# Patient Record
Sex: FEMALE | Race: WHITE | NOT HISPANIC OR LATINO | Employment: OTHER | URBAN - METROPOLITAN AREA
[De-identification: names, ages, dates, MRNs, and addresses within clinical notes are randomized per-mention and may not be internally consistent; named-entity substitution may affect disease eponyms.]

---

## 2017-06-19 ENCOUNTER — TRANSFERRED RECORDS (OUTPATIENT)
Dept: HEALTH INFORMATION MANAGEMENT | Facility: CLINIC | Age: 67
End: 2017-06-19

## 2017-06-19 LAB
ASTHMA CONTROL TEST SCORE: 23
ER ASTHMA: 0
HOSPITALIZATION ASTHMA: 0

## 2017-08-25 DIAGNOSIS — I10 ESSENTIAL HYPERTENSION, BENIGN: ICD-10-CM

## 2017-08-26 NOTE — TELEPHONE ENCOUNTER
hydrochlorothiazide (HYDRODIURIL) 25 MG tablet  Last Written Prescription Date: 10/14/2016  Last Fill Quantity: 90, # refills: 1  Last Office Visit with [facility], [facility] or [facility] prescribing provider: 11/11/2016       Potassium   Date Value Ref Range Status   11/11/2016 4.1 3.4 - 5.3 mmol/L Final     Creatinine   Date Value Ref Range Status   08/19/2016 0.78 0.52 - 1.04 mg/dL Final     BP Readings from Last 3 Encounters:   11/11/16 140/80   10/25/16 128/72   10/14/16 120/80

## 2017-08-28 RX ORDER — HYDROCHLOROTHIAZIDE 25 MG/1
TABLET ORAL
Qty: 90 TABLET | Refills: 0 | Status: SHIPPED | OUTPATIENT
Start: 2017-08-28 | End: 2017-10-04

## 2017-08-28 NOTE — TELEPHONE ENCOUNTER
Medication is being filled for 1 time refill only due to:  Patient needs office visit and labs for more refills.

## 2017-09-08 ENCOUNTER — TELEPHONE (OUTPATIENT)
Dept: FAMILY MEDICINE | Facility: CLINIC | Age: 67
End: 2017-09-08

## 2017-09-08 DIAGNOSIS — M22.41 CHONDROMALACIA OF PATELLA, RIGHT: Primary | ICD-10-CM

## 2017-09-08 NOTE — TELEPHONE ENCOUNTER
9/8/2017    Attempt 1    Contacted patient in regards to scheduling VIP mammogram  Message on voicemail     Patient is also due for - Preventive Health Screening N/A    Comments:       Outreach   KV

## 2017-10-04 ENCOUNTER — RADIANT APPOINTMENT (OUTPATIENT)
Dept: BONE DENSITY | Facility: CLINIC | Age: 67
End: 2017-10-04
Attending: FAMILY MEDICINE
Payer: COMMERCIAL

## 2017-10-04 ENCOUNTER — RADIANT APPOINTMENT (OUTPATIENT)
Dept: GENERAL RADIOLOGY | Facility: CLINIC | Age: 67
End: 2017-10-04
Attending: FAMILY MEDICINE
Payer: COMMERCIAL

## 2017-10-04 ENCOUNTER — OFFICE VISIT (OUTPATIENT)
Dept: FAMILY MEDICINE | Facility: CLINIC | Age: 67
End: 2017-10-04
Payer: COMMERCIAL

## 2017-10-04 ENCOUNTER — TELEPHONE (OUTPATIENT)
Dept: FAMILY MEDICINE | Facility: CLINIC | Age: 67
End: 2017-10-04

## 2017-10-04 VITALS
OXYGEN SATURATION: 96 % | HEIGHT: 65 IN | WEIGHT: 184 LBS | DIASTOLIC BLOOD PRESSURE: 72 MMHG | RESPIRATION RATE: 16 BRPM | BODY MASS INDEX: 30.66 KG/M2 | TEMPERATURE: 96.8 F | HEART RATE: 75 BPM | SYSTOLIC BLOOD PRESSURE: 134 MMHG

## 2017-10-04 DIAGNOSIS — M25.561 ACUTE PAIN OF RIGHT KNEE: ICD-10-CM

## 2017-10-04 DIAGNOSIS — N81.11 CYSTOCELE, MIDLINE: ICD-10-CM

## 2017-10-04 DIAGNOSIS — Z23 NEED FOR PROPHYLACTIC VACCINATION AND INOCULATION AGAINST INFLUENZA: ICD-10-CM

## 2017-10-04 DIAGNOSIS — Z00.00 ROUTINE GENERAL MEDICAL EXAMINATION AT A HEALTH CARE FACILITY: Primary | ICD-10-CM

## 2017-10-04 DIAGNOSIS — Z00.00 ROUTINE GENERAL MEDICAL EXAMINATION AT A HEALTH CARE FACILITY: ICD-10-CM

## 2017-10-04 DIAGNOSIS — R73.03 PRE-DIABETES: ICD-10-CM

## 2017-10-04 DIAGNOSIS — I10 BENIGN ESSENTIAL HYPERTENSION: ICD-10-CM

## 2017-10-04 DIAGNOSIS — M79.644 THUMB PAIN, RIGHT: ICD-10-CM

## 2017-10-04 LAB
ALBUMIN UR-MCNC: NEGATIVE MG/DL
ANION GAP SERPL CALCULATED.3IONS-SCNC: 8 MMOL/L (ref 3–14)
APPEARANCE UR: CLEAR
BILIRUB UR QL STRIP: NEGATIVE
BUN SERPL-MCNC: 16 MG/DL (ref 7–30)
CALCIUM SERPL-MCNC: 10.1 MG/DL (ref 8.5–10.1)
CHLORIDE SERPL-SCNC: 105 MMOL/L (ref 94–109)
CHOLEST SERPL-MCNC: 212 MG/DL
CO2 SERPL-SCNC: 26 MMOL/L (ref 20–32)
COLOR UR AUTO: YELLOW
CREAT SERPL-MCNC: 0.88 MG/DL (ref 0.52–1.04)
GFR SERPL CREATININE-BSD FRML MDRD: 64 ML/MIN/1.7M2
GLUCOSE SERPL-MCNC: 108 MG/DL (ref 70–99)
GLUCOSE UR STRIP-MCNC: NEGATIVE MG/DL
HBA1C MFR BLD: 5.4 % (ref 4.3–6)
HDLC SERPL-MCNC: 78 MG/DL
HGB UR QL STRIP: NEGATIVE
KETONES UR STRIP-MCNC: NEGATIVE MG/DL
LDLC SERPL CALC-MCNC: 120 MG/DL
LEUKOCYTE ESTERASE UR QL STRIP: NEGATIVE
NITRATE UR QL: NEGATIVE
NONHDLC SERPL-MCNC: 134 MG/DL
PH UR STRIP: 6.5 PH (ref 5–7)
POTASSIUM SERPL-SCNC: 4.2 MMOL/L (ref 3.4–5.3)
SODIUM SERPL-SCNC: 139 MMOL/L (ref 133–144)
SOURCE: NORMAL
SP GR UR STRIP: 1.01 (ref 1–1.03)
TRIGL SERPL-MCNC: 70 MG/DL
UROBILINOGEN UR STRIP-ACNC: 0.2 EU/DL (ref 0.2–1)

## 2017-10-04 PROCEDURE — 80061 LIPID PANEL: CPT | Performed by: FAMILY MEDICINE

## 2017-10-04 PROCEDURE — 90662 IIV NO PRSV INCREASED AG IM: CPT | Performed by: FAMILY MEDICINE

## 2017-10-04 PROCEDURE — 81003 URINALYSIS AUTO W/O SCOPE: CPT | Performed by: FAMILY MEDICINE

## 2017-10-04 PROCEDURE — 83036 HEMOGLOBIN GLYCOSYLATED A1C: CPT | Performed by: FAMILY MEDICINE

## 2017-10-04 PROCEDURE — 99397 PER PM REEVAL EST PAT 65+ YR: CPT | Mod: 25 | Performed by: FAMILY MEDICINE

## 2017-10-04 PROCEDURE — 80048 BASIC METABOLIC PNL TOTAL CA: CPT | Performed by: FAMILY MEDICINE

## 2017-10-04 PROCEDURE — 77080 DXA BONE DENSITY AXIAL: CPT | Performed by: INTERNAL MEDICINE

## 2017-10-04 PROCEDURE — G0008 ADMIN INFLUENZA VIRUS VAC: HCPCS | Performed by: FAMILY MEDICINE

## 2017-10-04 PROCEDURE — 73565 X-RAY EXAM OF KNEES: CPT

## 2017-10-04 PROCEDURE — 36415 COLL VENOUS BLD VENIPUNCTURE: CPT | Performed by: FAMILY MEDICINE

## 2017-10-04 PROCEDURE — 99214 OFFICE O/P EST MOD 30 MIN: CPT | Mod: 25 | Performed by: FAMILY MEDICINE

## 2017-10-04 RX ORDER — AMLODIPINE BESYLATE 5 MG/1
5 TABLET ORAL DAILY
Qty: 90 TABLET | Refills: 3 | Status: SHIPPED | OUTPATIENT
Start: 2017-10-04 | End: 2018-12-14

## 2017-10-04 RX ORDER — LATANOPROST 50 UG/ML
1 SOLUTION/ DROPS OPHTHALMIC AT BEDTIME
COMMUNITY

## 2017-10-04 NOTE — LETTER
My Asthma Action Plan  Name: Jennifer Jane   YOB: 1950  Date: 10/4/2017   My doctor: Sun Person MD   My clinic: [facility]        My Control Medicine: { :940504}  My Rescue Medicine: { :106128}  {AAP include Oral Steroid:545090} My Asthma Severity: { :961398}  Avoid your asthma triggers: { :470007}        {Is patient a child or adult?:205243}       GREEN ZONE   Good Control    I feel good    No cough or wheeze    Can work, sleep and play without asthma symptoms       Take your asthma control medicine every day.     1. If exercise triggers your asthma, take your rescue medication    15 minutes before exercise or sports, and    During exercise if you have asthma symptoms  2. Spacer to use with inhaler: If you have a spacer, make sure to use it with your inhaler             YELLOW ZONE Getting Worse  I have ANY of these:    I do not feel good    Cough or wheeze    Chest feels tight    Wake up at night   1. Keep taking your Green Zone medications  2. Start taking your rescue medicine:    every 20 minutes for up to 1 hour. Then every 4 hours for 24-48 hours.  3. If you stay in the Yellow Zone for more than 12-24 hours, contact your doctor.  4. If you do not return to the Green Zone in 12-24 hours or you get worse, start taking your oral steroid medicine if prescribed by your provider.           RED ZONE Medical Alert - Get Help  I have ANY of these:    I feel awful    Medicine is not helping    Breathing getting harder    Trouble walking or talking    Nose opens wide to breathe       1. Take your rescue medicine NOW  2. If your provider has prescribed an oral steroid medicine, start taking it NOW  3. Call your doctor NOW  4. If you are still in the Red Zone after 20 minutes and you have not reached your doctor:    Take your rescue medicine again and    Call 911 or go to the emergency room right away    See your regular doctor within 2 weeks of an Emergency Room  or Urgent Care visit for follow-up treatment.        Electronically signed by: Marjorie Siddiqi, October 4, 2017    Annual Reminders:  Meet with Asthma Educator,  Flu Shot in the Fall, consider Pneumonia Vaccination for patients with asthma (aged 19 and older).    Pharmacy: [facility] [address] [place] [address]                    Asthma Triggers  How To Control Things That Make Your Asthma Worse    Triggers are things that make your asthma worse.  Look at the list below to help you find your triggers and what you can do about them.  You can help prevent asthma flare-ups by staying away from your triggers.      Trigger                                                          What you can do   Cigarette Smoke  Tobacco smoke can make asthma worse. Do not allow smoking in your home, car or around you.  Be sure no one smokes at a child s day care or school.  If you smoke, ask your health care provider for ways to help you quit.  Ask family members to quit too.  Ask your health care provider for a referral to Quit Plan to help you quit smoking, or call 5-973-209-PLAN.     Colds, Flu, Bronchitis  These are common triggers of asthma. Wash your hands often.  Don t touch your eyes, nose or mouth.  Get a flu shot every year.     Dust Mites  These are tiny bugs that live in cloth or carpet. They are too small to see. Wash sheets and blankets in hot water every week.   Encase pillows and mattress in dust mite proof covers.  Avoid having carpet if you can. If you have carpet, vacuum weekly.   Use a dust mask and HEPA vacuum.   Pollen and Outdoor Mold  Some people are allergic to trees, grass, or weed pollen, or molds. Try to keep your windows closed.  Limit time out doors when pollen count is high.   Ask you health care provider about taking medicine during allergy season.     Animal Dander  Some people are allergic to skin flakes, urine or saliva from pets with fur or feathers. Keep pets with fur or feathers  out of your home.    If you can t keep the pet outdoors, then keep the pet out of your bedroom.  Keep the bedroom door closed.  Keep pets off cloth furniture and away from stuffed toys.     Mice, Rats, and Cockroaches  Some people are allergic to the waste from these pests.   Cover food and garbage.  Clean up spills and food crumbs.  Store grease in the refrigerator.   Keep food out of the bedroom.   Indoor Mold  This can be a trigger if your home has high moisture. Fix leaking faucets, pipes, or other sources of water.   Clean moldy surfaces.  Dehumidify basement if it is damp and smelly.   Smoke, Strong Odors, and Sprays  These can reduce air quality. Stay away from strong odors and sprays, such as perfume, powder, hair spray, paints, smoke incense, paint, cleaning products, candles and new carpet.   Exercise or Sports  Some people with asthma have this trigger. Be active!  Ask your doctor about taking medicine before sports or exercise to prevent symptoms.    Warm up for 5-10 minutes before and after sports or exercise.     Other Triggers of Asthma  Cold air:  Cover your nose and mouth with a scarf.  Sometimes laughing or crying can be a trigger.  Some medicines and food can trigger asthma.

## 2017-10-04 NOTE — PROGRESS NOTES
SUBJECTIVE:   Jennifer Jane is a 66 year old female who presents for Preventive Visit.  Are you in the first 12 months of your Medicare coverage?  No    Physical   Annual:     Getting at least 3 servings of Calcium per day::  Yes    Bi-annual eye exam::  Yes    Dental care twice a year::  Yes    Sleep apnea or symptoms of sleep apnea::  None    Diet::  Regular (no restrictions)    Taking medications regularly::  Yes    Medication side effects::  Other    Additional concerns today::  YES      COGNITIVE SCREEN  1) Repeat 3 items (Banana, Sunrise, Chair)    2) Clock draw: NORMAL  3) 3 item recall: Recalls 3 objects  Results: 3 items recalled: COGNITIVE IMPAIRMENT LESS LIKELY    Mini-CogTM Copyright S Konstantin. Licensed by the author for use in [facility]; reprinted with permission (devin@[email or web address]). All rights reserved.        Reviewed and updated as needed this visit by clinical staff       Reviewed and updated as needed this visit by Provider      Social History   Substance Use Topics     Smoking status: Never Smoker     Smokeless tobacco: Never Used     Alcohol use 0.0 oz/week     0 Standard drinks or equivalent per week       The patient does not drink >3 drinks per day nor >7 drinks per week.    PROBLEMS TO ADD ON...  She wishes to also address some pain in the right knee at today's visit. These have been moderate to severe in nature, gradual in onset until last week when helped a friend move and since then has been really in a lot of pain.  Can't go up or down stairs, feels unstable.   Exam shows reduced range of motion of right knee and tenderness medial joint line, positive rosi's, concerning for meniscal tear.  This pain needs further workup - will get xray today and likely MRI if results are negative.  Ordred both today, will call her with results and plan.  In meantime ice, ACE wrap, and OTC or prescription NSAID's are recommended for PRN use, side effects are discussed. Return for further  "discussion if these persist or worsen.    She wishes to also address some pain in the right thumb at today's visit. These have been mild-to-moderate in nature, gradual in onset. Exam shows localized tenderness of right MCP. These pains seem benign and are likely related to osteoarthritis. OTC or prescription NSAID's are recommended for PRN use, side effects are discussed. See hand surgeon to discuss possible corticosteroid injecton and/or would surgery help at this point?  Been over a year and worsening.    Also, she has additional complaints of doesn't like blood pressure med; makes her feel \"dried out\".  What else could she try?  blood pressure still in pre-hypertensive range at 130/70s.  Plan: switch to amlodipine, discussed risks and benefits and side effects, check blood pressure at home daily for next month and if high Return to clinic.    Also, she has additional complaints of needing to double urinate for last year.  On exam mild cystocele with valsalva, otherwise normal.  Plan: check Urinalysis today at patient request, discussed kegels, follow up if worsens.        Today's PHQ-2 Score: PHQ-2 ( 1999 [employer]) 10/4/2017   Q1: Little interest or pleasure in doing things 0   Q2: Feeling down, depressed or hopeless 0   PHQ-2 Score 0   Q1: Little interest or pleasure in doing things Not at all   Q2: Feeling down, depressed or hopeless Not at all   PHQ-2 Score 0       Do you feel safe in your environment - Yes    Do you have a Health Care Directive?: No: Advance care planning reviewed with patient; information given to patient to review.      Current providers sharing in care for this patient include: Patient Care Team:  Sun Person MD as PCP - General (Family Practice)      Hearing impairment: No    Ability to successfully perform activities of daily living: Yes, no assistance needed     Fall risk:         Home safety:  none identified      The following health maintenance items are reviewed in Epic and " "correct as of today:Health Maintenance   Topic Date Due     MAMMO SCREEN Q2 YR (SYSTEM ASSIGNED)  01/02/2017     FALL RISK ASSESSMENT  08/19/2017     INFLUENZA VACCINE (SYSTEM ASSIGNED)  09/01/2017     ASTHMA ACTION PLAN Q1 YR  10/14/2017     ASTHMA CONTROL TEST Q6 MOS  12/19/2017     ADVANCE DIRECTIVE PLANNING Q5 YRS  04/15/2020     TETANUS IMMUNIZATION (SYSTEM ASSIGNED)  01/06/2021     LIPID SCREEN Q5 YR FEMALE (SYSTEM ASSIGNED)  08/19/2021     COLON CANCER SCREEN (SYSTEM ASSIGNED)  04/15/2023     DEXA SCAN SCREENING (SYSTEM ASSIGNED)  Addressed     PNEUMOCOCCAL  Completed     HEPATITIS C SCREENING  Completed     Labs reviewed in EPIC  BP Readings from Last 3 Encounters:   10/04/17 134/72   11/11/16 140/80   10/25/16 128/72    Wt Readings from Last 3 Encounters:   10/04/17 184 lb (83.5 kg)   11/11/16 177 lb (80.3 kg)   10/25/16 174 lb 9.6 oz (79.2 kg)              ROS:  Constitutional, HEENT, cardiovascular, pulmonary, GI, , musculoskeletal, neuro, skin, endocrine and psych systems are negative, except as otherwise noted.      OBJECTIVE:   LMP  (LMP Unknown) Estimated body mass index is 29.45 kg/(m^2) as calculated from the following:    Height as of 11/11/16: 5' 5\" (1.651 m).    Weight as of 11/11/16: 177 lb (80.3 kg).  EXAM:   GENERAL APPEARANCE: healthy, alert and no distress  EYES: Eyes grossly normal to inspection, PERRL and conjunctivae and sclerae normal  HENT: ear canals and TM's normal, nose and mouth without ulcers or lesions, oropharynx clear and oral mucous membranes moist  NECK: no adenopathy, no asymmetry, masses, or scars and thyroid normal to palpation  RESP: lungs clear to auscultation - no rales, rhonchi or wheezes  BREAST: normal without masses, tenderness or nipple discharge and no palpable axillary masses or adenopathy  CV: regular rate and rhythm, normal S1 S2, no S3 or S4, no murmur, click or rub, no peripheral edema and peripheral pulses strong  ABDOMEN: soft, nontender, no " hepatosplenomegaly, no masses and bowel sounds normal   (female): normal female external genitalia, normal urethral meatus, vaginal mucosal atrophy noted, normal cervix, adnexae, and uterus without masses or abnormal discharge  MS: no musculoskeletal defects are noted and gait is age appropriate without ataxia  SKIN: no suspicious lesions or rashes  NEURO: Normal strength and tone, sensory exam grossly normal, mentation intact and speech normal  PSYCH: mentation appears normal and affect normal/bright    ASSESSMENT / PLAN:       ICD-10-CM    1. Routine general medical examination at a health care facility Z00.00 FLU VAC, SPLIT VIRUS IM > 3 YO (QUADRIVALENT) 16564     LIPID REFLEX TO DIRECT LDL PANEL     *MA Screening Digital Bilateral     Basic metabolic panel     DX Hip/Pelvis/Spine     UA reflex to Microscopic   2. Acute pain of right knee M25.561 XR Knee AP Standing Bilateral     MR Knee Left w/o & w Contrast     OFFICE/OUTPT VISIT,EST,LEVL IV    Pain for months, worse since helping friend move.  Exam consistent with meniscal tear.  Check xray today, depending on results MRI.  Ice, NSAIDS for now.  ACE.   3. Thumb pain, right M79.644 OFFICE/OUTPT VISIT,EST,LEVL IV    Has known arthritis.  Worsening.  See hand; they may do cortisone or discuss surgery.  Referred.   4. Benign essential hypertension I10 amLODIPine (NORVASC) 5 MG tablet     OFFICE/OUTPT VISIT,EST,LEVL IV    Pt feels hctz dries her out, would like to switch.  Lotrel not covered.  Will try amlodipine, check BP at home.  If not controlled RTC.   5. Cystocele, midline N81.11 UA reflex to Microscopic     OFFICE/OUTPT VISIT,EST,LEVL IV    Pt need to double void in last year.  On exam cystocele.  Plan: check UA.  Kegels.  Let us know if worsens.   6. Pre-diabetes R73.03 Hemoglobin A1c     OFFICE/OUTPT VISIT,EST,LEVL IV     PROBLEMS TO ADD ON...  She wishes to also address some pain in the right knee at today's visit. These have been moderate to severe in  "nature, gradual in onset until last week when helped a friend move and since then has been really in a lot of pain.  Can't go up or down stairs, feels unstable.   Exam shows reduced range of motion of right knee and tenderness medial joint line, positive rosi's, concerning for meniscal tear.  This pain needs further workup - will get xray today and likely MRI if results are negative.  Ordred both today, will call her with results and plan.  In meantime ice, ACE wrap, and OTC or prescription NSAID's are recommended for PRN use, side effects are discussed. Return for further discussion if these persist or worsen.    She wishes to also address some pain in the right thumb at today's visit. These have been mild-to-moderate in nature, gradual in onset. Exam shows localized tenderness of right MCP. These pains seem benign and are likely related to osteoarthritis. OTC or prescription NSAID's are recommended for PRN use, side effects are discussed. See hand surgeon to discuss possible corticosteroid injecton and/or would surgery help at this point?  Been over a year and worsening.    Also, she has additional complaints of doesn't like blood pressure med; makes her feel \"dried out\".  What else could she try?  blood pressure still in pre-hypertensive range at 130/70s.  Plan: switch to amlodipine, discussed risks and benefits and side effects, check blood pressure at home daily for next month and if high Return to clinic.    Also, she has additional complaints of needing to double urinate for last year.  On exam mild cystocele with valsalva, otherwise normal.  Plan: check Urinalysis today at patient request, discussed jarret, follow up if worsens.      End of Life Planning:  Patient currently has an advanced directive: Yes.  Practitioner is supportive of decision.    COUNSELING:  Reviewed preventive health counseling, as reflected in patient instructions        Estimated body mass index is 29.45 kg/(m^2) as calculated from " "the following:    Height as of 11/11/16: 5' 5\" (1.651 m).    Weight as of 11/11/16: 177 lb (80.3 kg).  Weight management plan: Discussed healthy diet and exercise guidelines and patient will follow up in 12 months in clinic to re-evaluate.   reports that she has never smoked. She has never used smokeless tobacco.        Appropriate preventive services were discussed with this patient, including applicable screening as appropriate for cardiovascular disease, diabetes, osteopenia/osteoporosis, and glaucoma.  As appropriate for age/gender, discussed screening for colorectal cancer, prostate cancer, breast cancer, and cervical cancer. Checklist reviewing preventive services available has been given to the patient.    Reviewed patients plan of care and provided an AVS. The Basic Care Plan (routine screening as documented in Health Maintenance) for Jennifer meets the Care Plan requirement. This Care Plan has been established and reviewed with the Patient.    Counseling Resources:  ATP IV Guidelines  Pooled Cohorts Equation Calculator  Breast Cancer Risk Calculator  FRAX Risk Assessment  ICSI Preventive Guidelines  Dietary Guidelines for Americans, 2010  USDA's MyPlate  ASA Prophylaxis  Lung CA Screening    Sun Person MD  [facility] for [facility] submitted by the patient on 10/4/2017   PHQ-2 Score: 0    "

## 2017-10-04 NOTE — NURSING NOTE
"Chief Complaint   Patient presents with     Physical       Initial /72  Pulse 75  Temp 96.8  F (36  C) (Tympanic)  Resp 16  Ht 5' 5\" (1.651 m)  Wt 184 lb (83.5 kg)  LMP  (LMP Unknown)  SpO2 96%  BMI 30.62 kg/m2 Estimated body mass index is 30.62 kg/(m^2) as calculated from the following:    Height as of this encounter: 5' 5\" (1.651 m).    Weight as of this encounter: 184 lb (83.5 kg).  Medication Reconciliation: complete   .Sulema CALLES      "

## 2017-10-04 NOTE — PROGRESS NOTES
Cayetano Rodriguez:  Your knee xray shows some stress and calcification, signs of wear and tear, but no severe arthritis which is good.  I think you need to get that MRI we talked about - please call to schedule it!  Let me know if you have any questions about this.  Dr. Sun Person MD  [facility]  [phone]

## 2017-10-04 NOTE — PROGRESS NOTES
Good news, Jennifer!   Your lab results are stable.  Let me know if you have any questions.  Dr. Sun Person MD  [facility]  [phone]

## 2017-10-04 NOTE — MR AVS SNAPSHOT
After Visit Summary   10/4/2017    Jennifer Jane    MRN: [id number]           Patient Information     Date Of Birth          1950        Visit Information        Provider Department      10/4/2017 8:20 AM Sun Person MD [facility]        Today's Diagnoses     Routine general medical examination at a health care facility    -  1    Pre-diabetes        Acute pain of right knee        Thumb pain, right        Cystocele, midline          Care Instructions    For your dexa scan:  Call to schedule at [facility]: [phone].    HAND SURGEONS:  [place]:  Dr. Radha Ramirez  [phone]  Dr. Caio Mcgregor  Or  [facility]  Dr. Anuradha Parra [phone]    For your knee:  We'll check an xray today.  Depending on results, you likely will need an MRI.  Call to schedule:  For [place] or [facility] call [phone].      Preventive Health Recommendations    Female Ages 65 +    Yearly exam:     See your health care provider every year in order to  o Review health changes.   o Discuss preventive care.    o Review your medicines if your doctor has prescribed any.      You no longer need a yearly Pap test unless you've had an abnormal Pap test in the past 10 years. If you have vaginal symptoms, such as bleeding or discharge, be sure to talk with your provider about a Pap test.      Every 1 to 2 years, have a mammogram.  If you are over 69, talk with your health care provider about whether or not you want to continue having screening mammograms.      Every 10 years, have a colonoscopy. Or, have a yearly FIT test (stool test). These exams will check for colon cancer.       Have a cholesterol test every 5 years, or more often if your doctor advises it.       Have a diabetes test (fasting glucose) every three years. If you are at risk for diabetes, you should have this test more often.       At age 65, have a bone density scan (DEXA) to check for  osteoporosis (brittle bone disease).    Shots:    Get a flu shot each year.    Get a tetanus shot every 10 years.    Talk to your doctor about your pneumonia vaccines. There are now two you should receive - Pneumovax (PPSV 23) and Prevnar (PCV 13).    Talk to your doctor about the shingles vaccine.    Talk to your doctor about the hepatitis B vaccine.    Nutrition:     Eat at least 5 servings of fruits and vegetables each day.      Eat whole-grain bread, whole-wheat pasta and brown rice instead of white grains and rice.      Talk to your provider about Calcium and Vitamin D.     Lifestyle    Exercise at least 150 minutes a week (30 minutes a day, 5 days a week). This will help you control your weight and prevent disease.      Limit alcohol to one drink per day.      No smoking.       Wear sunscreen to prevent skin cancer.       See your dentist twice a year for an exam and cleaning.    See your eye doctor every 1 to 2 years to screen for conditions such as glaucoma, macular degeneration and cataracts.  Knee Pain with Possible Torn Meniscus    The meniscus is a tough cartilage pad that cushions the inside of the knee joint. It helps absorb the shock from movement. It also spreads the weight of your body evenly across the knee joint. This prevents excess wear and tear to the bones of that joint.  The most common causes of meniscal tears are injuries, especially related to sports and degenerative disease that happens with aging.  A meniscus tear commonly happens during a twisting injury when the knee is bent. This causes pain, swelling, reduced movement of the knee, and trouble walking. There may be popping, clicking, joint locking or inability to completely straighten the knee. Ligaments of the knee may also be injured.  A torn meniscus is diagnosed by physical exam and X-rays. In the case of a severe injury, the knee may be too painful to examine fully. A more accurate exam can be done after the initial swelling goes  down. An MRI may be ordered to make a final diagnosis.  If your healthcare provider suspects a meniscal injury, you will treat your knee with ice and rest and preventing movement of the knee. A splint or knee brace that keeps your leg straight may be put on to protect the joint. Depending on the severity of the injury, surgery may be needed. A cartilage injury may take 4-12 weeks to heal depending on how bad it is.  Home care  Stay off the injured leg as much as possible until you can walk on it without pain. If you have a lot of pain when walking, crutches or a walker may be prescribed. (These can be rented or bought at many pharmacies and surgical or orthopedic supply stores). Follow your healthcare provider's advice about when to begin putting weight on that leg.  Keep your leg elevated to reduce pain and swelling. When sleeping, place a pillow under the injured leg. When sitting, support the injured leg so it is level with your waist. This is very important during the first 48 hours.  Apply an ice pack over the injured area for 15 to 20 minutes every 3 to 6 hours. You should do this for the first 24 to 48 hours. You can make an ice pack by filling a plastic bag that seals at the top with ice cubes and then wrapping it with a thin towel. Continue to use ice packs for relief of pain and swelling as needed. As the ice melts, be careful to avoid getting your wrap, splint, or cast wet. After 48 hours, apply heat (warm shower or warm bath) for 15 to 20 minutes several times a day, or alternate ice and heat. You can place the ice pack directly over the splint. If you have to wear a hook-and-loop knee brace, you can open it to apply the ice pack, or heat, directly to the knee. Never put ice directly on the skin. Always wrap the ice in a towel or other type of cloth.  You may use over-the-counter pain medicine to control pain, unless another pain medicine was prescribed. If you have chronic liver or kidney disease or  ever had a stomach ulcer, talk with your healthcare provider before using these medicines.  If you were given a splint, keep it dry at all times. Bathe with your splint out of the water. Protect it with a large plastic bag that is rubber-banded at the top end. If a fiberglass splint gets wet, you can dry it with a hair dryer. If you have a hook-and-loop knee brace, you can remove this to bathe, unless told otherwise.  Check with your healthcare provider before returning to sports or full work duties.  Follow-up care  Follow up with your healthcare provider, or as advised. This is usually within 1-2 weeks. Further testing may be required to check the extent of your injury.  If X-rays were taken, you will be told of any new findings that may affect your care.  Call 911  Call 911 if you have:   Shortness of breath  Chest pain  When to seek medical advice  Call your healthcare provider right away if any of these occur:  Toes or foot gets swollen, cold, blue, numb, or tingly  Pain or swelling spreads over the knee or calf  Warmth or redness appears over the knee or calf  Fever of 100.4 F (38 C) or above lasting for 24 to 48 hours  Date Last Reviewed: 11/23/2015 2000-2017 [employer]. [address] [place] [address], [place] [address]. All rights reserved. This information is not intended as a substitute for professional medical care. Always follow your healthcare professional's instructions.                  Follow-ups after your visit        Your next 10 appointments already scheduled     Oct 06, 2017 10:45 AM CDT   MA SCREENING DIGITAL BILATERAL with [facility]MA[facility]   [facility]    [address]  [place] [address] [phone]           Do not use any powder, lotion or deodorant under your arms or on your breast. If you do, we will ask you to remove it before your exam.  Wear comfortable, two-piece clothing.  If  you have any allergies, tell your care team.  Bring any previous mammograms from other facilities or have them mailed to the breast center.              Future tests that were ordered for you today     Open Future Orders        Priority Expected Expires Ordered    DX Hip/Pelvis/Spine Routine  10/4/2018 10/4/2017    MR Knee Left w/o & w Contrast Routine  10/4/2018 10/4/2017    XR Knee AP Standing Bilateral Routine 10/4/2017 10/4/2018 10/4/2017    *MA Screening Digital Bilateral Routine  10/4/2018 10/4/2017            Who to contact     If you have questions or need follow up information about today's clinic visit or your schedule please contact [facility] directly at [phone].  Normal or non-critical lab and imaging results will be communicated to you by Sistemichart, letter or phone within 4 business days after the clinic has received the results. If you do not hear from us within 7 days, please contact the clinic through Sistemichart or phone. If you have a critical or abnormal lab result, we will notify you by phone as soon as possible.  Submit refill requests through J[employer] App or call your pharmacy and they will forward the refill request to us. Please allow 3 business days for your refill to be completed.          Additional Information About Your Visit        J[employer] App Information     [employer] App gives you secure access to your electronic health record. If you see a primary care provider, you can also send messages to your care team and make appointments. If you have questions, please call your primary care clinic.  If you do not have a primary care provider, please call [phone] and they will assist you.        Care EveryWhere ID     This is your Care EveryWhere ID. This could be used by other organizations to access your [facility] medical records  [id number]        Your Vitals Were     Pulse Temperature Respirations Height Last Period Pulse Oximetry    75 96.8  F (36  C) (Tympanic)  "16 5' 5\" (1.651 m) (LMP Unknown) 96%    BMI (Body Mass Index)                   30.62 kg/m2            Blood Pressure from Last 3 Encounters:   10/04/17 134/72   11/11/16 140/80   10/25/16 128/72    Weight from Last 3 Encounters:   10/04/17 184 lb (83.5 kg)   11/11/16 177 lb (80.3 kg)   10/25/16 174 lb 9.6 oz (79.2 kg)              We Performed the Following     Basic metabolic panel     FLU VAC, SPLIT VIRUS IM > 3 YO (QUADRIVALENT) 70637     Hemoglobin A1c     LIPID REFLEX TO DIRECT LDL PANEL     UA reflex to Microscopic        Primary Care Provider Office Phone # Fax #    Sun Person [phone] [address] [facility] [address]        Equal Access to Services     TAMARA WYNNE : Hadii aad ku hadasho Soomaali, waaxda luqadaha, qaybta kaalmada aderodyaroscoe, vadim thomas . So [facility] [phone].    ATENCIÓN: Si habla español, tiene a garcia disposición servicios gratuitos de asistencia lingüística. Brody al [phone].    We comply with applicable federal civil rights laws and [place] laws. We do not discriminate on the basis of race, color, national origin, age, disability, sex, sexual orientation, or gender identity.            Thank you!     Thank you for choosing [facility]  for your care. Our goal is always to provide you with excellent care. Hearing back from our patients is one way we can continue to improve our services. Please take a few minutes to complete the written survey that you may receive in the mail after your visit with us. Thank you!             Your Updated Medication List - Protect others around you: Learn how to safely use, store and throw away your medicines at www.disposemymeds.org.          This list is accurate as of: 10/4/17  8:53 AM.  Always use your most recent med list.                   Brand Name Dispense Instructions for use Diagnosis    ADVAIR -21 MCG/ACT Inhaler   Generic drug:  " fluticasone-salmeterol           albuterol 108 (90 BASE) MCG/ACT Inhaler    VENTOLIN HFA    3 Inhaler    Inhale 2 puffs into the lungs every 6 hours as needed for shortness of breath / dyspnea    Intermittent asthma       BENADRYL PO      Take 25 mg by mouth        hydrochlorothiazide 25 MG tablet    HYDRODIURIL    90 tablet    TAKE 1 TABLET (25 MG) BY MOUTH DAILY    Essential hypertension, benign       ibuprofen 200 MG tablet    ADVIL/MOTRIN     Take 1-2 tablets (200-400 mg) by mouth At Bedtime        latanoprost 0.005 % ophthalmic solution    XALATAN     Place 1 drop into the right eye At Bedtime        TUMS PO      Take  by mouth At Bedtime.        ZYRTEC ALLERGY 10 MG tablet   Generic drug:  cetirizine      Take 10 mg by mouth daily.

## 2017-10-04 NOTE — TELEPHONE ENCOUNTER
Patient called and states that the MRI ordered by Dr. Person was for the left knee when it should be for the right knee. Please advise. Thanks!

## 2017-10-04 NOTE — PATIENT INSTRUCTIONS
For your dexa scan:  Call to schedule at [facility]: [phone].    HAND SURGEONS:  [place]/:  Dr. Radha Ramirez  [phone]  Dr. Caio Mcgregor  Or  [facility]  Dr. Anuradha Parra [phone]    For your knee:  We'll check an xray today.  Depending on results, you likely will need an MRI.  Call to schedule:  For [place] or [facility] access hospital call [phone].      Preventive Health Recommendations    Female Ages 65 +    Yearly exam:     See your health care provider every year in order to  o Review health changes.   o Discuss preventive care.    o Review your medicines if your doctor has prescribed any.      You no longer need a yearly Pap test unless you've had an abnormal Pap test in the past 10 years. If you have vaginal symptoms, such as bleeding or discharge, be sure to talk with your provider about a Pap test.      Every 1 to 2 years, have a mammogram.  If you are over 69, talk with your health care provider about whether or not you want to continue having screening mammograms.      Every 10 years, have a colonoscopy. Or, have a yearly FIT test (stool test). These exams will check for colon cancer.       Have a cholesterol test every 5 years, or more often if your doctor advises it.       Have a diabetes test (fasting glucose) every three years. If you are at risk for diabetes, you should have this test more often.       At age 65, have a bone density scan (DEXA) to check for osteoporosis (brittle bone disease).    Shots:    Get a flu shot each year.    Get a tetanus shot every 10 years.    Talk to your doctor about your pneumonia vaccines. There are now two you should receive - Pneumovax (PPSV 23) and Prevnar (PCV 13).    Talk to your doctor about the shingles vaccine.    Talk to your doctor about the hepatitis B vaccine.    Nutrition:     Eat at least 5 servings of fruits and vegetables each day.      Eat whole-grain bread, whole-wheat pasta and brown rice instead of white grains and  rice.      Talk to your provider about Calcium and Vitamin D.     Lifestyle    Exercise at least 150 minutes a week (30 minutes a day, 5 days a week). This will help you control your weight and prevent disease.      Limit alcohol to one drink per day.      No smoking.       Wear sunscreen to prevent skin cancer.       See your dentist twice a year for an exam and cleaning.    See your eye doctor every 1 to 2 years to screen for conditions such as glaucoma, macular degeneration and cataracts.  Knee Pain with Possible Torn Meniscus    The meniscus is a tough cartilage pad that cushions the inside of the knee joint. It helps absorb the shock from movement. It also spreads the weight of your body evenly across the knee joint. This prevents excess wear and tear to the bones of that joint.  The most common causes of meniscal tears are injuries, especially related to sports and degenerative disease that happens with aging.  A meniscus tear commonly happens during a twisting injury when the knee is bent. This causes pain, swelling, reduced movement of the knee, and trouble walking. There may be popping, clicking, joint locking or inability to completely straighten the knee. Ligaments of the knee may also be injured.  A torn meniscus is diagnosed by physical exam and X-rays. In the case of a severe injury, the knee may be too painful to examine fully. A more accurate exam can be done after the initial swelling goes down. An MRI may be ordered to make a final diagnosis.  If your healthcare provider suspects a meniscal injury, you will treat your knee with ice and rest and preventing movement of the knee. A splint or knee brace that keeps your leg straight may be put on to protect the joint. Depending on the severity of the injury, surgery may be needed. A cartilage injury may take 4-12 weeks to heal depending on how bad it is.  Home care  Stay off the injured leg as much as possible until you can walk on it without pain. If  you have a lot of pain when walking, crutches or a walker may be prescribed. (These can be rented or bought at many pharmacies and surgical or orthopedic supply stores). Follow your healthcare provider's advice about when to begin putting weight on that leg.  Keep your leg elevated to reduce pain and swelling. When sleeping, place a pillow under the injured leg. When sitting, support the injured leg so it is level with your waist. This is very important during the first 48 hours.  Apply an ice pack over the injured area for 15 to 20 minutes every 3 to 6 hours. You should do this for the first 24 to 48 hours. You can make an ice pack by filling a plastic bag that seals at the top with ice cubes and then wrapping it with a thin towel. Continue to use ice packs for relief of pain and swelling as needed. As the ice melts, be careful to avoid getting your wrap, splint, or cast wet. After 48 hours, apply heat (warm shower or warm bath) for 15 to 20 minutes several times a day, or alternate ice and heat. You can place the ice pack directly over the splint. If you have to wear a hook-and-loop knee brace, you can open it to apply the ice pack, or heat, directly to the knee. Never put ice directly on the skin. Always wrap the ice in a towel or other type of cloth.  You may use over-the-counter pain medicine to control pain, unless another pain medicine was prescribed. If you have chronic liver or kidney disease or ever had a stomach ulcer, talk with your healthcare provider before using these medicines.  If you were given a splint, keep it dry at all times. Bathe with your splint out of the water. Protect it with a large plastic bag that is rubber-banded at the top end. If a fiberglass splint gets wet, you can dry it with a hair dryer. If you have a hook-and-loop knee brace, you can remove this to bathe, unless told otherwise.  Check with your healthcare provider before returning to sports or full work duties.  Follow-up  care  Follow up with your healthcare provider, or as advised. This is usually within 1-2 weeks. Further testing may be required to check the extent of your injury.  If X-rays were taken, you will be told of any new findings that may affect your care.  Call 911  Call 911 if you have:   Shortness of breath  Chest pain  When to seek medical advice  Call your healthcare provider right away if any of these occur:  Toes or foot gets swollen, cold, blue, numb, or tingly  Pain or swelling spreads over the knee or calf  Warmth or redness appears over the knee or calf  Fever of 100.4 F (38 C) or above lasting for 24 to 48 hours  Date Last Reviewed: 11/23/2015 2000-2017 [employer]. [address] [place] [address], [place] [id number]. All rights reserved. This information is not intended as a substitute for professional medical care. Always follow your healthcare professional's instructions.

## 2017-10-04 NOTE — PROGRESS NOTES
Injectable Influenza Immunization Documentation    1.  Is the person to be vaccinated sick today?   No    2. Does the person to be vaccinated have an allergy to a component   of the vaccine?   No    3. Has the person to be vaccinated ever had a serious reaction   to influenza vaccine in the past?   No    4. Has the person to be vaccinated ever had Guillain-Barré syndrome?   No    Form completed by BRITNEY Siddiqi CMA

## 2017-10-05 ENCOUNTER — HOSPITAL ENCOUNTER (OUTPATIENT)
Dept: MRI IMAGING | Facility: CLINIC | Age: 67
Discharge: HOME | End: 2017-10-05
Attending: FAMILY MEDICINE | Admitting: FAMILY MEDICINE
Payer: COMMERCIAL

## 2017-10-05 DIAGNOSIS — M25.561 ACUTE PAIN OF RIGHT KNEE: ICD-10-CM

## 2017-10-05 PROCEDURE — 73721 MRI JNT OF LWR EXTRE W/O DYE: CPT | Mod: RT

## 2017-10-06 ENCOUNTER — RADIANT APPOINTMENT (OUTPATIENT)
Dept: MAMMOGRAPHY | Facility: CLINIC | Age: 67
End: 2017-10-06
Payer: COMMERCIAL

## 2017-10-06 DIAGNOSIS — Z12.31 VISIT FOR SCREENING MAMMOGRAM: ICD-10-CM

## 2017-10-06 PROCEDURE — G0202 SCR MAMMO BI INCL CAD: HCPCS | Mod: TC

## 2017-10-06 NOTE — PROGRESS NOTES
Roque Rodriguez:  Good news!  Your mammogram looks normal and reassuring.  The breast center will send along a more detailed report.  Let us know if you have any questions about this.  Best,  Dr. Sun Person MD  [facility]  [phone]

## 2017-10-09 ENCOUNTER — MYC MEDICAL ADVICE (OUTPATIENT)
Dept: FAMILY MEDICINE | Facility: CLINIC | Age: 67
End: 2017-10-09

## 2017-10-09 NOTE — TELEPHONE ENCOUNTER
APPT INFORMATION    Date & Time: 10/19/17- 9:30 AM    Reason for Appt: Right Thumb    Referring Name/Clinic: Dr. Soco Person @ [facility]     Yes / No COMMENT / NOTES DATE & ACTION   Patient Contacted? No Records are in Epic.  10/9/17- Closing encounter.        RECORDS CLINIC NAME  (use N/A if no records ) DATE & ACTION RECEIVED RECS & IMG? Y/N   (may include other helpful notes)   Internal Clinics: [facility] 10/9/17 - Records are in [facility].  Yes - Office Note 10/4/17

## 2017-10-10 PROBLEM — M22.41 CHONDROMALACIA OF PATELLA, RIGHT: Status: ACTIVE | Noted: 2017-10-10

## 2017-10-10 NOTE — PROGRESS NOTES
I called Jennifer to discuss her MRI results.  She states has had major issues with this knee for a long time.  Plan:  Will see ortho.  Referred to one of the docs below.  Appointments: [phone]  Dr. Robles or Dr. Hebert    Or     [facility] Location  [phone]    Dr. Sun Person MD/[facility]

## 2017-10-10 NOTE — TELEPHONE ENCOUNTER
I called Jennifer to discuss her MRI results.  She states has had major issues with this knee for a long time.  Plan:  Will see ortho.  Referred to one of the docs below.  Appointments: [phone]  Dr. Robles or Dr. Hebert    Or     [facility] Location  [phone]    Dr. Sun Person MD/[facility]

## 2017-10-11 DIAGNOSIS — M79.644 THUMB PAIN, RIGHT: Primary | ICD-10-CM

## 2017-10-11 NOTE — PROGRESS NOTES
Cayetano Rodriguez:  Your bone density scan is back and shows mild bone thinning, or osteopenia.  Best treatment for this is plenty of calcium (I know you have this one down!) and frequent weight bearing exercise.  Let me know if you have any questions about this.  Dr. Sun Person MD  [facility]  [phone]

## 2017-10-12 DIAGNOSIS — M25.561 CHRONIC PAIN OF RIGHT KNEE: Primary | ICD-10-CM

## 2017-10-12 DIAGNOSIS — G89.29 CHRONIC PAIN OF RIGHT KNEE: Primary | ICD-10-CM

## 2017-10-16 ASSESSMENT — ENCOUNTER SYMPTOMS
JOINT SWELLING: 1
MUSCLE CRAMPS: 1
NECK PAIN: 0
BACK PAIN: 0
STIFFNESS: 1
MYALGIAS: 1
MUSCLE WEAKNESS: 0
ARTHRALGIAS: 1

## 2017-10-17 ENCOUNTER — OFFICE VISIT (OUTPATIENT)
Dept: ORTHOPEDICS | Facility: CLINIC | Age: 67
End: 2017-10-17

## 2017-10-17 VITALS
SYSTOLIC BLOOD PRESSURE: 153 MMHG | BODY MASS INDEX: 30.66 KG/M2 | HEIGHT: 65 IN | WEIGHT: 184 LBS | DIASTOLIC BLOOD PRESSURE: 74 MMHG | HEART RATE: 82 BPM

## 2017-10-17 DIAGNOSIS — M17.11 PRIMARY OSTEOARTHRITIS OF RIGHT KNEE: Primary | ICD-10-CM

## 2017-10-17 NOTE — PROGRESS NOTES
Initial Visit     Referring MD: Sun Person     CC: right knee pain.      HPI: Jennifer Jane is a 66 year old year old female who presents with generalized right knee pain. She first noticed the pain 4 years ago and has been getting worse over time.She was helping someone move on 9/28/17 and noted that the pain increased. The pain is mostly anterior in the knee, and she notes that the pain will sometimes radiates superiorly and inferiorly. No mechanical symptoms. She notes difficulty with stairs, knee flexion, biking, and standing for long periods of time. She is retired. She has tried heat,cold, rest, elevation. Her activity goals are biking and walking for regular exercise.    PMH:   Patient Active Problem List   Diagnosis     Pre-diabetes     Chronic rhinitis     Chronic dacryocystitis     Sciatica     Intermittent asthma with allergic rhinitis     Fibromyalgia     Amaurosis fugax     Chronic angle-closure glaucoma     Pseudoexfoliation glaucoma     Glaucoma suspect     Overweight (BMI 25.0-29.9)     Hypercholesterolemia     Benign essential hypertension     Cystocele, midline     Thumb pain, right     Acute pain of right knee     Chondromalacia of patella, right        PSH:   Past Surgical History:   Procedure Laterality Date     BACK SURGERY  1984    micro-disc-ectomy     BREAST SURGERY  1984    L Breast bx     CHOLECYSTECTOMY  1990     TUBAL LIGATION  1989        Medications:   Current Outpatient Prescriptions   Medication     latanoprost (XALATAN) 0.005 % ophthalmic solution     DiphenhydrAMINE HCl (BENADRYL PO)     amLODIPine (NORVASC) 5 MG tablet     ADVAIR -21 MCG/ACT inhaler     ibuprofen (ADVIL,MOTRIN) 200 MG tablet     albuterol (VENTOLIN HFA) 108 (90 BASE) MCG/ACT inhaler     Calcium Carbonate Antacid (TUMS PO)     cetirizine (ZYRTEC ALLERGY) 10 MG tablet     No current facility-administered medications for this visit.         Allergies:   Allergies   Allergen Reactions     Animal Dander       Dust Mites      Grass      Hibiclens [Chlorhexidine] Itching     Pollen Extract      Trees      Vistaril         SH:   Social History     Occupational History     Not on file.     Social History Main Topics     Smoking status: Never Smoker     Smokeless tobacco: Never Used     Alcohol use 0.0 oz/week     0 Standard drinks or equivalent per week     Drug use: No     Sexual activity: Yes     Partners: Male        ROS: General ROS: negative  Respiratory ROS: no cough, shortness of breath, or wheezing  Cardiovascular ROS: no chest pain or dyspnea on exertion     PE:   Gen: A&OX3    Knee       RIGHT   LEFT   Skin:    Intact   Intact   ROM:     0-110   0-130   Effusion:    Trace  Neg   Medial joint line tenderness: Neg   Neg   Lateral joint line tenderness: Neg   Neg   Juan:    Neg   Neg   Patella crepitus:   Neg   Neg   Patella tenderness:   Neg   Neg   Lachman:    Neg   Neg   Pivot shift:    Neg   Neg   Valgus stress:   Neg   Neg   Varus stress:   Neg   Neg   Posterior drawer:   Neg   Neg   N-V     intact   intact   Hip:     nml   nml   Lower extremity edema:  Neg   Neg    No TTP over condyles or plateau on right  Bilateral quad weakness      XR:   Lateral and Merchant views of the right knee with no acute fractures, dislocations. There is tricompartmental degenerative changes noted.    MRI:  1. Three-compartment chondromalacia. Loose body.  2. Mild effusion.    I have personally reviewed the imaging.         Impression:   66 year old female with right knee DJD and some core and thigh muscle deconditioning.     Plan:   The risks and benefits of the available surgical and non-surgical treatment options were discussed with the patient.  She would like to pursue PT for strengthening, gait training, and proprioceptive exercises.  F/U 2-3 months if no improvement with PT.  Would consider injections or possible bracing.  She understands that at some point in the future she would likely benefit from a TKA, but this is  not necessary at this point.      cc:   Referring provider   [unfilled]     Primary care provider   [address] [facility] [address]

## 2017-10-17 NOTE — MR AVS SNAPSHOT
After Visit Summary   10/17/2017    Jennifer Jane    MRN: [id number]           Patient Information     Date Of Birth          1950        Visit Information        Provider Department      10/17/2017 9:15 AM Yuriy Petersen MD [facility]        Today's Diagnoses     Primary osteoarthritis of right knee    -  1       Follow-ups after your visit        Additional Services     PHYSICAL THERAPY REFERRAL (Internal)       Physical Therapy Referral                  Your next 10 appointments already scheduled     Oct 18, 2017  8:10 AM CDT   (Arrive by 7:55 AM)   [facility] Extremity with Luis Miguel Maxwell PT   [facility] ([facility] [place])    [address] [place] [address] [phone]            Oct 19, 2017  9:10 AM CDT   XR FINGER RIGHT G/E 2 VIEWS with [id number]   [facility] XRay ([facility])    [address] [place] [id number] [phone]           Please bring a list of your current medicines to your exam. (Include vitamins, minerals and over-thecounter medicines.) Leave your valuables at home.  Tell your doctor if there is a chance you may be pregnant.  You do not need to do anything special for this exam.            Oct 19, 2017  9:30 AM CDT   (Arrive by 9:15 AM)   NEW HAND with Lucina Ramirez MD   [facility] ([facility])    [address] [place] [id number] [phone]            Oct 25, 2017  9:30 AM CDT   ROSA MAIRA Extremity with Luis Miguel Maxwell PT   [facility] ([facility] [place])    [address] [place] [address] [phone]            Nov 01, 2017 10:50 AM CDT   [facility] Extremity with [facility] ([facility])    [address] [place] [address] [phone]              Who to contact      "Please call your clinic at [phone] to:    Ask questions about your health    Make or cancel appointments    Discuss your medicines    Learn about your test results    Speak to your doctor   If you have compliments or concerns about an experience at your clinic, or if you wish to file a complaint, please contact [facility] Patient Relations at [phone] or email us at [email or web address]         Additional Information About Your Visit        [employer] Information     [employer] gives you secure access to your electronic health record. If you see a primary care provider, you can also send messages to your care team and make appointments. If you have questions, please call your primary care clinic.  If you do not have a primary care provider, please call [phone] and they will assist you.      [employer] is an electronic gateway that provides easy, online access to your medical records. With [employer], you can request a clinic appointment, read your test results, renew a prescription or communicate with your care team.     To access your existing account, please contact your [facility] or call [phone] for assistance.        Care EveryWhere ID     This is your Care EveryWhere ID. This could be used by other organizations to access your [facility] medical records  [id number]        Your Vitals Were     Pulse Height Last Period BMI (Body Mass Index)          82 5' 5\" (1.651 m) (LMP Unknown) 30.62 kg/m2         Blood Pressure from Last 3 Encounters:   10/17/17 153/74   10/04/17 134/72   11/11/16 140/80    Weight from Last 3 Encounters:   10/17/17 184 lb (83.5 kg)   10/04/17 184 lb (83.5 kg)   11/11/16 177 lb (80.3 kg)              We Performed the Following     PHYSICAL THERAPY REFERRAL (Internal)        Primary Care Provider Office Phone # Fax #    Sun Person [phone] [address] [facility] [address]      "   Equal Access to Services     [facility]: Hadii conor oliveira shayy Somelly, waaxda luqadaha, qaybta kaalkarol doriandarrinroscoe, waxlexie debra sandykyjean pierre thomas . So [facility] [phone].    ATENCIÓN: Si sheila gallardo, tiene a garcia disposición servicios gratuitos de asistencia lingüística. Gwename al [phone].    We comply with applicable federal civil rights laws and [place] laws. We do not discriminate on the basis of race, color, national origin, age, disability, sex, sexual orientation, or gender identity.            Thank you!     Thank you for choosing [facility]  for your care. Our goal is always to provide you with excellent care. Hearing back from our patients is one way we can continue to improve our services. Please take a few minutes to complete the written survey that you may receive in the mail after your visit with us. Thank you!             Your Updated Medication List - Protect others around you: Learn how to safely use, store and throw away your medicines at www.disposemymeds.org.          This list is accurate as of: 10/17/17 10:08 AM.  Always use your most recent med list.                   Brand Name Dispense Instructions for use Diagnosis    ADVAIR -21 MCG/ACT Inhaler   Generic drug:  fluticasone-salmeterol           albuterol 108 (90 BASE) MCG/ACT Inhaler    VENTOLIN HFA    3 Inhaler    Inhale 2 puffs into the lungs every 6 hours as needed for shortness of breath / dyspnea    Intermittent asthma       amLODIPine 5 MG tablet    NORVASC    90 tablet    Take 1 tablet (5 mg) by mouth daily    Benign essential hypertension       BENADRYL PO      Take 25 mg by mouth        ibuprofen 200 MG tablet    ADVIL/MOTRIN     Take 1-2 tablets (200-400 mg) by mouth At Bedtime        latanoprost 0.005 % ophthalmic solution    XALATAN     Place 1 drop into the right eye At Bedtime        TUMS PO      Take  by mouth At Bedtime.        ZYRTEC ALLERGY 10 MG tablet   Generic drug:  cetirizine       Take 10 mg by mouth daily.

## 2017-10-17 NOTE — LETTER
10/17/2017      RE: Jennifer Jane  [address]  [facility] [address]       Initial Visit     Referring MD: Sun Person     CC: right knee pain.      HPI: Jennifer Jane is a 66 year old year old female who presents with generalized right knee pain. She first noticed the pain 4 years ago and has been getting worse over time.She was helping someone move on 9/28/17 and noted that the pain increased. The pain is mostly anterior in the knee, and she notes that the pain will sometimes radiates superiorly and inferiorly. No mechanical symptoms. She notes difficulty with stairs, knee flexion, biking, and standing for long periods of time. She is retired. She has tried heat,cold, rest, elevation. Her activity goals are biking and walking for regular exercise.    PMH:   Patient Active Problem List   Diagnosis     Pre-diabetes     Chronic rhinitis     Chronic dacryocystitis     Sciatica     Intermittent asthma with allergic rhinitis     Fibromyalgia     Amaurosis fugax     Chronic angle-closure glaucoma     Pseudoexfoliation glaucoma     Glaucoma suspect     Overweight (BMI 25.0-29.9)     Hypercholesterolemia     Benign essential hypertension     Cystocele, midline     Thumb pain, right     Acute pain of right knee     Chondromalacia of patella, right        PSH:   Past Surgical History:   Procedure Laterality Date     BACK SURGERY  1984    micro-disc-ectomy     BREAST SURGERY  1984    L Breast bx     CHOLECYSTECTOMY  1990     TUBAL LIGATION  1989        Medications:   Current Outpatient Prescriptions   Medication     latanoprost (XALATAN) 0.005 % ophthalmic solution     DiphenhydrAMINE HCl (BENADRYL PO)     amLODIPine (NORVASC) 5 MG tablet     ADVAIR -21 MCG/ACT inhaler     ibuprofen (ADVIL,MOTRIN) 200 MG tablet     albuterol (VENTOLIN HFA) 108 (90 BASE) MCG/ACT inhaler     Calcium Carbonate Antacid (TUMS PO)     cetirizine (ZYRTEC ALLERGY) 10 MG tablet     No current facility-administered medications  for this visit.         Allergies:   Allergies   Allergen Reactions     Animal Dander      Dust Mites      Grass      Hibiclens [Chlorhexidine] Itching     Pollen Extract      Trees      Vistaril         SH:   Social History     Occupational History     Not on file.     Social History Main Topics     Smoking status: Never Smoker     Smokeless tobacco: Never Used     Alcohol use 0.0 oz/week     0 Standard drinks or equivalent per week     Drug use: No     Sexual activity: Yes     Partners: Male        ROS: General ROS: negative  Respiratory ROS: no cough, shortness of breath, or wheezing  Cardiovascular ROS: no chest pain or dyspnea on exertion     PE:   Gen: A&OX3    Knee       RIGHT   LEFT   Skin:    Intact   Intact   ROM:     0-110   0-130   Effusion:    Trace  Neg   Medial joint line tenderness: Neg   Neg   Lateral joint line tenderness: Neg   Neg   Juan:    Neg   Neg   Patella crepitus:   Neg   Neg   Patella tenderness:   Neg   Neg   Lachman:    Neg   Neg   Pivot shift:    Neg   Neg   Valgus stress:   Neg   Neg   Varus stress:   Neg   Neg   Posterior drawer:   Neg   Neg   N-V     intact   intact   Hip:     nml   nml   Lower extremity edema:  Neg   Neg    No TTP over condyles or plateau on right  Bilateral quad weakness      XR:   Lateral and Merchant views of the right knee with no acute fractures, dislocations. There is tricompartmental degenerative changes noted.    MRI:  1. Three-compartment chondromalacia. Loose body.  2. Mild effusion.    I have personally reviewed the imaging.         Impression:   66 year old female with right knee DJD and some core and thigh muscle deconditioning.     Plan:   The risks and benefits of the available surgical and non-surgical treatment options were discussed with the patient.  She would like to pursue PT for strengthening, gait training, and proprioceptive exercises.  F/U 2-3 months if no improvement with PT.  Would consider injections or possible bracing.  She  understands that at some point in the future she would likely benefit from a TKA, but this is not necessary at this point.      cc:   Referring provider   [unfilled]     Primary care provider   [address] [facility] [place] [address]     Yuriy Petersen MD

## 2017-10-18 ENCOUNTER — THERAPY VISIT (OUTPATIENT)
Dept: PHYSICAL THERAPY | Facility: CLINIC | Age: 67
End: 2017-10-18
Payer: COMMERCIAL

## 2017-10-18 DIAGNOSIS — M25.561 CHRONIC PAIN OF RIGHT KNEE: Primary | ICD-10-CM

## 2017-10-18 DIAGNOSIS — G89.29 CHRONIC PAIN OF RIGHT KNEE: Primary | ICD-10-CM

## 2017-10-18 PROCEDURE — G8978 MOBILITY CURRENT STATUS: HCPCS | Mod: GP | Performed by: PHYSICAL THERAPIST

## 2017-10-18 PROCEDURE — 97110 THERAPEUTIC EXERCISES: CPT | Mod: GP | Performed by: PHYSICAL THERAPIST

## 2017-10-18 PROCEDURE — G8979 MOBILITY GOAL STATUS: HCPCS | Mod: GP | Performed by: PHYSICAL THERAPIST

## 2017-10-18 PROCEDURE — 97161 PT EVAL LOW COMPLEX 20 MIN: CPT | Mod: GP | Performed by: PHYSICAL THERAPIST

## 2017-10-18 ASSESSMENT — ACTIVITIES OF DAILY LIVING (ADL)
HOW_WOULD_YOU_RATE_THE_OVERALL_FUNCTION_OF_YOUR_KNEE_DURING_YOUR_USUAL_DAILY_ACTIVITIES?: ABNORMAL
KNEEL ON THE FRONT OF YOUR KNEE: I AM UNABLE TO DO THE ACTIVITY
KNEE_ACTIVITY_OF_DAILY_LIVING_SCORE: 32.86
HOW_WOULD_YOU_RATE_THE_CURRENT_FUNCTION_OF_YOUR_KNEE_DURING_YOUR_USUAL_DAILY_ACTIVITIES_ON_A_SCALE_FROM_0_TO_100_WITH_100_BEING_YOUR_LEVEL_OF_KNEE_FUNCTION_PRIOR_TO_YOUR_INJURY_AND_0_BEING_THE_INABILITY_TO_PERFORM_ANY_OF_YOUR_USUAL_DAILY_ACTIVITIES?: 50
SQUAT: I AM UNABLE TO DO THE ACTIVITY
GO DOWN STAIRS: I AM UNABLE TO DO THE ACTIVITY
KNEE_ACTIVITY_OF_DAILY_LIVING_SUM: 23
SIT WITH YOUR KNEE BENT: ACTIVITY IS VERY DIFFICULT
AS_A_RESULT_OF_YOUR_KNEE_INJURY,_HOW_WOULD_YOU_RATE_YOUR_CURRENT_LEVEL_OF_DAILY_ACTIVITY?: ABNORMAL
GIVING WAY, BUCKLING OR SHIFTING OF KNEE: THE SYMPTOM AFFECTS MY ACTIVITY SEVERELY
SWELLING: I HAVE THE SYMPTOM BUT IT DOES NOT AFFECT MY ACTIVITY
GO UP STAIRS: ACTIVITY IS VERY DIFFICULT
STIFFNESS: THE SYMPTOM AFFECTS MY ACTIVITY MODERATELY
RAW_SCORE: 23
RISE FROM A CHAIR: ACTIVITY IS VERY DIFFICULT
STAND: ACTIVITY IS MINIMALLY DIFFICULT
WALK: ACTIVITY IS SOMEWHAT DIFFICULT
LIMPING: THE SYMPTOM AFFECTS MY ACTIVITY MODERATELY
WEAKNESS: THE SYMPTOM AFFECTS MY ACTIVITY MODERATELY
PAIN: THE SYMPTOM AFFECTS MY ACTIVITY MODERATELY

## 2017-10-18 NOTE — PROGRESS NOTES
Subjective:    Patient is a 66 year old female presenting with rehab right knee hpi.   Jennifer Jane is a 66 year old female with a right knee condition.  Condition occurred with:  Repetition/overuse.  Condition occurred: in the community.  This is a chronic condition  9/28/17. Patient reports multiple year history of B knee problems. Her R knee pain increased on 9/28/17 with helping friend move. She did a lot of stairs, bending, and squatting on that day..    Patient reports pain:  Anterior and in the joint.    Pain is described as aching and other (pressure) and is intermittent and reported as 3/10.  Associated symptoms:  Loss of motion/stiffness, loss of strength and edema.   Symptoms are exacerbated by ascending stairs, descending stairs, bending/squatting, kneeling and transfers and relieved by rest.  Since onset symptoms are gradually improving.  Special tests:  MRI and x-ray (bone spur, chondromalacia, loose body).      General health as reported by patient is good.  Pertinent medical history includes:  Overweight, high blood pressure and fibromyalgia.  Medical allergies: yes (see chart).  Other surgeries include:  Other and orthopedic surgery (see chart).  Current medications:  High blood pressure medication, pain medication, anti-inflammatory and other (asthma, allergy).  Current occupation is Retired RN.        Barriers include:  Stairs.    Red flags:  Pain at rest/night (consistent with current knee problem).                        Objective:      Gait:    Gait Type:  Antalgic   Assistive Devices:  None  Deviations:  Knee:  Knee extension decr RAnkle:  Push off decr R                                                      Knee Evaluation:  ROM:  Strength wnl knee: Able to perform supine SLR B without extensor lag.  AROM      Extension:  Left: WNL    Right:  Lacking 3 +  Flexion: Left: WNL    Right: 113 +  PROM      Extension: Left:   Right:  Lacking 3 +  Flexion: Left:   Right:  119+      Strength:          Quad Set Left:  WNL    Pain: -   Quad Set Right:  Delayed    Pain: +        Edema:  Edema of the knee: Mild edema R knee.      Functional Testing:          Quad:    Single Leg Squat:  Left:       Right:        Bilateral Leg Squat:  R knee pain  Moderate loss of control, hip substitution and excessive anterior knee excursion              General     ROS    Assessment/Plan:      Patient is a 66 year old female with right side knee complaints.    Patient has the following significant findings with corresponding treatment plan.                Diagnosis 1:  Right knee OA  Pain -  hot/cold therapy, manual therapy, splint/taping/bracing/orthotics, self management, education and home program  Decreased ROM/flexibility - manual therapy, therapeutic exercise and home program  Decreased strength - therapeutic exercise, therapeutic activities and home program  Decreased proprioception - neuro re-education, gait training, therapeutic activities and home program  Impaired gait - gait training and home program  Impaired muscle performance - neuro re-education and home program  Decreased function - therapeutic activities and home program    Therapy Evaluation Codes:   1) History comprised of:   Personal factors that impact the plan of care:      Time since onset of symptoms.    Comorbidity factors that impact the plan of care are:      Fibromyalgia, High blood pressure, Osteoarthritis and Overweight.     Medications impacting care: Anti-inflammatory, High blood pressure and Pain.  2) Examination of Body Systems comprised of:   Body structures and functions that impact the plan of care:      Knee.   Activity limitations that impact the plan of care are:      Bathing, Bending, Cooking, Dressing, Lifting, Sitting, Squatting/kneeling, Stairs, Standing and Walking.  3) Clinical presentation characteristics are:   Stable/Uncomplicated.  4) Decision-Making    Low complexity using standardized patient assessment instrument and/or  measureable assessment of functional outcome.  Cumulative Therapy Evaluation is: Low complexity.    Previous and current functional limitations:  (See Goal Flow Sheet for this information)    Short term and Long term goals: (See Goal Flow Sheet for this information)     Communication ability:  Patient appears to be able to clearly communicate and understand verbal and written communication and follow directions correctly.  Treatment Explanation - The following has been discussed with the patient:   RX ordered/plan of care  Anticipated outcomes  Possible risks and side effects  This patient would benefit from PT intervention to resume normal activities.   Rehab potential is good.    Frequency:  1 X week, once daily  Duration:  for 6 weeks tapering to 1 X every other week over 4 weeks  Discharge Plan:  Achieve all LTG.  Independent in home treatment program.  Reach maximal therapeutic benefit.    Please refer to the daily flowsheet for treatment today, total treatment time and time spent performing 1:1 timed codes.

## 2017-10-19 ENCOUNTER — OFFICE VISIT (OUTPATIENT)
Dept: ORTHOPEDICS | Facility: CLINIC | Age: 67
End: 2017-10-19

## 2017-10-19 ENCOUNTER — PRE VISIT (OUTPATIENT)
Dept: ORTHOPEDICS | Facility: CLINIC | Age: 67
End: 2017-10-19

## 2017-10-19 VITALS — BODY MASS INDEX: 30.66 KG/M2 | WEIGHT: 184 LBS | HEIGHT: 65 IN

## 2017-10-19 DIAGNOSIS — M18.11 ARTHRITIS OF CARPOMETACARPAL (CMC) JOINT OF RIGHT THUMB: Primary | ICD-10-CM

## 2017-10-19 RX ORDER — TRIAMCINOLONE ACETONIDE 40 MG/ML
40 INJECTION, SUSPENSION INTRA-ARTICULAR; INTRAMUSCULAR ONCE
Qty: 1 ML | Refills: 0 | OUTPATIENT
Start: 2017-10-19 | End: 2017-10-19

## 2017-10-19 RX ORDER — LIDOCAINE HYDROCHLORIDE 10 MG/ML
2 INJECTION, SOLUTION INFILTRATION; PERINEURAL ONCE
Qty: 2 ML | Refills: 0 | OUTPATIENT
Start: 2017-10-19 | End: 2017-10-19

## 2017-10-19 NOTE — MR AVS SNAPSHOT
After Visit Summary   10/19/2017    Jennifer Jane    MRN: [id number]           Patient Information     Date Of Birth          1950        Visit Information        Provider Department      10/19/2017 9:30 AM Lucina Ramirez MD [facility]        Today's Diagnoses     Arthritis of carpometacarpal (CMC) joint of right thumb    -  1       Follow-ups after your visit        Additional Services     HAND THERAPY       Hand Therapy Referral    Hand based splint, CMC protocol                  Your next 10 appointments already scheduled     Nov 08, 2017  2:00 PM CST   ROSA MARIA Hand with Gerri Castillo OT   [facility] ([facility])    [address] [place] [id number] [phone]            Nov 15, 2017 12:40 PM CST   ROSA MARIA Extremity with Luis Miguel Maxwell PT   [facility] ([facility] [place])    [address] [place] [id number] [phone]            Nov 29, 2017  9:30 AM CST   ROSA MARIA Extremity with Luis Miguel Maxwell PT   [facility] ([facility] [place])    [address] [place] [id number] [phone]            Jan 25, 2018 10:30 AM CST   (Arrive by 10:15 AM)   RETURN HAND with Lucina Ramirez MD   [facility] ([facility])    [address] [place] [id number] [phone]              Who to contact     Please call your clinic at [phone] to:    Ask questions about your health    Make or cancel appointments    Discuss your medicines    Learn about your test results    Speak to your doctor   If you have compliments or concerns about an experience at your clinic, or if you wish to file a complaint, please contact [facility] Patient Relations at [phone] or email us at [email or web address]         Additional Information About Your  "Visit        Factualhart Information     [employer] gives you secure access to your electronic health record. If you see a primary care provider, you can also send messages to your care team and make appointments. If you have questions, please call your primary care clinic.  If you do not have a primary care provider, please call [phone] and they will assist you.      [employer] is an electronic gateway that provides easy, online access to your medical records. With [employer] you can request a clinic appointment, read your test results, renew a prescription or communicate with your care team.     To access your existing account, please contact your [facility] or call [phone] for assistance.        Care EveryWhere ID     This is your Care EveryWhere ID. This could be used by other organizations to access your [place] medical records  [id number]        Your Vitals Were     Height Last Period BMI (Body Mass Index)             1.651 m (5' 5\") (LMP Unknown) 30.62 kg/m2          Blood Pressure from Last 3 Encounters:   10/17/17 153/74   10/04/17 134/72   11/11/16 140/80    Weight from Last 3 Encounters:   10/19/17 83.5 kg (184 lb)   10/17/17 83.5 kg (184 lb)   10/04/17 83.5 kg (184 lb)              We Performed the Following     HAND THERAPY     Small Joint/Bursa injection and/or drainage (Finger) [20600]          Today's Medication Changes          These changes are accurate as of: 10/19/17 11:59 PM.  If you have any questions, ask your nurse or doctor.               Start taking these medicines.        Dose/Directions    lidocaine 1 % injection   Used for:  Arthritis of carpometacarpal (CMC) joint of right thumb   Started by:  Lucina Ramirez MD        Dose:  2 mL   2 mLs by INTRA-ARTICULAR route once for 1 dose   Quantity:  2 mL   Refills:  0       triamcinolone acetonide 40 MG/ML injection   Commonly known as:  KENALOG   Used for:  Arthritis of carpometacarpal (CMC) joint of " right thumb   Started by:  Lucina Ramirez MD        Dose:  40 mg   1 mL (40 mg) by INTRA-ARTICULAR route once for 1 dose   Quantity:  1 mL   Refills:  0            Where to get your medicines      Some of these will need a paper prescription and others can be bought over the counter.  Ask your nurse if you have questions.     You don't need a prescription for these medications     lidocaine 1 % injection    triamcinolone acetonide 40 MG/ML injection                Primary Care Provider Office Phone # Fax #    Sun Mandi Person [phone]       [facility] [address]        Equal Access to Services     [facility]: Hadii conor oliveira haddylon Vallejo, wathanh rubalcava, qaybta radhaalmaroscoe castle, vadim thomas . [facility] [phone].    ATENCIÓN: Si habla español, tiene a garcia disposición servicios gratuitos de asistencia lingüística. [facility] [phone].    We comply with applicable federal civil rights laws and [place] laws. We do not discriminate on the basis of race, color, national origin, age, disability, sex, sexual orientation, or gender identity.            Thank you!     Thank you for choosing [facility]  for your care. Our goal is always to provide you with excellent care. Hearing back from our patients is one way we can continue to improve our services. Please take a few minutes to complete the written survey that you may receive in the mail after your visit with us. Thank you!             Your Updated Medication List - Protect others around you: Learn how to safely use, store and throw away your medicines at www.disposemymeds.org.          This list is accurate as of: 10/19/17 11:59 PM.  Always use your most recent med list.                   Brand Name Dispense Instructions for use Diagnosis    ADVAIR -21 MCG/ACT Inhaler   Generic drug:  fluticasone-salmeterol           albuterol 108 (90 BASE) MCG/ACT Inhaler     VENTOLIN HFA    3 Inhaler    Inhale 2 puffs into the lungs every 6 hours as needed for shortness of breath / dyspnea    Intermittent asthma       amLODIPine 5 MG tablet    NORVASC    90 tablet    Take 1 tablet (5 mg) by mouth daily    Benign essential hypertension       BENADRYL PO      Take 25 mg by mouth        ibuprofen 200 MG tablet    ADVIL/MOTRIN     Take 1-2 tablets (200-400 mg) by mouth At Bedtime        latanoprost 0.005 % ophthalmic solution    XALATAN     Place 1 drop into the right eye At Bedtime        lidocaine 1 % injection     2 mL    2 mLs by INTRA-ARTICULAR route once for 1 dose    Arthritis of carpometacarpal (CMC) joint of right thumb       triamcinolone acetonide 40 MG/ML injection    KENALOG    1 mL    1 mL (40 mg) by INTRA-ARTICULAR route once for 1 dose    Arthritis of carpometacarpal (CMC) joint of right thumb       TUMS PO      Take  by mouth At Bedtime.        ZYRTEC ALLERGY 10 MG tablet   Generic drug:  cetirizine      Take 10 mg by mouth daily.

## 2017-10-19 NOTE — NURSING NOTE
"Reason For Visit:   Chief Complaint   Patient presents with     Consult     Pain, right thumb. Pt stated that she has been dropping things recently. Pt stated that she also is unable to flatten her right hand. Pt stated that she has trouble cutting vegetables wand using using scissors. Referring:  YOLANDA FRANKLIN       Primary MD: Yolanda Franklin    ?  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    Date of surgery: none  Type of surgery: none.    Smoker: No  Request smoking cessation information: No    Ht 1.651 m (5' 5\")  Wt 83.5 kg (184 lb)  LMP  (LMP Unknown)  BMI 30.62 kg/m2    Pain Assessment  Patient Currently in Pain: Denies  Primary Pain Location: Hand (Thumb)  Pain Orientation: Right  Pain Descriptors: Sharp (intermittent, bee sting)  Aggravating Factors:  (Use)    Hand Dominance Evaluation  Hand Dominance: Left    Pinch force  R hand key force: 6.35 kg (14 lb)  L hand key force: 6.35 kg (14 lb)     force  R hand pincher force: 2.722 kg (6 lb)  R hand  level 2 force: 30.8 kg (68 lb)    L hand pincher force: 3.175 kg (7 lb)  L hand  level  2 force: 38.6 kg (85 lb)                                        Jasmin Barrios LPN    "

## 2017-10-19 NOTE — NURSING NOTE
[address] [address]  Dept: [phone]  ______________________________________________________________________________    Patient: Jennifer Jane   : 1950   MRN: [id number]   2017    INVASIVE PROCEDURE SAFETY CHECKLIST    Date: 2017   Procedure:Right thumb CMC joint injection  Patient Name: Jennifer Jane  MRN: [id number]  YOB: 1950    Action: Complete sections as appropriate. Any discrepancy results in a HARD COPY until resolved.     PRE PROCEDURE:  Patient ID verified with 2 identifiers (name and  or MRN): Yes  Procedure and site verified with patient/designee (when able): Yes  Accurate consent documentation in medical record: Yes  H&P (or appropriate assessment) documented in medical record: Yes  H&P must be up to 20 days prior to procedure and updates within 24 hours of procedure as applicable: Yes  Relevant diagnostic and radiology test results appropriately labeled and displayed as applicable: Yes  Procedure site(s) marked with provider initials: NA    TIMEOUT:  Time-Out performed immediately prior to starting procedure, including verbal and active participation of all team members addressing the following:Yes  * Correct patient identify  * Confirmed that the correct side and site are marked  * An accurate procedure consent form  * Agreement on the procedure to be done  * Correct patient position  * Relevant images and results are properly labeled and appropriately displayed  * The need to administer antibiotics or fluids for irrigation purposes during the procedure as applicable   * Safety precautions based on patient history or medication use    DURING PROCEDURE: Verification of correct person, site, and procedures any time the responsibility for care of the patient is transferred to another member of the care team.     The following medication was given:     MEDICATION:  Lidocaine without  epinephrine  ROUTE: IA  SITE: Right thumb CMC jt  DOSE: 2ml  LOT #: [id number]   : [employer]  EXPIRATION DATE: 06/21  NDC#: 30170-533-41  Was there drug waste? Yes  Amount of drug waste (mL): 18.  Reason for waste:  Single use vial    MEDICATION:  Kenalog 40 mg  ROUTE: IA  SITE: Right thumb CMC jt.  DOSE: 1ml  LOT #: [id number]  : [employer]  EXPIRATION DATE: 02/2019  NDC#: 8864-4080-41  Was there drug waste? No    Jo Ann Palumbo, ATC  October 19, 2017

## 2017-10-19 NOTE — PROGRESS NOTES
Orthopaedic Surgery Clinic Consult  Date of Service: 10/19/17    Chief Complaint: R wrist pain    History of Present Illness:  Pt is a RHD 66 year old female, healthy, presenting with R radial wrist pain.  Present for several years, has tried NSAIDs and thumb/wrist brace.  No injections.  Difficult to manipulate objects, very bothersome.  No numb/tingling.  Has developed thumb stiffness.    Past Medical History:  Past Medical History:   Diagnosis Date     Arthritis     Fibromyalgia, ? Osteoarthritis     Back injury Rutured disc 1983    Micro discectomy, approx date 1983     Insomnia 12/26/2012     Problem list name updated by automated process. Provider to review     Reduced vision 9/2016    L vision block, bilateral elevated eye presures,poss. Glacom     Uncomplicated asthma     Diagnosed as 1 yo       Past Surgical History:  Past Surgical History:   Procedure Laterality Date     BACK SURGERY  1984    micro-disc-ectomy     BREAST SURGERY  1984    L Breast bx     C STOMACH SURGERY PROCEDURE UNLISTED  3/1990    Cholecystectomy, 11/1989 tubal ligation     CHOLECYSTECTOMY  1990     HC SACROPLASTY      Micro discectomy approx 1983     TUBAL LIGATION  1989       Medications:  Current Outpatient Prescriptions   Medication Sig Dispense Refill     latanoprost (XALATAN) 0.005 % ophthalmic solution Place 1 drop into the right eye At Bedtime       DiphenhydrAMINE HCl (BENADRYL PO) Take 25 mg by mouth       amLODIPine (NORVASC) 5 MG tablet Take 1 tablet (5 mg) by mouth daily 90 tablet 3     ADVAIR -21 MCG/ACT inhaler        ibuprofen (ADVIL,MOTRIN) 200 MG tablet Take 1-2 tablets (200-400 mg) by mouth At Bedtime       albuterol (VENTOLIN HFA) 108 (90 BASE) MCG/ACT inhaler Inhale 2 puffs into the lungs every 6 hours as needed for shortness of breath / dyspnea 3 Inhaler 1     Calcium Carbonate Antacid (TUMS PO) Take  by mouth At Bedtime.       cetirizine (ZYRTEC ALLERGY) 10 MG tablet Take 10 mg by mouth daily.          Allergies:     Allergies   Allergen Reactions     Animal Dander      Dust Mites      Grass      Hibiclens [Chlorhexidine] Itching     Pollen Extract      Trees      Vistaril        Social History:  Quite active with knitting, baking, etc.  Retired school nurse.  Social History     Social History     Marital status:      Spouse name: N/A     Number of children: N/A     Years of education: N/A     Occupational History     Not on file.     Social History Main Topics     Smoking status: Never Smoker     Smokeless tobacco: Never Used     Alcohol use 0.0 oz/week      Comment: 0 -2 times a year     Drug use: No     Sexual activity: Yes     Partners: Male     Birth control/ protection: Post-menopausal, Female Surgical     Other Topics Concern     Not on file     Social History Narrative         Family History:  Family History   Problem Relation Age of Onset     HEART DISEASE Mother      DIABETES Mother      Coronary Artery Disease Mother      Hypertension Mother      Anesthesia Reaction Mother      Anaphylaxis, oral opioixd     Asthma Mother      Thyroid Disease Mother      Low Back Problems Mother      HEART DISEASE Father      DIABETES Father      Coronary Artery Disease Father      Hypertension Father      Hyperlipidemia Father      Low Back Problems Father      Asthma Maternal Grandmother      Coronary Artery Disease Maternal Grandfather      CEREBROVASCULAR DISEASE Paternal Grandfather      DIABETES Sister      Hypertension Sister      Low Back Problems Sister      Asthma Daughter      Low Back Problems Brother      DIABETES No family hx of      Coronary Artery Disease No family hx of      Hypertension No family hx of      Hyperlipidemia No family hx of      CEREBROVASCULAR DISEASE No family hx of      Breast Cancer No family hx of      Colon Cancer No family hx of      Prostate Cancer No family hx of      Other Cancer No family hx of      Depression No family hx of      Anxiety Disorder No family hx of   "    MENTAL ILLNESS No family hx of      Substance Abuse No family hx of      Anesthesia Reaction No family hx of      Asthma No family hx of      OSTEOPOROSIS No family hx of      Genetic Disorder No family hx of      Thyroid Disease No family hx of      Obesity No family hx of      Unknown/Adopted No family hx of      Negative for blood clots, bleeding disorders or anesthesia related complications.    Review of Systems:  10 point review of systems is reviewed and is available below.    Physical Exam:  Ht 1.651 m (5' 5\")  Wt 83.5 kg (184 lb)  LMP  (LMP Unknown)  BMI 30.62 kg/m2  A&O, NAD  HEENT: NCAT, EOMI  Neuro: CN II-XII grossly intact  Neck: Full AROM without difficulty  Resp: equal and nonlabored  CV: RRR  Extremities:  RUE:   Mild deformity at thumb CMC joint.  Tender here, less so at STT joint.  Pain and crepitus with load and grind of thumb.     Thumb has developed adduction positioning.  Mild MCP hyperextensibility passively, no active hyperextension noted.   SILT m/u/r/ax nerve distributions, SILT in rad/ulnar aspects of all digits.   Fires interossei, EPL (thumbs up), FDP (a-o-k) 5/5.     Small palpable nodule at index finger, painful with palpation at A1.  No triggering.   All fingers wwp, radial pulse 2+    Imaging:  XR R wrist demonstrates R thumb CMC>STT arthritis    Assessment:  Pt is a RHD 66 year old female, with R thumb CMC arthritis.  Possible also early stage R index trigger finger.  Has failed bracing and NSAID's    Plan:  -Plan for R thumb CMC injection today.  -Activities as tolerated  -RTC PRN      Procedure:  Written consent was obtained from the patient.  R thumb CMC skin was steriley prepped. 1 ml kenalog and 1ml 1% lidocaine plain were drawn up into separate syringes and injection was performed under sterile conditions to the thumb flexor tendon sheath, using a single 27 gauge needle.  Injection with lidocaine was performed first, and joint localized under C-arm.  Then the steroid " syringe was loaded instead, and steroid injection performed to the CMC joint.  Patient tolerated this well, no complications.  Wound dressed with a bandaid.      I have personally examined this patient and have reviewed the clinical presentation and progress note with the resident. I agree with the treatment plan as outlined. The plan was formulated with the resident on the day of the resident's dictation.       Reggie Frances MD  PGY-4, Orthopaedic Surgery  [phone]          Answers for HPI/ROS submitted by the patient on 10/16/2017   General Symptoms: No  Skin Symptoms: No  HENT Symptoms: No  EYE SYMPTOMS: No  HEART SYMPTOMS: No  LUNG SYMPTOMS: No  INTESTINAL SYMPTOMS: No  URINARY SYMPTOMS: No  GYNECOLOGIC SYMPTOMS: No  BREAST SYMPTOMS: No  SKELETAL SYMPTOMS: Yes  BLOOD SYMPTOMS: No  NERVOUS SYSTEM SYMPTOMS: No  MENTAL HEALTH SYMPTOMS: No  Back pain: No  Muscle aches: Yes  Neck pain: No  Swollen joints: Yes  Joint pain: Yes  Bone pain: Yes  Muscle cramps: Yes  Muscle weakness: No  Joint stiffness: Yes  Bone fracture: No

## 2017-10-19 NOTE — LETTER
10/19/2017       RE: Jennifer Jane  [address]  [facility] [address]     Dear Colleague,    Thank you for referring your patient, Jennifer Jane, to the [facility] ORTHOPAEDIC [facility]IC at [facility] acute [facility]. Please see a copy of my visit note below.    Orthopaedic Surgery Clinic Consult  Date of Service: 10/19/17    Chief Complaint: R wrist pain    History of Present Illness:  Pt is a RHD 66 year old female, healthy, presenting with R radial wrist pain.  Present for several years, has tried NSAIDs and thumb/wrist brace.  No injections.  Difficult to manipulate objects, very bothersome.  No numb/tingling.  Has developed thumb stiffness.    Past Medical History:  Past Medical History:   Diagnosis Date     Arthritis     Fibromyalgia, ? Osteoarthritis     Back injury Rutured disc 1983    Micro discectomy, approx date 1983     Insomnia 12/26/2012     Problem list name updated by automated process. Provider to review     Reduced vision 9/2016    L vision block, bilateral elevated eye presures,poss. Glacom     Uncomplicated asthma     Diagnosed as 1 yo       Past Surgical History:  Past Surgical History:   Procedure Laterality Date     BACK SURGERY  1984    micro-disc-ectomy     BREAST SURGERY  1984    L Breast bx     C STOMACH SURGERY PROCEDURE UNLISTED  3/1990    Cholecystectomy, 11/1989 tubal ligation     CHOLECYSTECTOMY  1990     HC SACROPLASTY      Micro discectomy approx 1983     TUBAL LIGATION  1989       Medications:  Current Outpatient Prescriptions   Medication Sig Dispense Refill     latanoprost (XALATAN) 0.005 % ophthalmic solution Place 1 drop into the right eye At Bedtime       DiphenhydrAMINE HCl (BENADRYL PO) Take 25 mg by mouth       amLODIPine (NORVASC) 5 MG tablet Take 1 tablet (5 mg) by mouth daily 90 tablet 3     ADVAIR -21 MCG/ACT inhaler        ibuprofen (ADVIL,MOTRIN) 200 MG tablet Take 1-2 tablets (200-400 mg) by mouth At Bedtime       albuterol (VENTOLIN  HFA) 108 (90 BASE) MCG/ACT inhaler Inhale 2 puffs into the lungs every 6 hours as needed for shortness of breath / dyspnea 3 Inhaler 1     Calcium Carbonate Antacid (TUMS PO) Take  by mouth At Bedtime.       cetirizine (ZYRTEC ALLERGY) 10 MG tablet Take 10 mg by mouth daily.         Allergies:     Allergies   Allergen Reactions     Animal Dander      Dust Mites      Grass      Hibiclens [Chlorhexidine] Itching     Pollen Extract      Trees      Vistaril        Social History:  Quite active with knitting, baking, etc.  Retired school nurse.  Social History     Social History     Marital status:      Spouse name: N/A     Number of children: N/A     Years of education: N/A     Occupational History     Not on file.     Social History Main Topics     Smoking status: Never Smoker     Smokeless tobacco: Never Used     Alcohol use 0.0 oz/week      Comment: 0 -2 times a year     Drug use: No     Sexual activity: Yes     Partners: Male     Birth control/ protection: Post-menopausal, Female Surgical     Other Topics Concern     Not on file     Social History Narrative         Family History:  Family History   Problem Relation Age of Onset     HEART DISEASE Mother      DIABETES Mother      Coronary Artery Disease Mother      Hypertension Mother      Anesthesia Reaction Mother      Anaphylaxis, oral opioixd     Asthma Mother      Thyroid Disease Mother      Low Back Problems Mother      HEART DISEASE Father      DIABETES Father      Coronary Artery Disease Father      Hypertension Father      Hyperlipidemia Father      Low Back Problems Father      Asthma Maternal Grandmother      Coronary Artery Disease Maternal Grandfather      CEREBROVASCULAR DISEASE Paternal Grandfather      DIABETES Sister      Hypertension Sister      Low Back Problems Sister      Asthma Daughter      Low Back Problems Brother      DIABETES No family hx of      Coronary Artery Disease No family hx of      Hypertension No family hx of       "Hyperlipidemia No family hx of      CEREBROVASCULAR DISEASE No family hx of      Breast Cancer No family hx of      Colon Cancer No family hx of      Prostate Cancer No family hx of      Other Cancer No family hx of      Depression No family hx of      Anxiety Disorder No family hx of      MENTAL ILLNESS No family hx of      Substance Abuse No family hx of      Anesthesia Reaction No family hx of      Asthma No family hx of      OSTEOPOROSIS No family hx of      Genetic Disorder No family hx of      Thyroid Disease No family hx of      Obesity No family hx of      Unknown/Adopted No family hx of      Negative for blood clots, bleeding disorders or anesthesia related complications.    Review of Systems:  10 point review of systems is reviewed and is available below.    Physical Exam:  Ht 1.651 m (5' 5\")  Wt 83.5 kg (184 lb)  LMP  (LMP Unknown)  BMI 30.62 kg/m2  A&O, NAD  HEENT: NCAT, EOMI  Neuro: CN II-XII grossly intact  Neck: Full AROM without difficulty  Resp: equal and nonlabored  CV: RRR  Extremities:  RUE:   Mild deformity at thumb CMC joint.  Tender here, less so at STT joint.  Pain and crepitus with load and grind of thumb.     Thumb has developed adduction positioning.  Mild MCP hyperextensibility passively, no active hyperextension noted.   SILT m/u/r/ax nerve distributions, SILT in rad/ulnar aspects of all digits.   Fires interossei, EPL (thumbs up), FDP (a-o-k) 5/5.     Small palpable nodule at index finger, painful with palpation at A1.  No triggering.   All fingers wwp, radial pulse 2+    Imaging:  XR R wrist demonstrates R thumb CMC>STT arthritis    Assessment:  Pt is a RHD 66 year old female, with R thumb CMC arthritis.  Possible also early stage R index trigger finger.  Has failed bracing and NSAID's    Plan:  -Plan for R thumb CMC injection today.  -Activities as tolerated  -RTC PRN      Procedure:  Written consent was obtained from the patient.  R thumb CMC skin was steriley prepped. 1 ml " kenalog and 1ml 1% lidocaine plain were drawn up into separate syringes and injection was performed under sterile conditions to the thumb flexor tendon sheath, using a single 27 gauge needle.  Injection with lidocaine was performed first, and joint localized under C-arm.  Then the steroid syringe was loaded instead, and steroid injection performed to the CMC joint.  Patient tolerated this well, no complications.  Wound dressed with a bandaid.      I have personally examined this patient and have reviewed the clinical presentation and progress note with the resident. I agree with the treatment plan as outlined. The plan was formulated with the resident on the day of the resident's dictation.       Reggie Frances MD  PGY-4, Orthopaedic Surgery  [phone]      Again, thank you for allowing me to participate in the care of your patient.      Sincerely,    Lucina Ramirez MD

## 2017-10-23 ENCOUNTER — THERAPY VISIT (OUTPATIENT)
Dept: OCCUPATIONAL THERAPY | Facility: CLINIC | Age: 67
End: 2017-10-23
Payer: COMMERCIAL

## 2017-10-23 DIAGNOSIS — M79.644 THUMB PAIN, RIGHT: Primary | ICD-10-CM

## 2017-10-23 DIAGNOSIS — M18.11 OSTEOARTHRITIS OF CARPOMETACARPAL JOINT OF RIGHT THUMB, UNSPECIFIED OSTEOARTHRITIS TYPE: ICD-10-CM

## 2017-10-23 PROCEDURE — G8985 CARRY GOAL STATUS: HCPCS | Mod: GO | Performed by: OCCUPATIONAL THERAPIST

## 2017-10-23 PROCEDURE — 97165 OT EVAL LOW COMPLEX 30 MIN: CPT | Mod: GO | Performed by: OCCUPATIONAL THERAPIST

## 2017-10-23 PROCEDURE — G8984 CARRY CURRENT STATUS: HCPCS | Mod: GO | Performed by: OCCUPATIONAL THERAPIST

## 2017-10-23 PROCEDURE — 97110 THERAPEUTIC EXERCISES: CPT | Mod: GO | Performed by: OCCUPATIONAL THERAPIST

## 2017-10-23 PROCEDURE — 29130 APPL FINGER SPLINT STATIC: CPT | Mod: GO | Performed by: OCCUPATIONAL THERAPIST

## 2017-10-23 NOTE — PROGRESS NOTES
Hand Therapy Initial Evaluation    Current Date:  10/23/2017     Diagnosis:  Right  thumb pain    M18.11 Arthritis of carpometacarpal (CMC) joint of right thumb  Date of hand therapy orders: 10/19/17  Procedure:  CMC R thumb injection on 10/19/17  Referring MD: Lucina Ramirez MD   Next MD visit: January 2018    Subjective:      Subjective:  Jennifer Jane is a 66 year old  Female. She writes with her L hand and otherwise uses her R hand.    Patient reports symptoms of pain, stiffness/loss of motion and weakness/loss of strength of the right thumb which occurred due to onset over time. Since onset symptoms are Gradually getting worse.  Special tests:  x-ray.  Previous treatment: injection 10/19/17.    General health as reported by patient is good.  Pertinent medical history includes:implanted dental device, asthma, rare numbness/tingling, thumb pain at rest/night, weakness in R hand, glaucoma narrow angle  Medical allergies:hibiclens, vistaril.  Surgical history: other: microdiscectomy.  Medication history: anti-inflammatory, pain, Vit. D    Imaging per chart 10/20/17:  XR R wrist demonstrates R thumb CMC>STT arthritis    Occupational Profile Information:  Current occupation is retired (worked as an RN in pediatrics)  Daily Tasks: prolonged sitting, operating a machine/assembly, prolonged standing, driving, lifting/carrying, other sewing, cooking / cutting.  Prior functional level:  no limitations  Barriers include:none  Mobility: No difficulty  Transportation: drives  Leisure activities/hobbies: Pt enjoys sewing and reports it is difficult to  a needle. She would like to resume playign the flute, piano, ceramics. She likes cooking and baking including kneading dough.  Other: Pt notes she has difficulty using scissors and would like this to be easier. She would live to be able to shovel this winter.    Functional Outcome Measure:  Please refer to flow sheet.      Objective:  Pain Level Report  VAS(0-10)  10/23/2017   At Rest: 0   With Use: 3-5/10     Report of Pain:  Location:  Thumb of R hand, CMCj  Pain Quality:  Aching  Frequency: intermittent    Pain is worst:  daytime or nighttime  Exacerbated by:  Key pinch motion  Relieved by:  rest  Progression:  Improving since injection  ROM:  Pain Report:  - none    + mild    ++ moderate    +++ severe   Thumb 10/23/2017    AROM(PROM) R L   MP  62  60   IP  62 77    RAbd       PAbd       Retropulsion (0-4)       Kapandji Opposition Scale (0-10/10) 10   10       Thumb Observation/Appearance:  Key: + = present/ - = not observed    10/23/2017   Tenderness at CMC R:  tends to be sore to volar CMC, just proximal to CMC of thumb (not today, 5 days post injection  L:      Provocative Tests: deferred, please refer to chart for recent MD diagnosis and imaging.    Thumb ROM IMD  Intermetacarpal Distance Measure in mm-digital caliper  10/23/2017  Note: Pt is 5 days s/p injection to R thumb CMC Rabd PABD    RIGHT LEFT RIGHT LEFT   IMD RATER #1 65.87 59.84 66.83 59.58   IMD RATER #2 65.49 59.90 67.39 61.75   PAIN (NRS) #1 0 0 0 0   PAIN (NRS) #2 0 0 0 0       Strength (Pain free):   (Measured in pounds)  Pain Report:  - none    + mild    ++ moderate    +++ severe    10/23/2017    Trials R L   1   69 83     Lat Pinch 10/23/2017    Trials R L   1   16 17     3 Pt Pinch 10/23/2017    Trials R L   1 13 12.5     Assessment:  Patient presents with symptoms consistent with diagnosis of R CMC thumb arthritis, with conservative intervention.    Patient s limitations or Problem List includes: Pain, Decreased ROM/motion, weakness, decreased stability of the CMC joint, decreased  and pinch strength of the thumb which interferes with patients ability to perform  Self Care Tasks (dressing), Recreational Activities and Household Chores as compared to previous level of function.    Rehab Potential: Good- Return to full activity, some limitations.    Patient will benefit from skilled  Occupational Therapy to increased ROM, flexibility, pinch strength, and stability of the thumb and decrease pain to return to previous activity level and resume normal daily tasks and to reach their rehab potential.    Barriers to Learning:  No barrier    Communication Issues: Patient appears to be able to clearly communicate and understand verbal and written communication and follow directions correctly.    Chart Review: Brief history including review of medical and/or therapy records relating to the presenting problem and Simple history review with patient    Identified Performance Deficits: dressing, hygiene and grooming, home establishment and management, meal preparation and cleanup, shopping and leisure activities    Assessment of Occupational Performance:  3-5 Performance Deficits    Clinical Decision Making (Complexity): Low complexity    Treatment Explanations:  The following has been discussed with the patient,  Rx ordered/plan of care  Anticipated outcomes  Possible risks and side effects    Plan:  Frequency:  2 X a month, once daily  Duration:  for 3 months    Treatment Plan:  Modalities:  Paraffin  Therapeutic Exercise: AROM, Isometrics, and Stabilization exercises of the Thumb CMC, including active and resisted abduction, 1st DI strengthening  Manual Techniques: Joint Mobilization or reseating of the trapezium, self MFR to thumb adductor with clip  Orthosis fabrication:  Hand based Thumb Spica, Custom neoprene support   Education: Anatomy of CMC, joint protection principles, adaptive equipment as needed    Discharge Plan:  Achieve all LTG  [place] in home treatment program.  Reach maximal therapeutic benefit.    Home Program:  Thumb webspace stretch after clip use  1st DI with RB      Next Visit:  Adaptive aides, progress strength and stability

## 2017-10-23 NOTE — MR AVS SNAPSHOT
After Visit Summary   10/23/2017    Jennifer Jane    MRN: [id number]           Patient Information     Date Of Birth          1950        Visit Information        Provider Department      10/23/2017 11:30 AM Gerri Castillo OT M [facility] Hand Therapy        Today's Diagnoses     Thumb pain, right    -  1    Osteoarthritis of carpometacarpal joint of right thumb, unspecified osteoarthritis type           Follow-ups after your visit        Your next 10 appointments already scheduled     Oct 25, 2017  9:30 AM CDT   ROSA MARIA Extremity with Luis Miguel Maxwell PT   [facility] [place] ([facility] [place])    [address] [place] [address]   [phone]            Nov 01, 2017 10:50 AM CDT   ROSA MARIA Extremity with Luis Miguel Maxwell PT   [facility] [place] ([facility] [place])    [address] [place] [address]   [phone]            Nov 08, 2017  2:00 PM CST   ROSA MARIA Hand with ROSINA Smith [facility] Hand Therapy ([facility])    [address] [place] [id number] [phone]            Jan 25, 2018 10:30 AM CST   (Arrive by 10:15 AM)   RETURN HAND with Lucina Ramirez MD   [facility] ([facility])    [address] [place] [id number] [phone]              Who to contact     If you have questions or need follow up information about today's clinic visit or your schedule please contact [facility] HAND THERAPY directly at [phone].  Normal or non-critical lab and imaging results will be communicated to you by MyChart, letter or phone within 4 business days after the clinic has received the results. If you do not hear from us within 7 days, please contact the clinic through MyChart or phone. If you have a critical or abnormal lab result, we will notify you by phone as soon as possible.  Submit refill requests through  [employer] or call your pharmacy and they will forward the refill request to us. Please allow 3 business days for your refill to be completed.          Additional Information About Your Visit        MyChart Information     [employer] gives you secure access to your electronic health record. If you see a primary care provider, you can also send messages to your care team and make appointments. If you have questions, please call your primary care clinic.  If you do not have a primary care provider, please call [phone] and they will assist you.        Care EveryWhere ID     This is your Care EveryWhere ID. This could be used by other organizations to access your [facility] medical records  [id number]        Your Vitals Were     Last Period                   (LMP Unknown)            Blood Pressure from Last 3 Encounters:   10/17/17 153/74   10/04/17 134/72   11/11/16 140/80    Weight from Last 3 Encounters:   10/19/17 83.5 kg (184 lb)   10/17/17 83.5 kg (184 lb)   10/04/17 83.5 kg (184 lb)              We Performed the Following     APPLY FINGER SPLINT STATIC     HC OT EVAL, LOW COMPLEXITY     THERAPEUTIC EXERCISES        Primary Care Provider Office Phone # Fax #    Sun Person [phone] [address] [id number]        Equal Access to Services     TAMARA WYNNE : Hadii conor ku hadasho Soomaali, waaxda luqadaha, qaybta kaalmada adeegyada, waxay debra thomas . So [facility] [phone].    ATENCIÓN: Si habla español, tiene a garcia disposición servicios gratuitos de asistencia lingüística. Llame al [phone].    We comply with applicable federal civil rights laws and [place] laws. We do not discriminate on the basis of race, color, national origin, age, disability, sex, sexual orientation, or gender identity.            Thank you!     Thank you for choosing [facility] HAND THERAPY  for your care. Our goal is always to provide you with excellent care. Hearing back  from our patients is one way we can continue to improve our services. Please take a few minutes to complete the written survey that you may receive in the mail after your visit with us. Thank you!             Your Updated Medication List - Protect others around you: Learn how to safely use, store and throw away your medicines at www.disposemymeds.org.          This list is accurate as of: 10/23/17  2:09 PM.  Always use your most recent med list.                   Brand Name Dispense Instructions for use Diagnosis    ADVAIR -21 MCG/ACT Inhaler   Generic drug:  fluticasone-salmeterol           albuterol 108 (90 BASE) MCG/ACT Inhaler    VENTOLIN HFA    3 Inhaler    Inhale 2 puffs into the lungs every 6 hours as needed for shortness of breath / dyspnea    Intermittent asthma       amLODIPine 5 MG tablet    NORVASC    90 tablet    Take 1 tablet (5 mg) by mouth daily    Benign essential hypertension       BENADRYL PO      Take 25 mg by mouth        ibuprofen 200 MG tablet    ADVIL/MOTRIN     Take 1-2 tablets (200-400 mg) by mouth At Bedtime        latanoprost 0.005 % ophthalmic solution    XALATAN     Place 1 drop into the right eye At Bedtime        TUMS PO      Take  by mouth At Bedtime.        ZYRTEC ALLERGY 10 MG tablet   Generic drug:  cetirizine      Take 10 mg by mouth daily.

## 2017-10-25 ENCOUNTER — THERAPY VISIT (OUTPATIENT)
Dept: PHYSICAL THERAPY | Facility: CLINIC | Age: 67
End: 2017-10-25
Payer: COMMERCIAL

## 2017-10-25 DIAGNOSIS — M25.561 CHRONIC PAIN OF RIGHT KNEE: ICD-10-CM

## 2017-10-25 DIAGNOSIS — G89.29 CHRONIC PAIN OF RIGHT KNEE: ICD-10-CM

## 2017-10-25 PROCEDURE — 97110 THERAPEUTIC EXERCISES: CPT | Mod: GP | Performed by: PHYSICAL THERAPIST

## 2017-10-25 PROCEDURE — 97112 NEUROMUSCULAR REEDUCATION: CPT | Mod: GP | Performed by: PHYSICAL THERAPIST

## 2017-11-01 ENCOUNTER — THERAPY VISIT (OUTPATIENT)
Dept: PHYSICAL THERAPY | Facility: CLINIC | Age: 67
End: 2017-11-01
Payer: COMMERCIAL

## 2017-11-01 DIAGNOSIS — M25.561 CHRONIC PAIN OF RIGHT KNEE: ICD-10-CM

## 2017-11-01 DIAGNOSIS — G89.29 CHRONIC PAIN OF RIGHT KNEE: ICD-10-CM

## 2017-11-01 PROCEDURE — 97112 NEUROMUSCULAR REEDUCATION: CPT | Mod: GP | Performed by: PHYSICAL THERAPIST

## 2017-11-01 PROCEDURE — 97110 THERAPEUTIC EXERCISES: CPT | Mod: GP | Performed by: PHYSICAL THERAPIST

## 2017-11-08 ENCOUNTER — THERAPY VISIT (OUTPATIENT)
Dept: OCCUPATIONAL THERAPY | Facility: CLINIC | Age: 67
End: 2017-11-08
Payer: COMMERCIAL

## 2017-11-08 DIAGNOSIS — M79.644 THUMB PAIN, RIGHT: Primary | ICD-10-CM

## 2017-11-08 DIAGNOSIS — M18.11 OSTEOARTHRITIS OF CARPOMETACARPAL JOINT OF RIGHT THUMB, UNSPECIFIED OSTEOARTHRITIS TYPE: ICD-10-CM

## 2017-11-08 PROCEDURE — G8985 CARRY GOAL STATUS: HCPCS | Mod: GO | Performed by: OCCUPATIONAL THERAPIST

## 2017-11-08 PROCEDURE — G8984 CARRY CURRENT STATUS: HCPCS | Mod: GO | Performed by: OCCUPATIONAL THERAPIST

## 2017-11-08 PROCEDURE — G8986 CARRY D/C STATUS: HCPCS | Mod: GO | Performed by: OCCUPATIONAL THERAPIST

## 2017-11-08 PROCEDURE — 97110 THERAPEUTIC EXERCISES: CPT | Mod: GO | Performed by: OCCUPATIONAL THERAPIST

## 2017-11-08 PROCEDURE — 97535 SELF CARE MNGMENT TRAINING: CPT | Mod: GO | Performed by: OCCUPATIONAL THERAPIST

## 2017-11-08 NOTE — MR AVS SNAPSHOT
After Visit Summary   11/8/2017    Jennifer Jane    MRN: [id number]           Patient Information     Date Of Birth          1950        Visit Information        Provider Department      11/8/2017 2:00 PM Gerri Castillo OT [facility] Hand Therapy        Today's Diagnoses     Thumb pain, right    -  1    Osteoarthritis of carpometacarpal joint of right thumb, unspecified osteoarthritis type           Follow-ups after your visit        Your next 10 appointments already scheduled     Nov 15, 2017 12:40 PM CST   ROSA MARIA Extremity with Luis Miguel Maxwell PT   [facility] [place] ([facility] [place])    [address] [place] [address] [phone]            Nov 29, 2017  9:30 AM CST   ROSA MARIA Extremity with Luis Miguel Maxwell PT   [facility] [place] ([facility] [place])    [address] [place] [address] [phone]            Jan 25, 2018 10:30 AM CST   (Arrive by 10:15 AM)   RETURN HAND with Lucina Ramirez MD   [facility] Orthopaedic Clinic ([facility])    [address] [place] [id number] [phone]              Who to contact     If you have questions or need follow up information about today's clinic visit or your schedule please contact [facility] HAND THERAPY directly at [phone].  Normal or non-critical lab and imaging results will be communicated to you by MyChart, letter or phone within 4 business days after the clinic has received the results. If you do not hear from us within 7 days, please contact the clinic through MyChart or phone. If you have a critical or abnormal lab result, we will notify you by phone as soon as possible.  Submit refill requests through [employer] or call your pharmacy and they will forward the refill request to us. Please allow 3 business days for your refill to be completed.          Additional Information About Your Visit        MyChart Information      [employer] gives you secure access to your electronic health record. If you see a primary care provider, you can also send messages to your care team and make appointments. If you have questions, please call your primary care clinic.  If you do not have a primary care provider, please call [phone] and they will assist you.        Care EveryWhere ID     This is your Care EveryWhere ID. This could be used by other organizations to access your [facility] medical records  [id number]        Your Vitals Were     Last Period                   (LMP Unknown)            Blood Pressure from Last 3 Encounters:   10/17/17 153/74   10/04/17 134/72   11/11/16 140/80    Weight from Last 3 Encounters:   10/19/17 83.5 kg (184 lb)   10/17/17 83.5 kg (184 lb)   10/04/17 83.5 kg (184 lb)              We Performed the Following     SELF CARE MNGMENT TRAINING     THERAPEUTIC EXERCISES        Primary Care Provider Office Phone # Fax #    Sun Person [phone] [address] [facility] [address]        Equal Access to Services     [facility]: Hadii aad ku hadasho Soomaali, waaxda luqadaha, qaybta kaalmada adeegyaroscoe, vadim thomas . So [facility] [phone].    ATENCIÓN: Si habla español, tiene a garcia disposición servicios gratuitos de asistencia lingüística. Llame al [phone].    We comply with applicable federal civil rights laws and [place] laws. We do not discriminate on the basis of race, color, national origin, age, disability, sex, sexual orientation, or gender identity.            Thank you!     Thank you for choosing [facility] HAND THERAPY  for your care. Our goal is always to provide you with excellent care. Hearing back from our patients is one way we can continue to improve our services. Please take a few minutes to complete the written survey that you may receive in the mail after your visit with us. Thank you!             Your Updated Medication List - Protect  others around you: Learn how to safely use, store and throw away your medicines at www.disposemymeds.org.          This list is accurate as of: 11/8/17 11:59 PM.  Always use your most recent med list.                   Brand Name Dispense Instructions for use Diagnosis    ADVAIR -21 MCG/ACT Inhaler   Generic drug:  fluticasone-salmeterol           albuterol 108 (90 BASE) MCG/ACT Inhaler    VENTOLIN HFA    3 Inhaler    Inhale 2 puffs into the lungs every 6 hours as needed for shortness of breath / dyspnea    Intermittent asthma       amLODIPine 5 MG tablet    NORVASC    90 tablet    Take 1 tablet (5 mg) by mouth daily    Benign essential hypertension       BENADRYL PO      Take 25 mg by mouth        ibuprofen 200 MG tablet    ADVIL/MOTRIN     Take 1-2 tablets (200-400 mg) by mouth At Bedtime        latanoprost 0.005 % ophthalmic solution    XALATAN     Place 1 drop into the right eye At Bedtime        TUMS PO      Take  by mouth At Bedtime.        ZYRTEC ALLERGY 10 MG tablet   Generic drug:  cetirizine      Take 10 mg by mouth daily.

## 2017-11-09 PROBLEM — M79.644 THUMB PAIN, RIGHT: Status: RESOLVED | Noted: 2017-10-04 | Resolved: 2017-11-09

## 2017-11-15 ENCOUNTER — THERAPY VISIT (OUTPATIENT)
Dept: PHYSICAL THERAPY | Facility: CLINIC | Age: 67
End: 2017-11-15
Payer: COMMERCIAL

## 2017-11-15 DIAGNOSIS — G89.29 CHRONIC PAIN OF RIGHT KNEE: ICD-10-CM

## 2017-11-15 DIAGNOSIS — M25.561 CHRONIC PAIN OF RIGHT KNEE: ICD-10-CM

## 2017-11-15 PROCEDURE — 97112 NEUROMUSCULAR REEDUCATION: CPT | Mod: GP | Performed by: PHYSICAL THERAPIST

## 2017-11-15 PROCEDURE — 97110 THERAPEUTIC EXERCISES: CPT | Mod: GP | Performed by: PHYSICAL THERAPIST

## 2017-11-15 ASSESSMENT — ACTIVITIES OF DAILY LIVING (ADL)
LIMPING: THE SYMPTOM AFFECTS MY ACTIVITY SLIGHTLY
HOW_WOULD_YOU_RATE_THE_OVERALL_FUNCTION_OF_YOUR_KNEE_DURING_YOUR_USUAL_DAILY_ACTIVITIES?: NEARLY NORMAL
HOW_WOULD_YOU_RATE_THE_CURRENT_FUNCTION_OF_YOUR_KNEE_DURING_YOUR_USUAL_DAILY_ACTIVITIES_ON_A_SCALE_FROM_0_TO_100_WITH_100_BEING_YOUR_LEVEL_OF_KNEE_FUNCTION_PRIOR_TO_YOUR_INJURY_AND_0_BEING_THE_INABILITY_TO_PERFORM_ANY_OF_YOUR_USUAL_DAILY_ACTIVITIES?: 85
SWELLING: THE SYMPTOM AFFECTS MY ACTIVITY SLIGHTLY
GIVING WAY, BUCKLING OR SHIFTING OF KNEE: THE SYMPTOM AFFECTS MY ACTIVITY SLIGHTLY
AS_A_RESULT_OF_YOUR_KNEE_INJURY,_HOW_WOULD_YOU_RATE_YOUR_CURRENT_LEVEL_OF_DAILY_ACTIVITY?: NEARLY NORMAL
KNEE_ACTIVITY_OF_DAILY_LIVING_SUM: 42
PAIN: THE SYMPTOM AFFECTS MY ACTIVITY SLIGHTLY
GO DOWN STAIRS: ACTIVITY IS SOMEWHAT DIFFICULT
KNEE_ACTIVITY_OF_DAILY_LIVING_SCORE: 60
SIT WITH YOUR KNEE BENT: ACTIVITY IS MINIMALLY DIFFICULT
GO UP STAIRS: ACTIVITY IS MINIMALLY DIFFICULT
SQUAT: ACTIVITY IS VERY DIFFICULT
STIFFNESS: THE SYMPTOM AFFECTS MY ACTIVITY SLIGHTLY
RAW_SCORE: 42
WEAKNESS: THE SYMPTOM AFFECTS MY ACTIVITY SLIGHTLY
RISE FROM A CHAIR: ACTIVITY IS MINIMALLY DIFFICULT
STAND: ACTIVITY IS MINIMALLY DIFFICULT
KNEEL ON THE FRONT OF YOUR KNEE: ACTIVITY IS VERY DIFFICULT
WALK: ACTIVITY IS SOMEWHAT DIFFICULT

## 2017-11-29 ENCOUNTER — THERAPY VISIT (OUTPATIENT)
Dept: PHYSICAL THERAPY | Facility: CLINIC | Age: 67
End: 2017-11-29
Payer: COMMERCIAL

## 2017-11-29 DIAGNOSIS — G89.29 CHRONIC PAIN OF RIGHT KNEE: ICD-10-CM

## 2017-11-29 DIAGNOSIS — M25.561 CHRONIC PAIN OF RIGHT KNEE: ICD-10-CM

## 2017-11-29 PROCEDURE — 97112 NEUROMUSCULAR REEDUCATION: CPT | Mod: GP | Performed by: PHYSICAL THERAPIST

## 2017-11-29 PROCEDURE — 97110 THERAPEUTIC EXERCISES: CPT | Mod: GP | Performed by: PHYSICAL THERAPIST

## 2017-12-18 ENCOUNTER — THERAPY VISIT (OUTPATIENT)
Dept: PHYSICAL THERAPY | Facility: CLINIC | Age: 67
End: 2017-12-18
Payer: COMMERCIAL

## 2017-12-18 DIAGNOSIS — G89.29 CHRONIC PAIN OF RIGHT KNEE: ICD-10-CM

## 2017-12-18 DIAGNOSIS — M25.561 CHRONIC PAIN OF RIGHT KNEE: ICD-10-CM

## 2017-12-18 PROCEDURE — 97110 THERAPEUTIC EXERCISES: CPT | Mod: GP | Performed by: PHYSICAL THERAPIST

## 2017-12-18 PROCEDURE — 97112 NEUROMUSCULAR REEDUCATION: CPT | Mod: GP | Performed by: PHYSICAL THERAPIST

## 2017-12-18 NOTE — PROGRESS NOTES
[facility] Evaluation  Subjective:    HPI                    Objective:      Gait:    Gait Type:  Normal   Assistive Devices:  None                                                        Knee Evaluation:          Edema:  Normal      Functional Testing:          Quad:    Single Leg Squat:  Left:       Right:        Bilateral Leg Squat:  No pain  Mild loss of control      Proprioception:   Stork Balance Test:  Left:  Normal control, no pain  Right:  Mild loss of control, no pain  % of Uninvolved:           General     ROS    Assessment/Plan:      DISCHARGE REPORT    Progress reporting period is from 10/18/17 to 12/18/17.       SUBJECTIVE  Subjective changes noted by patient:  Patient reports her R knee feels better overall. She is able to ascend stairs with normal gait pattern. She uses a normal gait pattern with descending the stairs with occasionally switching to 1 step at a time. She is performing HEP without complaints.       Current Pain level: 0/10.     Initial Pain level: 3/10.   Changes in function:  Yes (See Goal flowsheet attached for changes in current functional level)  Adverse reaction to treatment or activity: None    OBJECTIVE  Changes noted in objective findings:  Yes, see objective findings.    ASSESSMENT/PLAN  Updated problem list and treatment plan: Diagnosis 1:  Right knee OA  Pain -  hot/cold therapy, manual therapy, splint/taping/bracing/orthotics, self management, education and home program  Decreased ROM/flexibility - manual therapy, therapeutic exercise and home program  Decreased strength - therapeutic exercise, therapeutic activities and home program  Decreased proprioception - neuro re-education, gait training, therapeutic activities and home program  Impaired muscle performance - neuro re-education and home program  Decreased function - therapeutic activities and home program  STG/LTGs have been met or progress has been made towards goals:  Yes (See Goal flow sheet  completed today.)  Assessment of Progress: The patient's condition is improving.  Self Management Plans:  Patient is independent in a home treatment program.  Patient is independent in self management of symptoms.  I have re-evaluated this patient and find that the nature, scope, duration and intensity of the therapy is appropriate for the medical condition of the patient.  Jennifer continues to require the following intervention to meet STG and LTG's:  PT intervention is no longer required to meet STG/LTG.    Recommendations:  This patient is ready to be discharged from therapy and continue their home treatment program.    Please refer to the daily flowsheet for treatment today, total treatment time and time spent performing 1:1 timed codes.

## 2017-12-19 PROBLEM — G89.29 CHRONIC PAIN OF RIGHT KNEE: Status: RESOLVED | Noted: 2017-10-23 | Resolved: 2017-12-19

## 2017-12-19 PROBLEM — M25.561 CHRONIC PAIN OF RIGHT KNEE: Status: RESOLVED | Noted: 2017-10-23 | Resolved: 2017-12-19

## 2018-01-25 ENCOUNTER — OFFICE VISIT (OUTPATIENT)
Dept: ORTHOPEDICS | Facility: CLINIC | Age: 68
End: 2018-01-25
Payer: COMMERCIAL

## 2018-01-25 DIAGNOSIS — M18.11 ARTHRITIS OF CARPOMETACARPAL (CMC) JOINT OF RIGHT THUMB: Primary | ICD-10-CM

## 2018-01-25 RX ORDER — MONTELUKAST SODIUM 10 MG/1
TABLET ORAL
Refills: 1 | COMMUNITY
Start: 2018-01-10

## 2018-01-25 NOTE — NURSING NOTE
Reason For Visit:   Chief Complaint   Patient presents with     RECHECK     Follow up. Arthritis, right thumb. Pt stated that she has been wearing her brace this week for comfort. Pt state dthat her right thumb was a little sore while shoveling but other then that she isnt having any problems.        Primary MD: Sun Person    Age: 67 year old    ?  No          Pain Assessment  Patient Currently in Pain: Yes  0-10 Pain Scale:  (a little bit. )  Primary Pain Location: Hand  Pain Orientation: Right  Pain Descriptors: Sore  Aggravating Factors:  (shoveling)    Hand Dominance Evaluation  Hand Dominance:  (Pt stated that she writes left handed but does everything else right handed. )          QuickDASH Assessment  QuickDASH Main 10/16/2017   1.Open a tight or new jar. Mild difficulty   2. Do heavy household chores (e.g., wash walls, floors) Moderate difficulty   3. Carry a shopping bag or briefcase. Mild difficulty   4. Wash your back. Mild difficulty   5. Use a knife to cut food. Mild difficulty   6. Recreational activities in which you take some force or impact through your arm, shoulder or hand (e.g., golf, hammering, tennis, etc.). Mild difficulty   7. During the past week, to what extent has your arm, shoulder or hand problem interfered with your normal social activities with family, friends, neighbours or groups? Not at all   8. During the past week, were you limited in your work or other regular daily activities as a result of your arm, shoulder or hand problem? Slightly limited   9. Arm, shoulder or hand pain. Mild   10.Tingling (pins and needles) in your arm,shoulder or hand. None   11. During the past week, how much difficulty have you had sleeping because of the pain in your arm, shoulder or hand? ([place] number) No difficulty   Quickdash Ability Score 20.45          Current Outpatient Prescriptions   Medication Sig Dispense Refill     montelukast (SINGULAIR) 10 MG tablet TAKE ONE TABLET BY  MOUTH EVERY DAY AS DIRECTED  1     latanoprost (XALATAN) 0.005 % ophthalmic solution Place 1 drop into the right eye At Bedtime       DiphenhydrAMINE HCl (BENADRYL PO) Take 25 mg by mouth       amLODIPine (NORVASC) 5 MG tablet Take 1 tablet (5 mg) by mouth daily 90 tablet 3     ADVAIR -21 MCG/ACT inhaler        ibuprofen (ADVIL,MOTRIN) 200 MG tablet Take 1-2 tablets (200-400 mg) by mouth At Bedtime       albuterol (VENTOLIN HFA) 108 (90 BASE) MCG/ACT inhaler Inhale 2 puffs into the lungs every 6 hours as needed for shortness of breath / dyspnea 3 Inhaler 1     Calcium Carbonate Antacid (TUMS PO) Take  by mouth At Bedtime.       cetirizine (ZYRTEC ALLERGY) 10 MG tablet Take 10 mg by mouth daily.         Allergies   Allergen Reactions     Animal Dander      Dust Mites      Grass      Hibiclens [Chlorhexidine] Itching     Pollen Extract      Trees      Dread Barrios LPN

## 2018-01-25 NOTE — PROGRESS NOTES
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 At last visit 10/19/2017 she received a first CMC injection which she reports gave her near 100% relief and is still lasting. Patient does not have any new complaints today. She is overall very happy with her care. She continues to wear a brace intermittently and continues with ice and compression when her thumb pain is aggravated    Denies any numbness, tingling. No fevers, chills.     Exam:   Gen: AOx3, NAD  HEENT: Normocephalic, atraumatic  Resp: breathing w/o difficulty on room air  Right upper extremity is evaluated today with no significant changes from prior exam. She does have very moderate tenderness about the first CMC joint with a equivocal CMC grind test today. She has full range of motion in her thumb as well as her wrist. Distally CMS is intact firing EPL/FPL/Intrinsics/Flex/Ext. Fingers wwp. Normal sensation to light touch throughout.       Imaging:  No new images today    A/P:  67-year-old female with right-sided first CMC arthritis as well as early STT arthritis, responding very well to conservative therapies thus far. Recommend continued conservative management with follow-up as needed      Seen and discussed with Dr. Ramirez who agrees with the assessment and plan.    SHANNAN Roman  PGY4  [phone]    I have personally examined this patient and have reviewed the clinical presentation and progress note with the resident. I agree with the treatment plan as outlined. The plan was formulated with the resident on the day of the resident's dictation.

## 2018-01-25 NOTE — LETTER
1/25/2018       RE: Jennifer Jane  [address]  [place] [address]     Dear Colleague,    Thank you for referring your patient, Jennifer Jane, to the [facility] at [facility]. Please see a copy of my visit note below.    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 At last visit 10/19/2017 she received a first CMC injection which she reports gave her near 100% relief and is still lasting. Patient does not have any new complaints today. She is overall very happy with her care. She continues to wear a brace intermittently and continues with ice and compression when her thumb pain is aggravated    Denies any numbness, tingling. No fevers, chills.     Exam:   Gen: AOx3, NAD  HEENT: Normocephalic, atraumatic  Resp: breathing w/o difficulty on room air  Right upper extremity is evaluated today with no significant changes from prior exam. She does have very moderate tenderness about the first CMC joint with a equivocal CMC grind test today. She has full range of motion in her thumb as well as her wrist. Distally CMS is intact firing EPL/FPL/Intrinsics/Flex/Ext. Fingers wwp. Normal sensation to light touch throughout.       Imaging:  No new images today    A/P:  67-year-old female with right-sided first CMC arthritis as well as early STT arthritis, responding very well to conservative therapies thus far. Recommend continued conservative management with follow-up as needed      Seen and discussed with Dr. Ramirez who agrees with the assessment and plan.    SHANNAN Roman  PGY4  [phone]    I have personally examined this patient and have reviewed the clinical presentation and progress note with the resident. I agree with the treatment plan as outlined. The plan was formulated with the resident on the day of the resident's dictation.       Again, thank you for allowing me to participate in the  care of your patient.      Sincerely,    Lucina Ramirez MD

## 2018-01-25 NOTE — MR AVS SNAPSHOT
After Visit Summary   1/25/2018    Jennifer Jane    MRN: [id number]           Patient Information     Date Of Birth          1950        Visit Information        Provider Department      1/25/2018 10:30 AM Lucina Ramirez MD [facility]        Today's Diagnoses     Arthritis of carpometacarpal (CMC) joint of right thumb    -  1       Follow-ups after your visit        Follow-up notes from your care team     Return if symptoms worsen or fail to improve.      Who to contact     Please call your clinic at [phone] to:    Ask questions about your health    Make or cancel appointments    Discuss your medicines    Learn about your test results    Speak to your doctor   If you have compliments or concerns about an experience at your clinic, or if you wish to file a complaint, please contact [facility] Patient Relations at [phone] or email us at [email or web address]         Additional Information About Your Visit        MyChart Information     [employer] gives you secure access to your electronic health record. If you see a primary care provider, you can also send messages to your care team and make appointments. If you have questions, please call your primary care clinic.  If you do not have a primary care provider, please call [phone] and they will assist you.      Mult[employer] is an electronic gateway that provides easy, online access to your medical records. With Mult[employer], you can request a clinic appointment, read your test results, renew a prescription or communicate with your care team.     To access your existing account, please contact your [facility] or call [phone] for assistance.        Care EveryWhere ID     This is your Care EveryWhere ID. This could be used by other organizations to access your [facility] medical records  [id number]        Your Vitals Were     Last Period                    (LMP Unknown)            Blood Pressure from Last 3 Encounters:   10/17/17 153/74   10/04/17 134/72   11/11/16 140/80    Weight from Last 3 Encounters:   10/19/17 83.5 kg (184 lb)   10/17/17 83.5 kg (184 lb)   10/04/17 83.5 kg (184 lb)              Today, you had the following     No orders found for display       Primary Care Provider Office Phone # Fax #    Sun Mandi Person [phone] [address] [facility] [address]        Equal Access to Services     [facility]: Hadii conor oliveira hadasho Somelly, waaxda luqadaha, qaybta kaalmada tin, vadim thomas . So [facility] [phone].    ATENCIÓN: Si habla español, tiene a garcia disposición servicios gratuitos de asistencia lingüística. [facility] [phone].    We comply with applicable federal civil rights laws and [place] laws. We do not discriminate on the basis of race, color, national origin, age, disability, sex, sexual orientation, or gender identity.            Thank you!     Thank you for choosing [facility]  for your care. Our goal is always to provide you with excellent care. Hearing back from our patients is one way we can continue to improve our services. Please take a few minutes to complete the written survey that you may receive in the mail after your visit with us. Thank you!             Your Updated Medication List - Protect others around you: Learn how to safely use, store and throw away your medicines at www.disposemymeds.org.          This list is accurate as of 1/25/18 11:59 PM.  Always use your most recent med list.                   Brand Name Dispense Instructions for use Diagnosis    ADVAIR -21 MCG/ACT Inhaler   Generic drug:  fluticasone-salmeterol           albuterol 108 (90 BASE) MCG/ACT Inhaler    VENTOLIN HFA    3 Inhaler    Inhale 2 puffs into the lungs every 6 hours as needed for shortness of breath / dyspnea    Intermittent asthma       amLODIPine 5 MG  tablet    NORVASC    90 tablet    Take 1 tablet (5 mg) by mouth daily    Benign essential hypertension       BENADRYL PO      Take 25 mg by mouth        ibuprofen 200 MG tablet    ADVIL/MOTRIN     Take 1-2 tablets (200-400 mg) by mouth At Bedtime        latanoprost 0.005 % ophthalmic solution    XALATAN     Place 1 drop into the right eye At Bedtime        montelukast 10 MG tablet    SINGULAIR     TAKE ONE TABLET BY MOUTH EVERY DAY AS DIRECTED        TUMS PO      Take  by mouth At Bedtime.        ZYRTEC ALLERGY 10 MG tablet   Generic drug:  cetirizine      Take 10 mg by mouth daily.

## 2018-03-05 ENCOUNTER — TELEPHONE (OUTPATIENT)
Dept: FAMILY MEDICINE | Facility: CLINIC | Age: 68
End: 2018-03-05

## 2018-03-05 NOTE — TELEPHONE ENCOUNTER
Pt reports high blood pressure and foot pain. BP readings include 148/74, 153/73 and 152/74. Pulse is also high 104 and 95. Denies chest pain, dizziness, or vision problems. Future appointment was scheduled. Declined OV today. She agreed to seek emergent care if chest pain or worsening symptoms.

## 2018-03-05 NOTE — TELEPHONE ENCOUNTER
Reason for call:  Patient reporting a symptom  Symptom or request: right foot pain & blood pressure high  Duration (how long have symptoms been present): 1 day     Have you been treated for this before? Yes  Additional comments: please call patient  Phone Number patient can be reached at:  Home number on file [phone] (home) or cell   Best Time:  any  Can we leave a detailed message on this number:  YES  Call taken on 3/5/2018 at 11:45 AM by NATALY AREVALO

## 2018-03-06 ENCOUNTER — OFFICE VISIT (OUTPATIENT)
Dept: FAMILY MEDICINE | Facility: CLINIC | Age: 68
End: 2018-03-06
Payer: COMMERCIAL

## 2018-03-06 ENCOUNTER — RADIANT APPOINTMENT (OUTPATIENT)
Dept: GENERAL RADIOLOGY | Facility: CLINIC | Age: 68
End: 2018-03-06
Attending: FAMILY MEDICINE
Payer: COMMERCIAL

## 2018-03-06 VITALS
SYSTOLIC BLOOD PRESSURE: 140 MMHG | TEMPERATURE: 97.1 F | RESPIRATION RATE: 16 BRPM | HEIGHT: 65 IN | OXYGEN SATURATION: 99 % | HEART RATE: 75 BPM | DIASTOLIC BLOOD PRESSURE: 76 MMHG

## 2018-03-06 DIAGNOSIS — R06.02 SHORTNESS OF BREATH: ICD-10-CM

## 2018-03-06 DIAGNOSIS — R11.0 NAUSEA: ICD-10-CM

## 2018-03-06 DIAGNOSIS — M79.671 RIGHT FOOT PAIN: ICD-10-CM

## 2018-03-06 DIAGNOSIS — R07.89 CHEST HEAVINESS: ICD-10-CM

## 2018-03-06 DIAGNOSIS — J01.00 SUBACUTE MAXILLARY SINUSITIS: ICD-10-CM

## 2018-03-06 DIAGNOSIS — R06.02 SHORTNESS OF BREATH: Primary | ICD-10-CM

## 2018-03-06 LAB
ALBUMIN SERPL-MCNC: 3.9 G/DL (ref 3.4–5)
ALP SERPL-CCNC: 96 U/L (ref 40–150)
ALT SERPL W P-5'-P-CCNC: 30 U/L (ref 0–50)
ANION GAP SERPL CALCULATED.3IONS-SCNC: 6 MMOL/L (ref 3–14)
AST SERPL W P-5'-P-CCNC: 25 U/L (ref 0–45)
BASOPHILS # BLD AUTO: 0 10E9/L (ref 0–0.2)
BASOPHILS NFR BLD AUTO: 0.2 %
BILIRUB SERPL-MCNC: 0.6 MG/DL (ref 0.2–1.3)
BUN SERPL-MCNC: 13 MG/DL (ref 7–30)
CALCIUM SERPL-MCNC: 9.8 MG/DL (ref 8.5–10.1)
CHLORIDE SERPL-SCNC: 107 MMOL/L (ref 94–109)
CO2 SERPL-SCNC: 27 MMOL/L (ref 20–32)
CREAT SERPL-MCNC: 0.73 MG/DL (ref 0.52–1.04)
DIFFERENTIAL METHOD BLD: NORMAL
EOSINOPHIL # BLD AUTO: 0.2 10E9/L (ref 0–0.7)
EOSINOPHIL NFR BLD AUTO: 1.7 %
ERYTHROCYTE [DISTWIDTH] IN BLOOD BY AUTOMATED COUNT: 13.2 % (ref 10–15)
GFR SERPL CREATININE-BSD FRML MDRD: 80 ML/MIN/1.7M2
GLUCOSE SERPL-MCNC: 109 MG/DL (ref 70–99)
HCT VFR BLD AUTO: 42.5 % (ref 35–47)
HGB BLD-MCNC: 14.2 G/DL (ref 11.7–15.7)
LYMPHOCYTES # BLD AUTO: 1.8 10E9/L (ref 0.8–5.3)
LYMPHOCYTES NFR BLD AUTO: 18.3 %
MCH RBC QN AUTO: 29.5 PG (ref 26.5–33)
MCHC RBC AUTO-ENTMCNC: 33.4 G/DL (ref 31.5–36.5)
MCV RBC AUTO: 88 FL (ref 78–100)
MONOCYTES # BLD AUTO: 0.5 10E9/L (ref 0–1.3)
MONOCYTES NFR BLD AUTO: 5.5 %
NEUTROPHILS # BLD AUTO: 7.3 10E9/L (ref 1.6–8.3)
NEUTROPHILS NFR BLD AUTO: 74.3 %
NT-PROBNP SERPL-MCNC: 22 PG/ML (ref 0–125)
PLATELET # BLD AUTO: 260 10E9/L (ref 150–450)
POTASSIUM SERPL-SCNC: 4.2 MMOL/L (ref 3.4–5.3)
PROT SERPL-MCNC: 7.8 G/DL (ref 6.8–8.8)
RBC # BLD AUTO: 4.82 10E12/L (ref 3.8–5.2)
SODIUM SERPL-SCNC: 140 MMOL/L (ref 133–144)
TROPONIN I SERPL-MCNC: <0.015 UG/L (ref 0–0.04)
WBC # BLD AUTO: 9.8 10E9/L (ref 4–11)

## 2018-03-06 PROCEDURE — 36415 COLL VENOUS BLD VENIPUNCTURE: CPT | Performed by: FAMILY MEDICINE

## 2018-03-06 PROCEDURE — 85025 COMPLETE CBC W/AUTO DIFF WBC: CPT | Performed by: FAMILY MEDICINE

## 2018-03-06 PROCEDURE — 80053 COMPREHEN METABOLIC PANEL: CPT | Performed by: FAMILY MEDICINE

## 2018-03-06 PROCEDURE — 71046 X-RAY EXAM CHEST 2 VIEWS: CPT | Mod: FY

## 2018-03-06 PROCEDURE — 93000 ELECTROCARDIOGRAM COMPLETE: CPT | Performed by: FAMILY MEDICINE

## 2018-03-06 PROCEDURE — 83880 ASSAY OF NATRIURETIC PEPTIDE: CPT | Performed by: FAMILY MEDICINE

## 2018-03-06 PROCEDURE — 99215 OFFICE O/P EST HI 40 MIN: CPT | Mod: 25 | Performed by: FAMILY MEDICINE

## 2018-03-06 PROCEDURE — 84484 ASSAY OF TROPONIN QUANT: CPT | Performed by: FAMILY MEDICINE

## 2018-03-06 ASSESSMENT — ANXIETY QUESTIONNAIRES
6. BECOMING EASILY ANNOYED OR IRRITABLE: NOT AT ALL
3. WORRYING TOO MUCH ABOUT DIFFERENT THINGS: NOT AT ALL
7. FEELING AFRAID AS IF SOMETHING AWFUL MIGHT HAPPEN: NOT AT ALL
7. FEELING AFRAID AS IF SOMETHING AWFUL MIGHT HAPPEN: NOT AT ALL
2. NOT BEING ABLE TO STOP OR CONTROL WORRYING: NOT AT ALL
GAD7 TOTAL SCORE: 0
4. TROUBLE RELAXING: NOT AT ALL
GAD7 TOTAL SCORE: 0
GAD7 TOTAL SCORE: 0
1. FEELING NERVOUS, ANXIOUS, OR ON EDGE: NOT AT ALL
5. BEING SO RESTLESS THAT IT IS HARD TO SIT STILL: NOT AT ALL

## 2018-03-06 ASSESSMENT — PATIENT HEALTH QUESTIONNAIRE - PHQ9
SUM OF ALL RESPONSES TO PHQ QUESTIONS 1-9: 2
SUM OF ALL RESPONSES TO PHQ QUESTIONS 1-9: 2
10. IF YOU CHECKED OFF ANY PROBLEMS, HOW DIFFICULT HAVE THESE PROBLEMS MADE IT FOR YOU TO DO YOUR WORK, TAKE CARE OF THINGS AT HOME, OR GET ALONG WITH OTHER PEOPLE: NOT DIFFICULT AT ALL

## 2018-03-06 NOTE — NURSING NOTE
"Chief Complaint   Patient presents with     Hypertension     Musculoskeletal Problem     right heel and left shoulder       Initial /76  Pulse 75  Temp 97.1  F (36.2  C)  Resp 16  Ht 5' 5\" (1.651 m)  LMP  (LMP Unknown)  SpO2 99% Estimated body mass index is 30.62 kg/(m^2) as calculated from the following:    Height as of 10/19/17: 5' 5\" (1.651 m).    Weight as of 10/19/17: 184 lb (83.5 kg).  Medication Reconciliation: complete   BRITNEY Siddiqi CMA      "

## 2018-03-06 NOTE — PROGRESS NOTES
SUBJECTIVE:   Jennifer Jane is a 67 year old female who presents to clinic today for the following health issues:      Musculoskeletal problem/pain      Duration:     Description  Location: right heel and left shoulder    Intensity:  moderate    Accompanying signs and symptoms: none    History  Previous similar problem: YES  Previous evaluation:  none    Precipitating or alleviating factors:  Trauma or overuse: no   Aggravating factors include: walking and use of left arm    Therapies tried and outcome: nothing    Hypertension      Duration: a few days    Description (location/character/radiation): pt has been having elevated blood pressures.  Pt also has been having some tachycardia.  Pt is concerned that shoulder pain and numbness in left arm could be related to elevated blood pressure.  Pt says she just doesn't feel good    Intensity:  moderate    Accompanying signs and symptoms: none    History (similar episodes/previous evaluation): None    Precipitating or alleviating factors: None    Therapies tried and outcome: None     67 year old with history of asthma, hyperlipidemia and hypertension (currently on amlodipine 5 mg) as well as pre-diabetes and obesity, here today with  A few concerns.    Around the first of the year was down and sick with upper respiratory infection.  Got some delsym and mucinex, made her feel weird, couldn't focus eyes.  Was taking extra albuterol, but cough was just bad.  Kept thinking was allergies, but cough just wouldn't go away.  Never had fever but was taking ibuprofen.  Came home on 19th of January; thought should do prednisone burst and started that on the 22nd 20 mg a day for 3 day.    Felt better for 1 day, then thought would jsut wait.  Went into [facility] on 2/14 and got prednisone 40 mg x 5 days and Z-mayi.  Felt better after that.  Continued to take albuterol regularly and advair regularly.      Now, having shortness of breath (still since [place]).  Since being home when  shoveling felt okay, but feeling winded still.  Usually goes away.      Also, pulled a muscle in left shoulder.  This Sunday came home from [place] and didn't feel good; tooke blood pressure and was high.  Shoulder and arm felt off, and stomach felt off, and a headache.      Eats and feels sensation and chest heaviness.      No CXR at [facility].  Cough is less, but still mucus and still shortness of breath.  Had normal echo and EKG     Also - right foot very painful for last few days.  Sharp right at heel.  Has had plantar fasciitis twice but was painful more mid foot - this is right at heel.    Had normal echo 9/14/2016:   CONCLUSION:    1) LVEF 65%. Normal LV size and systolic function. NNo regional     wall motion abnormalities.     2) Normal RV size and function.     3) No significant valvular disease.    4) IVC is small in size and responsive to inspiration indicating normal     or low RA pressure.     5) No prior study for comparison.       Left Ventricular Ejection Fraction: 65 %     Normal EKG from 9/14/2016:  Result Narrative   Sinus rhythm  Normal ECG  No previous ECGs available  Confirmed by MD DANYELLE, TAMMI ([id number]),  KENNA ALLEN ([id number]) on   9/23/2016 8:55:58 AM       Problem list and histories reviewed & adjusted, as indicated.  Additional history: as documented    BP Readings from Last 3 Encounters:   03/06/18 140/76   10/17/17 153/74   10/04/17 134/72    Wt Readings from Last 3 Encounters:   10/19/17 184 lb (83.5 kg)   10/17/17 184 lb (83.5 kg)   10/04/17 184 lb (83.5 kg)           Reviewed and updated as needed this visit by clinical staff  Tobacco  Allergies  Meds  Problems  Med Hx  Surg Hx  Fam Hx  Soc Hx        Reviewed and updated as needed this visit by Provider  Meds  Problems         ROS:  Constitutional, HEENT, cardiovascular, pulmonary, gi and gu systems are negative, except as otherwise noted.    OBJECTIVE:     /76  Pulse 75  Temp 97.1  F (36.2  C)  Resp 16   "Ht 5' 5\" (1.651 m)  LMP  (LMP Unknown)  SpO2 99%  There is no height or weight on file to calculate BMI.  GENERAL: healthy, alert and no distress  EYES: Eyes grossly normal to inspection, PERRL and conjunctivae and sclerae normal  HENT: normal cephalic/atraumatic, ear canals and TM's normal, nose and mouth without ulcers or lesions, oropharynx clear, oral mucous membranes moist and sinuses: maxillary tenderness on left  NECK: no adenopathy, no asymmetry, masses, or scars and thyroid normal to palpation  RESP: lungs clear to auscultation - no rales, rhonchi or wheezes  CV: regular rate and rhythm, normal S1 S2, no S3 or S4, no murmur, click or rub, no peripheral edema and peripheral pulses strong  ABDOMEN: soft, nontender, no hepatosplenomegaly, no masses and bowel sounds normal  MS: no gross musculoskeletal defects noted, no edema  SKIN: no suspicious lesions or rashes  NEURO: Normal strength and tone, mentation intact and speech normal  PSYCH: mentation appears normal, affect normal/bright  FOOT EXAM: No erythema or discharge, does have right sided point tenderness at PF insertion on heel    ASSESSMENT/PLAN:       Jennifer was seen today for hypertension and musculoskeletal problem.    Diagnoses and all orders for this visit:    Shortness of breath  -     XR Chest 2 Views; Future  -     EKG 12-lead complete w/read - Clinics  -     CBC with platelets and differential  -     Comprehensive metabolic panel (BMP + Alb, Alk Phos, ALT, AST, Total. Bili, TP)  -     Troponin I  -     BNP-N terminal pro    Nausea  -     CBC with platelets and differential  -     Comprehensive metabolic panel (BMP + Alb, Alk Phos, ALT, AST, Total. Bili, TP)  -     Troponin I  -     BNP-N terminal pro    Chest heaviness  -     CBC with platelets and differential  -     Comprehensive metabolic panel (BMP + Alb, Alk Phos, ALT, AST, Total. Bili, TP)  -     Troponin I  -     BNP-N terminal pro    Right foot pain  -     XR Foot Right G/E 3 Views; " Future    Subacute maxillary sinusitis  -     amoxicillin-clavulanate (AUGMENTIN) 875-125 MG per tablet; Take 1 tablet by mouth 2 times daily        Patient Instructions   1. Please restart 12.5 mg (half a tab) of your hydrochlorothiazide in addition to your amlodipine with your high blood pressure.      2. For your foot pain:   I think this is insertion pain from your plantar's fascia.   Rest, ice, ibuprofen, arch support.   Can see [facility] or podiatrist for possible steroid injection.        3. For your cough and arm strain:   -We ruled out pneumonia and I think heart attack is less likely with your normal CXR and EKG.  I'll let you know when the official reading of your CXR is back and your lab test (troponin).   Let's plan to:     Treat your sinus infection with augmentin x 10 days.    Come back in if your symptoms don't improve!      4. You failed your ACT today but feel your asthma is much better after dose of prednisone - we'll call in 1 month to repeat this over the phone (we sent a copy with you).  Let us know if your asthma worsens or doesn't continue to improve before then.              Greater than 40 minutes total were spent face to face with the patient including history, exam, counseling and coordination of care.    Sun Person MD  [facility]

## 2018-03-06 NOTE — PATIENT INSTRUCTIONS
1. Please restart 12.5 mg (half a tab) of your hydrochlorothiazide in addition to your amlodipine with your high blood pressure.      2. For your foot pain:   I think this is insertion pain from your plantar's fascia.   Rest, ice, ibuprofen, arch support.   Can see [facility] or podiatrist for possible steroid injection.        3. For your cough and arm strain:   -We ruled out pneumonia and I think heart attack is less likely with your normal CXR and EKG.  I'll let you know when the official reading of your CXR is back and your lab test (troponin).   Let's plan to:     Treat your sinus infection with augmentin x 10 days.    Come back in if your symptoms don't improve!      4. You failed your ACT today but feel your asthma is much better after dose of prednisone - we'll call in 1 month to repeat this over the phone (we sent a copy with you).  Let us know if your asthma worsens or doesn't continue to improve before then.

## 2018-03-06 NOTE — MR AVS SNAPSHOT
After Visit Summary   3/6/2018    Jennifer Jane    MRN: [id number]           Patient Information     Date Of Birth          1950        Visit Information        Provider Department      3/6/2018 9:00 AM Sun Person MD [facility]        Today's Diagnoses     Shortness of breath    -  1    Nausea        Chest heaviness        Right foot pain        Subacute maxillary sinusitis          Care Instructions    1. Please restart 12.5 mg (half a tab) of your hydrochlorothiazide in addition to your amlodipine with your high blood pressure.    2. For your foot pain:   I think this is insertion pain from your plantar's fascia.   Rest, ice, ibuprofen, arch support.   Can see [facility] or podiatrist for possible steroid injection.        3. For your cough and arm strain:   -We ruled out pneumonia and I think heart attack is less likely with your normal CXR and EKG.  I'll let you know when the official reading of your CXR is back and your lab test (troponin).   Let's plan to:     Treat your sinus infection with augmentin x 10 days.    Come back in if your symptoms don't improve!                  Follow-ups after your visit        Who to contact     If you have questions or need follow up information about today's clinic visit or your schedule please contact [facility] directly at [phone].  Normal or non-critical lab and imaging results will be communicated to you by MyChart, letter or phone within 4 business days after the clinic has received the results. If you do not hear from us within 7 days, please contact the clinic through MyChart or phone. If you have a critical or abnormal lab result, we will notify you by phone as soon as possible.  Submit refill requests through [employer] or call your pharmacy and they will forward the refill request to us. Please allow 3 business days for your refill to be completed.          Additional  "Information About Your Visit        MyChart Information     [employer] gives you secure access to your electronic health record. If you see a primary care provider, you can also send messages to your care team and make appointments. If you have questions, please call your primary care clinic.  If you do not have a primary care provider, please call [phone] and they will assist you.        Care EveryWhere ID     This is your Care EveryWhere ID. This could be used by other organizations to access your [place] medical records  [id number]        Your Vitals Were     Pulse Temperature Respirations Height Last Period Pulse Oximetry    75 97.1  F (36.2  C) 16 5' 5\" (1.651 m) (LMP Unknown) 99%       Blood Pressure from Last 3 Encounters:   03/06/18 140/76   10/17/17 153/74   10/04/17 134/72    Weight from Last 3 Encounters:   10/19/17 184 lb (83.5 kg)   10/17/17 184 lb (83.5 kg)   10/04/17 184 lb (83.5 kg)              We Performed the Following     BNP-N terminal pro     CBC with platelets and differential     Comprehensive metabolic panel (BMP + Alb, Alk Phos, ALT, AST, Total. Bili, TP)     EKG 12-lead complete w/read - Clinics     Troponin I          Today's Medication Changes          These changes are accurate as of 3/6/18  9:59 AM.  If you have any questions, ask your nurse or doctor.               Start taking these medicines.        Dose/Directions    amoxicillin-clavulanate 875-125 MG per tablet   Commonly known as:  AUGMENTIN   Used for:  Subacute maxillary sinusitis   Started by:  Sun Person MD        Dose:  1 tablet   Take 1 tablet by mouth 2 times daily   Quantity:  20 tablet   Refills:  0            Where to get your medicines      These medications were sent to [place] [address] [place] [facility] [address] [facility]  [address] [facility] [address]     Phone:  [phone]     amoxicillin-clavulanate 875-125 MG per tablet                Primary Care Provider Office Phone # Fax #    " Sun Person [phone]       [address] [facility] [address]        Equal Access to Services     TAMARA WYNNE : Hadii aad ku hadyuryruben Vallejo, warockyda ameliakacyha, arlynta kaceda doriandiaz, vadim gigiin hayaan dorianrod bustillosmax robert. So [facility] [phone].    ATENCIÓN: Si habla español, tiene a garcia disposición servicios gratuitos de asistencia lingüística. Llame al [phone].    We comply with applicable federal civil rights laws and [place] laws. We do not discriminate on the basis of race, color, national origin, age, disability, sex, sexual orientation, or gender identity.            Thank you!     Thank you for choosing [facility]  for your care. Our goal is always to provide you with excellent care. Hearing back from our patients is one way we can continue to improve our services. Please take a few minutes to complete the written survey that you may receive in the mail after your visit with us. Thank you!             Your Updated Medication List - Protect others around you: Learn how to safely use, store and throw away your medicines at www.disposemymeds.org.          This list is accurate as of 3/6/18  9:59 AM.  Always use your most recent med list.                   Brand Name Dispense Instructions for use Diagnosis    ADVAIR -21 MCG/ACT Inhaler   Generic drug:  fluticasone-salmeterol           albuterol 108 (90 BASE) MCG/ACT Inhaler    VENTOLIN HFA    3 Inhaler    Inhale 2 puffs into the lungs every 6 hours as needed for shortness of breath / dyspnea    Intermittent asthma       amLODIPine 5 MG tablet    NORVASC    90 tablet    Take 1 tablet (5 mg) by mouth daily    Benign essential hypertension       amoxicillin-clavulanate 875-125 MG per tablet    AUGMENTIN    20 tablet    Take 1 tablet by mouth 2 times daily    Subacute maxillary sinusitis       BENADRYL PO      Take 25 mg by mouth        ibuprofen 200 MG tablet    ADVIL/MOTRIN      Take 1-2 tablets (200-400 mg) by mouth At Bedtime        latanoprost 0.005 % ophthalmic solution    XALATAN     Place 1 drop into the right eye At Bedtime        montelukast 10 MG tablet    SINGULAIR     TAKE ONE TABLET BY MOUTH EVERY DAY AS DIRECTED        TUMS PO      Take  by mouth At Bedtime.        ZYRTEC ALLERGY 10 MG tablet   Generic drug:  cetirizine      Take 10 mg by mouth daily.

## 2018-03-07 ASSESSMENT — PATIENT HEALTH QUESTIONNAIRE - PHQ9: SUM OF ALL RESPONSES TO PHQ QUESTIONS 1-9: 2

## 2018-03-07 ASSESSMENT — ANXIETY QUESTIONNAIRES: GAD7 TOTAL SCORE: 0

## 2018-03-07 NOTE — PROGRESS NOTES
Cayetano Rodriguez:  Your lab tests are all really reassuring!  Looks like this has nothing to do with your heart, thank goodness.  Your chest x-ray DID show that you have arthritis in both your shoulders.  Do you have an orthopedic clinic that you've gone to in the past?  I think seeing a sports medicine doctor would make sense for your shoulder pain at this point - either at the [facility] or [facility].  Let me know if you have any questions about this.  Dr. Sun Person MD  [facility]  [phone]

## 2018-03-08 NOTE — PROGRESS NOTES
"Here are the results that show your normal chest xray, but some arthritis or \"degenerative changes\" in both shoulders.  Let me know if you have any questions.  Dr. Sun Person MD  [facility]  [phone]   "

## 2018-06-22 ENCOUNTER — TRANSFERRED RECORDS (OUTPATIENT)
Dept: HEALTH INFORMATION MANAGEMENT | Facility: CLINIC | Age: 68
End: 2018-06-22

## 2018-07-11 ENCOUNTER — APPOINTMENT (OUTPATIENT)
Dept: GENERAL RADIOLOGY | Facility: CLINIC | Age: 68
End: 2018-07-11
Attending: EMERGENCY MEDICINE
Payer: COMMERCIAL

## 2018-07-11 ENCOUNTER — HOSPITAL ENCOUNTER (EMERGENCY)
Facility: CLINIC | Age: 68
Discharge: HOME | End: 2018-07-12
Attending: EMERGENCY MEDICINE | Admitting: EMERGENCY MEDICINE
Payer: COMMERCIAL

## 2018-07-11 ENCOUNTER — APPOINTMENT (OUTPATIENT)
Dept: CT IMAGING | Facility: CLINIC | Age: 68
End: 2018-07-11
Attending: EMERGENCY MEDICINE
Payer: COMMERCIAL

## 2018-07-11 ENCOUNTER — NURSE TRIAGE (OUTPATIENT)
Dept: NURSING | Facility: CLINIC | Age: 68
End: 2018-07-11

## 2018-07-11 DIAGNOSIS — S20.212A RIB CONTUSION, LEFT, INITIAL ENCOUNTER: ICD-10-CM
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
## 2018-07-11 DIAGNOSIS — S40.011A CONTUSION OF RIGHT SHOULDER, INITIAL ENCOUNTER: ICD-10-CM

## 2018-07-11 PROCEDURE — 72072 X-RAY EXAM THORAC SPINE 3VWS: CPT

## 2018-07-11 PROCEDURE — 99284 EMERGENCY DEPT VISIT MOD MDM: CPT | Performed by: EMERGENCY MEDICINE

## 2018-07-11 PROCEDURE — 70450 CT HEAD/BRAIN W/O DYE: CPT

## 2018-07-11 PROCEDURE — 99284 EMERGENCY DEPT VISIT MOD MDM: CPT | Mod: Z6 | Performed by: EMERGENCY MEDICINE

## 2018-07-11 PROCEDURE — 73030 X-RAY EXAM OF SHOULDER: CPT | Mod: RT

## 2018-07-11 PROCEDURE — 72125 CT NECK SPINE W/O DYE: CPT

## 2018-07-11 PROCEDURE — 71046 X-RAY EXAM CHEST 2 VIEWS: CPT

## 2018-07-11 ASSESSMENT — ENCOUNTER SYMPTOMS
BACK PAIN: 1
EYE PAIN: 0
HEADACHES: 1
WOUND: 1
NAUSEA: 0
WEAKNESS: 0
VOMITING: 0
NUMBNESS: 0
NECK PAIN: 1

## 2018-07-11 NOTE — LETTER
July 12, 2018      To Whom It May Concern:      Jennifer Jane was seen in our Emergency Department today, 07/11/18.  She sustained a fall with multiple contusions and will have difficulty moving into and out of small spaces.  If possible please allow to sit in aisle seat as to not further exacerbate her symptoms.  She did not sustain any injuries that would preclude her from being able to fly.      Sincerely,        Jo Ann Martínez MD

## 2018-07-11 NOTE — ED AVS SNAPSHOT
[facility], Emergency Department    [address] [facility] [facility] [address]    Phone:  [phone]                                       Jennifer Jane   MRN: [id number]    Department:  [facility], Emergency Department   Date of Visit:  7/11/2018           Patient Information     Date Of Birth          1950        Your diagnoses for this visit were:     Rib contusion, left, initial encounter     Contusion of right shoulder, initial encounter     Fall, initial encounter        You were seen by Jo Ann Martínez MD.        Discharge Instructions       Please make an appointment to follow up with Your Primary Care Provider in 7-10 days as needed.    Use acetaminophen 1000 mg every 6 hours or ibuprofen 600 mg every 6 hours for pain control.      If you have worsening pain, shortness of breath, severe headache, vomiting or other concerns, return to the emergency department for re-evaluation.            Chest Contusion    A contusion is a bruise to the skin, muscle, or ribs. It may cause pain, tenderness, and swelling. It may turn the skin purple until it heals. Contusions take a few days to a few weeks to heal.  Home care  Follow these guidelines when caring for yourself at home:    Rest. Don t do any heavy lifting or strenuous activity. Don t do any activity that causes pain.    Put an ice pack on the injured area. Do this for 20 minutes every 1 to 2 hours the first day. You can make an ice pack by wrapping a plastic bag of ice cubes in a thin towel. Continue to use the ice pack 3 to 4 times a day for the next 2 days. Then use the ice pack as needed to ease pain and swelling.    After 1 to 2 days you may put a warm compress on the area. Do this for 10 minutes several times a day. A warm compress is a clean cloth that s damp with warm water.    Hold a pillow to the affected area when you cough. This will help ease pain.    You may use over-the-counter pain medicine such as acetaminophen or ibuprofen to  control pain, unless another pain medicine was prescribed. If you have chronic liver or kidney disease, talk with your healthcare provider before using these medicines. Also talk with your provider if you ve had a stomach ulcer or gastrointestinal bleeding.  Follow-up care  Follow up with your healthcare provider, or as advised.  When to seek medical advice  Call your healthcare provider right away if any of these occur:    New abdominal pain or abdominal pain that gets worse    Fever of 100.4 F (38 C) or higher, or as directed by your healthcare provider  Call 911  Call 911 if any of these occur:     Dizziness, weakness, or fainting    Shortness of breath, trouble breathing, or breathing fast    Chest pain gets worse when you breathe    Severe pain that comes on suddenly or lasts more than an hour  Date Last Reviewed: 5/1/2017 2000-2017 [employer] [address] [place] [address]. All rights reserved. This information is not intended as a substitute for professional medical care. Always follow your healthcare professional's instructions.          24 Hour Appointment Hotline       To make an appointment at any [facility], call [phone] ([phone]). If you don't have a family doctor or clinic, we will help you find one. [place] clinics are conveniently located to serve the needs of you and your family.             Review of your medicines      Our records show that you are taking the medicines listed below. If these are incorrect, please call your family doctor or clinic.        Dose / Directions Last dose taken    ADVAIR -21 MCG/ACT Inhaler   Generic drug:  fluticasone-salmeterol        Refills:  0        albuterol 108 (90 Base) MCG/ACT Inhaler   Commonly known as:  VENTOLIN HFA   Dose:  2 puff   Quantity:  3 Inhaler        Inhale 2 puffs into the lungs every 6 hours as needed for shortness of breath / dyspnea   Refills:  1        amLODIPine 5 MG tablet   Commonly  known as:  NORVASC   Dose:  5 mg   Quantity:  90 tablet        Take 1 tablet (5 mg) by mouth daily   Refills:  3        amoxicillin-clavulanate 875-125 MG per tablet   Commonly known as:  AUGMENTIN   Dose:  1 tablet   Quantity:  20 tablet        Take 1 tablet by mouth 2 times daily   Refills:  0        BENADRYL PO   Dose:  25 mg        Take 25 mg by mouth   Refills:  0        ibuprofen 200 MG tablet   Commonly known as:  ADVIL/MOTRIN   Dose:  200-400 mg        Take 1-2 tablets (200-400 mg) by mouth At Bedtime   Refills:  0        latanoprost 0.005 % ophthalmic solution   Commonly known as:  XALATAN   Dose:  1 drop        Place 1 drop into the right eye At Bedtime   Refills:  0        montelukast 10 MG tablet   Commonly known as:  SINGULAIR        TAKE ONE TABLET BY MOUTH EVERY DAY AS DIRECTED   Refills:  1        TUMS PO        Take  by mouth At Bedtime.   Refills:  0        ZYRTEC ALLERGY 10 MG tablet   Dose:  10 mg   Generic drug:  cetirizine        Take 10 mg by mouth daily.   Refills:  0                Procedures and tests performed during your visit     CT Cervical Spine w/o Contrast    CT Head w/o Contrast    XR Chest 2 Views    XR Shoulder Right G/E 3 Views    XR Thoracic Spine 3 Views      Orders Needing Specimen Collection     None      Pending Results     Date and Time Order Name Status Description    7/11/2018 2105 XR Thoracic Spine 3 Views Preliminary     7/11/2018 2105 XR Shoulder Right G/E 3 Views Preliminary     7/11/2018 2105 XR Chest 2 Views Preliminary     7/11/2018 2105 CT Cervical Spine w/o Contrast Preliminary             Pending Culture Results     No orders found for last 3 day(s).            Pending Results Instructions     If you had any lab results that were not finalized at the time of your Discharge, you can call the ED Lab Result RN at [phone]. You will be contacted by this team for any positive Lab results or changes in treatment. The nurses are available 7 days a week from 10A to  6:30P.  You can leave a message 24 hours per day and they will return your call.        Thank you for choosing [facility]       Thank you for choosing [facility] for your care. Our goal is always to provide you with excellent care. Hearing back from our patients is one way we can continue to improve our services. Please take a few minutes to complete the written survey that you may receive in the mail after you visit with us. Thank you!        [employer] Information     [employer] gives you secure access to your electronic health record. If you see a primary care provider, you can also send messages to your care team and make appointments. If you have questions, please call your primary care clinic.  If you do not have a primary care provider, please call [phone] and they will assist you.        Care EveryWhere ID     This is your Care EveryWhere ID. This could be used by other organizations to access your [place] medical records  [id number]        Equal Access to Services     TAMARA WYNNE : Devin Vallejo, tye rubalcava, kallie castle, vadim thomas . So [facility] [place] [facility] [phone].    ATENCIÓN: Si habla español, tiene a garcia disposición servicios gratuitos de asistencia lingüística. Llame al [phone].    We comply with applicable federal civil rights laws and [place] laws. We do not discriminate on the basis of race, color, national origin, age, disability, sex, sexual orientation, or gender identity.            After Visit Summary       This is your record. Keep this with you and show to your community pharmacist(s) and doctor(s) at your next visit.

## 2018-07-11 NOTE — ED AVS SNAPSHOT
[facility] [place], Emergency Department    [address] [place] [address]    Phone:  [phone]                                       Jennifer Jane   MRN: [id number]    Department:  [facility], Emergency Department   Date of Visit:  7/11/2018           After Visit Summary Signature Page     I have received my discharge instructions, and my questions have been answered. I have discussed any challenges I see with this plan with the nurse or doctor.    ..........................................................................................................................................  Patient/Patient Representative Signature      ..........................................................................................................................................  Patient Representative Print Name and Relationship to Patient    ..................................................               ................................................  Date                                            Time    ..........................................................................................................................................  Reviewed by Signature/Title    ...................................................              ..............................................  Date                                                            Time

## 2018-07-12 VITALS
DIASTOLIC BLOOD PRESSURE: 74 MMHG | SYSTOLIC BLOOD PRESSURE: 126 MMHG | OXYGEN SATURATION: 100 % | TEMPERATURE: 97.9 F | HEART RATE: 82 BPM | RESPIRATION RATE: 20 BRPM

## 2018-07-12 NOTE — ED PROVIDER NOTES
"  History     Chief Complaint   Patient presents with     Fall     HPI  Jennifer Jane is a 67 year old female s/p microdiscectomy with history of fibromyalgia, HTN, HLD, glaucoma, cataracts, and pseudoexfoliation syndrome, who presents for evaluation after a mechanical fall. She is referred here from Urgent Care for follow-up imaging. Patient states she was chasing a receipt in a parking lot at 4:55 PM today when she fell, landing on her left arm and hitting her forehead. She denies loss of consciousness. Patient reports she had tried to catch herself with her right arm. She states upon standing, she had immediate right shoulder pain. Currently, patient is complaining of a headache, mild right-sided neck pain, right shoulder pain. She also states she feels uncomfortable due to her C collar, which was placed on arrival here. Patient notes superficial scrapes on her forehead, left elbow, left knee, left ankle, and left hand. 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 Patient denies nausea, vomiting, numbness, or weakness. She states she has been able to bear weight and denies pain in either hip. Patient denies loose teeth. She reports her Tdap is up to date. Patient reports taking ibuprofen regularly (though states she has not yet taken anything for her pain today) but is otherwise not anticoagulated. She plans to travel to [place] by plane tomorrow.     Past Medical History:   Diagnosis Date     Arthritis     Fibromyalgia, ? Osteoarthritis     Back injury Rutured disc 1983    Micro discectomy, approx date 1983     Insomnia 12/26/2012     Problem list name updated by automated process. Provider to review     Reduced vision 9/2016    L vision block, bilateral elevated eye presures,poss. Glacom     Uncomplicated asthma     Diagnosed as 1 yo       Past Surgical History:   Procedure " Laterality Date     BACK SURGERY  1984    micro-disc-ectomy     BREAST SURGERY  1984    L Breast bx     C STOMACH SURGERY PROCEDURE UNLISTED  3/1990    Cholecystectomy, 11/1989 tubal ligation     CHOLECYSTECTOMY  1990     HC SACROPLASTY      Micro discectomy approx 1983     TUBAL LIGATION  1989       Family History   Problem Relation Age of Onset     HEART DISEASE Mother      Diabetes Mother      Coronary Artery Disease Mother      Hypertension Mother      Anesthesia Reaction Mother      Anaphylaxis, oral opioixd     Asthma Mother      Thyroid Disease Mother      Low Back Problems Mother      HEART DISEASE Father      Diabetes Father      Coronary Artery Disease Father      Hypertension Father      Hyperlipidemia Father      Low Back Problems Father      Asthma Maternal Grandmother      Coronary Artery Disease Maternal Grandfather      Cerebrovascular Disease Paternal Grandfather      Diabetes Sister      Hypertension Sister      Low Back Problems Sister      Asthma Daughter      Low Back Problems Brother      Diabetes No family hx of      Coronary Artery Disease No family hx of      Hypertension No family hx of      Hyperlipidemia No family hx of      Cerebrovascular Disease No family hx of      Breast Cancer No family hx of      Colon Cancer No family hx of      Prostate Cancer No family hx of      Other Cancer No family hx of      Depression No family hx of      Anxiety Disorder No family hx of      Mental Illness No family hx of      Substance Abuse No family hx of      Anesthesia Reaction No family hx of      Asthma No family hx of      Osteoperosis No family hx of      Genetic Disorder No family hx of      Thyroid Disease No family hx of      Obesity No family hx of      Unknown/Adopted No family hx of        Social History   Substance Use Topics     Smoking status: Never Smoker     Smokeless tobacco: Never Used     Alcohol use 0.0 oz/week      Comment: 0 -2 times a year       No current facility-administered  medications for this encounter.      Current Outpatient Prescriptions   Medication     cetirizine (ZYRTEC ALLERGY) 10 MG tablet     ADVAIR -21 MCG/ACT inhaler     albuterol (VENTOLIN HFA) 108 (90 BASE) MCG/ACT inhaler     amLODIPine (NORVASC) 5 MG tablet     amoxicillin-clavulanate (AUGMENTIN) 875-125 MG per tablet     Calcium Carbonate Antacid (TUMS PO)     DiphenhydrAMINE HCl (BENADRYL PO)     ibuprofen (ADVIL,MOTRIN) 200 MG tablet     latanoprost (XALATAN) 0.005 % ophthalmic solution     montelukast (SINGULAIR) 10 MG tablet        Allergies   Allergen Reactions     Animal Dander      Dust Mites      Grass      Hibiclens [Chlorhexidine] Itching     Pollen Extract      Trees      Vistaril          I have reviewed the Medications, Allergies, Past Medical and Surgical History, and Social History in the Epic system.    Review of Systems   Eyes: Positive for visual disturbance (right eye blurriness). Negative for pain.   Cardiovascular: Positive for chest pain (right).   Gastrointestinal: Negative for nausea and vomiting.   Musculoskeletal: Positive for back pain (right-sided, mild) and neck pain (right-sided, mild).        Positive for right shoulder pain   Skin: Positive for wound (scrapes on left knee, elbow, hand, ankle and forehead).   Neurological: Positive for headaches. Negative for weakness and numbness.       Physical Exam   Pulse: 76  Temp: 99.4  F (37.4  C)  Resp: 18  SpO2: 97 %      Physical Exam   General: patient is alert and oriented and in no acute distress   Head: Abrasion to the left forehead and normocephalic   EENT: moist mucus membranes without tonsillar erythema or exudates, no tongue laceration, no dental fracture, no malocclusion, no midface instability, no periorbital tenderness to palpation, pupils 2 mm, equal round and reactive, extraocular movements intact, no hemotympanum  Neck: Patient in cervical spine collar without midline tenderness to palpation though does have right  paraspinal tenderness  Cardiovascular: regular rate and rhythm, extremities warm and well perfused, no lower extremity edema  Pulmonary: lungs clear to auscultation bilaterally, metric, left anterior chest wall tenderness just inferior to the left breast without crepitus or step-off  Abdomen: soft, non-tender, nondistended, no CVA tenderness  Musculoskeletal: Right anterior shoulder tenderness to palpation with decreased range of motion on abduction secondary to pain, baseline left shoulder decreased range of motion without point bony tenderness, no point tenderness on the remainder of the upper or lower extremities, midline mid thoracic spine tenderness to palpation no other thoracic or lumbar spine tenderness  Neurological: alert and oriented, moving all extremities symmetrically, CN II-XII intact, strength 5/5 and symmetric in , elbow flexion/extension, hip flexion/extension, knee flexion/extension and ankle plantar/dorsiflexion, sensation to light touch in distal upper and lower extremities intact, normal gait  Skin: warm, dry, left forehead abrasion, abrasion to the left forearm and left lower leg      ED Course     ED Course     Procedures       8:48 PM  The patient was seen and examined by Dr. Martínez in Room 23.          Critical Care time:  none             Labs Ordered and Resulted from Time of ED Arrival Up to the Time of Departure from the ED - No data to display         Assessments & Plan (with Medical Decision Making)   Mrs. Jane is a 67 year old female with a history of fibromyalgia, HTN, HLD, glaucoma and pseudoexfoliation glaucoma who presents with headache, neck pain, left chest pain, and right shoulder pain s/p a mechanical fall today. She did sustain an abrasion to her left forehead but no loss of consciousness. Due to her age, she did have a CT of the head which shows no acute traumatic injury. In addition, she reports right-sided cervical spine pain. CT of the cervical spine is negative for  acute fracture or subluxation.  Cervical collar was removed and she is able to fully range her neck without any discomfort.  She did have a chest X-Ray for left interior chest pain, as well as X-Rays of the right shoulder and thoracic spine which are negative for fracture. She has no abdominal tenderness to palpation and denies any hip pain or lower extremity tenderness. She is able to ambulate without difficulty. She denies any neurologic deficits. Tetanus immunization is up to date. She will be discharged to home with ibuprofen and close return instructions.    I have reviewed the nursing notes.    I have reviewed the findings, diagnosis, plan and need for follow up with the patient.    New Prescriptions    No medications on file       Final diagnoses:   Rib contusion, left, initial encounter   Contusion of right shoulder, initial encounter   Fall, initial encounter   I, Cecil Nolasco, am serving as a trained medical scribe to document services personally performed by Jo Ann Martínez MD, based on the provider's statements to me.   I, Jo Ann Martínez MD, was physically present and have reviewed and verified the accuracy of this note documented by Cecil Nolasco.      7/11/2018   [facility], [place], EMERGENCY DEPARTMENT     Jo Ann Martínez MD  07/12/18 0237

## 2018-07-12 NOTE — DISCHARGE INSTRUCTIONS
Please make an appointment to follow up with Your Primary Care Provider in 7-10 days as needed.    Use acetaminophen 1000 mg every 6 hours or ibuprofen 600 mg every 6 hours for pain control.      If you have worsening pain, shortness of breath, severe headache, vomiting or other concerns, return to the emergency department for re-evaluation.            Chest Contusion    A contusion is a bruise to the skin, muscle, or ribs. It may cause pain, tenderness, and swelling. It may turn the skin purple until it heals. Contusions take a few days to a few weeks to heal.  Home care  Follow these guidelines when caring for yourself at home:    Rest. Don t do any heavy lifting or strenuous activity. Don t do any activity that causes pain.    Put an ice pack on the injured area. Do this for 20 minutes every 1 to 2 hours the first day. You can make an ice pack by wrapping a plastic bag of ice cubes in a thin towel. Continue to use the ice pack 3 to 4 times a day for the next 2 days. Then use the ice pack as needed to ease pain and swelling.    After 1 to 2 days you may put a warm compress on the area. Do this for 10 minutes several times a day. A warm compress is a clean cloth that s damp with warm water.    Hold a pillow to the affected area when you cough. This will help ease pain.    You may use over-the-counter pain medicine such as acetaminophen or ibuprofen to control pain, unless another pain medicine was prescribed. If you have chronic liver or kidney disease, talk with your healthcare provider before using these medicines. Also talk with your provider if you ve had a stomach ulcer or gastrointestinal bleeding.  Follow-up care  Follow up with your healthcare provider, or as advised.  When to seek medical advice  Call your healthcare provider right away if any of these occur:    New abdominal pain or abdominal pain that gets worse    Fever of 100.4 F (38 C) or higher, or as directed by your healthcare provider  Call  911  Call 911 if any of these occur:     Dizziness, weakness, or fainting    Shortness of breath, trouble breathing, or breathing fast    Chest pain gets worse when you breathe    Severe pain that comes on suddenly or lasts more than an hour  Date Last Reviewed: 5/1/2017 2000-2017 [employer]. [address] [place] [address]. All rights reserved. This information is not intended as a substitute for professional medical care. Always follow your healthcare professional's instructions.

## 2018-07-12 NOTE — ED NOTES
RN explained purpose of collar and spinal precautions. Pt states she can not lay fat because it hurts worse

## 2018-07-12 NOTE — ED TRIAGE NOTES
Patient was chasing down a receipt that was blowing around outside when she fell, hitting her right arm and forehead. Endorses neck tenderness. Patient was initially seen in urgent care and was sent here. C-collar placed on pt.

## 2018-11-09 ENCOUNTER — TELEPHONE (OUTPATIENT)
Dept: FAMILY MEDICINE | Facility: CLINIC | Age: 68
End: 2018-11-09

## 2018-11-09 ENCOUNTER — OFFICE VISIT (OUTPATIENT)
Dept: FAMILY MEDICINE | Facility: CLINIC | Age: 68
End: 2018-11-09
Payer: COMMERCIAL

## 2018-11-09 VITALS — BODY MASS INDEX: 30.87 KG/M2 | WEIGHT: 185.5 LBS | TEMPERATURE: 98.9 F | HEART RATE: 96 BPM | OXYGEN SATURATION: 95 %

## 2018-11-09 DIAGNOSIS — J45.41 MODERATE PERSISTENT ASTHMA WITH EXACERBATION: Primary | ICD-10-CM

## 2018-11-09 PROCEDURE — 99214 OFFICE O/P EST MOD 30 MIN: CPT | Performed by: FAMILY MEDICINE

## 2018-11-09 RX ORDER — METHYLPREDNISOLONE 4 MG
TABLET, DOSE PACK ORAL
Qty: 21 TABLET | Refills: 0 | Status: SHIPPED | OUTPATIENT
Start: 2018-11-09 | End: 2018-12-03

## 2018-11-09 NOTE — PROGRESS NOTES
SUBJECTIVE:   Jennifer Jane is a 67 year old female who presents to clinic today for the following health issues:      RESPIRATORY SYMPTOMS      Duration: 3 weeks    Description  nasal congestion, facial pain/pressure, cough and low sleep    Severity: moderate    Accompanying signs and symptoms: low sleep, asthma    History (predisposing factors):  asthma    Precipitating or alleviating factors: None    Therapies tried and outcome:  Started amoxicillin 11-8-18      SUBJECTIVE:   Jennifer Jane is a 67 year old female seen urgently with exacerbation of asthma for 2 days. Wheezing is described as moderate. Associated symptoms:asthma, decreased appetite, nasal congestion, nasal blockage, runny nose, facial pressure, right sinus pain, sore throat, productive cough, (cough is keeping awake at night) and cough described as productive, keeps awake at night and hoarse. Current asthma medications: Advair, SVN with Albuterol and using meds compliantly. Patient denies smoke cigarettes.  Has no history of asthma hospitalizations or intubations.      ROS:  Pulse 96  Temp 98.9  F (37.2  C) (Tympanic)  Wt 185 lb 8 oz (84.1 kg)  LMP  (LMP Unknown)  SpO2 95%  BMI 30.87 kg/m2  5-Point Review of Systems Negative-- Except as stated above.    OBJECTIVE:  GENERAL: She appears ill but not toxic, O2 at 95%, comfortable, alert, pleasant and in NAD.   Pulse 96, temperature 98.9  F (37.2  C), temperature source Tympanic, weight 185 lb 8 oz (84.1 kg), SpO2 95 %, not currently breastfeeding.  Vital signs are stable as above.   EARS: TMs normal bilaterally.    MOUTH: Oropharynx clear without erythema or exudate.  NECK: Soft, supple, with no cervical lymphadenopathy.  NOSE: Congested with mild rhinorrhea.  CV: Regular rate and rhythm with no murmur appreciated.  LUNGS: Good air movement.  Lungs bilaterally with scattered wheeze and coarse breath sounds, no crackle or rale. Able to get through sentences  easily.      ASSESSMENT/PLAN:  (J45.41) Moderate persistent asthma with exacerbation   Plan: methylPREDNISolone (MEDROL DOSEPAK) 4 MG tablet          *RX per orders - Use bronchodilator MDI 2 puff q4h prn, steroid MDI regularly to prevent asthma and oral steroid taper.  *Additional suggestions to patient: push fluids, rest, apply heat to sinuses for pain, use a vaporizer prn, use SVN machine q4h prn, ROV if not improved or if worse and continue antibiotics prescribed at [facility] for sinus infection.    Dr. Sun Person MD/[facility]

## 2018-11-09 NOTE — TELEPHONE ENCOUNTER
Patient called the clinic reporting a progressively worsening cough for the last 2 weeks. [facility], started augmentin, but feels like she needs a steroid. She is requesting to be double booked today at  clinic. The cough sounds coarse over the phone. Pt able to take a deep breath, no fever, no chills.     NURSING PLAN: Huddle with provider, plan includes doube book on Dr. Person's schedule asap    RECOMMENDED DISPOSITION:  To office now, another person to drive - Pt double booked.   Will comply with recommendation: Yes  If further questions/concerns or if symptoms do not improve, worsen or new symptoms develop, call your PCP or [facility] as soon as possible.      Guideline used:  Telephone Triage Protocols for Nurses, Fifth Edition, Sharon Mejia RN

## 2018-11-09 NOTE — MR AVS SNAPSHOT
After Visit Summary   11/9/2018    Jennifer Jane    MRN: [id number]           Patient Information     Date Of Birth          1950        Visit Information        Provider Department      11/9/2018 2:45 PM Sun Person MD [facility]        Today's Diagnoses     Moderate persistent asthma with exacerbation    -  1       Follow-ups after your visit        Your next 10 appointments already scheduled     Nov 28, 2018  1:00 PM CST   [facility] with [facility]    [address]  [address]  [facility] [address] [phone]              Who to contact     If you have questions or need follow up information about today's clinic visit or your schedule please contact [facility] directly at [phone].  Normal or non-critical lab and imaging results will be communicated to you by OPENLANEhart, letter or phone within 4 business days after the clinic has received the results. If you do not hear from us within 7 days, please contact the clinic through OPENLANEhart or phone. If you have a critical or abnormal lab result, we will notify you by phone as soon as possible.  Submit refill requests through JournalDoc or call your pharmacy and they will forward the refill request to us. Please allow 3 business days for your refill to be completed.          Additional Information About Your Visit        MyChart Information     JournalDoc gives you secure access to your electronic health record. If you see a primary care provider, you can also send messages to your care team and make appointments. If you have questions, please call your primary care clinic.  If you do not have a primary care provider, please call [phone] and they will assist you.        Care EveryWhere ID     This is your Care EveryWhere ID. This could be used by  other organizations to access your [facility] medical records  [id number]        Your Vitals Were     Pulse Temperature Last Period Pulse Oximetry BMI (Body Mass Index)       96 98.9  F (37.2  C) (Tympanic) (LMP Unknown) 95% 30.87 kg/m2        Blood Pressure from Last 3 Encounters:   07/12/18 126/74   03/06/18 140/76   10/17/17 153/74    Weight from Last 3 Encounters:   11/09/18 185 lb 8 oz (84.1 kg)   10/19/17 184 lb (83.5 kg)   10/17/17 184 lb (83.5 kg)              Today, you had the following     No orders found for display         Today's Medication Changes          These changes are accurate as of 11/9/18  4:12 PM.  If you have any questions, ask your nurse or doctor.               Start taking these medicines.        Dose/Directions    methylPREDNISolone 4 MG tablet   Commonly known as:  MEDROL DOSEPAK   Used for:  Moderate persistent asthma with exacerbation   Started by:  Sun Person MD        Follow package instructions   Quantity:  21 tablet   Refills:  0            Where to get your medicines      These medications were sent to [place] [address] [place] [address]  [address] [place] [address]     Phone:  [phone]     methylPREDNISolone 4 MG tablet                Primary Care Provider Office Phone # Fax #    Sun Person [phone] [address] [facility] [address]        Equal Access to Services     [facility] : Hadii conor lucas Somelly, waaxda luqadaha, qaybta kaalmada vadim castle . So [facility] [phone].    ATENCIÓN: Si habla español, tiene a garcia disposición servicios gratuitos de asistencia lingüística. Llame al [phone].    We comply with applicable federal civil rights laws and [place] laws. We do not discriminate on the basis of race, color, national origin, age, disability, sex, sexual orientation, or gender identity.            Thank you!     Thank you for choosing  [facility]  for your care. Our goal is always to provide you with excellent care. Hearing back from our patients is one way we can continue to improve our services. Please take a few minutes to complete the written survey that you may receive in the mail after your visit with us. Thank you!             Your Updated Medication List - Protect others around you: Learn how to safely use, store and throw away your medicines at www.disposemymeds.org.          This list is accurate as of 11/9/18  4:12 PM.  Always use your most recent med list.                   Brand Name Dispense Instructions for use Diagnosis    ADVAIR -21 MCG/ACT Inhaler   Generic drug:  fluticasone-salmeterol           albuterol 108 (90 Base) MCG/ACT inhaler    VENTOLIN HFA    3 Inhaler    Inhale 2 puffs into the lungs every 6 hours as needed for shortness of breath / dyspnea    Intermittent asthma       amLODIPine 5 MG tablet    NORVASC    90 tablet    Take 1 tablet (5 mg) by mouth daily    Benign essential hypertension       amoxicillin-clavulanate 875-125 MG per tablet    AUGMENTIN    20 tablet    Take 1 tablet by mouth 2 times daily    Subacute maxillary sinusitis       BENADRYL PO      Take 25 mg by mouth        ibuprofen 200 MG tablet    ADVIL/MOTRIN     Take 1-2 tablets (200-400 mg) by mouth At Bedtime        latanoprost 0.005 % ophthalmic solution    XALATAN     Place 1 drop into the right eye At Bedtime        methylPREDNISolone 4 MG tablet    MEDROL DOSEPAK    21 tablet    Follow package instructions    Moderate persistent asthma with exacerbation       montelukast 10 MG tablet    SINGULAIR     TAKE ONE TABLET BY MOUTH EVERY DAY AS DIRECTED        TUMS PO      Take  by mouth At Bedtime.        ZYRTEC ALLERGY 10 MG tablet   Generic drug:  cetirizine      Take 10 mg by mouth daily.

## 2018-11-09 NOTE — LETTER
My Asthma Action Plan  Name: Jennifer Jane   YOB: 1950  Date: 11/9/2018   My doctor: Sun Person MD   My clinic: [facility] [place] [facility]        My Control Medicine: Fluticasone + salmeterol (Advair) -  /21 mcg 2 puff bid  My Rescue Medicine: Albuterol (Proair/Ventolin/Proventil) inhaler q 4-6 hours prn wheeze   My Asthma Severity: moderate persistent  Avoid your asthma triggers: smoke, upper respiratory infections, dust mites, pollens, animal dander, insects/rodents, mold, humidity, aspirin, strong odors and fumes, occupational exposure, exercise or sports, emotions and cold air               GREEN ZONE   Good Control    I feel good    No cough or wheeze    Can work, sleep and play without asthma symptoms       Take your asthma control medicine every day.     1. If exercise triggers your asthma, take your rescue medication    15 minutes before exercise or sports, and    During exercise if you have asthma symptoms  2. Spacer to use with inhaler: If you have a spacer, make sure to use it with your inhaler             YELLOW ZONE Getting Worse  I have ANY of these:    I do not feel good    Cough or wheeze    Chest feels tight    Wake up at night   1. Keep taking your Green Zone medications  2. Start taking your rescue medicine:    every 20 minutes for up to 1 hour. Then every 4 hours for 24-48 hours.  3. If you stay in the Yellow Zone for more than 12-24 hours, contact your doctor.  4. If you do not return to the Green Zone in 12-24 hours or you get worse, start taking your oral steroid medicine if prescribed by your provider.           RED ZONE Medical Alert - Get Help  I have ANY of these:    I feel awful    Medicine is not helping    Breathing getting harder    Trouble walking or talking    Nose opens wide to breathe       1. Take your rescue medicine NOW  2. If your provider has prescribed an oral steroid medicine, start taking it NOW  3. Call your doctor  NOW  4. If you are still in the Red Zone after 20 minutes and you have not reached your doctor:    Take your rescue medicine again and    Call 911 or go to the emergency room right away    See your regular doctor within 2 weeks of an Emergency Room or Urgent Care visit for follow-up treatment.          Annual Reminders:  Meet with Asthma Educator,  Flu Shot in the Fall, consider Pneumonia Vaccination for patients with asthma (aged 19 and older).    Pharmacy: [facility] [id number] [address]                      Asthma Triggers  How To Control Things That Make Your Asthma Worse    Triggers are things that make your asthma worse.  Look at the list below to help you find your triggers and what you can do about them.  You can help prevent asthma flare-ups by staying away from your triggers.      Trigger                                                          What you can do   Cigarette Smoke  Tobacco smoke can make asthma worse. Do not allow smoking in your home, car or around you.  Be sure no one smokes at a child s day care or school.  If you smoke, ask your health care provider for ways to help you quit.  Ask family members to quit too.  Ask your health care provider for a referral to Quit Plan to help you quit smoking, or call 0-847-317-PLAN.     Colds, Flu, Bronchitis  These are common triggers of asthma. Wash your hands often.  Don t touch your eyes, nose or mouth.  Get a flu shot every year.     Dust Mites  These are tiny bugs that live in cloth or carpet. They are too small to see. Wash sheets and blankets in hot water every week.   Encase pillows and mattress in dust mite proof covers.  Avoid having carpet if you can. If you have carpet, vacuum weekly.   Use a dust mask and HEPA vacuum.   Pollen and Outdoor Mold  Some people are allergic to trees, grass, or weed pollen, or molds. Try to keep your windows closed.  Limit time out doors when pollen count is high.   Ask you health care  provider about taking medicine during allergy season.     Animal Dander  Some people are allergic to skin flakes, urine or saliva from pets with fur or feathers. Keep pets with fur or feathers out of your home.    If you can t keep the pet outdoors, then keep the pet out of your bedroom.  Keep the bedroom door closed.  Keep pets off cloth furniture and away from stuffed toys.     Mice, Rats, and Cockroaches  Some people are allergic to the waste from these pests.   Cover food and garbage.  Clean up spills and food crumbs.  Store grease in the refrigerator.   Keep food out of the bedroom.   Indoor Mold  This can be a trigger if your home has high moisture. Fix leaking faucets, pipes, or other sources of water.   Clean moldy surfaces.  Dehumidify basement if it is damp and smelly.   Smoke, Strong Odors, and Sprays  These can reduce air quality. Stay away from strong odors and sprays, such as perfume, powder, hair spray, paints, smoke incense, paint, cleaning products, candles and new carpet.   Exercise or Sports  Some people with asthma have this trigger. Be active!  Ask your doctor about taking medicine before sports or exercise to prevent symptoms.    Warm up for 5-10 minutes before and after sports or exercise.     Other Triggers of Asthma  Cold air:  Cover your nose and mouth with a scarf.  Sometimes laughing or crying can be a trigger.  Some medicines and food can trigger asthma.

## 2018-11-11 ENCOUNTER — APPOINTMENT (OUTPATIENT)
Dept: CT IMAGING | Facility: CLINIC | Age: 68
End: 2018-11-11
Attending: INTERNAL MEDICINE
Payer: COMMERCIAL

## 2018-11-11 ENCOUNTER — HOSPITAL ENCOUNTER (EMERGENCY)
Facility: CLINIC | Age: 68
Discharge: HOME | End: 2018-11-11
Attending: INTERNAL MEDICINE | Admitting: INTERNAL MEDICINE
Payer: COMMERCIAL

## 2018-11-11 VITALS
SYSTOLIC BLOOD PRESSURE: 156 MMHG | BODY MASS INDEX: 30.49 KG/M2 | OXYGEN SATURATION: 94 % | TEMPERATURE: 98.6 F | WEIGHT: 183 LBS | RESPIRATION RATE: 16 BRPM | HEART RATE: 91 BPM | DIASTOLIC BLOOD PRESSURE: 93 MMHG | HEIGHT: 65 IN

## 2018-11-11 DIAGNOSIS — S09.90XA CLOSED HEAD INJURY, INITIAL ENCOUNTER: ICD-10-CM
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
## 2018-11-11 PROCEDURE — 99284 EMERGENCY DEPT VISIT MOD MDM: CPT | Mod: 25 | Performed by: INTERNAL MEDICINE

## 2018-11-11 PROCEDURE — 70450 CT HEAD/BRAIN W/O DYE: CPT

## 2018-11-11 PROCEDURE — 99283 EMERGENCY DEPT VISIT LOW MDM: CPT | Mod: Z6 | Performed by: INTERNAL MEDICINE

## 2018-11-11 RX ORDER — HYDROCHLOROTHIAZIDE 12.5 MG/1
25 CAPSULE ORAL EVERY OTHER DAY
COMMUNITY
End: 2019-11-27

## 2018-11-11 ASSESSMENT — ENCOUNTER SYMPTOMS
WOUND: 1
ROS SKIN COMMENTS: SEE HPI

## 2018-11-11 NOTE — ED PROVIDER NOTES
"  History     Chief Complaint   Patient presents with     Fall     HPI  Jennifer Jane is a 67 year old female with a history of asthma, arthritis, HTN, and insomnia who presents for evaluation of head injury following a fall. Patient reports she woke up this morning and used her prescribed inhaler. She states after she inhaled the medication and held her breath she \"fell to the floor\". She states she hit the superior aspect of her head and scraped her nose against the rug on the floor. She noticed later that she had a \"divet\" in the area of her head where she had hit it and became concerned. No vision changes. No loss of consciousness. She does not take any anticoagulant medications. Per chart review, her last tetanus immunization was administered in 2011. No other concerns or complaints at this time.     I have reviewed the Medications, Allergies, Past Medical and Surgical History, and Social History in the Zip[employer] system.  Past Medical History:   Diagnosis Date     Arthritis     Fibromyalgia, ? Osteoarthritis     Back injury Rutured disc 1983    Micro discectomy, approx date 1983     Insomnia 12/26/2012     Problem list name updated by automated process. Provider to review     Reduced vision 9/2016    L vision block, bilateral elevated eye presures,poss. Glacom     Uncomplicated asthma     Diagnosed as 2 yo       Past Surgical History:   Procedure Laterality Date     BACK SURGERY  1984    micro-disc-ectomy     BREAST SURGERY  1984    L Breast bx     C STOMACH SURGERY PROCEDURE UNLISTED  3/1990    Cholecystectomy, 11/1989 tubal ligation     CHOLECYSTECTOMY  1990     HC SACROPLASTY      Micro discectomy approx 1983     TUBAL LIGATION  1989       Family History   Problem Relation Age of Onset     HEART DISEASE Mother      Diabetes Mother      Coronary Artery Disease Mother      Hypertension Mother      Anesthesia Reaction Mother      Anaphylaxis, oral opioixd     Asthma Mother      Thyroid Disease Mother      Low Back " Problems Mother      HEART DISEASE Father      Diabetes Father      Coronary Artery Disease Father      Hypertension Father      Hyperlipidemia Father      Low Back Problems Father      Asthma Maternal Grandmother      Coronary Artery Disease Maternal Grandfather      Cerebrovascular Disease Paternal Grandfather      Diabetes Sister      Hypertension Sister      Low Back Problems Sister      Asthma Daughter      Low Back Problems Brother      Diabetes No family hx of      Coronary Artery Disease No family hx of      Hypertension No family hx of      Hyperlipidemia No family hx of      Cerebrovascular Disease No family hx of      Breast Cancer No family hx of      Colon Cancer No family hx of      Prostate Cancer No family hx of      Other Cancer No family hx of      Depression No family hx of      Anxiety Disorder No family hx of      Mental Illness No family hx of      Substance Abuse No family hx of      Anesthesia Reaction No family hx of      Asthma No family hx of      Osteoporosis No family hx of      Genetic Disorder No family hx of      Thyroid Disease No family hx of      Obesity No family hx of      Unknown/Adopted No family hx of        Social History   Substance Use Topics     Smoking status: Never Smoker     Smokeless tobacco: Never Used     Alcohol use 0.0 oz/week      Comment: 0 -2 times a year       No current facility-administered medications for this encounter.      Current Outpatient Prescriptions   Medication     hydrochlorothiazide (MICROZIDE) 12.5 MG capsule     ADVAIR -21 MCG/ACT inhaler     albuterol (VENTOLIN HFA) 108 (90 BASE) MCG/ACT inhaler     amLODIPine (NORVASC) 5 MG tablet     amoxicillin-clavulanate (AUGMENTIN) 875-125 MG per tablet     Calcium Carbonate Antacid (TUMS PO)     cetirizine (ZYRTEC ALLERGY) 10 MG tablet     DiphenhydrAMINE HCl (BENADRYL PO)     ibuprofen (ADVIL,MOTRIN) 200 MG tablet     latanoprost (XALATAN) 0.005 % ophthalmic solution     methylPREDNISolone  "(MEDROL DOSEPAK) 4 MG tablet     montelukast (SINGULAIR) 10 MG tablet        Allergies   Allergen Reactions     Animal Dander      Dust Mites      Grass      Hibiclens [Chlorhexidine] Itching     Pollen Extract      Trees      Vistaril        Review of Systems   Eyes: Negative for visual disturbance.   Skin: Positive for wound (abrasion on bridge of nose).        See HPI   Neurological: Negative for syncope.   All other systems reviewed and are negative.      Physical Exam   BP: 179/85  Pulse: 91  Temp: 98.6  F (37  C)  Resp: 16  Height: 165.1 cm (5' 5\")  Weight: 83 kg (183 lb)  SpO2: 96 %      Physical Exam   Constitutional: She is oriented to person, place, and time. No distress.   HENT:   Head: Not macrocephalic and not microcephalic. Head is with contusion. Head is without raccoon's eyes, without Watson's sign, without abrasion, without laceration, without right periorbital erythema and without left periorbital erythema. Hair is normal.       Mouth/Throat: Oropharynx is clear and moist. No oropharyngeal exudate.   Eyes: Pupils are equal, round, and reactive to light. No scleral icterus.   Cardiovascular: Regular rhythm, normal heart sounds and intact distal pulses.    Pulmonary/Chest: Breath sounds normal. No respiratory distress. She exhibits no tenderness.   Abdominal: Soft. Bowel sounds are normal. There is no tenderness.   Musculoskeletal: She exhibits no edema or tenderness.        Cervical back: She exhibits no tenderness.        Thoracic back: She exhibits no tenderness.        Lumbar back: She exhibits no tenderness.   Neurological: She is oriented to person, place, and time.   Skin: Skin is warm. No rash noted. She is not diaphoretic.       ED Course     ED Course     Procedures       5:21 PM  The patient was seen and examined by Dr. Christian in Room 21.     Results for orders placed or performed during the hospital encounter of 11/11/18 (from the past 24 hour(s))   CT Head w/o Contrast     Status: None "    Collection Time: 11/11/18  6:02 PM    Narrative    CT HEAD W/O CONTRAST 11/11/2018 6:02 PM    History: trauma indentation on left parietal area;     Comparison: 7/11/2018.    Technique: Using multidetector thin collimation helical acquisition  technique, axial, coronal and sagittal CT images from the skull base  to the vertex were obtained without intravenous contrast.     Findings:    No intracranial hemorrhage, mass effect, or midline shift. The  ventricles are proportionate to the cerebral sulci. The gray to white  matter differentiation of the cerebral hemispheres is preserved. The  basal cisterns are patent.    Small air-fluid level in bilateral maxillary sinuses. The mastoid air  cells are clear. Old appearing fracture of the right nasal bone with  no soft tissue swelling.       Impression    Impression: No acute intracranial pathology.    I have personally reviewed the examination and initial interpretation  and I agree with the findings.    SKYLER ABARCA MD           Labs Ordered and Resulted from Time of ED Arrival Up to the Time of Departure from the ED - No data to display         Assessments & Plan (with Medical Decision Making)  CHI/contusion on left parietal area, CT head neg, no antocoagulants, no confusion, will discharge with reassurance, follow up with her PMD prn.       I have reviewed the nursing notes.    I have reviewed the findings, diagnosis, plan and need for follow up with the patient.    Discharge Medication List as of 11/11/2018  6:43 PM          Final diagnoses:   Closed head injury, initial encounter   Fall, initial encounter   IRhonda, am serving as a trained medical scribe to document services personally performed by Whitley Christian MD, based on the provider's statements to me.   IWhitley MD, was physically present and have reviewed and verified the accuracy of this note documented by Rhonda Piper.      11/11/2018   [facility] EMERGENCY DEPARTMENT      Whitley Christian MD  11/11/18 3944

## 2018-11-11 NOTE — ED AVS SNAPSHOT
[facility], Emergency Department    [address] [address]    Phone:  [phone]                                       Jennifer Jane   MRN: [id number]    Department:  [facility], Emergency Department   Date of Visit:  11/11/2018           Patient Information     Date Of Birth          1950        Your diagnoses for this visit were:     Closed head injury, initial encounter     Fall, initial encounter        You were seen by Whitley Christian MD.        Discharge Instructions       Please make an appointment to follow up with Your Primary Care Provider in 5-10 days if you have any concerns.     Your next 10 appointments already scheduled     Nov 28, 2018  1:00 PM CST   [facility]    [address]  [facility] [place] [facility] [address] [phone]              24 Hour Appointment Hotline       To make an appointment at any [facility], call [phone] ([phone]). If you don't have a family doctor or clinic, we will help you find one. [facility] are conveniently located to serve the needs of you and your family.             Review of your medicines      Our records show that you are taking the medicines listed below. If these are incorrect, please call your family doctor or clinic.        Dose / Directions Last dose taken    ADVAIR -21 MCG/ACT Inhaler   Generic drug:  fluticasone-salmeterol        Refills:  0        albuterol 108 (90 Base) MCG/ACT inhaler   Commonly known as:  VENTOLIN HFA   Dose:  2 puff   Quantity:  3 Inhaler        Inhale 2 puffs into the lungs every 6 hours as needed for shortness of breath / dyspnea   Refills:  1        amLODIPine 5 MG tablet   Commonly known as:  NORVASC   Dose:  5 mg   Quantity:  90 tablet        Take 1 tablet (5 mg) by mouth daily   Refills:  3        amoxicillin-clavulanate 875-125 MG per  tablet   Commonly known as:  AUGMENTIN   Dose:  1 tablet   Quantity:  20 tablet        Take 1 tablet by mouth 2 times daily   Refills:  0        BENADRYL PO   Dose:  25 mg        Take 25 mg by mouth   Refills:  0        hydrochlorothiazide 12.5 MG capsule   Commonly known as:  MICROZIDE   Dose:  25 mg        Take 25 mg by mouth every other day   Refills:  0        ibuprofen 200 MG tablet   Commonly known as:  ADVIL/MOTRIN   Dose:  200-400 mg        Take 1-2 tablets (200-400 mg) by mouth At Bedtime   Refills:  0        latanoprost 0.005 % ophthalmic solution   Commonly known as:  XALATAN   Dose:  1 drop        Place 1 drop into the right eye At Bedtime   Refills:  0        methylPREDNISolone 4 MG tablet   Commonly known as:  MEDROL DOSEPAK   Quantity:  21 tablet        Follow package instructions   Refills:  0        montelukast 10 MG tablet   Commonly known as:  SINGULAIR        TAKE ONE TABLET BY MOUTH EVERY DAY AS DIRECTED   Refills:  1        TUMS PO        Take  by mouth At Bedtime.   Refills:  0        ZYRTEC ALLERGY 10 MG tablet   Dose:  10 mg   Generic drug:  cetirizine        Take 10 mg by mouth daily.   Refills:  0                Procedures and tests performed during your visit     CT Head w/o Contrast      Orders Needing Specimen Collection     None      Pending Results     No orders found from 11/9/2018 to 11/12/2018.            Pending Culture Results     No orders found from 11/9/2018 to 11/12/2018.            Pending Results Instructions     If you had any lab results that were not finalized at the time of your Discharge, you can call the ED Lab Result RN at [phone]. You will be contacted by this team for any positive Lab results or changes in treatment. The nurses are available 7 days a week from 10A to 6:30P.  You can leave a message 24 hours per day and they will return your call.        Thank you for choosing [facility]       Thank you for choosing [facility] for your care. Our goal is always to  provide you with excellent care. Hearing back from our patients is one way we can continue to improve our services. Please take a few minutes to complete the written survey that you may receive in the mail after you visit with us. Thank you!        [facility] Information     [facility] gives you secure access to your electronic health record. If you see a primary care provider, you can also send messages to your care team and make appointments. If you have questions, please call your primary care clinic.  If you do not have a primary care provider, please call [phone] and they will assist you.        Care EveryWhere ID     This is your Care EveryWhere ID. This could be used by other organizations to access your [facility] medical records  [id number]        Equal Access to Services     TAMARA WYNNE : Devin Vallejo, tye rubalcava, kallie castle, vadim jones. [facility] [phone].    ATENCIÓN: Si habla español, tiene a garcia disposición servicios gratuitos de asistencia lingüística. Llame al [phone].    We comply with applicable federal civil rights laws and [place] laws. We do not discriminate on the basis of race, color, national origin, age, disability, sex, sexual orientation, or gender identity.            After Visit Summary       This is your record. Keep this with you and show to your community pharmacist(s) and doctor(s) at your next visit.

## 2018-11-11 NOTE — ED TRIAGE NOTES
"Patient presented with complaints of possible skull fracture after falling and hitting head. She reports using her inhaler today which causes her to get dizzy and she fell, hitting her head on a chair. She reports feeling a \"dent\" in her head.   "

## 2018-11-11 NOTE — ED AVS SNAPSHOT
[facility] [place], Emergency Department    [address] [place] [address]    Phone:  [phone]                                       Jennifer Jane   MRN: [id number]    Department:  [facility], Emergency Department   Date of Visit:  11/11/2018           After Visit Summary Signature Page     I have received my discharge instructions, and my questions have been answered. I have discussed any challenges I see with this plan with the nurse or doctor.    ..........................................................................................................................................  Patient/Patient Representative Signature      ..........................................................................................................................................  Patient Representative Print Name and Relationship to Patient    ..................................................               ................................................  Date                                   Time    ..........................................................................................................................................  Reviewed by Signature/Title    ...................................................              ..............................................  Date                                               Time          22EPIC Rev 08/18

## 2018-11-12 NOTE — DISCHARGE INSTRUCTIONS
Please make an appointment to follow up with Your Primary Care Provider in 5-10 days if you have any concerns.

## 2018-12-03 ENCOUNTER — OFFICE VISIT (OUTPATIENT)
Dept: FAMILY MEDICINE | Facility: CLINIC | Age: 68
End: 2018-12-03
Payer: COMMERCIAL

## 2018-12-03 VITALS
HEART RATE: 94 BPM | SYSTOLIC BLOOD PRESSURE: 122 MMHG | DIASTOLIC BLOOD PRESSURE: 80 MMHG | BODY MASS INDEX: 29.99 KG/M2 | HEIGHT: 65 IN | OXYGEN SATURATION: 97 % | RESPIRATION RATE: 20 BRPM | WEIGHT: 180 LBS | TEMPERATURE: 97.5 F

## 2018-12-03 DIAGNOSIS — H26.9 CATARACT OF LEFT EYE, UNSPECIFIED CATARACT TYPE: ICD-10-CM

## 2018-12-03 DIAGNOSIS — J45.20 INTERMITTENT ASTHMA WITH ALLERGIC RHINITIS: ICD-10-CM

## 2018-12-03 DIAGNOSIS — I10 BENIGN ESSENTIAL HYPERTENSION: ICD-10-CM

## 2018-12-03 DIAGNOSIS — Z01.818 PREOP GENERAL PHYSICAL EXAM: Primary | ICD-10-CM

## 2018-12-03 PROCEDURE — 99214 OFFICE O/P EST MOD 30 MIN: CPT | Performed by: INTERNAL MEDICINE

## 2018-12-03 PROCEDURE — 80048 BASIC METABOLIC PNL TOTAL CA: CPT | Performed by: INTERNAL MEDICINE

## 2018-12-03 PROCEDURE — 36415 COLL VENOUS BLD VENIPUNCTURE: CPT | Performed by: INTERNAL MEDICINE

## 2018-12-03 NOTE — PATIENT INSTRUCTIONS
On the morning of surgery,just use the Advair.                 I will let you know your lab results.    Consider getting the shingles vaccine (Shingrix) at your pharmacy.

## 2018-12-03 NOTE — PROGRESS NOTES
[address]  [address]  [facility] [address]  [phone]  Dept: [phone]    PRE-OP EVALUATION:  Today's date: 12/3/2018    Jennifer Jane (: 1950) presents for pre-operative evaluation assessment as requested by Dr. Rebecca Dawson.  She requires evaluation and anesthesia risk assessment prior to undergoing surgery/procedure for treatment of Left eye visual disturbance .    Fax number for surgical facility:   Primary Physician: Sun Person  Type of Anesthesia Anticipated: to be determined    Patient has a Health Care Directive or Living Will:  NO    Preop Questions 12/3/2018   Who is doing your surgery? dr rebecca dawson   What are you having done? catarat  surgery left eye   Date of Surgery/Procedure: 2018   Facility or Hospital where procedure/surgery will be performed: [facility]   1.  Do you have a history of Heart attack, stroke, stent, coronary bypass surgery, or other heart surgery? No   2.  Do you ever have any pain or discomfort in your chest? UNKNOWN -   (several weeks ago, she had an episode of chest pressure while at rest which lasted a few minutes.  She never has any exertional chest pain or pressure)   3.  Do you have a history of  Heart Failure? No   4.   Are you troubled by shortness of breath when:  walking on a level surface, or up a slight hill, or at night? No   5.  Do you currently have a cold, bronchitis or other respiratory infection? No   6.  Do you have a cough, shortness of breath, or wheezing? YES -    7.  Do you sometimes get pains in the calves of your legs when you walk? No   8. Do you or anyone in your family have previous history of blood clots? UNKNOWN -    9.  Do you or does anyone in your family have a serious bleeding problem such as prolonged bleeding following surgeries or cuts? No   10. Have you ever had problems with anemia or been told to take iron pills? No   11. Have you had any  abnormal blood loss such as black, tarry or bloody stools, or abnormal vaginal bleeding? No   12. Have you ever had a blood transfusion? No   13. Have you or any of your relatives ever had problems with anesthesia? YES -    14. Do you have sleep apnea, excessive snoring or daytime drowsiness? No   15. Do you have any prosthetic heart valves? No   16. Do you have prosthetic joints? No   17. Is there any chance that you may be pregnant? No         HPI:     HPI related to upcoming procedure: worsening visual acuity.       She plans on also having the R eye surgery in 2/19.                                                                                                                                                                                           She reports a recent respiratory infection with an asthma exacerbation.          She was treated with antibiotics and corticosteroids, and her symptoms have greatly improved.                            More recently, she lost her balance and fell forward and struck the bridge of her nose and her head against a rocking chair.  She was seen in the emergency room, and had a head CT which was negative.              MEDICAL HISTORY:     Patient Active Problem List    Diagnosis Date Noted     Right foot pain 03/06/2018     Priority: Medium     Chondromalacia of patella, right 10/10/2017     Priority: Medium     Severe, tri-compartmental on MRI 10/2017.  Referred to ortho.       Cystocele, midline 10/04/2017     Priority: Medium     Pt need to double void in last year.  On exam cystocele.  Plan: check UA.  Kegels.  Let us know if worsens.       Acute pain of right knee 10/04/2017     Priority: Medium     Pain for months, worse since helping friend move.  Exam consistent with meniscal tear.  Check xray today, depending on results MRI.  Ice, NSAIDS for now.  ACE.       Benign essential hypertension 10/18/2016     Priority: Medium     Pt feels hctz dries her out, would like to  switch.  Lotrel not covered.  Will try amlodipine, check BP at home.  If not controlled RTC.       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  Immunotherapy 2016 helping a lot  Advair BID       Fibromyalgia 12/28/2012     Priority: Medium     When doesn't get enough sleep = worse.  Otherwise well managed       Pre-diabetes 12/26/2012     Priority: Medium     Hyperglycemia 2012.  Fam history of diabetes.  Has made lifestyle changes       Chronic rhinitis 12/26/2012     Priority: Medium     Has had immunotherapy twice - in childhood and in late 20s-early 30s.   Again in 2016 with [facility].       Chronic dacryocystitis 12/26/2012     Priority: Medium     Dry eyes - sees eye doctor       Sciatica 12/26/2012     Priority: Medium     On and off - exacerbates when lifts heavy things  Bilaterally.          Past Medical History:   Diagnosis Date     Arthritis     Fibromyalgia, ? Osteoarthritis     Back injury Rutured disc 1983    Micro discectomy, approx date 1983     Insomnia 12/26/2012     Problem list name updated by automated process. Provider to review     Reduced vision 9/2016    L vision block, bilateral elevated eye presures,poss. Glacom     Uncomplicated asthma     Diagnosed as 2 yo     Past Surgical History:   Procedure Laterality Date     BACK SURGERY  1984    micro-disc-ectomy     BREAST SURGERY  1984    L Breast bx     C STOMACH SURGERY PROCEDURE UNLISTED  3/1990    Cholecystectomy, 11/1989 tubal ligation     CHOLECYSTECTOMY  1990     TUBAL LIGATION  1989     Current Outpatient  "Prescriptions   Medication Sig Dispense Refill     ADVAIR -21 MCG/ACT inhaler        albuterol (VENTOLIN HFA) 108 (90 BASE) MCG/ACT inhaler Inhale 2 puffs into the lungs every 6 hours as needed for shortness of breath / dyspnea 3 Inhaler 1     amLODIPine (NORVASC) 5 MG tablet Take 1 tablet (5 mg) by mouth daily 90 tablet 3     Calcium Carbonate Antacid (TUMS PO) Take  by mouth At Bedtime.       cetirizine (ZYRTEC ALLERGY) 10 MG tablet Take 10 mg by mouth daily.       DiphenhydrAMINE HCl (BENADRYL PO) Take 25 mg by mouth       hydrochlorothiazide (MICROZIDE) 12.5 MG capsule Take 25 mg by mouth every other day       ibuprofen (ADVIL,MOTRIN) 200 MG tablet Take 1-2 tablets (200-400 mg) by mouth At Bedtime       latanoprost (XALATAN) 0.005 % ophthalmic solution Place 1 drop into the right eye At Bedtime       montelukast (SINGULAIR) 10 MG tablet TAKE ONE TABLET BY MOUTH EVERY DAY AS DIRECTED  1     OTC products: None, except as noted above    Allergies   Allergen Reactions     Animal Dander      Dust Mites      Grass      Hibiclens [Chlorhexidine] Itching     Pollen Extract      Trees      Vistaril       Latex Allergy: NO    Social History   Substance Use Topics     Smoking status: Never Smoker     Smokeless tobacco: Never Used     Alcohol use 0.0 oz/week      Comment: 0 -2 times a year     History   Drug Use No       REVIEW OF SYSTEMS:   CONSTITUTIONAL: NEGATIVE for fever, chills, change in weight  ENT/MOUTH: NEGATIVE for ear, mouth and throat problems  RESP:NEGATIVE for hemoptysis  CV: NEGATIVE for chest pain, palpitations or peripheral edema  GI: NEGATIVE for hematochezia, melena, nausea and poor appetite  NEURO: NEGATIVE for gait disturbance and vertigo    EXAM:   /80 (BP Location: Left arm, Patient Position: Chair, Cuff Size: Adult Large)  Pulse 94  Temp 97.5  F (36.4  C)  Resp 20  Ht 5' 5\" (1.651 m)  Wt 180 lb (81.6 kg)  LMP  (LMP Unknown)  SpO2 97%  Breastfeeding? No  BMI 29.95 kg/m2  GENERAL " APPEARANCE: alert and no distress  HENT: nose and mouth without ulcers or lesions  RESP: no rales or rhonchi  CV: regular rate and rhythm, normal S1 S2, no S3 or S4 and no murmur, click or rub   ABDOMEN: soft, nontender, without hepatosplenomegaly or masses  NEURO: Normal strength and tone, mentation intact and speech normal    DIAGNOSTICS:     EKG: appears normal, NSR, dated 3/6/18  Labs Drawn and in Process:   Unresulted Labs Ordered in the Past 30 Days of this Admission     Date and Time Order Name Status Description    12/3/2018 0957 BASIC METABOLIC PANEL In process               IMPRESSION:   Reason for surgery/procedure: need for cataract surgery  Diagnosis/reason for consult: need for pre-op evaluation    The proposed surgical procedure is considered LOW risk.    REVISED CARDIAC RISK INDEX  The patient has the following serious cardiovascular risks for perioperative complications:  No serious cardiac risks  INTERPRETATION: 0 risks: Class I (very low risk - 0.4% complication rate)    The patient has the following additional risks for perioperative complications:  No identified additional risks      ICD-10-CM    1. Preop general physical exam Z01.818 Basic metabolic panel  (Ca, Cl, CO2, Creat, Gluc, K, Na, BUN)   2. Cataract of left eye, unspecified cataract type H26.9    3. Intermittent asthma with allergic rhinitis J45.20    4. Benign essential hypertension I10 Basic metabolic panel  (Ca, Cl, CO2, Creat, Gluc, K, Na, BUN)       RECOMMENDATIONS:       Patient Instructions     On the morning of surgery,just use the Advair.                 I will let you know your lab results.    Consider getting the shingles vaccine (Shingrix) at your pharmacy.           APPROVAL GIVEN to proceed with proposed procedure, without further diagnostic evaluation pending the lab results.       Signed Electronically by: Live Null MD    Copy of this evaluation report is provided to requesting physician.    Results for orders  placed or performed in visit on 12/03/18   Basic metabolic panel  (Ca, Cl, CO2, Creat, Gluc, K, Na, BUN)   Result Value Ref Range    Sodium 140 133 - 144 mmol/L    Potassium 4.0 3.4 - 5.3 mmol/L    Chloride 108 94 - 109 mmol/L    Carbon Dioxide 23 20 - 32 mmol/L    Anion Gap 9 3 - 14 mmol/L    Glucose 91 70 - 99 mg/dL    Urea Nitrogen 15 7 - 30 mg/dL    Creatinine 0.76 0.52 - 1.04 mg/dL    GFR Estimate 76 >60 mL/min/1.7m2    GFR Estimate If Black >90 >60 mL/min/1.7m2    Calcium 9.6 8.5 - 10.1 mg/dL

## 2018-12-03 NOTE — MR AVS SNAPSHOT
After Visit Summary   12/3/2018    Jennifer Jane    MRN: [id number]           Patient Information     Date Of Birth          1950        Visit Information        Provider Department      12/3/2018 9:30 AM Live Null MD [facility]        Today's Diagnoses     Preop general physical exam    -  1    Cataract of left eye, unspecified cataract type        Intermittent asthma with allergic rhinitis        Benign essential hypertension          Care Instructions      On the morning of surgery,just use the Advair.                 I will let you know your lab results.    Consider getting the shingles vaccine (Shingrix) at your pharmacy.           Follow-ups after your visit        Who to contact     If you have questions or need follow up information about today's clinic visit or your schedule please contact [facility] directly at [phone].  Normal or non-critical lab and imaging results will be communicated to you by MyChart, letter or phone within 4 business days after the clinic has received the results. If you do not hear from us within 7 days, please contact the clinic through "[employer]hart or phone. If you have a critical or abnormal lab result, we will notify you by phone as soon as possible.  Submit refill requests through [employer] or call your pharmacy and they will forward the refill request to us. Please allow 3 business days for your refill to be completed.          Additional Information About Your Visit        MyChart Information     [employer] gives you secure access to your electronic health record. If you see a primary care provider, you can also send messages to your care team and make appointments. If you have questions, please call your primary care clinic.  If you do not have a primary care provider, please call [phone] and they will assist you.        Care EveryWhere ID     This is your Care EveryWhere ID.  "This could be used by other organizations to access your [facility] medical records  [id number]        Your Vitals Were     Pulse Temperature Respirations Height Last Period Pulse Oximetry    94 97.5  F (36.4  C) 20 5' 5\" (1.651 m) (LMP Unknown) 97%    Breastfeeding? BMI (Body Mass Index)                No 29.95 kg/m2           Blood Pressure from Last 3 Encounters:   12/03/18 122/80   11/11/18 (!) 156/93   07/12/18 126/74    Weight from Last 3 Encounters:   12/03/18 180 lb (81.6 kg)   11/11/18 183 lb (83 kg)   11/09/18 185 lb 8 oz (84.1 kg)              We Performed the Following     Basic metabolic panel  (Ca, Cl, CO2, Creat, Gluc, K, Na, BUN)        Primary Care Provider Office Phone # Fax #    Sun Mandi Person [phone] [address] [facility] [address]        Equal Access to Services     TAMARA WYNNE : Hadii aad ku hadasho Soomaali, waaxda luqadaha, qaybta kaalmada adeegyada, waxay idiin hayaan ari sandyarajean pierre thomas . So [facility] [phone].    ATENCIÓN: Si habla español, tiene a garcia disposición servicios gratuitos de asistencia lingüística. Gwename al [phone].    We comply with applicable federal civil rights laws and [place] laws. We do not discriminate on the basis of race, color, national origin, age, disability, sex, sexual orientation, or gender identity.            Thank you!     Thank you for choosing [facility]  for your care. Our goal is always to provide you with excellent care. Hearing back from our patients is one way we can continue to improve our services. Please take a few minutes to complete the written survey that you may receive in the mail after your visit with us. Thank you!             Your Updated Medication List - Protect others around you: Learn how to safely use, store and throw away your medicines at www.disposemymeds.org.          This list is accurate as of 12/3/18 10:01 AM.  Always use your most recent med list.    "                Brand Name Dispense Instructions for use Diagnosis    ADVAIR -21 MCG/ACT inhaler   Generic drug:  fluticasone-salmeterol           albuterol 108 (90 Base) MCG/ACT inhaler    VENTOLIN HFA    3 Inhaler    Inhale 2 puffs into the lungs every 6 hours as needed for shortness of breath / dyspnea    Intermittent asthma       amLODIPine 5 MG tablet    NORVASC    90 tablet    Take 1 tablet (5 mg) by mouth daily    Benign essential hypertension       BENADRYL PO      Take 25 mg by mouth        hydrochlorothiazide 12.5 MG capsule    MICROZIDE     Take 25 mg by mouth every other day        ibuprofen 200 MG tablet    ADVIL/MOTRIN     Take 1-2 tablets (200-400 mg) by mouth At Bedtime        latanoprost 0.005 % ophthalmic solution    XALATAN     Place 1 drop into the right eye At Bedtime        montelukast 10 MG tablet    SINGULAIR     TAKE ONE TABLET BY MOUTH EVERY DAY AS DIRECTED        TUMS PO      Take  by mouth At Bedtime.        ZYRTEC ALLERGY 10 MG tablet   Generic drug:  cetirizine      Take 10 mg by mouth daily.

## 2018-12-03 NOTE — LETTER
December 4, 2018      Jennifer Jane  [address]  [place] [address]        Dear ,    We are writing to inform you of your test results.       Your lab results are normal,including the potassium,glucose,and the kidney function.      Keep taking the same medications.       Resulted Orders   Basic metabolic panel  (Ca, Cl, CO2, Creat, Gluc, K, Na, BUN)   Result Value Ref Range    Sodium 140 133 - 144 mmol/L    Potassium 4.0 3.4 - 5.3 mmol/L    Chloride 108 94 - 109 mmol/L    Carbon Dioxide 23 20 - 32 mmol/L    Anion Gap 9 3 - 14 mmol/L    Glucose 91 70 - 99 mg/dL    Urea Nitrogen 15 7 - 30 mg/dL    Creatinine 0.76 0.52 - 1.04 mg/dL    GFR Estimate 76 >60 mL/min/1.7m2      Comment:      Non  GFR Calc    GFR Estimate If Black >90 >60 mL/min/1.7m2      Comment:       GFR Calc    Calcium 9.6 8.5 - 10.1 mg/dL       If you have any questions or concerns, please call the clinic at the number listed above.       Sincerely,        Live Null MD

## 2018-12-03 NOTE — PROGRESS NOTES
"  [facility]  [address] [place] [address]  [address]  [facility] [address]  [phone]  Dept: [phone]    PRE-OP EVALUATION:  Today's date: 12/3/2018    Jennifer Jane (: 1950) presents for pre-operative evaluation assessment as requested by Dr. Rebecca Vora.  She requires evaluation and anesthesia risk assessment prior to undergoing surgery/procedure for treatment of Left eye visual disturbance .    Fax number for surgical facility: ***  Primary Physician: Sun Person  Type of Anesthesia Anticipated: {ANESTHESIA:396655}    Patient has a Health Care Directive or Living Will:  {YES/NO:903569::\"NO\"}    Preop Questions 12/3/2018   Who is doing your surgery? dr rebecca vora   What are you having done? catarat  surgery left eye   Date of Surgery/Procedure: 2018   Facility or Hospital where procedure/surgery will be performed: [facility]   1.  Do you have a history of Heart attack, stroke, stent, coronary bypass surgery, or other heart surgery? No   2.  Do you ever have any pain or discomfort in your chest? UNKNOWN - ***   3.  Do you have a history of  Heart Failure? No   4.   Are you troubled by shortness of breath when:  walking on a level surface, or up a slight hill, or at night? No   5.  Do you currently have a cold, bronchitis or other respiratory infection? No         HPI:     HPI related to upcoming procedure: ***      {. Problems:136546}    MEDICAL HISTORY:     Patient Active Problem List    Diagnosis Date Noted     Right foot pain 2018     Priority: Medium     Chondromalacia of patella, right 10/10/2017     Priority: Medium     Severe, tri-compartmental on MRI 10/2017.  Referred to ortho.       Cystocele, midline 10/04/2017     Priority: Medium     Pt need to double void in last year.  On exam cystocele.  Plan: check TERESA Meléndez.  Let us know if worsens.       Acute pain of right knee 10/04/2017     Priority: Medium     Pain for " months, worse since helping friend move.  Exam consistent with meniscal tear.  Check xray today, depending on results MRI.  Ice, NSAIDS for now.  ACE.       Benign essential hypertension 10/18/2016     Priority: Medium     Pt feels hctz dries her out, would like to switch.  Lotrel not covered.  Will try amlodipine, check BP at home.  If not controlled RTC.       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  Immunotherapy 2016 helping a lot  Advair BID       Fibromyalgia 12/28/2012     Priority: Medium     When doesn't get enough sleep = worse.  Otherwise well managed       Pre-diabetes 12/26/2012     Priority: Medium     Hyperglycemia 2012.  Fam history of diabetes.  Has made lifestyle changes       Chronic rhinitis 12/26/2012     Priority: Medium     Has had immunotherapy twice - in childhood and in late 20s-early 30s.   Again in 2016 with [facility].       Chronic dacryocystitis 12/26/2012     Priority: Medium     Dry eyes - sees eye doctor       Sciatica 12/26/2012     Priority: Medium     On and off - exacerbates when lifts heavy things  Bilaterally.        Past Medical History:   Diagnosis Date     Arthritis     Fibromyalgia, ? Osteoarthritis     Back injury Rutured disc 1983    Micro discectomy, approx date 1983     Insomnia 12/26/2012     Problem list name updated by automated process. Provider to review     Reduced vision 9/2016    L vision block, bilateral elevated eye presures,poss. Glacom     Uncomplicated asthma     Diagnosed as 1 yo     Past Surgical History:   Procedure  "Laterality Date     BACK SURGERY  1984    micro-disc-ectomy     BREAST SURGERY  1984    L Breast bx     C STOMACH SURGERY PROCEDURE UNLISTED  3/1990    Cholecystectomy, 11/1989 tubal ligation     CHOLECYSTECTOMY  1990     TUBAL LIGATION  1989     Current Outpatient Prescriptions   Medication Sig Dispense Refill     ADVAIR -21 MCG/ACT inhaler        albuterol (VENTOLIN HFA) 108 (90 BASE) MCG/ACT inhaler Inhale 2 puffs into the lungs every 6 hours as needed for shortness of breath / dyspnea 3 Inhaler 1     amLODIPine (NORVASC) 5 MG tablet Take 1 tablet (5 mg) by mouth daily 90 tablet 3     Calcium Carbonate Antacid (TUMS PO) Take  by mouth At Bedtime.       cetirizine (ZYRTEC ALLERGY) 10 MG tablet Take 10 mg by mouth daily.       DiphenhydrAMINE HCl (BENADRYL PO) Take 25 mg by mouth       hydrochlorothiazide (MICROZIDE) 12.5 MG capsule Take 25 mg by mouth every other day       ibuprofen (ADVIL,MOTRIN) 200 MG tablet Take 1-2 tablets (200-400 mg) by mouth At Bedtime       latanoprost (XALATAN) 0.005 % ophthalmic solution Place 1 drop into the right eye At Bedtime       montelukast (SINGULAIR) 10 MG tablet TAKE ONE TABLET BY MOUTH EVERY DAY AS DIRECTED  1     OTC products: {OTC ANALGESICS:009508}    Allergies   Allergen Reactions     Animal Dander      Dust Mites      Grass      Hibiclens [Chlorhexidine] Itching     Pollen Extract      Trees      Vistaril       Latex Allergy: {YES/NO WITH DEFAULT:427086::\"NO\"}    Social History   Substance Use Topics     Smoking status: Never Smoker     Smokeless tobacco: Never Used     Alcohol use 0.0 oz/week      Comment: 0 -2 times a year     History   Drug Use No       REVIEW OF SYSTEMS:   {ROS Preop Choices:553183}    EXAM:   LMP  (LMP Unknown)  {EXAM Preop Choices:485513}    DIAGNOSTICS:   {DIAGNOSTIC FOR PREOP:346294}    Recent Labs   Lab Test  03/06/18   1001  10/04/17   0906   10/14/16   1201   HGB  14.2   --    --    --    PLT  260   --    --    --    NA  140  139   --   " " --    POTASSIUM  4.2  4.2   < >  3.2*   CR  0.73  0.88   --    --    A1C   --   5.4   --   5.6    < > = values in this interval not displayed.        IMPRESSION:   {PREOP REASONS:836371::\"Reason for surgery/procedure: ***\",\"Diagnosis/reason for consult: ***\"}    The proposed surgical procedure is considered {HIGH=major cardiovascular or procedures requiring prolonged anesthesia >4 hours or large fluid shifts;    INTERMEDIATE=abdominal, most orthopedic and intrathoracic surgery; LOW= endoscopy, cataract and breast surgery:319117} risk.    REVISED CARDIAC RISK INDEX  The patient has the following serious cardiovascular risks for perioperative complications such as (MI, PE, VFib and 3  AV Block):  {PREOP REVISED CARDIAC INDEX (RCI):949742:p:\"No serious cardiac risks\"}  INTERPRETATION: {REVISED CARDIAC RISK INTERPRETATION:467067}    The patient has the following additional risks for perioperative complications:  {Additional perioperative risks:981903:p:\"No identified additional risks\"}      ICD-10-CM    1. Preop general physical exam Z01.818        RECOMMENDATIONS:     {IMPORTANT - Conditions - complete carefully!!:849756}    {IMPORTANT - Medications:408186::\"--Patient is to take all scheduled medications on the day of surgery EXCEPT for modifications listed below.\"}    {IMPORTANT - Approval:576614:p:\"APPROVAL GIVEN to proceed with proposed procedure, without further diagnostic evaluation\"}       Signed Electronically by: Live Null MD    Copy of this evaluation report is provided to requesting physician.    [facility] Preop Guidelines    Revised Cardiac Risk Index  "

## 2018-12-04 LAB
ANION GAP SERPL CALCULATED.3IONS-SCNC: 9 MMOL/L (ref 3–14)
BUN SERPL-MCNC: 15 MG/DL (ref 7–30)
CALCIUM SERPL-MCNC: 9.6 MG/DL (ref 8.5–10.1)
CHLORIDE SERPL-SCNC: 108 MMOL/L (ref 94–109)
CO2 SERPL-SCNC: 23 MMOL/L (ref 20–32)
CREAT SERPL-MCNC: 0.76 MG/DL (ref 0.52–1.04)
GFR SERPL CREATININE-BSD FRML MDRD: 76 ML/MIN/1.7M2
GLUCOSE SERPL-MCNC: 91 MG/DL (ref 70–99)
POTASSIUM SERPL-SCNC: 4 MMOL/L (ref 3.4–5.3)
SODIUM SERPL-SCNC: 140 MMOL/L (ref 133–144)

## 2019-01-23 ENCOUNTER — OFFICE VISIT (OUTPATIENT)
Dept: FAMILY MEDICINE | Facility: CLINIC | Age: 69
End: 2019-01-23
Payer: COMMERCIAL

## 2019-01-23 VITALS
SYSTOLIC BLOOD PRESSURE: 120 MMHG | BODY MASS INDEX: 30.29 KG/M2 | TEMPERATURE: 97.6 F | OXYGEN SATURATION: 96 % | DIASTOLIC BLOOD PRESSURE: 80 MMHG | WEIGHT: 182 LBS | HEART RATE: 82 BPM

## 2019-01-23 DIAGNOSIS — Z01.818 PREOP GENERAL PHYSICAL EXAM: Primary | ICD-10-CM

## 2019-01-23 DIAGNOSIS — H26.9 CATARACT, UNSPECIFIED CATARACT TYPE, UNSPECIFIED LATERALITY: ICD-10-CM

## 2019-01-23 PROCEDURE — 99213 OFFICE O/P EST LOW 20 MIN: CPT | Performed by: FAMILY MEDICINE

## 2019-01-23 NOTE — PROGRESS NOTES
[address]  [address]  [facility] [address]  [phone]  Dept: [phone]    PRE-OP EVALUATION:  Today's date: 2019    Jennifer Jane (: 1950) presents for pre-operative evaluation assessment as requested by Dr. Rebecca Vora.  She requires evaluation and anesthesia risk assessment prior to undergoing surgery/procedure for treatment of cataract .    Fax number for surgical facility: [facility]  Primary Physician: Sun Person  Type of Anesthesia Anticipated: to be determined    Patient has a Health Care Directive or Living Will:  NO    Preop Questions 2019   Who is doing your surgery? Rebecca Vora   What are you having done? cataract surgey   Date of Surgery/Procedure: 2019   Facility or Hospital where procedure/surgery will be performed: -   1.  Do you have a history of Heart attack, stroke, stent, coronary bypass surgery, or other heart surgery? No   2.  Do you ever have any pain or discomfort in your chest? No   3.  Do you have a history of  Heart Failure? No   4.   Are you troubled by shortness of breath when:  walking on a level surface, or up a slight hill, or at night? No   5.  Do you currently have a cold, bronchitis or other respiratory infection? No   6.  Do you have a cough, shortness of breath, or wheezing? No   7.  Do you sometimes get pains in the calves of your legs when you walk? No   8. Do you or anyone in your family have previous history of blood clots? No   9.  Do you or does anyone in your family have a serious bleeding problem such as prolonged bleeding following surgeries or cuts? No   10. Have you ever had problems with anemia or been told to take iron pills? No   11. Have you had any abnormal blood loss such as black, tarry or bloody stools, or abnormal vaginal bleeding? No   12. Have you ever had a blood transfusion? No   13. Have you or any of your relatives ever had problems with anesthesia?  No   14. Do you have sleep apnea, excessive snoring or daytime drowsiness? No   15. Do you have any prosthetic heart valves? No   16. Do you have prosthetic joints? No   17. Is there any chance that you may be pregnant? No         HPI:     HPI related to upcoming procedure:  Having right eye cataract fixed.  Had left done in December - went well.  No health concerns today.  Asthma well controlled - ACT 22.    MEDICAL HISTORY:     Patient Active Problem List    Diagnosis Date Noted     Right foot pain 03/06/2018     Priority: Medium     Chondromalacia of patella, right 10/10/2017     Priority: Medium     Severe, tri-compartmental on MRI 10/2017.  Referred to ortho.       Cystocele, midline 10/04/2017     Priority: Medium     Pt need to double void in last year.  On exam cystocele.  Plan: check UA.  Kegels.  Let us know if worsens.       Acute pain of right knee 10/04/2017     Priority: Medium     Pain for months, worse since helping friend move.  Exam consistent with meniscal tear.  Check xray today, depending on results MRI.  Ice, NSAIDS for now.  ACE.       Benign essential hypertension 10/18/2016     Priority: Medium     Pt feels hctz dries her out, would like to switch.  Lotrel not covered.  Will try amlodipine, check BP at home.  If not controlled RTC.       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  Immunotherapy 2016 helping a lot  Advair BID       Fibromyalgia 12/28/2012     Priority: Medium     When doesn't get enough sleep = worse.  Otherwise well managed       Pre-diabetes  12/26/2012     Priority: Medium     Hyperglycemia 2012.  Fam history of diabetes.  Has made lifestyle changes       Chronic rhinitis 12/26/2012     Priority: Medium     Has had immunotherapy twice - in childhood and in late 20s-early 30s.   Again in 2016 with [facility].       Chronic dacryocystitis 12/26/2012     Priority: Medium     Dry eyes - sees eye doctor       Sciatica 12/26/2012     Priority: Medium     On and off - exacerbates when lifts heavy things  Bilaterally.        Past Medical History:   Diagnosis Date     Arthritis     Fibromyalgia, ? Osteoarthritis     Back injury Rutured disc 1983    Micro discectomy, approx date 1983     Insomnia 12/26/2012     Problem list name updated by automated process. Provider to review     Reduced vision 9/2016    L vision block, bilateral elevated eye presures,poss. Glacom     Uncomplicated asthma     Diagnosed as 1 yo     Past Surgical History:   Procedure Laterality Date     BACK SURGERY  1984    micro-disc-ectomy     BREAST SURGERY  1984    L Breast bx     C STOMACH SURGERY PROCEDURE UNLISTED  3/1990    Cholecystectomy, 11/1989 tubal ligation     CHOLECYSTECTOMY  1990     TUBAL LIGATION  1989     Current Outpatient Medications   Medication Sig Dispense Refill     ADVAIR -21 MCG/ACT inhaler        albuterol (VENTOLIN HFA) 108 (90 BASE) MCG/ACT inhaler Inhale 2 puffs into the lungs every 6 hours as needed for shortness of breath / dyspnea 3 Inhaler 1     amLODIPine (NORVASC) 5 MG tablet TAKE 1 TABLET (5 MG) BY MOUTH DAILY 90 tablet 3     Calcium Carbonate Antacid (TUMS PO) Take  by mouth At Bedtime.       cetirizine (ZYRTEC ALLERGY) 10 MG tablet Take 10 mg by mouth daily.       DiphenhydrAMINE HCl (BENADRYL PO) Take 25 mg by mouth       hydrochlorothiazide (MICROZIDE) 12.5 MG capsule Take 25 mg by mouth every other day       ibuprofen (ADVIL,MOTRIN) 200 MG tablet Take 1-2 tablets (200-400 mg) by mouth At Bedtime       latanoprost (XALATAN) 0.005 % ophthalmic  solution Place 1 drop into the right eye At Bedtime       montelukast (SINGULAIR) 10 MG tablet TAKE ONE TABLET BY MOUTH EVERY DAY AS DIRECTED  1     zinc 23 MG LOZG lozenge Place 1 lozenge inside cheek 4 times daily       OTC products: None, except as noted above, no recent use of OTC ASA, NSAIDS or Steroids and no use of herbal medications or other supplements    Allergies   Allergen Reactions     Animal Dander      Dust Mites      Grass      Hibiclens [Chlorhexidine] Itching     Pollen Extract      Trees      Vistaril       Latex Allergy: NO    Social History     Tobacco Use     Smoking status: Never Smoker     Smokeless tobacco: Never Used   Substance Use Topics     Alcohol use: Yes     Alcohol/week: 0.0 oz     Comment: 0 -2 times a year     History   Drug Use No       REVIEW OF SYSTEMS:   CONSTITUTIONAL: NEGATIVE for fever, chills, change in weight  ENT/MOUTH: NEGATIVE for ear, mouth and throat problems  RESP: NEGATIVE for significant cough or SOB  CV: NEGATIVE for chest pain, palpitations or peripheral edema    EXAM:   /80 (Cuff Size: Adult Large)   Pulse 82   Temp 97.6  F (36.4  C) (Tympanic)   Wt 82.6 kg (182 lb)   LMP  (LMP Unknown)   SpO2 96%   BMI 30.29 kg/m    GENERAL APPEARANCE: healthy, alert and no distress  HENT: ear canals and TM's normal and nose and mouth without ulcers or lesions  RESP: lungs clear to auscultation - no rales, rhonchi or wheezes  CV: regular rate and rhythm, normal S1 S2, no S3 or S4 and no murmur, click or rub   ABDOMEN: soft, nontender, no HSM or masses and bowel sounds normal  NEURO: Normal strength and tone, sensory exam grossly normal, mentation intact and speech normal    DIAGNOSTICS:   No labs or EKG required for low risk surgery (cataract, skin procedure, breast biopsy, etc)    Recent Labs   Lab Test 12/03/18  0958 03/06/18  1001 10/04/17  0906  10/14/16  1201   HGB  --  14.2  --   --   --    PLT  --  260  --   --   --     140 139  --   --    POTASSIUM 4.0  4.2 4.2   < > 3.2*   CR 0.76 0.73 0.88  --   --    A1C  --   --  5.4  --  5.6    < > = values in this interval not displayed.        IMPRESSION:   Reason for surgery/procedure: Right cataract with vision impairment, having repair.  Diagnosis/reason for consult: Preop for above.    The proposed surgical procedure is considered LOW risk.    REVISED CARDIAC RISK INDEX  The patient has the following serious cardiovascular risks for perioperative complications such as (MI, PE, VFib and 3  AV Block):  No serious cardiac risks  INTERPRETATION: 0 risks: Class I (very low risk - 0.4% complication rate)    The patient has the following additional risks for perioperative complications:  No identified additional risks      ICD-10-CM    1. Preop general physical exam Z01.818    2. Cataract, unspecified cataract type, unspecified laterality H26.9        RECOMMENDATIONS:     Medications: Take only Advair in the morning of your surgery.    APPROVAL GIVEN to proceed with proposed procedure, without further diagnostic evaluation       Signed Electronically by: Sun Person MD    Copy of this evaluation report is provided to requesting physician.    [facility] Preop Guidelines    Revised Cardiac Risk Index

## 2019-01-24 ASSESSMENT — ASTHMA QUESTIONNAIRES: ACT_TOTALSCORE: 22

## 2019-01-30 ENCOUNTER — TRANSFERRED RECORDS (OUTPATIENT)
Dept: HEALTH INFORMATION MANAGEMENT | Facility: CLINIC | Age: 69
End: 2019-01-30

## 2019-07-16 ENCOUNTER — OFFICE VISIT (OUTPATIENT)
Dept: FAMILY MEDICINE | Facility: CLINIC | Age: 69
End: 2019-07-16
Payer: COMMERCIAL

## 2019-07-16 VITALS
SYSTOLIC BLOOD PRESSURE: 130 MMHG | BODY MASS INDEX: 31.2 KG/M2 | WEIGHT: 187.5 LBS | OXYGEN SATURATION: 97 % | TEMPERATURE: 97.3 F | HEART RATE: 72 BPM | DIASTOLIC BLOOD PRESSURE: 80 MMHG

## 2019-07-16 DIAGNOSIS — R30.0 DYSURIA: Primary | ICD-10-CM

## 2019-07-16 DIAGNOSIS — N81.11 CYSTOCELE, MIDLINE: ICD-10-CM

## 2019-07-16 LAB
ALBUMIN UR-MCNC: NEGATIVE MG/DL
APPEARANCE UR: CLEAR
BILIRUB UR QL STRIP: NEGATIVE
COLOR UR AUTO: YELLOW
GLUCOSE UR STRIP-MCNC: NEGATIVE MG/DL
HGB UR QL STRIP: NEGATIVE
KETONES UR STRIP-MCNC: NEGATIVE MG/DL
LEUKOCYTE ESTERASE UR QL STRIP: NEGATIVE
NITRATE UR QL: NEGATIVE
PH UR STRIP: 6 PH (ref 5–7)
SOURCE: NORMAL
SP GR UR STRIP: 1.01 (ref 1–1.03)
UROBILINOGEN UR STRIP-ACNC: 0.2 EU/DL (ref 0.2–1)

## 2019-07-16 PROCEDURE — 99213 OFFICE O/P EST LOW 20 MIN: CPT | Performed by: FAMILY MEDICINE

## 2019-07-16 PROCEDURE — 81003 URINALYSIS AUTO W/O SCOPE: CPT | Performed by: FAMILY MEDICINE

## 2019-07-16 ASSESSMENT — ANXIETY QUESTIONNAIRES
3. WORRYING TOO MUCH ABOUT DIFFERENT THINGS: NOT AT ALL
7. FEELING AFRAID AS IF SOMETHING AWFUL MIGHT HAPPEN: NOT AT ALL
5. BEING SO RESTLESS THAT IT IS HARD TO SIT STILL: NOT AT ALL
1. FEELING NERVOUS, ANXIOUS, OR ON EDGE: NOT AT ALL
6. BECOMING EASILY ANNOYED OR IRRITABLE: NOT AT ALL
IF YOU CHECKED OFF ANY PROBLEMS ON THIS QUESTIONNAIRE, HOW DIFFICULT HAVE THESE PROBLEMS MADE IT FOR YOU TO DO YOUR WORK, TAKE CARE OF THINGS AT HOME, OR GET ALONG WITH OTHER PEOPLE: NOT DIFFICULT AT ALL
GAD7 TOTAL SCORE: 0
2. NOT BEING ABLE TO STOP OR CONTROL WORRYING: NOT AT ALL

## 2019-07-16 ASSESSMENT — PATIENT HEALTH QUESTIONNAIRE - PHQ9
SUM OF ALL RESPONSES TO PHQ QUESTIONS 1-9: 2
5. POOR APPETITE OR OVEREATING: NOT AT ALL

## 2019-07-16 NOTE — RESULT ENCOUNTER NOTE
Here are the results we discussed in clinic today.  Let me know if you have any questions or concerns.  Dr. Sun Person MD/[facility]

## 2019-07-16 NOTE — PROGRESS NOTES
Subjective     Jennifer Jane is a 68 year old female who presents to clinic today for the following health issues:    HPI   URINARY TRACT SYMPTOMS      Duration: 1 day    Description  urgency and unable to empty bladder    Intensity:  mild    Accompanying signs and symptoms:  Fever/chills: no   Flank pain no   Nausea and vomiting: no   Vaginal symptoms: odor  Abdominal/Pelvic Pain: no     History  History of frequent UTI's: no   History of kidney stones: no   Sexually Active:    Possibility of pregnancy: No    Precipitating or alleviating factors: None    Therapies tried and outcome: none   Outcome:       Has known cystocele.  Has noticed more and more that difficult to empty bladder.  Urine is dark.  Not brown, not red.  Going out of town today and wants to be sure doesn't have an infection    Reviewed and updated as needed this visit by Provider  Tobacco  Allergies  Meds  Problems  Med Hx  Surg Hx  Fam Hx         Review of Systems   ROS COMP: Constitutional, HEENT, cardiovascular, pulmonary, gi and gu systems are negative, except as otherwise noted.      Objective    /80 (Cuff Size: Adult Large)   Pulse 72   Temp 97.3  F (36.3  C) (Tympanic)   Wt 85 kg (187 lb 8 oz)   LMP  (LMP Unknown)   SpO2 97%   BMI 31.20 kg/m    Body mass index is 31.2 kg/m .  Physical Exam   GENERAL: healthy, alert and no distress    Diagnostic Test Results:  Labs reviewed in Epic  Results for orders placed or performed in visit on 07/16/19 (from the past 24 hour(s))   UA reflex to Microscopic and Culture   Result Value Ref Range    Color Urine Yellow     Appearance Urine Clear     Glucose Urine Negative NEG^Negative mg/dL    Bilirubin Urine Negative NEG^Negative    Ketones Urine Negative NEG^Negative mg/dL    Specific Gravity Urine 1.015 1.003 - 1.035    Blood Urine Negative NEG^Negative    pH Urine 6.0 5.0 - 7.0 pH    Protein Albumin Urine Negative NEG^Negative mg/dL    Urobilinogen Urine 0.2 0.2 - 1.0 EU/dL    Nitrite  Urine Negative NEG^Negative    Leukocyte Esterase Urine Negative NEG^Negative    Source Midstream Urine            Assessment & Plan       ICD-10-CM    1. Dysuria R30.0 UA reflex to Microscopic and Culture     OB/GYN REFERRAL   2. Cystocele, midline N81.11 OB/GYN REFERRAL       No Urinary Tract Infection today.    I do think she should see gynecology/urology for this - she would like to see Dr. Arriaza.    Referred.    Patient Instructions   Dr. Arriaza's office is perfect - he specializes in urology issues within gynecology.  Here's the phone number - call for an appointment.  Best,  . Sun Person MD/[facility]    [address]  [address] [place]   [address]  Appointments:[phone] Location Details        Return in about 1 month (around 8/16/2019) for Medicare Annual Wellness Exam.    Sun Person MD  [facility]

## 2019-07-16 NOTE — PATIENT INSTRUCTIONS
Dr. Arriaza's office is perfect - he specializes in urology issues within gynecology.  Here's the phone number - call for an appointment.  Best,  Dr. Sun Person MD/[facility]    [address]  [address] [place]   [address]  Appointments:[phone] Location Details

## 2019-07-17 ASSESSMENT — ANXIETY QUESTIONNAIRES: GAD7 TOTAL SCORE: 0

## 2019-10-23 ENCOUNTER — ALLIED HEALTH/NURSE VISIT (OUTPATIENT)
Dept: NURSING | Facility: CLINIC | Age: 69
End: 2019-10-23
Payer: COMMERCIAL

## 2019-10-23 DIAGNOSIS — Z23 NEED FOR PROPHYLACTIC VACCINATION AND INOCULATION AGAINST INFLUENZA: Primary | ICD-10-CM

## 2019-10-23 PROCEDURE — G0008 ADMIN INFLUENZA VIRUS VAC: HCPCS

## 2019-10-23 PROCEDURE — 90662 IIV NO PRSV INCREASED AG IM: CPT

## 2019-10-23 PROCEDURE — 99207 ZZC NO CHARGE LOS: CPT

## 2019-11-03 PROBLEM — Z96.1 PSEUDOPHAKIA, BOTH EYES: Status: ACTIVE | Noted: 2019-02-01

## 2019-11-20 NOTE — PATIENT INSTRUCTIONS
Patient Education   Personalized Prevention Plan  You are due for the preventive services outlined below.  Your care team is available to assist you in scheduling these services.  If you have already completed any of these items, please share that information with your care team to update in your medical record.  Health Maintenance Due   Topic Date Due     Zoster (Shingles) Vaccine (2 of 3) 01/14/2011     A1C Lab  10/04/2018     Mammogram  10/06/2019     Basic Metabolic Panel  12/03/2019

## 2019-11-27 ENCOUNTER — OFFICE VISIT (OUTPATIENT)
Dept: FAMILY MEDICINE | Facility: CLINIC | Age: 69
End: 2019-11-27
Payer: COMMERCIAL

## 2019-11-27 VITALS
WEIGHT: 188 LBS | TEMPERATURE: 98.8 F | DIASTOLIC BLOOD PRESSURE: 76 MMHG | RESPIRATION RATE: 18 BRPM | SYSTOLIC BLOOD PRESSURE: 138 MMHG | BODY MASS INDEX: 31.28 KG/M2 | HEART RATE: 72 BPM | OXYGEN SATURATION: 97 %

## 2019-11-27 DIAGNOSIS — R73.03 PRE-DIABETES: ICD-10-CM

## 2019-11-27 DIAGNOSIS — Z00.00 ENCOUNTER FOR MEDICARE ANNUAL WELLNESS EXAM: Primary | ICD-10-CM

## 2019-11-27 DIAGNOSIS — E83.52 HYPERCALCEMIA: ICD-10-CM

## 2019-11-27 DIAGNOSIS — I10 BENIGN ESSENTIAL HYPERTENSION: ICD-10-CM

## 2019-11-27 DIAGNOSIS — Z12.31 ENCOUNTER FOR SCREENING MAMMOGRAM FOR MALIGNANT NEOPLASM OF BREAST: ICD-10-CM

## 2019-11-27 LAB
ANION GAP SERPL CALCULATED.3IONS-SCNC: 7 MMOL/L (ref 3–14)
BUN SERPL-MCNC: 15 MG/DL (ref 7–30)
CALCIUM SERPL-MCNC: 10.5 MG/DL (ref 8.5–10.1)
CHLORIDE SERPL-SCNC: 105 MMOL/L (ref 94–109)
CO2 SERPL-SCNC: 26 MMOL/L (ref 20–32)
CREAT SERPL-MCNC: 0.86 MG/DL (ref 0.52–1.04)
GFR SERPL CREATININE-BSD FRML MDRD: 69 ML/MIN/{1.73_M2}
GLUCOSE SERPL-MCNC: 105 MG/DL (ref 70–99)
HBA1C MFR BLD: 5.6 % (ref 0–5.6)
POTASSIUM SERPL-SCNC: 4.1 MMOL/L (ref 3.4–5.3)
SODIUM SERPL-SCNC: 138 MMOL/L (ref 133–144)

## 2019-11-27 PROCEDURE — 80048 BASIC METABOLIC PNL TOTAL CA: CPT | Performed by: FAMILY MEDICINE

## 2019-11-27 PROCEDURE — 99397 PER PM REEVAL EST PAT 65+ YR: CPT | Performed by: FAMILY MEDICINE

## 2019-11-27 PROCEDURE — 83036 HEMOGLOBIN GLYCOSYLATED A1C: CPT | Performed by: FAMILY MEDICINE

## 2019-11-27 PROCEDURE — 36415 COLL VENOUS BLD VENIPUNCTURE: CPT | Performed by: FAMILY MEDICINE

## 2019-11-27 PROCEDURE — 99207 C PAF COMPLETED  NO CHARGE: CPT | Mod: 25 | Performed by: FAMILY MEDICINE

## 2019-11-27 PROCEDURE — 99213 OFFICE O/P EST LOW 20 MIN: CPT | Mod: 25 | Performed by: FAMILY MEDICINE

## 2019-11-27 RX ORDER — AMLODIPINE BESYLATE 5 MG/1
10 TABLET ORAL DAILY
Qty: 180 TABLET | Refills: 3 | Status: SHIPPED | OUTPATIENT
Start: 2019-11-27 | End: 2019-12-23

## 2019-11-27 ASSESSMENT — ENCOUNTER SYMPTOMS
DIARRHEA: 0
HEMATURIA: 0
CHILLS: 0
DIZZINESS: 0
CONSTIPATION: 0
HEMATOCHEZIA: 0
NERVOUS/ANXIOUS: 0
FEVER: 0
COUGH: 1
ABDOMINAL PAIN: 0

## 2019-11-27 ASSESSMENT — ACTIVITIES OF DAILY LIVING (ADL): CURRENT_FUNCTION: NO ASSISTANCE NEEDED

## 2019-11-27 NOTE — PROGRESS NOTES
"SUBJECTIVE:   Jennifer Jane is a 68 year old female who presents for Preventive Visit.  Are you in the first 12 months of your Medicare coverage?  No    Healthy Habits:     In general, how would you rate your overall health?  Good    Frequency of exercise:  4-5 days/week    Duration of exercise:  30-45 minutes    Do you usually eat at least 4 servings of fruit and vegetables a day, include whole grains    & fiber and avoid regularly eating high fat or \"junk\" foods?  Yes    Taking medications regularly:  Yes    Medication side effects:  Other    Ability to successfully perform activities of daily living:  No assistance needed    Home Safety:  No safety concerns identified    Hearing Impairment:  No hearing concerns    In the past 6 months, have you been bothered by leaking of urine? Yes    In general, how would you rate your overall mental or emotional health?  Excellent      PHQ-2 Total Score: 0    Additional concerns today:  No    Do you feel safe in your environment? Yes    Have you ever done Advance Care Planning? (For example, a Health Directive, POLST, or a discussion with a medical provider or your loved ones about your wishes): No, advance care planning information given to patient to review.  Patient plans to discuss their wishes with loved ones or provider.        Fall risk  Fallen 2 or more times in the past year?: No  Any fall with injury in the past year?: No    Cognitive Screening   1) Repeat 3 items (Leader, Season, Table)    2) Clock draw: NORMAL  3) 3 item recall: Recalls 1 object   Results: NORMAL clock, 1-2 items recalled: COGNITIVE IMPAIRMENT LESS LIKELY    Mini-CogTM Copyright BRITNEY Pryor. Licensed by the author for use in [facility]; reprinted with permission (devin@.[email or web address]). All rights reserved.      Do you have sleep apnea, excessive snoring or daytime drowsiness?: no    Reviewed and updated as needed this visit by clinical staff  Tobacco  Allergies  Meds  Problems  Med Hx  Surg " Hx  Fam Hx  Soc Hx          Reviewed and updated as needed this visit by Provider  Tobacco  Allergies  Meds  Problems  Med Hx  Surg Hx  Fam Hx        Social History     Tobacco Use     Smoking status: Never Smoker     Smokeless tobacco: Never Used   Substance Use Topics     Alcohol use: Yes     Alcohol/week: 0.0 standard drinks     Comment: 0 -2 times a year         Alcohol Use 11/27/2019   Prescreen: >3 drinks/day or >7 drinks/week? No   Prescreen: >3 drinks/day or >7 drinks/week? -     Prob to Add On:  blood pressure:  At home usually in high 130s over 80s-90s.  No symptoms.  Does have family history of dementia so would like to minimize risk.  Taking 5 mg of amlodipine daily - no side effects.  Plan:  Inc to 10 mg amlodipine daily.  Check blood pressure at home weekly.  Let us know if side effects (reviewed again today) or can't tolerate higher dose OR if blood pressure not well controlled on this regimen.    Current providers sharing in care for this patient include:   Patient Care Team:  Sun Person MD as PCP - General (Family Practice)  Sun Person MD as Assigned PCP  Yuriy Petersen MD as MD (Orthopedics)    The following health maintenance items are reviewed in Epic and correct as of today:  Health Maintenance   Topic Date Due     ZOSTER IMMUNIZATION (2 of 3) 01/14/2011     A1C  10/04/2018     MAMMO SCREENING  10/06/2019     BMP  12/03/2019     ADVANCE CARE PLANNING  04/15/2020     ASTHMA ACTION PLAN  11/03/2020     ASTHMA CONTROL TEST  11/03/2020     MEDICARE ANNUAL WELLNESS VISIT  11/27/2020     FALL RISK ASSESSMENT  11/27/2020     DTAP/TDAP/TD IMMUNIZATION (2 - Td) 01/06/2021     LIPID  10/04/2022     COLONOSCOPY  04/15/2023     DEXA  Completed     HEPATITIS C SCREENING  Completed     PHQ-2  Completed     INFLUENZA VACCINE  Completed     PNEUMOCOCCAL IMMUNIZATION 65+ LOW/MEDIUM RISK  Completed     IPV IMMUNIZATION  Aged Out     MENINGITIS IMMUNIZATION  Aged Out       Review  "of Systems   Constitutional: Negative for chills and fever.   HENT: Positive for congestion. Negative for ear pain.    Respiratory: Positive for cough.    Cardiovascular: Negative for chest pain.   Gastrointestinal: Negative for abdominal pain, constipation, diarrhea and hematochezia.   Genitourinary: Negative for hematuria.   Neurological: Negative for dizziness.   Psychiatric/Behavioral: The patient is not nervous/anxious.          OBJECTIVE:   /76   Pulse 72   Temp 98.8  F (37.1  C) (Temporal)   Resp 18   Wt 85.3 kg (188 lb)   LMP  (LMP Unknown)   SpO2 97%   BMI 31.28 kg/m   Estimated body mass index is 31.28 kg/m  as calculated from the following:    Height as of 12/3/18: 1.651 m (5' 5\").    Weight as of this encounter: 85.3 kg (188 lb).  Physical Exam  GENERAL: healthy, alert and no distress  EYES: Eyes grossly normal to inspection, PERRL and conjunctivae and sclerae normal  HENT: ear canals and TM's normal, nose and mouth without ulcers or lesions  NECK: no adenopathy, no asymmetry, masses, or scars and thyroid normal to palpation  RESP: lungs clear to auscultation - no rales, rhonchi or wheezes  BREAST: normal without masses, tenderness or nipple discharge and no palpable axillary masses or adenopathy  CV: regular rate and rhythm, normal S1 S2, no S3 or S4, no murmur, click or rub, no peripheral edema and peripheral pulses strong  ABDOMEN: soft, nontender, no hepatosplenomegaly, no masses and bowel sounds normal  MS: no gross musculoskeletal defects noted, no edema  SKIN: no suspicious lesions or rashes  NEURO: Normal strength and tone, mentation intact and speech normal  PSYCH: mentation appears normal, affect normal/bright    Diagnostic Test Results:  Labs reviewed in Epic    ASSESSMENT / PLAN:     Problem List Items Addressed This Visit        Medium    Benign essential hypertension       Increase amlodipine to 10 mg daily     Follow up yearly, or if side effects or issues         Relevant " "Orders    OFFICE/OUTPT VISIT,ESTGLORIAL II (Completed)    Pre-diabetes       Check a1c today and yearly         Relevant Orders    Hemoglobin A1c (Completed)      Other Visit Diagnoses     Encounter for Medicare annual wellness exam    -  Primary    Relevant Orders    MA SCREENING DIGITAL BILAT - Future  (s+30)    BASIC METABOLIC PANEL (Completed)    Encounter for screening mammogram for malignant neoplasm of breast         Relevant Orders    MA SCREENING DIGITAL BILAT - Future  (s+30)        Prob to Add On:  blood pressure:  At home usually in high 130s over 80s-90s.  No symptoms.  Does have family history of dementia so would like to minimize risk.  Taking 5 mg of amlodipine daily - no side effects.  Plan:  Inc to 10 mg amlodipine daily.  Check blood pressure at home weekly.  Let us know if side effects (reviewed again today) or can't tolerate higher dose OR if blood pressure not well controlled on this regimen.      COUNSELING:  Reviewed preventive health counseling, as reflected in patient instructions    Estimated body mass index is 31.28 kg/m  as calculated from the following:    Height as of 12/3/18: 1.651 m (5' 5\").    Weight as of this encounter: 85.3 kg (188 lb).    Weight management plan: Discussed healthy diet and exercise guidelines     reports that she has never smoked. She has never used smokeless tobacco.      Appropriate preventive services were discussed with this patient, including applicable screening as appropriate for cardiovascular disease, diabetes, osteopenia/osteoporosis, and glaucoma.  As appropriate for age/gender, discussed screening for colorectal cancer, prostate cancer, breast cancer, and cervical cancer. Checklist reviewing preventive services available has been given to the patient.    Reviewed patients plan of care and provided an AVS. The Basic Care Plan (routine screening as documented in Health Maintenance) for Jennifer meets the Care Plan requirement. This Care Plan has been " established and reviewed with the Patient.    Counseling Resources:  ATP IV Guidelines  Pooled Cohorts Equation Calculator  Breast Cancer Risk Calculator  FRAX Risk Assessment  ICSI Preventive Guidelines  Dietary Guidelines for Americans, 2010  USDA's MyPlate  ASA Prophylaxis  Lung CA Screening    Sun Person MD  [facility]    Identified Health Risks:

## 2019-11-27 NOTE — LETTER
November 30, 2019      Jennifer ESCALANTE Buck  [address]  [facility] [address]        Dear ,    We are writing to inform you of your test results.    Test results indicate you may require additional follow up, see comment below.    Your calcium was high on your recent laboratory tests.  This needs to be followed up on - please make a lab only appointment for further testing.    Resulted Orders   BASIC METABOLIC PANEL   Result Value Ref Range    Sodium 138 133 - 144 mmol/L    Potassium 4.1 3.4 - 5.3 mmol/L    Chloride 105 94 - 109 mmol/L    Carbon Dioxide 26 20 - 32 mmol/L    Anion Gap 7 3 - 14 mmol/L    Glucose 105 (H) 70 - 99 mg/dL    Urea Nitrogen 15 7 - 30 mg/dL    Creatinine 0.86 0.52 - 1.04 mg/dL    GFR Estimate 69 >60 mL/min/[1.73_m2]      Comment:      Non  GFR Calc  Starting 12/18/2018, serum creatinine based estimated GFR (eGFR) will be   calculated using the Chronic Kidney Disease Epidemiology Collaboration   (CKD-EPI) equation.      GFR Estimate If Black 80 >60 mL/min/[1.73_m2]      Comment:       GFR Calc  Starting 12/18/2018, serum creatinine based estimated GFR (eGFR) will be   calculated using the Chronic Kidney Disease Epidemiology Collaboration   (CKD-EPI) equation.      Calcium 10.5 (H) 8.5 - 10.1 mg/dL   Hemoglobin A1c   Result Value Ref Range    Hemoglobin A1C 5.6 0 - 5.6 %      Comment:      Normal <5.7% Prediabetes 5.7-6.4%  Diabetes 6.5% or higher - adopted from ADA   consensus guidelines.         If you have any questions or concerns, please call the clinic at the number listed above.       Sincerely,        Sun Person MD

## 2019-11-30 NOTE — ASSESSMENT & PLAN NOTE
Your calcium was high on your recent laboratory tests.  This needs to be followed up on - please make a lab only appointment for further testing.  (I ordered calcium repeat with albumin, if still high will check PTH and vit D, ordered as just in case).

## 2019-11-30 NOTE — RESULT ENCOUNTER NOTE
Your calcium was high on your recent laboratory tests.  This needs to be followed up on - please make a lab only appointment for further testing.  (I ordered calcium repeat with albumin, if still high will check PTH and vit D, ordered as just in case).  Dr. Sun Person MD/[facility]

## 2019-12-05 ENCOUNTER — TELEPHONE (OUTPATIENT)
Dept: FAMILY MEDICINE | Facility: CLINIC | Age: 69
End: 2019-12-05

## 2019-12-05 NOTE — TELEPHONE ENCOUNTER
Contacted patient and she said her eye pain is feeling better now because she took some ibuprofen. She did contact her Ophthlamologist and they advised her to monitor her symptoms.   Patient checked her BP and it was 168/88. She thinks it is elevated due to her cold. She will keep checking her BP throughout the evening. Writer advised patient to seek care in the ED if systolic >180 or diastolic >110 or if patient has associated symptoms of hypertension like blurred vision, severe headache, dizziness, or SOB.   Patient acknowledges understanding and will comply.

## 2019-12-05 NOTE — TELEPHONE ENCOUNTER
Reason for call:  Patient reporting a symptom    Symptom or request:   Patient has eye pain   She has glacoma    BP at 2:50 was 171/95   At 3:00 was 158/106 today    She had a physical about a week ago     Duration (how long have symptoms been present): today    Have you been treated for this before? Yes    Additional comments:  Patient is concerned   Please call her    Phone Number patient can be reached at:  Home number on file [phone] (home)    Best Time:  anytime  Can we leave a detailed message on this number:  YES    Call taken on 12/5/2019 at 3:06 PM by GOLD CUEVAS

## 2019-12-09 ENCOUNTER — TELEPHONE (OUTPATIENT)
Dept: FAMILY MEDICINE | Facility: CLINIC | Age: 69
End: 2019-12-09

## 2019-12-09 NOTE — TELEPHONE ENCOUNTER
Reason for Call:  Other call back    Detailed comments: pt has questions about getting her labs, when they need to be done fasting and he calcium levels, please advise.    Phone Number Patient can be reached at: Home number on file [phone] (home)    Best Time: asap    Can we leave a detailed message on this number? YES    Call taken on 12/9/2019 at 3:20 PM by Suzie Davis

## 2019-12-10 DIAGNOSIS — E83.52 HYPERCALCEMIA: ICD-10-CM

## 2019-12-10 PROCEDURE — 80053 COMPREHEN METABOLIC PANEL: CPT | Performed by: FAMILY MEDICINE

## 2019-12-10 PROCEDURE — 36415 COLL VENOUS BLD VENIPUNCTURE: CPT | Performed by: FAMILY MEDICINE

## 2019-12-10 NOTE — LETTER
December 12, 2019      Jennifer ESCALANTE Buck  [address]  [facility] [address]        Dear ,    We are writing to inform you of your test results.    All of your labs were normal for you.     Please let us know if you have any further questions.     Resulted Orders   Comprehensive metabolic panel   Result Value Ref Range    Sodium 137 133 - 144 mmol/L    Potassium 4.1 3.4 - 5.3 mmol/L    Chloride 108 94 - 109 mmol/L    Carbon Dioxide 25 20 - 32 mmol/L    Anion Gap 4 3 - 14 mmol/L    Glucose 92 70 - 99 mg/dL    Urea Nitrogen 17 7 - 30 mg/dL    Creatinine 0.74 0.52 - 1.04 mg/dL    GFR Estimate 83 >60 mL/min/[1.73_m2]      Comment:      Non  GFR Calc  Starting 12/18/2018, serum creatinine based estimated GFR (eGFR) will be   calculated using the Chronic Kidney Disease Epidemiology Collaboration   (CKD-EPI) equation.      GFR Estimate If Black >90 >60 mL/min/[1.73_m2]      Comment:       GFR Calc  Starting 12/18/2018, serum creatinine based estimated GFR (eGFR) will be   calculated using the Chronic Kidney Disease Epidemiology Collaboration   (CKD-EPI) equation.      Calcium 9.8 8.5 - 10.1 mg/dL    Bilirubin Total 0.5 0.2 - 1.3 mg/dL    Albumin 4.1 3.4 - 5.0 g/dL    Protein Total 7.4 6.8 - 8.8 g/dL    Alkaline Phosphatase 99 40 - 150 U/L    ALT 33 0 - 50 U/L    AST 24 0 - 45 U/L       If you have any questions or concerns, please call the clinic at the number listed above.       Sincerely,    Sun Person MD/nr

## 2019-12-11 ENCOUNTER — HOSPITAL ENCOUNTER (OUTPATIENT)
Dept: MAMMOGRAPHY | Facility: CLINIC | Age: 69
Discharge: HOME | End: 2019-12-11
Attending: FAMILY MEDICINE | Admitting: FAMILY MEDICINE
Payer: COMMERCIAL

## 2019-12-11 DIAGNOSIS — Z12.31 ENCOUNTER FOR SCREENING MAMMOGRAM FOR MALIGNANT NEOPLASM OF BREAST: ICD-10-CM

## 2019-12-11 DIAGNOSIS — Z00.00 ENCOUNTER FOR MEDICARE ANNUAL WELLNESS EXAM: ICD-10-CM

## 2019-12-11 LAB
ALBUMIN SERPL-MCNC: 4.1 G/DL (ref 3.4–5)
ALP SERPL-CCNC: 99 U/L (ref 40–150)
ALT SERPL W P-5'-P-CCNC: 33 U/L (ref 0–50)
ANION GAP SERPL CALCULATED.3IONS-SCNC: 4 MMOL/L (ref 3–14)
AST SERPL W P-5'-P-CCNC: 24 U/L (ref 0–45)
BILIRUB SERPL-MCNC: 0.5 MG/DL (ref 0.2–1.3)
BUN SERPL-MCNC: 17 MG/DL (ref 7–30)
CALCIUM SERPL-MCNC: 9.8 MG/DL (ref 8.5–10.1)
CHLORIDE SERPL-SCNC: 108 MMOL/L (ref 94–109)
CO2 SERPL-SCNC: 25 MMOL/L (ref 20–32)
CREAT SERPL-MCNC: 0.74 MG/DL (ref 0.52–1.04)
GFR SERPL CREATININE-BSD FRML MDRD: 83 ML/MIN/{1.73_M2}
GLUCOSE SERPL-MCNC: 92 MG/DL (ref 70–99)
POTASSIUM SERPL-SCNC: 4.1 MMOL/L (ref 3.4–5.3)
PROT SERPL-MCNC: 7.4 G/DL (ref 6.8–8.8)
SODIUM SERPL-SCNC: 137 MMOL/L (ref 133–144)

## 2019-12-11 PROCEDURE — 77067 SCR MAMMO BI INCL CAD: CPT

## 2019-12-11 NOTE — RESULT ENCOUNTER NOTE
Dear Jennifer,    All of your labs were normal for you.    Please let us know if you have any further questions.    Best,  Dr. Sun Person MD/[facility]     Thank you for choosing the [facility] as your health care provider. Please don't hesitate to call with any questions or concerns [phone].

## 2019-12-11 NOTE — PROGRESS NOTES
"Subjective     Jennifer Jane is a 68 year old female who presents to clinic today for the following health issues:    HPI   Hypertension Follow-up      Do you check your blood pressure regularly outside of the clinic? No     Are you following a low salt diet? No    Are your blood pressures ever more than 140 on the top number (systolic) OR more   than 90 on the bottom number (diastolic), for example 140/90? Yes      How many servings of fruits and vegetables do you eat daily?  4 or more    How many days per week do you miss taking your medication? 0      BP Readings from Last 6 Encounters:   12/13/19 137/82   11/27/19 138/76   07/16/19 130/80   01/23/19 120/80   12/03/18 122/80   11/11/18 (!) 156/93       1. blood pressure high at home.  Exam is high.  All the way to 170s over 100s.  She recently increased her amlodipine to 10 mg daily.  Has been on hydrochlorothiazide in the past but it \"dried her out too much\".  Wondering about other options.    2.  Fatigue.  She takes naps every day.  Thinks it is just her fibromyalgia acting up but wonders if there could be something else going on.    3.  Hair loss.  Notes pulled out handfuls of hair every few days.  Her mom had hypothyroidism and so did her sister.        Reviewed and updated as needed this visit by Provider  Tobacco  Allergies  Meds  Problems  Med Hx  Surg Hx  Fam Hx         Review of Systems   ROS COMP: Constitutional, HEENT, cardiovascular, pulmonary, gi and gu systems are negative, except as otherwise noted.      Objective    /82   Pulse 83   Temp 98.2  F (36.8  C) (Oral)   Resp 16   Wt 82.9 kg (182 lb 12.8 oz)   LMP  (LMP Unknown)   SpO2 98%   BMI 30.42 kg/m    Body mass index is 30.42 kg/m .  Physical Exam   GENERAL: healthy, alert and no distress    Diagnostic Test Results:  Labs reviewed in Epic        Assessment & Plan       ICD-10-CM    1. Hair loss L65.9 CBC with platelets and differential     TSH with free T4 reflex     " Erythrocyte sedimentation rate auto     Ferritin   2. Fatigue, unspecified type R53.83 CBC with platelets and differential     TSH with free T4 reflex     Erythrocyte sedimentation rate auto     Ferritin     Exercise Stress w/PFT's - Adult   3. Benign essential hypertension I10 lisinopril (PRINIVIL/ZESTRIL) 10 MG tablet     **Basic metabolic panel FUTURE anytime          Patient Instructions   1. For your blood pressure:  1. Keep taking your amlodipine, and ADD 10 mg of lisinopril once a day.  2. Take all your blood pressure medications in the evening.  3. If blood pressure not at goal in 1 week, increase to 20 mg lisinopril daily.  4. Follow up with BMP blood test in 1 month after you start the lisinopril (this can be lab only visit).    2. For your hair loss:\  1. We're checking thyroid and iron tests today.    3. For your fatigue:  1. We're checking inflammation rate (ESR), blood counts (CBC), and iron levels today as well as your thyroid function.  2. If this doesn't give us an answer I'd like to be sure your heart is healthy.  Please schedule the stress test I ordered today.    If at the end of all this you are still not feeling well, please come back in!  We'll have to think everything through again and keep looking until we find an answer.      Return in about 1 month (around 1/13/2020) for Non-fasting Lab Only Visit.    25 minutes total were spent face to face with the patient including history, exam with >50% spent on counseling and coordination of care.      Sun Person MD  [facility]

## 2019-12-13 ENCOUNTER — OFFICE VISIT (OUTPATIENT)
Dept: FAMILY MEDICINE | Facility: CLINIC | Age: 69
End: 2019-12-13
Payer: COMMERCIAL

## 2019-12-13 VITALS
OXYGEN SATURATION: 98 % | SYSTOLIC BLOOD PRESSURE: 137 MMHG | HEART RATE: 83 BPM | RESPIRATION RATE: 16 BRPM | TEMPERATURE: 98.2 F | BODY MASS INDEX: 30.42 KG/M2 | WEIGHT: 182.8 LBS | DIASTOLIC BLOOD PRESSURE: 82 MMHG

## 2019-12-13 DIAGNOSIS — R53.83 FATIGUE, UNSPECIFIED TYPE: ICD-10-CM

## 2019-12-13 DIAGNOSIS — L65.9 HAIR LOSS: Primary | ICD-10-CM

## 2019-12-13 DIAGNOSIS — I10 BENIGN ESSENTIAL HYPERTENSION: ICD-10-CM

## 2019-12-13 LAB
BASOPHILS # BLD AUTO: 0 10E9/L (ref 0–0.2)
BASOPHILS NFR BLD AUTO: 0.2 %
DIFFERENTIAL METHOD BLD: NORMAL
EOSINOPHIL # BLD AUTO: 0.2 10E9/L (ref 0–0.7)
EOSINOPHIL NFR BLD AUTO: 2.3 %
ERYTHROCYTE [DISTWIDTH] IN BLOOD BY AUTOMATED COUNT: 13 % (ref 10–15)
ERYTHROCYTE [SEDIMENTATION RATE] IN BLOOD BY WESTERGREN METHOD: 10 MM/H (ref 0–30)
FERRITIN SERPL-MCNC: 76 NG/ML (ref 8–252)
HCT VFR BLD AUTO: 42.9 % (ref 35–47)
HGB BLD-MCNC: 14.2 G/DL (ref 11.7–15.7)
LYMPHOCYTES # BLD AUTO: 2 10E9/L (ref 0.8–5.3)
LYMPHOCYTES NFR BLD AUTO: 23.1 %
MCH RBC QN AUTO: 29.3 PG (ref 26.5–33)
MCHC RBC AUTO-ENTMCNC: 33.1 G/DL (ref 31.5–36.5)
MCV RBC AUTO: 89 FL (ref 78–100)
MONOCYTES # BLD AUTO: 0.5 10E9/L (ref 0–1.3)
MONOCYTES NFR BLD AUTO: 6.1 %
NEUTROPHILS # BLD AUTO: 6 10E9/L (ref 1.6–8.3)
NEUTROPHILS NFR BLD AUTO: 68.3 %
PLATELET # BLD AUTO: 280 10E9/L (ref 150–450)
RBC # BLD AUTO: 4.84 10E12/L (ref 3.8–5.2)
TSH SERPL DL<=0.005 MIU/L-ACNC: 0.91 MU/L (ref 0.4–4)
WBC # BLD AUTO: 8.8 10E9/L (ref 4–11)

## 2019-12-13 PROCEDURE — 84443 ASSAY THYROID STIM HORMONE: CPT | Performed by: FAMILY MEDICINE

## 2019-12-13 PROCEDURE — 85652 RBC SED RATE AUTOMATED: CPT | Performed by: FAMILY MEDICINE

## 2019-12-13 PROCEDURE — 85025 COMPLETE CBC W/AUTO DIFF WBC: CPT | Performed by: FAMILY MEDICINE

## 2019-12-13 PROCEDURE — 82728 ASSAY OF FERRITIN: CPT | Performed by: FAMILY MEDICINE

## 2019-12-13 PROCEDURE — 99214 OFFICE O/P EST MOD 30 MIN: CPT | Performed by: FAMILY MEDICINE

## 2019-12-13 PROCEDURE — 36415 COLL VENOUS BLD VENIPUNCTURE: CPT | Performed by: FAMILY MEDICINE

## 2019-12-13 RX ORDER — LISINOPRIL 10 MG/1
10 TABLET ORAL DAILY
Qty: 120 TABLET | Refills: 0 | Status: SHIPPED | OUTPATIENT
Start: 2019-12-13 | End: 2020-01-26

## 2019-12-13 NOTE — PATIENT INSTRUCTIONS
1. For your blood pressure:  1. Keep taking your amlodipine, and ADD 10 mg of lisinopril once a day.  2. Take all your blood pressure medications in the evening.  3. If blood pressure not at goal in 1 week, increase to 20 mg lisinopril daily.  4. Follow up with BMP blood test in 1 month after you start the lisinopril (this can be lab only visit).    2. For your hair loss:\  1. We're checking thyroid and iron tests today.    3. For your fatigue:  1. We're checking inflammation rate (ESR), blood counts (CBC), and iron levels today as well as your thyroid function.  2. If this doesn't give us an answer I'd like to be sure your heart is healthy.  Please schedule the stress test I ordered today.    If at the end of all this you are still not feeling well, please come back in!  We'll have to think everything through again and keep looking until we find an answer.

## 2019-12-17 NOTE — RESULT ENCOUNTER NOTE
Cayetano Rodriguez:  Good news - your thyroid, iron levels, blood counts, and inflammation tests are all completely normal.  Let me know if you have any further questions.  Best,  Dr. Sun Person MD/[facility]

## 2019-12-23 DIAGNOSIS — I10 BENIGN ESSENTIAL HYPERTENSION: Primary | ICD-10-CM

## 2019-12-23 RX ORDER — AMLODIPINE BESYLATE 5 MG/1
TABLET ORAL
Qty: 90 TABLET | Refills: 3 | Status: SHIPPED | OUTPATIENT
Start: 2019-12-23 | End: 2020-12-02

## 2019-12-23 NOTE — TELEPHONE ENCOUNTER
Pt states lost her medication, leaving out of town today at noon, will be paying out of pocket,insurance will not cover. Uses [facility], may reach her at [phone] please advise

## 2019-12-23 NOTE — TELEPHONE ENCOUNTER
"Requested Prescriptions   Pending Prescriptions Disp Refills     amLODIPine (NORVASC) 5 MG tablet [Pharmacy Med Name: AMLODIPINE BESYLATE 5 MG TAB]  Last Written Prescription Date:  11/27/2019  Last Fill Quantity: 180 tablet,  # refills: 3   Last office visit: 12/13/2019 with prescribing provider:  Raza   Future Office Visit:     90 tablet 3     Sig: TAKE 1 TABLET (5 MG) BY MOUTH DAILY       Calcium Channel Blockers Protocol  Passed - 12/23/2019  8:42 AM        Passed - Blood pressure under 140/90 in past 12 months     BP Readings from Last 3 Encounters:   12/13/19 137/82   11/27/19 138/76   07/16/19 130/80                 Passed - Recent (12 mo) or future (30 days) visit within the authorizing provider's specialty     Patient has had an office visit with the authorizing provider or a provider within the authorizing providers department within the previous 12 mos or has a future within next 30 days. See \"Patient Info\" tab in inbasket, or \"Choose Columns\" in Meds & Orders section of the refill encounter.              Passed - Medication is active on med list        Passed - Patient is age 18 or older        Passed - No active pregnancy on record        Passed - Normal serum creatinine on file in past 12 months     Recent Labs   Lab Test 12/10/19  1058   CR 0.74             Passed - No positive pregnancy test in past 12 months           "

## 2020-01-23 ENCOUNTER — TRANSFERRED RECORDS (OUTPATIENT)
Dept: HEALTH INFORMATION MANAGEMENT | Facility: CLINIC | Age: 70
End: 2020-01-23

## 2020-01-24 DIAGNOSIS — I10 BENIGN ESSENTIAL HYPERTENSION: ICD-10-CM

## 2020-01-24 NOTE — TELEPHONE ENCOUNTER
Pt has been having a cough since starting this medication. Pt states that it is not asthma, but a side effect of Lisinopril. Pt is wondering if she should take smaller dose or if she should do something else. Please assist. Ok to leave message on mobile. Thanks!      Michaela Cooper  [employer] Patient Rep

## 2020-01-25 ENCOUNTER — NURSE TRIAGE (OUTPATIENT)
Dept: NURSING | Facility: CLINIC | Age: 70
End: 2020-01-25

## 2020-01-25 NOTE — TELEPHONE ENCOUNTER
Asking about prep instructions for cardiac stress test on 1/27. No special instructions attached to appt. Informed pt no special prep instructions.     Reason for Disposition    Question about upcoming scheduled test, no triage required and triager able to answer question    Additional Information    Negative: [1] Caller is not with the adult (patient) AND [2] reporting urgent symptoms    Negative: Lab result questions    Negative: Medication questions    Negative: Caller can't be reached by phone    Negative: Caller has already spoken to PCP or another triager    Negative: RN needs further essential information from caller in order to complete triage    Negative: [1] Caller requesting NON-URGENT health information AND [2] PCP's office is the best resource    Negative: Requesting regular office appointment    Negative: Health Information question, no triage required and triager able to answer question    Negative: General information question, no triage required and triager able to answer question    Protocols used: INFORMATION ONLY CALL-A-

## 2020-01-26 RX ORDER — LOSARTAN POTASSIUM 25 MG/1
25 TABLET ORAL DAILY
Qty: 30 TABLET | Refills: 1 | Status: SHIPPED | OUTPATIENT
Start: 2020-01-26 | End: 2020-03-04

## 2020-01-26 NOTE — TELEPHONE ENCOUNTER
I switched to losartan, can you let her know?  Thanks,  Dr. Sun Person MD/[facility]

## 2020-01-27 ENCOUNTER — HOSPITAL ENCOUNTER (OUTPATIENT)
Dept: CARDIOLOGY | Facility: CLINIC | Age: 70
Discharge: HOME | End: 2020-01-27
Attending: FAMILY MEDICINE | Admitting: FAMILY MEDICINE
Payer: COMMERCIAL

## 2020-01-27 DIAGNOSIS — R53.83 FATIGUE, UNSPECIFIED TYPE: ICD-10-CM

## 2020-01-27 PROCEDURE — 94618 PULMONARY STRESS TESTING: CPT | Mod: 26 | Performed by: INTERNAL MEDICINE

## 2020-01-27 PROCEDURE — 93017 CV STRESS TEST TRACING ONLY: CPT

## 2020-01-27 NOTE — TELEPHONE ENCOUNTER
Writer [facility] stating an alternative med can be picked up at patient pharmacy. If patient has any questions please call clinic back.    Thanks! Karlene Lambert RN

## 2020-01-27 NOTE — TELEPHONE ENCOUNTER
"Patient wishes to get in with Sun christianson when she has no openings 3/4/20. Patient demanding to speak a \"real\" person, I explained I am a real person and then she went on in tell me I am uneducated and wants to speak to a \"real\" professional to get her scheduled when she wants. Patient refused to see any other provider.  Patient was extremely rude and belittling on the phone and also claims she never got the message from Karlene Lambert on 1/27/20.   "

## 2020-01-27 NOTE — TELEPHONE ENCOUNTER
Detailed message left on pt's personally identified phone.Not clear if she got the message that losartan was called to her pharmacy to replace the lisinopril that was likely causing her cough    Asked her to call back to speak to RN ,especially if she has other issues that she feels she needs to be seen for    Maggie Maynard, ION, BSN

## 2020-01-29 DIAGNOSIS — I10 BENIGN ESSENTIAL HYPERTENSION: ICD-10-CM

## 2020-01-29 PROCEDURE — 80048 BASIC METABOLIC PNL TOTAL CA: CPT | Performed by: FAMILY MEDICINE

## 2020-01-29 PROCEDURE — 36415 COLL VENOUS BLD VENIPUNCTURE: CPT | Performed by: FAMILY MEDICINE

## 2020-01-29 NOTE — TELEPHONE ENCOUNTER
Spoke to pt and made her a lab only appointment for today for her follow up bmp  Pt said her b/p has been normal since adding the lisinopril but the cough was awful  Pt picked up her losartan  She didn't get the voicemails that we left her-x2- so we took her mobile number off of her contact info and we are to leave messages on the home phone from now on.     Michaela Sabillon RN

## 2020-01-30 LAB
ANION GAP SERPL CALCULATED.3IONS-SCNC: 6 MMOL/L (ref 3–14)
BUN SERPL-MCNC: 17 MG/DL (ref 7–30)
CALCIUM SERPL-MCNC: 10 MG/DL (ref 8.5–10.1)
CHLORIDE SERPL-SCNC: 108 MMOL/L (ref 94–109)
CO2 SERPL-SCNC: 27 MMOL/L (ref 20–32)
CREAT SERPL-MCNC: 0.78 MG/DL (ref 0.52–1.04)
GFR SERPL CREATININE-BSD FRML MDRD: 77 ML/MIN/{1.73_M2}
GLUCOSE SERPL-MCNC: 114 MG/DL (ref 70–99)
POTASSIUM SERPL-SCNC: 4.4 MMOL/L (ref 3.4–5.3)
SODIUM SERPL-SCNC: 141 MMOL/L (ref 133–144)

## 2020-01-30 NOTE — RESULT ENCOUNTER NOTE
Cayetano Rodriguez,  Good news!  Your results are normal.    -All of your labs are normal.  For additional lab test information, labtestsonline.org is an excellent reference..    At this point I recommend no change to your plan of care - please let me know if you have any further questions or concerns.  Sincerely,  Dr. Sun Person MD    Thank you for choosing the [facility] as your health care provider. Please don't hesitate to call with any questions or concerns [phone].

## 2020-02-07 NOTE — RESULT ENCOUNTER NOTE
Cayetano Rodriguez,  Good news!  Your results are normal.  At this point I recommend no change to your plan of care - please let me know if you have any further questions or concerns.  Sincerely,  Dr. Sun Person MD    Thank you for choosing the [facility] as your health care provider. Please don't hesitate to call with any questions or concerns [phone].

## 2020-02-08 ENCOUNTER — OFFICE VISIT (OUTPATIENT)
Dept: URGENT CARE | Facility: URGENT CARE | Age: 70
End: 2020-02-08
Payer: COMMERCIAL

## 2020-02-08 VITALS
BODY MASS INDEX: 29.99 KG/M2 | RESPIRATION RATE: 16 BRPM | DIASTOLIC BLOOD PRESSURE: 74 MMHG | HEIGHT: 65 IN | HEART RATE: 84 BPM | OXYGEN SATURATION: 96 % | WEIGHT: 180 LBS | SYSTOLIC BLOOD PRESSURE: 128 MMHG | TEMPERATURE: 98.1 F

## 2020-02-08 DIAGNOSIS — R07.0 THROAT PAIN: Primary | ICD-10-CM

## 2020-02-08 LAB
DEPRECATED S PYO AG THROAT QL EIA: NORMAL
FLUAV+FLUBV AG SPEC QL: NEGATIVE
FLUAV+FLUBV AG SPEC QL: NEGATIVE
SPECIMEN SOURCE: NORMAL
SPECIMEN SOURCE: NORMAL

## 2020-02-08 PROCEDURE — 87880 STREP A ASSAY W/OPTIC: CPT | Performed by: PHYSICIAN ASSISTANT

## 2020-02-08 PROCEDURE — 87081 CULTURE SCREEN ONLY: CPT | Performed by: PHYSICIAN ASSISTANT

## 2020-02-08 PROCEDURE — 99213 OFFICE O/P EST LOW 20 MIN: CPT | Performed by: PHYSICIAN ASSISTANT

## 2020-02-08 PROCEDURE — 87804 INFLUENZA ASSAY W/OPTIC: CPT | Performed by: PHYSICIAN ASSISTANT

## 2020-02-08 ASSESSMENT — MIFFLIN-ST. JEOR: SCORE: 1337.35

## 2020-02-09 LAB
BACTERIA SPEC CULT: NORMAL
SPECIMEN SOURCE: NORMAL

## 2020-02-09 NOTE — PROGRESS NOTES
SUBJECTIVE:  Jennifer Jane is a 69 year old female with a chief complaint of sore throat.  Onset of symptoms was 1 day(s) ago.    Course of illness: gradual onset.  Severity moderate  Current and Associated symptoms: sore throat  Treatment measures tried include Tylenol/Ibuprofen.  Predisposing factors include exposure to strep and exposure to influenza.    Past Medical History:   Diagnosis Date     Arthritis     Fibromyalgia, ? Osteoarthritis     Back injury Rutured disc 1983    Micro discectomy, approx date 1983     Insomnia 12/26/2012     Problem list name updated by automated process. Provider to review     Reduced vision 9/2016    L vision block, bilateral elevated eye presures,poss. Glacom     Uncomplicated asthma     Diagnosed as 2 yo     Current Outpatient Medications   Medication Sig Dispense Refill     ADVAIR -21 MCG/ACT inhaler        albuterol (VENTOLIN HFA) 108 (90 BASE) MCG/ACT inhaler Inhale 2 puffs into the lungs every 6 hours as needed for shortness of breath / dyspnea 3 Inhaler 1     amLODIPine (NORVASC) 5 MG tablet TAKE 1 TABLET (5 MG) BY MOUTH DAILY 90 tablet 3     Calcium Carbonate Antacid (TUMS PO) Take  by mouth At Bedtime.       cetirizine (ZYRTEC ALLERGY) 10 MG tablet Take 10 mg by mouth daily.       ibuprofen (ADVIL,MOTRIN) 200 MG tablet Take 1-2 tablets (200-400 mg) by mouth At Bedtime       latanoprost (XALATAN) 0.005 % ophthalmic solution Place 1 drop into the right eye At Bedtime       losartan (COZAAR) 25 MG tablet Take 1 tablet (25 mg) by mouth daily 30 tablet 1     montelukast (SINGULAIR) 10 MG tablet TAKE ONE TABLET BY MOUTH EVERY DAY AS DIRECTED  1     DiphenhydrAMINE HCl (BENADRYL PO) Take 25 mg by mouth       zinc 23 MG LOZG lozenge Place 1 lozenge inside cheek 4 times daily       Social History     Tobacco Use     Smoking status: Never Smoker     Smokeless tobacco: Never Used   Substance Use Topics     Alcohol use: Yes     Alcohol/week: 0.0 standard drinks     Comment: 0  "-2 times a year       ROS:  10 point ROS negative except as listed above      OBJECTIVE:   /74   Pulse 84   Temp 98.1  F (36.7  C) (Oral)   Resp 16   Ht 1.651 m (5' 5\")   Wt 81.6 kg (180 lb)   LMP  (LMP Unknown)   SpO2 96%   BMI 29.95 kg/m    GENERAL APPEARANCE: healthy, alert and no distress  EYES: EOMI,  PERRL, conjunctiva clear  HENT: ear canals and TM's normal.  Nose normal.  Pharynx erythematous with some exudate noted.  NECK: supple, non-tender to palpation, no adenopathy noted  RESP: lungs clear to auscultation - no rales, rhonchi or wheezes  CV: regular rates and rhythm, normal S1 S2, no murmur noted  ABDOMEN:  soft, nontender, no HSM or masses and bowel sounds normal  SKIN: no suspicious lesions or rashes    Results for orders placed or performed in visit on 02/08/20   Strep, Rapid Screen     Status: None   Result Value Ref Range    Specimen Description Throat     Rapid Strep A Screen       NEGATIVE: No Group A streptococcal antigen detected by immunoassay, await culture report.   Influenza A/B antigen     Status: None   Result Value Ref Range    Influenza A/B Agn Specimen Nasopharyngeal     Influenza A Negative NEG^Negative    Influenza B Negative NEG^Negative   Beta strep group A culture     Status: None   Result Value Ref Range    Specimen Description Throat     Culture Micro No beta hemolytic Streptococcus Group A isolated          ASSESSMENT:  (R07.0) Throat pain  (primary encounter diagnosis)  Comment: No evidence of abscess or strep infection appreciated.    Plan: Strep, Rapid Screen, Influenza A/B antigen,         Beta strep group A culture      Follow up with PCP if symptoms worsen or fail to improve          Patient Instructions     Patient Education     Viral Pharyngitis (Sore Throat)    You or your child have pharyngitis (sore throat). This infection is caused by a virus. It can cause throat pain that is worse when swallowing, aching all over, headache, and fever. The infection may " be spread by coughing, kissing, or touching others after touching your mouth or nose. Antibiotic medicines do not work against viruses. They are not used for treating this illness.  Home care    If symptoms are severe, you or your child should rest at home. Return to work or school when you or your child feel well enough.     You or your child should drink plenty of fluids to prevent dehydration.    Use throat lozenges or numbing throat sprays to help reduce pain. Gargling with warm salt water will also help reduce throat pain. Dissolve 1/2 teaspoon of salt in 1 glass of warm water. Children can sip on juice or a popsicle. Children 5 years and older can also suck on a lollipop or hard candy.    Don t eat salty or spicy foods or give them to your child. These can be irritating to the throat.  Medicines for a child: You can give your child acetaminophen for fever, fussiness, or discomfort. In babies over 6 months of age, you may use ibuprofen instead of acetaminophen. If your child has chronic liver or kidney disease or ever had a stomach ulcer or GI bleeding, talk with your child s healthcare provider before giving these medicines. Aspirin should never be used by any child under 18 years of age who has a fever. It may cause severe liver damage.  Medicines for an adult: You may use acetaminophen or ibuprofen to control pain or fever, unless another medicine was prescribed for this. If you have chronic liver or kidney disease or ever had a stomach ulcer or GI bleeding, talk with your healthcare provider before using these medicines.  Follow-up care  Follow up with a healthcare provider or our staff if you or your child are not getting better over the next week.  When to seek medical advice  Call your healthcare provider right away if any of these occur:    Fever as directed by your healthcare provider.  For children, seek care if:  ? Your child is of any age and has repeated fevers above 104 F (40 C).  ? Your child is  younger than 2 years of age and has a fever of 100.4 F (38 C) for more than 1 day.  ? Your child is 2 years old or older and has a fever of 100.4 F (38 C) for more than 3 days.    New or worsening ear pain, sinus pain, or headache    Painful lumps in the back of neck    Stiff neck    Lymph nodes are getting larger    Can t swallow liquids, a lot of drooling, or can t open mouth wide due to throat pain    Signs of dehydration, such as very dark urine or no urine, sunken eyes, dizziness    Trouble breathing or noisy breathing    Muffled voice    New rash    Other symptoms are getting worse  Date Last Reviewed: 10/1/2017    6596-0821 [employer]. [address] [place] [address], [place] [address]. All rights reserved. This information is not intended as a substitute for professional medical care. Always follow your healthcare professional's instructions.

## 2020-02-09 NOTE — PATIENT INSTRUCTIONS
Patient Education     Viral Pharyngitis (Sore Throat)    You or your child have pharyngitis (sore throat). This infection is caused by a virus. It can cause throat pain that is worse when swallowing, aching all over, headache, and fever. The infection may be spread by coughing, kissing, or touching others after touching your mouth or nose. Antibiotic medicines do not work against viruses. They are not used for treating this illness.  Home care    If symptoms are severe, you or your child should rest at home. Return to work or school when you or your child feel well enough.     You or your child should drink plenty of fluids to prevent dehydration.    Use throat lozenges or numbing throat sprays to help reduce pain. Gargling with warm salt water will also help reduce throat pain. Dissolve 1/2 teaspoon of salt in 1 glass of warm water. Children can sip on juice or a popsicle. Children 5 years and older can also suck on a lollipop or hard candy.    Don t eat salty or spicy foods or give them to your child. These can be irritating to the throat.  Medicines for a child: You can give your child acetaminophen for fever, fussiness, or discomfort. In babies over 6 months of age, you may use ibuprofen instead of acetaminophen. If your child has chronic liver or kidney disease or ever had a stomach ulcer or GI bleeding, talk with your child s healthcare provider before giving these medicines. Aspirin should never be used by any child under 18 years of age who has a fever. It may cause severe liver damage.  Medicines for an adult: You may use acetaminophen or ibuprofen to control pain or fever, unless another medicine was prescribed for this. If you have chronic liver or kidney disease or ever had a stomach ulcer or GI bleeding, talk with your healthcare provider before using these medicines.  Follow-up care  Follow up with a healthcare provider or our staff if you or your child are not getting better over the next  week.  When to seek medical advice  Call your healthcare provider right away if any of these occur:    Fever as directed by your healthcare provider.  For children, seek care if:  ? Your child is of any age and has repeated fevers above 104 F (40 C).  ? Your child is younger than 2 years of age and has a fever of 100.4 F (38 C) for more than 1 day.  ? Your child is 2 years old or older and has a fever of 100.4 F (38 C) for more than 3 days.    New or worsening ear pain, sinus pain, or headache    Painful lumps in the back of neck    Stiff neck    Lymph nodes are getting larger    Can t swallow liquids, a lot of drooling, or can t open mouth wide due to throat pain    Signs of dehydration, such as very dark urine or no urine, sunken eyes, dizziness    Trouble breathing or noisy breathing    Muffled voice    New rash    Other symptoms are getting worse  Date Last Reviewed: 10/1/2017    4218-4934 [employer]. [address] [place] [address], [place] [address]. All rights reserved. This information is not intended as a substitute for professional medical care. Always follow your healthcare professional's instructions.

## 2020-02-10 NOTE — RESULT ENCOUNTER NOTE
This test was ordered by another provider who is following results.  Dr. Sun Person MD/[facility]

## 2020-03-04 ENCOUNTER — OFFICE VISIT (OUTPATIENT)
Dept: FAMILY MEDICINE | Facility: CLINIC | Age: 70
End: 2020-03-04
Payer: COMMERCIAL

## 2020-03-04 VITALS
SYSTOLIC BLOOD PRESSURE: 138 MMHG | HEART RATE: 87 BPM | DIASTOLIC BLOOD PRESSURE: 72 MMHG | BODY MASS INDEX: 29.95 KG/M2 | WEIGHT: 180 LBS | OXYGEN SATURATION: 98 % | RESPIRATION RATE: 18 BRPM

## 2020-03-04 DIAGNOSIS — R51.9 CHRONIC LEFT-SIDED HEADACHE: ICD-10-CM

## 2020-03-04 DIAGNOSIS — F51.04 CHRONIC INSOMNIA: Primary | ICD-10-CM

## 2020-03-04 DIAGNOSIS — G89.29 CHRONIC LEFT-SIDED HEADACHE: ICD-10-CM

## 2020-03-04 DIAGNOSIS — I10 BENIGN ESSENTIAL HYPERTENSION: ICD-10-CM

## 2020-03-04 PROCEDURE — 99214 OFFICE O/P EST MOD 30 MIN: CPT | Performed by: FAMILY MEDICINE

## 2020-03-04 RX ORDER — LOSARTAN POTASSIUM 25 MG/1
25 TABLET ORAL DAILY
Qty: 90 TABLET | Refills: 3 | Status: SHIPPED | OUTPATIENT
Start: 2020-03-04 | End: 2020-12-02

## 2020-03-04 RX ORDER — AMITRIPTYLINE HYDROCHLORIDE 10 MG/1
10 TABLET ORAL AT BEDTIME
Qty: 30 TABLET | Refills: 1 | Status: SHIPPED | OUTPATIENT
Start: 2020-03-04 | End: 2020-04-21

## 2020-03-04 NOTE — PROGRESS NOTES
"  Subjective     Jennifer Jane is a 69 year old female who presents to clinic today for the following health issues:    Headaches, fatigue persist.  Feeling discouraged.  Had stress test - negative.  Has had CT and MRI in 2018 and 2016 - normal.   Had labs 12/2018 CBC, CMP, TSH, ESR - normal.      HA: Sometimes around eye, sometimes down neck.  Had one episode where went to emergency room - and haded that blind spot on left eye.  These are mild.  Tired, naps 4/7 days at least.    If put hand on head, it's back of neck and top of head.  Pressure.  Mild.  Not pulsing, but if active does have.Has had left sided HA for \"at least 3\" years.    On 25 mg losartan, 5 mg norvasc.  BPs at home range from 120-150s systolic and 70s-90 diastolic. Mostly in normal range. No dizziness lightheaded.  Higher blood pressure readings are not always associated withHA.    Does have chronic poor sleep.  Started when kids were babies.  Worked nights for 7 years. Took amitriptyline - then they stopped.  10 mg.  Worked well for her.  Now using benadryl occasionally.    Stays up until 1-2 am.  Wakes once to go to bathroom.  No alcohol.  Limits liquid at night.    HPI     Reviewed and updated as needed this visit by Provider  Problems         Review of Systems   ROS COMP: Constitutional, HEENT, cardiovascular, pulmonary, gi and gu systems are negative, except as otherwise noted.      Objective    /72   Pulse 87   Resp 18   Wt 81.6 kg (180 lb)   LMP  (LMP Unknown)   SpO2 98%   BMI 29.95 kg/m    Body mass index is 29.95 kg/m .  Physical Exam   GENERAL: healthy, alert and no distress  EYES: Eyes grossly normal to inspection, PERRL and conjunctivae and sclerae normal  RESP: lungs clear to auscultation - no rales, rhonchi or wheezes  CV: regular rate and rhythm, normal S1 S2, no S3 or S4, no murmur, click or rub, no peripheral edema and peripheral pulses strong  MS: no gross musculoskeletal defects noted, no edema  SKIN: no suspicious " lesions or rashes  NEURO: Normal strength and tone, mentation intact and speech normal  PSYCH: mentation appears normal, affect normal/bright    Diagnostic Test Results:  Labs reviewed in [facility]  Results for ASHLY SMILEY (MRN [id number]) as of 3/4/2020 Results for ASHLY SMILEY (MRN [id number]) as of 3/4/2020 18:52   Ref. Range 12/13/2019 14:54   TSH Latest Ref Range: 0.40 - 4.00 mU/L 0.91   18:52     Ref. Range 12/13/2019 14:54   Ferritin Latest Ref Range: 8 - 252 ng/mL 76   Results for ASHLY SMILEY (MRN [id number]) as of 3/4/2020 18:52   Ref. Range 12/13/2019 14:54   WBC Latest Ref Range: 4.0 - 11.0 10e9/L 8.8   Hemoglobin Latest Ref Range: 11.7 - 15.7 g/dL 14.2   Hematocrit Latest Ref Range: 35.0 - 47.0 % 42.9   Platelet Count Latest Ref Range: 150 - 450 10e9/L 280   RBC Count Latest Ref Range: 3.8 - 5.2 10e12/L 4.84     Results for ASHLY SMILEY (MRN [id number]) as of 3/4/2020 18:52   Ref. Range 12/13/2019 14:54   Sed Rate Latest Ref Range: 0 - 30 mm/h 10   Results for ASHLY SMILEY (MRN [id number]) as of 3/4/2020 18:52   Ref. Range 1/29/2020 14:52   Sodium Latest Ref Range: 133 - 144 mmol/L 141   Potassium Latest Ref Range: 3.4 - 5.3 mmol/L 4.4   Chloride Latest Ref Range: 94 - 109 mmol/L 108   Carbon Dioxide Latest Ref Range: 20 - 32 mmol/L 27   Urea Nitrogen Latest Ref Range: 7 - 30 mg/dL 17   Creatinine Latest Ref Range: 0.52 - 1.04 mg/dL 0.78   GFR Estimate Latest Ref Range: >60 mL/min/1.73_m2 77   GFR Estimate If Black Latest Ref Range: >60 mL/min/1.73_m2 89   Calcium Latest Ref Range: 8.5 - 10.1 mg/dL 10.0   Anion Gap Latest Ref Range: 3 - 14 mmol/L 6           Assessment & Plan       ICD-10-CM    1. Chronic insomnia F51.04 amitriptyline (ELAVIL) 10 MG tablet   2. Chronic left-sided headache R51     Negative MRI and CT of head 2018 and 2016  Normal labs TSH, CBC, CMP, ESR 12/2019.  See plan below in pt instructions.   3. Benign essential hypertension I10 losartan (COZAAR) 25 MG tablet           Patient Instructions   1. No liquids after 8 pm.  2. Bedtime: Midnight.  3. NO NAPS.  4. Let's restart low dose amitriptyline to get you back into a good schedule.  5. No benadryl.  6. No more than 15-20 minutes in bed.  7. No screens after 10 pm.  8. Keep a sleep journal.    Come back if not feeling better or not working.    My next idea is to increase your losartan to 50 mg, but I don't want to do that and get your blood pressure too low.    If this doesn't help, you could see neurology for headaches or we could repeat MRI.  Dr. Louis works here - is great.      Return in about 3 months (around 6/4/2020) for and sooner if worsening or not improving.      I spent greater than 50% of the 30 minute visit counseling and coordinating care regarding patient's diagnoses listed above, particularly insomnia, headaches, bp.    Sun Person MD  [facility]

## 2020-03-04 NOTE — PATIENT INSTRUCTIONS
1. No liquids after 8 pm.  2. Bedtime: Midnight.  3. NO NAPS.  4. Let's restart low dose amitriptyline to get you back into a good schedule.  5. No benadryl.  6. No more than 15-20 minutes in bed.  7. No screens after 10 pm.  8. Keep a sleep journal.    Come back if not feeling better or not working.    My next idea is to increase your losartan to 50 mg, but I don't want to do that and get your blood pressure too low.    If this doesn't help, you could see neurology for headaches or we could repeat MRI.  Dr. Louis works here - is great.

## 2020-03-05 ASSESSMENT — ASTHMA QUESTIONNAIRES: ACT_TOTALSCORE: 22

## 2020-04-21 DIAGNOSIS — F51.04 CHRONIC INSOMNIA: ICD-10-CM

## 2020-04-21 RX ORDER — AMITRIPTYLINE HYDROCHLORIDE 10 MG/1
TABLET ORAL
Qty: 30 TABLET | Refills: 9 | Status: SHIPPED | OUTPATIENT
Start: 2020-04-21 | End: 2020-12-02

## 2020-04-21 NOTE — TELEPHONE ENCOUNTER
"Requested Prescriptions   Pending Prescriptions Disp Refills     amitriptyline (ELAVIL) 10 MG tablet [Pharmacy Med Name: AMITRIPTYLINE HCL 10 MG TAB] 30 tablet 1     Sig: TAKE 1 TABLET BY MOUTH EVERYDAY AT BEDTIME       Tricyclic Agents ( Annual appt and no PHQ9) Passed - 4/21/2020 12:13 AM        Passed - Blood Pressure under 140/90 in past 12 mos     BP Readings from Last 3 Encounters:   03/04/20 138/72   02/08/20 128/74   12/13/19 137/82                 Passed - Recent (12 mo) or future (30 days) visit within authorizing provider's specialty     Patient has had an office visit with the authorizing provider or a provider within the authorizing providers department within the previous 12 mos or has a future within next 30 days. See \"Patient Info\" tab in inbasket, or \"Choose Columns\" in Meds & Orders section of the refill encounter.              Passed - Medication is active on med list        Passed - Patient is age 18 or older        Passed - Patient is not pregnant        Passed - No positive pregnancy test on record in past 12 mos             "

## 2020-07-09 ENCOUNTER — TELEPHONE (OUTPATIENT)
Dept: FAMILY MEDICINE | Facility: CLINIC | Age: 70
End: 2020-07-09

## 2020-07-09 NOTE — TELEPHONE ENCOUNTER
Reason for call:  Patient reporting a symptomURGENT  Symptom or request: Right Leg and Foot Swelling    Duration (how long have symptoms been present): ongoing    Have you been treated for this before? n/a    Additional comments: Call patient today to discuss ASAP    Phone Number patient can be reached at:  Home number on file [phone] (home)    Best Time:  asap    Can we leave a detailed message on this number:  YES    Call taken on 7/9/2020 at 3:58 PM by Malu Patel

## 2020-07-09 NOTE — TELEPHONE ENCOUNTER
S-(situation): Edema in both legs-worse in RLE started a few days ago  Edema extends to Lower part of calf    Pain on rt foot, in heel area--tender to touch  When swelling goes down the redness noted where edema is present, patient unsure if growing, denies warmth or pain  Edema top of foot +4  Allergies are significant as well  Patient more winded, no wheezing  Can complete full sentences    B-(background): HTN, CCB(amlodipine)    A-(assessment): Patient with increased edema    R-(recommendations): Patient scheduled with Sun Person MD on 7/13 at 240. Patient instructed to jody red boundaries with marker. Patient advised to call clinic right away if redness grows outside of marked boundaries    Sun Person MD,  Would you like patient to be scheduled sooner--no availabililty tomorrow--    Please advise.    Thanks! Karlene Lambert RN

## 2020-07-10 NOTE — TELEPHONE ENCOUNTER
Reviewed this provider message with pt.  PT feels legs are better today.  Gave UC info in case she needs to be seen this weekend.  If sx don't worsen, pt will do appt with PCP on 7/13/20.  ION Babin

## 2020-07-10 NOTE — TELEPHONE ENCOUNTER
Sounds like she should be seen, but unfortunately I don't have any openings and I don't believe my colleagues do either.  Please offer other [facility] locations, or urgent care.  Dr. Sun Person MD / [facility]

## 2020-07-13 ENCOUNTER — VIRTUAL VISIT (OUTPATIENT)
Dept: FAMILY MEDICINE | Facility: CLINIC | Age: 70
End: 2020-07-13
Payer: COMMERCIAL

## 2020-07-13 DIAGNOSIS — R60.0 BILATERAL LOWER EXTREMITY EDEMA: Primary | ICD-10-CM

## 2020-07-13 PROCEDURE — 99213 OFFICE O/P EST LOW 20 MIN: CPT | Mod: 95 | Performed by: FAMILY MEDICINE

## 2020-07-13 NOTE — PROGRESS NOTES
"Jennifer Jane is a 69 year old female who is being evaluated via a billable video visit.      The patient has been notified of following:     \"This video visit will be conducted via a call between you and your physician/provider. We have found that certain health care needs can be provided without the need for an in-person physical exam.  This service lets us provide the care you need with a video conversation.  If a prescription is necessary we can send it directly to your pharmacy.  If lab work is needed we can place an order for that and you can then stop by our lab to have the test done at a later time.    Video visits are billed at different rates depending on your insurance coverage.  Please reach out to your insurance provider with any questions.    If during the course of the call the physician/provider feels a video visit is not appropriate, you will not be charged for this service.\"    Patient has given verbal consent for Video visit? Yes  How would [facility] to [facility]  Patient would like the video invitation sent by: Send to e-mail at: [email or web address]  Will anyone else be joining your video visit? No    Subjective     Jennifer Jane is a 69 year old female who presents today via video visit for the following health issues:    HPI  Musculoskeletal problem/pain      Duration: June 29th    Description  Location: both feet, more in right than left.     Intensity:  Normal     Accompanying signs and symptoms: had a red rash    History  Previous similar problem: no   Previous evaluation:  none    Precipitating or alleviating factors:  Trauma or overuse: no   Aggravating factors include: being on feet a lot will cause edema     Therapies tried and outcome: showering before bed and putting feet up to rest.         Video Start Time: 2:55 PM    69 year old well known to me in clinic today here for swelling in feet.  First noticed when cleaning the basement.  6/29th.  Was gone the next " day.    Has been going through a lot of boxes of really old things - 1700-1800s. A lot full of mold.      On June 9th was the day she called.  Had pitting edema on right foot.  Left foot was swollen as well but not as bad.  Speckling pinkish rash on legs.    When called, thought was from being swollen.    On amlodipine 5 mg.  Losartan 25 mg.    Last Comprehensive Metabolic Panel:  Sodium   Date Value Ref Range Status   01/29/2020 141 133 - 144 mmol/L Final     Potassium   Date Value Ref Range Status   01/29/2020 4.4 3.4 - 5.3 mmol/L Final     Chloride   Date Value Ref Range Status   01/29/2020 108 94 - 109 mmol/L Final     Carbon Dioxide   Date Value Ref Range Status   01/29/2020 27 20 - 32 mmol/L Final     Anion Gap   Date Value Ref Range Status   01/29/2020 6 3 - 14 mmol/L Final     Glucose   Date Value Ref Range Status   01/29/2020 114 (H) 70 - 99 mg/dL Final     Comment:     Non Fasting     Urea Nitrogen   Date Value Ref Range Status   01/29/2020 17 7 - 30 mg/dL Final     Creatinine   Date Value Ref Range Status   01/29/2020 0.78 0.52 - 1.04 mg/dL Final     GFR Estimate   Date Value Ref Range Status   01/29/2020 77 >60 mL/min/[1.73_m2] Final     Comment:     Non  GFR Calc  Starting 12/18/2018, serum creatinine based estimated GFR (eGFR) will be   calculated using the Chronic Kidney Disease Epidemiology Collaboration   (CKD-EPI) equation.       Calcium   Date Value Ref Range Status   01/29/2020 10.0 8.5 - 10.1 mg/dL Final       Stopped taking tylenol.  Ibuprofen instead.    Feet do swell - been that way for a long time.    Thinks it's better, but can still feel a little bruising too.  Mildly tender.  So much better.    Reviewed and updated as needed this visit by Provider  Tobacco  Allergies  Meds  Problems  Med Hx  Surg Hx  Fam Hx         Review of Systems   Constitutional, HEENT, cardiovascular, pulmonary, gi and gu systems are negative, except as otherwise noted.      Objective      "        Physical Exam     GENERAL: Healthy, alert and no distress  EYES: Eyes grossly normal to inspection.  No discharge or erythema, or obvious scleral/conjunctival abnormalities.  RESP: No audible wheeze, cough, or visible cyanosis.  No visible retractions or increased work of breathing.    SKIN: Visible skin clear. No significant rash, abnormal pigmentation or lesions.  NEURO: Cranial nerves grossly intact.  Mentation and speech appropriate for age.  PSYCH: Mentation appears normal, affect normal/bright, judgement and insight intact, normal speech and appearance well-groomed.      Diagnostic Test Results:  Labs reviewed in Epic        Assessment & Plan     Jennifer was seen today for edema.    Diagnoses and all orders for this visit:    Bilateral lower extremity edema  -     Compression Sleeve/Stocking Order for DME - ONLY FOR DME          Patient Instructions   1. Elevate  2. Avoid prolonged standing or crouching  3. Compression stockings (we can send order to [facility] on [address] [place] [address])  4. Let me know if continues, then we can lower your amlodipine dose or discuss starting a diuretic blood pressure medication again although I know they've \"dried you out\" in the past.      Return in about 4 months (around 11/13/2020) for Medicare Annual Wellness Exam.    Sun Person MD  [facility]      Video-Visit Details    Type of service:  Video Visit    Video End Time:3:10 PM    Originating Location (pt. Location): Home    Distant Location (provider location):  [facility]     Platform used for Video Visit: AmWell    Return in about 4 months (around 11/13/2020) for Medicare Annual Wellness Exam.       Sun Person MD      "

## 2020-07-13 NOTE — PATIENT INSTRUCTIONS
"1. Elevate  2. Avoid prolonged standing or crouching  3. Compression stockings (we can send order to [facility] on [address] [facility])  4. Let me know if continues, then we can lower your amlodipine dose or discuss starting a diuretic blood pressure medication again although I know they've \"dried you out\" in the past.  "

## 2020-12-02 ENCOUNTER — OFFICE VISIT (OUTPATIENT)
Dept: FAMILY MEDICINE | Facility: CLINIC | Age: 70
End: 2020-12-02
Payer: COMMERCIAL

## 2020-12-02 DIAGNOSIS — Z00.00 ENCOUNTER FOR MEDICARE ANNUAL WELLNESS EXAM: Primary | ICD-10-CM

## 2020-12-02 DIAGNOSIS — R73.03 PRE-DIABETES: ICD-10-CM

## 2020-12-02 DIAGNOSIS — R19.7 DIARRHEA, UNSPECIFIED TYPE: ICD-10-CM

## 2020-12-02 DIAGNOSIS — I10 BENIGN ESSENTIAL HYPERTENSION: ICD-10-CM

## 2020-12-02 DIAGNOSIS — F51.04 CHRONIC INSOMNIA: ICD-10-CM

## 2020-12-02 DIAGNOSIS — E78.5 HYPERLIPIDEMIA LDL GOAL <100: ICD-10-CM

## 2020-12-02 DIAGNOSIS — R41.3 MEMORY CHANGE: ICD-10-CM

## 2020-12-02 DIAGNOSIS — R60.0 BILATERAL LOWER EXTREMITY EDEMA: ICD-10-CM

## 2020-12-02 DIAGNOSIS — N81.11 CYSTOCELE, MIDLINE: ICD-10-CM

## 2020-12-02 DIAGNOSIS — E78.00 HYPERCHOLESTEROLEMIA: ICD-10-CM

## 2020-12-02 PROBLEM — E83.52 HYPERCALCEMIA: Status: RESOLVED | Noted: 2019-11-30 | Resolved: 2020-12-02

## 2020-12-02 LAB
ANION GAP SERPL CALCULATED.3IONS-SCNC: 5 MMOL/L (ref 3–14)
BUN SERPL-MCNC: 17 MG/DL (ref 7–30)
CALCIUM SERPL-MCNC: 10.1 MG/DL (ref 8.5–10.1)
CHLORIDE SERPL-SCNC: 107 MMOL/L (ref 94–109)
CHOLEST SERPL-MCNC: 233 MG/DL
CO2 SERPL-SCNC: 27 MMOL/L (ref 20–32)
CREAT SERPL-MCNC: 0.86 MG/DL (ref 0.52–1.04)
GFR SERPL CREATININE-BSD FRML MDRD: 68 ML/MIN/{1.73_M2}
GLUCOSE SERPL-MCNC: 121 MG/DL (ref 70–99)
HBA1C MFR BLD: 5.6 % (ref 0–5.6)
HDLC SERPL-MCNC: 76 MG/DL
LDLC SERPL CALC-MCNC: 145 MG/DL
NONHDLC SERPL-MCNC: 157 MG/DL
POTASSIUM SERPL-SCNC: 4.1 MMOL/L (ref 3.4–5.3)
SODIUM SERPL-SCNC: 139 MMOL/L (ref 133–144)
TRIGL SERPL-MCNC: 60 MG/DL

## 2020-12-02 PROCEDURE — 80048 BASIC METABOLIC PNL TOTAL CA: CPT | Performed by: FAMILY MEDICINE

## 2020-12-02 PROCEDURE — 80061 LIPID PANEL: CPT | Performed by: FAMILY MEDICINE

## 2020-12-02 PROCEDURE — 99214 OFFICE O/P EST MOD 30 MIN: CPT | Mod: 25 | Performed by: FAMILY MEDICINE

## 2020-12-02 PROCEDURE — 36415 COLL VENOUS BLD VENIPUNCTURE: CPT | Performed by: FAMILY MEDICINE

## 2020-12-02 PROCEDURE — 99397 PER PM REEVAL EST PAT 65+ YR: CPT | Mod: 25 | Performed by: FAMILY MEDICINE

## 2020-12-02 PROCEDURE — 83036 HEMOGLOBIN GLYCOSYLATED A1C: CPT | Performed by: FAMILY MEDICINE

## 2020-12-02 RX ORDER — AMLODIPINE BESYLATE 5 MG/1
5 TABLET ORAL AT BEDTIME
Qty: 90 TABLET | Refills: 3 | Status: SHIPPED | OUTPATIENT
Start: 2020-12-02 | End: 2022-01-20

## 2020-12-02 RX ORDER — LOSARTAN POTASSIUM 50 MG/1
50 TABLET ORAL AT BEDTIME
Qty: 90 TABLET | Refills: 3 | Status: SHIPPED | OUTPATIENT
Start: 2020-12-02 | End: 2021-11-29

## 2020-12-02 RX ORDER — ATORVASTATIN CALCIUM 20 MG/1
20 TABLET, FILM COATED ORAL DAILY
Qty: 90 TABLET | Refills: 3 | Status: SHIPPED | OUTPATIENT
Start: 2020-12-02 | End: 2021-11-29

## 2020-12-02 RX ORDER — AMITRIPTYLINE HYDROCHLORIDE 10 MG/1
TABLET ORAL
Qty: 90 TABLET | Refills: 3 | Status: SHIPPED | OUTPATIENT
Start: 2020-12-02 | End: 2022-02-16

## 2020-12-02 ASSESSMENT — MIFFLIN-ST. JEOR: SCORE: 1344.61

## 2020-12-02 ASSESSMENT — ENCOUNTER SYMPTOMS
COUGH: 0
HEMATOCHEZIA: 0
DIARRHEA: 0
EYE PAIN: 0
CHILLS: 0
CONSTIPATION: 0
DIZZINESS: 0
NERVOUS/ANXIOUS: 0
ABDOMINAL PAIN: 0
HEMATURIA: 0

## 2020-12-02 ASSESSMENT — ACTIVITIES OF DAILY LIVING (ADL): CURRENT_FUNCTION: HOUSEWORK REQUIRES ASSISTANCE

## 2020-12-02 NOTE — ASSESSMENT & PLAN NOTE
Up to date on mammogram and colon cancer screening.    Is due for shingles vaccine - do at pharmacy.    Flu shot today

## 2020-12-02 NOTE — PROGRESS NOTES
"SUBJECTIVE:   Jennifer Jane is a 69 year old female who presents for Preventive Visit.    Patient has been advised of split billing requirements and indicates understanding: Yes   Are you in the first 12 months of your Medicare coverage?  No    Healthy Habits:     In general, how would you rate your overall health?  Good    Frequency of exercise:  2-3 days/week    Duration of exercise:  N/A    Do you usually eat at least 4 servings of fruit and vegetables a day, include whole grains    & fiber and avoid regularly eating high fat or \"junk\" foods?  Yes    Taking medications regularly:  Yes    Medication side effects:  Other    Ability to successfully perform activities of daily living:  Housework requires assistance    Home Safety:  No safety concerns identified    Hearing Impairment:  Difficulty following a conversation in a noisy restaurant or crowded room    In the past 6 months, have you been bothered by leaking of urine?  No    In general, how would you rate your overall mental or emotional health?  Excellent      PHQ-2 Total Score: 0    Additional concerns today:  Yes    1. blood pressure.  Too high today.  And has had too much swelling in legs.  Doesn't want to be on diuretic as dries her out too much.    2. Last month.  Had 3 days of bad diarrhea.  Was on and off for 2 weeks.  No vomiting. Mild nausea.  Had a really sharp pain on right side before it all started.  No cough, no fever, no body aches.    3. Covid test was negative nov 21,2020    4. flu shot done sept 21       Still swelling in legs.  Bought compression socks.     Do you feel safe in your environment? Yes    Have you ever done Advance Care Planning? (For example, a Health Directive, POLST, or a discussion with a medical provider or your loved ones about your wishes): No, advance care planning information given to patient to review.  Patient plans to discuss their wishes with loved ones or provider.      Fall risk  Fallen 2 or more times in the " past year?: No  Any fall with injury in the past year?: No    Cognitive Screening   1) Repeat 3 items (Leader, Season, Table)    2) Clock draw: NORMAL  3) 3 item recall: Recalls 2 objects   Results: NORMAL clock, 1-2 items recalled: COGNITIVE IMPAIRMENT LESS LIKELY    Mini-CogTM Copyright BRITNEY Pryor. Licensed by the author for use in [facility]; reprinted with permission (devin@[facility]). All rights reserved.      Do you have sleep apnea, excessive snoring or daytime drowsiness?: no    Reviewed and updated as needed this visit by clinical staff  Tobacco  Allergies  Meds  Problems  Med Hx  Surg Hx  Fam Hx          Reviewed and updated as needed this visit by Provider  Tobacco  Allergies  Meds  Problems  Med Hx  Surg Hx  Fam Hx         Social History     Tobacco Use     Smoking status: Never Smoker     Smokeless tobacco: Never Used   Substance Use Topics     Alcohol use: Yes     Alcohol/week: 0.0 standard drinks     Comment: 0 -2 times a year     If you drink alcohol do you typically have >3 drinks per day or >7 drinks per week? No    Alcohol Use 12/2/2020   Prescreen: >3 drinks/day or >7 drinks/week? No   Prescreen: >3 drinks/day or >7 drinks/week? -   No flowsheet data found.    Current providers sharing in care for this patient include:   Patient Care Team:  Sun Person MD as PCP - General (Family Practice)  Yuriy Petersen MD as MD (Orthopedics)  Sun Person MD as Assigned PCP    The following health maintenance items are reviewed in Epic and correct as of today:  Health Maintenance   Topic Date Due     ZOSTER IMMUNIZATION (2 of 3) 01/14/2011     FALL RISK ASSESSMENT  11/27/2020     ASTHMA ACTION PLAN  12/31/2030 (Originally 11/3/2020)     DTAP/TDAP/TD IMMUNIZATION (2 - Td) 01/06/2021     ASTHMA CONTROL TEST  03/04/2021     MEDICARE ANNUAL WELLNESS VISIT  12/02/2021     A1C  12/02/2021     BMP  12/02/2021     LIPID  12/02/2021     MAMMO SCREENING  12/11/2021      "COLORECTAL CANCER SCREENING  04/15/2023     ADVANCE CARE PLANNING  12/02/2025     DEXA  10/04/2032     HEPATITIS C SCREENING  Completed     PHQ-2  Completed     INFLUENZA VACCINE  Completed     Pneumococcal Vaccine: 65+ Years  Completed     Pneumococcal Vaccine: Pediatrics (0 to 5 Years) and At-Risk Patients (6 to 64 Years)  Aged Out     IPV IMMUNIZATION  Aged Out     MENINGITIS IMMUNIZATION  Aged Out     HEPATITIS B IMMUNIZATION  Aged Out       Review of Systems   Constitutional: Negative for chills.   HENT: Positive for congestion. Negative for ear pain.    Eyes: Negative for pain.   Respiratory: Negative for cough.    Cardiovascular: Negative for chest pain.   Gastrointestinal: Negative for abdominal pain, constipation, diarrhea and hematochezia.   Genitourinary: Negative for hematuria.   Neurological: Negative for dizziness.   Psychiatric/Behavioral: The patient is not nervous/anxious.          OBJECTIVE:   BP (!) 159/89 (BP Location: Left arm, Patient Position: Sitting, Cuff Size: Adult Regular)   Pulse 89   Temp 98.6  F (37  C) (Oral)   Ht 1.651 m (5' 5\")   Wt 82.4 kg (181 lb 9.6 oz)   LMP  (LMP Unknown)   SpO2 97%   BMI 30.22 kg/m   Estimated body mass index is 30.22 kg/m  as calculated from the following:    Height as of this encounter: 1.651 m (5' 5\").    Weight as of this encounter: 82.4 kg (181 lb 9.6 oz).  Physical Exam  GENERAL APPEARANCE: healthy, alert and no distress  EYES: Eyes grossly normal to inspection, PERRL and conjunctivae and sclerae normal  HENT: ear canals and TM's normal, nose and mouth without ulcers or lesions, oropharynx clear and oral mucous membranes moist  NECK: no adenopathy, no asymmetry, masses, or scars and thyroid normal to palpation  RESP: lungs clear to auscultation - no rales, rhonchi or wheezes  BREAST: normal without masses, tenderness or nipple discharge and no palpable axillary masses or adenopathy  CV: regular rate and rhythm, normal S1 S2, no S3 or S4, no " murmur, click or rub, no peripheral edema and peripheral pulses strong  ABDOMEN: soft, nontender, no hepatosplenomegaly, no masses and bowel sounds normal  MS: no musculoskeletal defects are noted and gait is age appropriate without ataxia  SKIN: no suspicious lesions or rashes  NEURO: Normal strength and tone, sensory exam grossly normal, mentation intact and speech normal  PSYCH: mentation appears normal and affect normal/bright    Diagnostic Test Results:  Labs reviewed in Epic    ASSESSMENT / PLAN:     Problem List Items Addressed This Visit        Medium    Benign essential hypertension       Too high today - on amlodipine 10  mg and losartan 25 mg nightly.    Decrease amlodipine to 5 mg nightly (to help swelling)    Increase losartan to 50 mg nightly.    Check blood pressure daily for 2 weeks.    Send me on [employer] your blood pressure numbers.    If not at goal, we'll do an E-visit to adjust your medications.    If at goal, we'll keep things the same!    Recheck BMP today and yearly.         Relevant Medications    losartan (COZAAR) 50 MG tablet    amLODIPine (NORVASC) 5 MG tablet    Other Relevant Orders    Basic metabolic panel (Completed)    Bilateral lower extremity edema       No diuretics, dries her out too much.    Will lower amlodipine dose to 2.5 mg    Compression socks.    If persists and bothersome let me know, we could cut out amlodipine completely to see if this helps.         Chronic insomnia       Has had since her 30s.    Low dose amitriptyline helpful.    Continue this as only 10 mg and has been stable.    Refilled         Relevant Medications    amitriptyline (ELAVIL) 10 MG tablet    Cystocele, midline     Refered obgyn. Dr. Perera         Diarrhea, unspecified type       I wonder about diverticulitis as a cause for your episode?    If happens again, especially with pain, call or mychart the clinic.  We'll get you in for an appointment and probably get a CT scan to look for diverticulitis,  "and treat you for this.         Encounter for Medicare annual wellness exam - Primary       Up to date on mammogram and colon cancer screening.    Is due for shingles vaccine - do at pharmacy.    Flu shot today         Relevant Orders    NEUROLOGY ADULT REFERRAL    Hypercholesterolemia       Has had in past.    Recheck fasting lipids today.             Relevant Orders    Lipid panel reflex to direct LDL Fasting (Completed)    Pre-diabetes       Check A1C today, improved last check.    Weight loss, exercise, diet important    Lab Results   Component Value Date    A1C 5.6 11/27/2019    A1C 5.4 10/04/2017    A1C 5.6 10/14/2016    A1C 5.9 02/26/2015              Relevant Orders    HEMOGLOBIN A1C (Completed)      Other Visit Diagnoses     Memory change        Referred to Dr. Louis.  Brother andria, mom and grandma with dementia          Patient has been advised of split billing requirements and indicates understanding: Yes  COUNSELING:  Reviewed preventive health counseling, as reflected in patient instructions    Estimated body mass index is 30.22 kg/m  as calculated from the following:    Height as of this encounter: 1.651 m (5' 5\").    Weight as of this encounter: 82.4 kg (181 lb 9.6 oz).    Weight management plan: Discussed healthy diet and exercise guidelines    She reports that she has never smoked. She has never used smokeless tobacco.      Appropriate preventive services were discussed with this patient, including applicable screening as appropriate for cardiovascular disease, diabetes, osteopenia/osteoporosis, and glaucoma.  As appropriate for age/gender, discussed screening for colorectal cancer, prostate cancer, breast cancer, and cervical cancer. Checklist reviewing preventive services available has been given to the patient.    Reviewed patients plan of care and provided an AVS. The Basic Care Plan (routine screening as documented in Health Maintenance) for Jennifer meets the Care Plan requirement. " This Care Plan has been established and reviewed with the Patient.    Counseling Resources:  ATP IV Guidelines  Pooled Cohorts Equation Calculator  Breast Cancer Risk Calculator  Breast Cancer: Medication to Reduce Risk  FRAX Risk Assessment  ICSI Preventive Guidelines  Dietary Guidelines for Americans, 2010  USDA's MyPlate  ASA Prophylaxis  Lung CA Screening    Sun Person MD  [facility]    Identified Health Risks:

## 2020-12-02 NOTE — ASSESSMENT & PLAN NOTE
Has had since her 30s.    Low dose amitriptyline helpful.    Continue this as only 10 mg and has been stable.    Refilled

## 2020-12-02 NOTE — ASSESSMENT & PLAN NOTE
No diuretics, dries her out too much.    Will lower amlodipine dose to 2.5 mg    Compression socks.    If persists and bothersome let me know, we could cut out amlodipine completely to see if this helps.

## 2020-12-02 NOTE — ASSESSMENT & PLAN NOTE
Check A1C today, improved last check.    Weight loss, exercise, diet important    Lab Results   Component Value Date    A1C 5.6 11/27/2019    A1C 5.4 10/04/2017    A1C 5.6 10/14/2016    A1C 5.9 02/26/2015

## 2020-12-02 NOTE — ASSESSMENT & PLAN NOTE
I wonder about diverticulitis as a cause for your episode?    If happens again, especially with pain, call or mychart the clinic.  We'll get you in for an appointment and probably get a CT scan to look for diverticulitis, and treat you for this.

## 2020-12-02 NOTE — ASSESSMENT & PLAN NOTE
Too high today - on amlodipine 10  mg and losartan 25 mg nightly.    Decrease amlodipine to 5 mg nightly (to help swelling)    Increase losartan to 50 mg nightly.    Check blood pressure daily for 2 weeks.    Send me on [employer] your blood pressure numbers.    If not at goal, we'll do an E-visit to adjust your medications.    If at goal, we'll keep things the same!    Recheck BMP today and yearly.

## 2020-12-02 NOTE — PATIENT INSTRUCTIONS
Patient Education   Personalized Prevention Plan  You are due for the preventive services outlined below.  Your care team is available to assist you in scheduling these services.  If you have already completed any of these items, please share that information with your care team to update in your medical record.  Health Maintenance Due   Topic Date Due     Zoster (Shingles) Vaccine (2 of 3) 01/14/2011     Cholesterol Lab  10/04/2018     A1C Lab  11/27/2020     FALL RISK ASSESSMENT  11/27/2020       Pre-diabetes    Check A1C today, improved last check.    Weight loss, exercise, diet important    Lab Results   Component Value Date    A1C 5.6 11/27/2019    A1C 5.4 10/04/2017    A1C 5.6 10/14/2016    A1C 5.9 02/26/2015         Chronic insomnia    Has had since her 30s.    Low dose amitriptyline helpful.    Continue this as only 10 mg and has been stable.    Refilled    Hypercholesterolemia    Has had in past.    Recheck fasting lipids today.        Benign essential hypertension    Too high today - on amlodipine 10  mg and losartan 25 mg nightly.    Decrease amlodipine to 5 mg nightly (to help swelling)    Increase losartan to 50 mg nightly.    Check blood pressure daily for 2 weeks.    Send me on [employer] your blood pressure numbers.    If not at goal, we'll do an E-visit to adjust your medications.    If at goal, we'll keep things the same!    Recheck BMP today and yearly.    Encounter for Medicare annual wellness exam    Up to date on mammogram and colon cancer screening.    Is due for shingles vaccine - do at pharmacy.    Flu shot today    Diarrhea, unspecified type    I wonder about diverticulitis as a cause for your episode?    If happens again, especially with pain, call or mychart the clinic.  We'll get you in for an appointment and probably get a CT scan to look for diverticulitis, and treat you for this.    Bilateral lower extremity edema    No diuretics, dries her out too much.    Will lower amlodipine dose  to 2.5 mg    Compression socks.    If persists and bothersome let me know, we could cut out amlodipine completely to see if this helps.

## 2020-12-03 NOTE — RESULT ENCOUNTER NOTE
"Cayetano Rodriguez:  Nice to see you today.  Your cholesterol is higher than last year, and in the range where we should start a cholesterol lowering medication.  I'll send this to your pharmacy.  Your glucose is still in the \"pre-diabetic\" range and a little higher than last year.  A heart healthy Mediterranean style diet, exercise, and weight loss helps both of these issues.  Let me know if you have any questions about this.  Best,  Dr. Sun Person MD/[facility]       The 10-year ASCVD risk score (Gerald HEATON Jr., et al., 2013) is: 18.5%    Values used to calculate the score:      Age: 69 years      Sex: Female      Is Non- : No      Diabetic: No      Tobacco smoker: No      Systolic Blood Pressure: 159 mmHg      Is BP treated: Yes      HDL Cholesterol: 76 mg/dL      Total Cholesterol: 233 mg/dL  "

## 2020-12-14 ENCOUNTER — MYC MEDICAL ADVICE (OUTPATIENT)
Dept: FAMILY MEDICINE | Facility: CLINIC | Age: 70
End: 2020-12-14

## 2020-12-14 ENCOUNTER — HOSPITAL ENCOUNTER (OUTPATIENT)
Dept: MAMMOGRAPHY | Facility: CLINIC | Age: 70
Discharge: HOME | End: 2020-12-14
Attending: FAMILY MEDICINE | Admitting: FAMILY MEDICINE
Payer: COMMERCIAL

## 2020-12-14 DIAGNOSIS — Z12.31 VISIT FOR SCREENING MAMMOGRAM: ICD-10-CM

## 2020-12-14 PROCEDURE — 77067 SCR MAMMO BI INCL CAD: CPT

## 2020-12-14 NOTE — RESULT ENCOUNTER NOTE
Roque Rodriguez:  Good news!  Your mammogram looks normal and reassuring.  The breast center will send along a more detailed report.  Let us know if you have any questions about this.  Best,  Dr. Sun Person MD  [facility]  [phone]

## 2020-12-15 VITALS
HEART RATE: 89 BPM | BODY MASS INDEX: 30.26 KG/M2 | SYSTOLIC BLOOD PRESSURE: 116 MMHG | WEIGHT: 181.6 LBS | OXYGEN SATURATION: 97 % | DIASTOLIC BLOOD PRESSURE: 67 MMHG | TEMPERATURE: 98.6 F | HEIGHT: 65 IN
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
## 2020-12-16 NOTE — PATIENT INSTRUCTIONS
AFTER VISIT SUMMARY (AVS):    At today's visit we thoroughly discussed various diagnostic possibilities for your symptoms, necessary evaluation, and the plan, which includes:  Orders Placed This Encounter   Procedures     MR Brain w/o Contrast     Methylmalonic Acid     Vitamin B1 whole blood     TSH with free T4 reflex     Vitamin B12     NEUROPSYCHOLOGY REFERRAL     No new medications.    Stay physically and mentally active with particular emphasis on daily mentally stimulating activities of your choice (such as crosswords, puzzles, sudoku, etc.), stretching exercises, walking, and healthy eating.     Additional recommendations after the work-up.    Next follow-up appointment is in the next 3 months or earlier if needed.    Please do not hesitate to call me with any questions or concerns.    Thanks.

## 2020-12-17 ENCOUNTER — OFFICE VISIT (OUTPATIENT)
Dept: NEUROSURGERY | Facility: CLINIC | Age: 70
End: 2020-12-17
Attending: FAMILY MEDICINE
Payer: COMMERCIAL

## 2020-12-17 VITALS
OXYGEN SATURATION: 98 % | HEIGHT: 65 IN | TEMPERATURE: 98.8 F | BODY MASS INDEX: 30.16 KG/M2 | SYSTOLIC BLOOD PRESSURE: 122 MMHG | DIASTOLIC BLOOD PRESSURE: 80 MMHG | HEART RATE: 82 BPM | WEIGHT: 181 LBS

## 2020-12-17 DIAGNOSIS — I10 BENIGN ESSENTIAL HYPERTENSION: ICD-10-CM

## 2020-12-17 DIAGNOSIS — R41.3 MEMORY CHANGES: Primary | ICD-10-CM

## 2020-12-17 PROCEDURE — 99205 OFFICE O/P NEW HI 60 MIN: CPT | Performed by: PSYCHIATRY & NEUROLOGY

## 2020-12-17 RX ORDER — AMLODIPINE BESYLATE 5 MG/1
5 TABLET ORAL AT BEDTIME
Qty: 90 TABLET | Refills: 3 | OUTPATIENT
Start: 2020-12-17

## 2020-12-17 RX ORDER — PSEUDOEPHEDRINE HCL 30 MG
30 TABLET ORAL PRN
COMMUNITY
End: 2022-02-16

## 2020-12-17 RX ORDER — CARBOXYMETHYLCELLULOSE SODIUM 5 MG/ML
1 SOLUTION/ DROPS OPHTHALMIC 3 TIMES DAILY PRN
COMMUNITY
End: 2022-02-16

## 2020-12-17 RX ORDER — SODIUM CHLORIDE/ALOE VERA
GEL (GRAM) NASAL PRN
COMMUNITY
End: 2022-02-16

## 2020-12-17 ASSESSMENT — MONTREAL COGNITIVE ASSESSMENT (MOCA)
10. [FLUENCY] NAME WORDS STARTING WITH DESIGNATED LETTER: 1
12. MEMORY INDEX SCORE: 0
6. READ LIST OF DIGITS [FORWARD/BACKWARD]: 2
WHAT IS THE TOTAL SCORE (OUT OF 30): 24
8. SERIAL SUBTRACTION OF 7S: 3
VISUOSPATIAL/EXECUTIVE SUBSCORE: 5
9. REPEAT EACH SENTENCE: 2
7. [VIGILENCE] TAP WHEN HEARING DESIGNATED LETTER: 1
11. FOR EACH PAIR OF WORDS, WHAT CATEGORY DO THEY BELONG TO (OUT OF 2): 1
13. ORIENTATION SUBSCORE: 6
4. NAME EACH OF THE THREE ANIMALS SHOWN: 3
WHAT LEVEL OF EDUCATION WAS ATTAINED: 0

## 2020-12-17 ASSESSMENT — MIFFLIN-ST. JEOR: SCORE: 1341.89

## 2020-12-17 NOTE — Clinical Note
Please mail the patient a copy of my note from today's visit per her request. Thanks, Jaycob Louis MD.
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
## 2020-12-17 NOTE — NURSING NOTE
"Jennifer Jane is a 69 year old female who presents for:  Chief Complaint   Patient presents with     Consult     Memory consultation        Initial Vitals:  /80 (BP Location: Right arm, Patient Position: Sitting)   Pulse 82   Temp 98.8  F (37.1  C)   Ht 5' 5\" (1.651 m)   Wt 181 lb (82.1 kg)   LMP  (LMP Unknown)   SpO2 98%   BMI 30.12 kg/m   Estimated body mass index is 30.12 kg/m  as calculated from the following:    Height as of this encounter: 5' 5\" (1.651 m).    Weight as of this encounter: 181 lb (82.1 kg).. Body surface area is 1.94 meters squared. BP completed using cuff size: regular  Data Unavailable    Nursing Comments: Patient presents for memory consultation    Schuyler Trujillo MA  "

## 2020-12-21 DIAGNOSIS — E78.00 HYPERCHOLESTEROLEMIA: ICD-10-CM

## 2020-12-21 DIAGNOSIS — R41.3 MEMORY CHANGES: ICD-10-CM

## 2020-12-21 LAB — VIT B12 SERPL-MCNC: 563 PG/ML (ref 193–986)

## 2020-12-21 PROCEDURE — 80061 LIPID PANEL: CPT | Performed by: PSYCHIATRY & NEUROLOGY

## 2020-12-21 PROCEDURE — 99000 SPECIMEN HANDLING OFFICE-LAB: CPT | Performed by: PSYCHIATRY & NEUROLOGY

## 2020-12-21 PROCEDURE — 82607 VITAMIN B-12: CPT | Performed by: PSYCHIATRY & NEUROLOGY

## 2020-12-21 PROCEDURE — 83921 ORGANIC ACID SINGLE QUANT: CPT | Performed by: PSYCHIATRY & NEUROLOGY

## 2020-12-21 PROCEDURE — 84425 ASSAY OF VITAMIN B-1: CPT | Mod: 90 | Performed by: PSYCHIATRY & NEUROLOGY

## 2020-12-21 PROCEDURE — 36415 COLL VENOUS BLD VENIPUNCTURE: CPT | Performed by: PSYCHIATRY & NEUROLOGY

## 2020-12-21 PROCEDURE — 84443 ASSAY THYROID STIM HORMONE: CPT | Performed by: PSYCHIATRY & NEUROLOGY

## 2020-12-22 LAB
CHOLEST SERPL-MCNC: 156 MG/DL
HDLC SERPL-MCNC: 81 MG/DL
LDLC SERPL CALC-MCNC: 63 MG/DL
NONHDLC SERPL-MCNC: 75 MG/DL
TRIGL SERPL-MCNC: 59 MG/DL
TSH SERPL DL<=0.005 MIU/L-ACNC: 1.13 MU/L (ref 0.4–4)

## 2020-12-24 LAB
METHYLMALONATE SERPL-SCNC: <0.1 UMOL/L (ref 0–0.4)
VIT B1 BLD-MCNC: 142 NMOL/L (ref 70–180)

## 2020-12-28 NOTE — RESULT ENCOUNTER NOTE
Roque Jane,  Your results came back and are within acceptable limits. . If you have any further concerns please do not hesitate to contact us by message, phone or making an appointment.  Have a good day   Dr Ranjit VINCENT

## 2021-01-07 ENCOUNTER — HOSPITAL ENCOUNTER (OUTPATIENT)
Dept: MRI IMAGING | Facility: CLINIC | Age: 71
Discharge: HOME | End: 2021-01-07
Attending: PSYCHIATRY & NEUROLOGY | Admitting: PSYCHIATRY & NEUROLOGY
Payer: COMMERCIAL

## 2021-01-07 DIAGNOSIS — R41.3 MEMORY CHANGES: ICD-10-CM

## 2021-01-07 PROCEDURE — 70551 MRI BRAIN STEM W/O DYE: CPT

## 2021-01-07 PROCEDURE — 70551 MRI BRAIN STEM W/O DYE: CPT | Mod: 26 | Performed by: RADIOLOGY

## 2021-01-08 NOTE — RESULT ENCOUNTER NOTE
Caeytano Rodriguez,  This note is to let you know that your results were normal.  Please let me know if you have any further questions or concerns.  Sincerely,  Dr. Sun Person MD    Thank you for choosing the [facility] as your health care provider. Please don't hesitate to call with any questions or concerns [phone].

## 2021-01-27 ENCOUNTER — TRANSFERRED RECORDS (OUTPATIENT)
Dept: HEALTH INFORMATION MANAGEMENT | Facility: CLINIC | Age: 71
End: 2021-01-27
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
## 2021-10-23 ENCOUNTER — HEALTH MAINTENANCE LETTER (OUTPATIENT)
Age: 71
End: 2021-10-23

## 2021-10-25 ENCOUNTER — TRANSFERRED RECORDS (OUTPATIENT)
Dept: HEALTH INFORMATION MANAGEMENT | Facility: CLINIC | Age: 71
End: 2021-10-25
Payer: COMMERCIAL

## 2021-10-26 ENCOUNTER — NURSE TRIAGE (OUTPATIENT)
Dept: NURSING | Facility: CLINIC | Age: 71
End: 2021-10-26

## 2021-10-27 ENCOUNTER — OFFICE VISIT (OUTPATIENT)
Dept: URGENT CARE | Facility: URGENT CARE | Age: 71
End: 2021-10-27
Payer: COMMERCIAL

## 2021-10-27 VITALS
HEIGHT: 65 IN | BODY MASS INDEX: 29.66 KG/M2 | SYSTOLIC BLOOD PRESSURE: 128 MMHG | DIASTOLIC BLOOD PRESSURE: 72 MMHG | RESPIRATION RATE: 16 BRPM | HEART RATE: 80 BPM | OXYGEN SATURATION: 98 % | TEMPERATURE: 98.7 F | WEIGHT: 178 LBS

## 2021-10-27 DIAGNOSIS — Z23 NEED FOR PROPHYLACTIC VACCINATION AND INOCULATION AGAINST INFLUENZA: ICD-10-CM

## 2021-10-27 DIAGNOSIS — S61.213A LACERATION OF LEFT MIDDLE FINGER WITHOUT FOREIGN BODY WITHOUT DAMAGE TO NAIL, INITIAL ENCOUNTER: Primary | ICD-10-CM

## 2021-10-27 PROCEDURE — G0008 ADMIN INFLUENZA VIRUS VAC: HCPCS | Performed by: NURSE PRACTITIONER

## 2021-10-27 PROCEDURE — 90715 TDAP VACCINE 7 YRS/> IM: CPT | Performed by: NURSE PRACTITIONER

## 2021-10-27 PROCEDURE — 90662 IIV NO PRSV INCREASED AG IM: CPT | Performed by: NURSE PRACTITIONER

## 2021-10-27 PROCEDURE — 99212 OFFICE O/P EST SF 10 MIN: CPT | Performed by: NURSE PRACTITIONER

## 2021-10-27 RX ORDER — AZELASTINE 1 MG/ML
1 SPRAY, METERED NASAL 2 TIMES DAILY
COMMUNITY

## 2021-10-27 RX ORDER — MUPIROCIN 20 MG/G
OINTMENT TOPICAL 3 TIMES DAILY
COMMUNITY
End: 2024-01-03

## 2021-10-27 ASSESSMENT — MIFFLIN-ST. JEOR: SCORE: 1323.28

## 2021-10-27 NOTE — PATIENT INSTRUCTIONS
Patient Education     Self-Care for Cuts, Scrapes, and Burns   Cuts, scrapes, and burns are hard to avoid. Most minor injuries can be treated at home. A small wound may threaten your health if it causes severe blood loss or becomes infected. Call your healthcare provider if a wound doesn t heal within a couple of weeks.  When should I call my healthcare provider?  Call your healthcare provider right away if:    You can t stop the bleeding.    The wound covers a large area, is deep, or you can see muscles, tendons or bones.    You can't move a part of an extremity such as a finger after a cut. This could mean that a tendon was cut    Your ear or eye is injured or burned.    The burn is larger than the size of your palm, or is on your neck, face, foot, groin, or your hand.    A puncture wound is deep or wide, or was caused by a dirty or arelis object.    You have signs of infection: fever, pus, pain, or redness.    It has been 10 years or more since your last tetanus shot.  Caring for cuts, scrapes, and puncture wounds  If you re caring for someone else, remember to protect yourself from illnesses carried in blood and body fluids. Use gloves or whatever else is available (a towel, perhaps) as a barrier between you and the blood.  Step 1. Control bleeding    Apply direct pressure to a cut or scrape to stop bleeding.    Allow a minor puncture wound to stop bleeding on its own, unless the bleeding is heavy. This may help clean out the wound.  Step 2. Clean the wound    Kill germs and remove the dirt by washing the wound with clean, running water and soap.    Soak a minor puncture wound in warm, sudsy water for several minutes. Repeat this at least 2 times every day.  Step 3. Cover the injury    Hold the edges of a cut together with a butterfly bandage.    Apply antibiotic ointment.    For a cut or scrape, apply an adhesive bandage or clean gauze. Tape it in place.    Cover a minor puncture with gauze to absorb drainage  and let in air to help with healing.    Treating minor burns    Cool the burn immediately. Otherwise, the skin continues to hold heat and will keep burning. Use cloths soaked in cool water, place the burned area under a gentle stream of cool water, or submerge the burn in a full sink or bucket.    Treat a minor burn like you treat a minor cut or scrape. Clean and cover it with a loose dressing.    Don't put butter, oil, or ointment on a burn. This only seals in heat. After you cool the area, you can apply a moisturizer with Aloe vera (with or without a numbing agent) to soothe the burn.    Don t break blisters or pull off skin from a broken blister. This skin helps protect the healing skin underneath.    Usha last reviewed this educational content on 12/1/2019 2000-2021 [employer]. All rights reserved. This information is not intended as a substitute for professional medical care. Always follow your healthcare professional's instructions.

## 2021-10-27 NOTE — TELEPHONE ENCOUNTER
Pt called in states she had finger injury today.  The injury happened 8:15 PM.  Left middle finger.  The Pt states she had some bleeding.  The Pt some pressure.  The Pt left the band aid for the Finger.  There open skin 1/4/ inch size.  It is need her finger nail on the right side.  There is pain 1-2/10 on the scale.  Last tetanus shot was 1/6/2011.  The disposition is to be seen with in 3 days.  Care advice given per protocol.  Patient agrees with care advice given.   Agreed to call back if he has additional symptoms or questions.      Erik Babb [place] Nurse Advisor 10/26/2021 10:32 PM        Reason for Disposition    [1] Last tetanus shot > 10 years ago AND [2] CLEAN cut or scrape (e.g., object AND skin were clean)    Additional Information    Negative: [1] Major bleeding (e.g., spurting blood) AND [2] can't be stopped    Negative: Wound looks infected    Negative: Caused by animal bite    Negative: Caused by human bite    Negative: Amputated finger    Negative: Skin is split open or gaping (or length > 1/2 inch or 12 mm)    Negative: [1] Bleeding AND [2] won't stop after 10 minutes of direct pressure (using correct technique)    Negative: [1] Dirt in the wound AND [2] not removed with 15 minutes of scrubbing    Negative: High pressure injection injury (e.g., from grease gun or paint gun, usually work-related)    Negative: Looks like a broken bone (e.g., crooked or deformed)    Negative: Looks like a dislocated joint (e.g., crooked or deformed)    Negative: [1] Fingernail is partially torn AND [2] from crush injury  (Exception: torn nail from catching it on something)    Negative: [1] Cut AND [2] numbness (loss of sensation) of finger    Negative: [1] Cut AND [2] finger joint can't be opened (straightened) or closed (bent) completely    Negative: Sounds like a serious injury to the triager    Negative: [1] SEVERE pain AND [2] not improved 2 hours after pain medicine/ice packs    Negative: [1] MODERATE-SEVERE  pain AND [2] blood present under a nail    Negative: Fingernail is completely torn off (fingernail avulsion)    Negative: Base of fingernail has popped out of the skin fold (cuticle)    Negative: Suspicious history for the injury    Negative: Large swelling or bruise    Negative: Finger joint can't be opened (straightened) or closed (bent) completely    (Note: injured person should be able to do this without assistance)    Negative: [1] No prior tetanus shots (or is not fully vaccinated) AND [2] any wound (e.g., cut, scrape)    Negative: [1] HIV positive or severe immunodeficiency (severely weak immune system) AND [2] DIRTY cut or scrape    Negative: [1] Last tetanus shot > 5 years ago AND [2] DIRTY cut or scrape    Protocols used: FINGER INJURY-A-AH

## 2021-12-18 ENCOUNTER — HEALTH MAINTENANCE LETTER (OUTPATIENT)
Age: 71
End: 2021-12-18

## 2021-12-20 DIAGNOSIS — E78.00 HYPERCHOLESTEROLEMIA: ICD-10-CM

## 2021-12-20 RX ORDER — ATORVASTATIN CALCIUM 20 MG/1
TABLET, FILM COATED ORAL
Qty: 90 TABLET | Refills: 0 | Status: SHIPPED | OUTPATIENT
Start: 2021-12-20 | End: 2022-02-16

## 2021-12-29 ENCOUNTER — TRANSFERRED RECORDS (OUTPATIENT)
Dept: HEALTH INFORMATION MANAGEMENT | Facility: CLINIC | Age: 71
End: 2021-12-29
Payer: COMMERCIAL

## 2022-02-12 ENCOUNTER — HEALTH MAINTENANCE LETTER (OUTPATIENT)
Age: 72
End: 2022-02-12

## 2022-02-16 ENCOUNTER — OFFICE VISIT (OUTPATIENT)
Dept: FAMILY MEDICINE | Facility: CLINIC | Age: 72
End: 2022-02-16
Payer: COMMERCIAL

## 2022-02-16 VITALS
BODY MASS INDEX: 29.49 KG/M2 | OXYGEN SATURATION: 96 % | WEIGHT: 177 LBS | DIASTOLIC BLOOD PRESSURE: 72 MMHG | RESPIRATION RATE: 14 BRPM | HEART RATE: 74 BPM | SYSTOLIC BLOOD PRESSURE: 134 MMHG | HEIGHT: 65 IN | TEMPERATURE: 98.3 F

## 2022-02-16 DIAGNOSIS — R73.03 PRE-DIABETES: ICD-10-CM

## 2022-02-16 DIAGNOSIS — R25.2 CRAMP OF LIMB: ICD-10-CM

## 2022-02-16 DIAGNOSIS — Z00.00 ENCOUNTER FOR MEDICARE ANNUAL WELLNESS EXAM: Primary | ICD-10-CM

## 2022-02-16 DIAGNOSIS — E78.00 HYPERCHOLESTEROLEMIA: ICD-10-CM

## 2022-02-16 DIAGNOSIS — M25.561 ACUTE PAIN OF RIGHT KNEE: ICD-10-CM

## 2022-02-16 DIAGNOSIS — I10 BENIGN ESSENTIAL HYPERTENSION: ICD-10-CM

## 2022-02-16 DIAGNOSIS — F51.04 CHRONIC INSOMNIA: ICD-10-CM

## 2022-02-16 DIAGNOSIS — M65.30 TRIGGER FINGER, ACQUIRED: ICD-10-CM

## 2022-02-16 LAB — HBA1C MFR BLD: 5.8 % (ref 0–5.6)

## 2022-02-16 PROCEDURE — 83735 ASSAY OF MAGNESIUM: CPT | Performed by: FAMILY MEDICINE

## 2022-02-16 PROCEDURE — 36415 COLL VENOUS BLD VENIPUNCTURE: CPT | Performed by: FAMILY MEDICINE

## 2022-02-16 PROCEDURE — 99214 OFFICE O/P EST MOD 30 MIN: CPT | Mod: 25 | Performed by: FAMILY MEDICINE

## 2022-02-16 PROCEDURE — 83036 HEMOGLOBIN GLYCOSYLATED A1C: CPT | Performed by: FAMILY MEDICINE

## 2022-02-16 PROCEDURE — 99397 PER PM REEVAL EST PAT 65+ YR: CPT | Mod: 25 | Performed by: FAMILY MEDICINE

## 2022-02-16 PROCEDURE — 80048 BASIC METABOLIC PNL TOTAL CA: CPT | Performed by: FAMILY MEDICINE

## 2022-02-16 PROCEDURE — 80061 LIPID PANEL: CPT | Performed by: FAMILY MEDICINE

## 2022-02-16 RX ORDER — ATORVASTATIN CALCIUM 20 MG/1
20 TABLET, FILM COATED ORAL DAILY
Qty: 90 TABLET | Refills: 3 | Status: SHIPPED | OUTPATIENT
Start: 2022-02-16 | End: 2023-02-17

## 2022-02-16 RX ORDER — AMLODIPINE BESYLATE 5 MG/1
5 TABLET ORAL AT BEDTIME
Qty: 90 TABLET | Refills: 3 | Status: SHIPPED | OUTPATIENT
Start: 2022-02-16 | End: 2023-01-25

## 2022-02-16 RX ORDER — AMITRIPTYLINE HYDROCHLORIDE 10 MG/1
TABLET ORAL
Qty: 90 TABLET | Refills: 3 | Status: SHIPPED | OUTPATIENT
Start: 2022-02-16 | End: 2022-02-16

## 2022-02-16 RX ORDER — LOSARTAN POTASSIUM 50 MG/1
50 TABLET ORAL AT BEDTIME
Qty: 90 TABLET | Refills: 3 | Status: SHIPPED | OUTPATIENT
Start: 2022-02-16 | End: 2023-01-25

## 2022-02-16 ASSESSMENT — ASTHMA QUESTIONNAIRES
QUESTION_1 LAST FOUR WEEKS HOW MUCH OF THE TIME DID YOUR ASTHMA KEEP YOU FROM GETTING AS MUCH DONE AT WORK, SCHOOL OR AT HOME: NONE OF THE TIME
ACT_TOTALSCORE: 24
ACT_TOTALSCORE: 24
QUESTION_3 LAST FOUR WEEKS HOW OFTEN DID YOUR ASTHMA SYMPTOMS (WHEEZING, COUGHING, SHORTNESS OF BREATH, CHEST TIGHTNESS OR PAIN) WAKE YOU UP AT NIGHT OR EARLIER THAN USUAL IN THE MORNING: NOT AT ALL
QUESTION_4 LAST FOUR WEEKS HOW OFTEN HAVE YOU USED YOUR RESCUE INHALER OR NEBULIZER MEDICATION (SUCH AS ALBUTEROL): ONCE A WEEK OR LESS
QUESTION_2 LAST FOUR WEEKS HOW OFTEN HAVE YOU HAD SHORTNESS OF BREATH: NOT AT ALL
QUESTION_5 LAST FOUR WEEKS HOW WOULD YOU RATE YOUR ASTHMA CONTROL: COMPLETELY CONTROLLED

## 2022-02-16 ASSESSMENT — ACTIVITIES OF DAILY LIVING (ADL): CURRENT_FUNCTION: NO ASSISTANCE NEEDED

## 2022-02-16 NOTE — PATIENT INSTRUCTIONS
Patient Education   Personalized Prevention Plan  You are due for the preventive services outlined below.  Your care team is available to assist you in scheduling these services.  If you have already completed any of these items, please share that information with your care team to update in your medical record.  Health Maintenance Due   Topic Date Due     Zoster (Shingles) Vaccine (2 of 3) 01/14/2011     A1C Lab  12/02/2021     Basic Metabolic Panel  12/02/2021     FALL RISK ASSESSMENT  12/02/2021     Cholesterol Lab  12/21/2021     PHQ-2  01/01/2022       Urinary Incontinence, Female (Adult)   Urinary incontinence means loss of bladder control. This problem affects many women, especially as they get older. If you have incontinence, you may be embarrassed to ask for help. But know that this problem can be treated.   Types of Incontinence  There are different types of incontinence. Two of the main types are described here. You can have more than one type.     Stress incontinence. With this type, urine leaks when pressure (stress) is put on the bladder. This may happen when you cough, sneeze, or laugh. Stress incontinence most often occurs because the pelvic floor muscles that support the bladder and urethra are weak. This can happen after pregnancy and vaginal childbirth or a hysterectomy. It can also be due to excess body weight or hormone changes.    Urge incontinence (also called overactive bladder). With this type, a sudden urge to urinate is felt often. This may happen even though there may not be much urine in the bladder. The need to urinate often during the night is common. Urge incontinence most often occurs because of bladder spasms. This may be due to bladder irritation or infection. Damage to bladder nerves or pelvic muscles, constipation, and certain medicines can also lead to urge incontinence.  Treatment depends on the cause. Further evaluation is needed to find the type you have. This will likely  include an exam and certain tests. Based on the results, you and your healthcare provider can then plan treatment. Until a diagnosis is made, the home care tips below can help ease symptoms.   Home care    Do pelvic floor muscle exercises, if they are prescribed. The pelvic floor muscles help support the bladder and urethra. Many women find that their symptoms improve when doing special exercises that strengthen these muscles. To do the exercises, contract the muscles you would use to stop your stream of urine. But do this when you re not urinating. Hold for 10 seconds, then relax. Repeat 10 to 20 times in a row, at least 3 times a day. Your healthcare provider may give you other instructions for how to do the exercises and how often.    Keep a bladder diary. This helps track how often and how much you urinate over a set period of time. Bring this diary with you to your next visit with the provider. The information can help your provider learn more about your bladder problem.    Lose weight, if advised to by your provider. Extra weight puts pressure on the bladder. Your provider can help you create a weight-loss plan that s right for you. This may include exercising more and making certain diet changes.    Don't have foods and drinks that may irritate the bladder. These can include alcohol and caffeinated drinks.    Quit smoking. Smoking and other tobacco use can lead to a long-term (chronic) cough that strains the pelvic floor muscles. Smoking may also damage the bladder and urethra. Talk with your provider about treatments or methods you can use to quit smoking.    If drinking large amounts of fluid makes you have symptoms, you may be advised to limit your fluid intake. You may also be advised to drink most of your fluids during the day and to limit fluids at night.    If you re worried about urine leakage or accidents, you may wear absorbent pads to catch urine. Change the pads often. This helps reduce  discomfort. It may also reduce the risk of skin or bladder infections.    Follow-up care  Follow up with your healthcare provider, or as directed. It may take some to find the right treatment for your problem. But healthy lifestyle changes can be made right away. These include such things as exercising on a regular basis, eating a healthy diet, losing weight (if needed), and quitting smoking. Your treatment plan may include special therapies or medicines. Certain procedures or surgery may also be options. Talk about any questions you have with your provider.   When to seek medical advice  Call the healthcare provider right away if any of these occur:    Fever of 100.4 F (38 C) or higher, or as directed by your provider    Bladder pain or fullness    Belly swelling    Nausea or vomiting    Back pain    Weakness, dizziness, or fainting  [employer] last reviewed this educational content on 1/1/2020 2000-2021 [employer]. All rights reserved. This information is not intended as a substitute for professional medical care. Always follow your healthcare professional's instructions.

## 2022-02-16 NOTE — PROGRESS NOTES
"  SUBJECTIVE:   Jennifer Jane is a 71 year old female who presents for Preventive Visit.      Patient has been advised of split billing requirements and indicates understanding: Yes  Are you in the first 12 months of your Medicare coverage?  No    Healthy Habits:    In general, how would you rate your overall health?  Good    Frequency of exercise:  2-3 days/week    Duration of exercise:  15-30 minutes    Do you usually eat at least 4 servings of fruit and vegetables a day, include whole grains    & fiber and avoid regularly eating high fat or \"junk\" foods?  Yes    Taking medications regularly:  Yes    Barriers to taking medications:  None    Medication side effects:  Other (spasming )    Ability to successfully perform activities of daily living:  No assistance needed    Home Safety:  No safety concerns identified    Hearing Impairment:  No hearing concerns    In the past 6 months, have you been bothered by leaking of urine? Yes (little but not very often)    In general, how would you rate your overall mental or emotional health?  Excellent      PHQ-2 Total Score:    Additional concerns today:  Yes    Right knee.  If stands for a few minutes radiates down.  Swelling, pain,  Reduced ROM.  Awhile ago had therapy for left knee. Helped.      Also -  Trigger thumb.  Been splinting. Still a lot of joint issues.  Saw Dr. Ramirez in past; helpful when got injection.    Also -spasms sometimes.  Everywhere.  Will get jelena horse from time to time.  In muscle, arm.  Tightening pain.   5-30 minutes.   Not everyday.   Anytime day or night.    Has eliminated a lot of mild from diet this year.      Do you feel safe in your environment? Yes    Have you ever done Advance Care Planning? (For example, a Health Directive, POLST, or a discussion with a medical provider or your loved ones about your wishes): No, advance care planning information given to patient to review.  Patient plans to discuss their wishes with loved ones or " provider.         Fall risk  Fallen 2 or more times in the past year?: No  Any fall with injury in the past year?: No    Cognitive Screening   1) Repeat 3 items (Leader, Season, Table)    2) Clock draw: NORMAL  3) 3 item recall: Recalls 2 objects   Results: NORMAL clock, 1-2 items recalled: COGNITIVE IMPAIRMENT LESS LIKELY    Mini-CogTM Copyright BRITNEY Pryor. Licensed by the author for use in [facility]; reprinted with permission (marco antonioob@[facility]). All rights reserved.      Do you have sleep apnea, excessive snoring or daytime drowsiness?: no    Reviewed and updated as needed this visit by clinical staff   Tobacco  Allergies  Meds  Problems  Med Hx  Surg Hx  Fam Hx  Soc   Hx        Reviewed and updated as needed this visit by Provider    Allergies  Meds  Problems            Social History     Tobacco Use     Smoking status: Never Smoker     Smokeless tobacco: Never Used   Substance Use Topics     Alcohol use: Yes     Alcohol/week: 0.0 standard drinks     Comment: 0 -2 times a year     If you drink alcohol do you typically have >3 drinks per day or >7 drinks per week? No    Alcohol Use 2/16/2022   Prescreen: >3 drinks/day or >7 drinks/week? -   Prescreen: >3 drinks/day or >7 drinks/week? No         Current providers sharing in care for this patient include:   Patient Care Team:  Sun Person MD as PCP - General (Family Practice)  Yuriy Petersen MD as MD (Orthopedics)  Sun Person MD as Assigned PCP  Jaycob Louis MD as Assigned Neuroscience Provider    The following health maintenance items are reviewed in Epic and correct as of today:  Health Maintenance Due   Topic Date Due     ZOSTER IMMUNIZATION (2 of 3) 01/14/2011     A1C  12/02/2021     BMP  12/02/2021     FALL RISK ASSESSMENT  12/02/2021     LIPID  12/21/2021     PHQ-2  01/01/2022     Lab work is in process  Labs reviewed in EPIC          Review of Systems  Constitutional, HEENT, cardiovascular,  "pulmonary, GI, , musculoskeletal, neuro, skin, endocrine and psych systems are negative, except as otherwise noted.    OBJECTIVE:   /72   Pulse 74   Temp 98.3  F (36.8  C) (Temporal)   Resp 14   Ht 1.638 m (5' 4.5\")   Wt 80.3 kg (177 lb)   LMP  (LMP Unknown)   SpO2 96%   BMI 29.91 kg/m   Estimated body mass index is 29.91 kg/m  as calculated from the following:    Height as of this encounter: 1.638 m (5' 4.5\").    Weight as of this encounter: 80.3 kg (177 lb).  Physical Exam  Musculoskeletal:      Right knee:      Instability Tests: Medial Juan test negative and lateral Juan test negative.       GENERAL: healthy, alert and no distress  EYES: Eyes grossly normal to inspection, PERRL and conjunctivae and sclerae normal  HENT: ear canals and TM's normal, nose and mouth without ulcers or lesions  NECK: no adenopathy, no asymmetry, masses, or scars and thyroid normal to palpation  RESP: lungs clear to auscultation - no rales, rhonchi or wheezes  CV: regular rate and rhythm, normal S1 S2, no S3 or S4, no murmur, click or rub, no peripheral edema and peripheral pulses strong  ABDOMEN: soft, nontender, no hepatosplenomegaly, no masses and bowel sounds normal  MS: Right Knee Exam     Tenderness   The patient is experiencing tenderness in the lateral joint line and medial joint line.    Range of Motion   Extension: normal   Flexion: 120     Tests   Juan:  Medial - negative Lateral - negative  Varus: negative Valgus: positive  Lachman:  Anterior - negative    Posterior - negative  Drawer:  Anterior - negative    Posterior - negative    Other   Erythema: present  Scars: absent  Sensation: normal  Pulse: present  Swelling: none            SKIN: no suspicious lesions or rashes  NEURO: Normal strength and tone, mentation intact and speech normal  PSYCH: mentation appears normal, affect normal/bright    Diagnostic Test Results:  Labs reviewed in Epic    ASSESSMENT / PLAN:   Jennifer was seen today for " physical.    Diagnoses and all orders for this visit:    Encounter for Medicare annual wellness exam  Comments:  Will do mammogram.  UTD on colonoscopy  UTD on imm other than SHINGRIX - will do  Labs today  Follow up yearly  Orders:  -     REVIEW OF HEALTH MAINTENANCE PROTOCOL ORDERS    Benign essential hypertension  Comments:  BP well controlled on amlodipine 5 mg with losartan 50 mg.  Refilled today.  BMP check today and yearly.  Orders:  -     losartan (COZAAR) 50 MG tablet; Take 1 tablet (50 mg) by mouth At Bedtime  -     amLODIPine (NORVASC) 5 MG tablet; Take 1 tablet (5 mg) by mouth At Bedtime    Chronic insomnia  Comments:  Better now, off amitriptyline!  Doing well (sleeping in own room)  Orders:  -     Discontinue: amitriptyline (ELAVIL) 10 MG tablet; TAKE 1 TABLET BY MOUTH EVERYDAY AT BEDTIME    Hypercholesterolemia  Comments:  Atorvastatin work well  Recheck lipids today  Refilled  Stable  Orders:  -     atorvastatin (LIPITOR) 20 MG tablet; Take 1 tablet (20 mg) by mouth daily    Pre-diabetes  Comments:  Hyperglycemia since 2012  Works on lifestyle changes  check glucose and A1C today    Trigger finger, acquired  Comments:  Has had good success with injections in past  Referred ortho hand  Orders:  -     Orthopedic  Referral; Future    Acute pain of right knee  Comments:  IMproving over last few weeks  XRAY today.  She would prefer PT over injection if possible; will send results and plan  Orders:  -     XR Knee Standing Right 2 Views; Future    Cramp of limb  Comments:  Check electrolytes and mag today  If normal - consider trial off statin to see if improves cramping    Orders:  -     Magnesium; Future  -     Magnesium    Other orders  -     HEMOGLOBIN A1C; Future  -     BASIC METABOLIC PANEL; Future  -     Lipid panel reflex to direct LDL Fasting; Future  -     HEMOGLOBIN A1C  -     BASIC METABOLIC PANEL  -     Lipid panel reflex to direct LDL Fasting        Patient has been advised of split  "billing requirements and indicates understanding: Yes    COUNSELING:  Reviewed preventive health counseling, as reflected in patient instructions    Estimated body mass index is 29.91 kg/m  as calculated from the following:    Height as of this encounter: 1.638 m (5' 4.5\").    Weight as of this encounter: 80.3 kg (177 lb).        She reports that she has never smoked. She has never used smokeless tobacco.      Appropriate preventive services were discussed with this patient, including applicable screening as appropriate for cardiovascular disease, diabetes, osteopenia/osteoporosis, and glaucoma.  As appropriate for age/gender, discussed screening for colorectal cancer, prostate cancer, breast cancer, and cervical cancer. Checklist reviewing preventive services available has been given to the patient.    Reviewed patients plan of care and provided an AVS. The Basic Care Plan (routine screening as documented in Health Maintenance) for Jennifer meets the Care Plan requirement. This Care Plan has been established and reviewed with the Patient.    Counseling Resources:  ATP IV Guidelines  Pooled Cohorts Equation Calculator  Breast Cancer Risk Calculator  Breast Cancer: Medication to Reduce Risk  FRAX Risk Assessment  ICSI Preventive Guidelines  Dietary Guidelines for Americans, 2010  USDA's MyPlate  ASA Prophylaxis  Lung CA Screening    Sun Person MD  [facility]    Identified Health Risks:  "

## 2022-02-17 ENCOUNTER — TELEPHONE (OUTPATIENT)
Dept: FAMILY MEDICINE | Facility: CLINIC | Age: 72
End: 2022-02-17
Payer: COMMERCIAL

## 2022-02-17 PROBLEM — M18.11 OSTEOARTHRITIS OF CARPOMETACARPAL (CMC) JOINT OF RIGHT THUMB: Status: RESOLVED | Noted: 2017-10-23 | Resolved: 2017-11-09

## 2022-02-17 LAB
ANION GAP SERPL CALCULATED.3IONS-SCNC: 6 MMOL/L (ref 3–14)
BUN SERPL-MCNC: 13 MG/DL (ref 7–30)
CALCIUM SERPL-MCNC: 9.9 MG/DL (ref 8.5–10.1)
CHLORIDE BLD-SCNC: 109 MMOL/L (ref 94–109)
CHOLEST SERPL-MCNC: 155 MG/DL
CO2 SERPL-SCNC: 24 MMOL/L (ref 20–32)
CREAT SERPL-MCNC: 0.79 MG/DL (ref 0.52–1.04)
FASTING STATUS PATIENT QL REPORTED: YES
GFR SERPL CREATININE-BSD FRML MDRD: 79 ML/MIN/1.73M2
GLUCOSE BLD-MCNC: 117 MG/DL (ref 70–99)
HDLC SERPL-MCNC: 88 MG/DL
LDLC SERPL CALC-MCNC: 57 MG/DL
MAGNESIUM SERPL-MCNC: 2.2 MG/DL (ref 1.6–2.3)
NONHDLC SERPL-MCNC: 67 MG/DL
POTASSIUM BLD-SCNC: 3.9 MMOL/L (ref 3.4–5.3)
SODIUM SERPL-SCNC: 139 MMOL/L (ref 133–144)
TRIGL SERPL-MCNC: 52 MG/DL

## 2022-02-17 NOTE — TELEPHONE ENCOUNTER
It's still there - it's in standing orders.    Thanks Angelina!    She should be able to come in for xray alone  Dr. Sun Person MD / [facility]

## 2022-02-17 NOTE — TELEPHONE ENCOUNTER
Reason for Call: Request for an order or referral:    Order or referral being requested: Xray RT Knee    Date needed: as soon as possible    Has the patient been seen by the PCP for this problem? YES    Additional comments: Patient got confused yesterday after completing her labs she left as she was not rerouted to Xray. Is hoping to get completed asap, I noticed it was cancelled so reaching out to see if it can be reordered    Phone number Patient can be reached at:  Cell number on file:    Telephone Information:   Mobile [phone]       Best Time:  Open    Can we leave a detailed message on this number?  YES    Call taken on 2/17/2022 at 12:59 PM by Angelina Ferreira

## 2022-02-18 ENCOUNTER — ANCILLARY PROCEDURE (OUTPATIENT)
Dept: GENERAL RADIOLOGY | Facility: CLINIC | Age: 72
End: 2022-02-18
Attending: FAMILY MEDICINE
Payer: COMMERCIAL

## 2022-02-18 DIAGNOSIS — M25.561 ACUTE PAIN OF RIGHT KNEE: ICD-10-CM

## 2022-02-18 PROCEDURE — 73560 X-RAY EXAM OF KNEE 1 OR 2: CPT | Mod: RT | Performed by: RADIOLOGY

## 2022-02-22 NOTE — RESULT ENCOUNTER NOTE
Cayetano Rodriguez:Your Xray does show arthritis - but I think we can still start with PT.  If you don't get better after ~6 wks of PT let me know and I'll refer you for a possible injection.  Best,  Dr. Sun Person MD / [facility]

## 2022-03-18 ENCOUNTER — TELEPHONE (OUTPATIENT)
Dept: FAMILY MEDICINE | Facility: CLINIC | Age: 72
End: 2022-03-18
Payer: COMMERCIAL

## 2022-03-18 DIAGNOSIS — M25.561 RIGHT KNEE PAIN, UNSPECIFIED CHRONICITY: Primary | ICD-10-CM

## 2022-03-18 NOTE — TELEPHONE ENCOUNTER
Dr. Soco Person,    Pt's calling for a Physical Therapy Order for right knee pain at [facility]. The physical therapy  did schedule Pt for appts but stated there is no PT Order in chart.     BORIS Claros [facility] Referral Rep

## 2022-03-21 ENCOUNTER — APPOINTMENT (OUTPATIENT)
Dept: CT IMAGING | Facility: CLINIC | Age: 72
End: 2022-03-21
Attending: EMERGENCY MEDICINE
Payer: COMMERCIAL

## 2022-03-21 ENCOUNTER — HOSPITAL ENCOUNTER (EMERGENCY)
Facility: CLINIC | Age: 72
Discharge: HOME | End: 2022-03-21
Attending: EMERGENCY MEDICINE | Admitting: EMERGENCY MEDICINE
Payer: COMMERCIAL

## 2022-03-21 ENCOUNTER — NURSE TRIAGE (OUTPATIENT)
Dept: NURSING | Facility: CLINIC | Age: 72
End: 2022-03-21
Payer: COMMERCIAL

## 2022-03-21 VITALS
TEMPERATURE: 98.5 F | HEART RATE: 71 BPM | OXYGEN SATURATION: 97 % | DIASTOLIC BLOOD PRESSURE: 85 MMHG | RESPIRATION RATE: 18 BRPM | SYSTOLIC BLOOD PRESSURE: 135 MMHG

## 2022-03-21 DIAGNOSIS — R07.81 PLEURITIC PAIN: ICD-10-CM

## 2022-03-21 LAB
ANION GAP SERPL CALCULATED.3IONS-SCNC: 6 MMOL/L (ref 3–14)
ATRIAL RATE - MUSE: 79 BPM
BASOPHILS # BLD AUTO: 0.1 10E3/UL (ref 0–0.2)
BASOPHILS NFR BLD AUTO: 1 %
BUN SERPL-MCNC: 15 MG/DL (ref 7–30)
CALCIUM SERPL-MCNC: 10 MG/DL (ref 8.5–10.1)
CHLORIDE BLD-SCNC: 107 MMOL/L (ref 94–109)
CO2 SERPL-SCNC: 26 MMOL/L (ref 20–32)
CREAT SERPL-MCNC: 0.84 MG/DL (ref 0.52–1.04)
D DIMER PPP FEU-MCNC: 0.75 UG/ML FEU (ref 0–0.5)
DIASTOLIC BLOOD PRESSURE - MUSE: NORMAL MMHG
EOSINOPHIL # BLD AUTO: 0.2 10E3/UL (ref 0–0.7)
EOSINOPHIL NFR BLD AUTO: 2 %
ERYTHROCYTE [DISTWIDTH] IN BLOOD BY AUTOMATED COUNT: 12.9 % (ref 10–15)
GFR SERPL CREATININE-BSD FRML MDRD: 73 ML/MIN/1.73M2
GLUCOSE BLD-MCNC: 105 MG/DL (ref 70–99)
HCT VFR BLD AUTO: 41.8 % (ref 35–47)
HGB BLD-MCNC: 13.6 G/DL (ref 11.7–15.7)
IMM GRANULOCYTES # BLD: 0 10E3/UL
IMM GRANULOCYTES NFR BLD: 0 %
INTERPRETATION ECG - MUSE: NORMAL
LYMPHOCYTES # BLD AUTO: 2.1 10E3/UL (ref 0.8–5.3)
LYMPHOCYTES NFR BLD AUTO: 24 %
MCH RBC QN AUTO: 29.6 PG (ref 26.5–33)
MCHC RBC AUTO-ENTMCNC: 32.5 G/DL (ref 31.5–36.5)
MCV RBC AUTO: 91 FL (ref 78–100)
MONOCYTES # BLD AUTO: 0.4 10E3/UL (ref 0–1.3)
MONOCYTES NFR BLD AUTO: 5 %
NEUTROPHILS # BLD AUTO: 6.1 10E3/UL (ref 1.6–8.3)
NEUTROPHILS NFR BLD AUTO: 68 %
NRBC # BLD AUTO: 0 10E3/UL
NRBC BLD AUTO-RTO: 0 /100
P AXIS - MUSE: 54 DEGREES
PLATELET # BLD AUTO: 284 10E3/UL (ref 150–450)
POTASSIUM BLD-SCNC: 3.9 MMOL/L (ref 3.4–5.3)
PR INTERVAL - MUSE: 156 MS
QRS DURATION - MUSE: 72 MS
QT - MUSE: 378 MS
QTC - MUSE: 433 MS
R AXIS - MUSE: -4 DEGREES
RBC # BLD AUTO: 4.59 10E6/UL (ref 3.8–5.2)
SODIUM SERPL-SCNC: 139 MMOL/L (ref 133–144)
SYSTOLIC BLOOD PRESSURE - MUSE: NORMAL MMHG
T AXIS - MUSE: 43 DEGREES
TROPONIN I SERPL HS-MCNC: 6 NG/L
VENTRICULAR RATE- MUSE: 79 BPM
WBC # BLD AUTO: 8.9 10E3/UL (ref 4–11)

## 2022-03-21 PROCEDURE — 250N000011 HC RX IP 250 OP 636: Performed by: EMERGENCY MEDICINE

## 2022-03-21 PROCEDURE — 36415 COLL VENOUS BLD VENIPUNCTURE: CPT | Performed by: EMERGENCY MEDICINE

## 2022-03-21 PROCEDURE — 93005 ELECTROCARDIOGRAM TRACING: CPT

## 2022-03-21 PROCEDURE — 80048 BASIC METABOLIC PNL TOTAL CA: CPT | Performed by: EMERGENCY MEDICINE

## 2022-03-21 PROCEDURE — 85379 FIBRIN DEGRADATION QUANT: CPT | Performed by: EMERGENCY MEDICINE

## 2022-03-21 PROCEDURE — 71275 CT ANGIOGRAPHY CHEST: CPT

## 2022-03-21 PROCEDURE — 250N000009 HC RX 250: Performed by: EMERGENCY MEDICINE

## 2022-03-21 PROCEDURE — 85025 COMPLETE CBC W/AUTO DIFF WBC: CPT | Performed by: EMERGENCY MEDICINE

## 2022-03-21 PROCEDURE — 99285 EMERGENCY DEPT VISIT HI MDM: CPT | Mod: 25

## 2022-03-21 PROCEDURE — 84484 ASSAY OF TROPONIN QUANT: CPT | Performed by: EMERGENCY MEDICINE

## 2022-03-21 PROCEDURE — 96374 THER/PROPH/DIAG INJ IV PUSH: CPT | Mod: 59

## 2022-03-21 RX ORDER — KETOROLAC TROMETHAMINE 15 MG/ML
15 INJECTION, SOLUTION INTRAMUSCULAR; INTRAVENOUS ONCE
Status: COMPLETED | OUTPATIENT
Start: 2022-03-21 | End: 2022-03-21

## 2022-03-21 RX ORDER — IOPAMIDOL 755 MG/ML
67 INJECTION, SOLUTION INTRAVASCULAR ONCE
Status: COMPLETED | OUTPATIENT
Start: 2022-03-21 | End: 2022-03-21

## 2022-03-21 RX ADMIN — IOPAMIDOL 67 ML: 755 INJECTION, SOLUTION INTRAVENOUS at 18:36

## 2022-03-21 RX ADMIN — SODIUM CHLORIDE 91 ML: 900 INJECTION INTRAVENOUS at 18:35

## 2022-03-21 RX ADMIN — KETOROLAC TROMETHAMINE 15 MG: 15 INJECTION, SOLUTION INTRAMUSCULAR; INTRAVENOUS at 17:31

## 2022-03-21 ASSESSMENT — ENCOUNTER SYMPTOMS
BACK PAIN: 1
SORE THROAT: 0
ABDOMINAL PAIN: 0
COUGH: 0
FEVER: 0
ARTHRALGIAS: 1

## 2022-03-21 NOTE — TELEPHONE ENCOUNTER
Writer huddled with Dr. Church who recommends ER evaluation to rule out DVT/PE/cardiac involvement.    Writer called patient and reviewed recommendation per Dr. Church.  Patient verbalized understanding and in agreement with plan.    Patient stated she will go to either [facility] or [facility] ER.    KERRI RodgersN, RN  [facility]

## 2022-03-21 NOTE — ED PROVIDER NOTES
History   Chief Complaint:  Left scapula pain       HPI   Jennifer Jane is a 71 year old female with history of HTN and prediabetes presents with left scapula pain. Patient reportedly has had non-radiating left scapula pain for the last 4 days.  She has not had the pain before. The pain exacerbates with deep breath but nonspecific to sitting or lying down. She denies trauma or injuries. She denies any lifting, pushing, or anything strange 1-2 days prior to onset. She also has had right knee spasms for months. She has seasonal allergies. Otherwise, she states that she has been eating and drinking well. No new medications. No blood thinners. She denies abdominal pain, cough, fever, sore throat, rash, or leg swelling.  Patient is going to [place] soon for about 2 weeks but otherwise no recent travel nor does she smoke..     Review of Systems   Constitutional: Negative for fever.   HENT: Negative for sore throat.    Respiratory: Negative for cough.    Cardiovascular: Negative for leg swelling.   Gastrointestinal: Negative for abdominal pain.   Musculoskeletal: Positive for arthralgias and back pain.   Skin: Negative for rash.   All other systems reviewed and are negative.    Allergies:  Canola Oil [Vegetable Oil]  Animal Dander  Dust Mites  Grass  Hydroxyzine  Pollen Extract  Trees  Vistaril  Chlorhexidine    Medications:  ADVAIR  inhaler  albuterol inhaler  amlodipine   atorvastatin   Calcium Carbonate Antacid  cetirizine   DiphenhydrAMINE   losartan   montelukast     Past Medical History:     Arthritis  Hypercalcemia  HTN  Asthma  Pre-diabetes  Chronic rhinitis  Chronic dacryocystitis  Sciatica  Fibromyalgia  Hypercholesterolemia  Chronic left-sided headache      Past Surgical History:    micro-disc-ectomy  Cholecystectomy  Tubal ligation  Left breast surgery     Family History:    Mother: heart disease, diabetes, CAD, HTN, Asthma, thyroid disease  Father: diabetes, CAD, HTN, hyperlipidemia  Sister: diabetes,  HTN    Social History:  Presents with .    Physical Exam     Patient Vitals for the past 24 hrs:   BP Temp Temp src Pulse Resp SpO2   03/21/22 1930 135/85 -- -- -- 18 97 %   03/21/22 1900 -- -- -- -- -- 96 %   03/21/22 1845 -- -- -- -- -- 98 %   03/21/22 1735 (!) 151/88 -- -- 71 -- 95 %   03/21/22 1730 (!) 151/88 -- -- -- -- --   03/21/22 1313 (!) 140/65 98.5  F (36.9  C) Temporal 77 18 96 %       Physical Exam  Physical Exam   Constitutional:  Patient is oriented to person, place, and time. They appear well-developed and well-nourished. Mild distress secondary to left scapular pain.   HENT:   Eyes:    Conjunctivae normal and EOM are normal. Pupils are equal, round, and reactive to light.   Neck:    Normal range of motion.   Cardiovascular: Normal rate, regular rhythm and normal heart sounds.  Exam reveals no gallop and no friction rub.  No murmur heard.  Pulmonary/Chest:  Effort normal and breath sounds normal. Patient has no wheezes. Patient has no rales.   Musculoskeletal:  Pleuric pain in left upper back very focally tender just medial to the left scapula with deep inspiration.. Normal range of motion. Patient exhibits no edema. no calf asymmetry.  No palpable masses in the popliteal fossa.  Neurological:   Patient is alert and oriented to person, place, and time. Patient has normal strength. No cranial nerve deficit or sensory deficit. GCS 15  Skin:   Skin is warm and dry. No rash noted. No erythema. No evidence of shingles  Psychiatric:   Patient has a normal mood and affect. Patient's behavior is normal. Judgment and thought content normal.       Emergency Department Course   ECG  ECG obtained at 1319, ECG read at 1704  Normal sinus rhythm.   Rate 79 bpm. AK interval 156 ms. QRS duration 72 ms. QT/QTc 378/433 ms. P-R-T axes 54 -4 43.     Imaging:  CT Chest Pulmonary Embolism w Contrast   Final Result   IMPRESSION:   1.  Negative CT pulmonary angiogram. No evidence of pulmonary embolus.        Report  per radiology    Laboratory:  Labs Ordered and Resulted from Time of ED Arrival to Time of ED Departure   D DIMER QUANTITATIVE - Abnormal       Result Value    D-Dimer Quantitative 0.75 (*)    BASIC METABOLIC PANEL - Abnormal    Sodium 139      Potassium 3.9      Chloride 107      Carbon Dioxide (CO2) 26      Anion Gap 6      Urea Nitrogen 15      Creatinine 0.84      Calcium 10.0      Glucose 105 (*)     GFR Estimate 73     TROPONIN I - Normal    Troponin I High Sensitivity 6     CBC WITH PLATELETS AND DIFFERENTIAL    WBC Count 8.9      RBC Count 4.59      Hemoglobin 13.6      Hematocrit 41.8      MCV 91      MCH 29.6      MCHC 32.5      RDW 12.9      Platelet Count 284      % Neutrophils 68      % Lymphocytes 24      % Monocytes 5      % Eosinophils 2      % Basophils 1      % Immature Granulocytes 0      NRBCs per 100 WBC 0      Absolute Neutrophils 6.1      Absolute Lymphocytes 2.1      Absolute Monocytes 0.4      Absolute Eosinophils 0.2      Absolute Basophils 0.1      Absolute Immature Granulocytes 0.0      Absolute NRBCs 0.0          Emergency Department Course:     Reviewed:  I reviewed nursing notes, vitals, past medical history and Care Everywhere    Assessments:  1703 I obtained history and examined the patient as noted above.   1810 Patient D dimer is high so I ordered chest CT.  1925 I rechecked the patient and explained findings.     Interventions:  1731 Ketorolac 15 mg IV    Disposition:  The patient was discharged to home.     Impression & Plan     Medical Decision Making:  Jennifer Jane is a 71-year-old female presenting to the emergency department for evaluation of pleuritic pain.  It is mostly in the left back.  Its not painful if she does not take a deep breath.  She denies any recent injury.  There is no evidence of cellulitis, infection, shingles.  Furthermore physical exam was not concerning for DVT.  Blood work and EKG were performed.  EKG shows no ischemia or arrhythmia.  She is not acutely  anemic no acute metabolic derangement to explain her pain.  Her D-dimer was slightly elevated although age adjustment was considered.  Given her symptoms I did elect to do a CTA of her chest.  There is fortunately no evidence of PE, dissection, pneumothorax, consolidation/infiltrate, effusion.  She does have a granuloma in her right lower lobe which she was made aware of.  This looks to be a calcification benign in nature.  At this time she appears to have pleuritic pain most likely thoracic muscle pain she did get complete relief with the ketorolac.  I feel that she is safe for discharge given her work-up today.  She will follow-up with her primary care doctor as needed.  Ibuprofen for discomfort was recommended given her relief today.    Diagnosis:    ICD-10-CM    1. Pleuritic pain  R07.81        Discharge Medications:  Discharge Medication List as of 3/21/2022  7:29 PM          Scribe Disclosure:  I, Piter Nolasco, am serving as a scribe at 5:03 PM on 3/21/2022 to document services personally performed by Christiana Moura MD based on my observations and the provider's statements to me.            Christiana Moura MD  03/21/22 0137

## 2022-03-21 NOTE — TELEPHONE ENCOUNTER
"Nurse Triage SBAR    Is this a 2nd Level Triage? YES, LICENSED PRACTITIONER REVIEW IS REQUIRED    Situation: Knee and chest pain    Background: Patient calls with two concerns. First she is concerned about pain in her right knee. She reports that she has had pain for quite awhile but the pain worsened 6 days ago. She is able to walk but is limping. Going up and down stairs she has to keep her knee straight. Patient is not able to bend her knee all the way due to pain and swelling. Swelling in the knee is mild. Patient does get swelling in her lower leg and ankle but this is present bilaterally.    Second, for the past 4 days patient has been having pain in her upper back just under her shoulder blade on the left side. She currently rates pain at a 5. Pain is not noticeable if she is sitting and breathing shallow. Pain worsens when taking deeper breaths. Patient denies difficulty breathing, but says \"that she is taking deeper breaths.\" Patient denies any cough or congestion. Patient feels that pain is worsening and becoming more frequent.    Patient will be flying to [place] tomorrow and would like to be evaluated before she leaves.     Assessment:     Protocol Recommended Disposition:   See Within 3 Days In Office , Go To ED/UCC Now (Or To Office With PCP Approval), See More Appropriate Protocol    Recommendation:   Patient is wondering if she can be seen in clinic or if she should go to UC or ED?     Routed to provider    Does the patient meet one of the following criteria for ADS visit consideration? 16+ years old, with an [facility] PCP     TIP  Providers, please consider if this condition is appropriate for management at one of our Acute and Diagnostic Services sites.     If patient is a good candidate, please use dotphrase <dot>triageresponse and select Refer to ADS to document.        Parvin Kang RN  [facility]      Reason for Disposition    Pain in the upper back over the ribs (rib cage) " that radiates (travels) into the chest    Chest pain or 'angina' comes and goes and is happening more often (increasing in frequency) or getting worse (increasing in severity) (Exception: chest pains that last only a few seconds)    MODERATE pain (e.g., symptoms interfere with work or school, limping) and present > 3 days    Additional Information    Negative: Passed out (i.e., fainted, collapsed and was not responding)    Negative: Shock suspected (e.g., cold/pale/clammy skin, too weak to stand, low BP, rapid pulse)    Negative: Sounds like a life-threatening emergency to the triager    Negative: Major injury to the back (e.g., MVA, fall > 10 feet or 3 meters, penetrating injury, etc.)    Negative: Severe difficulty breathing (e.g., struggling for each breath, speaks in single words)    Negative: Passed out (i.e., fainted, collapsed and was not responding)    Negative: Difficult to awaken or acting confused (e.g., disoriented, slurred speech)    Negative: Shock suspected (e.g., cold/pale/clammy skin, too weak to stand, low BP, rapid pulse)    Negative: Chest pain lasting longer than 5 minutes and ANY of the following:* Over 45 years old* Over 30 years old and at least one cardiac risk factor (e.g., diabetes, high blood pressure, high cholesterol, smoker, or strong family history of heart disease)* History of heart disease (i.e., angina, heart attack, heart failure, bypass surgery, takes nitroglycerin)* Pain is crushing, pressure-like, or heavy    Negative: Heart beating < 50 beats per minute OR > 140 beats per minute    Negative: Visible sweat on face or sweat dripping down face    Negative: Sounds like a life-threatening emergency to the triager    Negative: Followed an injury to chest    Negative: SEVERE chest pain    Negative: Pain also in shoulder(s) or arm(s) or jaw    Negative: Difficulty breathing    Negative: Cocaine use within last 3 days    Negative: Major surgery in the past month    Negative: Hip or leg  fracture (broken bone) in past month (or had cast on leg or ankle in past month)    Negative: Illness requiring prolonged bedrest in past month (e.g., immobilization, long hospital stay)    Negative: Long-distance travel in past month (e.g., car, bus, train, plane; with trip lasting 6 or more hours)    Negative: History of prior 'blood clot' in leg or lungs (i.e., deep vein thrombosis, pulmonary embolism)    Negative: History of inherited increased risk of blood clots (e.g., Factor 5 Leiden, Anti-thrombin 3, Protein C or Protein S deficiency, Prothrombin mutation)    Negative: Heart beating irregularly or very rapidly    Negative: Chest pain lasting longer than 5 minutes and occurred in last 3 days (72 hours) (Exception: feels exactly the same as previously diagnosed heartburn and has accompanying sour taste in mouth)    Negative: Sounds like a life-threatening emergency to the triager    Negative: Followed a knee injury    Negative: Thigh or calf pain and only 1 side and present > 1 hour    Negative: Thigh or calf swelling and only 1 side    Negative: Patient sounds very sick or weak to the triager    Negative: Swollen knee joint and fever    Negative: Can't move swollen joint at all    Negative: SEVERE pain (e.g., excruciating, unable to walk)    Negative: Very swollen joint    Negative: Painful rash with multiple small blisters grouped together (i.e., dermatomal distribution or 'band' or 'stripe')    Negative: Looks like a boil, infected sore, deep ulcer, or other infected rash (spreading redness, pus)    Protocols used: BACK PAIN-A-OH, CHEST PAIN-A-OH, KNEE PAIN-A-OH  COVID 19 Nurse Triage Plan/Patient Instructions    Please be aware that novel coronavirus (COVID-19) may be circulating in the community. If you develop symptoms such as fever, cough, or SOB or if you have concerns about the presence of another infection including coronavirus (COVID-19), please contact your health care provider or visit  [email or web address].     Disposition/Instructions    In-Person Visit with provider recommended. Reference Visit Selection Guide.  ED Visit recommended. Follow protocol based instructions.     Bring Your Own Device:  Please also bring your smart device(s) (smart phones, tablets, laptops) and their charging cables for your personal use and to communicate with your care team during your visit.    Thank you for taking steps to prevent the spread of this virus.  o Limit your contact with others.  o Wear a simple mask to cover your cough.  o Wash your hands well and often.    Resources    [facility]: About COVID-19: [email or web address]    CDC: What to Do If You're Sick: www.cdc.gov/coronavirus/2019-ncov/about/steps-when-sick.html    CDC: Ending Home Isolation: www.cdc.gov/coronavirus/2019-ncov/hcp/disposition-in-home-patients.html     CDC: Caring for Someone: www.cdc.gov/coronavirus/2019-ncov/if-you-are-sick/care-for-someone.html     [facility]: Interim Guidance for Hospital Discharge to Home: [email or web address]/hcp/hospdischarge.[email or web address]    [facility] clinical trials (COVID-19 research studies): [email or web address].[email or web address]-clinical-trials     Below are the COVID-19 hotlines at the [place] [facility] ([facility]). Interpreters are available.   o For health questions: Call 387-457-3694 or 1-880.627.6101 (7 a.m. to 7 p.m.)  o For questions about schools and childcare: Call 243-038-1334 or 1-459.801.7389 (7 a.m. to 7 p.m.)

## 2022-03-22 ENCOUNTER — TELEPHONE (OUTPATIENT)
Dept: FAMILY MEDICINE | Facility: CLINIC | Age: 72
End: 2022-03-22
Payer: COMMERCIAL

## 2022-03-22 NOTE — TELEPHONE ENCOUNTER
Scapular pain in ED, PE ruled out, improved with Volatren    ED / Discharge Outreach Protocol    Patient Contact    Attempt # 1    Was call answered?  No.  Left message on voicemail with information to call me back.    Maggie Maynard, RN, BSN  [facility]

## 2022-03-23 NOTE — TELEPHONE ENCOUNTER
ED / Discharge Outreach Protocol    Patient Contact    Attempt # 2    Was call answered?  No.  Left message on voicemail with information to call me back.    Donna Staples RN  [facility]

## 2022-03-27 ENCOUNTER — TELEPHONE (OUTPATIENT)
Dept: NURSING | Facility: CLINIC | Age: 72
End: 2022-03-27
Payer: COMMERCIAL

## 2022-03-27 NOTE — TELEPHONE ENCOUNTER
Telephone call    Patient was returning call.  Unable to find reason for call.  Routing to caller.    Adelia Sandhu RN   [facility] Nurse Advisor  2:04 PM 3/27/2022

## 2022-04-11 ENCOUNTER — THERAPY VISIT (OUTPATIENT)
Dept: PHYSICAL THERAPY | Facility: CLINIC | Age: 72
End: 2022-04-11
Payer: COMMERCIAL

## 2022-04-11 DIAGNOSIS — M25.561 RIGHT KNEE PAIN, UNSPECIFIED CHRONICITY: ICD-10-CM

## 2022-04-11 PROCEDURE — 97161 PT EVAL LOW COMPLEX 20 MIN: CPT | Mod: GP | Performed by: PHYSICAL THERAPIST

## 2022-04-11 PROCEDURE — 97110 THERAPEUTIC EXERCISES: CPT | Mod: GP | Performed by: PHYSICAL THERAPIST

## 2022-04-11 ASSESSMENT — ACTIVITIES OF DAILY LIVING (ADL)
KNEE_ACTIVITY_OF_DAILY_LIVING_SCORE: 41.43
RISE FROM A CHAIR: ACTIVITY IS FAIRLY DIFFICULT
SQUAT: I AM UNABLE TO DO THE ACTIVITY
LIMPING: I HAVE THE SYMPTOM BUT IT DOES NOT AFFECT MY ACTIVITY
GO UP STAIRS: ACTIVITY IS FAIRLY DIFFICULT
SWELLING: THE SYMPTOM AFFECTS MY ACTIVITY SLIGHTLY
STAND: ACTIVITY IS SOMEWHAT DIFFICULT
HOW_WOULD_YOU_RATE_THE_CURRENT_FUNCTION_OF_YOUR_KNEE_DURING_YOUR_USUAL_DAILY_ACTIVITIES_ON_A_SCALE_FROM_0_TO_100_WITH_100_BEING_YOUR_LEVEL_OF_KNEE_FUNCTION_PRIOR_TO_YOUR_INJURY_AND_0_BEING_THE_INABILITY_TO_PERFORM_ANY_OF_YOUR_USUAL_DAILY_ACTIVITIES?: 65
SIT WITH YOUR KNEE BENT: ACTIVITY IS FAIRLY DIFFICULT
PAIN: THE SYMPTOM AFFECTS MY ACTIVITY MODERATELY
WEAKNESS: THE SYMPTOM AFFECTS MY ACTIVITY SLIGHTLY
STIFFNESS: THE SYMPTOM AFFECTS MY ACTIVITY MODERATELY
KNEEL ON THE FRONT OF YOUR KNEE: I AM UNABLE TO DO THE ACTIVITY
GIVING WAY, BUCKLING OR SHIFTING OF KNEE: THE SYMPTOM AFFECTS MY ACTIVITY SLIGHTLY
AS_A_RESULT_OF_YOUR_KNEE_INJURY,_HOW_WOULD_YOU_RATE_YOUR_CURRENT_LEVEL_OF_DAILY_ACTIVITY?: ABNORMAL
HOW_WOULD_YOU_RATE_THE_OVERALL_FUNCTION_OF_YOUR_KNEE_DURING_YOUR_USUAL_DAILY_ACTIVITIES?: ABNORMAL
RAW_SCORE: 29
KNEE_ACTIVITY_OF_DAILY_LIVING_SUM: 29
GO DOWN STAIRS: ACTIVITY IS VERY DIFFICULT
WALK: ACTIVITY IS FAIRLY DIFFICULT

## 2022-04-11 NOTE — PROGRESS NOTES
Physical Therapy Initial Evaluation  Subjective:  The history is provided by the patient. No  was used.   Therapist Generated HPI Evaluation  Problem details: 3/18/22. Pt reports chronic history of B knee pain (R>L). Her R knee pain flared up at least 6 months ago. Denies specific CIRILO. She was referred to PT on 3/18/22..         Type of problem:  Bilateral knees (R>L).    This is a chronic condition.  Condition occurred with:  Degenerative joint disease.  Where condition occurred: for unknown reasons.  Patient reports pain:  Anterior and posterior.  Pain is described as other and sharp (spasms, pressure) and is intermittent.  Pain radiates to:  Lower leg. Pain is the same all the time.    Associated symptoms:  Loss of motion/stiffness, edema and loss of strength. Symptoms are exacerbated by ascending stairs, descending stairs, walking, standing, bending/squatting, kneeling and transfers  and relieved by ice and rest.  Special tests included:  X-ray.  Previous treatment includes physical therapy.   Barriers include:  Stairs.    Patient Health History  Jennifer Jane being seen for therapy R knee.          Pain is reported as 1/10 (up to 8-9/10) on pain scale.  General health as reported by patient is good.  Pertinent medical history includes: asthma, fibromyalgia, high blood pressure, migraines/headaches, osteoarthritis and concussions/dizziness.   Red flags:  Pain at rest/night (consistent with current knee problem).  Medical allergies: other. Other medical allergies details: see chart.   Surgeries include:  Orthopedic surgery. Other surgery history details: back 1982.    Current medications:  Anti-inflammatory, high blood pressure medication and other. Other medications details: asthma inhaler.    Current occupation is Retired RN.                                       Objective:  Standing Alignment:              Knee:  Genu valgus R and genu valgus L      Gait:  B functional knee valgus  Gait  Type:  Antalgic   Assistive Devices:  None  Deviations:  Knee:  Knee extension decr L and knee extension decr RGeneral Deviations:  Stride length decr                                                      Knee Evaluation:  ROM:    AROM      Extension:  Left: lacking 6 -/+    Right:  Lacking 7 -/+  Flexion: Left: 129 -/+    Right: 129 -/+  PROM    Hyperextension: Left: lacking 6 -/+    Right:  Lacking 5 -/+    Flexion: Left: 132 -/+    Right:  130 -/+      Strength:         Quad Set Left:  Fair    Pain: +   Quad Set Right:  Fair    Pain: +        Edema:  Edema of the knee: Mild edema R knee.      Functional Testing:          Quad:    Single Leg Squat:  Left:       Right:        Bilateral Leg Squat:  B knee pain and pressure  Moderate loss of control and excessive anterior knee excursion      Proprioception:   Stork Balance Test:  Left:  Mild loss of control, no pain  Right:  Mild loss of control, no pain  % of Uninvolved:           General     ROS    Assessment/Plan:    Patient is a 71 year old female with both sides knee complaints.    Patient has the following significant findings with corresponding treatment plan.                Diagnosis 1:  R knee pain  Pain -  hot/cold therapy, manual therapy, splint/taping/bracing/orthotics, self management, education and home program  Decreased ROM/flexibility - manual therapy, therapeutic exercise and home program  Decreased strength - therapeutic exercise, therapeutic activities and home program  Decreased proprioception - neuro re-education, gait training, therapeutic activities and home program  Impaired gait - gait training and home program  Impaired muscle performance - neuro re-education and home program  Decreased function - therapeutic activities and home program    Therapy Evaluation Codes:   1) History comprised of:   Personal factors that impact the plan of care:      Time since onset of symptoms.    Comorbidity factors that impact the plan of care are:       Asthma, Dizziness, High blood pressure, Migraines/headaches, Osteoarthritis and Pain at night/rest.     Medications impacting care: Anti-inflammatory and High blood pressure.  2) Examination of Body Systems comprised of:   Body structures and functions that impact the plan of care:      Knee.   Activity limitations that impact the plan of care are:      Bathing, Dressing, Lifting, Sitting, Squatting/kneeling, Stairs, Standing and Walking.  3) Clinical presentation characteristics are:   Stable/Uncomplicated.  4) Decision-Making    Low complexity using standardized patient assessment instrument and/or measureable assessment of functional outcome.  Cumulative Therapy Evaluation is: Low complexity.    Previous and current functional limitations:  (See Goal Flow Sheet for this information)    Short term and Long term goals: (See Goal Flow Sheet for this information)     Communication ability:  Patient appears to be able to clearly communicate and understand verbal and written communication and follow directions correctly.  Treatment Explanation - The following has been discussed with the patient:   RX ordered/plan of care  Anticipated outcomes  Possible risks and side effects  This patient would benefit from PT intervention to resume normal activities.   Rehab potential is good.    Frequency:  1 X week, once daily  Duration:  for 6 weeks tapering to 1 X every other week over 4 weeks  Discharge Plan:  Achieve all LTG.  Independent in home treatment program.  Reach maximal therapeutic benefit.    Please refer to the daily flowsheet for treatment today, total treatment time and time spent performing 1:1 timed codes.

## 2022-04-12 NOTE — PROGRESS NOTES
[facility]    OUTPATIENT Physical Therapy ORTHOPEDIC EVALUATION  PLAN OF TREATMENT FOR OUTPATIENT REHABILITATION  (COMPLETE FOR INITIAL CLAIMS ONLY)  Patient's Last Name, First Name, M.I.  YOB: 1950  Jennifer Jane    Provider s Name:  [facility]   Medical Record No.  [id number]   Start of Care Date:      Onset Date:       Type:     _X__PT   ___OT Medical Diagnosis:    Encounter Diagnosis   Name Primary?    Right knee pain, unspecified chronicity         Treatment Diagnosis:  R knee pain        Goals:     04/11/22 0500   Body Part   Goals listed below are for B knees   Goal #1   Goal #1 ambulation   Previous Functional Level No restrictions   Current Functional Level Minutes patient can walk   Performance Level 5   STG Target Performance Minutes patient will be able to walk   Performance Level 10   Rationale for safe household ambulation;for safe community ambulation   Due Date 05/09/22    LTG Target Performance Minutes patient will be able to  walk   Performance Level 30   Rationale for safe community ambulation   Due Date 06/20/22       Therapy Frequency:     Predicted Duration of Therapy Intervention:       Luis Miguel Maxwell, PT                 I CERTIFY THE NEED FOR THESE SERVICES FURNISHED UNDER        THIS PLAN OF TREATMENT AND WHILE UNDER MY CARE     (Physician attestation of this document indicates review and certification of the therapy plan).                     Certification Date From:      Certification Date To:       Referring Provider:  Sun Person    Initial Assessment        See Epic Evaluation

## 2022-04-21 ENCOUNTER — THERAPY VISIT (OUTPATIENT)
Dept: PHYSICAL THERAPY | Facility: CLINIC | Age: 72
End: 2022-04-21
Payer: COMMERCIAL

## 2022-04-21 DIAGNOSIS — M25.561 CHRONIC PAIN OF RIGHT KNEE: Primary | ICD-10-CM

## 2022-04-21 DIAGNOSIS — G89.29 CHRONIC PAIN OF RIGHT KNEE: Primary | ICD-10-CM

## 2022-04-21 PROCEDURE — 97110 THERAPEUTIC EXERCISES: CPT | Mod: GP | Performed by: PHYSICAL THERAPIST

## 2022-04-21 PROCEDURE — 97530 THERAPEUTIC ACTIVITIES: CPT | Mod: GP | Performed by: PHYSICAL THERAPIST

## 2022-04-28 ENCOUNTER — THERAPY VISIT (OUTPATIENT)
Dept: PHYSICAL THERAPY | Facility: CLINIC | Age: 72
End: 2022-04-28
Payer: COMMERCIAL

## 2022-04-28 DIAGNOSIS — G89.29 CHRONIC PAIN OF RIGHT KNEE: Primary | ICD-10-CM

## 2022-04-28 DIAGNOSIS — M25.561 CHRONIC PAIN OF RIGHT KNEE: Primary | ICD-10-CM

## 2022-04-28 PROCEDURE — 97110 THERAPEUTIC EXERCISES: CPT | Mod: GP | Performed by: PHYSICAL THERAPIST

## 2022-04-28 PROCEDURE — 97530 THERAPEUTIC ACTIVITIES: CPT | Mod: GP | Performed by: PHYSICAL THERAPIST

## 2022-05-05 ENCOUNTER — THERAPY VISIT (OUTPATIENT)
Dept: PHYSICAL THERAPY | Facility: CLINIC | Age: 72
End: 2022-05-05
Payer: COMMERCIAL

## 2022-05-05 DIAGNOSIS — G89.29 CHRONIC PAIN OF RIGHT KNEE: Primary | ICD-10-CM

## 2022-05-05 DIAGNOSIS — M25.561 CHRONIC PAIN OF RIGHT KNEE: Primary | ICD-10-CM

## 2022-05-05 PROCEDURE — 97110 THERAPEUTIC EXERCISES: CPT | Mod: GP | Performed by: PHYSICAL THERAPIST

## 2022-05-05 NOTE — PROGRESS NOTES
PROGRESS  REPORT    Progress reporting period is from 4/11/22 to 5/5/22.       SUBJECTIVE  Subjective changes noted by patient:  Was going to [employer] and tripped and fell forward onto her hands and knees (Monday, so 3-4 days ago).  Held off on some of exercises until last night.  Feels she likely just bruised the knees.    Current Pain level: 1/10.     Initial Pain level: 9/10.   Changes in function:  Yes (See Goal flowsheet attached for changes in current functional level)  Adverse reaction to treatment or activity: None    OBJECTIVE  Changes noted in objective findings:  Yes,   Objective:   Observe visable bruising medial knee (purple coloration), minimal swelling present.    PROM unchanged compared to prior.    Special tests remain negative: A/P Drawer, Lachmans, Valgus/Varus at 0 and 30.    Meniscus trace discomfort with rotation, but does have some DJD, so may not be significant.    Feels stable.    Will continue PT, but ease off of squat/step/sidestep exercises for this week and then gradually return to.     ASSESSMENT/PLAN  Updated problem list and treatment plan: Diagnosis 1:  R knee pain    Pain -  hot/cold therapy and manual therapy  Decreased ROM/flexibility - manual therapy and therapeutic exercise  Decreased strength - therapeutic exercise and therapeutic activities  Impaired balance - neuro re-education and therapeutic activities  Decreased proprioception - neuro re-education and therapeutic activities  Edema - cold therapy  Impaired gait - gait training  Decreased function - therapeutic activities  STG/LTGs have been met or progress has been made towards goals:  Yes, gradually improving.  Then did just have a fall.  Appears to be contusion, will modify program for the next week and resume.   Assessment of Progress: The patient's condition has potential to improve.  Self Management Plans:  Patient has been instructed in a home treatment program.  I have re-evaluated this patient and find that the nature,  scope, duration and intensity of the therapy is appropriate for the medical condition of the patient.  Jennifer continues to require the following intervention to meet STG and LTG's:  PT    Recommendations:  This patient would benefit from continued therapy.     Frequency:  2 X a month, once daily  Duration:  for 2 months        Please refer to the daily flowsheet for treatment today, total treatment time and time spent performing 1:1 timed codes.

## 2022-05-24 ENCOUNTER — THERAPY VISIT (OUTPATIENT)
Dept: PHYSICAL THERAPY | Facility: CLINIC | Age: 72
End: 2022-05-24
Payer: COMMERCIAL

## 2022-05-24 DIAGNOSIS — G89.29 CHRONIC PAIN OF RIGHT KNEE: Primary | ICD-10-CM

## 2022-05-24 DIAGNOSIS — M25.561 CHRONIC PAIN OF RIGHT KNEE: Primary | ICD-10-CM

## 2022-05-24 PROCEDURE — 97110 THERAPEUTIC EXERCISES: CPT | Mod: GP | Performed by: PHYSICAL THERAPIST

## 2022-05-24 NOTE — PROGRESS NOTES
PROGRESS  REPORT    Progress reporting period is from 4/11/22 to 5/24/22 (5 visits)      SUBJECTIVE  Subjective changes noted by patient:  Notes that she has been having pain lately.  This is with lifting, carrying and going up stairs.  She did also have a fall around 5/1/22, with bruising about the knee, but she feels mostly recovered back to baseline of when starting PT since then.  She has been relatively consistent with her HEP, but symptoms are persistent.  She notes difficulty fully extending her knee, sometimes feels weakness in quad area.  We discussed importance of doing her knee ext ROM exercises as well as quad/glute strength.  Discussed ramping program up on good days and down on ones with pain and if painful to stick mostly with non weight bearing exercises and adding squats/steps in on days when knee is feeling up to it.  She overall, continues to have knee pain and plans to check back with her MD.  Possibility of injection was discussed after trial of PT per review and perhaps that may be revisited.  She is also having some pain down the lower leg and up to the hip.  She's not sure if this could be related to fibromyalgia or back, plans to discuss with MD at FU as well.    Current Pain level: 2/10.     Initial Pain level: 9/10.   Changes in function:  Yes (See Goal flowsheet attached for changes in current functional level)  Adverse reaction to treatment or activity: None    OBJECTIVE  Changes noted in objective findings:  Yes,   Objective:    Lacking some terminal knee extension R side yet.  Minimal edema present.  Bruising recovered from prior fall, as assessed in 5/5 PN.  Trace discomfort with rotation during Mc[place]rays, otherwise feels stable on ligment testing. PT focused on knee extension PROM, quad and glute med/max strengthening.    ASSESSMENT/PLAN  Updated problem list and treatment plan: Diagnosis 1:  R knee joint pain (x-ray findings: Mild tricompartmental osteoarthrosis.  Extensive  chondrocalcinosis and synovial calcifications.)    Pain -  hot/cold therapy, manual therapy and directional preference exercise  Decreased ROM/flexibility - manual therapy and therapeutic exercise  Decreased strength - therapeutic exercise and therapeutic activities  Decreased proprioception - neuro re-education and therapeutic activities  Impaired gait - gait training  Decreased function - therapeutic activities  STG/LTGs have been met or progress has been made towards goals:  Yes (See Goal flow sheet completed today.)  Assessment of Progress: Patient made initial improvement, fell bruising her knee - does report feeling recovered from fall, but has more pain at visit 5 than visit 3 (just before fall), overall, has made some progress, but is still having pain.   Self Management Plans:  Patient has been instructed in a home treatment program.  I have re-evaluated this patient and find that the nature, scope, duration and intensity of the therapy is appropriate for the medical condition of the patient.  Jennifer continues to require the following intervention to meet STG and LTG's:  Will continue her HEP and check back with her MD    Recommendations:  Continue HEP and check back with referring provider for further consultation or consideration for other intervention (Discussed possible injection at prior visit)    Please refer to the daily flowsheet for treatment today, total treatment time and time spent performing 1:1 timed codes.

## 2022-06-29 ENCOUNTER — TRANSFERRED RECORDS (OUTPATIENT)
Dept: HEALTH INFORMATION MANAGEMENT | Facility: CLINIC | Age: 72
End: 2022-06-29

## 2022-09-13 ASSESSMENT — ASTHMA QUESTIONNAIRES
ACT_TOTALSCORE: 23
QUESTION_4 LAST FOUR WEEKS HOW OFTEN HAVE YOU USED YOUR RESCUE INHALER OR NEBULIZER MEDICATION (SUCH AS ALBUTEROL): ONCE A WEEK OR LESS
ACT_TOTALSCORE: 23
QUESTION_3 LAST FOUR WEEKS HOW OFTEN DID YOUR ASTHMA SYMPTOMS (WHEEZING, COUGHING, SHORTNESS OF BREATH, CHEST TIGHTNESS OR PAIN) WAKE YOU UP AT NIGHT OR EARLIER THAN USUAL IN THE MORNING: NOT AT ALL
QUESTION_1 LAST FOUR WEEKS HOW MUCH OF THE TIME DID YOUR ASTHMA KEEP YOU FROM GETTING AS MUCH DONE AT WORK, SCHOOL OR AT HOME: NONE OF THE TIME
QUESTION_2 LAST FOUR WEEKS HOW OFTEN HAVE YOU HAD SHORTNESS OF BREATH: NOT AT ALL
QUESTION_5 LAST FOUR WEEKS HOW WOULD YOU RATE YOUR ASTHMA CONTROL: WELL CONTROLLED

## 2022-09-16 ENCOUNTER — ANCILLARY PROCEDURE (OUTPATIENT)
Dept: CT IMAGING | Facility: CLINIC | Age: 72
End: 2022-09-16
Attending: FAMILY MEDICINE
Payer: COMMERCIAL

## 2022-09-16 ENCOUNTER — OFFICE VISIT (OUTPATIENT)
Dept: FAMILY MEDICINE | Facility: CLINIC | Age: 72
End: 2022-09-16
Payer: COMMERCIAL

## 2022-09-16 VITALS
RESPIRATION RATE: 20 BRPM | HEIGHT: 66 IN | HEART RATE: 72 BPM | WEIGHT: 169 LBS | TEMPERATURE: 97.2 F | SYSTOLIC BLOOD PRESSURE: 136 MMHG | BODY MASS INDEX: 27.16 KG/M2 | OXYGEN SATURATION: 96 % | DIASTOLIC BLOOD PRESSURE: 62 MMHG

## 2022-09-16 DIAGNOSIS — R63.4 WEIGHT LOSS: ICD-10-CM

## 2022-09-16 DIAGNOSIS — E83.52 SERUM CALCIUM ELEVATED: ICD-10-CM

## 2022-09-16 DIAGNOSIS — R10.32 ABDOMINAL PAIN, LEFT LOWER QUADRANT: Primary | ICD-10-CM

## 2022-09-16 DIAGNOSIS — Z78.0 ASYMPTOMATIC MENOPAUSE: ICD-10-CM

## 2022-09-16 DIAGNOSIS — Z12.11 SCREEN FOR COLON CANCER: ICD-10-CM

## 2022-09-16 DIAGNOSIS — R79.89 ELEVATED PARATHYROID HORMONE: ICD-10-CM

## 2022-09-16 DIAGNOSIS — R10.32 ABDOMINAL PAIN, LEFT LOWER QUADRANT: ICD-10-CM

## 2022-09-16 LAB
BASOPHILS # BLD AUTO: 0 10E3/UL (ref 0–0.2)
BASOPHILS NFR BLD AUTO: 0 %
CREAT BLD-MCNC: 0.9 MG/DL (ref 0.5–1)
EOSINOPHIL # BLD AUTO: 0.2 10E3/UL (ref 0–0.7)
EOSINOPHIL NFR BLD AUTO: 2 %
ERYTHROCYTE [DISTWIDTH] IN BLOOD BY AUTOMATED COUNT: 12.9 % (ref 10–15)
GFR SERPL CREATININE-BSD FRML MDRD: >60 ML/MIN/1.73M2
HCT VFR BLD AUTO: 41.5 % (ref 35–47)
HGB BLD-MCNC: 13.7 G/DL (ref 11.7–15.7)
IMM GRANULOCYTES # BLD: 0 10E3/UL
IMM GRANULOCYTES NFR BLD: 0 %
LIPASE SERPL-CCNC: 71 U/L (ref 13–60)
LYMPHOCYTES # BLD AUTO: 2.1 10E3/UL (ref 0.8–5.3)
LYMPHOCYTES NFR BLD AUTO: 24 %
MCH RBC QN AUTO: 29.6 PG (ref 26.5–33)
MCHC RBC AUTO-ENTMCNC: 33 G/DL (ref 31.5–36.5)
MCV RBC AUTO: 90 FL (ref 78–100)
MONOCYTES # BLD AUTO: 0.5 10E3/UL (ref 0–1.3)
MONOCYTES NFR BLD AUTO: 5 %
NEUTROPHILS # BLD AUTO: 6 10E3/UL (ref 1.6–8.3)
NEUTROPHILS NFR BLD AUTO: 68 %
PLATELET # BLD AUTO: 247 10E3/UL (ref 150–450)
RBC # BLD AUTO: 4.63 10E6/UL (ref 3.8–5.2)
WBC # BLD AUTO: 8.7 10E3/UL (ref 4–11)

## 2022-09-16 PROCEDURE — 80053 COMPREHEN METABOLIC PANEL: CPT | Performed by: FAMILY MEDICINE

## 2022-09-16 PROCEDURE — 99214 OFFICE O/P EST MOD 30 MIN: CPT | Performed by: FAMILY MEDICINE

## 2022-09-16 PROCEDURE — 83690 ASSAY OF LIPASE: CPT | Performed by: FAMILY MEDICINE

## 2022-09-16 PROCEDURE — 74177 CT ABD & PELVIS W/CONTRAST: CPT | Mod: GC | Performed by: RADIOLOGY

## 2022-09-16 PROCEDURE — 84443 ASSAY THYROID STIM HORMONE: CPT | Performed by: FAMILY MEDICINE

## 2022-09-16 PROCEDURE — 36415 COLL VENOUS BLD VENIPUNCTURE: CPT | Performed by: FAMILY MEDICINE

## 2022-09-16 PROCEDURE — 85025 COMPLETE CBC W/AUTO DIFF WBC: CPT | Performed by: FAMILY MEDICINE

## 2022-09-16 PROCEDURE — 99207 E-CONSULT TO ENDOCRINOLOGY (ADULT OUTPT PROVIDER TO SPECIALIST WRITTEN QUESTION & RESPONSE): CPT | Performed by: FAMILY MEDICINE

## 2022-09-16 RX ORDER — IOPAMIDOL 755 MG/ML
95 INJECTION, SOLUTION INTRAVASCULAR ONCE
Status: COMPLETED | OUTPATIENT
Start: 2022-09-16 | End: 2022-09-16

## 2022-09-16 RX ADMIN — IOPAMIDOL 95 ML: 755 INJECTION, SOLUTION INTRAVASCULAR at 16:01

## 2022-09-16 NOTE — RESULT ENCOUNTER NOTE
Cayetano Rodriguez,  This note is to let you know that your results were normal.  Please let me know if you have any further questions or concerns.  Sincerely,  Dr. Sun Person MD    Thank you for choosing the [facility] [place] as your health care provider. Please don't hesitate to call with any questions or concerns [phone].

## 2022-09-16 NOTE — PROGRESS NOTES
"  Assessment & Plan     Abdominal pain, left lower quadrant  Weight loss  Patient Instructions   1. For your weight loss and abdominal pain:  a. I think that your weight loss is because of your diet and exercise changes!  Great work and I'm glad you're feeling better.  b. However given that you have that lower abdominal pain or fullness on and off I'd like to be sure there is nothing else going on.  I'm ordering:  i. Lab tests today  ii. CT scan abdomen/pelvis to evaluate your ovaries  iii. Your colonoscopy which you are due for.      If this workup is normal but then your symptoms continue or your nausea gets worse or persistent again, let me know as then I'd order an upper endoscopy for you.   - CT Abdomen Pelvis w Contrast; Future  - CBC with platelets and differential; Future  - Comprehensive metabolic panel (BMP + Alb, Alk Phos, ALT, AST, Total. Bili, TP); Future  - Lipase; Future  - TSH with free T4 reflex; Future      Screen for colon cancer  - Colonoscopy Screening  Referral; Future             BMI:   Estimated body mass index is 27.28 kg/m  as calculated from the following:    Height as of this encounter: 1.676 m (5' 6\").    Weight as of this encounter: 76.7 kg (169 lb).   Weight management plan: Discussed healthy diet and exercise guidelines        No follow-ups on file.    Sun Person MD  [facility]    Henrry Rodriguez is a 71 year old, presenting for the following health issues:  Follow Up (Stomach /knee)      History of Present Illness       Reason for visit:  Tummy/abdomen  Symptom onset:  More than a month  Symptoms include:  Nausea, irregular stools, pressure, discomfort, weight loss  Symptom intensity:  Mild  Symptom progression:  Staying the same  Had these symptoms before:  No  What makes it worse:  No  What makes it better:  No    She eats 4 or more servings of fruits and vegetables daily.She consumes 1 sweetened beverage(s) daily.She exercises with " "enough effort to increase her heart rate 30 to 60 minutes per day.  She exercises with enough effort to increase her heart rate 4 days per week.   She is taking medications regularly.     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  Knee is better.  Still can't bend as far as wants to, but not worried about that.    Other issue more worried about is:  1. Purposely decided to change diet.  Eat when hungry, not eat when not hungry.  But now having bouts of really intense nausea.  Best guess is started in July or end of June.  1-2 times a week.  Last for a few minutes then would go away spontaneously.    Then august on the 15th had hours of nausea.  Then had to throw up - threw up a lot.    No diarrhea, no fever.      Has had a few more bouts of nausea.    Plus: has some discomfort sometimes on left side of abdomen.    Knows that has irregular bowels sometimes.  Will have some constipation, and sometimes gas.    But thought - prettu regular that can feel this pressure on left lower abdomen.    Knows that has some nerve damage - had to think about it and move around on toilet.    Doesn't take fiber supplements.  Drinks a lot of water.  Eats a lot of veggies and fruits.      Weight:  Wt Readings from Last 4 Encounters:   09/16/22 76.7 kg (169 lb)   02/16/22 80.3 kg (177 lb)   10/27/21 80.7 kg (178 lb)   12/17/20 82.1 kg (181 lb)     Gallbladder taken out - 1990.  Had tubes tied.    Also - in March had emergency room visit.      Review of Systems   Constitutional, HEENT, cardiovascular, pulmonary, gi and gu systems are negative, except as otherwise noted.      Objective    /62   Pulse 72   Temp 97.2  F (36.2  C) (Temporal)   Resp 20   Ht 1.676 m (5' 6\")   Wt 76.7 kg (169 lb)   LMP  (LMP Unknown)   SpO2 96%   BMI 27.28 kg/m    Body mass index is 27.28 kg/m .  Physical Exam   GENERAL: healthy, alert and no distress  ABDOMEN: soft, nontender, no " hepatosplenomegaly, no masses and bowel sounds normal  MS: no gross musculoskeletal defects noted, no edema  SKIN: no suspicious lesions or rashes  NEURO: Normal strength and tone, mentation intact and speech normal

## 2022-09-16 NOTE — PATIENT INSTRUCTIONS
For your weight loss and abdominal pain:  I think that your weight loss is because of your diet and exercise changes!  Great work and I'm glad you're feeling better.  However given that you have that lower abdominal pain or fullness on and off I'd like to be sure there is nothing else going on.  I'm ordering:  Lab tests today  CT scan abdomen/pelvis to evaluate your ovaries  Your colonoscopy which you are due for.      If this workup is normal but then your symptoms continue or your nausea gets worse or persistent again, let me know as then I'd order an upper endoscopy for you.     Jennifer, you are due for your mammogram.  This is an important, lifesaving test!    Please call to schedule your mammogram at one of the locations below, or schedule on the [employer] home page under Schedule an Appointment.    [facility]. [address] [place] [address]  Appointments: [phone]  [facility] [address]  [place] [address] Appointments: [phone]   [facility]. [address] [place] [address]  Appointments: [phone]             I know it's been very difficult to get an appointment with me lately!  Here are a few suggestions to access care:    Schedule your annual visit at least 4-6 months ahead of when they are due.  Even better, schedule your appointment for next year on your way out.    For a more urgent issue* appointment - I often have availability for Video or Telephone Visits much sooner than in person.  You can schedule these on [employer] Schedule an Appointment and we can take care of a lot of issues this way!  If you need labs, x-rays or immunizations we can always schedule those separately after our virtual visit.  (*I can do annual medicare wellness visits virtually, but not other yearly adult or pediatric preventative exams or well child checks).    Finally, you can reach me via an E-Visit (similar to e-mail exchange) on  weekdays, and I typically will get back to you within 24 hours.  E-visits are perfect for fairly simple issues (I.e. urinary tract infection, bronchitis, COVID test) or medication adjustments or refills (I.e. blood pressure or anxiety medicine) that don't require a lot of discussion or explanation.  You can select this via [employer] [facility] under the Main Menu and select E-Visit (not schedule an appointment).

## 2022-09-17 LAB
ALBUMIN SERPL-MCNC: 4 G/DL (ref 3.4–5)
ALP SERPL-CCNC: 81 U/L (ref 40–150)
ALT SERPL W P-5'-P-CCNC: 45 U/L (ref 0–50)
ANION GAP SERPL CALCULATED.3IONS-SCNC: 5 MMOL/L (ref 3–14)
AST SERPL W P-5'-P-CCNC: 28 U/L (ref 0–45)
BILIRUB SERPL-MCNC: 0.7 MG/DL (ref 0.2–1.3)
BUN SERPL-MCNC: 12 MG/DL (ref 7–30)
CALCIUM SERPL-MCNC: 10.4 MG/DL (ref 8.5–10.1)
CHLORIDE BLD-SCNC: 108 MMOL/L (ref 94–109)
CO2 SERPL-SCNC: 28 MMOL/L (ref 20–32)
CREAT SERPL-MCNC: 0.79 MG/DL (ref 0.52–1.04)
GFR SERPL CREATININE-BSD FRML MDRD: 79 ML/MIN/1.73M2
GLUCOSE BLD-MCNC: 118 MG/DL (ref 70–99)
POTASSIUM BLD-SCNC: 4.3 MMOL/L (ref 3.4–5.3)
PROT SERPL-MCNC: 6.9 G/DL (ref 6.8–8.8)
SODIUM SERPL-SCNC: 141 MMOL/L (ref 133–144)
TSH SERPL DL<=0.005 MIU/L-ACNC: 1.61 MU/L (ref 0.4–4)

## 2022-09-20 NOTE — RESULT ENCOUNTER NOTE
Cayetano Rodriguez,  This note is to let you know that your results were normal.  Please let me know if you have any further questions or concerns.  Sincerely,  Dr. Sun Person MD    Thank you for choosing the [facility] as your health care provider. Please don't hesitate to call with any questions or concerns [phone].

## 2022-09-24 NOTE — RESULT ENCOUNTER NOTE
Cayetano Rodriguez:  I'm glad your CT scan was normal.  However your blood tests showed a high calcium and mildly elevated lipase level.  Can you come back in for a Lab Only appointment to look into this a little more?  I've ordered a few follow up tests for you.  Let me know if you have any questions.  Best,  Dr. Sun Person MD/[facility]

## 2022-09-26 ENCOUNTER — LAB (OUTPATIENT)
Dept: LAB | Facility: CLINIC | Age: 72
End: 2022-09-26
Payer: COMMERCIAL

## 2022-09-26 DIAGNOSIS — R10.32 ABDOMINAL PAIN, LEFT LOWER QUADRANT: ICD-10-CM

## 2022-09-26 DIAGNOSIS — E83.52 SERUM CALCIUM ELEVATED: ICD-10-CM

## 2022-09-26 LAB
CA-I BLD-MCNC: 5.3 MG/DL (ref 4.4–5.2)
DEPRECATED CALCIDIOL+CALCIFEROL SERPL-MC: 25 UG/L (ref 20–75)
LIPASE SERPL-CCNC: 20 U/L (ref 13–60)
PTH-INTACT SERPL-MCNC: 68 PG/ML (ref 15–65)

## 2022-09-26 PROCEDURE — 83690 ASSAY OF LIPASE: CPT

## 2022-09-26 PROCEDURE — 83970 ASSAY OF PARATHORMONE: CPT

## 2022-09-26 PROCEDURE — 82306 VITAMIN D 25 HYDROXY: CPT

## 2022-09-26 PROCEDURE — 36415 COLL VENOUS BLD VENIPUNCTURE: CPT

## 2022-09-26 PROCEDURE — 82330 ASSAY OF CALCIUM: CPT

## 2022-09-29 NOTE — RESULT ENCOUNTER NOTE
Cayetano Rodriguez:  As I'm sure you saw, your calcium and parathyroid levels were both a bit high on recheck here.  I'm getting in touch with the endocrinology team about next steps - I'll let you know what they say but they likely will want to see you for a visit.  I'll be in touch soon.  Best,  Dr. Sun Person MD / [facility]

## 2022-09-30 ENCOUNTER — E-CONSULT (OUTPATIENT)
Dept: ENDOCRINOLOGY | Facility: CLINIC | Age: 72
End: 2022-09-30
Payer: COMMERCIAL

## 2022-09-30 ENCOUNTER — MYC MEDICAL ADVICE (OUTPATIENT)
Dept: FAMILY MEDICINE | Facility: CLINIC | Age: 72
End: 2022-09-30

## 2022-09-30 PROCEDURE — 99451 NTRPROF PH1/NTRNET/EHR 5/>: CPT

## 2022-09-30 NOTE — PROGRESS NOTES
2022     E-Consult has been accepted.    Interprofessional consultation requested by:  Sun Person MD      Clinical Question/Purpose: MY CLINICAL QUESTION IS:  Hypercalcemia result.  I did confirmatory testing and has slightly high PTH and slightly high ionized calcium.  Next step?  Does she need to see you or is there something I can do in primary care to manage at this point?  Thank you.    Patient assessment and information reviewed:   16 Ca 10  19 Ca 10.5  2022 Ca 10.4, albumin 4, alk phos 81, creatinine 0.79  2022 iCa 5.3, PTH 68, 25OHD 25    10/4/17 DXA lowest T-score -1.2 right femoral neck  22 CT abdomen with contrast: no kidney stone    Assessment; hypercalcemia with high PTH.  Calcium is borderline, sometimes normal. Differential Primary hyperparathyroidism vs familial hypocalciuric hypercalcemia    Recommendations:   Next Labs to include phosphorus  Repeat DXA including wrist   24 hour urine calcium and creatinine  to determine creatinine clearance and calcium creatinine clearance ratio.  Ratio < 0.01 is more suggestive of FHH and > 0.02 more suggestive of primary hyperparathyroidism.  [email or web address]    Indications for surgical treatment of hyperparathyroidism include the followin.  Calcium > 1 mg/dl over normal  2.  Kidney stones  3.  Osteoporosis  4.  Hypercalciuria as defined by > 250 mg/24 hours (women)  5. Declining renal function creatinine clearance < 60  If surgical indications are met would get parathyroid ultrasound and refer to experienced parathyroid surgeon such as Dr Katina Regan.  If surgical indications are not met would follow labs yearly or sooner prn.     The recommendations provided in this E-Consult are based on a review of clinical data pertinent to the clinical question presented, without a review of the patient's complete medical record or, the benefit of a comprehensive in-person or virtual patient evaluation. This  consultation should not replace the clinical judgement and evaluation of the provider ordering this E-Consult. Any new clinical issues, or changes in patient status since the filing of this E-Consult will need to be taken into account when assessing these recommendations. Please contact me if you have further questions.    My total time spent reviewing clinical information and formulating assessment was 15 minutes.        Indigo Eid MD

## 2022-10-03 ENCOUNTER — LAB (OUTPATIENT)
Dept: LAB | Facility: CLINIC | Age: 72
End: 2022-10-03
Payer: COMMERCIAL

## 2022-10-03 DIAGNOSIS — R79.89 ELEVATED PARATHYROID HORMONE: ICD-10-CM

## 2022-10-03 DIAGNOSIS — E83.52 SERUM CALCIUM ELEVATED: ICD-10-CM

## 2022-10-03 DIAGNOSIS — Z78.0 ASYMPTOMATIC MENOPAUSE: ICD-10-CM

## 2022-10-03 LAB — PHOSPHATE SERPL-MCNC: 2.9 MG/DL (ref 2.5–4.5)

## 2022-10-03 PROCEDURE — 84100 ASSAY OF PHOSPHORUS: CPT

## 2022-10-03 PROCEDURE — 36415 COLL VENOUS BLD VENIPUNCTURE: CPT

## 2022-10-03 NOTE — TELEPHONE ENCOUNTER
Writer responded via [employer].    KERRI RodgersN, RN-BC  [facility]

## 2022-10-04 ENCOUNTER — HOSPITAL ENCOUNTER (OUTPATIENT)
Dept: MAMMOGRAPHY | Facility: CLINIC | Age: 72
Discharge: HOME | End: 2022-10-04
Attending: FAMILY MEDICINE | Admitting: FAMILY MEDICINE
Payer: COMMERCIAL

## 2022-10-04 ENCOUNTER — NURSE TRIAGE (OUTPATIENT)
Dept: NURSING | Facility: CLINIC | Age: 72
End: 2022-10-04

## 2022-10-04 ENCOUNTER — APPOINTMENT (OUTPATIENT)
Dept: GENERAL RADIOLOGY | Facility: CLINIC | Age: 72
End: 2022-10-04
Attending: EMERGENCY MEDICINE
Payer: COMMERCIAL

## 2022-10-04 ENCOUNTER — HOSPITAL ENCOUNTER (EMERGENCY)
Facility: CLINIC | Age: 72
Discharge: HOME | End: 2022-10-05
Attending: EMERGENCY MEDICINE | Admitting: EMERGENCY MEDICINE
Payer: COMMERCIAL

## 2022-10-04 DIAGNOSIS — Z12.31 VISIT FOR SCREENING MAMMOGRAM: ICD-10-CM

## 2022-10-04 DIAGNOSIS — R10.13 EPIGASTRIC PAIN: ICD-10-CM

## 2022-10-04 DIAGNOSIS — R00.2 PALPITATIONS: ICD-10-CM

## 2022-10-04 LAB
ALBUMIN SERPL BCG-MCNC: 4.5 G/DL (ref 3.5–5.2)
ALP SERPL-CCNC: 87 U/L (ref 35–104)
ALT SERPL W P-5'-P-CCNC: 32 U/L (ref 10–35)
ANION GAP SERPL CALCULATED.3IONS-SCNC: 10 MMOL/L (ref 7–15)
AST SERPL W P-5'-P-CCNC: 35 U/L (ref 10–35)
ATRIAL RATE - MUSE: 74 BPM
BASOPHILS # BLD AUTO: 0 10E3/UL (ref 0–0.2)
BASOPHILS NFR BLD AUTO: 0 %
BILIRUB SERPL-MCNC: 0.5 MG/DL
BUN SERPL-MCNC: 12.7 MG/DL (ref 8–23)
CALCIUM 24H UR-MRATE: 0.25 G/SPEC (ref 0.1–0.3)
CALCIUM SERPL-MCNC: 10.4 MG/DL (ref 8.8–10.2)
CALCIUM UR-MCNC: 8.2 MG/DL
CHLORIDE SERPL-SCNC: 105 MMOL/L (ref 98–107)
COLLECT DURATION TIME UR: 24 H
CREAT SERPL-MCNC: 0.81 MG/DL (ref 0.51–0.95)
DEPRECATED HCO3 PLAS-SCNC: 26 MMOL/L (ref 22–29)
DIASTOLIC BLOOD PRESSURE - MUSE: NORMAL MMHG
EOSINOPHIL # BLD AUTO: 0.2 10E3/UL (ref 0–0.7)
EOSINOPHIL NFR BLD AUTO: 2 %
ERYTHROCYTE [DISTWIDTH] IN BLOOD BY AUTOMATED COUNT: 12.9 % (ref 10–15)
GFR SERPL CREATININE-BSD FRML MDRD: 77 ML/MIN/1.73M2
GLUCOSE SERPL-MCNC: 120 MG/DL (ref 70–99)
HCT VFR BLD AUTO: 41.1 % (ref 35–47)
HGB BLD-MCNC: 13.9 G/DL (ref 11.7–15.7)
IMM GRANULOCYTES # BLD: 0 10E3/UL
IMM GRANULOCYTES NFR BLD: 0 %
INTERPRETATION ECG - MUSE: NORMAL
LIPASE SERPL-CCNC: 18 U/L (ref 13–60)
LYMPHOCYTES # BLD AUTO: 1.9 10E3/UL (ref 0.8–5.3)
LYMPHOCYTES NFR BLD AUTO: 23 %
MCH RBC QN AUTO: 29.6 PG (ref 26.5–33)
MCHC RBC AUTO-ENTMCNC: 33.8 G/DL (ref 31.5–36.5)
MCV RBC AUTO: 87 FL (ref 78–100)
MONOCYTES # BLD AUTO: 0.6 10E3/UL (ref 0–1.3)
MONOCYTES NFR BLD AUTO: 7 %
NEUTROPHILS # BLD AUTO: 5.6 10E3/UL (ref 1.6–8.3)
NEUTROPHILS NFR BLD AUTO: 68 %
NRBC # BLD AUTO: 0 10E3/UL
NRBC BLD AUTO-RTO: 0 /100
P AXIS - MUSE: 51 DEGREES
PLATELET # BLD AUTO: 255 10E3/UL (ref 150–450)
POTASSIUM SERPL-SCNC: 4 MMOL/L (ref 3.4–5.3)
PR INTERVAL - MUSE: 158 MS
PROT SERPL-MCNC: 6.9 G/DL (ref 6.4–8.3)
QRS DURATION - MUSE: 80 MS
QT - MUSE: 396 MS
QTC - MUSE: 439 MS
R AXIS - MUSE: -7 DEGREES
RBC # BLD AUTO: 4.7 10E6/UL (ref 3.8–5.2)
SODIUM SERPL-SCNC: 141 MMOL/L (ref 136–145)
SPECIMEN VOL UR: 3090 ML
SYSTOLIC BLOOD PRESSURE - MUSE: NORMAL MMHG
T AXIS - MUSE: 24 DEGREES
TROPONIN T SERPL HS-MCNC: 35 NG/L
TSH SERPL DL<=0.005 MIU/L-ACNC: 1.41 UIU/ML (ref 0.3–4.2)
VENTRICULAR RATE- MUSE: 74 BPM
WBC # BLD AUTO: 8.2 10E3/UL (ref 4–11)

## 2022-10-04 PROCEDURE — 71046 X-RAY EXAM CHEST 2 VIEWS: CPT

## 2022-10-04 PROCEDURE — 81050 URINALYSIS VOLUME MEASURE: CPT

## 2022-10-04 PROCEDURE — 83690 ASSAY OF LIPASE: CPT | Performed by: EMERGENCY MEDICINE

## 2022-10-04 PROCEDURE — 71046 X-RAY EXAM CHEST 2 VIEWS: CPT | Mod: 26 | Performed by: RADIOLOGY

## 2022-10-04 PROCEDURE — 80053 COMPREHEN METABOLIC PANEL: CPT | Performed by: EMERGENCY MEDICINE

## 2022-10-04 PROCEDURE — 84443 ASSAY THYROID STIM HORMONE: CPT | Performed by: EMERGENCY MEDICINE

## 2022-10-04 PROCEDURE — 93010 ELECTROCARDIOGRAM REPORT: CPT | Performed by: EMERGENCY MEDICINE

## 2022-10-04 PROCEDURE — 83735 ASSAY OF MAGNESIUM: CPT | Performed by: EMERGENCY MEDICINE

## 2022-10-04 PROCEDURE — 77067 SCR MAMMO BI INCL CAD: CPT

## 2022-10-04 PROCEDURE — 84484 ASSAY OF TROPONIN QUANT: CPT | Performed by: EMERGENCY MEDICINE

## 2022-10-04 PROCEDURE — 99284 EMERGENCY DEPT VISIT MOD MDM: CPT | Mod: 25 | Performed by: EMERGENCY MEDICINE

## 2022-10-04 PROCEDURE — 82340 ASSAY OF CALCIUM IN URINE: CPT

## 2022-10-04 PROCEDURE — 85004 AUTOMATED DIFF WBC COUNT: CPT | Performed by: EMERGENCY MEDICINE

## 2022-10-04 PROCEDURE — 93005 ELECTROCARDIOGRAM TRACING: CPT | Performed by: EMERGENCY MEDICINE

## 2022-10-04 PROCEDURE — 36415 COLL VENOUS BLD VENIPUNCTURE: CPT | Performed by: EMERGENCY MEDICINE

## 2022-10-04 PROCEDURE — 99285 EMERGENCY DEPT VISIT HI MDM: CPT | Mod: 25 | Performed by: EMERGENCY MEDICINE

## 2022-10-04 ASSESSMENT — ACTIVITIES OF DAILY LIVING (ADL): ADLS_ACUITY_SCORE: 35

## 2022-10-04 NOTE — TELEPHONE ENCOUNTER
DX, Referring NOTES: Serum Calcium Elevated elevated parathyroid hormone; referred by Dr. Sun Person    For Cancer Patients: Need the original operative and surgical pathology reports and all imaging reports/images related to the disease (includes all thyroid US, nuclear thyroid and total body scans, PET scans, chest CT reports since prior to the diagnosis ).   APPT DATE: 10/26/2022   NOTES (FOR ALL VISITS) STATUS DETAILS   OFFICE NOTES from referring provider Internal [facility]:  9/16/22 - [facility] OV with Dr. Person   OFFICE NOTES from other specialist Received / Internal [facility]:  9/30/22 - ENDO E Consult with Dr. Eid    [facility]:  6/29/22 - ENT OV with Dr. Hernández   ED NOTES N/A    OPERATIVE REPORT  (thyroid, pituitary, adrenal, parathyroid)  (All op notes related to diagnoses) N/A    MEDICATION LIST Internal    IMAGING      DEXASCAN Internal [facility]:  (EDWIGE) 10/10/22 - DEXA  10/4/17 - DEXA   MRI (BRAIN) Internal [facility]:  1/7/21 - MRI Brain   XR (Chest) [facility]:  7/11/18 - XR Chest  3/6/18 - XR Chest  11/11/16 - XR Chest   CT (HEAD/NECK/CHEST/ABDOMEN) [facility]:  9/16/22 - CT Abd/Pelvis  3/21/22 - CT Chest  11/11/18 - CT Head  7/11/18 - CT Cervical Spine  7/11/18 - CT Head   LABS     DIABETES: HBGA1C, CREATININE, FASTING LIPIDS, MICROALBUMIN URINE, POTASSIUM, TSH, T4    THYROID: TSH, T4, CBC, THYRODLONULIN, TOTAL T3, FREE T4, CALCITONIN, CEA [facility]:  10/4/22 - Calcium  9/26/22 - Parathyroid Hormone  9/26/22 - Vitamin D  9/16/22 - CBC  9/16/22 - CMP  9/16/22 - TSH, T4  3/21/22 - BMP  2/16/22 - HBGA1C  2/16/22 - Lipid  11/11/16 - Potassium  1/2/15 - Creatinine  1/2/15 - Glucose

## 2022-10-05 ENCOUNTER — MYC MEDICAL ADVICE (OUTPATIENT)
Dept: FAMILY MEDICINE | Facility: CLINIC | Age: 72
End: 2022-10-05

## 2022-10-05 VITALS
TEMPERATURE: 98.2 F | HEART RATE: 81 BPM | RESPIRATION RATE: 18 BRPM | SYSTOLIC BLOOD PRESSURE: 154 MMHG | DIASTOLIC BLOOD PRESSURE: 84 MMHG | OXYGEN SATURATION: 96 %

## 2022-10-05 DIAGNOSIS — R00.2 PALPITATIONS: ICD-10-CM

## 2022-10-05 DIAGNOSIS — R07.9 CHEST PAIN ON EXERTION: Primary | ICD-10-CM

## 2022-10-05 LAB
CA-I BLD-MCNC: 4.8 MG/DL (ref 4.4–5.2)
MAGNESIUM SERPL-MCNC: 2 MG/DL (ref 1.7–2.3)
TROPONIN T SERPL HS-MCNC: 29 NG/L

## 2022-10-05 PROCEDURE — 84484 ASSAY OF TROPONIN QUANT: CPT | Performed by: EMERGENCY MEDICINE

## 2022-10-05 PROCEDURE — 82330 ASSAY OF CALCIUM: CPT | Performed by: EMERGENCY MEDICINE

## 2022-10-05 PROCEDURE — 250N000013 HC RX MED GY IP 250 OP 250 PS 637: Performed by: EMERGENCY MEDICINE

## 2022-10-05 PROCEDURE — 36415 COLL VENOUS BLD VENIPUNCTURE: CPT | Performed by: EMERGENCY MEDICINE

## 2022-10-05 RX ORDER — ASPIRIN 81 MG/1
324 TABLET, CHEWABLE ORAL ONCE
Status: COMPLETED | OUTPATIENT
Start: 2022-10-05 | End: 2022-10-05

## 2022-10-05 RX ORDER — NITROGLYCERIN 0.4 MG/1
0.4 TABLET SUBLINGUAL EVERY 5 MIN PRN
Status: DISCONTINUED | OUTPATIENT
Start: 2022-10-05 | End: 2022-10-05 | Stop reason: HOSPADM

## 2022-10-05 RX ADMIN — NITROGLYCERIN 0.4 MG: 0.4 TABLET SUBLINGUAL at 01:28

## 2022-10-05 RX ADMIN — ASPIRIN 81 MG CHEWABLE TABLET 324 MG: 81 TABLET CHEWABLE at 01:17

## 2022-10-05 ASSESSMENT — ENCOUNTER SYMPTOMS
NAUSEA: 0
ABDOMINAL PAIN: 1
SHORTNESS OF BREATH: 0
PALPITATIONS: 1
COUGH: 0
VOMITING: 0
FEVER: 0

## 2022-10-05 ASSESSMENT — ACTIVITIES OF DAILY LIVING (ADL): ADLS_ACUITY_SCORE: 35

## 2022-10-05 NOTE — ED NOTES
ED Triage Provider Note  [facility]  Encounter Date: Oct 4, 2022    History:  Chief Complaint   Patient presents with     Palpitations     Jennifer Jane is a 71 year old female who presents to the ED with palpitations and chest pressure.  Patient states that she has had the symptoms on and off for the majority of this year.  They have significantly worsened over the past 3 days but she is overall felt faint and generally weak.  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  She is listened to her heart and feels that it may be irregular.  She feels that it is skipping every third beat.  She has seen her doctor for this issue and is currently being worked up for parathyroidism/hypercalcemia.  She has not had any recent cardiac evaluation.  States she has had stress test/ultrasound which were negative, 2 years ago.  Symptoms have been intermittent since onset.  No particular aggravating alleviating factors.  Also complains of a pressure that is underneath her left breast.  Is nonradiating.  Gets worse after eating.    Review of Systems:  10 point review of systems negative aside from what is mentioned in the HPI.    Exam:  BP (!) 157/91   Pulse 69   Temp 98.2  F (36.8  C) (Oral)   Resp 18   LMP  (LMP Unknown)   SpO2 98%   General: No acute distress. Appears stated age.   Cardio: Regular rate, extremities well perfused  Resp: Normal work of breathing, grossly normal respiratory rate  Neuro: Alert. CN II-XII grossly intact. Grossly intact strength.       Medical Decision Making:  Patient arriving to the ED with problem as above. A medical screening exam was performed. \ orders initiated from Triage. The patient is appropriate to wait in lobby.    Donnie Naqvi,  on 10/4/2022 at 10:16 PM     Donnie Naqvi,   10/04/22 2211

## 2022-10-05 NOTE — TELEPHONE ENCOUNTER
Pt calling with concerns about irregular heart beat, feeling nauseated, feeling faint X last 3 days and feeling worse tonight.    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    Reports missing sometimes 5 beats/minutes    Pt also reports elevated parathyroid levels     States following BP's are low for her;  /65 felt nauseated         112/64 P 67 irregular         119/54 P 54 today      Pt Denies;  Chest pain  Unable to walk  Difficulty breathing at time of this call    According to the protocol, patient should see a HCP within 4 hours.  Care advice given. Patient verbalizes understanding and agrees with plan of care.     Trinity Pollock RN, Nurse Advisor 9:33 PM 10/4/2022  COVID 19 Nurse Triage Plan/Patient Instructions    Please be aware that novel coronavirus (COVID-19) may be circulating in the community. If you develop symptoms such as fever, cough, or SOB or if you have concerns about the presence of another infection including coronavirus (COVID-19), please contact your health care provider or visit [email or web address].     Disposition/Instructions    ED Visit recommended. Follow protocol based instructions.     Bring Your Own Device:  Please also bring your smart device(s) (smart phones, tablets, laptops) and their charging cables for your personal use and to communicate with your care team during your visit.    Thank you for taking steps to prevent the spread of this virus.  o Limit your contact with others.  o Wear a simple mask to cover your cough.  o Wash your hands well and often.      Reason for Disposition    [1] Skipped or extra beat(s) AND [2] occurs 4 or more times per minute    Additional Information    Negative: Passed out (i.e., lost consciousness, collapsed and was not responding)    Negative: Shock suspected (e.g., cold/pale/clammy skin, too weak to stand, low BP, rapid pulse)    Negative:  Difficult to awaken or acting confused (e.g., disoriented, slurred speech)    Negative: Visible sweat on face or sweat dripping down face    Negative: Unable to walk, or can only walk with assistance (e.g., requires support)    Negative: [1] Received SHOCK from implantable cardiac defibrillator AND [2] persisting symptoms (i.e., palpitations, lightheadedness)    Negative: [1] Dizziness, lightheadedness, or weakness AND [2] heart beating very rapidly (e.g., > 140 / minute)    Negative: [1] Dizziness, lightheadedness, or weakness AND [2] heart beating very slowly (e.g., < 50 / minute)    Negative: Sounds like a life-threatening emergency to the triager    Negative: Chest pain    Negative: Implantable Cardiac Defibrillator (ICD) or a pacemaker symptoms or questions    Negative: Difficulty breathing    Negative: Dizziness, lightheadedness, or weakness    Negative: [1] Heart beating very rapidly (e.g., > 140 / minute) AND [2] present now  (Exception: during exercise)    Negative: Heart beating very slowly (e.g., < 50 / minute)  (Exception: athlete and heart rate normal for caller)    Negative: New or worsened shortness of breath with activity (dyspnea on exertion)    Negative: Patient sounds very sick or weak to the triager    Negative: [1] Heart beating very rapidly (e.g., > 140 / minute) AND [2] not present now  (Exception: during exercise)    Protocols used: HEART RATE AND HEARTBEAT [id number]

## 2022-10-05 NOTE — TELEPHONE ENCOUNTER
Detailed message left for patient stating Dr. Person ordered stress test, and scheduling number: [phone].    KERRI RodgersN, RN-BC  [facility]

## 2022-10-05 NOTE — TELEPHONE ENCOUNTER
PT also called about this mychart.  PT says the ER is NOT able to order this outpatient stress test.  Pt needs the stress test in 1-2 days.  Will Dr. Sun Person order this?  I pended the exercise stress test.    OK to leave a detailed message.  ION Babin    From ER AVS-  would strongly recommend following up with  your clinic doctor within the next 1-2 days for a  stress test as soon as possible. Return to the ER  with any new or worsening

## 2022-10-05 NOTE — ED TRIAGE NOTES
Pt ambulatory to triage with c/o irregular heart beat. Denies cp or sob.      Triage Assessment     Row Name 10/04/22 6111       Triage Assessment (Adult)    Airway WDL WDL       Respiratory WDL    Respiratory WDL WDL       Skin Circulation/Temperature WDL    Skin Circulation/Temperature WDL WDL       Cardiac WDL    Cardiac WDL X  feels heart skipping beats       Peripheral/Neurovascular WDL    Peripheral Neurovascular WDL WDL       Cognitive/Neuro/Behavioral WDL    Cognitive/Neuro/Behavioral WDL WDL

## 2022-10-05 NOTE — ED PROVIDER NOTES
ED Provider Note  [facility] EMERGENCY DEPARTMENT ([facility])  10/04/22      History     Chief Complaint   Patient presents with     Palpitations     The history is provided by the patient and medical records.     Jennifer Jane is a 71 year old female with past medical history significant for HTN, prediabetes, asthma, hypercholesterolemia, and hypercalcemia who presents to the ED with a primary complaint of irregular heartbeat tonight, some epigastric discomfort. She reports that on Friday, 4 days ago, she reports that she was carrying some things up the stairs, started to feel lightheaded, so decided to take it easy.  Symptoms resolved with rest.  She states she checked her blood pressure that day and it was lower than typical for her-typically she runs in the 130 systolic, was 113 systolic.  She states that tonight she had some epigastric discomfort which started approximately 4 hours prior to my seeing her.  She states that made her wonder if this could be heart related so she took out her stethoscope (she is a retired nurse) and listened to her heart.  She started to notice that her heart rate was irregular.  She was not necessarily feeling palpitations though.  No chest pain.  She does report that she had a similar epigastric pressure on multiple occasions in the past.  She saw her clinic doctor recently, states that she was found to have high calcium and is currently undergoing evaluation for this.  She states her amylase was also high, though on repeat it went down.  She states she has had her gallbladder out.  She denies any previous cardiac history.  She does have a family history of CAD in her father at a young age.  She is a non-smoker.  She does have hypertension and hyperlipidemia, states she is prediabetic.  She states typically when the epigastric pressure comes it lasts a few hours.  She has not noticed it specifically with exertion, though states  that at times she has noticed it with eating.  No reported lower extremity symptoms.  She denies any fever, cough, shortness of breath, nausea or vomiting today.  No other complaints.    This part of the medical record was transcribed by Katie Yee, Medical Scribe, from a dictation done by Juliann Thomas MD.       Past Medical History  Past Medical History:   Diagnosis Date     Arthritis     Fibromyalgia, ? Osteoarthritis     Back injury     Micro discectomy, approx date 1983     Hypercalcemia 11/30/2019     Insomnia 12/26/2012     Problem list name updated by automated process. Provider to review     Reduced vision 09/2016    L vision block, bilateral elevated eye presures,poss. Glacom     Uncomplicated asthma     Diagnosed as 2 yo     Past Surgical History:   Procedure Laterality Date     BACK SURGERY  1984    micro-disc-ectomy     BREAST SURGERY  1984    L Breast bx     CHOLECYSTECTOMY  1990     TUBAL LIGATION  1989     ZZC STOMACH SURGERY PROCEDURE UNLISTED  3/1990    Cholecystectomy, 11/1989 tubal ligation     albuterol (VENTOLIN HFA) 108 (90 BASE) MCG/ACT inhaler  amLODIPine (NORVASC) 5 MG tablet  atorvastatin (LIPITOR) 20 MG tablet  azelastine (ASTELIN) 0.1 % nasal spray  Calcium Carbonate Antacid (TUMS PO)  DiphenhydrAMINE HCl (BENADRYL PO)  fluticasone-salmeterol (ADVAIR-HFA) 230-21 MCG/ACT inhaler  latanoprost (XALATAN) 0.005 % ophthalmic solution  losartan (COZAAR) 50 MG tablet  montelukast (SINGULAIR) 10 MG tablet  mupirocin (BACTROBAN) 2 % external ointment      Allergies   Allergen Reactions     Canola Oil [Vegetable Oil] Anaphylaxis and GI Disturbance     Animal Dander Unknown     Dust Mites Unknown     Grass      Hydroxyzine Other (See Comments)     Passed out         Pollen Extract Unknown     Trees      Vistaril      Chlorhexidine Itching and Rash     Hibiclens       Family History  Family History   Problem Relation Age of Onset     Heart Disease Mother      Diabetes Mother      Coronary  Artery Disease Mother      Hypertension Mother      Anesthesia Reaction Mother         Anaphylaxis, oral opioixd     Asthma Mother      Thyroid Disease Mother      Low Back Problems Mother      Heart Disease Father      Diabetes Father      Coronary Artery Disease Father      Hypertension Father      Hyperlipidemia Father      Low Back Problems Father      Asthma Maternal Grandmother      Coronary Artery Disease Maternal Grandfather      Cerebrovascular Disease Paternal Grandfather      Diabetes Sister      Hypertension Sister      Low Back Problems Sister      Asthma Daughter      Low Back Problems Brother      Social History   Social History     Tobacco Use     Smoking status: Never Smoker     Smokeless tobacco: Never Used   Substance Use Topics     Alcohol use: Yes     Alcohol/week: 0.0 standard drinks     Comment: 0 -2 times a year     Drug use: No      Past medical history, past surgical history, medications, allergies, family history, and social history were reviewed with the patient. No additional pertinent items.       Review of Systems   Constitutional: Negative for fever.   Respiratory: Negative for cough and shortness of breath.    Cardiovascular: Positive for palpitations. Negative for chest pain.   Gastrointestinal: Positive for abdominal pain. Negative for nausea and vomiting.     A complete review of systems was performed with pertinent positives and negatives noted in the HPI, and all other systems negative.    Physical Exam   BP: (!) 157/91  Pulse: 69  Temp: 98.2  F (36.8  C)  Resp: 18  SpO2: 98 %  Physical Exam  Constitutional:       General: She is not in acute distress.     Appearance: She is not diaphoretic.   HENT:      Head: Atraumatic.   Eyes:      General: No scleral icterus.  Cardiovascular:      Heart sounds: Normal heart sounds.   Pulmonary:      Effort: No respiratory distress.      Breath sounds: Normal breath sounds.   Abdominal:      Palpations: Abdomen is soft.      Tenderness: There  is abdominal tenderness (mild tenderness without guarding).   Musculoskeletal:         General: No tenderness.   Skin:     General: Skin is warm.      Findings: No rash.         ED Course      Procedures            EKG Interpretation:      Interpreted by Juliann Thomas MD  Time reviewed: 0015  Symptoms at time of EKG: epigastric pressure, palpitations   Rhythm: normal sinus   Rate: 74  Axis: Normal  Ectopy: premature atrial contraction  Conduction: normal  ST Segments/ T Waves: No ST-T wave changes  Q Waves: none  Comparison to prior:     Clinical Impression: NSR with premature atrial contraction                          Results for orders placed or performed during the hospital encounter of 10/04/22   XR Chest 2 Views     Status: None    Narrative    EXAM: XR CHEST 2 VIEWS  LOCATION: [facility]  DATE/TIME: 10/4/2022 10:41 PM    INDICATION: chest pressure, palpitations  COMPARISON: None.      Impression    IMPRESSION: Negative chest.   Comprehensive metabolic panel     Status: Abnormal   Result Value Ref Range    Sodium 141 136 - 145 mmol/L    Potassium 4.0 3.4 - 5.3 mmol/L    Chloride 105 98 - 107 mmol/L    Carbon Dioxide (CO2) 26 22 - 29 mmol/L    Anion Gap 10 7 - 15 mmol/L    Urea Nitrogen 12.7 8.0 - 23.0 mg/dL    Creatinine 0.81 0.51 - 0.95 mg/dL    Calcium 10.4 (H) 8.8 - 10.2 mg/dL    Glucose 120 (H) 70 - 99 mg/dL    Alkaline Phosphatase 87 35 - 104 U/L    AST 35 10 - 35 U/L    ALT 32 10 - 35 U/L    Protein Total 6.9 6.4 - 8.3 g/dL    Albumin 4.5 3.5 - 5.2 g/dL    Bilirubin Total 0.5 <=1.2 mg/dL    GFR Estimate 77 >60 mL/min/1.73m2   Lipase     Status: Normal   Result Value Ref Range    Lipase 18 13 - 60 U/L   Troponin T, High Sensitivity     Status: Abnormal   Result Value Ref Range    Troponin T, High Sensitivity 35 (H) <=14 ng/L   TSH with free T4 reflex     Status: Normal   Result Value Ref Range    TSH 1.41 0.30 - 4.20 uIU/mL   CBC with platelets and  differential     Status: None   Result Value Ref Range    WBC Count 8.2 4.0 - 11.0 10e3/uL    RBC Count 4.70 3.80 - 5.20 10e6/uL    Hemoglobin 13.9 11.7 - 15.7 g/dL    Hematocrit 41.1 35.0 - 47.0 %    MCV 87 78 - 100 fL    MCH 29.6 26.5 - 33.0 pg    MCHC 33.8 31.5 - 36.5 g/dL    RDW 12.9 10.0 - 15.0 %    Platelet Count 255 150 - 450 10e3/uL    % Neutrophils 68 %    % Lymphocytes 23 %    % Monocytes 7 %    % Eosinophils 2 %    % Basophils 0 %    % Immature Granulocytes 0 %    NRBCs per 100 WBC 0 <1 /100    Absolute Neutrophils 5.6 1.6 - 8.3 10e3/uL    Absolute Lymphocytes 1.9 0.8 - 5.3 10e3/uL    Absolute Monocytes 0.6 0.0 - 1.3 10e3/uL    Absolute Eosinophils 0.2 0.0 - 0.7 10e3/uL    Absolute Basophils 0.0 0.0 - 0.2 10e3/uL    Absolute Immature Granulocytes 0.0 <=0.4 10e3/uL    Absolute NRBCs 0.0 10e3/uL   Magnesium     Status: Normal   Result Value Ref Range    Magnesium 2.0 1.7 - 2.3 mg/dL   Troponin T, High Sensitivity     Status: Abnormal   Result Value Ref Range    Troponin T, High Sensitivity 29 (H) <=14 ng/L   Ionized Calcium     Status: Normal   Result Value Ref Range    Calcium Ionized 4.8 4.4 - 5.2 mg/dL   EKG 12-lead     Status: None   Result Value Ref Range    Systolic Blood Pressure  mmHg    Diastolic Blood Pressure  mmHg    Ventricular Rate 74 BPM    Atrial Rate 74 BPM    HI Interval 158 ms    QRS Duration 80 ms     ms    QTc 439 ms    P Axis 51 degrees    R AXIS -7 degrees    T Axis 24 degrees    Interpretation ECG       Sinus rhythm with Premature atrial complexes with Aberrant conduction  Otherwise normal ECG  Unconfirmed report - interpretation of this ECG is computer generated - see medical record for final interpretation  Confirmed by - EMERGENCY ROOM, PHYSICIAN ([id number]),  ISAI FRANZ ([id number]) on 10/4/2022 10:30:10 PM     CBC with platelets differential     Status: None    Narrative    The following orders were created for panel order CBC with platelets differential.  Procedure                                Abnormality         Status                     ---------                               -----------         ------                     CBC with platelets and d...[[id number]]                      Final result                 Please view results for these tests on the individual orders.     Medications   nitroGLYcerin (NITROSTAT) sublingual tablet 0.4 mg (0.4 mg Sublingual Given 10/5/22 0128)   aspirin (ASA) chewable tablet 324 mg (324 mg Oral Given 10/5/22 0117)        Assessments & Plan (with Medical Decision Making)   States that she did not really feel palpitations, just wondered if she might have them, so listen to her heart and realized her heart rate was irregular.  She does appear to be having PACs and PVCs intermittently.  She has been having some intermittent epigastric discomfort, had very mild tenderness here without guarding.  I did give her 1 nitro, she said perhaps it helped a little bit.  EKG without any ischemic change.  Initial troponin very mildly elevated at 35, repeat delta troponin downtrending at 29.  Chest x-ray unremarkable.  Basic labs otherwise not concerning.  She does have multiple risk factors for CAD.  I do not think the palpitations are particularly concerning, though do think is very reasonable to perform an ACS rule out given the minimally elevated troponin, episode of lightheadedness a few days ago, and intermittent epigastric pain.  We discussed admission for observation, she initially agreed, ultimately changed her mind decided to discharge home.  She is strongly encouraged to follow-up with primary care soon as possible to schedule an urgent stress test.  She is encouraged to return immediately with any new or worsening symptoms, any other concerns.  I have low concern for PE or dissection, particularly given that the symptoms have been recurrent and are intermittent.  No sign of infection at this time.  The patient verbalizes understanding and is agreeable  to the plan.    Dictation Disclaimer: Some of this Note has been completed with voice-recognition dictation software. Although errors are generally corrected real-time, there is the potential for a rare error to be present in the completed chart.      I have reviewed the nursing notes. I have reviewed the findings, diagnosis, plan and need for follow up with the patient.    New Prescriptions    No medications on file       Final diagnoses:   Epigastric pain   Palpitations     I, Katie Yee, am serving as a trained medical scribe to document services personally performed by Juliann Thomas MD based on the provider's statements to me on October 5, 2022.  This document has been checked and approved by the attending provider.    I, Juliann Thomas MD, was physically present and have reviewed and verified the accuracy of this note documented by Katie Yee medical scribe.      --  Juliann Thomsa  [facility] EMERGENCY DEPARTMENT  10/4/2022     Juliann Thomas MD  10/05/22 0251

## 2022-10-05 NOTE — ED NOTES
PT A&Ox3. Breathing regular and unlabored. Circuation warm and dry. PT states no pain at this time. IV removed prior to discharge. PT ambulating to exit with S/O. PT educated on discharge instructions and verbalized understanding.

## 2022-10-05 NOTE — DISCHARGE INSTRUCTIONS
I would strongly recommend following up with your clinic doctor within the next 1-2 days for a stress test as soon as possible. Return to the ER with any new or worsening symptoms or any other concerns.

## 2022-10-06 ENCOUNTER — HOSPITAL ENCOUNTER (OUTPATIENT)
Dept: CARDIOLOGY | Facility: CLINIC | Age: 72
Discharge: HOME | End: 2022-10-06
Attending: FAMILY MEDICINE | Admitting: FAMILY MEDICINE
Payer: COMMERCIAL

## 2022-10-06 DIAGNOSIS — R07.9 CHEST PAIN ON EXERTION: ICD-10-CM

## 2022-10-06 PROCEDURE — 93018 CV STRESS TEST I&R ONLY: CPT | Performed by: INTERNAL MEDICINE

## 2022-10-06 PROCEDURE — 93017 CV STRESS TEST TRACING ONLY: CPT

## 2022-10-06 PROCEDURE — 93016 CV STRESS TEST SUPVJ ONLY: CPT | Performed by: INTERNAL MEDICINE

## 2022-10-10 ENCOUNTER — TELEPHONE (OUTPATIENT)
Dept: FAMILY MEDICINE | Facility: CLINIC | Age: 72
End: 2022-10-10

## 2022-10-10 ENCOUNTER — HEALTH MAINTENANCE LETTER (OUTPATIENT)
Age: 72
End: 2022-10-10

## 2022-10-10 ENCOUNTER — ANCILLARY PROCEDURE (OUTPATIENT)
Dept: BONE DENSITY | Facility: CLINIC | Age: 72
End: 2022-10-10
Attending: FAMILY MEDICINE
Payer: COMMERCIAL

## 2022-10-10 DIAGNOSIS — E83.52 SERUM CALCIUM ELEVATED: ICD-10-CM

## 2022-10-10 DIAGNOSIS — R79.89 ELEVATED PARATHYROID HORMONE: Primary | ICD-10-CM

## 2022-10-10 DIAGNOSIS — Z78.0 ASYMPTOMATIC MENOPAUSE: ICD-10-CM

## 2022-10-10 DIAGNOSIS — R79.89 ELEVATED PARATHYROID HORMONE: ICD-10-CM

## 2022-10-10 PROCEDURE — 77080 DXA BONE DENSITY AXIAL: CPT | Performed by: INTERNAL MEDICINE

## 2022-10-10 NOTE — TELEPHONE ENCOUNTER
Jann from [facility] imaging calling. [phone]  PT is coming in today at 3 pm for dexa of wrist.    Do you want the standard Dexa of hip/pelvis/ spine at the same time?  Last done 10/4/2017.    Will send HIGH PRIORITY to Dr. Person.  ION Babin

## 2022-10-10 NOTE — TELEPHONE ENCOUNTER
From endocrine e-consult-  Recommendations:   Next Labs to include phosphorus  Repeat DXA including wrist   24 hour urine calcium and creatinine  to determine creatinine clearance and calcium creatinine clearance ratio.  Ratio < 0.01 is more suggestive of FHH and > 0.02 more suggestive of primary hyperparathyroidism.  [email or web address]      Dr. Sun Person and I reviewed this. I will place the Dexa order and update imaging department.  ION Babin

## 2022-10-13 ENCOUNTER — IMMUNIZATION (OUTPATIENT)
Dept: NURSING | Facility: CLINIC | Age: 72
End: 2022-10-13
Payer: COMMERCIAL

## 2022-10-13 PROCEDURE — 91312 COVID-19,PF,PFIZER BOOSTER BIVALENT: CPT

## 2022-10-13 PROCEDURE — 0124A COVID-19,PF,PFIZER BOOSTER BIVALENT: CPT

## 2022-10-26 ENCOUNTER — ANCILLARY PROCEDURE (OUTPATIENT)
Dept: ULTRASOUND IMAGING | Facility: CLINIC | Age: 72
End: 2022-10-26
Payer: COMMERCIAL

## 2022-10-26 ENCOUNTER — PRE VISIT (OUTPATIENT)
Dept: ENDOCRINOLOGY | Facility: CLINIC | Age: 72
End: 2022-10-26

## 2022-10-26 ENCOUNTER — OFFICE VISIT (OUTPATIENT)
Dept: ENDOCRINOLOGY | Facility: CLINIC | Age: 72
End: 2022-10-26
Attending: FAMILY MEDICINE
Payer: COMMERCIAL

## 2022-10-26 VITALS
SYSTOLIC BLOOD PRESSURE: 135 MMHG | BODY MASS INDEX: 26.65 KG/M2 | HEART RATE: 80 BPM | OXYGEN SATURATION: 96 % | DIASTOLIC BLOOD PRESSURE: 78 MMHG | WEIGHT: 165.1 LBS

## 2022-10-26 DIAGNOSIS — R79.89 ELEVATED PARATHYROID HORMONE: ICD-10-CM

## 2022-10-26 DIAGNOSIS — E83.52 SERUM CALCIUM ELEVATED: ICD-10-CM

## 2022-10-26 DIAGNOSIS — M85.9 LOW BONE DENSITY: Primary | ICD-10-CM

## 2022-10-26 DIAGNOSIS — Z78.0 ASYMPTOMATIC MENOPAUSE: ICD-10-CM

## 2022-10-26 DIAGNOSIS — E83.52 SERUM CALCIUM ELEVATED: Primary | ICD-10-CM

## 2022-10-26 PROCEDURE — 99205 OFFICE O/P NEW HI 60 MIN: CPT

## 2022-10-26 PROCEDURE — 76536 US EXAM OF HEAD AND NECK: CPT | Mod: GC | Performed by: RADIOLOGY

## 2022-10-26 RX ORDER — NITROGLYCERIN 0.4 MG/1
0.4 TABLET SUBLINGUAL EVERY 5 MIN PRN
COMMUNITY
End: 2024-01-03

## 2022-10-26 NOTE — LETTER
10/26/2022       RE: Jennifer Jane  [address]  [facility] [address]     Dear Colleague,    Thank you for referring your patient, Jennifer Jane, to the [facility] [place] at [facility]. Please see a copy of my visit note below.    Endocrine Consult note    Attending Assessment/Plan :     Hypercalcemia with high PTH and borderline hypercalcemia .  The pattern is that of mild primary hyperparathyroidism.  Urine creatinine was not measured with the urine calcium so I cannot calculate the urine calcium creatinine clearance ratio .  Parathyroid ultrasound    Indications for surgical treatment of hyperparathyroidism include the followin.  Calcium > 1 mg/dl over normal not met  2.  Kidney stones- not met  3.  Osteoporosis- not met  4.  Hypercalciuria - borderline met   5. Declining renal function    Addendum  10/26/22 parathyroid US:   Right thyroid nodule # 1 0.5 x 0.3 x 0.6 cm   Parathyroid adenoma candidate right 0.8 x 0.5 x 0.4 - by stills.  This is not seen on cine .  It is also more superior in location than typically seen    Low bone density. The FRAX score at the hip > 3% would lead to recommendation for treatment.   Discussed. Will pursue# 1 first. If imaging is negative would start alendronate.  If imaging localizes abnormal parathyroid might pursue surgical treatment    Post menopausal     Prediabetes by A1c 5.8% on 22    I have independently reviewed and interpreted labs, imaging as indicated.      Indigo Eid MD    Chief complaint:  Jennifer is a 71 year old female seen in consultation at the request of Dr Sun Person for hypercalcemia.   I have reviewed Care Everywhere including Critical access hospital lab reports, imaging reports and provider notes as indicated.      HISTORY OF PRESENT ILLNESS    Hypercalcemia has been intermittently noted since 18.  She was on hydrochlorothiazide since at least  2017 until 11/27/19  She has history of disc fracture after trauma, treated with microdiscectomy. This left a little LLE paresis which was temporary     She is describing the visit to the ED 10/4. She felt like she should check BP, couldn't get a BP;  She was having irregular heart rhythm.  The note states they recommended admission but she declined.  Appt with cardillogy is pending.      Endocrine relevant labs are as follows:  11/27/19 Ca 10.5, creatinine 0.86  2/16/22 HgbA1c 5.8%  9/16/22 Ca 10.4, TSH 1.61  9/26/22 iCa 5.3, PTH 68, 25OHD 25  10/4/22 Ca 10.4, Mg 2, alk phos 87, creatinine 0.81, TSH 1.41; 24 hour urine calcium 250 mg/24hours,   10/5/2022 iCa     Drink a ton of mmilk, yogurt, ice cream  Takes tums for heartburn-- hasn't taken in 4 days at least     Relevant imaging is as follows: (as read by me as it pertains to endocrine relevant organs)  11/17/2021 MRI brain: there are no T1 coronal pituitary images.  Pituitary appears normal on sagittal;  Slight convex up upper border on T2  3/21/22 CT Chest: thyroid appears normal;   9/16/22 CT abdomen with contrast - no kidney stones  10/10/22 DXA lowest T-score -1.2 right femoral neck     REVIEW OF SYSTEMS  Weight is down 20# in the last 2 years - eating less  Sleep at night not great - sleep problems for years; gets very uncomfortable ; sometimes can't fall asleep;   Sweating with vacuuming for 1.5 hours   Cardiac can feel PVCs after eating, notices this every day  Respiratory: richter when in [place];   GI: Constipation and diarrhea; Heartburn at night  Lateral lower extremity spasms, every few days - very intense x years   Joint problem right hand/thumb region - has had injection for this   10 system ROS otherwise as per the HPI or negative    Past Medical History  Past Medical History:   Diagnosis Date     Arthritis     Fibromyalgia, ? Osteoarthritis     Back injury     Micro discectomy, approx date 1983     Glaucoma      Hiatal hernia      HTN (hypertension)       Hypercalcemia 11/30/2019     Insomnia 12/26/2012     Problem list name updated by automated process. Provider to review     Reduced vision 09/2016    L vision block, bilateral elevated eye presures,poss. Glacom     Uncomplicated asthma     Diagnosed as 1 yo     Past Surgical History:   Procedure Laterality Date      cataract extraction with intraocular lens implant Right 01/31/2019     BACK SURGERY  1984    micro-disc-ectomy     BREAST SURGERY  1984    L Breast bx     cataract extraction with intraocular lens Left 12/06/2018     CHOLECYSTECTOMY  1990     IRIDOTOMY/IRIDECTOMY LASER SURGERY Left 09/27/2016     IRIDOTOMY/IRIDECTOMY LASER SURGERY Right 10/18/2016     TUBAL LIGATION  1989     [facility] STOMACH SURGERY PROCEDURE UNLISTED  03/1990    Cholecystectomy, 11/1989 tubal ligation     Medications  Current Outpatient Medications   Medication Sig Dispense Refill     albuterol (VENTOLIN HFA) 108 (90 BASE) MCG/ACT inhaler Inhale 2 puffs into the lungs every 6 hours as needed for shortness of breath / dyspnea 3 Inhaler 1     amLODIPine (NORVASC) 5 MG tablet Take 1 tablet (5 mg) by mouth At Bedtime 90 tablet 3     atorvastatin (LIPITOR) 20 MG tablet Take 1 tablet (20 mg) by mouth daily 90 tablet 3     azelastine (ASTELIN) 0.1 % nasal spray Spray 1 spray into both nostrils 2 times daily       Calcium Carbonate Antacid (TUMS PO) Take  by mouth At Bedtime.       DiphenhydrAMINE HCl (BENADRYL PO) Take 25 mg by mouth       fluticasone-salmeterol (ADVAIR-HFA) 230-21 MCG/ACT inhaler        latanoprost (XALATAN) 0.005 % ophthalmic solution Place 1 drop into the right eye At Bedtime       losartan (COZAAR) 50 MG tablet Take 1 tablet (50 mg) by mouth At Bedtime 90 tablet 3     montelukast (SINGULAIR) 10 MG tablet TAKE ONE TABLET BY MOUTH EVERY DAY AS DIRECTED  1     mupirocin (BACTROBAN) 2 % external ointment Apply topically 3 times daily       nitroGLYcerin (NITROSTAT) 0.4 MG sublingual tablet Place 0.4 mg under the tongue every 5  minutes as needed for chest pain For chest pain place 1 tablet under the tongue every 5 minutes for 3 doses. If symptoms persist 5 minutes after 1st dose call 911.       Allergies  Allergies   Allergen Reactions     Canola Oil [Vegetable Oil] Anaphylaxis and GI Disturbance     Animal Dander Unknown     Dust Mites Unknown     Grass      Hydroxyzine Other (See Comments)     Passed out         Pollen Extract Unknown     Trees      Vistaril      Chlorhexidine Itching and Rash     Hibiclens       Family History  Family History   Problem Relation Age of Onset     Heart Disease Mother      Diabetes Mother      Coronary Artery Disease Mother      Hypertension Mother      Anesthesia Reaction Mother         Anaphylaxis, oral opioixd     Asthma Mother      Thyroid Disease Mother      Low Back Problems Mother      Heart Disease Father      Diabetes Father      Coronary Artery Disease Father      Hypertension Father      Hyperlipidemia Father      Low Back Problems Father      Diabetes Sister      Hypertension Sister      Low Back Problems Sister      Thyroid Disease Sister      Low Back Problems Brother      Asthma Maternal Grandmother      Coronary Artery Disease Maternal Grandfather      Cerebrovascular Disease Paternal Grandfather      Asthma Daughter      Calcium Disorder No family hx of      Hip fracture No family hx of      Social History  Social History     Tobacco Use     Smoking status: Never     Smokeless tobacco: Never   Substance Use Topics     Alcohol use: Yes     Alcohol/week: 0.0 standard drinks     Comment: 0 -2 times a year     Drug use: No     Retired nurse; 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  [employer]; cleaning [employer]    Physical Exam  /78   Pulse 80   Wt 74.9 kg (165 lb 1.6 oz)   LMP  (LMP Unknown)   SpO2 96%   BMI 26.65 kg/m    Body mass index is 26.65 kg/m .  GENERAL : pleasant woman in  In no apparent distress. She is sitting on the exam table.   SKIN: Normal color, normal  temperature, texture.       EYES: PER, EOMI, No scleral icterus,  No proptosis, conjunctival redness, stare, retraction  NECK: No visible masses. No palpable adenopathy, or masses.   THYROID:  Not palpable;   RESP: Lungs clear to auscultation bilaterally  CARDIAC: Regular rate and rhythm, normal S1 S2, without murmurs, rubs or gallops      NEURO: awake, alert, responds appropriately to questions.    Moves all extremities;   Slight tremor of the outstretched hand left.  DTRs  2 /4 KJ ,   EXTREMITIES: No clubbing, cyanosis or edema.    DATA REVIEW      [facility] - [address] [address]  Phone: [phone]   Fax: [phone]                    Impression  Based on BMD diagnosis is consistent with low bone density based on WHO criteria Ref. 1     Results   Lumbar spine   T-score 0.4 (L1-4), BMD is 1.225 g/cm2  Left femoral neck  T-score -1.3  Right femoral neck  T-score -1.5  Left total femur  T-score -0.2 , BMD is 0.986 g/cm2  Right total femur  T-score -0.3, BMD is 0.967 g/cm2  Radius  T-score -1.5, BMD 0.600 g/cm2   Interval changeNo prior study available for comparison. Previous study was performed on another scanner.  Fracture risk The estimated 10-year risk for a major osteoporotic fracture is 16.5% and for a hip fracture is 3.2%. FRAX was calculated based on information provided by the patient on the DXA questionnaire. Ref. 4  Repeat Recommend repeat DXA in 2 years.  The above are general recommendations for follow-up testing and intervals between BMD testing should be determinedaccording to each patient's clinical status.  Principal result :  ARMANI Bermudez MD, BECKY VINCENT      EXAM: CT CHEST PULMONARY EMBOLISM W CONTRASTLOC[facility]  DATE/TIME: 3/21/2022 6:35 PM  INDICATION: PE suspected, low/intermediate probability, positive D-dimer. Chest pain.  COMPARISON: None.  TECHNIQUE: CT chest pulmonary angiogram  during arterial phase injection of IV contrast. Multiplanar reformats and MIP reconstructions were performed. Dose reduction techniques were used.   CONTRAST: 67 mL Isovue 370.     FINDINGS:  ANGIOGRAM CHEST: Pulmonary arteries are normal caliber and negative for pulmonary emboli. Thoracic aorta is negative for dissection. No CT evidence of right heart strain.  LUNGS AND PLEURA: Calcified granuloma right lower lobe. The lungs are otherwise clear.  MEDIASTINUM/AXILLAE: Normal.  CORONARY ARTERY CALCIFICATION: Mild.  UPPER ABDOMEN: Normal.  MUSCULOSKELETAL: Normal.                                                              IMPRESSION:1.  Negative CT pulmonary angiogram. No evidence of pulmonary embolus.      US THYROID 10/26/2022 3:00 PM     COMPARISON: None available.     HISTORY: Serum calcium elevated; Elevated parathyroid hormone     FINDINGS:   Thyroid parenchyma: heterogenous  The right lobe of the thyroid measures: 1.8 x 1.4 x 4.3 cm  The left lobe of the thyroid measures: 1.2 x 1.6 x 4.0 cm  The thyroid isthmus measures: 2.2 mm     Nodule 1:  Lobe: Right  Location: Mid lobe  Size: 0.5 x 0.3 x 0.6 cm  Composition: Spongiform (0 points)  Echogenicity: Hyperechoic or isoechoic (1 point)  Shape: Wider than tall (0 points)  Margin: Smooth (0 points)  Echogenic Foci: None or large comet tail artifact (0 points)  Stability: Not previously imaged  TIRADS: TR2 (1-2 points) Not suspicious     Posteromedial to the right thyroid lobe there is a 0.5 x 0.8 x 0.4 cm  hypoechoic lesion with no definite involvement of the thyroid  parenchyma.                                                                      Impression: 0.8 cm hypoechoic lesion posteromedial to the right  thyroid lobe, which may represent a parathyroid adenoma. Consider  further evaluation with technetium 99 sestamibi scan     ACR TI-RADS recommendations  TR2 (2 points) & TR1 (0 points) -No FNA or follow-up  TR3 (3 points) - FNA if ? 2.5cm, follow-up if 1.5  -2.4 cm in 1, 3 and  5 years  TR4 (4-6 points) - FNA if ? 1.5cm, follow-up if 1 -1.4 cm in 1, 2, 3  and 5 years  TR5 (?7 points) - FNA if ? 1cm, follow-up if 0.5 -0.9 cm every year  for 5 years      I have personally reviewed the examination and initial interpretation  and I agree with the findings.     LUMA ZHANG MD

## 2022-10-26 NOTE — PROGRESS NOTES
Endocrine Consult note    Attending Assessment/Plan :     Hypercalcemia with high PTH and borderline hypercalcuria .  The pattern is that of mild primary hyperparathyroidism.  Urine creatinine was not measured with the urine calcium so I cannot calculate the urine calcium creatinine clearance ratio .  Parathyroid ultrasound    Indications for surgical treatment of hyperparathyroidism include the followin.  Calcium > 1 mg/dl over normal not met  2.  Kidney stones- not met  3.  Osteoporosis- not met  4.  Hypercalciuria - borderline met   5. Declining renal function    Addendum  10/26/22 parathyroid US:   Right thyroid nodule # 1 0.5 x 0.3 x 0.6 cm   Parathyroid adenoma candidate right 0.8 x 0.5 x 0.4 - by stills.  This is not seen on cine .  It is also more superior in location than typically seen  Left cine shows ? Parathyroid candidate inferiorly - but only seen in trans, not long   2022 123I sestamibi subtraction SPECT - localizes to the left   5/15/2023 Ca 10.4, phos 3.3 , PTH 58 ,  creatinine 0.78  23 244 hour urine calcium 260 mg/24 hours, 7.4 mg/dl, urine creatinine 1.06 g/spec; 30.7 mg/dl, volume 3450.   Urine calcium/creatinine clearance ratio 0.019    Low bone density. The FRAX score at the hip > 3% would lead to recommendation for treatment.   Discussed. Will pursue# 1 first. If imaging is negative would start alendronate.  If imaging localizes abnormal parathyroid might pursue surgical treatment    Post menopausal     Prediabetes by A1c 5.8% on 22    I have independently reviewed and interpreted labs, imaging as indicated.      Indigo Eid MD    Chief complaint:  Jennifer is a 71 year old female seen in consultation at the request of Dr Sun Person for hypercalcemia.   I have reviewed Care Everywhere including [facility], [facility] lab reports, imaging reports and provider notes as indicated.      HISTORY OF PRESENT ILLNESS    Hypercalcemia has been intermittently noted since  11/27/19.  She was on hydrochlorothiazide since at least 2017 until 11/27/19  She has history of disc fracture after trauma, treated with microdiscectomy. This left a little LLE paresis which was temporary     She is describing the visit to the ED 10/4. She felt like she should check BP, couldn't get a BP;  She was having irregular heart rhythm.  The note states they recommended admission but she declined.  Appt with cardillogy is pending.      Endocrine relevant labs are as follows:  11/27/19 Ca 10.5, creatinine 0.86  2/16/22 HgbA1c 5.8%  9/16/22 Ca 10.4, TSH 1.61  9/26/22 iCa 5.3, PTH 68, 25OHD 25  10/4/22 Ca 10.4, Mg 2, alk phos 87, creatinine 0.81, TSH 1.41; 24 hour urine calcium 250 mg/24hours,   10/5/2022 iCa     Drink a ton of mmilk, yogurt, ice cream  Takes tums for heartburn-- hasn't taken in 4 days at least     Relevant imaging is as follows: (as read by me as it pertains to endocrine relevant organs)  11/17/2021 MRI brain: there are no T1 coronal pituitary images.  Pituitary appears normal on sagittal;  Slight convex up upper border on T2  3/21/22 CT Chest: thyroid appears normal;   9/16/22 CT abdomen with contrast - no kidney stones  10/10/22 DXA lowest T-score -1.2 right femoral neck     REVIEW OF SYSTEMS  Weight is down 20# in the last 2 years - eating less  Sleep at night not great - sleep problems for years; gets very uncomfortable ; sometimes can't fall asleep;   Sweating with vacuuming for 1.5 hours   Cardiac can feel PVCs after eating, notices this every day  Respiratory: richter when in [place];   GI: Constipation and diarrhea; Heartburn at night  Lateral lower extremity spasms, every few days - very intense x years   Joint problem right hand/thumb region - has had injection for this   10 system ROS otherwise as per the HPI or negative    Past Medical History  Past Medical History:   Diagnosis Date     Arthritis     Fibromyalgia, ? Osteoarthritis     Back injury     Micro discectomy, approx date 1983      Glaucoma      Hiatal hernia      HTN (hypertension)      Hypercalcemia 11/30/2019     Insomnia 12/26/2012     Problem list name updated by automated process. Provider to review     Reduced vision 09/2016    L vision block, bilateral elevated eye presures,poss. Glacom     Uncomplicated asthma     Diagnosed as 1 yo     Past Surgical History:   Procedure Laterality Date      cataract extraction with intraocular lens implant Right 01/31/2019     BACK SURGERY  1984    micro-disc-ectomy     BREAST SURGERY  1984    L Breast bx     cataract extraction with intraocular lens Left 12/06/2018     CHOLECYSTECTOMY  1990     IRIDOTOMY/IRIDECTOMY LASER SURGERY Left 09/27/2016     IRIDOTOMY/IRIDECTOMY LASER SURGERY Right 10/18/2016     TUBAL LIGATION  1989     ZZC STOMACH SURGERY PROCEDURE UNLISTED  03/1990    Cholecystectomy, 11/1989 tubal ligation     Medications  Current Outpatient Medications   Medication Sig Dispense Refill     albuterol (VENTOLIN HFA) 108 (90 BASE) MCG/ACT inhaler Inhale 2 puffs into the lungs every 6 hours as needed for shortness of breath / dyspnea 3 Inhaler 1     amLODIPine (NORVASC) 5 MG tablet Take 1 tablet (5 mg) by mouth At Bedtime 90 tablet 3     atorvastatin (LIPITOR) 20 MG tablet Take 1 tablet (20 mg) by mouth daily 90 tablet 3     azelastine (ASTELIN) 0.1 % nasal spray Spray 1 spray into both nostrils 2 times daily       Calcium Carbonate Antacid (TUMS PO) Take  by mouth At Bedtime.       DiphenhydrAMINE HCl (BENADRYL PO) Take 25 mg by mouth       fluticasone-salmeterol (ADVAIR-HFA) 230-21 MCG/ACT inhaler        latanoprost (XALATAN) 0.005 % ophthalmic solution Place 1 drop into the right eye At Bedtime       losartan (COZAAR) 50 MG tablet Take 1 tablet (50 mg) by mouth At Bedtime 90 tablet 3     montelukast (SINGULAIR) 10 MG tablet TAKE ONE TABLET BY MOUTH EVERY DAY AS DIRECTED  1     mupirocin (BACTROBAN) 2 % external ointment Apply topically 3 times daily       nitroGLYcerin (NITROSTAT) 0.4  MG sublingual tablet Place 0.4 mg under the tongue every 5 minutes as needed for chest pain For chest pain place 1 tablet under the tongue every 5 minutes for 3 doses. If symptoms persist 5 minutes after 1st dose call 911.       Allergies  Allergies   Allergen Reactions     Canola Oil [Vegetable Oil] Anaphylaxis and GI Disturbance     Animal Dander Unknown     Dust Mites Unknown     Grass      Hydroxyzine Other (See Comments)     Passed out         Pollen Extract Unknown     Trees      Vistaril      Chlorhexidine Itching and Rash     Hibiclens       Family History  Family History   Problem Relation Age of Onset     Heart Disease Mother      Diabetes Mother      Coronary Artery Disease Mother      Hypertension Mother      Anesthesia Reaction Mother         Anaphylaxis, oral opioixd     Asthma Mother      Thyroid Disease Mother      Low Back Problems Mother      Heart Disease Father      Diabetes Father      Coronary Artery Disease Father      Hypertension Father      Hyperlipidemia Father      Low Back Problems Father      Diabetes Sister      Hypertension Sister      Low Back Problems Sister      Thyroid Disease Sister      Low Back Problems Brother      Asthma Maternal Grandmother      Coronary Artery Disease Maternal Grandfather      Cerebrovascular Disease Paternal Grandfather      Asthma Daughter      Calcium Disorder No family hx of      Hip fracture No family hx of      Social History  Social History     Tobacco Use     Smoking status: Never     Smokeless tobacco: Never   Substance Use Topics     Alcohol use: Yes     Alcohol/week: 0.0 standard drinks     Comment: 0 -2 times a year     Drug use: No     Retired nurse; 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  [employer]; cleaning [employer]    Physical Exam  /78   Pulse 80   Wt 74.9 kg (165 lb 1.6 oz)   LMP  (LMP Unknown)   SpO2 96%   BMI 26.65 kg/m    Body mass index is 26.65 kg/m .  GENERAL : pleasant woman in  In no apparent distress. She is  sitting on the exam table.   SKIN: Normal color, normal temperature, texture.       EYES: PER, EOMI, No scleral icterus,  No proptosis, conjunctival redness, stare, retraction  NECK: No visible masses. No palpable adenopathy, or masses.   THYROID:  Not palpable;   RESP: Lungs clear to auscultation bilaterally  CARDIAC: Regular rate and rhythm, normal S1 S2, without murmurs, rubs or gallops      NEURO: awake, alert, responds appropriately to questions.    Moves all extremities;   Slight tremor of the outstretched hand left.  DTRs  2 /4 KJ ,   EXTREMITIES: No clubbing, cyanosis or edema.    DATA REVIEW      [facility] - [address] [address]  Phone: [phone]   Fax: [phone]                    Impression  Based on BMD diagnosis is consistent with low bone density based on WHO criteria Ref. 1     Results   Lumbar spine   T-score 0.4 (L1-4), BMD is 1.225 g/cm2  Left femoral neck  T-score -1.3  Right femoral neck  T-score -1.5  Left total femur  T-score -0.2 , BMD is 0.986 g/cm2  Right total femur  T-score -0.3, BMD is 0.967 g/cm2  Radius  T-score -1.5, BMD 0.600 g/cm2   Interval changeNo prior study available for comparison. Previous study was performed on another scanner.  Fracture risk The estimated 10-year risk for a major osteoporotic fracture is 16.5% and for a hip fracture is 3.2%. FRAX was calculated based on information provided by the patient on the DXA questionnaire. Ref. 4  Repeat Recommend repeat DXA in 2 years.  The above are general recommendations for follow-up testing and intervals between BMD testing should be determinedaccording to each patient's clinical status.  Principal result :  ARMANI Bermudez MD, BECKY VINCENT      EXAM: CT CHEST PULMONARY EMBOLISM [facility]  DATE/TIME: 3/21/2022 6:35 PM  INDICATION: PE suspected, low/intermediate probability, positive D-dimer. Chest  pain.  COMPARISON: None.  TECHNIQUE: CT chest pulmonary angiogram during arterial phase injection of IV contrast. Multiplanar reformats and MIP reconstructions were performed. Dose reduction techniques were used.   CONTRAST: 67 mL Isovue 370.     FINDINGS:  ANGIOGRAM CHEST: Pulmonary arteries are normal caliber and negative for pulmonary emboli. Thoracic aorta is negative for dissection. No CT evidence of right heart strain.  LUNGS AND PLEURA: Calcified granuloma right lower lobe. The lungs are otherwise clear.  MEDIASTINUM/AXILLAE: Normal.  CORONARY ARTERY CALCIFICATION: Mild.  UPPER ABDOMEN: Normal.  MUSCULOSKELETAL: Normal.                                                              IMPRESSION:1.  Negative CT pulmonary angiogram. No evidence of pulmonary embolus.      US THYROID 10/26/2022 3:00 PM     COMPARISON: None available.     HISTORY: Serum calcium elevated; Elevated parathyroid hormone     FINDINGS:   Thyroid parenchyma: heterogenous  The right lobe of the thyroid measures: 1.8 x 1.4 x 4.3 cm  The left lobe of the thyroid measures: 1.2 x 1.6 x 4.0 cm  The thyroid isthmus measures: 2.2 mm     Nodule 1:  Lobe: Right  Location: Mid lobe  Size: 0.5 x 0.3 x 0.6 cm  Composition: Spongiform (0 points)  Echogenicity: Hyperechoic or isoechoic (1 point)  Shape: Wider than tall (0 points)  Margin: Smooth (0 points)  Echogenic Foci: None or large comet tail artifact (0 points)  Stability: Not previously imaged  TIRADS: TR2 (1-2 points) Not suspicious     Posteromedial to the right thyroid lobe there is a 0.5 x 0.8 x 0.4 cm  hypoechoic lesion with no definite involvement of the thyroid  parenchyma.                                                                      Impression: 0.8 cm hypoechoic lesion posteromedial to the right  thyroid lobe, which may represent a parathyroid adenoma. Consider  further evaluation with technetium 99 sestamibi scan     ACR TI-RADS recommendations  TR2 (2 points) & TR1 (0 points) -No  FNA or follow-up  TR3 (3 points) - FNA if ? 2.5cm, follow-up if 1.5 -2.4 cm in 1, 3 and  5 years  TR4 (4-6 points) - FNA if ? 1.5cm, follow-up if 1 -1.4 cm in 1, 2, 3  and 5 years  TR5 (?7 points) - FNA if ? 1cm, follow-up if 0.5 -0.9 cm every year  for 5 years      I have personally reviewed the examination and initial interpretation  and I agree with the findings.     LUMA ZHANG MD     11/7/2022 Examination: Nuclear medicine parathyroid examination with SPECT CTand High Resolution CT images of the Neck.   Date:  11/7/2022 3:16 PM    Indication:  Serum calcium elevated; Elevated parathyroid hormone    Comparison: Ultrasound 10/26/2022   Technique:   The patient received 8.29 mCi of I-123 orally and 23.4 mCi of Tc-99  Cardiolite.  Three separate phases of images were obtained.  1. Dynamic images were obtained of the thyroid gland for 30 minutesfollowing Cardiolite administration.   2. Iodine images were obtained of the thyroid  followed by manualsubtraction from the Cardiolite images.   3. SPECT CT images were also obtained of the thyroid using a dual energy technique for both I-123 and for Tc-99m followed by 3 mm high resolution CT images of the neck from the base of the skull to the base of the heart.  After normalization of the I-123 images to the Tc-99m- Cardiolite images the reconstructed axial slices were subtracted, yielding neck images highlighting abnormally perfused  tissues.  Findings:  The images of the thyroid gland on both the I-123 images and on the Tc-99m Cardiolite images demonstrates homogeneous appearance of the gland. There are no thyroid lesions identified. The SPECT CT images with subtraction images demonstrates a subtle focal area uptake posterior medial to the superior aspect of the h left thyroid gland series 3 image 105.                                                           Impression: Focal uptake along the superior posterior medial aspect of the left thyroid gland. While the  suspected parathyroid adenoma is not clearlydelineated on CT, this is the most likely location based on perfusion.   Note this location is contralateral to the finding reported on  ultrasound 10/26/22.     I have personally reviewed the examination and initial interpretation  and I agree with the findings.     STEPHANIE ADHIKARI MD

## 2022-10-26 NOTE — TELEPHONE ENCOUNTER
RECORDS RECEIVED FROM:   DATE RECEIVED:   NOTES STATUS DETAILS   OFFICE NOTE from referring provider    Internal 10-22-22 Dr. Person    OFFICE NOTE from other cardiologist    N/A    SUMMARY from hospital/ED   N/A    MEDICATION LIST   Internal    DIAGNOSTIC PROCEDURES     EKG   Internal 10-4-22   Monitor Reports   N/A    IMAGING (DISC & REPORT)      Echo   N/A    Stress Tests   Internal 10-6-22   Cath   N/A    MRI/MRA   N/A    CT/CTA   Internal 3-21-22 CT Chest

## 2022-11-04 ENCOUNTER — PRE VISIT (OUTPATIENT)
Dept: CARDIOLOGY | Facility: CLINIC | Age: 72
End: 2022-11-04

## 2022-11-04 ENCOUNTER — MEDICAL CORRESPONDENCE (OUTPATIENT)
Dept: HEALTH INFORMATION MANAGEMENT | Facility: CLINIC | Age: 72
End: 2022-11-04

## 2022-11-04 ENCOUNTER — OFFICE VISIT (OUTPATIENT)
Dept: CARDIOLOGY | Facility: CLINIC | Age: 72
End: 2022-11-04
Attending: FAMILY MEDICINE
Payer: COMMERCIAL

## 2022-11-04 ENCOUNTER — ANCILLARY PROCEDURE (OUTPATIENT)
Dept: CARDIOLOGY | Facility: CLINIC | Age: 72
End: 2022-11-04
Attending: INTERNAL MEDICINE
Payer: COMMERCIAL

## 2022-11-04 VITALS
BODY MASS INDEX: 27.22 KG/M2 | DIASTOLIC BLOOD PRESSURE: 79 MMHG | HEART RATE: 75 BPM | SYSTOLIC BLOOD PRESSURE: 138 MMHG | WEIGHT: 163.4 LBS | HEIGHT: 65 IN | OXYGEN SATURATION: 98 %

## 2022-11-04 DIAGNOSIS — I49.3 PVC'S (PREMATURE VENTRICULAR CONTRACTIONS): ICD-10-CM

## 2022-11-04 DIAGNOSIS — I49.3 PVC'S (PREMATURE VENTRICULAR CONTRACTIONS): Primary | ICD-10-CM

## 2022-11-04 DIAGNOSIS — R07.9 CHEST PAIN ON EXERTION: ICD-10-CM

## 2022-11-04 DIAGNOSIS — R00.2 PALPITATIONS: ICD-10-CM

## 2022-11-04 PROCEDURE — G0463 HOSPITAL OUTPT CLINIC VISIT: HCPCS | Mod: 25

## 2022-11-04 PROCEDURE — 93248 EXT ECG>7D<15D REV&INTERPJ: CPT | Performed by: INTERNAL MEDICINE

## 2022-11-04 PROCEDURE — 99204 OFFICE O/P NEW MOD 45 MIN: CPT | Mod: 25 | Performed by: INTERNAL MEDICINE

## 2022-11-04 PROCEDURE — 93005 ELECTROCARDIOGRAM TRACING: CPT

## 2022-11-04 PROCEDURE — 93246 EXT ECG>7D<15D RECORDING: CPT

## 2022-11-04 ASSESSMENT — PAIN SCALES - GENERAL: PAINLEVEL: NO PAIN (0)

## 2022-11-04 NOTE — PROGRESS NOTES
"Per Dr. Johnson, patient to have 14 day Zio monitor placed.  Diagnosis: PVC's [I49.3]  Monitor placed: {YES / NO:738537::\"Yes\"}  Patient Instructed: {YES / NO:228301::\"Yes\"}  Patient verbalized understanding: {YES / NO:645553::\"Yes\"}  Holter # [id number]      "

## 2022-11-04 NOTE — PATIENT INSTRUCTIONS
Plan:         Your Care Team:  [facility]   Telephone Number     Katina Sanchez, RN (A-K)  Miladis Hart, RN (L-Z) [phone]     For scheduling appointments:  Faustino DUBOIS [phone]   For scheduling  procedures:    Danette Siddiqi   [phone]   For the Device Clinic (Pacemakers, ICDs, Loop Recorders)    During business hours: [phone]  After business hours:   [phone]- select option 4 and ask for job code [phone].       Cardi[facility] Clinic:   [address] [place] [address]. [place] [address]      As always, Thank you for trusting us with your health care needs!

## 2022-11-04 NOTE — NURSING NOTE
Chief Complaint   Patient presents with     New Patient     PVC consult. Treadmill stress done on 10/5/22 per PCP. Hx mild primary hyperparathyroidism       Vitals were taken, medications reconciled, and EKG performed.    Domingo Lucas  3:59 PM

## 2022-11-04 NOTE — LETTER
11/4/2022      RE: Jennifer Jane  [address]  [facility] [address]       Dear Colleague,    Thank you for the opportunity to participate in the care of your patient, Jennifer Jane, at the [facility] [place] at [facility]. Please see a copy of my visit note below.    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  She was referred for a cardiology evaluation.    She complained of skipped beats and occasional dizziness but denied any chest pain and SOB.    PAST MEDICAL HISTORY:  Past Medical History:   Diagnosis Date     Arthritis     Fibromyalgia, ? Osteoarthritis     Back injury     Micro discectomy, approx date 1983     Glaucoma      Hiatal hernia      HTN (hypertension)      Hypercalcemia 11/30/2019     Insomnia 12/26/2012     Problem list name updated by automated process. Provider to review     Reduced vision 09/2016    L vision block, bilateral elevated eye presures,poss. Glacom     Uncomplicated asthma     Diagnosed as 1 yo       CURRENT MEDICATIONS:  Current Outpatient Medications   Medication Sig Dispense Refill     albuterol (VENTOLIN HFA) 108 (90 BASE) MCG/ACT inhaler Inhale 2 puffs into the lungs every 6 hours as needed for shortness of breath / dyspnea 3 Inhaler 1     amLODIPine (NORVASC) 5 MG tablet Take 1 tablet (5 mg) by mouth At Bedtime 90 tablet 3     atorvastatin (LIPITOR) 20 MG tablet Take 1 tablet (20 mg) by mouth daily 90 tablet 3     azelastine (ASTELIN) 0.1 % nasal spray Spray 1 spray into both nostrils 2 times daily       Calcium Carbonate Antacid (TUMS PO) Take  by mouth At Bedtime.       DiphenhydrAMINE HCl (BENADRYL PO) Take 25 mg by mouth       fluticasone-salmeterol (ADVAIR-HFA) 230-21 MCG/ACT inhaler        latanoprost (XALATAN) 0.005 % ophthalmic solution Place 1 drop into the  right eye At Bedtime       losartan (COZAAR) 50 MG tablet Take 1 tablet (50 mg) by mouth At Bedtime 90 tablet 3     montelukast (SINGULAIR) 10 MG tablet TAKE ONE TABLET BY MOUTH EVERY DAY AS DIRECTED  1     mupirocin (BACTROBAN) 2 % external ointment Apply topically 3 times daily       nitroGLYcerin (NITROSTAT) 0.4 MG sublingual tablet Place 0.4 mg under the tongue every 5 minutes as needed for chest pain For chest pain place 1 tablet under the tongue every 5 minutes for 3 doses. If symptoms persist 5 minutes after 1st dose call 911.         PAST SURGICAL HISTORY:  Past Surgical History:   Procedure Laterality Date      cataract extraction with intraocular lens implant Right 01/31/2019     BACK SURGERY  1984    micro-disc-ectomy     BREAST SURGERY  1984    L Breast bx     cataract extraction with intraocular lens Left 12/06/2018     CHOLECYSTECTOMY  1990     IRIDOTOMY/IRIDECTOMY LASER SURGERY Left 09/27/2016     IRIDOTOMY/IRIDECTOMY LASER SURGERY Right 10/18/2016     TUBAL LIGATION  1989     ZC STOMACH SURGERY PROCEDURE UNLISTED  03/1990    Cholecystectomy, 11/1989 tubal ligation       ALLERGIES:     Allergies   Allergen Reactions     Canola Oil [Vegetable Oil] Anaphylaxis and GI Disturbance     Animal Dander Unknown     Dust Mites Unknown     Grass      Hydroxyzine Other (See Comments)     Passed out         Pollen Extract Unknown     Trees      Vistaril      Chlorhexidine Itching and Rash     Hibiclens         FAMILY HISTORY:  + Premature coronary artery disease  - Atrial fibrillation  - Sudden cardiac death     SOCIAL HISTORY:  Social History     Tobacco Use     Smoking status: Never     Smokeless tobacco: Never   Substance Use Topics     Alcohol use: Yes     Alcohol/week: 0.0 standard drinks     Comment: 0 -2 times a year     Drug use: No       ROS:   Constitutional: No fever, chills, or sweats. Weight stable.   ENT: No visual disturbance, ear ache, epistaxis, sore throat.   Cardiovascular: As per HPI.  "  Respiratory: No cough, hemoptysis.    GI: No nausea, vomiting, hematemesis, melena, or hematochezia.   : No hematuria.   Integument: Negative.   Psychiatric: Negative.   Hematologic:  Easy bruising, no easy bleeding.  Neuro: Negative.   Endocrinology: No significant heat or cold intolerance   Musculoskeletal: No myalgia.    Exam:  /79 (BP Location: Right arm, Patient Position: Sitting, Cuff Size: Adult Regular)   Pulse 75   Ht 1.66 m (5' 5.35\")   Wt 74.1 kg (163 lb 6.4 oz)   LMP  (LMP Unknown)   SpO2 98%   BMI 26.90 kg/m    GENERAL APPEARANCE: healthy, alert and no distress  HEENT: no icterus, no xanthelasmas, normal pupil size and reaction, normal palate, mucosa moist, no central cyanosis  NECK: no adenopathy, no asymmetry, masses, or scars, thyroid normal to palpation and no bruits, JVP not elevated  RESPIRATORY: lungs clear to auscultation - no rales, rhonchi or wheezes, no use of accessory muscles, no retractions, respirations are unlabored, normal respiratory rate  CARDIOVASCULAR: regular rhythm, normal S1 with physiologic split S2, no S3 or S4 and no murmur, click or rub, precordium quiet with normal PMI.  ABDOMEN: soft, non tender, without hepatosplenomegaly, no masses palpable, bowel sounds normal, aorta not enlarged by palpation, no abdominal bruits  EXTREMITIES: peripheral pulses normal, no edema, no bruits  NEURO: alert and oriented to person/place/time, normal speech, gait and affect  VASC: Radial, femoral, dorsalis pedis and posterior tibialis pulses are normal in volumes and symmetric bilaterally. No bruits are heard.  SKIN: no ecchymoses, no rashes    Labs:  CBC RESULTS:   Lab Results   Component Value Date    WBC 8.2 10/04/2022    WBC 8.8 12/13/2019    RBC 4.70 10/04/2022    RBC 4.84 12/13/2019    HGB 13.9 10/04/2022    HGB 14.2 12/13/2019    HCT 41.1 10/04/2022    HCT 42.9 12/13/2019    MCV 87 10/04/2022    MCV 89 12/13/2019    MCH 29.6 10/04/2022    MCH 29.3 12/13/2019    MCHC 33.8 " 10/04/2022    MCHC 33.1 12/13/2019    RDW 12.9 10/04/2022    RDW 13.0 12/13/2019     10/04/2022     12/13/2019       BMP RESULTS:  Lab Results   Component Value Date     10/04/2022     12/02/2020    POTASSIUM 4.0 10/04/2022    POTASSIUM 4.3 09/16/2022    POTASSIUM 4.1 12/02/2020    CHLORIDE 105 10/04/2022    CHLORIDE 108 09/16/2022    CHLORIDE 107 12/02/2020    CO2 26 10/04/2022    CO2 28 09/16/2022    CO2 27 12/02/2020    ANIONGAP 10 10/04/2022    ANIONGAP 5 09/16/2022    ANIONGAP 5 12/02/2020     (H) 10/04/2022     (H) 09/16/2022     (H) 12/02/2020    BUN 12.7 10/04/2022    BUN 12 09/16/2022    BUN 17 12/02/2020    CR 0.81 10/04/2022    CR 0.86 12/02/2020    GFRESTIMATED 77 10/04/2022    GFRESTIMATED >60 09/16/2022    GFRESTIMATED 68 12/02/2020    GFRESTBLACK 78 12/02/2020    KELLEN 10.4 (H) 10/04/2022    KELLEN 10.1 12/02/2020        INR RESULTS:  No results found for: INR    Procedures:  TMT on 10/6/2022: Reviewed.  Interpretation Summary     This was an exercise stress test, no images were submitted for review. The  indication for the test was exertional chest pain.     Patient exercised for 9 minutes and 22 seconds on the [place] ramp protocol. She  achieved a maximum heart rate of 148BPM, corresponding to 8 METS and  representing 100% of the maximum predicted heart rate. Heart rate and blood  pressure response to exercise were normal. Patient reported no symtpoms during  stress and the test was terminated due to shortness of breath.     Baseline ECG shows normal sinus rhythm with a heart rate of 85BPM. Extremely  difficult stress ECG due to artifact yet there is no clear evidence for ST  segment or T wave changes to suggest underlying myocardial ischemia. FRequent  PVCs noted during stress as well as recovery.              ECG on 11/4/2022: Reviewed.      Assessment and Plan:   # HTN  # Prediabetes  # Asthma  # Hypercholesterolemia  # Hypercalcemia  # FHx of CAD     Normal stress test on 10/6/2022.  # PVCs  I will place her on a Zio patch for 2 weeks to see her PVC burden and schedule her to take TTE to rule out any SHDs.  I will see her back in a month.     I spent a total of 45 min today to review the records, see the patient, and complete the documents.      Please do not hesitate to contact me if you have any questions/concerns.     Sincerely,     Alexander Johnson MD    CC  Patient Care Team:  Yuriy Petersen MD as MD (Orthopedics)  Sun Person MD as Assigned PCP  Katina Sanchez, RN as Specialty Care Coordinator (Cardiology)

## 2022-11-04 NOTE — PROGRESS NOTES
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  She was referred for a cardiology evaluation.    She complained of skipped beats and occasional dizziness but denied any chest pain and SOB.    PAST MEDICAL HISTORY:  Past Medical History:   Diagnosis Date     Arthritis     Fibromyalgia, ? Osteoarthritis     Back injury     Micro discectomy, approx date 1983     Glaucoma      Hiatal hernia      HTN (hypertension)      Hypercalcemia 11/30/2019     Insomnia 12/26/2012     Problem list name updated by automated process. Provider to review     Reduced vision 09/2016    L vision block, bilateral elevated eye presures,poss. Glacom     Uncomplicated asthma     Diagnosed as 2 yo       CURRENT MEDICATIONS:  Current Outpatient Medications   Medication Sig Dispense Refill     albuterol (VENTOLIN HFA) 108 (90 BASE) MCG/ACT inhaler Inhale 2 puffs into the lungs every 6 hours as needed for shortness of breath / dyspnea 3 Inhaler 1     amLODIPine (NORVASC) 5 MG tablet Take 1 tablet (5 mg) by mouth At Bedtime 90 tablet 3     atorvastatin (LIPITOR) 20 MG tablet Take 1 tablet (20 mg) by mouth daily 90 tablet 3     azelastine (ASTELIN) 0.1 % nasal spray Spray 1 spray into both nostrils 2 times daily       Calcium Carbonate Antacid (TUMS PO) Take  by mouth At Bedtime.       DiphenhydrAMINE HCl (BENADRYL PO) Take 25 mg by mouth       fluticasone-salmeterol (ADVAIR-HFA) 230-21 MCG/ACT inhaler        latanoprost (XALATAN) 0.005 % ophthalmic solution Place 1 drop into the right eye At Bedtime       losartan (COZAAR) 50 MG tablet Take 1 tablet (50 mg) by mouth At Bedtime 90 tablet 3     montelukast (SINGULAIR) 10 MG tablet TAKE ONE TABLET BY MOUTH EVERY DAY AS DIRECTED  1     mupirocin (BACTROBAN) 2 % external ointment Apply topically 3 times daily       nitroGLYcerin (NITROSTAT) 0.4 MG sublingual tablet Place  0.4 mg under the tongue every 5 minutes as needed for chest pain For chest pain place 1 tablet under the tongue every 5 minutes for 3 doses. If symptoms persist 5 minutes after 1st dose call 911.         PAST SURGICAL HISTORY:  Past Surgical History:   Procedure Laterality Date      cataract extraction with intraocular lens implant Right 01/31/2019     BACK SURGERY  1984    micro-disc-ectomy     BREAST SURGERY  1984    L Breast bx     cataract extraction with intraocular lens Left 12/06/2018     CHOLECYSTECTOMY  1990     IRIDOTOMY/IRIDECTOMY LASER SURGERY Left 09/27/2016     IRIDOTOMY/IRIDECTOMY LASER SURGERY Right 10/18/2016     TUBAL LIGATION  1989     ZZC STOMACH SURGERY PROCEDURE UNLISTED  03/1990    Cholecystectomy, 11/1989 tubal ligation       ALLERGIES:     Allergies   Allergen Reactions     Canola Oil [Vegetable Oil] Anaphylaxis and GI Disturbance     Animal Dander Unknown     Dust Mites Unknown     Grass      Hydroxyzine Other (See Comments)     Passed out         Pollen Extract Unknown     Trees      Vistaril      Chlorhexidine Itching and Rash     Hibiclens         FAMILY HISTORY:  + Premature coronary artery disease  - Atrial fibrillation  - Sudden cardiac death     SOCIAL HISTORY:  Social History     Tobacco Use     Smoking status: Never     Smokeless tobacco: Never   Substance Use Topics     Alcohol use: Yes     Alcohol/week: 0.0 standard drinks     Comment: 0 -2 times a year     Drug use: No       ROS:   Constitutional: No fever, chills, or sweats. Weight stable.   ENT: No visual disturbance, ear ache, epistaxis, sore throat.   Cardiovascular: As per HPI.   Respiratory: No cough, hemoptysis.    GI: No nausea, vomiting, hematemesis, melena, or hematochezia.   : No hematuria.   Integument: Negative.   Psychiatric: Negative.   Hematologic:  Easy bruising, no easy bleeding.  Neuro: Negative.   Endocrinology: No significant heat or cold intolerance   Musculoskeletal: No myalgia.    Exam:  /79 (BP  "Location: Right arm, Patient Position: Sitting, Cuff Size: Adult Regular)   Pulse 75   Ht 1.66 m (5' 5.35\")   Wt 74.1 kg (163 lb 6.4 oz)   LMP  (LMP Unknown)   SpO2 98%   BMI 26.90 kg/m    GENERAL APPEARANCE: healthy, alert and no distress  HEENT: no icterus, no xanthelasmas, normal pupil size and reaction, normal palate, mucosa moist, no central cyanosis  NECK: no adenopathy, no asymmetry, masses, or scars, thyroid normal to palpation and no bruits, JVP not elevated  RESPIRATORY: lungs clear to auscultation - no rales, rhonchi or wheezes, no use of accessory muscles, no retractions, respirations are unlabored, normal respiratory rate  CARDIOVASCULAR: regular rhythm, normal S1 with physiologic split S2, no S3 or S4 and no murmur, click or rub, precordium quiet with normal PMI.  ABDOMEN: soft, non tender, without hepatosplenomegaly, no masses palpable, bowel sounds normal, aorta not enlarged by palpation, no abdominal bruits  EXTREMITIES: peripheral pulses normal, no edema, no bruits  NEURO: alert and oriented to person/place/time, normal speech, gait and affect  VASC: Radial, femoral, dorsalis pedis and posterior tibialis pulses are normal in volumes and symmetric bilaterally. No bruits are heard.  SKIN: no ecchymoses, no rashes    Labs:  CBC RESULTS:   Lab Results   Component Value Date    WBC 8.2 10/04/2022    WBC 8.8 12/13/2019    RBC 4.70 10/04/2022    RBC 4.84 12/13/2019    HGB 13.9 10/04/2022    HGB 14.2 12/13/2019    HCT 41.1 10/04/2022    HCT 42.9 12/13/2019    MCV 87 10/04/2022    MCV 89 12/13/2019    MCH 29.6 10/04/2022    MCH 29.3 12/13/2019    MCHC 33.8 10/04/2022    MCHC 33.1 12/13/2019    RDW 12.9 10/04/2022    RDW 13.0 12/13/2019     10/04/2022     12/13/2019       BMP RESULTS:  Lab Results   Component Value Date     10/04/2022     12/02/2020    POTASSIUM 4.0 10/04/2022    POTASSIUM 4.3 09/16/2022    POTASSIUM 4.1 12/02/2020    CHLORIDE 105 10/04/2022    CHLORIDE 108 " 09/16/2022    CHLORIDE 107 12/02/2020    CO2 26 10/04/2022    CO2 28 09/16/2022    CO2 27 12/02/2020    ANIONGAP 10 10/04/2022    ANIONGAP 5 09/16/2022    ANIONGAP 5 12/02/2020     (H) 10/04/2022     (H) 09/16/2022     (H) 12/02/2020    BUN 12.7 10/04/2022    BUN 12 09/16/2022    BUN 17 12/02/2020    CR 0.81 10/04/2022    CR 0.86 12/02/2020    GFRESTIMATED 77 10/04/2022    GFRESTIMATED >60 09/16/2022    GFRESTIMATED 68 12/02/2020    GFRESTBLACK 78 12/02/2020    KELLEN 10.4 (H) 10/04/2022    KELLEN 10.1 12/02/2020        INR RESULTS:  No results found for: INR    Procedures:  TMT on 10/6/2022: Reviewed.  Interpretation Summary     This was an exercise stress test, no images were submitted for review. The  indication for the test was exertional chest pain.     Patient exercised for 9 minutes and 22 seconds on the [place] ramp protocol. She  achieved a maximum heart rate of 148BPM, corresponding to 8 METS and  representing 100% of the maximum predicted heart rate. Heart rate and blood  pressure response to exercise were normal. Patient reported no symtpoms during  stress and the test was terminated due to shortness of breath.     Baseline ECG shows normal sinus rhythm with a heart rate of 85BPM. Extremely  difficult stress ECG due to artifact yet there is no clear evidence for ST  segment or T wave changes to suggest underlying myocardial ischemia. FRequent  PVCs noted during stress as well as recovery.              ECG on 11/4/2022: Reviewed.      Assessment and Plan:   # HTN  # Prediabetes  # Asthma  # Hypercholesterolemia  # Hypercalcemia  # FHx of CAD    Normal stress test on 10/6/2022.  # PVCs  I will place her on a Zio patch for 2 weeks to see her PVC burden and schedule her to take TTE to rule out any SHDs.  I will see her back in a month.     I spent a total of 45 min today to review the records, see the patient, and complete the documents.    CC  Patient Care Team:  Sun Person MD as PCP -  General (Family Practice)  Yuriy Petersen MD as MD (Orthopedics)  Sun Franklin MD as Assigned PCP  Katina Sanchez, RN as Specialty Care Coordinator (Cardiology)  Alexander Johnson MD (Cardiovascular Disease)  SUN FRANKLIN

## 2022-11-07 ENCOUNTER — HOSPITAL ENCOUNTER (OUTPATIENT)
Dept: NUCLEAR MEDICINE | Facility: CLINIC | Age: 72
Setting detail: NUCLEAR MEDICINE
Discharge: HOME | End: 2022-11-07
Payer: COMMERCIAL

## 2022-11-07 DIAGNOSIS — R79.89 ELEVATED PARATHYROID HORMONE: ICD-10-CM

## 2022-11-07 DIAGNOSIS — E83.52 SERUM CALCIUM ELEVATED: ICD-10-CM

## 2022-11-07 PROCEDURE — 343N000001 HC RX 343

## 2022-11-07 PROCEDURE — A9500 TC99M SESTAMIBI: HCPCS

## 2022-11-07 PROCEDURE — 78071 PARATHYRD PLANAR W/WO SUBTRJ: CPT

## 2022-11-07 PROCEDURE — A9516 IODINE I-123 SOD IODIDE MIC: HCPCS

## 2022-11-07 PROCEDURE — 78072 PARATHYRD PLANAR W/SPECT&CT: CPT | Mod: 26 | Performed by: RADIOLOGY

## 2022-11-07 RX ADMIN — Medication 829 UCI.: at 09:15

## 2022-11-07 RX ADMIN — Medication 23.4 MCI.: at 12:42

## 2022-11-08 LAB
ATRIAL RATE - MUSE: 73 BPM
DIASTOLIC BLOOD PRESSURE - MUSE: NORMAL MMHG
INTERPRETATION ECG - MUSE: NORMAL
P AXIS - MUSE: 54 DEGREES
PR INTERVAL - MUSE: 132 MS
QRS DURATION - MUSE: 84 MS
QT - MUSE: 392 MS
QTC - MUSE: 431 MS
R AXIS - MUSE: 2 DEGREES
SYSTOLIC BLOOD PRESSURE - MUSE: NORMAL MMHG
T AXIS - MUSE: 42 DEGREES
VENTRICULAR RATE- MUSE: 73 BPM

## 2022-11-13 DIAGNOSIS — E21.0 PRIMARY HYPERPARATHYROIDISM (H): Primary | ICD-10-CM

## 2022-11-21 ENCOUNTER — ANCILLARY PROCEDURE (OUTPATIENT)
Dept: CARDIOLOGY | Facility: CLINIC | Age: 72
End: 2022-11-21
Attending: INTERNAL MEDICINE
Payer: COMMERCIAL

## 2022-11-21 DIAGNOSIS — I49.3 PVC'S (PREMATURE VENTRICULAR CONTRACTIONS): ICD-10-CM

## 2022-11-21 LAB — LVEF ECHO: NORMAL

## 2022-11-21 PROCEDURE — 93306 TTE W/DOPPLER COMPLETE: CPT | Performed by: INTERNAL MEDICINE

## 2022-11-29 ENCOUNTER — OFFICE VISIT (OUTPATIENT)
Dept: URGENT CARE | Facility: URGENT CARE | Age: 72
End: 2022-11-29
Payer: COMMERCIAL

## 2022-11-29 ENCOUNTER — ANCILLARY PROCEDURE (OUTPATIENT)
Dept: GENERAL RADIOLOGY | Facility: CLINIC | Age: 72
End: 2022-11-29
Attending: FAMILY MEDICINE
Payer: COMMERCIAL

## 2022-11-29 ENCOUNTER — TELEPHONE (OUTPATIENT)
Dept: FAMILY MEDICINE | Facility: CLINIC | Age: 72
End: 2022-11-29

## 2022-11-29 VITALS
DIASTOLIC BLOOD PRESSURE: 71 MMHG | SYSTOLIC BLOOD PRESSURE: 136 MMHG | OXYGEN SATURATION: 95 % | HEART RATE: 72 BPM | TEMPERATURE: 98.6 F

## 2022-11-29 DIAGNOSIS — R05.9 COUGH, UNSPECIFIED TYPE: Primary | ICD-10-CM

## 2022-11-29 DIAGNOSIS — J45.20 MILD INTERMITTENT ASTHMA WITHOUT COMPLICATION: ICD-10-CM

## 2022-11-29 DIAGNOSIS — R06.2 WHEEZING: ICD-10-CM

## 2022-11-29 PROCEDURE — 71046 X-RAY EXAM CHEST 2 VIEWS: CPT | Mod: TC | Performed by: RADIOLOGY

## 2022-11-29 PROCEDURE — 99214 OFFICE O/P EST MOD 30 MIN: CPT | Performed by: FAMILY MEDICINE

## 2022-11-29 RX ORDER — DOXYCYCLINE 100 MG/1
100 CAPSULE ORAL 2 TIMES DAILY
Qty: 14 CAPSULE | Refills: 0 | Status: SHIPPED | OUTPATIENT
Start: 2022-12-01 | End: 2022-12-08

## 2022-11-29 RX ORDER — ALBUTEROL SULFATE 90 UG/1
2 AEROSOL, METERED RESPIRATORY (INHALATION) EVERY 6 HOURS PRN
Qty: 6.7 G | Refills: 1 | Status: SHIPPED | OUTPATIENT
Start: 2022-11-29 | End: 2023-09-15

## 2022-11-29 RX ORDER — BENZONATATE 100 MG/1
100 CAPSULE ORAL 3 TIMES DAILY PRN
Qty: 30 CAPSULE | Refills: 0 | Status: SHIPPED | OUTPATIENT
Start: 2022-11-29 | End: 2023-02-17

## 2022-11-29 RX ORDER — PREDNISONE 20 MG/1
20 TABLET ORAL 2 TIMES DAILY
Qty: 10 TABLET | Refills: 0 | Status: SHIPPED | OUTPATIENT
Start: 2022-11-29 | End: 2022-12-04

## 2022-11-29 NOTE — PROGRESS NOTES
Chief Complaint   Patient presents with     Cough     Started coughing since thanksgiving, check lungs at home seems like something sounds off, yesterday was the worse, took covid test yesterday//negative, no fever, cough is getting worse, pt as asthma, pt has ache in chest      Medication Request     Pt was wanting to see if she could get the nitrostat refilled      Jennifer was seen today for cough and medication request.    Diagnoses and all orders for this visit:    Cough, unspecified type  -     XR Chest 2 Views  -     doxycycline hyclate (VIBRAMYCIN) 100 MG capsule; Take 1 capsule (100 mg) by mouth 2 times daily for 7 days  -     benzonatate (TESSALON) 100 MG capsule; Take 1 capsule (100 mg) by mouth 3 times daily as needed    Wheezing  -     predniSONE (DELTASONE) 20 MG tablet; Take 1 tablet (20 mg) by mouth 2 times daily for 5 days    Mild intermittent asthma without complication  -     albuterol (VENTOLIN HFA) 108 (90 Base) MCG/ACT inhaler; Inhale 2 puffs into the lungs every 6 hours as needed for shortness of breath / dyspnea      Symptomatic measures encouraged, humidified air, plenty of fluids.    Your appetite may be low, so a light diet is fine. Stay well hydrated by drinking 6 to 8 glasses of fluids per day. This includes water, soft drinks, sports drinks, juices, tea, or soup. Extra fluids will help loosen mucus in your nose and lungs.    Over-the-counter cough, cold, and sore-throat medicines will not shorten the length of the illness, but they may be helpful to reduce your symptoms. Don't use decongestants if you have high blood pressure.  Discussed with patient about the normal x-ray finding advised if symptoms are get better over the next 2 days should consider starting the antibiotic    Finish all antibiotic medicine. Do this even if you are feeling better after only a few days.  Follow up if symptoms worsen  such as sob, high fever and chest pain in 2-3 days   Results and clinical findings  reviewed pt         D/d:  Acute Bronchitis  Acute Sinusitis  Asthma exacerbation  COPD exacerbation  Cough  Pneumonia  Upper Respiratory Infection    PLAN:  See orders in Epic    Symptomatic measures encouraged, humidified air, plenty of fluids.    Your appetite may be low, so a light diet is fine. Stay well hydrated by drinking 6 to 8 glasses of fluids per day. This includes water, soft drinks, sports drinks, juices, tea, or soup. Extra fluids will help loosen mucus in your nose and lungs.    Over-the-counter cough, cold, and sore-throat medicines will not shorten the length of the illness, but they may be helpful to reduce your symptoms. Don't use decongestants if you have high blood pressure.    Finish all antibiotic medicine. Do this even if you are feeling better after only a few days.  Follow up if symptoms worsen  such as sob, high fever and chest pain in 2-3 days   Results and clinical findings reviewed pt and family member   Discussed about lung findings       SUBJECTIVE:  Jennifer Jane is a 71 year old female with h/o asthma  who presents to the clinic today with a chief complaint of cough , shortness of breath. and wheezing. for 5 day(s).  Her cough is described as persistent, productive of yellow sputum and wheezing.    The patient's symptoms are moderate and worsening.  Associated symptoms include chest pain and shortness of breath. The patient's symptoms are exacerbated by no particular triggers  Patient has been using inhaler  to improve symptoms.    Past Medical History:   Diagnosis Date     Arthritis     Fibromyalgia, ? Osteoarthritis     Back injury     Micro discectomy, approx date 1983     Glaucoma      Hiatal hernia      HTN (hypertension)      Hypercalcemia 11/30/2019     Insomnia 12/26/2012     Problem list name updated by automated process. Provider to review     Reduced vision 09/2016    L vision block, bilateral elevated eye presures,poss. Glacom     Uncomplicated asthma     Diagnosed as 1  yo       Current Outpatient Medications   Medication Sig Dispense Refill     albuterol (VENTOLIN HFA) 108 (90 Base) MCG/ACT inhaler Inhale 2 puffs into the lungs every 6 hours as needed for shortness of breath / dyspnea 6.7 g 1     amLODIPine (NORVASC) 5 MG tablet Take 1 tablet (5 mg) by mouth At Bedtime 90 tablet 3     atorvastatin (LIPITOR) 20 MG tablet Take 1 tablet (20 mg) by mouth daily 90 tablet 3     azelastine (ASTELIN) 0.1 % nasal spray Spray 1 spray into both nostrils 2 times daily       benzonatate (TESSALON) 100 MG capsule Take 1 capsule (100 mg) by mouth 3 times daily as needed 30 capsule 0     Calcium Carbonate Antacid (TUMS PO) Take  by mouth At Bedtime.       DiphenhydrAMINE HCl (BENADRYL PO) Take 25 mg by mouth       [START ON 12/1/2022] doxycycline hyclate (VIBRAMYCIN) 100 MG capsule Take 1 capsule (100 mg) by mouth 2 times daily for 7 days 14 capsule 0     fluticasone-salmeterol (ADVAIR-HFA) 230-21 MCG/ACT inhaler        latanoprost (XALATAN) 0.005 % ophthalmic solution Place 1 drop into the right eye At Bedtime       losartan (COZAAR) 50 MG tablet Take 1 tablet (50 mg) by mouth At Bedtime 90 tablet 3     montelukast (SINGULAIR) 10 MG tablet TAKE ONE TABLET BY MOUTH EVERY DAY AS DIRECTED  1     mupirocin (BACTROBAN) 2 % external ointment Apply topically 3 times daily       nitroGLYcerin (NITROSTAT) 0.4 MG sublingual tablet Place 0.4 mg under the tongue every 5 minutes as needed for chest pain For chest pain place 1 tablet under the tongue every 5 minutes for 3 doses. If symptoms persist 5 minutes after 1st dose call 911.       predniSONE (DELTASONE) 20 MG tablet Take 1 tablet (20 mg) by mouth 2 times daily for 5 days 10 tablet 0       Social History     Tobacco Use     Smoking status: Never     Smokeless tobacco: Never   Substance Use Topics     Alcohol use: Yes     Alcohol/week: 0.0 standard drinks     Comment: 0 -2 times a year       ROS  10 point ROS of systems including Constitutional, Eyes, ,  Cardiovascular, Gastroenterology, Genitourinary, Integumentary, Muscularskeletal, Psychiatric were all negative except for pertinent positives noted in my HPI           OBJECTIVE:  /71 (BP Location: Left arm, Patient Position: Sitting, Cuff Size: Adult Regular)   Pulse 72   Temp 98.6  F (37  C) (Oral)   LMP  (LMP Unknown)   SpO2 95%   GENERAL APPEARANCE: healthy, alert and no distress  EYES: EOMI,  PERRL, conjunctiva clear  HENT: ear canals and TM's normal.  Nose and mouth without ulcers, erythema or lesions  NECK: supple, nontender, no lymphadenopathy  RESP: lungs good air entry but coarser breath sounds on the right lower lung field  CV: regular rates and rhythm, normal S1 S2, no murmur noted  PSYCH: mentation appears normal    Nancie Del Cid MD

## 2022-11-29 NOTE — TELEPHONE ENCOUNTER
Nurse Triage SBAR    Is this a 2nd Level Triage? NO    Situation: Patient is asking if she can go to  to be assessed    Background: States she has had some wheezing and she has used her rescue inhaler already.  Her inhaler is actually  and she is concerned she may need a steroid.  Has needed Steroid in the past.  No wheezing heard over the phone.  Patient is speaking in long, complete sentences.  Does not feel she needs ER  Understands Urgent Care opens at 1000 and she may have a wait.  Will plan to come at 0930 to check in.      Assessment: Possible asthma flare    Protocol Recommended Disposition:   No disposition on file.    Recommendation: Patient to be evaluated this morning in  at [facility]    Routed to provider    Does the patient meet one of the following criteria for ADS visit consideration? 16+ years old, with an [facility] PCP     TIP  Providers, please consider if this condition is appropriate for management at one of our Acute and Diagnostic Services sites.     If patient is a good candidate, please use dotphrase <dot>triageresponse and select Refer to ADS to document.  Margy Henry RN  [facility]

## 2022-11-29 NOTE — TELEPHONE ENCOUNTER
Urgent care okay  Thank you!  Dr. Sun Person MD / [facility]    Detail Level: Zone Initiate Treatment: Drysol Dab-O-Matic 20 % topical solution QHS\\nApply to areas of excessive sweating on body at bedtime daily until sweating stops. Make sure body is completely dry. Then apply once to twice a week for maintenance.

## 2022-12-02 ENCOUNTER — OFFICE VISIT (OUTPATIENT)
Dept: CARDIOLOGY | Facility: CLINIC | Age: 72
End: 2022-12-02
Attending: INTERNAL MEDICINE
Payer: COMMERCIAL

## 2022-12-02 VITALS
HEART RATE: 74 BPM | DIASTOLIC BLOOD PRESSURE: 80 MMHG | BODY MASS INDEX: 27.21 KG/M2 | HEIGHT: 65 IN | WEIGHT: 163.3 LBS | OXYGEN SATURATION: 96 % | SYSTOLIC BLOOD PRESSURE: 152 MMHG

## 2022-12-02 DIAGNOSIS — I49.3 PVC'S (PREMATURE VENTRICULAR CONTRACTIONS): Primary | ICD-10-CM

## 2022-12-02 PROCEDURE — 99214 OFFICE O/P EST MOD 30 MIN: CPT | Performed by: INTERNAL MEDICINE

## 2022-12-02 PROCEDURE — 93005 ELECTROCARDIOGRAM TRACING: CPT

## 2022-12-02 PROCEDURE — G0463 HOSPITAL OUTPT CLINIC VISIT: HCPCS | Mod: 25

## 2022-12-02 RX ORDER — DEXTROMETHORPHAN POLISTIREX 30 MG/5ML
60 SUSPENSION ORAL 2 TIMES DAILY
COMMUNITY
End: 2023-02-17

## 2022-12-02 ASSESSMENT — PAIN SCALES - GENERAL: PAINLEVEL: NO PAIN (0)

## 2022-12-02 NOTE — PROGRESS NOTES
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  She was referred for a cardiology evaluation.    She complained of skipped beats and occasional dizziness but denied any chest pain and SOB.    She wore a Zio patch and took TTE and returned to the clinic today.    PAST MEDICAL HISTORY:  Past Medical History:   Diagnosis Date     Arthritis     Fibromyalgia, ? Osteoarthritis     Back injury     Micro discectomy, approx date 1983     Glaucoma      Hiatal hernia      HTN (hypertension)      Hypercalcemia 11/30/2019     Insomnia 12/26/2012     Problem list name updated by automated process. Provider to review     Reduced vision 09/2016    L vision block, bilateral elevated eye presures,poss. Glacom     Uncomplicated asthma     Diagnosed as 2 yo       CURRENT MEDICATIONS:  Current Outpatient Medications   Medication Sig Dispense Refill     albuterol (VENTOLIN HFA) 108 (90 Base) MCG/ACT inhaler Inhale 2 puffs into the lungs every 6 hours as needed for shortness of breath / dyspnea 6.7 g 1     amLODIPine (NORVASC) 5 MG tablet Take 1 tablet (5 mg) by mouth At Bedtime 90 tablet 3     atorvastatin (LIPITOR) 20 MG tablet Take 1 tablet (20 mg) by mouth daily 90 tablet 3     azelastine (ASTELIN) 0.1 % nasal spray Spray 1 spray into both nostrils 2 times daily       benzonatate (TESSALON) 100 MG capsule Take 1 capsule (100 mg) by mouth 3 times daily as needed 30 capsule 0     Calcium Carbonate Antacid (TUMS PO) Take  by mouth At Bedtime.       dextromethorphan (DELSYM) 30 MG/5ML liquid Take 60 mg by mouth 2 times daily       DiphenhydrAMINE HCl (BENADRYL PO) Take 25 mg by mouth       doxycycline hyclate (VIBRAMYCIN) 100 MG capsule Take 1 capsule (100 mg) by mouth 2 times daily for 7 days 14 capsule 0     fluticasone-salmeterol (ADVAIR-HFA) 230-21 MCG/ACT inhaler        ibuprofen (ADVIL/MOTRIN) 100  MG tablet Take 100 mg by mouth every 4 hours as needed       latanoprost (XALATAN) 0.005 % ophthalmic solution Place 1 drop into the right eye At Bedtime       losartan (COZAAR) 50 MG tablet Take 1 tablet (50 mg) by mouth At Bedtime 90 tablet 3     montelukast (SINGULAIR) 10 MG tablet TAKE ONE TABLET BY MOUTH EVERY DAY AS DIRECTED  1     mupirocin (BACTROBAN) 2 % external ointment Apply topically 3 times daily       nitroGLYcerin (NITROSTAT) 0.4 MG sublingual tablet Place 0.4 mg under the tongue every 5 minutes as needed for chest pain For chest pain place 1 tablet under the tongue every 5 minutes for 3 doses. If symptoms persist 5 minutes after 1st dose call 911.       predniSONE (DELTASONE) 20 MG tablet Take 1 tablet (20 mg) by mouth 2 times daily for 5 days 10 tablet 0       PAST SURGICAL HISTORY:  Past Surgical History:   Procedure Laterality Date      cataract extraction with intraocular lens implant Right 01/31/2019     BACK SURGERY  1984    micro-disc-ectomy     BREAST SURGERY  1984    L Breast bx     cataract extraction with intraocular lens Left 12/06/2018     CHOLECYSTECTOMY  1990     IRIDOTOMY/IRIDECTOMY LASER SURGERY Left 09/27/2016     IRIDOTOMY/IRIDECTOMY LASER SURGERY Right 10/18/2016     TUBAL LIGATION  1989     Z STOMACH SURGERY PROCEDURE UNLISTED  03/1990    Cholecystectomy, 11/1989 tubal ligation       ALLERGIES:     Allergies   Allergen Reactions     Canola Oil [Vegetable Oil] Anaphylaxis and GI Disturbance     Animal Dander Unknown     Dust Mites Unknown     Grass      Hydroxyzine Other (See Comments)     Passed out         Pollen Extract Unknown     Trees      Vistaril      Chlorhexidine Itching and Rash     Hibiclens         FAMILY HISTORY:  + Premature coronary artery disease  - Atrial fibrillation  - Sudden cardiac death     SOCIAL HISTORY:  Social History     Tobacco Use     Smoking status: Never     Smokeless tobacco: Never   Substance Use Topics     Alcohol use: Yes     Alcohol/week:  "0.0 standard drinks     Comment: 0 -2 times a year     Drug use: No       ROS:   Constitutional: No fever, chills, or sweats. Weight stable.   ENT: No visual disturbance, ear ache, epistaxis, sore throat.   Cardiovascular: As per HPI.   Respiratory: No cough, hemoptysis.    GI: No nausea, vomiting, hematemesis, melena, or hematochezia.   : No hematuria.   Integument: Negative.   Psychiatric: Negative.   Hematologic:  Easy bruising, no easy bleeding.  Neuro: Negative.   Endocrinology: No significant heat or cold intolerance   Musculoskeletal: No myalgia.    Exam:  BP (!) 152/80 (BP Location: Right arm, Patient Position: Chair, Cuff Size: Adult Regular)   Pulse 74   Ht 1.658 m (5' 5.28\")   Wt 74.1 kg (163 lb 4.8 oz)   LMP  (LMP Unknown)   SpO2 96%   BMI 26.95 kg/m    GENERAL APPEARANCE: healthy, alert and no distress  HEENT: no icterus, no xanthelasmas, normal pupil size and reaction, normal palate, mucosa moist, no central cyanosis  NECK: no adenopathy, no asymmetry, masses, or scars, thyroid normal to palpation and no bruits, JVP not elevated  RESPIRATORY: lungs clear to auscultation - no rales, rhonchi or wheezes, no use of accessory muscles, no retractions, respirations are unlabored, normal respiratory rate  CARDIOVASCULAR: regular rhythm, normal S1 with physiologic split S2, no S3 or S4 and no murmur, click or rub, precordium quiet with normal PMI.  ABDOMEN: soft, non tender, without hepatosplenomegaly, no masses palpable, bowel sounds normal, aorta not enlarged by palpation, no abdominal bruits  EXTREMITIES: peripheral pulses normal, no edema, no bruits  NEURO: alert and oriented to person/place/time, normal speech, gait and affect  VASC: Radial, femoral, dorsalis pedis and posterior tibialis pulses are normal in volumes and symmetric bilaterally. No bruits are heard.  SKIN: no ecchymoses, no rashes    Labs:  CBC RESULTS:   Lab Results   Component Value Date    WBC 8.2 10/04/2022    WBC 8.8 12/13/2019 "    RBC 4.70 10/04/2022    RBC 4.84 12/13/2019    HGB 13.9 10/04/2022    HGB 14.2 12/13/2019    HCT 41.1 10/04/2022    HCT 42.9 12/13/2019    MCV 87 10/04/2022    MCV 89 12/13/2019    MCH 29.6 10/04/2022    MCH 29.3 12/13/2019    MCHC 33.8 10/04/2022    MCHC 33.1 12/13/2019    RDW 12.9 10/04/2022    RDW 13.0 12/13/2019     10/04/2022     12/13/2019       BMP RESULTS:  Lab Results   Component Value Date     10/04/2022     12/02/2020    POTASSIUM 4.0 10/04/2022    POTASSIUM 4.3 09/16/2022    POTASSIUM 4.1 12/02/2020    CHLORIDE 105 10/04/2022    CHLORIDE 108 09/16/2022    CHLORIDE 107 12/02/2020    CO2 26 10/04/2022    CO2 28 09/16/2022    CO2 27 12/02/2020    ANIONGAP 10 10/04/2022    ANIONGAP 5 09/16/2022    ANIONGAP 5 12/02/2020     (H) 10/04/2022     (H) 09/16/2022     (H) 12/02/2020    BUN 12.7 10/04/2022    BUN 12 09/16/2022    BUN 17 12/02/2020    CR 0.81 10/04/2022    CR 0.86 12/02/2020    GFRESTIMATED 77 10/04/2022    GFRESTIMATED >60 09/16/2022    GFRESTIMATED 68 12/02/2020    GFRESTBLACK 78 12/02/2020    KELLEN 10.4 (H) 10/04/2022    EKLLEN 10.1 12/02/2020        INR RESULTS:  No results found for: INR    Procedures:  TMT on 10/6/2022: Reviewed.  Interpretation Summary     This was an exercise stress test, no images were submitted for review. The  indication for the test was exertional chest pain.     Patient exercised for 9 minutes and 22 seconds on the [place] ramp protocol. She  achieved a maximum heart rate of 148BPM, corresponding to 8 METS and  representing 100% of the maximum predicted heart rate. Heart rate and blood  pressure response to exercise were normal. Patient reported no symtpoms during  stress and the test was terminated due to shortness of breath.     Baseline ECG shows normal sinus rhythm with a heart rate of 85BPM. Extremely  difficult stress ECG due to artifact yet there is no clear evidence for ST  segment or T wave changes to suggest underlying  myocardial ischemia. FRequent  PVCs noted during stress as well as recovery.              ECG on 11/4/2022: Reviewed.      TTE on 11/21/2022: Reviewed.  Interpretation Summary  Global and regional left ventricular function is normal with an EF of 60-65%.  Right ventricular function, chamber size, wall motion, and thickness are  normal.  No hemodynamically significant valve abnormalities.  The inferior vena cava is normal.  Frequent PVCs noted throughout the study.  There is no prior study for direct comparison.    Zio patch in 11/2022: Reviewed.            ECG on 12/2/2022: Reviewed.        Assessment and Plan:   # HTN  # Prediabetes  # Asthma  # Hypercholesterolemia  # Hypercalcemia  # FHx of CAD    Normal stress test on 10/6/2022.  # Normal TTE on 11/21/2022.  # PVCs  Zio patch in 11/2022 revealed that the PVC burden was 5.7%.  I advised her to observe at this point and repeat Zio patch every 6 months.  I will see her back in 6 months.     I spent a total of 30 min today to review the records, see the patient, and complete the documents.    CC  Patient Care Team:  Sun Franklin MD as PCP - General (Family Practice)  Yuriy Petersen MD as MD (Orthopedics)  Sun Franklin MD as Assigned PCP  Katina Sanchez, RN as Specialty Care Coordinator (Cardiology)  Alexander Johnson MD (Cardiovascular Disease)  Indigo Eid MD as Assigned Endocrinology Provider  Alexander Johnson MD as Assigned Heart and Vascular Provider  SUN FRANKLIN

## 2022-12-02 NOTE — PATIENT INSTRUCTIONS
You were seen in the Electrophysiology Clinic today by: Dr. Johnson    Plan:   Follow up visit:  Repeat jdopatch (heart monitor) in 6 months  Follow up with Dr. Johnson after ziopatch -- in about 7 months      If you have further questions, please utilize [employer] to contact us.     Your Care Team:    EP Cardiology   Telephone Number     Nurse Line  Miladis Hart, RN   Katina Sanchez, RN   [phone]     For scheduling appointments:   Faustino   [phone]   For procedure scheduling:    Danette Siddiqi     [phone]   For the Device Clinic (Pacemakers, ICDs, Loop Recorders)    During business hours: [phone]  After business hours:   [phone]- select option 4 and ask for job code [id number].       On-call cardiologist for after hours or on weekends:   [phone], option #4, and ask to speak to the on-call cardiologist.     Cardiovascular Clinic:   [address] [place] [address]. [place] [address]      As always, Thank you for trusting us with your health care needs!

## 2022-12-02 NOTE — NURSING NOTE
Chief Complaint   Patient presents with     Follow Up     1 mo follow-up PVCs. Review zio (online at time of prep) showing 5.7% PVCs       Vitals were taken, medications reconciled, and EKG performed.    Domingo Lucas  10:37 AM

## 2022-12-02 NOTE — LETTER
12/2/2022      RE: Jennifer Jane  [address]  [facility] [facility] [address]       Dear Colleague,    Thank you for the opportunity to participate in the care of your patient, Jennifer Jane, at the [facility] [place] [facility]. Please see a copy of my visit note below.    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  She was referred for a cardiology evaluation.    She complained of skipped beats and occasional dizziness but denied any chest pain and SOB.    She wore a Zio patch and took TTE and returned to the clinic today.    PAST MEDICAL HISTORY:  Past Medical History:   Diagnosis Date     Arthritis     Fibromyalgia, ? Osteoarthritis     Back injury     Micro discectomy, approx date 1983     Glaucoma      Hiatal hernia      HTN (hypertension)      Hypercalcemia 11/30/2019     Insomnia 12/26/2012     Problem list name updated by automated process. Provider to review     Reduced vision 09/2016    L vision block, bilateral elevated eye presures,poss. Glacom     Uncomplicated asthma     Diagnosed as 2 yo       CURRENT MEDICATIONS:  Current Outpatient Medications   Medication Sig Dispense Refill     albuterol (VENTOLIN HFA) 108 (90 Base) MCG/ACT inhaler Inhale 2 puffs into the lungs every 6 hours as needed for shortness of breath / dyspnea 6.7 g 1     amLODIPine (NORVASC) 5 MG tablet Take 1 tablet (5 mg) by mouth At Bedtime 90 tablet 3     atorvastatin (LIPITOR) 20 MG tablet Take 1 tablet (20 mg) by mouth daily 90 tablet 3     azelastine (ASTELIN) 0.1 % nasal spray Spray 1 spray into both nostrils 2 times daily       benzonatate (TESSALON) 100 MG capsule Take 1 capsule (100 mg) by mouth 3 times daily as needed 30 capsule 0     Calcium Carbonate Antacid (TUMS PO) Take  by mouth At Bedtime.       dextromethorphan  (DELSYM) 30 MG/5ML liquid Take 60 mg by mouth 2 times daily       DiphenhydrAMINE HCl (BENADRYL PO) Take 25 mg by mouth       doxycycline hyclate (VIBRAMYCIN) 100 MG capsule Take 1 capsule (100 mg) by mouth 2 times daily for 7 days 14 capsule 0     fluticasone-salmeterol (ADVAIR-HFA) 230-21 MCG/ACT inhaler        ibuprofen (ADVIL/MOTRIN) 100 MG tablet Take 100 mg by mouth every 4 hours as needed       latanoprost (XALATAN) 0.005 % ophthalmic solution Place 1 drop into the right eye At Bedtime       losartan (COZAAR) 50 MG tablet Take 1 tablet (50 mg) by mouth At Bedtime 90 tablet 3     montelukast (SINGULAIR) 10 MG tablet TAKE ONE TABLET BY MOUTH EVERY DAY AS DIRECTED  1     mupirocin (BACTROBAN) 2 % external ointment Apply topically 3 times daily       nitroGLYcerin (NITROSTAT) 0.4 MG sublingual tablet Place 0.4 mg under the tongue every 5 minutes as needed for chest pain For chest pain place 1 tablet under the tongue every 5 minutes for 3 doses. If symptoms persist 5 minutes after 1st dose call 911.       predniSONE (DELTASONE) 20 MG tablet Take 1 tablet (20 mg) by mouth 2 times daily for 5 days 10 tablet 0       PAST SURGICAL HISTORY:  Past Surgical History:   Procedure Laterality Date      cataract extraction with intraocular lens implant Right 01/31/2019     BACK SURGERY  1984    micro-disc-ectomy     BREAST SURGERY  1984    L Breast bx     cataract extraction with intraocular lens Left 12/06/2018     CHOLECYSTECTOMY  1990     IRIDOTOMY/IRIDECTOMY LASER SURGERY Left 09/27/2016     IRIDOTOMY/IRIDECTOMY LASER SURGERY Right 10/18/2016     TUBAL LIGATION  1989     [facility] STOMACH SURGERY PROCEDURE UNLISTED  03/1990    Cholecystectomy, 11/1989 tubal ligation       ALLERGIES:     Allergies   Allergen Reactions     Canola Oil [Vegetable Oil] Anaphylaxis and GI Disturbance     Animal Dander Unknown     Dust Mites Unknown     Grass      Hydroxyzine Other (See Comments)     Passed out         Pollen Extract Unknown     Trees  "     Vistaril      Chlorhexidine Itching and Rash     Hibiclens         FAMILY HISTORY:  + Premature coronary artery disease  - Atrial fibrillation  - Sudden cardiac death     SOCIAL HISTORY:  Social History     Tobacco Use     Smoking status: Never     Smokeless tobacco: Never   Substance Use Topics     Alcohol use: Yes     Alcohol/week: 0.0 standard drinks     Comment: 0 -2 times a year     Drug use: No       ROS:   Constitutional: No fever, chills, or sweats. Weight stable.   ENT: No visual disturbance, ear ache, epistaxis, sore throat.   Cardiovascular: As per HPI.   Respiratory: No cough, hemoptysis.    GI: No nausea, vomiting, hematemesis, melena, or hematochezia.   : No hematuria.   Integument: Negative.   Psychiatric: Negative.   Hematologic:  Easy bruising, no easy bleeding.  Neuro: Negative.   Endocrinology: No significant heat or cold intolerance   Musculoskeletal: No myalgia.    Exam:  BP (!) 152/80 (BP Location: Right arm, Patient Position: Chair, Cuff Size: Adult Regular)   Pulse 74   Ht 1.658 m (5' 5.28\")   Wt 74.1 kg (163 lb 4.8 oz)   LMP  (LMP Unknown)   SpO2 96%   BMI 26.95 kg/m    GENERAL APPEARANCE: healthy, alert and no distress  HEENT: no icterus, no xanthelasmas, normal pupil size and reaction, normal palate, mucosa moist, no central cyanosis  NECK: no adenopathy, no asymmetry, masses, or scars, thyroid normal to palpation and no bruits, JVP not elevated  RESPIRATORY: lungs clear to auscultation - no rales, rhonchi or wheezes, no use of accessory muscles, no retractions, respirations are unlabored, normal respiratory rate  CARDIOVASCULAR: regular rhythm, normal S1 with physiologic split S2, no S3 or S4 and no murmur, click or rub, precordium quiet with normal PMI.  ABDOMEN: soft, non tender, without hepatosplenomegaly, no masses palpable, bowel sounds normal, aorta not enlarged by palpation, no abdominal bruits  EXTREMITIES: peripheral pulses normal, no edema, no bruits  NEURO: alert " and oriented to person/place/time, normal speech, gait and affect  VASC: Radial, femoral, dorsalis pedis and posterior tibialis pulses are normal in volumes and symmetric bilaterally. No bruits are heard.  SKIN: no ecchymoses, no rashes    Labs:  CBC RESULTS:   Lab Results   Component Value Date    WBC 8.2 10/04/2022    WBC 8.8 12/13/2019    RBC 4.70 10/04/2022    RBC 4.84 12/13/2019    HGB 13.9 10/04/2022    HGB 14.2 12/13/2019    HCT 41.1 10/04/2022    HCT 42.9 12/13/2019    MCV 87 10/04/2022    MCV 89 12/13/2019    MCH 29.6 10/04/2022    MCH 29.3 12/13/2019    MCHC 33.8 10/04/2022    MCHC 33.1 12/13/2019    RDW 12.9 10/04/2022    RDW 13.0 12/13/2019     10/04/2022     12/13/2019       BMP RESULTS:  Lab Results   Component Value Date     10/04/2022     12/02/2020    POTASSIUM 4.0 10/04/2022    POTASSIUM 4.3 09/16/2022    POTASSIUM 4.1 12/02/2020    CHLORIDE 105 10/04/2022    CHLORIDE 108 09/16/2022    CHLORIDE 107 12/02/2020    CO2 26 10/04/2022    CO2 28 09/16/2022    CO2 27 12/02/2020    ANIONGAP 10 10/04/2022    ANIONGAP 5 09/16/2022    ANIONGAP 5 12/02/2020     (H) 10/04/2022     (H) 09/16/2022     (H) 12/02/2020    BUN 12.7 10/04/2022    BUN 12 09/16/2022    BUN 17 12/02/2020    CR 0.81 10/04/2022    CR 0.86 12/02/2020    GFRESTIMATED 77 10/04/2022    GFRESTIMATED >60 09/16/2022    GFRESTIMATED 68 12/02/2020    GFRESTBLACK 78 12/02/2020    KELLEN 10.4 (H) 10/04/2022    KELLEN 10.1 12/02/2020        INR RESULTS:  No results found for: INR    Procedures:  TMT on 10/6/2022: Reviewed.  Interpretation Summary     This was an exercise stress test, no images were submitted for review. The  indication for the test was exertional chest pain.     Patient exercised for 9 minutes and 22 seconds on the [place] ramp protocol. She  achieved a maximum heart rate of 148BPM, corresponding to 8 METS and  representing 100% of the maximum predicted heart rate. Heart rate and blood  pressure  response to exercise were normal. Patient reported no symtpoms during  stress and the test was terminated due to shortness of breath.     Baseline ECG shows normal sinus rhythm with a heart rate of 85BPM. Extremely  difficult stress ECG due to artifact yet there is no clear evidence for ST  segment or T wave changes to suggest underlying myocardial ischemia. FRequent  PVCs noted during stress as well as recovery.              ECG on 11/4/2022: Reviewed.      TTE on 11/21/2022: Reviewed.  Interpretation Summary  Global and regional left ventricular function is normal with an EF of 60-65%.  Right ventricular function, chamber size, wall motion, and thickness are  normal.  No hemodynamically significant valve abnormalities.  The inferior vena cava is normal.  Frequent PVCs noted throughout the study.  There is no prior study for direct comparison.    Zio patch in 11/2022: Reviewed.            ECG on 12/2/2022: Reviewed.        Assessment and Plan:   # HTN  # Prediabetes  # Asthma  # Hypercholesterolemia  # Hypercalcemia  # FHx of CAD    Normal stress test on 10/6/2022.  # Normal TTE on 11/21/2022.  # PVCs  Zio patch in 11/2022 revealed that the PVC burden was 5.7%.  I advised her to observe at this point and repeat Zio patch every 6 months.  I will see her back in 6 months.     I spent a total of 30 min today to review the records, see the patient, and complete the documents.    CC  Patient Care Team:  Sun Franklin MD as PCP - General (Family Practice)  Yuriy Petersen MD as MD (Orthopedics)  Sun Franklin MD as Assigned PCP  Katina Sanchez RN as Specialty Care Coordinator (Cardiology)  Alexander Johnson MD (Cardiovascular Disease)  Indigo Eid MD as Assigned Endocrinology Provider  Alexander Johnson MD as Assigned Heart and Vascular Provider  SUN FRANKLIN        Please do not hesitate to contact me if you have any questions/concerns.     Sincerely,     Alexander Johnson MD

## 2022-12-03 LAB
ATRIAL RATE - MUSE: 67 BPM
DIASTOLIC BLOOD PRESSURE - MUSE: NORMAL MMHG
INTERPRETATION ECG - MUSE: NORMAL
P AXIS - MUSE: 58 DEGREES
PR INTERVAL - MUSE: 142 MS
QRS DURATION - MUSE: 76 MS
QT - MUSE: 404 MS
QTC - MUSE: 426 MS
R AXIS - MUSE: -7 DEGREES
SYSTOLIC BLOOD PRESSURE - MUSE: NORMAL MMHG
T AXIS - MUSE: 40 DEGREES
VENTRICULAR RATE- MUSE: 67 BPM

## 2022-12-06 ENCOUNTER — NURSE TRIAGE (OUTPATIENT)
Dept: FAMILY MEDICINE | Facility: CLINIC | Age: 72
End: 2022-12-06

## 2022-12-06 NOTE — TELEPHONE ENCOUNTER
"Call received from patient:  1. Urgent Care eval on 11/29/22  2. Negative results for COVID-19-tested a couple times  3. Was not feeling better on Prednisone so started Doxycycline  4. Last dose of Prednisone was 12/4/22  5. URI symptoms are recurring and now feels more congested in nose   6. Has a \"funny sound\" in her chest and cough is still intense at times but improved overall  7. No chest pain  8. Some SOB with feeling congested-but not SOB at rest nor with activity  9. If lays down on right side hears a rumbling in her chest \"coarser\" than a wheeze-more so with exhalation    Recommended patient be evaluated in-person to have lungs re-assessed.    Patient verbalized understanding and in agreement with plan.    Appt initially scheduled with Dr. Wheat on 12/7/22.  Writer was then informed Dr. Wheat is not available for in-person appt tomorrow, only virtual.    Dr. Person offered to double book patient at 1300 tomorrow, 12/7/22.  Offered this to patient who agreed to this appt.  Appt date, time and location confirmed with patient.        Reason for Disposition    Wheezing is present     \"Coarse\" noise per patient    Additional Information    Negative: Bluish (or gray) lips or face    Negative: SEVERE difficulty breathing (e.g., struggling for each breath, speaks in single words)    Negative: Rapid onset of cough and has hives    Negative: Coughing started suddenly after medicine, an allergic food or bee sting    Negative: Difficulty breathing after exposure to flames, smoke, or fumes    Negative: Sounds like a life-threatening emergency to the triager    Negative: Previous asthma attacks and this feels like asthma attack    Negative: Dry cough (non-productive; no sputum or minimal clear sputum) and within 14 days of COVID-19 Exposure    Negative: MODERATE difficulty breathing (e.g., speaks in phrases, SOB even at rest, pulse 100-120) and still present when not coughing    Negative: Chest pain present when not " coughing    Negative: Passed out (i.e., fainted, collapsed and was not responding)    Negative: Patient sounds very sick or weak to the triager    Negative: MILD difficulty breathing (e.g., minimal/no SOB at rest, SOB with walking, pulse <100) and still present when not coughing    Negative: Coughed up > 1 tablespoon (15 ml) blood (Exception: Blood-tinged sputum.)    Negative: Fever > 103 F (39.4 C)    Negative: Fever > 101 F (38.3 C) and over 60 years of age    Negative: Fever > 100.0 F (37.8 C) and has diabetes mellitus or a weak immune system (e.g., HIV positive, cancer chemotherapy, organ transplant, splenectomy, chronic steroids)    Negative: Fever > 100.0 F (37.8 C) and bedridden (e.g., nursing home patient, stroke, chronic illness, recovering from surgery)    Negative: Increasing ankle swelling    Protocols used: COUGH-A-KERRI PrinceN, RN-BC  [facility]

## 2022-12-07 ENCOUNTER — OFFICE VISIT (OUTPATIENT)
Dept: FAMILY MEDICINE | Facility: CLINIC | Age: 72
End: 2022-12-07
Payer: COMMERCIAL

## 2022-12-07 VITALS
SYSTOLIC BLOOD PRESSURE: 134 MMHG | RESPIRATION RATE: 22 BRPM | OXYGEN SATURATION: 96 % | HEIGHT: 65 IN | BODY MASS INDEX: 27.32 KG/M2 | DIASTOLIC BLOOD PRESSURE: 82 MMHG | WEIGHT: 164 LBS | HEART RATE: 62 BPM | TEMPERATURE: 97.8 F

## 2022-12-07 DIAGNOSIS — J45.41 MODERATE PERSISTENT ASTHMA WITH EXACERBATION: Primary | ICD-10-CM

## 2022-12-07 PROCEDURE — 99213 OFFICE O/P EST LOW 20 MIN: CPT | Performed by: FAMILY MEDICINE

## 2022-12-07 RX ORDER — PREDNISONE 20 MG/1
TABLET ORAL
Qty: 20 TABLET | Refills: 0 | Status: SHIPPED | OUTPATIENT
Start: 2022-12-07 | End: 2023-02-17

## 2022-12-07 NOTE — PROGRESS NOTES
Assessment & Plan     Moderate persistent asthma with exacerbation    Improved on prednisone, but then has worsened again since finished 5 day burst    Chest x-ray reviewed and clear    Lung exam clear today except for occasional wheeze with deep cough    Will do slower and longer taper of prednisone.    Discussed with patient, all questions answered, in agreement with this plan, will return or seek further care if not improving or worsening and likely would get further imaging such as CT at that point  - predniSONE (DELTASONE) 20 MG tablet; Take 3 tabs by mouth daily x 3 days, then 2 tabs daily x 3 days, then 1 tab daily x 3 days, then 1/2 tab daily x 3 days.        Return for already scheduled appt, if worsening or not improving.    Sun Person MD  [facility]    Henrry Rodriguez is a 71 year old, presenting for the following health issues:  URI (Cough and Congestion since Thanksgiving, no fever. She has had negative COVID tests. She was seen in  and was giving prednisone and antibiotics. She feels that she is not improving much and her chest/throat feel coarse.) and Pressure in the left side of her chest (Ongoing issue and has been seen by cardiology on Friday. She had the Holter Monitor, echocardiogram and stress test. Results indicated PVC's during the daytime.)      URI    History of Present Illness       Reason for visit:  Continued respiratory illness  Symptom onset:  1-2 weeks ago    She eats 4 or more servings of fruits and vegetables daily.She consumes 1 sweetened beverage(s) daily.She exercises with enough effort to increase her heart rate 20 to 29 minutes per day.  She exercises with enough effort to increase her heart rate 4 days per week.   She is taking medications regularly.     Thanksgiving evening started coughing.  Then the next day felt worse  Felt rattling in chest  Came into urgent care 11/29  Started her on prednisone for 5 days, then 3 days later  "started antibiotics doxycycline    When lays down, deep rattle on right side.    Last 3 nights has slept.    Got better on prednisone.  Then worse again once stopped.    No fever, no nasal congestion      Review of Systems   Constitutional, HEENT, cardiovascular, pulmonary, gi and gu systems are negative, except as otherwise noted.      Objective    BP (!) 144/82   Pulse 62   Temp 97.8  F (36.6  C)   Resp 22   Ht 1.651 m (5' 5\")   Wt 74.4 kg (164 lb)   LMP  (LMP Unknown)   SpO2 96%   BMI 27.29 kg/m    Body mass index is 27.29 kg/m .  Physical Exam   GENERAL: healthy, alert and no distress  RESP: expiratory wheezes throughout and rare with deep cough, otherwise lungs clear and good air movement and able to speak in full sentences. O296%              "

## 2022-12-19 ENCOUNTER — TRANSFERRED RECORDS (OUTPATIENT)
Dept: HEALTH INFORMATION MANAGEMENT | Facility: CLINIC | Age: 72
End: 2022-12-19

## 2023-01-21 DIAGNOSIS — I10 BENIGN ESSENTIAL HYPERTENSION: ICD-10-CM

## 2023-01-25 RX ORDER — AMLODIPINE BESYLATE 5 MG/1
TABLET ORAL
Qty: 90 TABLET | Refills: 2 | Status: SHIPPED | OUTPATIENT
Start: 2023-01-25 | End: 2024-01-26

## 2023-01-25 RX ORDER — LOSARTAN POTASSIUM 50 MG/1
TABLET ORAL
Qty: 90 TABLET | Refills: 2 | Status: SHIPPED | OUTPATIENT
Start: 2023-01-25 | End: 2023-11-01

## 2023-01-25 NOTE — TELEPHONE ENCOUNTER
---Prescription approved per [facility] Refill Protocol.       Yanely Clements RN BSN     [facility]      --Last visit:  12/7/2022

## 2023-02-10 ENCOUNTER — VIRTUAL VISIT (OUTPATIENT)
Dept: FAMILY MEDICINE | Facility: CLINIC | Age: 73
End: 2023-02-10
Payer: COMMERCIAL

## 2023-02-10 ENCOUNTER — NURSE TRIAGE (OUTPATIENT)
Dept: FAMILY MEDICINE | Facility: CLINIC | Age: 73
End: 2023-02-10
Payer: COMMERCIAL

## 2023-02-10 DIAGNOSIS — U07.1 INFECTION DUE TO 2019 NOVEL CORONAVIRUS: Primary | ICD-10-CM

## 2023-02-10 PROCEDURE — 99213 OFFICE O/P EST LOW 20 MIN: CPT | Mod: VID | Performed by: PHYSICIAN ASSISTANT

## 2023-02-10 NOTE — PROGRESS NOTES
Jennifer is a 72 year old who is being evaluated via a billable video visit.      How would you like to obtain your AVS? MyChart  If the video visit is dropped, the invitation should be resent by: Text to cell phone: [phone]  Will anyone else be joining your video visit? No          Assessment & Plan     Infection due to 2019 novel coronavirus  Willing to begin antiviral therapy.  Within window.  Would benefit from treatment. Start Paxlovid.  Discussed side effects of this medication.  Hold statin therapy for 10 days.  Continue all other medications.  Discussed additional symptomatic care measures.  Push fluids.  Rest.  Follow-up if no improvement or worsening.  Reviewed signs and symptoms that urgent/emergent reevaluation.    - nirmatrelvir and ritonavir (PAXLOVID) therapy pack  Dispense: 30 tablet; Refill: 0      15 minutes spent on the date of the encounter doing chart review, review of test results, interpretation of tests, patient visit and documentation          Return in about 1 month (around 3/10/2023), or if symptoms worsen or fail to improve, for Follow up.    The likelihood of other entities in the differential is insufficient to justify any further testing for them at this time. This was explained to the patient. The patient was advised that persistent or worsening symptoms would require further evaluation. Patient advised to call the office and if unable to reach to go to the emergency room if they develop any new or worsening symptoms. Expressed understanding and agreement with above stated plan.       [facility] is a 72 year old presenting for the following health issues:  Covid Concern (Patient having a virtual appointment for complaints of Covid.  Tested positive today for Covid.  Symptoms started on Sunday. Had allergy shots on Monday and by Wednesday sick all day.)      +COVID today.  Symptoms 2/5/23 - body aches?   2/8 - fever,  chills, (101.0), congestion. Productive cough.   2/9 - chills - temp improving (99.9), overall feeling better  2/10: . Achy. Sneezing. Cough. Diarrhea.   Here to discuss antiviral therapy.  No shortness of breath, chest pain, or abdominal pain.  Has completed 4 COVID-19 immunizations.        COVID-19 Symptom Review  How many days ago did these symptoms start? 2/5/2023    Are any of the following symptoms significant for you?    New or worsening difficulty breathing? No    Worsening cough? Yes, I am coughing up mucus.    Fever or chills? Yes, the highest temperature was 101.9 last night    Headache: YES    Sore throat: YES    Chest pain: No    Diarrhea: YES    Body aches? YES    What treatments has patient tried? Zinc and Vitamin C.   Does patient live in a nursing home, group home, or shelter? No  Does patient have a way to get food/medications during quarantined? Yes, I have a friend or family member who can help me.                Review of Systems   Constitutional, HEENT, cardiovascular, pulmonary, GI, , musculoskeletal, neuro, skin, endocrine and psych systems are negative, except as otherwise noted.      Objective           Vitals:  No vitals were obtained today due to virtual visit.    Physical Exam   GENERAL: Healthy, alert and no distress  EYES: Eyes grossly normal to inspection.  No discharge or erythema, or obvious scleral/conjunctival abnormalities.  RESP: No audible wheeze, cough, or visible cyanosis.  No visible retractions or increased work of breathing.    SKIN: Visible skin clear. No significant rash, abnormal pigmentation or lesions.  NEURO: Cranial nerves grossly intact.  Mentation and speech appropriate for age.  PSYCH: Mentation appears normal, affect normal/bright, judgement and insight intact, normal speech and appearance well-groomed.          Video-Visit Details    Type of service:  Video Visit   Video Start Time: 4:30  Video End Time:4:38    Originating Location (pt. Location):  Home    Distant Location (provider location):  On-site  Platform used for Video Visit: Debra    Answers for HPI/ROS submitted by the patient on 2/10/2023  How many servings of fruits and vegetables do you eat daily?: 2-3  On average, how many sweetened beverages do you drink each day (Examples: soda, juice, sweet tea, etc.  Do NOT count diet or artificially sweetened beverages)?: 0  How many minutes a day do you exercise enough to make your heart beat faster?: 20 to 29  How many days a week do you exercise enough to make your heart beat faster?: 5  How many days per week do you miss taking your medication?: 0  What is the reason for your visit today?: Covid  When did your symptoms begin?: 3-7 days ago  What are your symptoms?: fever, cough, headache  How would you describe these symptoms?: Moderate  Are your symptoms:: Staying the same  Have you had these symptoms before?: No  Is there anything that makes you feel worse?: no  Is there anything that makes you feel better?: no

## 2023-02-10 NOTE — TELEPHONE ENCOUNTER
Sinus headache joint pain cough      Reason for Disposition    [1] Fever > 101 F (38.3 C) AND [2] over 60 years of age    Additional Information    Negative: SEVERE difficulty breathing (e.g., struggling for each breath, speaks in single words)    Negative: Difficult to awaken or acting confused (e.g., disoriented, slurred speech)    Negative: Bluish (or gray) lips or face now    Negative: Shock suspected (e.g., cold/pale/clammy skin, too weak to stand, low BP, rapid pulse)    Negative: Sounds like a life-threatening emergency to the triager    Negative: [1] Diagnosed or suspected COVID-19 AND [2] symptoms lasting 3 or more weeks    Negative: [1] COVID-19 exposure AND [2] no symptoms    Negative: COVID-19 vaccine reaction suspected (e.g., fever, headache, muscle aches) occurring 1 to 3 days after getting vaccine    Negative: COVID-19 vaccine, questions about    Negative: [1] Lives with someone known to have influenza (flu test positive) AND [2] flu-like symptoms (e.g., cough, runny nose, sore throat, SOB; with or without fever)    Negative: [1] Adult with possible COVID-19 symptoms AND [2] triager concerned about severity of symptoms or other causes    Negative: COVID-19 and breastfeeding, questions about    Negative: SEVERE or constant chest pain or pressure  (Exception: Mild central chest pain, present only when coughing.)    Negative: MODERATE difficulty breathing (e.g., speaks in phrases, SOB even at rest, pulse 100-120)    Negative: Headache and stiff neck (can't touch chin to chest)    Negative: Oxygen level (e.g., pulse oximetry) 90 percent or lower    Negative: Chest pain or pressure  (Exception: MILD central chest pain, present only when coughing)    Negative: Patient sounds very sick or weak to the triager    Protocols used: CORONAVIRUS (COVID-19) DIAGNOSED OR [id number]    RN COVID TREATMENT VISIT  02/10/23    Jennifer ESCALANTE Bukc  72 year old  Current weight? 164#    Has the patient been seen by a primary  care provider at an [facility] or [facility] within the past two years? Yes.   Have you been in close proximity to/do you have a known exposure to a person with a confirmed case of influenza? No.     Date of positive COVID test (PCR or at home)?  2/10/23    Current COVID symptoms: fever or chills, cough, muscle or body aches and congestion or runny nose    Date COVID symptoms began: 2/6/23    Do you have any of the following conditions that place you at risk of being very sick from COVID-19? 65 years or older, chronic lung disease and overweight (BMI>25)    Is patient eligible to continue? Yes, established patient, 12 years or older weighing at least 88.2 lbs, who has COVID symptoms that started in the past 5 days and is at risk for being very sick from COVID-19.       Have you received monoclonal antibodies or oral antiviral medications since testing positive to COVID-19? No    Are you currently hospitalized for COVID-19? No    Do you have a history of hepatitis? No    Are you currently pregnant or nursing? No    Do you have a clinically significant hypersensitivity to nirmatrelvir, ritonavir, or molnupiravir? No    Do you have any history of severe renal impairment (eGFR < 30mL/min)? No    Do you have any history of hepatic impairment or abnormalities (e.g. hepatic panel, ALT, AST, ALK Phos, bilirubin)? No    Have you had a coronary stent placed in the previous 6 months? No    Is patient eligible to continue?   Yes, patient meets all eligibility requirements for the RN COVID treatment (as denoted by all no responses above).     Current Outpatient Medications   Medication Sig Dispense Refill     albuterol (VENTOLIN HFA) 108 (90 Base) MCG/ACT inhaler Inhale 2 puffs into the lungs every 6 hours as needed for shortness of breath / dyspnea 6.7 g 1     amLODIPine (NORVASC) 5 MG tablet TAKE 1 TABLET BY MOUTH AT BEDTIME 90 tablet 2     atorvastatin (LIPITOR) 20 MG tablet Take 1 tablet (20 mg)  by mouth daily 90 tablet 3     azelastine (ASTELIN) 0.1 % nasal spray Spray 1 spray into both nostrils 2 times daily       benzonatate (TESSALON) 100 MG capsule Take 1 capsule (100 mg) by mouth 3 times daily as needed 30 capsule 0     Calcium Carbonate Antacid (TUMS PO) Take  by mouth At Bedtime.       dextromethorphan (DELSYM) 30 MG/5ML liquid Take 60 mg by mouth 2 times daily       DiphenhydrAMINE HCl (BENADRYL PO) Take 25 mg by mouth       fluticasone-salmeterol (ADVAIR-HFA) 230-21 MCG/ACT inhaler        ibuprofen (ADVIL/MOTRIN) 100 MG tablet Take 100 mg by mouth every 4 hours as needed       latanoprost (XALATAN) 0.005 % ophthalmic solution Place 1 drop into the right eye At Bedtime       losartan (COZAAR) 50 MG tablet TAKE 1 TABLET BY MOUTH AT BEDTIME 90 tablet 2     montelukast (SINGULAIR) 10 MG tablet TAKE ONE TABLET BY MOUTH EVERY DAY AS DIRECTED  1     mupirocin (BACTROBAN) 2 % external ointment Apply topically 3 times daily       nitroGLYcerin (NITROSTAT) 0.4 MG sublingual tablet Place 0.4 mg under the tongue every 5 minutes as needed for chest pain For chest pain place 1 tablet under the tongue every 5 minutes for 3 doses. If symptoms persist 5 minutes after 1st dose call 911.       predniSONE (DELTASONE) 20 MG tablet Take 3 tabs by mouth daily x 3 days, then 2 tabs daily x 3 days, then 1 tab daily x 3 days, then 1/2 tab daily x 3 days. 20 tablet 0       Medications from List 1 of the standing order (on medications that exclude the use of Paxlovid) that patient is taking: NONE. Is patient taking [place]'s Wort? No  Is patient taking [place]'s Wort or any meds from List 1? No.   Medications from List 2 of the standing order (on meds that provider needs to adjust) that patient is taking: amlodipine (Norvasc), explained a provider visit is necessary to discuss medication adjustments while taking Paxlovid.  salmeterol and salmeterol-containing medications (Advair), explained a provider visit is necessary to  discuss medication adjustments while taking Paxlovid. Is patient on any of the meds from List 2? Yes. Patient will be transferred to a  at the end of this call.   Donna Staples RN

## 2023-02-17 ENCOUNTER — OFFICE VISIT (OUTPATIENT)
Dept: FAMILY MEDICINE | Facility: CLINIC | Age: 73
End: 2023-02-17
Payer: COMMERCIAL

## 2023-02-17 VITALS
HEART RATE: 80 BPM | OXYGEN SATURATION: 97 % | SYSTOLIC BLOOD PRESSURE: 130 MMHG | RESPIRATION RATE: 20 BRPM | TEMPERATURE: 97.2 F | BODY MASS INDEX: 27.26 KG/M2 | WEIGHT: 163.6 LBS | HEIGHT: 65 IN | DIASTOLIC BLOOD PRESSURE: 60 MMHG

## 2023-02-17 DIAGNOSIS — Z00.00 ENCOUNTER FOR MEDICARE ANNUAL WELLNESS EXAM: Primary | ICD-10-CM

## 2023-02-17 DIAGNOSIS — E55.9 VITAMIN D DEFICIENCY, UNSPECIFIED: ICD-10-CM

## 2023-02-17 DIAGNOSIS — L65.9 HAIR LOSS: ICD-10-CM

## 2023-02-17 DIAGNOSIS — Z12.11 SCREEN FOR COLON CANCER: ICD-10-CM

## 2023-02-17 DIAGNOSIS — J45.41 MODERATE PERSISTENT ASTHMA WITH EXACERBATION: ICD-10-CM

## 2023-02-17 DIAGNOSIS — R73.03 PRE-DIABETES: ICD-10-CM

## 2023-02-17 DIAGNOSIS — E78.00 HYPERCHOLESTEROLEMIA: ICD-10-CM

## 2023-02-17 DIAGNOSIS — R05.8 RECURRENT COUGH: ICD-10-CM

## 2023-02-17 LAB
ALBUMIN SERPL BCG-MCNC: 4.4 G/DL (ref 3.5–5.2)
ALP SERPL-CCNC: 79 U/L (ref 35–104)
ALT SERPL W P-5'-P-CCNC: 33 U/L (ref 10–35)
ANION GAP SERPL CALCULATED.3IONS-SCNC: 12 MMOL/L (ref 7–15)
AST SERPL W P-5'-P-CCNC: 32 U/L (ref 10–35)
BASOPHILS # BLD AUTO: 0 10E3/UL (ref 0–0.2)
BASOPHILS NFR BLD AUTO: 0 %
BILIRUB SERPL-MCNC: 0.4 MG/DL
BUN SERPL-MCNC: 14.6 MG/DL (ref 8–23)
CALCIUM SERPL-MCNC: 10.5 MG/DL (ref 8.8–10.2)
CHLORIDE SERPL-SCNC: 103 MMOL/L (ref 98–107)
CHOLEST SERPL-MCNC: 231 MG/DL
CREAT SERPL-MCNC: 0.77 MG/DL (ref 0.51–0.95)
DEPRECATED HCO3 PLAS-SCNC: 26 MMOL/L (ref 22–29)
EOSINOPHIL # BLD AUTO: 0 10E3/UL (ref 0–0.7)
EOSINOPHIL NFR BLD AUTO: 0 %
ERYTHROCYTE [DISTWIDTH] IN BLOOD BY AUTOMATED COUNT: 12.9 % (ref 10–15)
ERYTHROCYTE [SEDIMENTATION RATE] IN BLOOD BY WESTERGREN METHOD: 8 MM/HR (ref 0–30)
FERRITIN SERPL-MCNC: 171 NG/ML (ref 11–328)
GFR SERPL CREATININE-BSD FRML MDRD: 81 ML/MIN/1.73M2
GLUCOSE SERPL-MCNC: 123 MG/DL (ref 70–99)
HBA1C MFR BLD: 6.3 % (ref 0–5.6)
HCT VFR BLD AUTO: 37.3 % (ref 35–47)
HDLC SERPL-MCNC: 83 MG/DL
HGB BLD-MCNC: 13.7 G/DL (ref 11.7–15.7)
IMM GRANULOCYTES # BLD: 0.1 10E3/UL
IMM GRANULOCYTES NFR BLD: 1 %
LDLC SERPL CALC-MCNC: 126 MG/DL
LYMPHOCYTES # BLD AUTO: 2.4 10E3/UL (ref 0.8–5.3)
LYMPHOCYTES NFR BLD AUTO: 21 %
MCH RBC QN AUTO: 29.5 PG (ref 26.5–33)
MCHC RBC AUTO-ENTMCNC: 36.7 G/DL (ref 31.5–36.5)
MCV RBC AUTO: 80 FL (ref 78–100)
MONOCYTES # BLD AUTO: 0.8 10E3/UL (ref 0–1.3)
MONOCYTES NFR BLD AUTO: 7 %
NEUTROPHILS # BLD AUTO: 8 10E3/UL (ref 1.6–8.3)
NEUTROPHILS NFR BLD AUTO: 71 %
NONHDLC SERPL-MCNC: 148 MG/DL
PLATELET # BLD AUTO: 305 10E3/UL (ref 150–450)
POTASSIUM SERPL-SCNC: 4.2 MMOL/L (ref 3.4–5.3)
PROT SERPL-MCNC: 6.9 G/DL (ref 6.4–8.3)
RBC # BLD AUTO: 4.64 10E6/UL (ref 3.8–5.2)
SODIUM SERPL-SCNC: 141 MMOL/L (ref 136–145)
TRIGL SERPL-MCNC: 108 MG/DL
TSH SERPL DL<=0.005 MIU/L-ACNC: 1.36 UIU/ML (ref 0.3–4.2)
WBC # BLD AUTO: 11.2 10E3/UL (ref 4–11)

## 2023-02-17 PROCEDURE — 85652 RBC SED RATE AUTOMATED: CPT | Performed by: FAMILY MEDICINE

## 2023-02-17 PROCEDURE — 85025 COMPLETE CBC W/AUTO DIFF WBC: CPT | Performed by: FAMILY MEDICINE

## 2023-02-17 PROCEDURE — 82627 DEHYDROEPIANDROSTERONE: CPT | Performed by: FAMILY MEDICINE

## 2023-02-17 PROCEDURE — 36415 COLL VENOUS BLD VENIPUNCTURE: CPT | Performed by: FAMILY MEDICINE

## 2023-02-17 PROCEDURE — 80061 LIPID PANEL: CPT | Performed by: FAMILY MEDICINE

## 2023-02-17 PROCEDURE — 84443 ASSAY THYROID STIM HORMONE: CPT | Performed by: FAMILY MEDICINE

## 2023-02-17 PROCEDURE — 84403 ASSAY OF TOTAL TESTOSTERONE: CPT | Performed by: FAMILY MEDICINE

## 2023-02-17 PROCEDURE — 83036 HEMOGLOBIN GLYCOSYLATED A1C: CPT | Performed by: FAMILY MEDICINE

## 2023-02-17 PROCEDURE — 84270 ASSAY OF SEX HORMONE GLOBUL: CPT | Performed by: FAMILY MEDICINE

## 2023-02-17 PROCEDURE — 80053 COMPREHEN METABOLIC PANEL: CPT | Performed by: FAMILY MEDICINE

## 2023-02-17 PROCEDURE — 82306 VITAMIN D 25 HYDROXY: CPT | Performed by: FAMILY MEDICINE

## 2023-02-17 PROCEDURE — 82728 ASSAY OF FERRITIN: CPT | Performed by: FAMILY MEDICINE

## 2023-02-17 PROCEDURE — 99214 OFFICE O/P EST MOD 30 MIN: CPT | Mod: 25 | Performed by: FAMILY MEDICINE

## 2023-02-17 PROCEDURE — G0439 PPPS, SUBSEQ VISIT: HCPCS | Performed by: FAMILY MEDICINE

## 2023-02-17 RX ORDER — ATORVASTATIN CALCIUM 20 MG/1
20 TABLET, FILM COATED ORAL DAILY
Qty: 90 TABLET | Refills: 3 | Status: SHIPPED | OUTPATIENT
Start: 2023-02-17 | End: 2024-01-30

## 2023-02-17 ASSESSMENT — ENCOUNTER SYMPTOMS
NAUSEA: 0
COUGH: 1
FEVER: 0
CHILLS: 0
NERVOUS/ANXIOUS: 0
DYSURIA: 0
WEAKNESS: 0
FREQUENCY: 0
SHORTNESS OF BREATH: 0
PALPITATIONS: 0
DIZZINESS: 0
HEADACHES: 0
HEMATURIA: 0
ABDOMINAL PAIN: 0
SORE THROAT: 0
HEARTBURN: 0
MYALGIAS: 0
CONSTIPATION: 0
PARESTHESIAS: 0
HEMATOCHEZIA: 0
BREAST MASS: 0
EYE PAIN: 0
DIARRHEA: 0
ARTHRALGIAS: 1

## 2023-02-17 ASSESSMENT — ACTIVITIES OF DAILY LIVING (ADL): CURRENT_FUNCTION: NO ASSISTANCE NEEDED

## 2023-02-17 NOTE — PROGRESS NOTES
"SUBJECTIVE:   Jennifer is a 72 year old who presents for Preventive Visit.  Patient has been advised of split billing requirements and indicates understanding: Yes  Are you in the first 12 months of your Medicare coverage?  No    Healthy Habits:     In general, how would you rate your overall health?  Good    Frequency of exercise:  2-3 days/week    Duration of exercise:  30-45 minutes    Do you usually eat at least 4 servings of fruit and vegetables a day, include whole grains    & fiber and avoid regularly eating high fat or \"junk\" foods?  Yes    Taking medications regularly:  Yes    Medication side effects:  None    Ability to successfully perform activities of daily living:  No assistance needed    Home Safety:  No safety concerns identified    Hearing Impairment:  No hearing concerns    In the past 6 months, have you been bothered by leaking of urine? Yes    In general, how would you rate your overall mental or emotional health?  Excellent      PHQ-2 Total Score: 0    Additional concerns today:  Yes    Cough.  Has had upper respiratory infection 3 times in last 3 months.  Had chest x-ray - normal.  Still feels something in right side.    Cardiology - follow up this summer. Has taken nitroglycerin when feels issue.  Helped.    Endo and hyperparathyroid - repeat this summer.  Follows with Dr. Eid.    Hair loss.  When brushing.  For last 4 months.  Has lost weight.    Have you ever done Advance Care Planning? (For example, a Health Directive, POLST, or a discussion with a medical provider or your loved ones about your wishes): No, advance care planning information given to patient to review.  Patient plans to discuss their wishes with loved ones or provider.         Fall risk  Fallen 2 or more times in the past year?: No  Any fall with injury in the past year?: No  click delete button to remove this line now  Cognitive Screening   1) Repeat 3 items (Leader, Season, Table)    2) Clock draw: NORMAL  3) 3 item " recall: Recalls 2 objects   Results: NORMAL clock, 1-2 items recalled: COGNITIVE IMPAIRMENT LESS LIKELY    Mini-CogTM Copyright BRITNEY Pryor. Licensed by the author for use in [facility]; reprinted with permission (devin@[facility]). All rights reserved.      Do you have sleep apnea, excessive snoring or daytime drowsiness?: no    Reviewed and updated as needed this visit by clinical staff   Tobacco  Allergies  Meds              Reviewed and updated as needed this visit by Provider                 Social History     Tobacco Use     Smoking status: Never     Smokeless tobacco: Never   Substance Use Topics     Alcohol use: Yes     Alcohol/week: 0.0 standard drinks     Comment: 0 -2 times a year     If you drink alcohol do you typically have >3 drinks per day or >7 drinks per week? No    Alcohol Use 2/17/2023   Prescreen: >3 drinks/day or >7 drinks/week? No   Prescreen: >3 drinks/day or >7 drinks/week? -         Current providers sharing in care for this patient include:   Patient Care Team:  Sun Person MD as PCP - General (Family Practice)  Yuriy Petersen MD as MD (Orthopedics)  Sun Person MD as Assigned PCP  Katina Sanchez RN as Specialty Care Coordinator (Cardiology)  Alexander Johnson MD (Cardiovascular Disease)  Indigo Eid MD as Assigned Endocrinology Provider  Alexander Johnson MD as Assigned Heart and Vascular Provider    The following health maintenance items are reviewed in Epic and correct as of today:  Health Maintenance   Topic Date Due     ZOSTER IMMUNIZATION (2 of 3) 01/14/2011     INFLUENZA VACCINE (1) 09/01/2022     A1C  02/16/2023     LIPID  02/16/2023     ASTHMA ACTION PLAN  12/31/2030 (Originally 11/3/2020)     COLORECTAL CANCER SCREENING  04/15/2023     ASTHMA CONTROL TEST  09/16/2023     BMP  10/04/2023     MEDICARE ANNUAL WELLNESS VISIT  02/17/2024     ANNUAL REVIEW OF HM ORDERS  02/17/2024     FALL RISK ASSESSMENT  02/17/2024     MAMMO SCREENING  10/04/2024  "    DEXA  10/10/2024     ADVANCE CARE PLANNING  02/17/2028     DTAP/TDAP/TD IMMUNIZATION (3 - Td or Tdap) 10/27/2031     HEPATITIS C SCREENING  Completed     PHQ-2 (once per calendar year)  Completed     Pneumococcal Vaccine: 65+ Years  Completed     COVID-19 Vaccine  Completed     IPV IMMUNIZATION  Aged Out     MENINGITIS IMMUNIZATION  Aged Out           Review of Systems   Constitutional: Negative for chills and fever.   HENT: Positive for congestion. Negative for ear pain, hearing loss and sore throat.    Eyes: Negative for pain and visual disturbance.   Respiratory: Positive for cough. Negative for shortness of breath.    Cardiovascular: Negative for chest pain, palpitations and peripheral edema.   Gastrointestinal: Negative for abdominal pain, constipation, diarrhea, heartburn, hematochezia and nausea.   Breasts:  Negative for tenderness, breast mass and discharge.   Genitourinary: Negative for dysuria, frequency, genital sores, hematuria, pelvic pain, urgency, vaginal bleeding and vaginal discharge.   Musculoskeletal: Positive for arthralgias. Negative for myalgias.   Skin: Negative for rash.   Neurological: Negative for dizziness, weakness, headaches and paresthesias.   Psychiatric/Behavioral: Negative for mood changes. The patient is not nervous/anxious.          OBJECTIVE:   /60 (BP Location: Left arm, Patient Position: Sitting)   Pulse 80   Temp 97.2  F (36.2  C) (Temporal)   Resp 20   Ht 1.638 m (5' 4.5\")   Wt 74.2 kg (163 lb 9.6 oz)   LMP  (LMP Unknown)   SpO2 97%   BMI 27.65 kg/m   Estimated body mass index is 27.65 kg/m  as calculated from the following:    Height as of this encounter: 1.638 m (5' 4.5\").    Weight as of this encounter: 74.2 kg (163 lb 9.6 oz).  Physical Exam  GENERAL: healthy, alert and no distress  EYES: Eyes grossly normal to inspection, PERRL and conjunctivae and sclerae normal  HENT: ear canals and TM's normal, nose and mouth without ulcers or lesions  NECK: no " adenopathy, no asymmetry, masses, or scars and thyroid normal to palpation  RESP: lungs clear to auscultation - no rales, rhonchi or wheezes  CV: regular rate and rhythm, normal S1 S2, no S3 or S4, no murmur, click or rub, no peripheral edema and peripheral pulses strong  ABDOMEN: soft, nontender, no hepatosplenomegaly, no masses and bowel sounds normal  MS: no gross musculoskeletal defects noted, no edema  SKIN: no suspicious lesions or rashes  NEURO: Normal strength and tone, mentation intact and speech normal  PSYCH: mentation appears normal, affect normal/bright    Diagnostic Test Results:  Labs reviewed in Epic    ASSESSMENT / PLAN:   Jennifer was seen today for medicare visit.    Diagnoses and all orders for this visit:    Encounter for Medicare annual wellness exam    Mammo: up to date     Colon: due, ordered    Immunizations: due for Shingrix; will do at pharmacy    Discussed healthy lifestyle and aging recommendations including regular exercise, adequate and regular sleep, 5+ fruits and veggies daily.  -     PRIMARY CARE FOLLOW-UP SCHEDULING; Future  -     REVIEW OF HEALTH MAINTENANCE PROTOCOL ORDERS    Pre-diabetes  -     HEMOGLOBIN A1C; Future    Recurrent cough  Moderate persistent asthma with exacerbation    Illness x 3 since nov    Still congestion in chest but just had Covid    If persists over next month get ct below  -     CT Chest w/o Contrast; Future    Hypercholesterolemia  Comments:  Atorvastatin work well  Recheck lipids today  Refilled  Stable  Orders:  -     Lipid panel reflex to direct LDL Non-fasting; Future  -     atorvastatin (LIPITOR) 20 MG tablet; Take 1 tablet (20 mg) by mouth daily    Hair loss    See plan in patient instructions:  Hair Loss:  1. You could add over the counter rogaine nightly  2. Be sure you are getting enough iron in your diet.  You may want to take over the counter iron twice weekly.  3. Be sure you are getting enough protein in your diet.    Discussed testing and  "management of hair loss in women.    Most are multifactorial - combination of telogen effluvium (shedding) and common hormonal \"female pattern hair loss\"    Check labs below (D, ferritin, testosterone and dhea, cbc, cmp)    If normal start:    Oral low dose iron every other day if ferritin not at 50 (goal 50-80 for hair growth)    Oral vit D 400-1000 daily    Eat at least 50 gm of protein daily for hair growth.  Real food better than supplements.    Topical 5% rogaine/minoxidil nightly (once daily).  Apply liquid, rub on scalp in affected area.  -     Vitamin D Deficiency; Future  -     Erythrocyte sedimentation rate auto; Future  -     Ferritin; Future  -     Testosterone Free and Total; Future  -     DHEA sulfate; Future  -     TSH with free T4 reflex; Future  -     CBC with Platelets & Differential; Future  -     Comprehensive metabolic panel; Future    Vitamin D deficiency, unspecified  -     Vitamin D Deficiency; Future    Screen for colon cancer  -     Colonoscopy Screening  Referral; Future      Patient Instructions     Hair Loss:  4. You could add over the counter rogaine nightly  5. Be sure you are getting enough iron in your diet.  You may want to take over the counter iron twice weekly.  6. Be sure you are getting enough protein in your diet.    Discussed testing and management of hair loss in women.    Most are multifactorial - combination of telogen effluvium (shedding) and common hormonal \"female pattern hair loss\"    Check labs below (D, ferritin, testosterone and dhea, cbc, cmp)    If normal start:    Oral low dose iron every other day if ferritin not at 50 (goal 50-80 for hair growth)    Oral vit D 400-1000 daily    Eat at least 50 gm of protein daily for hair growth.  Real food better than supplements.    Topical 5% rogaine/minoxidil nightly (once daily).  Apply liquid, rub on scalp in affected area.    For your chest congestion:    If it persists over next month, please schedule the CT " scan of your chest that I ordered today.      You are due for Shingles Vaccine.    Medicare covers these now for free when done at your pharmacy - make appointment either at [facility] or [facility]/etc to get this done.          Patient Education   Personalized Prevention Plan  You are due for the preventive services outlined below.  Your care team is available to assist you in scheduling these services.  If you have already completed any of these items, please share that information with your care team to update in your medical record.  Health Maintenance Due   Topic Date Due     Zoster (Shingles) Vaccine (2 of 3) 01/14/2011     Flu Vaccine (1) 09/01/2022     A1C Lab  02/16/2023     Cholesterol Lab  02/16/2023     ANNUAL REVIEW OF HM ORDERS  02/16/2023     FALL RISK ASSESSMENT  02/16/2023     Annual Wellness Visit  02/16/2023              Patient has been advised of split billing requirements and indicates understanding: Yes      COUNSELING:  Reviewed preventive health counseling, as reflected in patient instructions        She reports that she has never smoked. She has never used smokeless tobacco.      Appropriate preventive services were discussed with this patient, including applicable screening as appropriate for cardiovascular disease, diabetes, osteopenia/osteoporosis, and glaucoma.  As appropriate for age/gender, discussed screening for colorectal cancer, prostate cancer, breast cancer, and cervical cancer. Checklist reviewing preventive services available has been given to the patient.    Reviewed patients plan of care and provided an AVS. The basic care plab for Jennifer meets the Care Plan requirement. This Care Plan has been established and reviewed with the Patient.      Sun Person MD  [facility]    Identified Health Risks:

## 2023-02-17 NOTE — PATIENT INSTRUCTIONS
"Hair Loss:  You could add over the counter rogaine nightly  Be sure you are getting enough iron in your diet.  You may want to take over the counter iron twice weekly.  Be sure you are getting enough protein in your diet.  Discussed testing and management of hair loss in women.  Most are multifactorial - combination of telogen effluvium (shedding) and common hormonal \"female pattern hair loss\"  Check labs below (D, ferritin, testosterone and dhea, cbc, cmp)  If normal start:  Oral low dose iron every other day if ferritin not at 50 (goal 50-80 for hair growth)  Oral vit D 400-1000 daily  Eat at least 50 gm of protein daily for hair growth.  Real food better than supplements.  Topical 5% rogaine/minoxidil nightly (once daily).  Apply liquid, rub on scalp in affected area.    For your chest congestion:  If it persists over next month, please schedule the CT scan of your chest that I ordered today.      You are due for Shingles Vaccine.  Medicare covers these now for free when done at your pharmacy - make appointment either at [facility] or [facility]/etc to get this done.          Patient Education   Personalized Prevention Plan  You are due for the preventive services outlined below.  Your care team is available to assist you in scheduling these services.  If you have already completed any of these items, please share that information with your care team to update in your medical record.  Health Maintenance Due   Topic Date Due    Zoster (Shingles) Vaccine (2 of 3) 01/14/2011    Flu Vaccine (1) 09/01/2022    A1C Lab  02/16/2023    Cholesterol Lab  02/16/2023    ANNUAL REVIEW OF HM ORDERS  02/16/2023    FALL RISK ASSESSMENT  02/16/2023    Annual Wellness Visit  02/16/2023        "

## 2023-02-18 LAB — DEPRECATED CALCIDIOL+CALCIFEROL SERPL-MC: 17 UG/L (ref 20–75)

## 2023-02-20 LAB
DHEA-S SERPL-MCNC: <15 UG/DL (ref 35–430)
SHBG SERPL-SCNC: 41 NMOL/L (ref 30–135)

## 2023-02-21 LAB
TESTOST FREE SERPL-MCNC: ABNORMAL PG/ML
TESTOST SERPL-MCNC: <2 NG/DL (ref 8–60)

## 2023-03-22 ENCOUNTER — TRANSFERRED RECORDS (OUTPATIENT)
Dept: HEALTH INFORMATION MANAGEMENT | Facility: CLINIC | Age: 73
End: 2023-03-22
Payer: COMMERCIAL

## 2023-03-22 ENCOUNTER — NURSE TRIAGE (OUTPATIENT)
Dept: FAMILY MEDICINE | Facility: CLINIC | Age: 73
End: 2023-03-22
Payer: COMMERCIAL

## 2023-03-22 NOTE — TELEPHONE ENCOUNTER
Provider Response to 2nd Level Triage Request    I have reviewed the RN documentation. My recommendation is:  Agree with below recommendations

## 2023-03-22 NOTE — TELEPHONE ENCOUNTER
" Providers-Please review and advise if agree with RN plan.  Is it possible the force used by patient to complete PFT could have caused this petechiae?    Call received from patient:  1. Saw Allergy/Asthma doctor today and had PFT  2. Got home and has petechiae on neck   A. Three inch area and not spreading   B. No pain, itching, burning   C. Afebrile    D. Denied any other symptoms  3. Last week in [place]-had several areas of bruising on arms after lifting heavy bags  4. Should platelets be checked?    Reviewed with patient platelets were normal on 2/17/23 and writer can ask a provider for input.  Patient declined, stating \"I'm not worried about it.\"    Plan discussed with patient:  1. If petechiae spread and/or develop any fever/other symptoms, be evaluated in Urgent Care.  Reviewed [facility] location and Urgent Care hours.    Patient verbalized understanding and in agreement with plan.    Thank you!  SANJIV Rodgers, RN-[facility]      Reason for Disposition    [1] Purple or blood-colored LOCALIZED rash AND [2] no fever AND [3] sounds well to triager  (Exception: bruise from injury or friction)    Additional Information    Negative: [1] Purple or blood-colored WIDESPREAD rash AND [2] fever    Negative: [1] Purple or blood-colored WIDESPREAD rash AND [2] very weak    Negative: Shock suspected (e.g., cold/pale/clammy skin, too weak to stand, low BP, rapid pulse)    Negative: Difficult to awaken or acting confused (e.g., disoriented, slurred speech)    Negative: Sounds like a life-threatening emergency to the triager    Negative: Headache    Negative: [1] Purple or blood-colored LOCALIZED rash AND [2] fever    Negative: Patient sounds very sick or weak to the triager    Negative: [1] Purple or blood-colored WIDESPREAD rash AND [2] no fever AND [3] sounds well to triager    Protocols used: RASH - PURPLE SPOTS OR DOTS-A-AH      "

## 2023-03-23 NOTE — TELEPHONE ENCOUNTER
Noted.    No further action needed from triage.    KERRI RodgersN, RN-BC  [facility]

## 2023-03-28 ENCOUNTER — ANCILLARY PROCEDURE (OUTPATIENT)
Dept: CT IMAGING | Facility: CLINIC | Age: 73
End: 2023-03-28
Attending: FAMILY MEDICINE
Payer: COMMERCIAL

## 2023-03-28 DIAGNOSIS — J45.41 MODERATE PERSISTENT ASTHMA WITH EXACERBATION: ICD-10-CM

## 2023-03-28 DIAGNOSIS — R05.8 RECURRENT COUGH: ICD-10-CM

## 2023-03-28 PROCEDURE — 71250 CT THORAX DX C-: CPT | Mod: GC | Performed by: RADIOLOGY

## 2023-03-28 NOTE — RESULT ENCOUNTER NOTE
Roque Rodriguez,  Thanks for coming to clinic.  The clinician who ordered these tests is currently out of the office, but we wanted to let you know that your results have been reviewed.    Your CT scan shows minimal abnormal findings that are not urgent.    Your clinician will review your results when they return and may get back to you with further information if needed.    Amada Arciniega MD  [facility]

## 2023-03-29 DIAGNOSIS — R05.8 RECURRENT COUGH: ICD-10-CM

## 2023-03-29 DIAGNOSIS — R91.8 ABNORMAL CT LUNG SCREENING: Primary | ICD-10-CM

## 2023-03-29 DIAGNOSIS — R91.8 ABNORMAL CT SCAN OF LUNG: ICD-10-CM

## 2023-04-04 ENCOUNTER — VIRTUAL VISIT (OUTPATIENT)
Dept: FAMILY MEDICINE | Facility: CLINIC | Age: 73
End: 2023-04-04
Payer: COMMERCIAL

## 2023-04-04 ENCOUNTER — NURSE TRIAGE (OUTPATIENT)
Dept: NURSING | Facility: CLINIC | Age: 73
End: 2023-04-04

## 2023-04-04 DIAGNOSIS — R07.9 CHEST PAIN, UNSPECIFIED TYPE: Primary | ICD-10-CM

## 2023-04-04 PROCEDURE — 99214 OFFICE O/P EST MOD 30 MIN: CPT | Mod: VID | Performed by: NURSE PRACTITIONER

## 2023-04-04 ASSESSMENT — ASTHMA QUESTIONNAIRES
ACT_TOTALSCORE: 21
QUESTION_2 LAST FOUR WEEKS HOW OFTEN HAVE YOU HAD SHORTNESS OF BREATH: ONCE OR TWICE A WEEK
QUESTION_3 LAST FOUR WEEKS HOW OFTEN DID YOUR ASTHMA SYMPTOMS (WHEEZING, COUGHING, SHORTNESS OF BREATH, CHEST TIGHTNESS OR PAIN) WAKE YOU UP AT NIGHT OR EARLIER THAN USUAL IN THE MORNING: NOT AT ALL
ACT_TOTALSCORE: 21
QUESTION_1 LAST FOUR WEEKS HOW MUCH OF THE TIME DID YOUR ASTHMA KEEP YOU FROM GETTING AS MUCH DONE AT WORK, SCHOOL OR AT HOME: NONE OF THE TIME
QUESTION_5 LAST FOUR WEEKS HOW WOULD YOU RATE YOUR ASTHMA CONTROL: WELL CONTROLLED
QUESTION_4 LAST FOUR WEEKS HOW OFTEN HAVE YOU USED YOUR RESCUE INHALER OR NEBULIZER MEDICATION (SUCH AS ALBUTEROL): TWO OR THREE TIMES PER WEEK

## 2023-04-04 ASSESSMENT — ENCOUNTER SYMPTOMS: CHEST TIGHTNESS: 1

## 2023-04-04 NOTE — TELEPHONE ENCOUNTER
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  Pain \"went up into both sides of neck.\"  Took nitroglycerin 30 mins ago.  \"No chest pain now.\"  \"Got a mild headache of course.\"  Headache now resolving.    Current vital signs:  /79  O2 sat 95-to-96%.  Heart rate 94.    \"The nitroglycerin seemed to work well.\"  \"Have not needed to use it for many months.\"    Pt agrees to provider eval due to brief episode chest pain and use of nitroglycerin.  Warm transferred to a  for an appointment now.  Same-day video visit scheduled.    Migdalia ESCALANTE Health Nurse Advisor     Reason for Disposition    All other patients with chest pain (Exception: fleeting chest pain lasting a few seconds)    Additional Information    Negative: SEVERE difficulty breathing (e.g., struggling for each breath, speaks in single words)    Negative: Passed out (i.e., fainted, collapsed and was not responding)    Negative: Difficult to awaken or acting confused (e.g., disoriented, slurred speech)    Negative: Shock suspected (e.g., cold/pale/clammy skin, too weak to stand, low BP, rapid pulse)    Negative: Chest pain lasting longer than 5 minutes and ANY of the following:* Over 44 years old* Over 30 years old and at least one cardiac risk factor (e.g., diabetes mellitus, high blood pressure, high cholesterol, smoker, or strong family history of heart disease)* History of heart disease (i.e., angina, heart attack, heart failure, bypass surgery, takes nitroglycerin)* Pain is crushing, pressure-like, or heavy    Negative: Heart beating < 50 beats per minute OR > 140 beats per minute    Negative: Visible sweat on face or sweat dripping down face    Negative: Sounds like a life-threatening emergency to the triager    Negative: Followed an injury to chest    Negative: SEVERE chest pain    Negative: Pain also in shoulder(s) or arm(s) or jaw    Negative: Difficulty breathing    Negative: Cocaine " use within last 3 days    Negative: Major surgery in the past month    Negative: Hip or leg fracture (broken bone) in past month (or had cast on leg or ankle in past month)    Negative: Illness requiring prolonged bedrest in past month (e.g., immobilization, long hospital stay)    Negative: Long-distance travel in past month (e.g., car, bus, train, plane; with trip lasting 6 or more hours)    Negative: History of prior 'blood clot' in leg or lungs (i.e., deep vein thrombosis, pulmonary embolism)    Negative: History of inherited increased risk of blood clots (e.g., Factor 5 Leiden, Anti-thrombin 3, Protein C or Protein S deficiency, Prothrombin mutation)    Negative: Cancer treatment in the past two months (or has cancer now)    Negative: Heart beating irregularly or very rapidly    Negative: Chest pain lasting longer than 5 minutes and occurred in last 3 days (72 hours) (Exception: feels exactly the same as previously diagnosed heartburn and has accompanying sour taste in mouth)    Negative: Chest pain or 'angina' comes and goes and is happening more often (increasing in frequency) or getting worse (increasing in severity) (Exception: chest pains that last only a few seconds)    Negative: Dizziness or lightheadedness    Negative: Coughing up blood    Negative: Patient sounds very sick or weak to the triager    Negative: Patient says chest pain feels exactly the same as previously diagnosed 'heartburn' and describes burning in chest and accompanying sour taste in mouth    Negative: Fever > 100.4 F (38.0 C)    Negative: Chest pain(s) lasting a few seconds persists > 3 days    Negative: Rash in same area as pain (may be described as 'small blisters')    Protocols used: CHEST PAIN-A-OH

## 2023-04-04 NOTE — Clinical Note
RoqueJennifer had an isolated incident of CP radiating up into her neck today.  It was resolved with nitroglycerine and she has had no further episodes.  She will report to the ED if she has any further episode but I wanted to make you aware.  She has follow up scheduled with your team, but I am not sure if you would consider additional stress testing vs angiography?  Thanks, Estrellita SINGLETARY, DNP

## 2023-04-04 NOTE — PROGRESS NOTES
"Jennifer is a 72 year old who is being evaluated via a billable video visit.    angina  How would you like to obtain your AVS? MyChart  If the video visit is dropped, the invitation should be resent by: Text to cell phone: [phone]  Will anyone else be joining your video visit? No        Assessment & Plan     Chest pain, unspecified type  Patient had 3-minute episode of substernal chest pain that radiated up into her left neck earlier today (approximately 11am), resolved with nitroglycerin.  No other symptoms of dyspnea, nausea, diaphoresis, or syncope.  She has had no further recurrence.  Her stress test back in October was negative for wall motion abnormality.  I did encourage her to consider going into the ER if she has any further incidents of chest pain she should report to the emergency room immediately or call 911.  I reviewed her medications today and suggested she add 81 mg of aspirin to her regimen, already on statin.  I will send her chart to her cardiology team to see if they think repeating her stress test be indicated or consider angiography.  Patient verbalizes understanding.      Review of external notes as documented elsewhere in note  Ordering of each unique test  Prescription drug management  30 minutes spent by me on the date of the encounter doing chart review, history and exam, documentation and further activities per the note       BMI:   Estimated body mass index is 27.65 kg/m  as calculated from the following:    Height as of 2/17/23: 1.638 m (5' 4.5\").    Weight as of 2/17/23: 74.2 kg (163 lb 9.6 oz).           GEMINI Garcia [facility]    Henrry Rodriguez is a 72 year old, presenting for the following health issues:  Chest Pain        4/4/2023     3:29 PM   Additional Questions   Roomed by Jann IRENE RN     History of Present Illness       Reason for visit:  Chest pain  Symptom onset:  Today  Symptoms include:  Chest tightness  Symptom intensity:  " Moderate  Symptom progression:  Improving  Had these symptoms before:  Yes  Has tried/received treatment for these symptoms:  Yes  Previous treatment was successful:  Yes  Prior treatment description:  Nitro  What makes it worse:  Activity  What makes it better:  Rest, nitro    She eats 4 or more servings of fruits and vegetables daily.She consumes 0 sweetened beverage(s) daily.She exercises with enough effort to increase her heart rate 30 to 60 minutes per day.  She exercises with enough effort to increase her heart rate 3 or less days per week.   She is taking medications regularly.       Chest Pain (hx of irregular heart beat)  Onset/Duration: 4/4/2023 - lasted approx 15 mins total  Description:   Location: mid-stermal  Character: tight, pressure  Radiation: throat  Duration: 15 minutes   Intensity: moderate  Progression of Symptoms: resolved at present  Accompanying Signs & Symptoms:  Shortness of breath: No  Sweating: No  Nausea/vomiting: No  Lightheadedness: No  Palpitations: No  Fever/Chills: No  Cough: No           Heartburn: No  History:   Family history of heart disease: YES  Tobacco use: No  Previous similar symptoms: YES- similar symptoms but nothing of this severity in the past  Precipitating factors: - pt was doing her taxes when this occurred  Worse with exertion: N/A  Worse with deep breaths: N/A           Related to eating: No           Better with burping: No  Alleviating factors: nitroglycerin, rest  Therapies tried and outcome: one dose sublingual nitroglycerin taken with relief provided    Was doing taxes, had some chest pain in the middle of the chest up into left neck.  Lasted 3 minutes, resolved with a nitroglycerine.  When she went into the kitchen, BP was high pulse was 96 and BP was a little elevated.      Had a cardiac evaluation several months ago in October.  Stress test was grossly normal.      Trop was elevated when she was in the ED previously and she was given nitroglycerine.  She  has follow up scheduled with cardiology in a few months        Review of Systems   Respiratory: Positive for chest tightness.    Cardiovascular: Positive for chest pain.      Constitutional, HEENT, cardiovascular, pulmonary, gi and gu systems are negative, except as otherwise noted.      Objective    Vitals - Patient Reported  Systolic (Patient Reported): (!) 155  Diastolic (Patient Reported): 78  SpO2 (Patient Reported): 95  Pulse (Patient Reported): 84  Pain Score: No Pain (0)      Vitals:  No vitals were obtained today due to virtual visit.    Physical Exam   GENERAL: Healthy, alert and no distress  EYES: Eyes grossly normal to inspection.  No discharge or erythema, or obvious scleral/conjunctival abnormalities.  RESP: No audible wheeze, cough, or visible cyanosis.  No visible retractions or increased work of breathing.    SKIN: Visible skin clear. No significant rash, abnormal pigmentation or lesions.  NEURO: Cranial nerves grossly intact.  Mentation and speech appropriate for age.  PSYCH: Mentation appears normal, affect normal/bright, judgement and insight intact, normal speech and appearance well-groomed.                Video-Visit Details    Type of service:  Video Visit   Video Start Time: 1603  Video End Time:1617    Originating Location (pt. Location): Home    Distant Location (provider location):  Off-site  Platform used for Video Visit: [employer]

## 2023-04-18 NOTE — TELEPHONE ENCOUNTER
RECORDS RECEIVED FROM: internal    DATE RECEIVED: 6.26.23    NOTES STATUS DETAILS   OFFICE NOTE from referring provider internal  Sun Person MD   OFFICE NOTE from other specialist     DISCHARGE SUMMARY from hospital     DISCHARGE REPORT from the ER     MEDICATION LIST internal     IMAGING  (NEED IMAGES AND REPORTS)     CT SCAN internal  3.28.23, 3.21.22    CHEST XRAY (CXR) internal  11.29.22, 10.4.22   TESTS     PULMONARY FUNCTION TESTING (PFT) internal  Scheduled 6.26.23

## 2023-05-01 ENCOUNTER — ANCILLARY PROCEDURE (OUTPATIENT)
Dept: CARDIOLOGY | Facility: CLINIC | Age: 73
End: 2023-05-01
Attending: INTERNAL MEDICINE
Payer: COMMERCIAL

## 2023-05-01 DIAGNOSIS — I49.3 PVC'S (PREMATURE VENTRICULAR CONTRACTIONS): ICD-10-CM

## 2023-05-01 PROCEDURE — 93246 EXT ECG>7D<15D RECORDING: CPT

## 2023-05-01 PROCEDURE — 93248 EXT ECG>7D<15D REV&INTERPJ: CPT | Performed by: INTERNAL MEDICINE

## 2023-05-01 NOTE — PROGRESS NOTES
"Per Dr. Johnson, patient to have 14 day zio monitor placed.  Diagnosis: pvc's  Monitor placed: {YES / NO:746028::\"Yes\"}  Patient Instructed: {YES / NO:622898::\"Yes\"}  Patient verbalized understanding: {YES / NO:870324::\"Yes\"}  Holter # [id number]  Demarco Machado"

## 2023-05-09 ENCOUNTER — TELEPHONE (OUTPATIENT)
Dept: PULMONOLOGY | Facility: CLINIC | Age: 73
End: 2023-05-09
Payer: COMMERCIAL

## 2023-05-09 NOTE — TELEPHONE ENCOUNTER
RECORDS RECEIVED FROM: internal     DATE RECEIVED: 5.10.23     NOTES STATUS DETAILS   OFFICE NOTE from referring provider internal  Sun Person MD   OFFICE NOTE from other specialist       DISCHARGE SUMMARY from hospital       DISCHARGE REPORT from the ER       MEDICATION LIST internal      IMAGING  (NEED IMAGES AND REPORTS)       CT SCAN internal  3.28.23, 3.21.22    CHEST XRAY (CXR) internal  11.29.22, 10.4.22   TESTS       PULMONARY FUNCTION TESTING (PFT) In process  Scheduled 6.26.23     Action 5.9.23 sv    Action Taken Message sent to nurse pool to place PFT order

## 2023-05-09 NOTE — TELEPHONE ENCOUNTER
Patient Contacted     Appointment type: NEW  Provider: YASMIN  Return date: 5/10/23  Specialty phone number: [phone]  Additional appointment(s) needed: FPFT  Additonal Notes: Pt requested to be seen sooner than June. Pt completed PFT at outside facility and will bring record to appt.

## 2023-05-10 ENCOUNTER — PRE VISIT (OUTPATIENT)
Dept: PULMONOLOGY | Facility: CLINIC | Age: 73
End: 2023-05-10
Payer: COMMERCIAL

## 2023-05-10 ENCOUNTER — OFFICE VISIT (OUTPATIENT)
Dept: PULMONOLOGY | Facility: CLINIC | Age: 73
End: 2023-05-10
Attending: FAMILY MEDICINE
Payer: COMMERCIAL

## 2023-05-10 VITALS — SYSTOLIC BLOOD PRESSURE: 129 MMHG | OXYGEN SATURATION: 96 % | HEART RATE: 79 BPM | DIASTOLIC BLOOD PRESSURE: 75 MMHG

## 2023-05-10 DIAGNOSIS — R91.8 ABNORMAL CT SCAN OF LUNG: ICD-10-CM

## 2023-05-10 DIAGNOSIS — R05.8 RECURRENT COUGH: ICD-10-CM

## 2023-05-10 PROCEDURE — 99204 OFFICE O/P NEW MOD 45 MIN: CPT | Mod: GC | Performed by: STUDENT IN AN ORGANIZED HEALTH CARE EDUCATION/TRAINING PROGRAM

## 2023-05-10 PROCEDURE — G0463 HOSPITAL OUTPT CLINIC VISIT: HCPCS | Performed by: STUDENT IN AN ORGANIZED HEALTH CARE EDUCATION/TRAINING PROGRAM

## 2023-05-10 ASSESSMENT — PAIN SCALES - GENERAL: PAINLEVEL: NO PAIN (0)

## 2023-05-10 NOTE — LETTER
5/10/2023         RE: Jennifer Jane  [address]  [facility] [place] [address]        Dear Colleague,    Thank you for referring your patient, Jennifer Jane, to the [facility] [place]. Please see a copy of my visit note below.    [facility]    Name: Jennifer Jane MRN: [id number]     Age: 72 year old   YOB: 1950     Reason for Visit: abnormal CT, hx of asthma, cough          HPI:   Jennifer Jane is a 72 year old female with history of asthma, fibromyalgia, glaucoma who presents for abnormal CT in the setting of cough.  She is a retired nurse.    Recent illness started on Nov 23 with cold symtpoms. She saw urgent care on Nov 29 who noted rumbles in her R chest, and she was prescribed a short course of prednisone. This helped a little bt, but she still had symptoms, so she saw her PCP on Dec 7 who prescribed antibiotics and a 12 day prednisone taper from 60mg daily to 10mg daily. This helped, and she was almost better. Dec 19 she saw her allergist, who gave her a prescription for prednisone, which she did not fill until January 6, and after she took it at that time she felt completely better. During this time, she had no wheezing, the primary symptom was a cough and rattle in R chest which she could feel and hear.  She then got COVID on Feb 10, and started on paxlovid. She had significant fever, malaise, cough, and the cough lasted until March 25. She saw her PCP during that time, and CT chest was obtained March 28.  Those symptoms have resolved. She does have a slight productive cough, of creamy sometimes yellowish in color.    Regarding her asthma: she was diagnosed at age 1. She has undergone multiple rounds of allergy shots over her life. She has been on a controller inhaler since her 30s. She has been on advair for many years. Before this most recent illness, she only had a few courses of antibiotics and  prednisone over the last many years. She has not had severe illness requiring hospitalization or intubation. She did have whooping cough in the 90s and had a cough for 8 months. Triggers for her asthma include dust, she uses an air purifier at home which helps significantly. She takes advair (has been on for many years), montelukast, and gets allergy shots.  She does have chronic nasal congestion, and post-nasal drip. She denies any GERD symptoms. She has BLE edema which is chronic, she wears stockings. She is currently being worked up by cardiology, wearing a Ofelia Patch. She has glaucoma and uses eye drops. She has intermittent dry mouth, but this is rare. She has arthritis, stiffness in her hands, hips, knees, back. Not worse at any time of day. No swelling in joints. No rashes. Rare aspiratione vents    10 point ROS performed and negative except for as noted in HPI.       Social History:   Tobacco: never smoker.  Occupation: former RN.  Now sings.  Exposures: no water damage or mold in home. No pets, no birds.         Past Medical History:     Past Medical History:   Diagnosis Date    Arthritis     Fibromyalgia, ? Osteoarthritis    Back injury     Micro discectomy, approx date 1983    Glaucoma     Hiatal hernia     HTN (hypertension)     Hypercalcemia 11/30/2019    Insomnia 12/26/2012     Problem list name updated by automated process. Provider to review    Reduced vision 09/2016    L vision block, bilateral elevated eye presures,poss. Glacom    Uncomplicated asthma     Diagnosed as 2 yo           Past Surgical History:      Past Surgical History:   Procedure Laterality Date     cataract extraction with intraocular lens implant Right 01/31/2019    BACK SURGERY  1984    micro-disc-ectomy    BREAST SURGERY  1984    L Breast bx    cataract extraction with intraocular lens Left 12/06/2018    CHOLECYSTECTOMY  1990    IRIDOTOMY/IRIDECTOMY LASER SURGERY Left 09/27/2016    IRIDOTOMY/IRIDECTOMY LASER SURGERY Right  10/18/2016    TUBAL LIGATION  1989    [facility] STOMACH SURGERY PROCEDURE UNLISTED  03/1990    Cholecystectomy, 11/1989 tubal ligation           Family History:   Daughter with psoriasis, few other family members with fibromyalgia, no other autoimmune diseases in the family.   Mother with COPD and multiple pneumonias, smoked her whole life.  Family History   Problem Relation Age of Onset    Heart Disease Mother     Diabetes Mother     Coronary Artery Disease Mother     Hypertension Mother     Anesthesia Reaction Mother         Anaphylaxis, oral opioixd    Asthma Mother     Thyroid Disease Mother     Low Back Problems Mother     Heart Disease Father     Diabetes Father     Coronary Artery Disease Father     Hypertension Father     Hyperlipidemia Father     Low Back Problems Father     Diabetes Sister     Hypertension Sister     Low Back Problems Sister     Thyroid Disease Sister     Low Back Problems Brother     Asthma Maternal Grandmother     Coronary Artery Disease Maternal Grandfather     Cerebrovascular Disease Paternal Grandfather     Asthma Daughter     Calcium Disorder No family hx of     Hip fracture No family hx of            Allergies:     Allergies   Allergen Reactions    Canola Oil [Vegetable Oil] Anaphylaxis and GI Disturbance    Animal Dander Unknown    Dust Mites Unknown    Grass     Hydroxyzine Other (See Comments)     Passed out        Hydroxyzine Hcl     Pollen Extract Unknown    Trees     Chlorhexidine Itching and Rash     Hibiclens             Medications:   albuterol (VENTOLIN HFA) 108 (90 Base) MCG/ACT inhaler, Inhale 2 puffs into the lungs every 6 hours as needed for shortness of breath / dyspnea  amLODIPine (NORVASC) 5 MG tablet, TAKE 1 TABLET BY MOUTH AT BEDTIME  atorvastatin (LIPITOR) 20 MG tablet, Take 1 tablet (20 mg) by mouth daily  azelastine (ASTELIN) 0.1 % nasal spray, Spray 1 spray into both nostrils 2 times daily  Calcium Carbonate Antacid (TUMS PO), Take  by mouth At  Bedtime.  fluticasone-salmeterol (ADVAIR-HFA) 230-21 MCG/ACT inhaler,   ibuprofen (ADVIL/MOTRIN) 100 MG tablet, Take 100 mg by mouth every 4 hours as needed  latanoprost (XALATAN) 0.005 % ophthalmic solution, Place 1 drop into the right eye At Bedtime  losartan (COZAAR) 50 MG tablet, TAKE 1 TABLET BY MOUTH AT BEDTIME  montelukast (SINGULAIR) 10 MG tablet, TAKE ONE TABLET BY MOUTH EVERY DAY AS DIRECTED  Multiple Vitamins-Minerals (MULTIPLE VITAMINS/WOMENS PO), Take 1 tablet by mouth daily  mupirocin (BACTROBAN) 2 % external ointment, Apply topically 3 times daily  nitroGLYcerin (NITROSTAT) 0.4 MG sublingual tablet, Place 0.4 mg under the tongue every 5 minutes as needed for chest pain For chest pain place 1 tablet under the tongue every 5 minutes for 3 doses. If symptoms persist 5 minutes after 1st dose call 911.    No current facility-administered medications on file prior to visit.         Exam:   /75   Pulse 79   LMP  (LMP Unknown)   SpO2 96%     Gen: sitting upright, in no distress  HEENT: atraumatic, EOMI  Oropharynx: no oral lesions, mallampati 1  CV: RRR, LE in compression stockings  Resp: no increased WOB, CTAB, no crackles appreciated  Skin: no rashes or lesions on exposed skin  Extremities: moving all extremities, no gross deformities, bunions, some joint enlargement.  Neuro: alert and oriented no FND         Data:   CBC: no eosinphilia  Blood gas: not available.  CXR 11/29/2022 during illness: negative chest.    CT chest 3/28/2023 IMPRESSION: Minimal subpleural reticulation and groundglass opacities in the  inferolateral right middle lobe and lingula. The findings suggest subpleural non-fibrotic mild early interstitial lung process such as NSIP or organizing pneumonia, chronic eosinophilic pneumonia or  infection. Otherwise no abnormal findings in the chest to explain the patient's recurrent cough and continued follow up suggested.    PFT:  PFTs from outside facility (scanned)  3/22/2023: FVC 3.06  (102% pred), FEV1 2.09 (92%pred), FEV1/FVC 68.4 (Z-score -1.15).  12/19/2022: FVC 2.91 (96%pred), FEV1 2.14 (93%pred), FEV1/FVC 73.6 (Z-score -.32).    ECHO 11/21/22 Interpretation Summary: Global and regional left ventricular function is normal with an EF of 60-65%. Right ventricular function, chamber size, wall motion, and thickness are normal.  No hemodynamically significant valve abnormalities. The inferior vena cava is normal. Frequent PVCs noted throughout the study. There is no prior study for direct comparison.         Assessment and Recommendations:   Interstitial lung abnormalities  Asthma, well controlled  Allergic rhinitis  Cough, improving  Hoarse voice    Ms. Jane is a 72 year old woman with history of well controlled asthma, who presents to pulmonary clinic for abnormal CT chest in the setting of recent URI and COVID.  The interstitial changes are very minimal. Most likely, these are post-infectious/post-COVID findings. We will repeat CT chest in 1 year to re-evaluate. Ddx includes an underlying ILD, no significant symptoms to suggest connective tissue disorder, no clear medication culprits, no exposures, no aspiration symptoms, no prior ARDS.  In regards to her hoarse voice, unlikely related to CT chest findings, perhaps related to post-nasal drip vs GERD, will defer to primary pulmonologist at [facility] for further evaluation. No recent changes to meds, unlikely advair as she has been on this for years.    - complete full PFTs as baseline  - repeat CT chest in 1 year, if post-infectious would expect these findings to resolve by that time  - if CT chest remains with JINA, should have at least annual full PFTs for monitoring  - if symptoms recur, or new symptoms develop, consider additional workup for underlying ILD such as connective tissue disease screening    Patient staffed with Dr. Whitman. Return to clinic as needed, with repeat CT chest and full PFTs in 1 year.    Alisha Maria, DO  Pulmonary  and Critical Care Fellow      I saw and evaluated patient with Fellow.  Case discussed - agree with note.  I reviewed outside PFT: normal.  I reviewed chest CT from [facility]: minimal peripheral reticulation left lung.  This could be ILAs or residual of COVID-19 infection from earlier this year.  She needs full PFT to assess TLC and DLCO.  If normal, then this is likely mild ILAs, and she will need annual PFT and symptom monitoring.  Would repeat chest CT in 1 year - if resolved, then likely these changes would have been residual of COVID-19 infection.  If persist, then she will need to be followed for possibility of developing ILD.  No need for treatment at this time.    DAJUAN HAMMOND M.D.

## 2023-05-10 NOTE — NURSING NOTE
Chief Complaint   Patient presents with     New Patient     New spirometry      Vitals were taken and medications were reconciled.     Aretha Yousif RMA  3:24 PM

## 2023-05-10 NOTE — PROGRESS NOTES
[facility]    Name: Jennifer Jane MRN: [id number]     Age: 72 year old   YOB: 1950     Reason for Visit: abnormal CT, hx of asthma, cough          HPI:   Jennifer Jane is a 72 year old female with history of asthma, fibromyalgia, glaucoma who presents for abnormal CT in the setting of cough.  She is a retired nurse.    Recent illness started on Nov 23 with cold symtpoms. She saw urgent care on Nov 29 who noted rumbles in her R chest, and she was prescribed a short course of prednisone. This helped a little bt, but she still had symptoms, so she saw her PCP on Dec 7 who prescribed antibiotics and a 12 day prednisone taper from 60mg daily to 10mg daily. This helped, and she was almost better. Dec 19 she saw her allergist, who gave her a prescription for prednisone, which she did not fill until January 6, and after she took it at that time she felt completely better. During this time, she had no wheezing, the primary symptom was a cough and rattle in R chest which she could feel and hear.  She then got COVID on Feb 10, and started on paxlovid. She had significant fever, malaise, cough, and the cough lasted until March 25. She saw her PCP during that time, and CT chest was obtained March 28.  Those symptoms have resolved. She does have a slight productive cough, of creamy sometimes yellowish in color.    Regarding her asthma: she was diagnosed at age 1. She has undergone multiple rounds of allergy shots over her life. She has been on a controller inhaler since her 30s. She has been on advair for many years. Before this most recent illness, she only had a few courses of antibiotics and prednisone over the last many years. She has not had severe illness requiring hospitalization or intubation. She did have whooping cough in the 90s and had a cough for 8 months. Triggers for her asthma include dust, she uses an air purifier at home which helps significantly. She takes advair  (has been on for many years), montelukast, and gets allergy shots.  She does have chronic nasal congestion, and post-nasal drip. She denies any GERD symptoms. She has BLE edema which is chronic, she wears stockings. She is currently being worked up by cardiology, wearing a Ofelia Patch. She has glaucoma and uses eye drops. She has intermittent dry mouth, but this is rare. She has arthritis, stiffness in her hands, hips, knees, back. Not worse at any time of day. No swelling in joints. No rashes. Rare aspiratione vents    10 point ROS performed and negative except for as noted in HPI.       Social History:   Tobacco: never smoker.  Occupation: former RN.  Now sings.  Exposures: no water damage or mold in home. No pets, no birds.         Past Medical History:     Past Medical History:   Diagnosis Date     Arthritis     Fibromyalgia, ? Osteoarthritis     Back injury     Micro discectomy, approx date 1983     Glaucoma      Hiatal hernia      HTN (hypertension)      Hypercalcemia 11/30/2019     Insomnia 12/26/2012     Problem list name updated by automated process. Provider to review     Reduced vision 09/2016    L vision block, bilateral elevated eye presures,poss. Glacom     Uncomplicated asthma     Diagnosed as 2 yo           Past Surgical History:      Past Surgical History:   Procedure Laterality Date      cataract extraction with intraocular lens implant Right 01/31/2019     BACK SURGERY  1984    micro-disc-ectomy     BREAST SURGERY  1984    L Breast bx     cataract extraction with intraocular lens Left 12/06/2018     CHOLECYSTECTOMY  1990     IRIDOTOMY/IRIDECTOMY LASER SURGERY Left 09/27/2016     IRIDOTOMY/IRIDECTOMY LASER SURGERY Right 10/18/2016     TUBAL LIGATION  1989     Z STOMACH SURGERY PROCEDURE UNLISTED  03/1990    Cholecystectomy, 11/1989 tubal ligation           Family History:   Daughter with psoriasis, few other family members with fibromyalgia, no other autoimmune diseases in the family.   Mother with  COPD and multiple pneumonias, smoked her whole life.  Family History   Problem Relation Age of Onset     Heart Disease Mother      Diabetes Mother      Coronary Artery Disease Mother      Hypertension Mother      Anesthesia Reaction Mother         Anaphylaxis, oral opioixd     Asthma Mother      Thyroid Disease Mother      Low Back Problems Mother      Heart Disease Father      Diabetes Father      Coronary Artery Disease Father      Hypertension Father      Hyperlipidemia Father      Low Back Problems Father      Diabetes Sister      Hypertension Sister      Low Back Problems Sister      Thyroid Disease Sister      Low Back Problems Brother      Asthma Maternal Grandmother      Coronary Artery Disease Maternal Grandfather      Cerebrovascular Disease Paternal Grandfather      Asthma Daughter      Calcium Disorder No family hx of      Hip fracture No family hx of            Allergies:     Allergies   Allergen Reactions     Canola Oil [Vegetable Oil] Anaphylaxis and GI Disturbance     Animal Dander Unknown     Dust Mites Unknown     Grass      Hydroxyzine Other (See Comments)     Passed out         Hydroxyzine Hcl      Pollen Extract Unknown     Trees      Chlorhexidine Itching and Rash     Hibiclens             Medications:   albuterol (VENTOLIN HFA) 108 (90 Base) MCG/ACT inhaler, Inhale 2 puffs into the lungs every 6 hours as needed for shortness of breath / dyspnea  amLODIPine (NORVASC) 5 MG tablet, TAKE 1 TABLET BY MOUTH AT BEDTIME  atorvastatin (LIPITOR) 20 MG tablet, Take 1 tablet (20 mg) by mouth daily  azelastine (ASTELIN) 0.1 % nasal spray, Spray 1 spray into both nostrils 2 times daily  Calcium Carbonate Antacid (TUMS PO), Take  by mouth At Bedtime.  fluticasone-salmeterol (ADVAIR-HFA) 230-21 MCG/ACT inhaler,   ibuprofen (ADVIL/MOTRIN) 100 MG tablet, Take 100 mg by mouth every 4 hours as needed  latanoprost (XALATAN) 0.005 % ophthalmic solution, Place 1 drop into the right eye At Bedtime  losartan (COZAAR)  50 MG tablet, TAKE 1 TABLET BY MOUTH AT BEDTIME  montelukast (SINGULAIR) 10 MG tablet, TAKE ONE TABLET BY MOUTH EVERY DAY AS DIRECTED  Multiple Vitamins-Minerals (MULTIPLE VITAMINS/WOMENS PO), Take 1 tablet by mouth daily  mupirocin (BACTROBAN) 2 % external ointment, Apply topically 3 times daily  nitroGLYcerin (NITROSTAT) 0.4 MG sublingual tablet, Place 0.4 mg under the tongue every 5 minutes as needed for chest pain For chest pain place 1 tablet under the tongue every 5 minutes for 3 doses. If symptoms persist 5 minutes after 1st dose call 911.    No current facility-administered medications on file prior to visit.         Exam:   /75   Pulse 79   LMP  (LMP Unknown)   SpO2 96%     Gen: sitting upright, in no distress  HEENT: atraumatic, EOMI  Oropharynx: no oral lesions, mallampati 1  CV: RRR, LE in compression stockings  Resp: no increased WOB, CTAB, no crackles appreciated  Skin: no rashes or lesions on exposed skin  Extremities: moving all extremities, no gross deformities, bunions, some joint enlargement.  Neuro: alert and oriented no FND         Data:   CBC: no eosinphilia  Blood gas: not available.  CXR 11/29/2022 during illness: negative chest.    CT chest 3/28/2023 IMPRESSION: Minimal subpleural reticulation and groundglass opacities in the  inferolateral right middle lobe and lingula. The findings suggest subpleural non-fibrotic mild early interstitial lung process such as NSIP or organizing pneumonia, chronic eosinophilic pneumonia or  infection. Otherwise no abnormal findings in the chest to explain the patient's recurrent cough and continued follow up suggested.    PFT:  PFTs from outside facility (scanned)  3/22/2023: FVC 3.06 (102% pred), FEV1 2.09 (92%pred), FEV1/FVC 68.4 (Z-score -1.15).  12/19/2022: FVC 2.91 (96%pred), FEV1 2.14 (93%pred), FEV1/FVC 73.6 (Z-score -.32).    ECHO 11/21/22 Interpretation Summary: Global and regional left ventricular function is normal with an EF of 60-65%.  Right ventricular function, chamber size, wall motion, and thickness are normal.  No hemodynamically significant valve abnormalities. The inferior vena cava is normal. Frequent PVCs noted throughout the study. There is no prior study for direct comparison.         Assessment and Recommendations:   Interstitial lung abnormalities  Asthma, well controlled  Allergic rhinitis  Cough, improving  Hoarse voice    Ms. Jane is a 72 year old woman with history of well controlled asthma, who presents to pulmonary clinic for abnormal CT chest in the setting of recent URI and COVID.  The interstitial changes are very minimal. Most likely, these are post-infectious/post-COVID findings. We will repeat CT chest in 1 year to re-evaluate. Ddx includes an underlying ILD, no significant symptoms to suggest connective tissue disorder, no clear medication culprits, no exposures, no aspiration symptoms, no prior ARDS.  In regards to her hoarse voice, unlikely related to CT chest findings, perhaps related to post-nasal drip vs GERD, will defer to primary pulmonologist at [facility] for further evaluation. No recent changes to meds, unlikely advair as she has been on this for years.    - complete full PFTs as baseline  - repeat CT chest in 1 year, if post-infectious would expect these findings to resolve by that time  - if CT chest remains with JINA, should have at least annual full PFTs for monitoring  - if symptoms recur, or new symptoms develop, consider additional workup for underlying ILD such as connective tissue disease screening    Patient staffed with Dr. Whitman. Return to clinic as needed, with repeat CT chest and full PFTs in 1 year.    Alisha Maria,   Pulmonary and Critical Care Fellow      I saw and evaluated patient with Fellow.  Case discussed - agree with note.  I reviewed outside PFT: normal.  I reviewed chest CT from [facility]: minimal peripheral reticulation left lung.  This could be ILAs or residual of COVID-19 infection  from earlier this year.  She needs full PFT to assess TLC and DLCO.  If normal, then this is likely mild ILAs, and she will need annual PFT and symptom monitoring.  Would repeat chest CT in 1 year - if resolved, then likely these changes would have been residual of COVID-19 infection.  If persist, then she will need to be followed for possibility of developing ILD.  No need for treatment at this time.    DAJUAN HAMMOND M.D.

## 2023-05-15 ENCOUNTER — LAB (OUTPATIENT)
Dept: LAB | Facility: CLINIC | Age: 73
End: 2023-05-15
Payer: COMMERCIAL

## 2023-05-15 DIAGNOSIS — E21.0 PRIMARY HYPERPARATHYROIDISM (H): ICD-10-CM

## 2023-05-15 LAB
CALCIUM SERPL-MCNC: 10.4 MG/DL (ref 8.8–10.2)
CREAT SERPL-MCNC: 0.78 MG/DL (ref 0.51–0.95)
GFR SERPL CREATININE-BSD FRML MDRD: 80 ML/MIN/1.73M2
PHOSPHATE SERPL-MCNC: 3.3 MG/DL (ref 2.5–4.5)
PTH-INTACT SERPL-MCNC: 58 PG/ML (ref 15–65)

## 2023-05-15 PROCEDURE — 82310 ASSAY OF CALCIUM: CPT

## 2023-05-15 PROCEDURE — 83970 ASSAY OF PARATHORMONE: CPT

## 2023-05-15 PROCEDURE — 36415 COLL VENOUS BLD VENIPUNCTURE: CPT

## 2023-05-15 PROCEDURE — 84100 ASSAY OF PHOSPHORUS: CPT

## 2023-05-15 PROCEDURE — 82565 ASSAY OF CREATININE: CPT

## 2023-05-16 ENCOUNTER — APPOINTMENT (OUTPATIENT)
Dept: LAB | Facility: CLINIC | Age: 73
End: 2023-05-16
Payer: COMMERCIAL

## 2023-05-16 LAB
CALCIUM 24H UR-MRATE: 0.26 G/SPEC (ref 0.1–0.3)
CALCIUM UR-MCNC: 7.4 MG/DL
COLLECT DURATION TIME UR: 24 H
COLLECT DURATION TIME UR: 24 H
CREAT 24H UR-MRATE: 1.06 G/(24.H) (ref 0.72–1.51)
CREAT UR-MCNC: 30.7 MG/DL
SPECIMEN VOL UR: 3450 ML
SPECIMEN VOL UR: 3450 ML

## 2023-05-16 PROCEDURE — 82570 ASSAY OF URINE CREATININE: CPT

## 2023-05-16 PROCEDURE — 82340 ASSAY OF CALCIUM IN URINE: CPT

## 2023-05-16 PROCEDURE — 81050 URINALYSIS VOLUME MEASURE: CPT

## 2023-05-18 DIAGNOSIS — R05.8 RECURRENT COUGH: Primary | ICD-10-CM

## 2023-05-18 DIAGNOSIS — R91.8 ABNORMAL CT SCAN OF LUNG: ICD-10-CM

## 2023-05-18 LAB
DLCOUNC-%PRED-PRE: 107 %
DLCOUNC-PRE: 20.49 ML/MIN/MMHG
DLCOUNC-PRED: 19.01 ML/MIN/MMHG
ERV-%PRED-PRE: 34 %
ERV-PRE: 0.19 L
ERV-PRED: 0.55 L
EXPTIME-PRE: 8.38 SEC
FEF2575-%PRED-POST: 95 %
FEF2575-%PRED-PRE: 78 %
FEF2575-POST: 1.62 L/SEC
FEF2575-PRE: 1.33 L/SEC
FEF2575-PRED: 1.7 L/SEC
FEFMAX-%PRED-PRE: 117 %
FEFMAX-PRE: 6.45 L/SEC
FEFMAX-PRED: 5.49 L/SEC
FEV1-%PRED-PRE: 104 %
FEV1-PRE: 2.1 L
FEV1FEV6-PRE: 72 %
FEV1FEV6-PRED: 79 %
FEV1FVC-PRE: 72 %
FEV1FVC-PRED: 79 %
FEV1SVC-PRE: 72 %
FEV1SVC-PRED: 66 %
FIFMAX-PRE: 6.29 L/SEC
FRCPLETH-%PRED-PRE: 87 %
FRCPLETH-PRE: 2.41 L
FRCPLETH-PRED: 2.74 L
FVC-%PRED-PRE: 113 %
FVC-PRE: 2.93 L
FVC-PRED: 2.58 L
IC-%PRED-PRE: 107 %
IC-PRE: 2.71 L
IC-PRED: 2.51 L
RVPLETH-%PRED-PRE: 104 %
RVPLETH-PRE: 2.22 L
RVPLETH-PRED: 2.13 L
TLCPLETH-%PRED-PRE: 101 %
TLCPLETH-PRE: 5.12 L
TLCPLETH-PRED: 5.02 L
VA-%PRED-PRE: 100 %
VA-PRE: 4.62 L
VC-%PRED-PRE: 94 %
VC-PRE: 2.9 L
VC-PRED: 3.06 L

## 2023-05-18 PROCEDURE — 94729 DIFFUSING CAPACITY: CPT | Performed by: INTERNAL MEDICINE

## 2023-05-18 PROCEDURE — 94060 EVALUATION OF WHEEZING: CPT | Performed by: INTERNAL MEDICINE

## 2023-05-18 PROCEDURE — 94726 PLETHYSMOGRAPHY LUNG VOLUMES: CPT | Performed by: INTERNAL MEDICINE

## 2023-05-22 ENCOUNTER — TRANSFERRED RECORDS (OUTPATIENT)
Dept: HEALTH INFORMATION MANAGEMENT | Facility: CLINIC | Age: 73
End: 2023-05-22
Payer: COMMERCIAL

## 2023-06-26 ENCOUNTER — PRE VISIT (OUTPATIENT)
Dept: PULMONOLOGY | Facility: CLINIC | Age: 73
End: 2023-06-26

## 2023-07-10 ENCOUNTER — OFFICE VISIT (OUTPATIENT)
Dept: CARDIOLOGY | Facility: CLINIC | Age: 73
End: 2023-07-10
Attending: INTERNAL MEDICINE
Payer: COMMERCIAL

## 2023-07-10 VITALS
BODY MASS INDEX: 28.39 KG/M2 | WEIGHT: 168 LBS | SYSTOLIC BLOOD PRESSURE: 129 MMHG | OXYGEN SATURATION: 98 % | DIASTOLIC BLOOD PRESSURE: 78 MMHG | HEART RATE: 67 BPM

## 2023-07-10 DIAGNOSIS — I49.3 PVC'S (PREMATURE VENTRICULAR CONTRACTIONS): Primary | ICD-10-CM

## 2023-07-10 PROCEDURE — G0463 HOSPITAL OUTPT CLINIC VISIT: HCPCS | Mod: 25 | Performed by: INTERNAL MEDICINE

## 2023-07-10 PROCEDURE — 93005 ELECTROCARDIOGRAM TRACING: CPT

## 2023-07-10 PROCEDURE — 99213 OFFICE O/P EST LOW 20 MIN: CPT | Performed by: INTERNAL MEDICINE

## 2023-07-10 PROCEDURE — 93010 ELECTROCARDIOGRAM REPORT: CPT | Performed by: INTERNAL MEDICINE

## 2023-07-10 ASSESSMENT — PAIN SCALES - GENERAL: PAINLEVEL: NO PAIN (0)

## 2023-07-10 NOTE — LETTER
7/10/2023      RE: Jennifer Jane  [address]  [facility] [address]       Dear Colleague,    Thank you for the opportunity to participate in the care of your patient, Jennifer Jane, at the [facility] [place] at [facility]. Please see a copy of my visit note below.    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  She was referred for a cardiology evaluation.    She complained of skipped beats and occasional dizziness but denied any chest pain and SOB.  Zio patch in 11/2022 revealed that the PVC burden was 5.7%.  She wore a repeat Zio patch and returned to the clinic today.    PAST MEDICAL HISTORY:  Past Medical History:   Diagnosis Date    Arthritis     Fibromyalgia, ? Osteoarthritis    Back injury     Micro discectomy, approx date 1983    Glaucoma     Hiatal hernia     HTN (hypertension)     Hypercalcemia 11/30/2019    Insomnia 12/26/2012     Problem list name updated by automated process. Provider to review    Reduced vision 09/2016    L vision block, bilateral elevated eye presures,poss. Glacom    Uncomplicated asthma     Diagnosed as 1 yo       CURRENT MEDICATIONS:  Current Outpatient Medications   Medication Sig Dispense Refill    albuterol (VENTOLIN HFA) 108 (90 Base) MCG/ACT inhaler Inhale 2 puffs into the lungs every 6 hours as needed for shortness of breath / dyspnea 6.7 g 1    amLODIPine (NORVASC) 5 MG tablet TAKE 1 TABLET BY MOUTH AT BEDTIME 90 tablet 2    atorvastatin (LIPITOR) 20 MG tablet Take 1 tablet (20 mg) by mouth daily 90 tablet 3    azelastine (ASTELIN) 0.1 % nasal spray Spray 1 spray into both nostrils 2 times daily      Calcium Carbonate Antacid (TUMS PO) Take  by mouth At Bedtime.      fluticasone-salmeterol (ADVAIR-HFA) 230-21 MCG/ACT inhaler       ibuprofen (ADVIL/MOTRIN) 100 MG  tablet Take 100 mg by mouth every 4 hours as needed      latanoprost (XALATAN) 0.005 % ophthalmic solution Place 1 drop into the right eye At Bedtime      losartan (COZAAR) 50 MG tablet TAKE 1 TABLET BY MOUTH AT BEDTIME 90 tablet 2    montelukast (SINGULAIR) 10 MG tablet TAKE ONE TABLET BY MOUTH EVERY DAY AS DIRECTED  1    Multiple Vitamins-Minerals (MULTIPLE VITAMINS/WOMENS PO) Take 1 tablet by mouth daily      mupirocin (BACTROBAN) 2 % external ointment Apply topically 3 times daily      nitroGLYcerin (NITROSTAT) 0.4 MG sublingual tablet Place 0.4 mg under the tongue every 5 minutes as needed for chest pain For chest pain place 1 tablet under the tongue every 5 minutes for 3 doses. If symptoms persist 5 minutes after 1st dose call 911.         PAST SURGICAL HISTORY:  Past Surgical History:   Procedure Laterality Date     cataract extraction with intraocular lens implant Right 01/31/2019    BACK SURGERY  1984    micro-disc-ectomy    BREAST SURGERY  1984    L Breast bx    cataract extraction with intraocular lens Left 12/06/2018    CHOLECYSTECTOMY  1990    IRIDOTOMY/IRIDECTOMY LASER SURGERY Left 09/27/2016    IRIDOTOMY/IRIDECTOMY LASER SURGERY Right 10/18/2016    TUBAL LIGATION  1989    ZZC STOMACH SURGERY PROCEDURE UNLISTED  03/1990    Cholecystectomy, 11/1989 tubal ligation       ALLERGIES:     Allergies   Allergen Reactions    Canola Oil [Vegetable Oil] Anaphylaxis and GI Disturbance    Animal Dander Unknown    Dust Mites Unknown    Grass     Hydroxyzine Other (See Comments)     Passed out        Hydroxyzine Hcl     Pollen Extract Unknown    Trees     Chlorhexidine Itching and Rash     Hibiclens         FAMILY HISTORY:  + Premature coronary artery disease  - Atrial fibrillation  - Sudden cardiac death     SOCIAL HISTORY:  Social History     Tobacco Use    Smoking status: Never    Smokeless tobacco: Never   Substance Use Topics    Alcohol use: Yes     Alcohol/week: 0.0 standard drinks of alcohol     Comment: 0 -2  times a year    Drug use: No       ROS:   Constitutional: No fever, chills, or sweats. Weight stable.   ENT: No visual disturbance, ear ache, epistaxis, sore throat.   Cardiovascular: As per HPI.   Respiratory: No cough, hemoptysis.    GI: No nausea, vomiting, hematemesis, melena, or hematochezia.   : No hematuria.   Integument: Negative.   Psychiatric: Negative.   Hematologic:  Easy bruising, no easy bleeding.  Neuro: Negative.   Endocrinology: No significant heat or cold intolerance   Musculoskeletal: No myalgia.    Exam:  /78 (BP Location: Right arm, Patient Position: Chair, Cuff Size: Adult Large)   Pulse 67   Wt 76.2 kg (168 lb)   LMP  (LMP Unknown)   SpO2 98%   BMI 28.39 kg/m    GENERAL APPEARANCE: healthy, alert and no distress  HEENT: no icterus, no xanthelasmas, normal pupil size and reaction, normal palate, mucosa moist, no central cyanosis  NECK: no adenopathy, no asymmetry, masses, or scars, thyroid normal to palpation and no bruits, JVP not elevated  RESPIRATORY: lungs clear to auscultation - no rales, rhonchi or wheezes, no use of accessory muscles, no retractions, respirations are unlabored, normal respiratory rate  CARDIOVASCULAR: regular rhythm, normal S1 with physiologic split S2, no S3 or S4 and no murmur, click or rub, precordium quiet with normal PMI.  ABDOMEN: soft, non tender, without hepatosplenomegaly, no masses palpable, bowel sounds normal, aorta not enlarged by palpation, no abdominal bruits  EXTREMITIES: peripheral pulses normal, no edema, no bruits  NEURO: alert and oriented to person/place/time, normal speech, gait and affect  VASC: Radial, femoral, dorsalis pedis and posterior tibialis pulses are normal in volumes and symmetric bilaterally. No bruits are heard.  SKIN: no ecchymoses, no rashes    Labs:  CBC RESULTS:   Lab Results   Component Value Date    WBC 11.2 (H) 02/17/2023    WBC 8.8 12/13/2019    RBC 4.64 02/17/2023    RBC 4.84 12/13/2019    HGB 13.7 02/17/2023     HGB 14.2 12/13/2019    HCT 37.3 02/17/2023    HCT 42.9 12/13/2019    MCV 80 02/17/2023    MCV 89 12/13/2019    MCH 29.5 02/17/2023    MCH 29.3 12/13/2019    MCHC 36.7 (H) 02/17/2023    MCHC 33.1 12/13/2019    RDW 12.9 02/17/2023    RDW 13.0 12/13/2019     02/17/2023     12/13/2019       BMP RESULTS:  Lab Results   Component Value Date     02/17/2023     12/02/2020    POTASSIUM 4.2 02/17/2023    POTASSIUM 4.3 09/16/2022    POTASSIUM 4.1 12/02/2020    CHLORIDE 103 02/17/2023    CHLORIDE 108 09/16/2022    CHLORIDE 107 12/02/2020    CO2 26 02/17/2023    CO2 28 09/16/2022    CO2 27 12/02/2020    ANIONGAP 12 02/17/2023    ANIONGAP 5 09/16/2022    ANIONGAP 5 12/02/2020     (H) 02/17/2023     (H) 09/16/2022     (H) 12/02/2020    BUN 14.6 02/17/2023    BUN 12 09/16/2022    BUN 17 12/02/2020    CR 0.78 05/15/2023    CR 0.86 12/02/2020    GFRESTIMATED 80 05/15/2023    GFRESTIMATED >60 09/16/2022    GFRESTIMATED 68 12/02/2020    GFRESTBLACK 78 12/02/2020    KELLEN 10.4 (H) 05/15/2023    KELLEN 10.1 12/02/2020        INR RESULTS:  No results found for: INR    Procedures:  TMT on 10/6/2022: Reviewed.  Interpretation Summary     This was an exercise stress test, no images were submitted for review. The  indication for the test was exertional chest pain.     Patient exercised for 9 minutes and 22 seconds on the [place] ramp protocol. She  achieved a maximum heart rate of 148BPM, corresponding to 8 METS and  representing 100% of the maximum predicted heart rate. Heart rate and blood  pressure response to exercise were normal. Patient reported no symtpoms during  stress and the test was terminated due to shortness of breath.     Baseline ECG shows normal sinus rhythm with a heart rate of 85BPM. Extremely  difficult stress ECG due to artifact yet there is no clear evidence for ST  segment or T wave changes to suggest underlying myocardial ischemia. FRequent  PVCs noted during stress as well as  recovery.              ECG on 11/4/2022: Reviewed.      TTE on 11/21/2022: Reviewed.  Interpretation Summary  Global and regional left ventricular function is normal with an EF of 60-65%.  Right ventricular function, chamber size, wall motion, and thickness are  normal.  No hemodynamically significant valve abnormalities.  The inferior vena cava is normal.  Frequent PVCs noted throughout the study.  There is no prior study for direct comparison.    Zio patch in 11/2022: Reviewed.            ECG on 12/2/2022: Reviewed.      Zio patch in 5/2023: Reviewed.          ECG on 7/10/2023: Reviewed.      Assessment and Plan:   # HTN  # Prediabetes  # Asthma  # Hypercholesterolemia  # Hypercalcemia  # FHx of CAD    Normal stress test on 10/6/2022.  # Normal TTE on 11/21/2022.  # PVCs  Zio patch in 11/2022 revealed that the PVC burden was 5.7%.  Repeat Zio patch in 5/2023 revealed that the PVC burden was <1%.  I advised her to observe at this point and let us know if she has frequent PVCs again.  Otherwise, no regular follow up will be required.     I spent a total of 20 min today to review the records, see the patient, and complete the documents.      Please do not hesitate to contact me if you have any questions/concerns.     Sincerely,     Alexander Johnson MD      CC  Patient Care Team:  Sun Person MD as PCP - General (Family Practice)

## 2023-07-10 NOTE — NURSING NOTE
Chief Complaint   Patient presents with     Follow Up     July 10, 2023 - Dr. Johnson: Return for 6 mo follow up d/t PVC's      Vitals were taken, medications reconciled, and EKG was performed.    Juan R Benton, EMT  11:24 AM

## 2023-07-10 NOTE — PROGRESS NOTES
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  She was referred for a cardiology evaluation.    She complained of skipped beats and occasional dizziness but denied any chest pain and SOB.  Zio patch in 11/2022 revealed that the PVC burden was 5.7%.  She wore a repeat Zio patch and returned to the clinic today.    PAST MEDICAL HISTORY:  Past Medical History:   Diagnosis Date     Arthritis     Fibromyalgia, ? Osteoarthritis     Back injury     Micro discectomy, approx date 1983     Glaucoma      Hiatal hernia      HTN (hypertension)      Hypercalcemia 11/30/2019     Insomnia 12/26/2012     Problem list name updated by automated process. Provider to review     Reduced vision 09/2016    L vision block, bilateral elevated eye presures,poss. Glacom     Uncomplicated asthma     Diagnosed as 1 yo       CURRENT MEDICATIONS:  Current Outpatient Medications   Medication Sig Dispense Refill     albuterol (VENTOLIN HFA) 108 (90 Base) MCG/ACT inhaler Inhale 2 puffs into the lungs every 6 hours as needed for shortness of breath / dyspnea 6.7 g 1     amLODIPine (NORVASC) 5 MG tablet TAKE 1 TABLET BY MOUTH AT BEDTIME 90 tablet 2     atorvastatin (LIPITOR) 20 MG tablet Take 1 tablet (20 mg) by mouth daily 90 tablet 3     azelastine (ASTELIN) 0.1 % nasal spray Spray 1 spray into both nostrils 2 times daily       Calcium Carbonate Antacid (TUMS PO) Take  by mouth At Bedtime.       fluticasone-salmeterol (ADVAIR-HFA) 230-21 MCG/ACT inhaler        ibuprofen (ADVIL/MOTRIN) 100 MG tablet Take 100 mg by mouth every 4 hours as needed       latanoprost (XALATAN) 0.005 % ophthalmic solution Place 1 drop into the right eye At Bedtime       losartan (COZAAR) 50 MG tablet TAKE 1 TABLET BY MOUTH AT BEDTIME 90 tablet 2     montelukast (SINGULAIR) 10 MG tablet TAKE ONE TABLET BY MOUTH EVERY DAY AS DIRECTED  1      Multiple Vitamins-Minerals (MULTIPLE VITAMINS/WOMENS PO) Take 1 tablet by mouth daily       mupirocin (BACTROBAN) 2 % external ointment Apply topically 3 times daily       nitroGLYcerin (NITROSTAT) 0.4 MG sublingual tablet Place 0.4 mg under the tongue every 5 minutes as needed for chest pain For chest pain place 1 tablet under the tongue every 5 minutes for 3 doses. If symptoms persist 5 minutes after 1st dose call 911.         PAST SURGICAL HISTORY:  Past Surgical History:   Procedure Laterality Date      cataract extraction with intraocular lens implant Right 01/31/2019     BACK SURGERY  1984    micro-disc-ectomy     BREAST SURGERY  1984    L Breast bx     cataract extraction with intraocular lens Left 12/06/2018     CHOLECYSTECTOMY  1990     IRIDOTOMY/IRIDECTOMY LASER SURGERY Left 09/27/2016     IRIDOTOMY/IRIDECTOMY LASER SURGERY Right 10/18/2016     TUBAL LIGATION  1989     Z STOMACH SURGERY PROCEDURE UNLISTED  03/1990    Cholecystectomy, 11/1989 tubal ligation       ALLERGIES:     Allergies   Allergen Reactions     Canola Oil [Vegetable Oil] Anaphylaxis and GI Disturbance     Animal Dander Unknown     Dust Mites Unknown     Grass      Hydroxyzine Other (See Comments)     Passed out         Hydroxyzine Hcl      Pollen Extract Unknown     Trees      Chlorhexidine Itching and Rash     Hibiclens         FAMILY HISTORY:  + Premature coronary artery disease  - Atrial fibrillation  - Sudden cardiac death     SOCIAL HISTORY:  Social History     Tobacco Use     Smoking status: Never     Smokeless tobacco: Never   Substance Use Topics     Alcohol use: Yes     Alcohol/week: 0.0 standard drinks of alcohol     Comment: 0 -2 times a year     Drug use: No       ROS:   Constitutional: No fever, chills, or sweats. Weight stable.   ENT: No visual disturbance, ear ache, epistaxis, sore throat.   Cardiovascular: As per HPI.   Respiratory: No cough, hemoptysis.    GI: No nausea, vomiting, hematemesis, melena, or hematochezia.    : No hematuria.   Integument: Negative.   Psychiatric: Negative.   Hematologic:  Easy bruising, no easy bleeding.  Neuro: Negative.   Endocrinology: No significant heat or cold intolerance   Musculoskeletal: No myalgia.    Exam:  /78 (BP Location: Right arm, Patient Position: Chair, Cuff Size: Adult Large)   Pulse 67   Wt 76.2 kg (168 lb)   LMP  (LMP Unknown)   SpO2 98%   BMI 28.39 kg/m    GENERAL APPEARANCE: healthy, alert and no distress  HEENT: no icterus, no xanthelasmas, normal pupil size and reaction, normal palate, mucosa moist, no central cyanosis  NECK: no adenopathy, no asymmetry, masses, or scars, thyroid normal to palpation and no bruits, JVP not elevated  RESPIRATORY: lungs clear to auscultation - no rales, rhonchi or wheezes, no use of accessory muscles, no retractions, respirations are unlabored, normal respiratory rate  CARDIOVASCULAR: regular rhythm, normal S1 with physiologic split S2, no S3 or S4 and no murmur, click or rub, precordium quiet with normal PMI.  ABDOMEN: soft, non tender, without hepatosplenomegaly, no masses palpable, bowel sounds normal, aorta not enlarged by palpation, no abdominal bruits  EXTREMITIES: peripheral pulses normal, no edema, no bruits  NEURO: alert and oriented to person/place/time, normal speech, gait and affect  VASC: Radial, femoral, dorsalis pedis and posterior tibialis pulses are normal in volumes and symmetric bilaterally. No bruits are heard.  SKIN: no ecchymoses, no rashes    Labs:  CBC RESULTS:   Lab Results   Component Value Date    WBC 11.2 (H) 02/17/2023    WBC 8.8 12/13/2019    RBC 4.64 02/17/2023    RBC 4.84 12/13/2019    HGB 13.7 02/17/2023    HGB 14.2 12/13/2019    HCT 37.3 02/17/2023    HCT 42.9 12/13/2019    MCV 80 02/17/2023    MCV 89 12/13/2019    MCH 29.5 02/17/2023    MCH 29.3 12/13/2019    MCHC 36.7 (H) 02/17/2023    MCHC 33.1 12/13/2019    RDW 12.9 02/17/2023    RDW 13.0 12/13/2019     02/17/2023     12/13/2019        BMP RESULTS:  Lab Results   Component Value Date     02/17/2023     12/02/2020    POTASSIUM 4.2 02/17/2023    POTASSIUM 4.3 09/16/2022    POTASSIUM 4.1 12/02/2020    CHLORIDE 103 02/17/2023    CHLORIDE 108 09/16/2022    CHLORIDE 107 12/02/2020    CO2 26 02/17/2023    CO2 28 09/16/2022    CO2 27 12/02/2020    ANIONGAP 12 02/17/2023    ANIONGAP 5 09/16/2022    ANIONGAP 5 12/02/2020     (H) 02/17/2023     (H) 09/16/2022     (H) 12/02/2020    BUN 14.6 02/17/2023    BUN 12 09/16/2022    BUN 17 12/02/2020    CR 0.78 05/15/2023    CR 0.86 12/02/2020    GFRESTIMATED 80 05/15/2023    GFRESTIMATED >60 09/16/2022    GFRESTIMATED 68 12/02/2020    GFRESTBLACK 78 12/02/2020    KELLEN 10.4 (H) 05/15/2023    KELLEN 10.1 12/02/2020        INR RESULTS:  No results found for: INR    Procedures:  TMT on 10/6/2022: Reviewed.  Interpretation Summary     This was an exercise stress test, no images were submitted for review. The  indication for the test was exertional chest pain.     Patient exercised for 9 minutes and 22 seconds on the [place] ramp protocol. She  achieved a maximum heart rate of 148BPM, corresponding to 8 METS and  representing 100% of the maximum predicted heart rate. Heart rate and blood  pressure response to exercise were normal. Patient reported no symtpoms during  stress and the test was terminated due to shortness of breath.     Baseline ECG shows normal sinus rhythm with a heart rate of 85BPM. Extremely  difficult stress ECG due to artifact yet there is no clear evidence for ST  segment or T wave changes to suggest underlying myocardial ischemia. FRequent  PVCs noted during stress as well as recovery.              ECG on 11/4/2022: Reviewed.      TTE on 11/21/2022: Reviewed.  Interpretation Summary  Global and regional left ventricular function is normal with an EF of 60-65%.  Right ventricular function, chamber size, wall motion, and thickness are  normal.  No hemodynamically  significant valve abnormalities.  The inferior vena cava is normal.  Frequent PVCs noted throughout the study.  There is no prior study for direct comparison.    Zio patch in 11/2022: Reviewed.            ECG on 12/2/2022: Reviewed.      Zio patch in 5/2023: Reviewed.          ECG on 7/10/2023: Reviewed.      Assessment and Plan:   # HTN  # Prediabetes  # Asthma  # Hypercholesterolemia  # Hypercalcemia  # FHx of CAD    Normal stress test on 10/6/2022.  # Normal TTE on 11/21/2022.  # PVCs  Zio patch in 11/2022 revealed that the PVC burden was 5.7%.  Repeat Zio patch in 5/2023 revealed that the PVC burden was <1%.  I advised her to observe at this point and let us know if she has frequent PVCs again.  Otherwise, no regular follow up will be required.     I spent a total of 20 min today to review the records, see the patient, and complete the documents.    CC  Patient Care Team:  Sun Franklin MD as PCP - General (Family Practice)  Yuriy Petersen MD as MD (Orthopedics)  Sun Franklin MD as Assigned PCP  Katina Sanchez, RN as Specialty Care Coordinator (Cardiology)  Alexander Johnson MD (Cardiovascular Disease)  Indigo Eid MD as Assigned Endocrinology Provider  Alexander Johnson MD as Assigned Heart and Vascular Provider  Demarco Dutta MD as MD (Internal Medicine)  SUN FRANKLIN

## 2023-07-10 NOTE — PATIENT INSTRUCTIONS
You were seen in the Electrophysiology Clinic today by: Dr. Johnson    Plan:   Medication Changes: None    Follow up Visit: with Dr. Johnson as needed only      If you have further questions, please utilize [employer] to contact us.     Your Care Team:    [facility]   Telephone Number     Nurse Line  Carmelo Arevalo RN    [phone]     For scheduling appointments:   [facility]   [phone]   For procedure scheduling:    Danette Siddiqi     [phone]   For the Device Clinic (Pacemakers, ICDs, Loop Recorders)    During business hours: [phone]  After business hours:   [phone]- select option 4 and ask for job code [id number].       On-call cardiologist for after hours or on weekends:   [phone], option #4, and ask to speak to the on-call cardiologist.     Cardiovascular Clinic:   [address] [place] [address]. [place] [address]      As always, Thank you for trusting us with your health care needs!

## 2023-07-11 LAB
ATRIAL RATE - MUSE: 65 BPM
DIASTOLIC BLOOD PRESSURE - MUSE: NORMAL MMHG
INTERPRETATION ECG - MUSE: NORMAL
P AXIS - MUSE: 52 DEGREES
PR INTERVAL - MUSE: 156 MS
QRS DURATION - MUSE: 72 MS
QT - MUSE: 400 MS
QTC - MUSE: 416 MS
R AXIS - MUSE: -4 DEGREES
SYSTOLIC BLOOD PRESSURE - MUSE: NORMAL MMHG
T AXIS - MUSE: 38 DEGREES
VENTRICULAR RATE- MUSE: 65 BPM

## 2023-07-12 ENCOUNTER — ANCILLARY PROCEDURE (OUTPATIENT)
Dept: ULTRASOUND IMAGING | Facility: CLINIC | Age: 73
End: 2023-07-12
Payer: COMMERCIAL

## 2023-07-12 ENCOUNTER — OFFICE VISIT (OUTPATIENT)
Dept: ENDOCRINOLOGY | Facility: CLINIC | Age: 73
End: 2023-07-12
Payer: COMMERCIAL

## 2023-07-12 VITALS
BODY MASS INDEX: 28.66 KG/M2 | OXYGEN SATURATION: 95 % | SYSTOLIC BLOOD PRESSURE: 133 MMHG | WEIGHT: 169.6 LBS | DIASTOLIC BLOOD PRESSURE: 72 MMHG | HEART RATE: 69 BPM

## 2023-07-12 DIAGNOSIS — E21.0 PRIMARY HYPERPARATHYROIDISM (H): Primary | ICD-10-CM

## 2023-07-12 DIAGNOSIS — E21.0 PRIMARY HYPERPARATHYROIDISM (H): ICD-10-CM

## 2023-07-12 DIAGNOSIS — R82.994 HYPERCALCIURIA: ICD-10-CM

## 2023-07-12 DIAGNOSIS — M85.9 LOW BONE DENSITY: ICD-10-CM

## 2023-07-12 PROCEDURE — 76536 US EXAM OF HEAD AND NECK: CPT | Mod: GC | Performed by: RADIOLOGY

## 2023-07-12 PROCEDURE — 99215 OFFICE O/P EST HI 40 MIN: CPT

## 2023-07-12 RX ORDER — ALENDRONATE SODIUM 70 MG/1
70 TABLET ORAL
Qty: 12 TABLET | Refills: 3 | Status: SHIPPED | OUTPATIENT
Start: 2023-07-12 | End: 2024-01-31

## 2023-07-12 ASSESSMENT — PAIN SCALES - GENERAL: PAINLEVEL: NO PAIN (0)

## 2023-07-12 NOTE — PATIENT INSTRUCTIONS
Start alendronate 70 mg/WEEK.    Take on empty stomach, remaining upright -  don't eat for one hour    Repeat parathyroid ultrasound now - after that we will decide if parathyroid surgery referral     Next DXA bone density 10/10/2024 or after - must be on same machine as the last one for comparison     Blood test in November    Yearly labs  prior to next appt- 24 hour urine calcium, labs

## 2023-07-12 NOTE — PROGRESS NOTES
Endocrine Consult note    Attending Assessment/Plan :     Hypercalcemia with high PTH and borderline hypercalcuria .  The pattern is that of mild primary hyperparathyroidism.    Indications for surgical treatment of hyperparathyroidism include the following:   Calcium > 1 mg/dl over normal not met   Kidney stones- not met   Osteoporosis- not met   Hypercalciuria -  met   Declining renal function    We have not clearly localized parathyroid adenoma - conflicting results on US and parathyroid scan.    Repeat parathyroid US didn't resolve the issue    Labs in November   ?referral to parathyroid surgeon-- check results and then decide     Addendum:  8/15/23 Ca 10, 25OHD 20  10/31/23 Ca 10.4, phosphorus 2.9, creatinine 0.84  6/25/24 CA 10.1, iCa 5.2, phos 2.0, PTH 61- next appt 8/28     Hypercalciuria - as above.     Low bone density. The FRAX score at the hip > 3% would lead to recommendation for treatment.   Her sister recently fractured hehr hip  Next DXA 2 year after the last one     start alendronate.    Post menopausal     Prediabetes       Indigo Eid MD    Chief complaint: HISTORY OF PRESENT ILLNESS    Jennifer presents to follow up on primary hyperparathyroidism, low bone density.  I have seen her once before, on 10/26/22.  Since then, she has had a number of labs and images as noted.    Hypercalcemia has been intermittently noted since 11/27/19.  She was on hydrochlorothiazide since at least 2017 until 11/27/19.  Despite this, the urine calcium is high.  We saw a parathyroid candidate on the RIGHT by US.  By parathyroid scan the parathyroid candidate is on the LEFT.       Endocrine relevant labs are as follows:  11/27/19 Ca 10.5, creatinine 0.86  2/16/22 HgbA1c 5.8%  9/16/22 Ca 10.4, TSH 1.61  9/26/22 iCa 5.3, PTH 68, 25OHD 25  10/4/22 Ca 10.4, Mg 2, alk phos 87, creatinine 0.81, TSH 1.41; 24 hour urine calcium 250 mg/24hours,   10/5/2022 iCa   2/17/23 Ca 10.5, Hgba1c 6.3%, testosterone < 2, Dheas < 15,  25OHD 17, TSH 1.36,  Alk phos 79  5/15/2023 Ca 10.4, phos 3.3 , PTH 58 ,  creatinine 0.78  5/16/23 244 hour urine calcium 260 mg/24 hours, 7.4 mg/dl, urine creatinine 1.06 g/spec; 30.7 mg/dl, volume 3450.   Urine calcium/creatinine clearance ratio 0.019    Drink a ton of mmilk, yogurt, ice cream  Takes tums for heartburn--    Relevant imaging is as follows: (as read by me as it pertains to endocrine relevant organs)  11/17/2021 MRI brain: there are no T1 coronal pituitary images.  Pituitary appears normal on sagittal;  Slight convex up upper border on T2  3/21/22 CT Chest: thyroid appears normal;   9/16/22 CT abdomen with contrast - no kidney stones  10/10/22 DXA lowest T-score -1.2 right femoral neck   11/7/2022 123I sestamibi subtraction SPECT - localizes to the left   7/12/23 parathyroid US following appt - compared with 10/26/22 parathyroid US:   Right thyroid nodule # 1 0.5 x 0.3 x  Was  0.5 x 0.3 x 0.6 cm   right # 2  0.5 x 0.6 x  0.6 cm Was 0.8 x 0.5 x 0.4 - by stills.  This is again not seen on cine .  It is also more superior in location than typically seen  Left cine does not show parathyroid candidate today - last study it showed ? Parathyroid candidate inferiorly - but only seen in trans, not long     REVIEW OF SYSTEMS    Persistent cough despite not having cough when she had covid  GI: both diarrhea and constipation; no heartburn at night   No bone pain   + left hip pain x weeks    Past Medical History  Past Medical History:   Diagnosis Date    Arthritis     Fibromyalgia, ? Osteoarthritis    Back injury     Micro discectomy, approx date 1983    Glaucoma     Hiatal hernia     HTN (hypertension)     Hypercalcemia 11/30/2019    Insomnia 12/26/2012     Problem list name updated by automated process. Provider to review    Reduced vision 09/2016    L vision block, bilateral elevated eye presures,poss. Glacom    Uncomplicated asthma     Diagnosed as 2 yo     Past Surgical History:   Procedure Laterality Date      cataract extraction with intraocular lens implant Right 01/31/2019    BACK SURGERY  1984    micro-disc-ectomy    BREAST SURGERY  1984    L Breast bx    cataract extraction with intraocular lens Left 12/06/2018    CHOLECYSTECTOMY  1990    IRIDOTOMY/IRIDECTOMY LASER SURGERY Left 09/27/2016    IRIDOTOMY/IRIDECTOMY LASER SURGERY Right 10/18/2016    TUBAL LIGATION  1989    ZZC STOMACH SURGERY PROCEDURE UNLISTED  03/1990    Cholecystectomy, 11/1989 tubal ligation     Medications  Current Outpatient Medications   Medication Sig Dispense Refill    albuterol (VENTOLIN HFA) 108 (90 Base) MCG/ACT inhaler Inhale 2 puffs into the lungs every 6 hours as needed for shortness of breath / dyspnea 6.7 g 1    alendronate (FOSAMAX) 70 MG tablet Take 1 tablet (70 mg) by mouth every 7 days Take 60 minutes before am meal with 8 oz. water. Remain upright for 30 minutes. 12 tablet 3    amLODIPine (NORVASC) 5 MG tablet TAKE 1 TABLET BY MOUTH AT BEDTIME 90 tablet 2    atorvastatin (LIPITOR) 20 MG tablet Take 1 tablet (20 mg) by mouth daily 90 tablet 3    azelastine (ASTELIN) 0.1 % nasal spray Spray 1 spray into both nostrils 2 times daily      Calcium Carbonate Antacid (TUMS PO) Take  by mouth At Bedtime.      fluticasone-salmeterol (ADVAIR-HFA) 230-21 MCG/ACT inhaler       ibuprofen (ADVIL/MOTRIN) 100 MG tablet Take 100 mg by mouth every 4 hours as needed      latanoprost (XALATAN) 0.005 % ophthalmic solution Place 1 drop into the right eye At Bedtime      losartan (COZAAR) 50 MG tablet TAKE 1 TABLET BY MOUTH AT BEDTIME 90 tablet 2    montelukast (SINGULAIR) 10 MG tablet TAKE ONE TABLET BY MOUTH EVERY DAY AS DIRECTED  1    mupirocin (BACTROBAN) 2 % external ointment Apply topically 3 times daily      nitroGLYcerin (NITROSTAT) 0.4 MG sublingual tablet Place 0.4 mg under the tongue every 5 minutes as needed for chest pain For chest pain place 1 tablet under the tongue every 5 minutes for 3 doses. If symptoms persist 5 minutes after 1st  dose call 911.       0ne tums every night  She is not on vitamin D     Allergies  Allergies   Allergen Reactions    Canola Oil [Vegetable Oil] Anaphylaxis and GI Disturbance    Animal Dander Unknown    Dust Mites Unknown    Grass     Hydroxyzine Other (See Comments)     Passed out        Hydroxyzine Hcl     Pollen Extract Unknown    Trees     Chlorhexidine Itching and Rash     Hibiclens       Family History  Family History   Problem Relation Age of Onset    Heart Disease Mother     Diabetes Mother     Coronary Artery Disease Mother     Hypertension Mother     Anesthesia Reaction Mother         Anaphylaxis, oral opioixd    Asthma Mother     Thyroid Disease Mother     Low Back Problems Mother     Heart Disease Father     Diabetes Father     Coronary Artery Disease Father     Hypertension Father     Hyperlipidemia Father     Low Back Problems Father     Diabetes Sister     Hypertension Sister     Low Back Problems Sister     Thyroid Disease Sister     Hip fracture Sister     Low Back Problems Brother     Asthma Maternal Grandmother     Coronary Artery Disease Maternal Grandfather     Cerebrovascular Disease Paternal Grandfather     Asthma Daughter     Genetic Disease Daughter         Kallman syndrome; no olfactory bulb;    Calcium Disorder No family hx of      Social History  Social History     Tobacco Use    Smoking status: Never    Smokeless tobacco: Never   Substance Use Topics    Alcohol use: Yes     Alcohol/week: 0.0 standard drinks of alcohol     Comment: 0 -2 times a year    Drug use: No     Retired nurse;     Physical Exam  /72   Pulse 69   Wt 76.9 kg (169 lb 9.6 oz)   LMP  (LMP Unknown)   SpO2 95%   BMI 28.66 kg/m    Body mass index is 28.66 kg/m .  GENERAL : pleasant woman in  In no apparent distress.    SKIN: Normal color, normal temperature, texture.       EYES: PER, EOMI, No scleral icterus,  No proptosis, conjunctival redness, stare, retraction  NECK: No visible masses. No palpable adenopathy,  or masses.   THYROID:  Not palpable;   RESP: Lungs clear to auscultation bilaterally  CARDIAC: Regular rate and rhythm, normal S1 S2, without murmurs, rubs or gallops      NEURO: awake, alert, responds appropriately to questions.    Moves all extremities;    EXTREMITIES: No clubbing, cyanosis or edema.    DATA REVIEW     Latest Ref [facility] 10/4/2022  10:23 PM 10/5/2022  1:21 AM   ENDO CALCIUM LABS-UMP      Vitamin D Deficiency screening 20 - 75 ug/L     Albumin 3.4 - 5.0 g/dL     Albumin 3.5 - 5.2 g/dL 4.5     Alkaline Phosphatase 35 - 104 U/L 87     CALCIUM IONIZED WB 4.4 - 5.2 mg/dL  4.8    Calcium 8.8 - 10.2 mg/dL 10.4 (H)     Calcium Urine g/24 h 0.10 - 0.30 g/spec     Calcium Urine mg/dL mg/dL     Urea Nitrogen 7 - 30 mg/dL     Urea Nitrogen 8.0 - 23.0 mg/dL 12.7     Creatinine 0.51 - 0.95 mg/dL 0.81     Lipase 13 - 60 U/L 18     Magnesium 1.7 - 2.3 mg/dL 2.0     Parathyroid Hormone Intact 15 - 65 pg/mL        Latest Ref Rn 2/17/2023  9:50 AM 5/15/2023  10:21 AM   ENDO CALCIUM LABS-UMP      Vitamin D Deficiency screening 20 - 75 ug/L 17 (L)     Albumin 3.4 - 5.0 g/dL     Albumin 3.5 - 5.2 g/dL 4.4     Alkaline Phosphatase 35 - 104 U/L 79     CALCIUM IONIZED WB 4.4 - 5.2 mg/dL     Calcium 8.8 - 10.2 mg/dL 10.5 (H)  10.4 (H)    Calcium Urine g/24 h 0.10 - 0.30 g/spec     Calcium Urine mg/dL mg/dL     Urea Nitrogen 7 - 30 mg/dL     Urea Nitrogen 8.0 - 23.0 mg/dL 14.6     Creatinine 0.51 - 0.95 mg/dL 0.77  0.78    Lipase 13 - 60 U/L     Magnesium 1.7 - 2.3 mg/dL     Parathyroid Hormone Intact 15 - 65 pg/mL  58       Latest Ref [facility] 5/16/2023  11:16 AM   ENDO CALCIUM LABS-UMP     Vitamin D Deficiency screening 20 - 75 ug/L    Albumin 3.4 - 5.0 g/dL    Albumin 3.5 - 5.2 g/dL    Alkaline Phosphatase 35 - 104 U/L    CALCIUM IONIZED WB 4.4 - 5.2 mg/dL    Calcium 8.8 - 10.2 mg/dL    Calcium Urine g/24 h 0.10 - 0.30 g/spec 0.26    Calcium Urine mg/dL mg/dL 7.4    Urea Nitrogen 7 - 30 mg/dL    Urea Nitrogen 8.0 - 23.0 mg/dL     Creatinine 0.51 - 0.95 mg/dL    Lipase 13 - 60 U/L    Magnesium 1.7 - 2.3 mg/dL    Parathyroid Hormone Intact 15 - 65 pg/mL       Legend:  (H) High  (L) Low    US THYROID 10/26/2022 3:00 PMCOMPARISON: None available.HISTORY: Serum calcium elevated; Elevated parathyroid hormone   FINDINGS:   Thyroid parenchyma: heterogenous  The right lobe of the thyroid measures: 1.8 x 1.4 x 4.3 cm  The left lobe of the thyroid measures: 1.2 x 1.6 x 4.0 cm  The thyroid isthmus measures: 2.2 mm     Nodule 1:  Lobe: Right  Location: Mid lobe  Size: 0.5 x 0.3 x 0.6 cm  Composition: Spongiform (0 points)  Echogenicity: Hyperechoic or isoechoic (1 point)  Shape: Wider than tall (0 points)  Margin: Smooth (0 points)  Echogenic Foci: None or large comet tail artifact (0 points)  Stability: Not previously imaged  TIRADS: TR2 (1-2 points) Not suspicious     Posteromedial to the right thyroid lobe there is a 0.5 x 0.8 x 0.4 cmhypoechoic lesion with no definite involvement of the thyroid parenchyma.                                                     Impression: 0.8 cm hypoechoic lesion posteromedial to the rightthyroid lobe, which may represent a parathyroid adenoma. Consider  further evaluation with technetium 99 sestamibi scan     ACR TI-RADS recommendations  TR2 (2 points) & TR1 (0 points) -No FNA or follow-up  TR3 (3 points) - FNA if ? 2.5cm, follow-up if 1.5 -2.4 cm in 1, 3 and5 years  TR4 (4-6 points) - FNA if ? 1.5cm, follow-up if 1 -1.4 cm in 1, 2, 3and 5 years  TR5 (?7 points) - FNA if ? 1cm, follow-up if 0.5 -0.9 cm every yearfor 5 years   I have personally reviewed the examination and initial interpretationand I agree with the findings.  LUMA ZHANG MD     Examination: Nuclear medicine parathyroid examination with SPECT CTand High Resolution CT images of the Neck.  Date:  11/7/2022 3:16 PM   Indication:  Serum calcium elevated; Elevated parathyroid hormone   Comparison: Ultrasound 10/26/2022  Technique:  The patient  received 8.29 mCi of I-123 orally and 23.4 mCi of Tc-99Cardiolite.  Three separate phases of images were obtained.  1. Dynamic images were obtained of the thyroid gland for 30 minutesfollowing Cardiolite administration.   2. Iodine images were obtained of the thyroid  followed by manualsubtraction from the Cardiolite images.   3. SPECT CT images were also obtained of the thyroid using a dualenergy technique for both I-123 and for Tc-99m followed by 3 mm high resolution CT images of the neck from the base of the skull to thebase of the heart.  After normalization of the I-123 images to the  Tc-99m- Cardiolite images the reconstructed axial slices weresubtracted, yielding neck images highlighting abnormally perfused  tissues.  Findings:The images of the thyroid gland on both the I-123 images and on the Tc-99m Cardiolite images demonstrates homogeneous appearance of the gland. There are no thyroid lesions identified. The SPECT CT imageswith subtraction images demonstrates a subtle focal area uptakeposterior medial to the superior aspect of the h left thyroid glandseries 3 image 105.                                                             Impression:  Focal uptake along the superior posterior medial aspect of the leftthyroid gland. While the suspected parathyroid adenoma is not clearly  delineated on CT, this is the most likely location based on perfusion.Note this location is contralateral to the finding reported on  ultrasound 10/26/22.  I have personally reviewed the examination and initial interpretationand I agree with the findings.  STEPHANIE ADHIKARI MD     EXAMINATION: Chest CT  3/28/2023 10:19 AM  CLINICAL HISTORY: Recurrent cough; Moderate persistent asthma withexacerbation  COMPARISON: CTA chest 3/21/2022.  TECHNIQUE: CT imaging obtained through the chest without contrast.Axial, coronal, and sagittal reconstructions and axial MIP reformattedimages are reviewed.   CONTRAST: None  FINDINGS:  Lungs: The  airway is patent. Minimal subpleuralreticulation/groundglass opacities in the inferior lateral rightmiddle lobe and inferior lateral lingula. No airspace consolidation.No pleural effusion or pneumothorax. Right lower lobe calcified  granulomas. Several sub-4 mm solid pulmonary nodules, for example 2 mm  solid nodule in the right lower lobe (series 4, image 124).  Mediastinum: The thyroid is unremarkable. Cardiac size is normal.Normal caliber aorta and main pulmonary artery. No pericardial  effusion. Mild coronary artery calcium. No thoracic lymphadenopathy.Partially calcified right hilar nodes. Esophagus is normal in caliber.  Bones and soft tissues: No suspicious bone findings. Mild degenerativechanges of the thoracic spine and shoulders.  Upper Abdomen: Limited evaluation of the upper abdomen. Smallaccessory splenule adjacent to the pancreatic tail.                                                            IMPRESSION:   Minimal subpleural reticulation and groundglass opacities in the inferolateral right middle lobe and lingula. The findings suggest  subpleural non-fibrotic mild early interstitial lung process such as NSIP or organizing pneumonia, chronic eosinophilic pneumonia or  infection. Otherwise no abnormal findings in the chest to explain the  patient's recurrent cough and continued follow up suggested.  I have personally reviewed the examination and initial interpretationand I agree with the findings.  PENELOPE COLEMAN MD

## 2023-08-15 ENCOUNTER — OFFICE VISIT (OUTPATIENT)
Dept: FAMILY MEDICINE | Facility: CLINIC | Age: 73
End: 2023-08-15
Payer: COMMERCIAL

## 2023-08-15 VITALS
BODY MASS INDEX: 27.82 KG/M2 | HEART RATE: 70 BPM | WEIGHT: 167 LBS | RESPIRATION RATE: 15 BRPM | SYSTOLIC BLOOD PRESSURE: 154 MMHG | DIASTOLIC BLOOD PRESSURE: 74 MMHG | HEIGHT: 65 IN | OXYGEN SATURATION: 98 % | TEMPERATURE: 97.5 F

## 2023-08-15 DIAGNOSIS — E21.0 PRIMARY HYPERPARATHYROIDISM (H): Primary | ICD-10-CM

## 2023-08-15 DIAGNOSIS — M85.9 LOW BONE DENSITY: ICD-10-CM

## 2023-08-15 LAB
CALCIUM SERPL-MCNC: 10 MG/DL (ref 8.8–10.2)
DEPRECATED CALCIDIOL+CALCIFEROL SERPL-MC: 20 UG/L (ref 20–75)

## 2023-08-15 PROCEDURE — 82310 ASSAY OF CALCIUM: CPT | Performed by: NURSE PRACTITIONER

## 2023-08-15 PROCEDURE — 82306 VITAMIN D 25 HYDROXY: CPT | Performed by: NURSE PRACTITIONER

## 2023-08-15 PROCEDURE — 36415 COLL VENOUS BLD VENIPUNCTURE: CPT | Performed by: NURSE PRACTITIONER

## 2023-08-15 PROCEDURE — 99213 OFFICE O/P EST LOW 20 MIN: CPT | Performed by: NURSE PRACTITIONER

## 2023-08-15 ASSESSMENT — PAIN SCALES - GENERAL: PAINLEVEL: NO PAIN (0)

## 2023-08-15 NOTE — PATIENT INSTRUCTIONS
Checking Calcium and Vitamin D today  - take Ibuprofen twice a day for 2-3 days following dose of Alendrate   - follow up with Dr. Eid

## 2023-08-15 NOTE — PROGRESS NOTES
Assessment & Plan     1. Primary hyperparathyroidism (H)  Check Ca today; per patient request;   - Calcium; Future      2. Low bone density  Started alendrate developed muscular and joint pain; HA's with 4 doses; due for dose today; recommend to hold until follow up with Endocrinology Dr. Eid   - Calcium; Future  - Vitamin D Deficiency; Future  - Calcium  - Vitamin D Deficiency         Patient Instructions   Checking Calcium and Vitamin D today  - take Ibuprofen twice a day for 2-3 days following dose of Alendrate   - follow up with G[facility]INI [facility]    Henrry Rodriguez is a 72 year old, presenting for the following health issues:  Recheck Medication (Hip pain. And headaches a couple of days./Hands started to hurt in the mornings./Rib area on right side pain./Yesterday been experiencing brain fog./Calcium concerns./Thumb issue, won't bend at the joint./Been fatigued.)      8/15/2023    10:57 AM   Additional Questions   Roomed by Emerita PHILLIPS       History of Present Illness       Reason for visit:  Concerns about symtoms from med  Symptom onset:  1-2 weeks ago  Symptoms include:  Pain in bones headache rib  Symptom intensity:  Moderate  Symptom progression:  Worsening  Had these symptoms before:  No  What makes it worse:  Not sure  What makes it better:  Med  ibuprofen or tylenol    She eats 4 or more servings of fruits and vegetables daily.She consumes 1 sweetened beverage(s) daily.She exercises with enough effort to increase her heart rate 10 to 19 minutes per day.  She exercises with enough effort to increase her heart rate 4 days per week.   She is taking medications regularly.     Patient with know hx of low bone density followed by Endocrinology recently Started alendronate 70 mg x 4 dose developed HA, left hip pain, bilateral thumb pain, right flank pain; fatigue; discussed side effects of medication; due for follow up with specialty  "clinic    - no known injury to left hip; denies weakness, n/t ; no b/b changes;   - BP elevated in clinic; home readings within goal             Review of Systems         Objective    BP (!) 154/74 (BP Location: Right arm, Patient Position: Sitting, Cuff Size: Adult Regular)   Pulse 70   Temp 97.5  F (36.4  C) (Temporal)   Resp 15   Ht 1.638 m (5' 4.5\")   Wt 75.8 kg (167 lb)   LMP  (LMP Unknown)   SpO2 98%   BMI 28.22 kg/m    Body mass index is 28.22 kg/m .  Physical Exam                         "

## 2023-08-23 ENCOUNTER — TRANSFERRED RECORDS (OUTPATIENT)
Dept: HEALTH INFORMATION MANAGEMENT | Facility: CLINIC | Age: 73
End: 2023-08-23
Payer: COMMERCIAL

## 2023-09-15 ENCOUNTER — OFFICE VISIT (OUTPATIENT)
Dept: URGENT CARE | Facility: URGENT CARE | Age: 73
End: 2023-09-15
Payer: COMMERCIAL

## 2023-09-15 VITALS
BODY MASS INDEX: 26.66 KG/M2 | HEIGHT: 65 IN | RESPIRATION RATE: 16 BRPM | TEMPERATURE: 97.4 F | SYSTOLIC BLOOD PRESSURE: 135 MMHG | WEIGHT: 160 LBS | HEART RATE: 65 BPM | DIASTOLIC BLOOD PRESSURE: 73 MMHG | OXYGEN SATURATION: 97 %

## 2023-09-15 DIAGNOSIS — J45.41 MODERATE PERSISTENT ASTHMA WITH EXACERBATION: Primary | ICD-10-CM

## 2023-09-15 PROCEDURE — 99214 OFFICE O/P EST MOD 30 MIN: CPT | Performed by: INTERNAL MEDICINE

## 2023-09-15 RX ORDER — PREDNISONE 20 MG/1
40 TABLET ORAL DAILY
Qty: 10 TABLET | Refills: 0 | Status: SHIPPED | OUTPATIENT
Start: 2023-09-15 | End: 2023-09-20

## 2023-09-15 RX ORDER — FLUTICASONE PROPIONATE AND SALMETEROL 250; 50 UG/1; UG/1
1 POWDER RESPIRATORY (INHALATION) 2 TIMES DAILY
Qty: 60 EACH | Refills: 0 | Status: SHIPPED | OUTPATIENT
Start: 2023-09-15 | End: 2024-02-10

## 2023-09-15 RX ORDER — FLUTICASONE PROPIONATE AND SALMETEROL XINAFOATE 230; 21 UG/1; UG/1
2 AEROSOL, METERED RESPIRATORY (INHALATION) 2 TIMES DAILY
Qty: 12 G | Refills: 0 | Status: SHIPPED | OUTPATIENT
Start: 2023-09-15 | End: 2023-09-15

## 2023-09-15 RX ORDER — ALBUTEROL SULFATE 90 UG/1
2 AEROSOL, METERED RESPIRATORY (INHALATION) EVERY 6 HOURS PRN
Qty: 6.7 G | Refills: 0 | Status: SHIPPED | OUTPATIENT
Start: 2023-09-15 | End: 2023-09-20

## 2023-09-15 ASSESSMENT — ENCOUNTER SYMPTOMS: WHEEZING: 1

## 2023-09-15 NOTE — PROGRESS NOTES
"ASSESSMENT AND PLAN:      ICD-10-CM    1. Moderate persistent asthma with exacerbation  J45.41 predniSONE (DELTASONE) 20 MG tablet     fluticasone-salmeterol (ADVAIR-HFA) 230-21 MCG/ACT inhaler        Prednisone 40 mg daily 5 days.  Albuterol 2 puffs every 4-6 hours.  Advair 2 puffs twice daily    Return in about 1 week (around 9/22/2023) for asthma exacerbation.        Karmen Dewey MD  [facility]    Subjective     Jennifer Jane is a 72 year old who presents for Patient presents with:  Cough: X3 weeks of cough, tightness in chest, sinus issues   Did a covid test and was negative, no fever     an established patient of [facility].    URI Adult    Onset of symptoms was 15 days) ago.  Course of illness is waxing and waning.      Current and Associated symptoms:  Cough  Rattling chest started 2 weeks ago.  Chest congestion persisting  Productive.  Mainly white.  Occasional yellow  Treatment measures tried include Tylenol/Ibuprofen and Inhaler (name: albuterol).advair 2 x day  Predisposing factors include HX of asthma and thought related to air quality.    Negative home COVID test.    Review of Systems   Respiratory:  Positive for wheezing (sometimes).            Objective    /73   Pulse 65   Temp 97.4  F (36.3  C) (Temporal)   Resp 16   Ht 1.638 m (5' 4.5\")   Wt 72.6 kg (160 lb)   LMP  (LMP Unknown)   SpO2 97%   BMI 27.04 kg/m    Physical Exam  Vitals reviewed.   Constitutional:       Appearance: Normal appearance. She is not ill-appearing or toxic-appearing.   HENT:      Right Ear: Tympanic membrane normal.      Left Ear: Tympanic membrane normal.      Mouth/Throat:      Mouth: Mucous membranes are moist.      Pharynx: Oropharynx is clear.   Cardiovascular:      Rate and Rhythm: Normal rate and regular rhythm.      Pulses: Normal pulses.      Heart sounds: Normal heart sounds.   Pulmonary:      Effort: Pulmonary effort is normal.      Breath sounds: Wheezing present.      " Comments: Coughing fits during respiratory exam

## 2023-09-20 ENCOUNTER — OFFICE VISIT (OUTPATIENT)
Dept: FAMILY MEDICINE | Facility: CLINIC | Age: 73
End: 2023-09-20
Payer: COMMERCIAL

## 2023-09-20 VITALS
HEIGHT: 65 IN | OXYGEN SATURATION: 97 % | RESPIRATION RATE: 16 BRPM | WEIGHT: 169.7 LBS | SYSTOLIC BLOOD PRESSURE: 134 MMHG | BODY MASS INDEX: 28.27 KG/M2 | TEMPERATURE: 97.5 F | HEART RATE: 76 BPM | DIASTOLIC BLOOD PRESSURE: 74 MMHG

## 2023-09-20 DIAGNOSIS — J45.41 MODERATE PERSISTENT ASTHMA WITH ACUTE EXACERBATION: Primary | ICD-10-CM

## 2023-09-20 PROCEDURE — 99214 OFFICE O/P EST MOD 30 MIN: CPT | Performed by: NURSE PRACTITIONER

## 2023-09-20 RX ORDER — ALBUTEROL SULFATE 90 UG/1
2 AEROSOL, METERED RESPIRATORY (INHALATION) EVERY 6 HOURS PRN
Qty: 8.5 G | Refills: 5 | Status: SHIPPED | OUTPATIENT
Start: 2023-09-20

## 2023-09-20 RX ORDER — PREDNISONE 20 MG/1
TABLET ORAL
Qty: 9 TABLET | Refills: 0 | Status: SHIPPED | OUTPATIENT
Start: 2023-09-20 | End: 2024-01-03

## 2023-09-20 ASSESSMENT — PAIN SCALES - GENERAL: PAINLEVEL: NO PAIN (0)

## 2023-09-20 NOTE — PROGRESS NOTES
Assessment & Plan     (J45.41) Moderate persistent asthma with acute exacerbation  (primary encounter diagnosis)  Comment: improving  Plan: albuterol (VENTOLIN HFA) 108 (90 Base) MCG/ACT         inhaler, predniSONE (DELTASONE) 20 MG tablet        Will extend the Prednisone and taper.  Try Mucinex.   Follow up if symptoms are not improving or worsen.        I spent a total of 31 minutes on the day of the visit.   Time spent by me doing chart review, history and exam, documentation and further activities per the note     MED REC REQUIRED  Post Medication Reconciliation Status: discharge medications reconciled and changed, per note/orders      Gerri Krishnamurthy NP  [facility]    Henrry Rodriguez is a 72 year old, presenting for the following health issues:  Urgent Care (Asthma exacerbation)        9/20/2023     3:25 PM   Additional Questions   Roomed by ION Hartman         9/20/2023     3:25 PM   Patient Reported Additional Medications   Patient reports taking the following new medications Robutussin, emergen-c       [facility]       ED/UC Followup:    Facility:  [facility]  Date of visit: 9/15/23  Reason for visit: Asthma exacerbation follow-up  Current Status: Was prescribed Prednisone 40 mg daily 5 days. Albuterol 2 puffs every 4-6 hours.  Advair 2 puffs twice daily   Exacerbation improved but not resolved. Pt still has crackles and upper left lobe congestion and lower bases      She was seen in  5 days ago.  She had been sick for 3 weeks with cough, shortness of breath, sinus symptoms.  Her sinus symptoms have resolved.  She feels much better, but still has a cough, some post nasal drainage.    She did see pulmonology in May for an abnormal chest CT, plan is to repeat in one year.  Notes from that visit:  Recent illness started on Nov 23 with cold symtpoms. She saw urgent care on Nov 29 who noted rumbles in her R chest, and she was prescribed a short course of prednisone. This helped  a little bt, but she still had symptoms, so she saw her PCP on Dec 7 who prescribed antibiotics and a 12 day prednisone taper from 60mg daily to 10mg daily. This helped, and she was almost better. Dec 19 she saw her allergist, who gave her a prescription for prednisone, which she did not fill until January 6, and after she took it at that time she felt completely better. During this time, she had no wheezing, the primary symptom was a cough and rattle in R chest which she could feel and hear.  She then got COVID on Feb 10, and started on paxlovid. She had significant fever, malaise, cough, and the cough lasted until March 25. She saw her PCP during that time, and CT chest was obtained March 28.  Those symptoms have resolved. She does have a slight productive cough, of creamy sometimes yellowish in color.     Regarding her asthma: she was diagnosed at age 1. She has undergone multiple rounds of allergy shots over her life. She has been on a controller inhaler since her 30s. She has been on advair for many years. Before this most recent illness, she only had a few courses of antibiotics and prednisone over the last many years. She has not had severe illness requiring hospitalization or intubation. She did have whooping cough in the 90s and had a cough for 8 months. Triggers for her asthma include dust, she uses an air purifier at home which helps significantly. She takes advair (has been on for many years), montelukast, and gets allergy shots.  She does have chronic nasal congestion, and post-nasal drip. She denies any GERD symptoms. She has BLE edema which is chronic, she wears stockings. She is currently being worked up by cardiology, wearing a Ofelia Patch. She has glaucoma and uses eye drops. She has intermittent dry mouth, but this is rare. She has arthritis, stiffness in her hands, hips, knees, back. Not worse at any time of day. No swelling in joints. No rashes. Rare aspiratione vents            Review of Systems  "        Objective    /74 (BP Location: Right arm, Patient Position: Sitting, Cuff Size: Adult Regular)   Pulse 76   Temp 97.5  F (36.4  C) (Temporal)   Resp 16   Ht 1.638 m (5' 4.5\")   Wt 77 kg (169 lb 11.2 oz)   LMP  (LMP Unknown)   SpO2 97%   BMI 28.68 kg/m    Body mass index is 28.68 kg/m .  Physical Exam   GENERAL: healthy, alert and no distress  RESP: lungs clear to auscultation - no rales, rhonchi or wheezes; harsh cough noted  CV: regular rate and rhythm, normal S1 S2, no S3 or S4, no murmur, click or rub, no peripheral edema and peripheral pulses strong  PSYCH: mentation appears normal, affect normal/bright                      "

## 2023-10-17 ENCOUNTER — TRANSFERRED RECORDS (OUTPATIENT)
Dept: HEALTH INFORMATION MANAGEMENT | Facility: CLINIC | Age: 73
End: 2023-10-17
Payer: COMMERCIAL

## 2023-10-25 ENCOUNTER — TELEPHONE (OUTPATIENT)
Dept: CARDIOLOGY | Facility: CLINIC | Age: 73
End: 2023-10-25
Payer: COMMERCIAL

## 2023-10-25 NOTE — TELEPHONE ENCOUNTER
[facility] Call Center    Phone Message    May a detailed message be left on voicemail: no     Reason for Call: Other: Patient called stating they received a letter letting them know Dr. Johnson is no longer with  [facility] and to call to make an appointment. Patient will like to know when they are supposed to make a follow up. If it should be an annual follow up from when they were last seen in July, 6 months, etc. Please call patient back to further discuss.      Action Taken: Other: Cardiology    Travel Screening: Not Applicable    Thank you!  [facility]

## 2023-10-25 NOTE — TELEPHONE ENCOUNTER
No appt is needed at this time. Attempted to tresa pt to discuss but no answer. Left VM stating she could call back to discuss or send mychart message.     Carmelo Arevalo, RN   Cardiology Nurse Coordinator

## 2023-10-31 ENCOUNTER — LAB (OUTPATIENT)
Dept: LAB | Facility: CLINIC | Age: 73
End: 2023-10-31
Payer: COMMERCIAL

## 2023-10-31 DIAGNOSIS — R82.994 HYPERCALCIURIA: ICD-10-CM

## 2023-10-31 DIAGNOSIS — E21.0 PRIMARY HYPERPARATHYROIDISM (H): ICD-10-CM

## 2023-10-31 LAB
CALCIUM SERPL-MCNC: 10.4 MG/DL (ref 8.8–10.2)
CREAT SERPL-MCNC: 0.84 MG/DL (ref 0.51–0.95)
EGFRCR SERPLBLD CKD-EPI 2021: 73 ML/MIN/1.73M2
PHOSPHATE SERPL-MCNC: 2.9 MG/DL (ref 2.5–4.5)

## 2023-10-31 PROCEDURE — 82565 ASSAY OF CREATININE: CPT

## 2023-10-31 PROCEDURE — 84100 ASSAY OF PHOSPHORUS: CPT

## 2023-10-31 PROCEDURE — 36415 COLL VENOUS BLD VENIPUNCTURE: CPT

## 2023-10-31 PROCEDURE — 82310 ASSAY OF CALCIUM: CPT

## 2023-11-01 DIAGNOSIS — I10 BENIGN ESSENTIAL HYPERTENSION: ICD-10-CM

## 2023-11-01 RX ORDER — LOSARTAN POTASSIUM 50 MG/1
50 TABLET ORAL AT BEDTIME
Qty: 90 TABLET | Refills: 0 | Status: SHIPPED | OUTPATIENT
Start: 2023-11-01 | End: 2023-12-15

## 2023-11-16 ENCOUNTER — NURSE TRIAGE (OUTPATIENT)
Dept: NURSING | Facility: CLINIC | Age: 73
End: 2023-11-16
Payer: COMMERCIAL

## 2023-11-17 ENCOUNTER — OFFICE VISIT (OUTPATIENT)
Dept: URGENT CARE | Facility: URGENT CARE | Age: 73
End: 2023-11-17
Payer: COMMERCIAL

## 2023-11-17 VITALS
TEMPERATURE: 97.7 F | HEART RATE: 65 BPM | BODY MASS INDEX: 27.49 KG/M2 | SYSTOLIC BLOOD PRESSURE: 157 MMHG | OXYGEN SATURATION: 97 % | HEIGHT: 65 IN | WEIGHT: 165 LBS | DIASTOLIC BLOOD PRESSURE: 82 MMHG

## 2023-11-17 DIAGNOSIS — M54.50 ACUTE RIGHT-SIDED LOW BACK PAIN WITHOUT SCIATICA: ICD-10-CM

## 2023-11-17 DIAGNOSIS — L03.213 PERIORBITAL CELLULITIS OF LEFT EYE: Primary | ICD-10-CM

## 2023-11-17 PROCEDURE — 99214 OFFICE O/P EST MOD 30 MIN: CPT | Performed by: PHYSICIAN ASSISTANT

## 2023-11-17 ASSESSMENT — ENCOUNTER SYMPTOMS
WEAKNESS: 0
BACK PAIN: 1
NUMBNESS: 0
EYE REDNESS: 1
EYE PAIN: 1

## 2023-11-17 NOTE — PROGRESS NOTES
Assessment & Plan        1. Periorbital cellulitis of left eye    - amoxicillin-clavulanate (AUGMENTIN) 875-125 MG tablet; Take 1 tablet by mouth 2 times daily for 10 days  Dispense: 20 tablet; Refill: 0    2. Acute right-sided low back pain without sciatica    -Referred patient to spine specialist for low back pain work-up and [id number]  - Spine  Referral; Future    Patient Instructions   Ibuprofen 600 mg every 6 hours as needed daily with food or milk which may be alternated with acetaminophen 500 to 1000 mg every 6 hours as needed. This would mean Ibuprofen, 4 hours later may take acetaminophen, then 4 hours after acetaminophen you may take Ibuprofen, and so on. If pain more managed, decrease doses.     Perform hot epsom salt soaks, light stretching, lidocaine patches as needed.       Return for Follow up with Spine Specialist.    At the end of the encounter, I discussed results, diagnosis, medications. Discussed red flags for immediate return to clinic/ER, as well as indications for follow up if no improvement. Patient understood and agreed to plan. Patient was stable for discharge.    Henrry Rodriguez is a 72 year old female who presents to clinic today for the following health issues:  Chief Complaint   Patient presents with    Urgent Care    Eye Problem     X3 days of swelling on left eye     Back Problem     Pain in back x2 weeks      HPI    Patient reports left eyelid swelling for 3 days. She notes eye pain. She denies cold symptoms. She has a history of allergies and takes singular and azelastine. Denies fever and chills.   Patient also reports back pain which started 2 weeks ago. She reports low midline back pain. She describes the pain as sharp, does not radiate. She notes back pain worsens with standing from a sitting position, walking. Sitting is fine. She has been taking ibuprofen and acetaminophen 2-3 times a day for pain which helps. Patient has a prior history of back surgery,  laminectomy per patient. She denies weakness, bowel and bladder symptoms, saddle anaesthesia.     Review of Systems   Eyes:  Positive for pain and redness.   Musculoskeletal:  Positive for back pain and gait problem.   Neurological:  Negative for weakness and numbness.   All other systems reviewed and are negative.      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, mild stage  2017-10: Chronic pain of right knee  2017-10: Osteoarthritis of carpometacarpal joint of right thumb,   unspecified osteoarthritis type  2017-10: Chondromalacia of patella, right  2017-10: Cystocele, midline  2017-10: Cystocele, midline  2017-10: Thumb pain, right  2017-10: Acute pain of right knee  2016-10: Benign essential hypertension  2016-10: Overweight (BMI 25.0-29.9)  2016-10: Hypercholesterolemia  2016-09: Amaurosis fugax  2016-09: Chronic angle-closure glaucoma  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 Osteoarthritis    Back injury     Micro discectomy, approx date 1983    Glaucoma     Hiatal hernia     HTN (hypertension)      "Hypercalcemia 11/30/2019    Insomnia 12/26/2012     Problem list name updated by automated process. Provider to review    Reduced vision 09/2016    L vision block, bilateral elevated eye presures,poss. [employer]    Uncomplicated asthma     Diagnosed as 2 yo       Social History     Tobacco Use    Smoking status: Never     Passive exposure: Past    Smokeless tobacco: Never   Substance Use Topics    Alcohol use: Yes     Alcohol/week: 0.0 standard drinks of alcohol     Comment: 0 -2 times a year           Objective    BP (!) 157/82   Pulse 65   Temp 97.7  F (36.5  C) (Temporal)   Ht 1.638 m (5' 4.5\")   Wt 74.8 kg (165 lb)   LMP  (LMP Unknown)   SpO2 97%   BMI 27.88 kg/m    Physical Exam  Constitutional:       Appearance: Normal appearance.   HENT:      Head: Normocephalic.      Mouth/Throat:      Pharynx: Uvula midline.   Eyes:      Conjunctiva/sclera:      Right eye: Right conjunctiva is not injected.      Left eye: Left conjunctiva is not injected.        Comments: Left lower eyelid erythema, swelling and edema   Cardiovascular:      Rate and Rhythm: Normal rate and regular rhythm.   Pulmonary:      Effort: Pulmonary effort is normal.      Breath sounds: Normal breath sounds.   Musculoskeletal:      Lumbar back: Tenderness present. Negative right straight leg raise test and negative left straight leg raise test.   Lymphadenopathy:      Head:      Right side of head: No submental, submandibular or tonsillar adenopathy.      Left side of head: No submental, submandibular or tonsillar adenopathy.      Cervical: No cervical adenopathy.      Right cervical: No superficial cervical adenopathy.     Left cervical: No superficial cervical adenopathy.   Skin:     General: Skin is warm and dry.      Findings: No rash.   Neurological:      Mental Status: She is alert.      Sensory: Sensation is intact.      Motor: Motor function is intact.      Gait: Gait is intact.      Deep Tendon Reflexes:      Reflex Scores:       " Patellar reflexes are 2+ on the right side and 2+ on the left side.       Achilles reflexes are 2+ on the right side and 2+ on the left side.  Psychiatric:         Mood and Affect: Mood normal.         Behavior: Behavior normal.              Jeanine Callahan PA-C

## 2023-11-17 NOTE — TELEPHONE ENCOUNTER
"Nurse Triage SBAR    Is this a 2nd Level Triage? NO    Situation: Pt calling with concerns for swollen eyelid.    Background: Pt states she had eye pain starting last night. Tonight, the swelling is worse.    Assessment: Pt is c/o her left lower eyelid being red, swollen, and tender. Denies visual changes and fever.    Protocol Recommended Disposition:   See PCP Within 24 Hours    Recommendation: Dispo to be seen within 24 hours. Pt was transferred to scheduling after she was given options.     Reason for Disposition   Eyelid is red and painful (or tender to touch)    Additional Information   Negative: Unresponsive, passed out or very weak   Negative: Difficulty breathing or wheezing   Negative: [1] Difficulty swallowing or slurred speech AND [2] sudden onset   Negative: Sounds like a life-threatening emergency to the triager   Negative: [1] SEVERE eyelid swelling (i.e., shut or almost) AND [2] fever   Negative: [1] Eyelid (outer) is very red AND [2] fever   Negative: Patient sounds very sick or weak to the triager   Negative: [1] Pregnant 20 or more weeks AND [2] sudden weight gain (e.g., more than 3 lbs or 1.4 kg in one week)   Negative: [1] Postpartum (from 0 to 6 weeks after delivery) AND [2] sudden weight gain (e.g., more than 3 lbs or 1.4 kg in one week)   Negative: [1] SEVERE eyelid swelling (i.e., shut or almost) AND [2] involves both eyes (Exception: Itchy eyes, which  are probably an allergic reaction.)   Negative: [1] SEVERE eyelid swelling AND [2] Taking an ACE Inhibitor medicine (e.g., benazepril / LOTENSIN, captopril / CAPOTEN, enalapril / VASOTEC, lisinopril / ZESTRIL)   Negative: [1] SEVERE eyelid swelling on one side AND [2] red and painful (or tender to touch)   Negative: [1] SEVERE eyelid swelling on one side AND [2] sinus pain or pressure   Negative: Fever   Negative: [1] Painful rash AND [2] multiple small blisters grouped together (i.e., dermatomal distribution or \"band\" or \"stripe\")   " Negative: [1] SEVERE eyelid swelling (i.e., shut or almost) AND [2] involves both eyes AND [3] itchy    Protocols used: Eye - Swelling-A-AH    Ingrid Thomas RN  [facility] Nurse Advisor   11/16/2023  9:37 PM

## 2023-11-17 NOTE — PATIENT INSTRUCTIONS
Ibuprofen 600 mg every 6 hours as needed daily with food or milk which may be alternated with acetaminophen 500 to 1000 mg every 6 hours as needed. This would mean Ibuprofen, 4 hours later may take acetaminophen, then 4 hours after acetaminophen you may take Ibuprofen, and so on. If pain more managed, decrease doses.     Perform hot epsom salt soaks, light stretching, lidocaine patches as needed.

## 2023-12-04 ENCOUNTER — TELEPHONE (OUTPATIENT)
Dept: PHYSICAL MEDICINE AND REHAB | Facility: CLINIC | Age: 73
End: 2023-12-04
Payer: COMMERCIAL

## 2023-12-05 ENCOUNTER — OFFICE VISIT (OUTPATIENT)
Dept: PHYSICAL MEDICINE AND REHAB | Facility: CLINIC | Age: 73
End: 2023-12-05
Attending: PHYSICIAN ASSISTANT
Payer: COMMERCIAL

## 2023-12-05 ENCOUNTER — MEDICAL CORRESPONDENCE (OUTPATIENT)
Dept: HEALTH INFORMATION MANAGEMENT | Facility: CLINIC | Age: 73
End: 2023-12-05

## 2023-12-05 VITALS
OXYGEN SATURATION: 96 % | WEIGHT: 164.9 LBS | SYSTOLIC BLOOD PRESSURE: 168 MMHG | BODY MASS INDEX: 28.15 KG/M2 | HEART RATE: 83 BPM | DIASTOLIC BLOOD PRESSURE: 73 MMHG | HEIGHT: 64 IN

## 2023-12-05 DIAGNOSIS — M25.551 HIP PAIN, BILATERAL: ICD-10-CM

## 2023-12-05 DIAGNOSIS — M54.42 ACUTE BILATERAL LOW BACK PAIN WITH BILATERAL SCIATICA: Primary | ICD-10-CM

## 2023-12-05 DIAGNOSIS — M25.552 HIP PAIN, BILATERAL: ICD-10-CM

## 2023-12-05 DIAGNOSIS — M54.41 ACUTE BILATERAL LOW BACK PAIN WITH BILATERAL SCIATICA: Primary | ICD-10-CM

## 2023-12-05 DIAGNOSIS — M54.16 LUMBAR RADICULOPATHY: ICD-10-CM

## 2023-12-05 DIAGNOSIS — R29.898 WEAKNESS OF LEFT LOWER EXTREMITY: ICD-10-CM

## 2023-12-05 PROCEDURE — 99205 OFFICE O/P NEW HI 60 MIN: CPT | Performed by: NURSE PRACTITIONER

## 2023-12-05 ASSESSMENT — PAIN SCALES - GENERAL: PAINLEVEL: NO PAIN (0)

## 2023-12-05 NOTE — LETTER
"12/5/2023       RE: Jennifer Jane  [address]  [facility] [address]     Dear Colleague,    Thank you for referring your patient, Jennifer Jane, to the [facility] [place] at [facility]. Please see a copy of my visit note below.    Patient seen at the request of Jeanine Callahan for an opinion and evaluation of acute right-sided low back pain without sciatica.      HISTORY OF PRESENT ILLNESS:  Jennifer Jane is a 72 year old female who presents with a chief complaint of Low back pain with sciatica.     She was seen in the family medicine clinic 11/17/2023 and recommended ibuprofen, lidocaine patches, and Tylenol referred to the spine clinic.    She was seen today in the clinic. She reports a longstanding history of chronic low back pain.  In 1983 or 1984 she had a Chymopapain injection for the pain.  Unfortunately she suffered recurrent pain after a fall and underwent a microdiscectomy in 1983 or 1984.  Since surgery, she reports off-and-on back pain as well as some residual weakness.  She notices the weakness with walking, occasionally feeling like her knees may buckle.  She states the weakness has remained unchanged over the years.    Today she describes acute on chronic low back pain.  She carried some heavy grocery bags and began noticing some subsequent pain and altered gait like she was taking \"half steps\".  She describes bilateral low back pain, radiating to the posterolateral hips.  There is intermittent pain in the posterolateral surfaces of both legs.  In particular today the left leg is more bothersome.    The back pain is the most bothersome for her as it is constant.  The paresthesias in her legs are intermittent and transient, lasting less than an hour.    She states that the pain has improved 50% since the initial onset.  Today she rates the pain 0-1/10.    The pain is aggravated with leaning back " "against a chair while sitting, walking, changing positions in general, walking up stairs, or carrying groceries.  She has been unable to do day-to-day activities like cleaning her house due to pain.  Her hips feel sore if she is laying on her side. The pain is relieved by laying down or sitting up straight while sitting and not leaning her back against the chair.    She endorses an episode recently when she tripped but cannot recall if she indeed fell.    She describes bowel or bladder issues which sound functional. She wears a pad for urinary incontinence. She endorses chronic constipation with intermittent diarrhea.  She states that she has had some \"smears\" of stool which she did not anticipate, but thought it may have been related to passing gas or possibly having diarrhea that particular day.        She denies saddle anesthesia. She denies weakness, unintentional weight loss, fevers/chills, or night sweats.       PRIOR INJURIES/TREATMENT:   Ice/Heat: ice    Brace: occasional elastic support brace    Physical Therapy:   PT for knee pain in 2022     - Current Pain Medications -   Tylenol 500 mg  ibuprofen  Prednisone, Rx 9/20/2023 - for asthma exacerbation      - Prior/Trialed Pain Medications -   None      Prior Procedures:  Date    Procedure   Improvement (%)  1980s  Chymopapain injection Some relief             Prior Related Surgery:    Microdiscetomy, 1983 or 1984    Other (acupuncture, OMT, CMM, TENS, DME, etc.):   Massage, remotely    Specialists Seen - (with most recent, available notes and clinic visits reviewed)   1. Orthopedics-right knee OA  2.  Neurology-memory changes  3.  Orthopedics-right thumb arthritis  4. Spine surgery in 1980s - Dr Spence at the [facility] ([facility])     IMAGING - reviewed   No available lumbar spine imaging    DEXA 10/10/2022  Osteopenia    CT ABDOMEN PELVIS W CONTRAST, 9/16/2022   FINDINGS:     Lower thorax: No effusion or focal consolidation.  " Normal heart size.   Small hiatal hernia. Calcified granulomas in the right lower lobe  associated with calcified right hilar lymph nodes.     Liver: No mass. No intrahepatic biliary ductal dilation.     Biliary System: Gallbladder surgically absent.     Pancreas: Mild atrophy of the pancreatic head. No mass or pancreatic  ductal dilation.     Adrenal glands: No mass or nodules     Spleen: Normal.     Kidneys: No suspicious mass, obstructing calculus or hydronephrosis.     Gastrointestinal tract :Normal appendix. Normal caliber small bowel.     Mesentery/peritoneum/retroperitoneum: No mass. No free fluid or air.     Lymph nodes: No significant lymphadenopathy.     Vasculature: Patent major abdominal vasculature.     Pelvis: Urinary bladder is normal.  Descent of the anorectal junction,  vaginal apex, and bladder neck to or below the pubococcygeal line.     Osseous structures: No aggressive or acute osseous lesion.  Multilevel  degenerative changes visualized spine. Grade 1 anterolisthesis of L4  on L5.      Soft tissues: Within normal limits.                                                                      IMPRESSION:   1. No acute or suspicious abnormality in the abdomen or pelvis to  explain patient's symptoms.  2. Evidence of pelvic floor laxity.    XR THORACIC SPINE 3 VW, 7/11/2018   Findings:   AP and lateral views of the thoracic spine were obtained. The upper  thoracic spine is not well-visualized in the lateral view. No  significant compression deformities appreciated. The visualized lung  fields are clear.                                                     Impression:   No thoracic compression deformities identified.      CT CERVICAL SPINE W/O CONTRAST 7/11/2018   Findings:  The cervical vertebrae are normally aligned. Mild straightening of the  normal cervical lordosis. No acute fracture or subluxation. No  prevertebral edema. Multilevel cervical spondylosis with loss of  intervertebral disc height at  C2-T1. Posterior osteophyte disc  complexes from C3 through C7 result in mild to moderate spinal canal  narrowing, most pronounced at C3-4. Prominent anterior bridging  osteophytes. Multilevel uncinate process hypertrophy and bilateral  facet joint arthropathy. Moderate to severe right neural foraminal  narrowing at C6-7 and moderate left neuroforaminal narrowing at C6-7.     No abnormality of the paraspinous soft tissues.                                              Impression:   1. No fracture or traumatic subluxation of the cervical spine.  2. Multilevel cervical spondylosis.        Review Of Systems:  I am responding to those symptoms which are directly relevant to the specific indication for my consultation. I recommend that the patient follow up with their primary or referring provider to pursue any other symptoms which may be of concern.       Medical History:  She  has a past medical history of Arthritis, Back injury, Glaucoma, Hiatal hernia, HTN (hypertension), Hypercalcemia (11/30/2019), Insomnia (12/26/2012), Reduced vision (09/2016), and Uncomplicated asthma.     She  has a past surgical history that includes Cholecystectomy (1990); Breast surgery (1984); back surgery (1984); tubal ligation (1989); STOMACH SURGERY PROCEDURE UNLISTED (03/1990); cataract extraction with intraocular lens (Left, 12/06/2018);  cataract extraction with intraocular lens implant (Right, 01/31/2019); IRIDOTOMY/IRIDECTOMY LASER SURGERY (Left, 09/27/2016); and IRIDOTOMY/IRIDECTOMY LASER SURGERY (Right, 10/18/2016).    Family History  Her family history includes Anesthesia Reaction in her mother; Asthma in her daughter, maternal grandmother, and mother; Cerebrovascular Disease in her paternal grandfather; Coronary Artery Disease in her father, maternal grandfather, and mother; Diabetes in her father, mother, and sister; Genetic Disease in her daughter; Heart Disease in her father and mother; Hip fracture in her sister;  "Hyperlipidemia in her father; Hypertension in her father, mother, and sister; Low Back Problems in her brother, father, mother, and sister; Thyroid Disease in her mother and sister.     Social History:  Work: Retired, formerly worked as a school nurse & at [employer]  Current living situation: lives with .   She  reports that she has never smoked. She has been exposed to tobacco smoke. She has never used smokeless tobacco. She reports current alcohol use. She reports that she does not use drugs.        Current Medications:   She has a current medication list which includes the following prescription(s): albuterol, alendronate, amlodipine, atorvastatin, azelastine, calcium carbonate antacid, fluticasone-salmeterol, ibuprofen, latanoprost, losartan, montelukast, mupirocin, nitroglycerin, and prednisone.     Allergies:    -- Canola Oil [Vegetable Oil] -- Anaphylaxis and GI Disturbance   -- Animal Dander -- Unknown   -- Dust Mites -- Unknown   -- Grass    -- Hydroxyzine -- Other (See Comments)    --  Passed out   -- Hydroxyzine Hcl    -- Pollen Extract -- Unknown   -- Trees    -- Chlorhexidine -- Itching and Rash    --  Hibiclens    PHYSICAL EXAMINATION:  BP (!) 165/96 (BP Location: Left arm, Patient Position: Chair, Cuff Size: Adult Regular)   Pulse 83   Ht 1.638 m (5' 4.49\")   Wt 74.8 kg (164 lb 14.5 oz)   LMP  (LMP Unknown)   SpO2 96%   BMI 27.88 kg/m     --CONSTITUTIONAL: Vital signs as above. No acute distress. The patient is well nourished and well groomed.  --PSYCHIATRIC: The patient is awake, alert, oriented to person, place, time and answering questions appropriately with clear speech. Appropriate mood and affect   --SKIN:  Posterior torso is clean, dry, intact without rashes.   --RESPIRATORY: Normal rhythm and effort. No abnormal accessory muscle breathing patterns noted.   --GROSS MOTOR: Easily arises from a seated position. Toe walking and heel walking are normal.    --STANDING EXAMINATION: Gait is " non-antalgic. Normal lumbar lordosis noted, no lateral shift.  --LOWER EXTREMITY MOTOR TESTING:  Plantar flexion left 5/5, right 5/5   Dorsiflexion left 5/5, right 5/5   Great toe MTP extension left 3/5, right 5/5  Knee flexion left 5/5, right 5/5  Knee extension left 5/5, right 5/5   Hip flexion left 5/5, right 5/5  --MUSCULOSKELETAL: Lumbar spine inspection reveals no evidence of deformity. Range of motion is not limited in lumbar flexion.  Range of motion is limited with lumbar extension.    No tenderness to palpation lumbar spine.   Straight leg raise positive on the left side and negative on the right side.   Sciatic notch non-tender.   Facet loading maneuvers are positive bilaterally.  --SACROILIAC JOINT: No pain with palpation the SI joint. Efrain's neg bilat. Gaenslen's negative bilaterally.  Thigh thrust test negative bilaterally.  Sacroiliac Joint Compression Test negative bilaterally. .  --HIPS: slightly limited range of motion bilaterally. Negative FABERs on the involved lower extremity. No pain with logrolling.  There is pain with palpation of both greater trochanters.   --NEUROLOGIC: 2/4 patellar and achilles reflexes bilaterally.  Sensation to light touch is intact in the bilateral L4, L5, and S1 dermatomes. No clonus.    --VASCULAR:  Warm lower limbs bilaterally. There is no pitting edema of the bilateral lower extremities.       ASSESSMENT:  Jennifer Jane is a pleasant 72 year old female who presents with atraumatic acute on chronic low back pain which began in November.  There is bilateral low back pain, radiating to both posterolateral hips L>R in a L5 or S1 distribution.  There is MTP weakness on the left side and she reports subjective gait changes.    Suspect disc bulge/herniation which may be the cause of the symptoms she is describing.  Differential also includes axial/mechanical low back pain related to the lumbar facet joints, muscle strain, hip related pain, compression fracture, bilateral  hip trochanteric bursitis.    Complicating comorbidities include:  # Osteopenia      PLAN:  -Add an x-ray lumbar spine which can be done after today's visit, given the fall she describes and history of osteopenia  -Given L LE weakness on exam, subjective gait changes, and some ambiguity regarding the bowel and bladder symptoms, will add MRI of lumbar spine  -She feels the pain is adequately managed with current over-the-counter analgesics.  No medication changes made today.  -An order for acute spine PT to establish home exercise program.  -If the MRI does not show any etiology for the bowel and bladder symptoms, will suggest pelvic floor PT.  Of note, there was evidence of pelvic laxity on the CT A/P 9/16/2022.  -We reviewed red flag symptoms which would necessitate being seen in the emergency department right away and she expressed understanding.  -Patient was hypertensive on initial clinic intake and also after the visit.  She denies any significant pain, shortness of breath, chest pain, vision changes, or headache.  She reports taking her blood pressure medications last night.  She has a home blood pressure cuff and says she will plan to take the blood pressure at home and send a message to her PCP.  Will send a message to her primary care doctor to update them in that regard as well.  -RTC for review of the MRI    Ready to learn, no apparent learning barriers.  Education provided on treatment plan according to patient's preferred learning style.  Patient verbalizes understanding.       67 minutes spent by me on the date of the encounter doing chart review, history and exam, documentation and further activities per the note         Again, thank you for allowing me to participate in the care of your patient.      Sincerely,    GEMINI Chappell CNP

## 2023-12-05 NOTE — NURSING NOTE
"Chief Complaint   Patient presents with    Consult     Low back pain     BP (!) 165/96 (BP Location: Left arm, Patient Position: Chair, Cuff Size: Adult Regular)   Pulse 83   Ht 1.638 m (5' 4.49\")   Wt 74.8 kg (164 lb 14.5 oz)   LMP  (LMP Unknown)   SpO2 96%   BMI 27.88 kg/m      Dickson Johnston EMT  "

## 2023-12-05 NOTE — PROGRESS NOTES
"Patient seen at the request of Jeanine Callahan for an opinion and evaluation of acute right-sided low back pain without sciatica.      HISTORY OF PRESENT ILLNESS:  Jennifer Jane is a 72 year old female who presents with a chief complaint of Low back pain with sciatica.     She was seen in the family medicine clinic 11/17/2023 and recommended ibuprofen, lidocaine patches, and Tylenol referred to the spine clinic.    She was seen today in the clinic. She reports a longstanding history of chronic low back pain.  In 1983 or 1984 she had a Chymopapain injection for the pain.  Unfortunately she suffered recurrent pain after a fall and underwent a microdiscectomy in 1983 or 1984.  Since surgery, she reports off-and-on back pain as well as some residual weakness.  She notices the weakness with walking, occasionally feeling like her knees may buckle.  She states the weakness has remained unchanged over the years.    Today she describes acute on chronic low back pain.  She carried some heavy grocery bags and began noticing some subsequent pain and altered gait like she was taking \"half steps\".  She describes bilateral low back pain, radiating to the posterolateral hips.  There is intermittent pain in the posterolateral surfaces of both legs.  In particular today the left leg is more bothersome.    The back pain is the most bothersome for her as it is constant.  The paresthesias in her legs are intermittent and transient, lasting less than an hour.    She states that the pain has improved 50% since the initial onset.  Today she rates the pain 0-1/10.    The pain is aggravated with leaning back against a chair while sitting, walking, changing positions in general, walking up stairs, or carrying groceries.  She has been unable to do day-to-day activities like cleaning her house due to pain.  Her hips feel sore if she is laying on her side. The pain is relieved by laying down or sitting up straight while sitting and not leaning her " "back against the chair.    She endorses an episode recently when she tripped but cannot recall if she indeed fell.    She describes bowel or bladder issues which sound functional. She wears a pad for urinary incontinence. She endorses chronic constipation with intermittent diarrhea.  She states that she has had some \"smears\" of stool which she did not anticipate, but thought it may have been related to passing gas or possibly having diarrhea that particular day.        She denies saddle anesthesia. She denies weakness, unintentional weight loss, fevers/chills, or night sweats.       PRIOR INJURIES/TREATMENT:   Ice/Heat: ice    Brace: occasional elastic support brace    Physical Therapy:   PT for knee pain in 2022     - Current Pain Medications -   Tylenol 500 mg  ibuprofen  Prednisone, Rx 9/20/2023 - for asthma exacerbation      - Prior/Trialed Pain Medications -   None      Prior Procedures:  Date    Procedure   Improvement (%)  1980s  Chymopapain injection Some relief             Prior Related Surgery:    Microdiscetomy, 1983 or 1984    Other (acupuncture, OMT, CMM, TENS, DME, etc.):   Massage, remotely    Specialists Seen - (with most recent, available notes and clinic visits reviewed)   1. Orthopedics-right knee OA  2.  Neurology-memory changes  3.  Orthopedics-right thumb arthritis  4. Spine surgery in 1980s - Dr Spence at the [facility] (now [facility])     IMAGING - reviewed   No available lumbar spine imaging    DEXA 10/10/2022  Osteopenia    CT ABDOMEN PELVIS W CONTRAST, 9/16/2022   FINDINGS:     Lower thorax: No effusion or focal consolidation.  Normal heart size.   Small hiatal hernia. Calcified granulomas in the right lower lobe  associated with calcified right hilar lymph nodes.     Liver: No mass. No intrahepatic biliary ductal dilation.     Biliary System: Gallbladder surgically absent.     Pancreas: Mild atrophy of the pancreatic head. No mass or pancreatic  ductal dilation.   "   Adrenal glands: No mass or nodules     Spleen: Normal.     Kidneys: No suspicious mass, obstructing calculus or hydronephrosis.     Gastrointestinal tract :Normal appendix. Normal caliber small bowel.     Mesentery/peritoneum/retroperitoneum: No mass. No free fluid or air.     Lymph nodes: No significant lymphadenopathy.     Vasculature: Patent major abdominal vasculature.     Pelvis: Urinary bladder is normal.  Descent of the anorectal junction,  vaginal apex, and bladder neck to or below the pubococcygeal line.     Osseous structures: No aggressive or acute osseous lesion.  Multilevel  degenerative changes visualized spine. Grade 1 anterolisthesis of L4  on L5.      Soft tissues: Within normal limits.                                                                      IMPRESSION:   1. No acute or suspicious abnormality in the abdomen or pelvis to  explain patient's symptoms.  2. Evidence of pelvic floor laxity.    XR THORACIC SPINE 3 VW, 7/11/2018   Findings:   AP and lateral views of the thoracic spine were obtained. The upper  thoracic spine is not well-visualized in the lateral view. No  significant compression deformities appreciated. The visualized lung  fields are clear.                                                     Impression:   No thoracic compression deformities identified.      CT CERVICAL SPINE W/O CONTRAST 7/11/2018   Findings:  The cervical vertebrae are normally aligned. Mild straightening of the  normal cervical lordosis. No acute fracture or subluxation. No  prevertebral edema. Multilevel cervical spondylosis with loss of  intervertebral disc height at C2-T1. Posterior osteophyte disc  complexes from C3 through C7 result in mild to moderate spinal canal  narrowing, most pronounced at C3-4. Prominent anterior bridging  osteophytes. Multilevel uncinate process hypertrophy and bilateral  facet joint arthropathy. Moderate to severe right neural foraminal  narrowing at C6-7 and moderate left  neuroforaminal narrowing at C6-7.     No abnormality of the paraspinous soft tissues.                                              Impression:   1. No fracture or traumatic subluxation of the cervical spine.  2. Multilevel cervical spondylosis.        Review Of Systems:  I am responding to those symptoms which are directly relevant to the specific indication for my consultation. I recommend that the patient follow up with their primary or referring provider to pursue any other symptoms which may be of concern.       Medical History:  She  has a past medical history of Arthritis, Back injury, Glaucoma, Hiatal hernia, HTN (hypertension), Hypercalcemia (11/30/2019), Insomnia (12/26/2012), Reduced vision (09/2016), and Uncomplicated asthma.     She  has a past surgical history that includes Cholecystectomy (1990); Breast surgery (1984); back surgery (1984); tubal ligation (1989); STOMACH SURGERY PROCEDURE UNLISTED (03/1990); cataract extraction with intraocular lens (Left, 12/06/2018);  cataract extraction with intraocular lens implant (Right, 01/31/2019); IRIDOTOMY/IRIDECTOMY LASER SURGERY (Left, 09/27/2016); and IRIDOTOMY/IRIDECTOMY LASER SURGERY (Right, 10/18/2016).    Family History  Her family history includes Anesthesia Reaction in her mother; Asthma in her daughter, maternal grandmother, and mother; Cerebrovascular Disease in her paternal grandfather; Coronary Artery Disease in her father, maternal grandfather, and mother; Diabetes in her father, mother, and sister; Genetic Disease in her daughter; Heart Disease in her father and mother; Hip fracture in her sister; Hyperlipidemia in her father; Hypertension in her father, mother, and sister; Low Back Problems in her brother, father, mother, and sister; Thyroid Disease in her mother and sister.     Social History:  Work: Retired, formerly worked as a school nurse & at [employer]  Current living situation: lives with .   She  reports that she has never smoked.  "She has been exposed to tobacco smoke. She has never used smokeless tobacco. She reports current alcohol use. She reports that she does not use drugs.        Current Medications:   She has a current medication list which includes the following prescription(s): albuterol, alendronate, amlodipine, atorvastatin, azelastine, calcium carbonate antacid, fluticasone-salmeterol, ibuprofen, latanoprost, losartan, montelukast, mupirocin, nitroglycerin, and prednisone.     Allergies:    -- Canola Oil [Vegetable Oil] -- Anaphylaxis and GI Disturbance   -- Animal Dander -- Unknown   -- Dust Mites -- Unknown   -- Grass    -- Hydroxyzine -- Other (See Comments)    --  Passed out   -- Hydroxyzine Hcl    -- Pollen Extract -- Unknown   -- Trees    -- Chlorhexidine -- Itching and Rash    --  Hibiclens    PHYSICAL EXAMINATION:  BP (!) 165/96 (BP Location: Left arm, Patient Position: Chair, Cuff Size: Adult Regular)   Pulse 83   Ht 1.638 m (5' 4.49\")   Wt 74.8 kg (164 lb 14.5 oz)   LMP  (LMP Unknown)   SpO2 96%   BMI 27.88 kg/m     --CONSTITUTIONAL: Vital signs as above. No acute distress. The patient is well nourished and well groomed.  --PSYCHIATRIC: The patient is awake, alert, oriented to person, place, time and answering questions appropriately with clear speech. Appropriate mood and affect   --SKIN:  Posterior torso is clean, dry, intact without rashes.   --RESPIRATORY: Normal rhythm and effort. No abnormal accessory muscle breathing patterns noted.   --GROSS MOTOR: Easily arises from a seated position. Toe walking and heel walking are normal.    --STANDING EXAMINATION: Gait is non-antalgic. Normal lumbar lordosis noted, no lateral shift.  --LOWER EXTREMITY MOTOR TESTING:  Plantar flexion left 5/5, right 5/5   Dorsiflexion left 5/5, right 5/5   Great toe MTP extension left 3/5, right 5/5  Knee flexion left 5/5, right 5/5  Knee extension left 5/5, right 5/5   Hip flexion left 5/5, right 5/5  --MUSCULOSKELETAL: Lumbar spine " inspection reveals no evidence of deformity. Range of motion is not limited in lumbar flexion.  Range of motion is limited with lumbar extension.    No tenderness to palpation lumbar spine.   Straight leg raise positive on the left side and negative on the right side.   Sciatic notch non-tender.   Facet loading maneuvers are positive bilaterally.  --SACROILIAC JOINT: No pain with palpation the SI joint. Efrain's neg bilat. Gaenslen's negative bilaterally.  Thigh thrust test negative bilaterally.  Sacroiliac Joint Compression Test negative bilaterally. .  --HIPS: slightly limited range of motion bilaterally. Negative FABERs on the involved lower extremity. No pain with logrolling.  There is pain with palpation of both greater trochanters.   --NEUROLOGIC: 2/4 patellar and achilles reflexes bilaterally.  Sensation to light touch is intact in the bilateral L4, L5, and S1 dermatomes. No clonus.    --VASCULAR:  Warm lower limbs bilaterally. There is no pitting edema of the bilateral lower extremities.       ASSESSMENT:  Jennifer Jane is a pleasant 72 year old female who presents with atraumatic acute on chronic low back pain which began in November.  There is bilateral low back pain, radiating to both posterolateral hips L>R in a L5 or S1 distribution.  There is MTP weakness on the left side and she reports subjective gait changes.    Suspect disc bulge/herniation which may be the cause of the symptoms she is describing.  Differential also includes axial/mechanical low back pain related to the lumbar facet joints, muscle strain, hip related pain, compression fracture, bilateral hip trochanteric bursitis.    Complicating comorbidities include:  # Osteopenia      PLAN:  -Add an x-ray lumbar spine which can be done after today's visit, given the fall she describes and history of osteopenia  -Given L LE weakness on exam, subjective gait changes, and some ambiguity regarding the bowel and bladder symptoms, will add MRI of  lumbar spine  -She feels the pain is adequately managed with current over-the-counter analgesics.  No medication changes made today.  -An order for acute spine PT to establish home exercise program.  -If the MRI does not show any etiology for the bowel and bladder symptoms, will suggest pelvic floor PT.  Of note, there was evidence of pelvic laxity on the CT A/P 9/16/2022.  -We reviewed red flag symptoms which would necessitate being seen in the emergency department right away and she expressed understanding.  -Patient was hypertensive on initial clinic intake and also after the visit.  She denies any significant pain, shortness of breath, chest pain, vision changes, or headache.  She reports taking her blood pressure medications last night.  She has a home blood pressure cuff and says she will plan to take the blood pressure at home and send a message to her PCP.  Will send a message to her primary care doctor to update them in that regard as well.  -RTC for review of the MRI    Ready to learn, no apparent learning barriers.  Education provided on treatment plan according to patient's preferred learning style.  Patient verbalizes understanding.   __________________________________  Megan Menon NP  Physical Medicine & Rehabilitation        67 minutes spent by me on the date of the encounter doing chart review, history and exam, documentation and further activities per the note

## 2023-12-07 ENCOUNTER — TELEPHONE (OUTPATIENT)
Dept: FAMILY MEDICINE | Facility: CLINIC | Age: 73
End: 2023-12-07
Payer: COMMERCIAL

## 2023-12-08 ENCOUNTER — HOSPITAL ENCOUNTER (OUTPATIENT)
Dept: GENERAL RADIOLOGY | Facility: CLINIC | Age: 73
Discharge: HOME | End: 2023-12-08
Attending: NURSE PRACTITIONER
Payer: COMMERCIAL

## 2023-12-08 ENCOUNTER — HOSPITAL ENCOUNTER (OUTPATIENT)
Dept: MRI IMAGING | Facility: CLINIC | Age: 73
Discharge: HOME | End: 2023-12-08
Attending: NURSE PRACTITIONER
Payer: COMMERCIAL

## 2023-12-08 DIAGNOSIS — M54.41 ACUTE BILATERAL LOW BACK PAIN WITH BILATERAL SCIATICA: ICD-10-CM

## 2023-12-08 DIAGNOSIS — R29.898 WEAKNESS OF LEFT LOWER EXTREMITY: ICD-10-CM

## 2023-12-08 DIAGNOSIS — M54.42 ACUTE BILATERAL LOW BACK PAIN WITH BILATERAL SCIATICA: ICD-10-CM

## 2023-12-08 DIAGNOSIS — M54.16 LUMBAR RADICULOPATHY: ICD-10-CM

## 2023-12-08 PROCEDURE — 72148 MRI LUMBAR SPINE W/O DYE: CPT | Mod: 26 | Performed by: RADIOLOGY

## 2023-12-08 PROCEDURE — 72100 X-RAY EXAM L-S SPINE 2/3 VWS: CPT

## 2023-12-08 PROCEDURE — 72100 X-RAY EXAM L-S SPINE 2/3 VWS: CPT | Mod: 26 | Performed by: STUDENT IN AN ORGANIZED HEALTH CARE EDUCATION/TRAINING PROGRAM

## 2023-12-08 PROCEDURE — 72148 MRI LUMBAR SPINE W/O DYE: CPT

## 2023-12-08 NOTE — TELEPHONE ENCOUNTER
Could we reach out to patient and offer a nurse only blood pressure check on a day I'm in clinic so we can adjust medications if needed?    Thanks!  Dr. Sun Person MD / [facility]

## 2023-12-08 NOTE — TELEPHONE ENCOUNTER
----- Message from GEMINI Chappell CNP sent at 12/5/2023  8:18 PM CST -----  Hi Dr. Person,    My name is Megan Menon and I'm a nurse practitioner in the Physical Medicine & Rehabilitation medical spine clinic at the [facility] [place]     I wanted to let you know that I saw mutual patient, Jennifer Jane in the clinic today regarding back pain.  She was hypertensive on the initial intake and then also after the visit.  She was asymptomatic.  She reports having taken her blood pressure medications the night before.  She says that she owns a home blood pressure cuff and was planning to monitor her BP at home.  She said that she would reach out to you as well.  I just wanted to let you know she may be contacting you in that regard.    Thank you,  Megan Menon NP  Physical Medicine and Rehabilitation, Medical Spine

## 2023-12-11 ENCOUNTER — OFFICE VISIT (OUTPATIENT)
Dept: PHYSICAL MEDICINE AND REHAB | Facility: CLINIC | Age: 73
End: 2023-12-11
Payer: COMMERCIAL

## 2023-12-11 ENCOUNTER — MEDICAL CORRESPONDENCE (OUTPATIENT)
Dept: HEALTH INFORMATION MANAGEMENT | Facility: CLINIC | Age: 73
End: 2023-12-11

## 2023-12-11 ENCOUNTER — TELEPHONE (OUTPATIENT)
Dept: FAMILY MEDICINE | Facility: CLINIC | Age: 73
End: 2023-12-11

## 2023-12-11 ENCOUNTER — MYC MEDICAL ADVICE (OUTPATIENT)
Dept: FAMILY MEDICINE | Facility: CLINIC | Age: 73
End: 2023-12-11

## 2023-12-11 VITALS
SYSTOLIC BLOOD PRESSURE: 138 MMHG | WEIGHT: 167.7 LBS | HEART RATE: 72 BPM | OXYGEN SATURATION: 97 % | HEIGHT: 66 IN | BODY MASS INDEX: 26.95 KG/M2 | DIASTOLIC BLOOD PRESSURE: 82 MMHG

## 2023-12-11 DIAGNOSIS — M54.41 ACUTE BILATERAL LOW BACK PAIN WITH BILATERAL SCIATICA: Primary | ICD-10-CM

## 2023-12-11 DIAGNOSIS — M54.42 ACUTE BILATERAL LOW BACK PAIN WITH BILATERAL SCIATICA: Primary | ICD-10-CM

## 2023-12-11 DIAGNOSIS — R29.898 WEAKNESS OF LEFT LOWER EXTREMITY: ICD-10-CM

## 2023-12-11 DIAGNOSIS — M54.16 LUMBAR RADICULOPATHY: ICD-10-CM

## 2023-12-11 DIAGNOSIS — M47.816 FACET ARTHROPATHY, LUMBAR: ICD-10-CM

## 2023-12-11 DIAGNOSIS — M48.061 SPINAL STENOSIS OF LUMBAR REGION WITHOUT NEUROGENIC CLAUDICATION: ICD-10-CM

## 2023-12-11 DIAGNOSIS — M48.061 NEUROFORAMINAL STENOSIS OF LUMBAR SPINE: ICD-10-CM

## 2023-12-11 PROCEDURE — 99214 OFFICE O/P EST MOD 30 MIN: CPT | Performed by: NURSE PRACTITIONER

## 2023-12-11 ASSESSMENT — PAIN SCALES - GENERAL: PAINLEVEL: NO PAIN (0)

## 2023-12-11 NOTE — PROGRESS NOTES
"PM&R Follow-Up Visit -     Date of Initial Visit: 12/5/23  LOV: 12/5/23  TD: 12/11/2023     Recall: Jennifer Jane is a 72 year old female who presents with a chief complaint of Low back pain with sciatica.      INTERVAL HISTORY:  Patient was last seen in clinic 12/5/23. At that visit, the plan was to obtain an x-ray of the lumbar spine, MRI of the lumbar spine, and begin physical therapy with a home exercise program.    She was seen today in the clinic for follow-up after the MRI. She reports bilat LBP (L>R) radiating to the posterolateral legs.  At today's visit she denies any bowel or bladder incontinence issue -she reports having normal sensation that she needs to urinate or defecate. She did not yet start PT.        RECALL HISTORY OF PRESENT ILLNESS:  Jennifer Jane is a 72 year old female who presents with a chief complaint of Low back pain with sciatica.     She was seen in the family medicine clinic 11/17/2023 and recommended ibuprofen, lidocaine patches, and Tylenol referred to the spine clinic.    She was seen today in the clinic. She reports a longstanding history of chronic low back pain.  In 1983 or 1984 she had a Chymopapain injection for the pain.  Unfortunately she suffered recurrent pain after a fall and underwent a microdiscectomy in 1983 or 1984.  Since surgery, she reports off-and-on back pain as well as some residual weakness.  She notices the weakness with walking, occasionally feeling like her knees may buckle.  She states the weakness has remained unchanged over the years.    Today she describes acute on chronic low back pain.  She carried some heavy grocery bags and began noticing some subsequent pain and altered gait like she was taking \"half steps\".  She describes bilateral low back pain, radiating to the posterolateral hips.  There is intermittent pain in the posterolateral surfaces of both legs.  In particular today the left leg is more bothersome.    The back pain is the most bothersome " "for her as it is constant.  The paresthesias in her legs are intermittent and transient, lasting less than an hour.    She states that the pain has improved 50% since the initial onset.  Today she rates the pain 0-1/10.    The pain is aggravated with leaning back against a chair while sitting, walking, changing positions in general, walking up stairs, or carrying groceries.  She has been unable to do day-to-day activities like cleaning her house due to pain.  Her hips feel sore if she is laying on her side. The pain is relieved by laying down or sitting up straight while sitting and not leaning her back against the chair.    She endorses an episode recently when she tripped but cannot recall if she indeed fell.    She describes bowel or bladder issues which sound functional. She wears a pad for urinary incontinence. She endorses chronic constipation with intermittent diarrhea.  She states that she has had some \"smears\" of stool which she did not anticipate, but thought it may have been related to passing gas or possibly having diarrhea that particular day.      She denies saddle anesthesia. She denies weakness, unintentional weight loss, fevers/chills, or night sweats.       PRIOR INJURIES/TREATMENT:   Ice/Heat: ice    Brace: occasional elastic support brace    Physical Therapy:   PT for knee pain in 2022     - Current Pain Medications -   Tylenol 500 mg  ibuprofen  Prednisone, Rx 9/20/2023 - for asthma exacerbation      - Prior/Trialed Pain Medications -   None      Prior Procedures:  Date    Procedure   Improvement (%)  1980s  Chymopapain injection Some relief             Prior Related Surgery:    Microdiscetomy, 1983 or 1984    Other (acupuncture, OMT, CMM, TENS, DME, etc.):   Massage, remotely    Specialists Seen - (with most recent, available notes and clinic visits reviewed)   1. Orthopedics-right knee OA  2.  Neurology-memory changes  3.  Orthopedics-right thumb arthritis  4. Spine surgery in 1980s - " [facility] at the [facility] ([facility])     IMAGING - reviewed   MR LUMBAR SPINE W/O CONTRAST  12/8/2023   FINDINGS:  There are 5 lumbar type vertebrae, used for the purposes of this  dictation. Conus tip at approximately T12-L1. Grade 1 anterolisthesis  of L4 over L5. Marked loss of disc height at L5-S1 is moderate to  severe. Degenerative endplate changes at L5-S1. No loss of vertebral  body height. No abnormal diffusion restriction.     On a level by level basis, the findings are as follows:     T12-L1: No spinal canal or neural foraminal stenosis.     L1-2: Mild disc bulge. Ligamentum flavum hypertrophy. No spinal canal  or neural foraminal stenosis.     L2-3: Diffuse disc bulge. Ligamentum flavum hypertrophy. Facet  arthropathy. No spinal canal stenosis. No significant neural foraminal  stenosis.      L3-4: Large disc bulge with superimposed right central disc extrusion  extending cranially along the posterior aspect of the L3 vertebral  body. Marked facet arthropathy and thickening of ligamentum flavum.  There is moderate to severe spinal canal stenosis. There is mild  bilateral neural foraminal stenosis.     L4-5: Grade 1 anterolisthesis. Large disc bulge, asymmetric to the  right. Marked facet arthropathy and ligamentum flavum thickening.  Severe spinal canal stenosis. Mild to moderate bilateral neural  foraminal stenosis.     L5-S1: Circumferential disc osteophyte complex with facet arthropathy  but no spinal canal stenosis. There is a left hemilaminectomy. The  left neural foramen is mildly to moderately narrowed in the right  neural foramen is mildly narrowed.     The visualized paraspinous soft tissues are within normal limits.                                                      IMPRESSION:  Multilevel lumbar spondylosis greatest at L4-5 with severe spinal  canal stenosis at L3-4 with moderate to severe spinal canal stenosis.  Multilevel neural foraminal narrowings as detailed  above.      XR LUMBAR SPINE 2/3 VIEWS  12/8/2023   FINDINGS:   Standing frontal and lateral views of the lumbar spine with L5-S1 spot  view.     5 lumbar type vertebral bodies are assumed for the purpose of this  dictation.     No acute osseous abnormality. The vertebral body heights are  maintained. Intervertebral disc space narrowing at L3-4, L4-5, and  L5-S1. Trace anterolisthesis of L3 on L4 and L4 on L5 and trace  retrolisthesis of L5 on S1. Degenerative changes with osteophytic  spurring and lower level predominant facet arthropathy. Likely neural  foraminal narrowing at L4-5 and L5-S1.      The visualized bowel gas pattern is nonobstructive. Vascular  calcifications.                                                                      IMPRESSION:   1. No acute osseous abnormality.  2. Moderate degenerative changes of the lumbar spine are greatest at  L5-S1.      DEXA 10/10/2022  Osteopenia    CT ABDOMEN PELVIS W CONTRAST, 9/16/2022   FINDINGS:     Lower thorax: No effusion or focal consolidation.  Normal heart size.   Small hiatal hernia. Calcified granulomas in the right lower lobe  associated with calcified right hilar lymph nodes.     Liver: No mass. No intrahepatic biliary ductal dilation.     Biliary System: Gallbladder surgically absent.     Pancreas: Mild atrophy of the pancreatic head. No mass or pancreatic  ductal dilation.     Adrenal glands: No mass or nodules     Spleen: Normal.     Kidneys: No suspicious mass, obstructing calculus or hydronephrosis.     Gastrointestinal tract :Normal appendix. Normal caliber small bowel.     Mesentery/peritoneum/retroperitoneum: No mass. No free fluid or air.     Lymph nodes: No significant lymphadenopathy.     Vasculature: Patent major abdominal vasculature.     Pelvis: Urinary bladder is normal.  Descent of the anorectal junction,  vaginal apex, and bladder neck to or below the pubococcygeal line.     Osseous structures: No aggressive or acute osseous lesion.   Multilevel  degenerative changes visualized spine. Grade 1 anterolisthesis of L4  on L5.      Soft tissues: Within normal limits.                                                                      IMPRESSION:   1. No acute or suspicious abnormality in the abdomen or pelvis to  explain patient's symptoms.  2. Evidence of pelvic floor laxity.    XR THORACIC SPINE 3 VW, 7/11/2018   Findings:   AP and lateral views of the thoracic spine were obtained. The upper  thoracic spine is not well-visualized in the lateral view. No  significant compression deformities appreciated. The visualized lung  fields are clear.                                                     Impression:   No thoracic compression deformities identified.      CT CERVICAL SPINE W/O CONTRAST 7/11/2018   Findings:  The cervical vertebrae are normally aligned. Mild straightening of the  normal cervical lordosis. No acute fracture or subluxation. No  prevertebral edema. Multilevel cervical spondylosis with loss of  intervertebral disc height at C2-T1. Posterior osteophyte disc  complexes from C3 through C7 result in mild to moderate spinal canal  narrowing, most pronounced at C3-4. Prominent anterior bridging  osteophytes. Multilevel uncinate process hypertrophy and bilateral  facet joint arthropathy. Moderate to severe right neural foraminal  narrowing at C6-7 and moderate left neuroforaminal narrowing at C6-7.     No abnormality of the paraspinous soft tissues.                                              Impression:   1. No fracture or traumatic subluxation of the cervical spine.  2. Multilevel cervical spondylosis.        Review Of Systems:  I am responding to those symptoms which are directly relevant to the specific indication for my consultation. I recommend that the patient follow up with their primary or referring provider to pursue any other symptoms which may be of concern.       Medical History:  She has a past medical history of Arthritis,  Back injury, Glaucoma, Hiatal hernia, HTN (hypertension), Hypercalcemia (11/30/2019), Insomnia (12/26/2012), Reduced vision (09/2016), and Uncomplicated asthma.     She  has a past surgical history that includes Cholecystectomy (1990); Breast surgery (1984); back surgery (1984); tubal ligation (1989); STOMACH SURGERY PROCEDURE UNLISTED (03/1990); cataract extraction with intraocular lens (Left, 12/06/2018);  cataract extraction with intraocular lens implant (Right, 01/31/2019); IRIDOTOMY/IRIDECTOMY LASER SURGERY (Left, 09/27/2016); and IRIDOTOMY/IRIDECTOMY LASER SURGERY (Right, 10/18/2016).    Family History  Her family history includes Anesthesia Reaction in her mother; Asthma in her daughter, maternal grandmother, and mother; Cerebrovascular Disease in her paternal grandfather; Coronary Artery Disease in her father, maternal grandfather, and mother; Diabetes in her father, mother, and sister; Genetic Disease in her daughter; Heart Disease in her father and mother; Hip fracture in her sister; Hyperlipidemia in her father; Hypertension in her father, mother, and sister; Low Back Problems in her brother, father, mother, and sister; Thyroid Disease in her mother and sister.     Social History:  Work: Retired, formerly worked as a school nurse & at [employer]  Current living situation: lives with .   She  reports that she has never smoked. She has been exposed to tobacco smoke. She has never used smokeless tobacco. She reports current alcohol use. She reports that she does not use drugs.        Current Medications:   She has a current medication list which includes the following prescription(s): albuterol, alendronate, amlodipine, atorvastatin, azelastine, calcium carbonate antacid, fluticasone-salmeterol, ibuprofen, latanoprost, losartan, montelukast, mupirocin, nitroglycerin, and prednisone.     Allergies:    -- Canola Oil [Vegetable Oil] -- Anaphylaxis and GI Disturbance   -- Animal Dander -- Unknown   -- Dust  "Mites -- Unknown   -- Grass    -- Hydroxyzine -- Other (See Comments)    --  Passed out   -- Hydroxyzine Hcl    -- Pollen Extract -- Unknown   -- Trees    -- Chlorhexidine -- Itching and Rash    --  Hibiclens    PHYSICAL EXAMINATION:  /82 (BP Location: Left arm, Patient Position: Sitting, Cuff Size: Adult Regular)   Pulse 72   Ht 1.667 m (5' 5.63\")   Wt 76.1 kg (167 lb 11.2 oz)   LMP  (LMP Unknown)   SpO2 97%   BMI 27.37 kg/m     --CONSTITUTIONAL: Vital signs as above. No acute distress. The patient is well nourished and well groomed.  --PSYCHIATRIC: The patient is awake, alert, oriented to person, place, time and answering questions appropriately with clear speech. Appropriate mood and affect   --SKIN:  Posterior torso is clean, dry, intact without rashes.   --RESPIRATORY: Normal rhythm and effort. No abnormal accessory muscle breathing patterns noted.   --STANDING EXAMINATION: Gait is non-antalgic. Normal lumbar lordosis noted, no lateral shift.  --LOWER EXTREMITY MOTOR TESTING:  Plantar flexion left 5/5, right 5/5   Dorsiflexion left 5/5, right 5/5   Great toe MTP extension left 4+/5 better than prior, right 5/5  Knee flexion left 5/5, right 5/5  Knee extension left 5/5, right 5/5   Hip flexion left 5/5, right 5/5  --VASCULAR:  Warm lower limbs bilaterally. There is no pitting edema of the bilateral lower extremities.       ASSESSMENT:  Jennifer Jane is a pleasant 72 year old female who presented with atraumatic acute on chronic low back pain which began in November.  There is constant bilateral low back pain, intermittently radiating to both posterolateral hips L>R in a L5 or S1 distribution.  There is MTP weakness on the left side and she reports subjective gait changes.    Complicating comorbidities include:  # Osteopenia on fosamax (patient discontinued    X-ray of the lumbar spine 12/8/2023 shows:  -Trace listhesis L3/L4, L4/L5, L5/S1  -Lumbar spondylosis with bone spurs and facet " arthropathy    MRI of the lumbar spine 12/8/2023 shows  -Severe point spinal canal stenosis at L4-5  -Moderate to severe spinal canal stenosis at L3-4  -Marked facet arthropathy at L3-4 and L4-5  -Multilevel neuroforaminal stenosis, including at L5-S1 bilaterally      PLAN:  -X-ray and MRI lumbar spine reviewed in detail with the patient today.  We discussed that the origins of the pain are most likely multifactorial and may include a component of this spinal canal stenosis, facet arthropathy, and neuroforaminal stenosis (in particular at the L5-S1 level)  -She prefers to start with PT and a home exercise program only.  The order for PT was added at the last visit.  I provided her with the phone number for scheduling again.  -Additionally we could consider pelvic floor PT in the future.  -We discussed options for interventional procedures for pain which could be considered in the future, such as L5-S1 ILESI.  She prefers to begin with PT/HEP exclusively for now  -She does not want to consider medication changes today. Could consider gabapentin in the future  -We discussed red flag symptoms which would necessitate her being seen in the emergency department right away and she expressed understanding  -She reports self discontinuing the Fosamax.  We discussed the importance of this medication and I advised her to please follow-up with her primary doctor if she is finding the Fosamax intolerable  -RTC after 6 weeks of PT and home exercise program, or sooner if needed      Ready to learn, no apparent learning barriers.  Education provided on treatment plan according to patient's preferred learning style.  Patient verbalizes understanding.   __________________________________  Megan Menon NP  Physical Medicine & Rehabilitation        32 minutes spent by me on the date of the encounter doing chart review, history and exam, documentation and further activities per the note

## 2023-12-11 NOTE — LETTER
12/11/2023       RE: Jennifer Jane  [address]  [facility] [id number]     Dear Colleague,    Thank you for referring your patient, Jennifer Jane, to the [facility]YS[facility]ICINE [facility]IN[facility] [place] at [facility]. Please see a copy of my visit note below.    PM&R Follow-Up Visit -     Date of Initial Visit: 12/5/23  LOV: 12/5/23  TD: 12/11/2023     Recall: Jennifer Jane is a 72 year old female who presents with a chief complaint of Low back pain with sciatica.      INTERVAL HISTORY:  Patient was last seen in clinic 12/5/23. At that visit, the plan was to obtain an x-ray of the lumbar spine, MRI of the lumbar spine, and begin physical therapy with a home exercise program.    She was seen today in the clinic for follow-up after the MRI. She reports bilat LBP (L>R) radiating to the posterolateral legs.  At today's visit she denies any bowel or bladder incontinence issue -she reports having normal sensation that she needs to urinate or defecate. She did not yet start PT.        RECALL HISTORY OF PRESENT ILLNESS:  Jennifer Jane is a 72 year old female who presents with a chief complaint of Low back pain with sciatica.     She was seen in the family medicine clinic 11/17/2023 and recommended ibuprofen, lidocaine patches, and Tylenol referred to the spine clinic.    She was seen today in the clinic. She reports a longstanding history of chronic low back pain.  In 1983 or 1984 she had a Chymopapain injection for the pain.  Unfortunately she suffered recurrent pain after a fall and underwent a microdiscectomy in 1983 or 1984.  Since surgery, she reports off-and-on back pain as well as some residual weakness.  She notices the weakness with walking, occasionally feeling like her knees may buckle.  She states the weakness has remained unchanged over the years.    Today she describes acute on chronic low back pain.  She carried some heavy grocery  "bags and began noticing some subsequent pain and altered gait like she was taking \"half steps\".  She describes bilateral low back pain, radiating to the posterolateral hips.  There is intermittent pain in the posterolateral surfaces of both legs.  In particular today the left leg is more bothersome.    The back pain is the most bothersome for her as it is constant.  The paresthesias in her legs are intermittent and transient, lasting less than an hour.    She states that the pain has improved 50% since the initial onset.  Today she rates the pain 0-1/10.    The pain is aggravated with leaning back against a chair while sitting, walking, changing positions in general, walking up stairs, or carrying groceries.  She has been unable to do day-to-day activities like cleaning her house due to pain.  Her hips feel sore if she is laying on her side. The pain is relieved by laying down or sitting up straight while sitting and not leaning her back against the chair.    She endorses an episode recently when she tripped but cannot recall if she indeed fell.    She describes bowel or bladder issues which sound functional. She wears a pad for urinary incontinence. She endorses chronic constipation with intermittent diarrhea.  She states that she has had some \"smears\" of stool which she did not anticipate, but thought it may have been related to passing gas or possibly having diarrhea that particular day.      She denies saddle anesthesia. She denies weakness, unintentional weight loss, fevers/chills, or night sweats.       PRIOR INJURIES/TREATMENT:   Ice/Heat: ice    Brace: occasional elastic support brace    Physical Therapy:   PT for knee pain in 2022     - Current Pain Medications -   Tylenol 500 mg  ibuprofen  Prednisone, Rx 9/20/2023 - for asthma exacerbation      - Prior/Trialed Pain Medications -   None      Prior Procedures:  Date    Procedure   Improvement (%)  1980s  Chymopapain injection Some relief             Prior " Related Surgery:    Microdiscetomy, 1983 or 1984    Other (acupuncture, OMT, CMM, TENS, DME, etc.):   Massage, remotely    Specialists Seen - (with most recent, available notes and clinic visits reviewed)   1. Orthopedics-right knee OA  2.  Neurology-memory changes  3.  Orthopedics-right thumb arthritis  4. Spine surgery in 1980s - Dr Spence at the [facility] (now [facility])     IMAGING - reviewed   MR LUMBAR SPINE W/O CONTRAST  12/8/2023   FINDINGS:  There are 5 lumbar type vertebrae, used for the purposes of this  dictation. Conus tip at approximately T12-L1. Grade 1 anterolisthesis  of L4 over L5. Marked loss of disc height at L5-S1 is moderate to  severe. Degenerative endplate changes at L5-S1. No loss of vertebral  body height. No abnormal diffusion restriction.     On a level by level basis, the findings are as follows:     T12-L1: No spinal canal or neural foraminal stenosis.     L1-2: Mild disc bulge. Ligamentum flavum hypertrophy. No spinal canal  or neural foraminal stenosis.     L2-3: Diffuse disc bulge. Ligamentum flavum hypertrophy. Facet  arthropathy. No spinal canal stenosis. No significant neural foraminal  stenosis.      L3-4: Large disc bulge with superimposed right central disc extrusion  extending cranially along the posterior aspect of the L3 vertebral  body. Marked facet arthropathy and thickening of ligamentum flavum.  There is moderate to severe spinal canal stenosis. There is mild  bilateral neural foraminal stenosis.     L4-5: Grade 1 anterolisthesis. Large disc bulge, asymmetric to the  right. Marked facet arthropathy and ligamentum flavum thickening.  Severe spinal canal stenosis. Mild to moderate bilateral neural  foraminal stenosis.     L5-S1: Circumferential disc osteophyte complex with facet arthropathy  but no spinal canal stenosis. There is a left hemilaminectomy. The  left neural foramen is mildly to moderately narrowed in the right  neural foramen is  mildly narrowed.     The visualized paraspinous soft tissues are within normal limits.                                                      IMPRESSION:  Multilevel lumbar spondylosis greatest at L4-5 with severe spinal  canal stenosis at L3-4 with moderate to severe spinal canal stenosis.  Multilevel neural foraminal narrowings as detailed above.      XR LUMBAR SPINE 2/3 VIEWS  12/8/2023   FINDINGS:   Standing frontal and lateral views of the lumbar spine with L5-S1 spot  view.     5 lumbar type vertebral bodies are assumed for the purpose of this  dictation.     No acute osseous abnormality. The vertebral body heights are  maintained. Intervertebral disc space narrowing at L3-4, L4-5, and  L5-S1. Trace anterolisthesis of L3 on L4 and L4 on L5 and trace  retrolisthesis of L5 on S1. Degenerative changes with osteophytic  spurring and lower level predominant facet arthropathy. Likely neural  foraminal narrowing at L4-5 and L5-S1.      The visualized bowel gas pattern is nonobstructive. Vascular  calcifications.                                                                      IMPRESSION:   1. No acute osseous abnormality.  2. Moderate degenerative changes of the lumbar spine are greatest at  L5-S1.      DEXA 10/10/2022  Osteopenia    CT ABDOMEN PELVIS W CONTRAST, 9/16/2022   FINDINGS:     Lower thorax: No effusion or focal consolidation.  Normal heart size.   Small hiatal hernia. Calcified granulomas in the right lower lobe  associated with calcified right hilar lymph nodes.     Liver: No mass. No intrahepatic biliary ductal dilation.     Biliary System: Gallbladder surgically absent.     Pancreas: Mild atrophy of the pancreatic head. No mass or pancreatic  ductal dilation.     Adrenal glands: No mass or nodules     Spleen: Normal.     Kidneys: No suspicious mass, obstructing calculus or hydronephrosis.     Gastrointestinal tract :Normal appendix. Normal caliber small bowel.      Mesentery/peritoneum/retroperitoneum: No mass. No free fluid or air.     Lymph nodes: No significant lymphadenopathy.     Vasculature: Patent major abdominal vasculature.     Pelvis: Urinary bladder is normal.  Descent of the anorectal junction,  vaginal apex, and bladder neck to or below the pubococcygeal line.     Osseous structures: No aggressive or acute osseous lesion.  Multilevel  degenerative changes visualized spine. Grade 1 anterolisthesis of L4  on L5.      Soft tissues: Within normal limits.                                                                      IMPRESSION:   1. No acute or suspicious abnormality in the abdomen or pelvis to  explain patient's symptoms.  2. Evidence of pelvic floor laxity.    XR THORACIC SPINE 3 VW, 7/11/2018   Findings:   AP and lateral views of the thoracic spine were obtained. The upper  thoracic spine is not well-visualized in the lateral view. No  significant compression deformities appreciated. The visualized lung  fields are clear.                                                     Impression:   No thoracic compression deformities identified.      CT CERVICAL SPINE W/O CONTRAST 7/11/2018   Findings:  The cervical vertebrae are normally aligned. Mild straightening of the  normal cervical lordosis. No acute fracture or subluxation. No  prevertebral edema. Multilevel cervical spondylosis with loss of  intervertebral disc height at C2-T1. Posterior osteophyte disc  complexes from C3 through C7 result in mild to moderate spinal canal  narrowing, most pronounced at C3-4. Prominent anterior bridging  osteophytes. Multilevel uncinate process hypertrophy and bilateral  facet joint arthropathy. Moderate to severe right neural foraminal  narrowing at C6-7 and moderate left neuroforaminal narrowing at C6-7.     No abnormality of the paraspinous soft tissues.                                              Impression:   1. No fracture or traumatic subluxation of the cervical  spine.  2. Multilevel cervical spondylosis.        Review Of Systems:  I am responding to those symptoms which are directly relevant to the specific indication for my consultation. I recommend that the patient follow up with their primary or referring provider to pursue any other symptoms which may be of concern.       Medical History:  She has a past medical history of Arthritis, Back injury, Glaucoma, Hiatal hernia, HTN (hypertension), Hypercalcemia (11/30/2019), Insomnia (12/26/2012), Reduced vision (09/2016), and Uncomplicated asthma.     She  has a past surgical history that includes Cholecystectomy (1990); Breast surgery (1984); back surgery (1984); tubal ligation (1989); STOMACH SURGERY PROCEDURE UNLISTED (03/1990); cataract extraction with intraocular lens (Left, 12/06/2018);  cataract extraction with intraocular lens implant (Right, 01/31/2019); IRIDOTOMY/IRIDECTOMY LASER SURGERY (Left, 09/27/2016); and IRIDOTOMY/IRIDECTOMY LASER SURGERY (Right, 10/18/2016).    Family History  Her family history includes Anesthesia Reaction in her mother; Asthma in her daughter, maternal grandmother, and mother; Cerebrovascular Disease in her paternal grandfather; Coronary Artery Disease in her father, maternal grandfather, and mother; Diabetes in her father, mother, and sister; Genetic Disease in her daughter; Heart Disease in her father and mother; Hip fracture in her sister; Hyperlipidemia in her father; Hypertension in her father, mother, and sister; Low Back Problems in her brother, father, mother, and sister; Thyroid Disease in her mother and sister.     Social History:  Work: Retired, formerly worked as a school nurse & at [employer]  Current living situation: lives with .   She  reports that she has never smoked. She has been exposed to tobacco smoke. She has never used smokeless tobacco. She reports current alcohol use. She reports that she does not use drugs.        Current Medications:   She has a current  "medication list which includes the following prescription(s): albuterol, alendronate, amlodipine, atorvastatin, azelastine, calcium carbonate antacid, fluticasone-salmeterol, ibuprofen, latanoprost, losartan, montelukast, mupirocin, nitroglycerin, and prednisone.     Allergies:    -- Canola Oil [Vegetable Oil] -- Anaphylaxis and GI Disturbance   -- Animal Dander -- Unknown   -- Dust Mites -- Unknown   -- Grass    -- Hydroxyzine -- Other (See Comments)    --  Passed out   -- Hydroxyzine Hcl    -- Pollen Extract -- Unknown   -- Trees    -- Chlorhexidine -- Itching and Rash    --  Hibiclens    PHYSICAL EXAMINATION:  /82 (BP Location: Left arm, Patient Position: Sitting, Cuff Size: Adult Regular)   Pulse 72   Ht 1.667 m (5' 5.63\")   Wt 76.1 kg (167 lb 11.2 oz)   LMP  (LMP Unknown)   SpO2 97%   BMI 27.37 kg/m     --CONSTITUTIONAL: Vital signs as above. No acute distress. The patient is well nourished and well groomed.  --PSYCHIATRIC: The patient is awake, alert, oriented to person, place, time and answering questions appropriately with clear speech. Appropriate mood and affect   --SKIN:  Posterior torso is clean, dry, intact without rashes.   --RESPIRATORY: Normal rhythm and effort. No abnormal accessory muscle breathing patterns noted.   --STANDING EXAMINATION: Gait is non-antalgic. Normal lumbar lordosis noted, no lateral shift.  --LOWER EXTREMITY MOTOR TESTING:  Plantar flexion left 5/5, right 5/5   Dorsiflexion left 5/5, right 5/5   Great toe MTP extension left 4+/5 better than prior, right 5/5  Knee flexion left 5/5, right 5/5  Knee extension left 5/5, right 5/5   Hip flexion left 5/5, right 5/5  --VASCULAR:  Warm lower limbs bilaterally. There is no pitting edema of the bilateral lower extremities.       ASSESSMENT:  Jennifer Jane is a pleasant 72 year old female who presented with atraumatic acute on chronic low back pain which began in November.  There is constant bilateral low back pain, " intermittently radiating to both posterolateral hips L>R in a L5 or S1 distribution.  There is MTP weakness on the left side and she reports subjective gait changes.    Complicating comorbidities include:  # Osteopenia on fosamax (patient discontinued    X-ray of the lumbar spine 12/8/2023 shows:  -Trace listhesis L3/L4, L4/L5, L5/S1  -Lumbar spondylosis with bone spurs and facet arthropathy    MRI of the lumbar spine 12/8/2023 shows  -Severe point spinal canal stenosis at L4-5  -Moderate to severe spinal canal stenosis at L3-4  -Marked facet arthropathy at L3-4 and L4-5  -Multilevel neuroforaminal stenosis, including at L5-S1 bilaterally      PLAN:  -X-ray and MRI lumbar spine reviewed in detail with the patient today.  We discussed that the origins of the pain are most likely multifactorial and may include a component of this spinal canal stenosis, facet arthropathy, and neuroforaminal stenosis (in particular at the L5-S1 level)  -She prefers to start with PT and a home exercise program only.  The order for PT was added at the last visit.  I provided her with the phone number for scheduling again.  -Additionally we could consider pelvic floor PT in the future.  -We discussed options for interventional procedures for pain which could be considered in the future, such as L5-S1 ILESI.  She prefers to begin with PT/HEP exclusively for now  -She does not want to consider medication changes today. Could consider gabapentin in the future  -We discussed red flag symptoms which would necessitate her being seen in the emergency department right away and she expressed understanding  -She reports self discontinuing the Fosamax.  We discussed the importance of this medication and I advised her to please follow-up with her primary doctor if she is finding the Fosamax intolerable  -RTC after 6 weeks of PT and home exercise program, or sooner if needed      Ready to learn, no apparent learning barriers.  Education provided on  treatment plan according to patient's preferred learning style.  Patient verbalizes understanding.   _______________________________      32 minutes spent by me on the date of the encounter doing chart review, history and exam, documentation and further activities per the note         Again, thank you for allowing me to participate in the care of your patient.      Sincerely,    GEMINI Chappell CNP

## 2023-12-11 NOTE — PATIENT INSTRUCTIONS
It was a pleasure seeing you in the clinic today.  As discussed, we made the following updates to your plan of care:       An order for physical therapy was added at the last visit. You may also call [phone] to arrange an appointment at any of the [facility] outpatient physical therapy locations.      We discussed epidural steroid injections to help with pain. I will include additional information about the injections below. Please let us know if you would like to proceed or have other questions about the injections.    If you have increased weakness,  losing control bowel or bladder, increased severe pain please go to the emergency department.      Let's follow up in 6 weeks, or sooner if needed.       Thank you,  Megan Menon NP  Physical Medicine and Rehabilitation, Medical Spine  PM&R clinic Phone: [phone]  PM&R clinic Fax: [phone]        Preparing for Spine Injection Therapy              Why Do I Need Spine Injection Therapy?  Your Health Care Provider is recommending spine injection therapy to  help relieve your back and neck pain. This will be in addition to other therapies such as medications and physical therapy. The purpose of these injections is to reduce the amount of inflammation (swelling, pain, heat, redness, loss of body function) around the nerves thus reducing the amount of pain.     The medications you will receive with the injections will include:   Anesthetic - medication to numb the painful area.    Steroid - medication that prevents or reduces swelling and pain (anti-inflammatory).   To reduce your discomfort during the injection procedure, you will receive a numbing medication injection prior to the placement of the needles. You will be lying on your stomach during the injection procedure. We will use a low-dose x-ray (fluoroscopy) to help guide needle placement.  You must have a  for this procedure. We will need to reschedule your injection if you do not have a   with you.      What are the different types of injections and procedure?  Below, is a brief description of the different types of injections we use to deliver pain medication as close as possible to the nerves in the painful area:    Epidural injection: (picture on the right) Epidural injections place 2 medications in your epidural space.  This is the space alongside your spinal canal (not inside of it). Nerves from your spinal cord pass through this space. The medications will bathe those nerves.       Facet joint injection: These injections place 2 medications into the joints of your neck or spine.   SI joint injection: (picture on the left) deliver pain medications into the Sacroiliac joint that connects the hip bones.   Hip joint injection: deliver pain medication to the joint that connect your hip and femur bones (the femur is the bone in the center of the leg that extends from the knee with the hip).  You will be lying on your side with your affected side up. For example, if we are injecting your left hip, then you will be lying on your right side.   Medial Branch Block injections: This is a test to see if your pain is coming from a specific nerve. This injection is similar to a facet joint injection but contains only the numbing medication.  You will keep a pain score diary for the rest of the day and the following morning after receiving the injection.    Radiofrequency ablation: This is a procedure that uses radio waves and numbing medication to block the nerves that feel pain at the joint. The pain relief effects can last for a long time, but are not permanent. The procedure is similar to the Medial Branch Block but requires additional testing to ensure that the needle is near the nerve before numbing and ablating it.  Doctors often order sedation for this procedure.  Please see the sections on sedation below.      What Should I Expect if I Receive Sedation? THIS IS ORDERED BY THE PHYSICIAN  This is  conscious sedation.  The medications we give to you will help you relax and reduce your anxiety.  You will still be awake for the procedure so we can ask you questions and hear your answers.     What Are My Responsibilities With Sedation?  You must stop eating and drinking 8 (eight) hours before your procedure. You can have clear fluids (water, coffee/tea without milk) up to 2 hours prior to the injections. Nothing by mouth for 2 hours prior to injection.  This includes gum, mints, or chew.  Take your morning medications with a small sip of water.    You must have a  who will check-in with you, and stay in the building while the procedure is underway.  If you do not have a  your sedation will be cancelled.  We will monitor you for at least 30 minutes after the procedure before being discharged home.      What Are the Risks and Complications For This Procedure?  Risks and complication are rare, but can still occur.  You should understand, discuss, and accept these risks before agreeing to the procedure. They include, but are not limited to:  infection  nerve damage   paralysis  injection failure or a need for further injections or additional procedures  continued or worsening of symptoms/pain,   medication reaction,  dural leak (into the hole covering around the spinal cord. This may cause a a spinal headache)  leak of the medication into the spinal canal, nerves, or blood vessel.  death       What do I need to do before the procedure?   If you do not follow these instructions your procedure may be cancelled.  Tell us if you are on major blood thinners such as Coumadin, Xarelto , Plavix, Eliquis , Pradaxa , or others.    Contact the doctor who prescribed your blood thinner to ask for permission to stop taking it before you have the injection.    Schedule your pain injection procedure after your doctor gave their permission.   We will notify you when to stop and re-start your blood thinner.  Tell us if you  have any allergies to contrast dye.  If you do, we may give additional medications to take before the procedure.  Tell us if you are pregnant, or possibly pregnant.  If so, you cannot receive steroid medications or be exposed to fluoroscopic X-rays.  Tell us if you have been sick during the 10 days before the procedure. This includes:   colds   gastrointestinal illness or discomfort  dental sores,   skin infection, or any other type of infection.  Tell us if you have taken antibiotics during the 10 days prior to the procedure.  Do not drink alcohol the night before or on the day of the procedure.  You must shower the night before and on the day of your procedure.  Wear comfortable, clean clothing.  If you have an outside MRI (Magnetic Resonance Imaging photo), please bring it with you.    What Will Happen After the Procedure?  If you did not receive sedation we will monitor you for 15 minutes after the procedure. If you received sedation, we will monitor you for at least 30 minutes after the procedure     How soon can I expect pain relief?  You have received 2 types of medications with your injection:    Anesthetic - numbing medication which only acts for a few hours    Steroid which may take 3-14 days to be effective.   You can expect to feel your normal pain after the anesthetic wears off, until the steroid becomes effective.    How should I care for myself at home?  Get plenty of rest and avoid twisting, bending movements, heavy lifting, or strenuous activity for the first 24 hours. This will help the steroid be more effective. Medial Branch Block injections will have different discharge instructions.  Those will be discussed at that injection appointment.  Resume your pain medications  Apply ice packs (on for 20 minutes at a time), every 2-3 hours to your injection area for the first 2-3 days to help with pain control.    Avoid heat (pads or water bottles), which can cause the veins to open up, making the  steroid less effective. You can use heat after 48 hours.  Take showers only for the first 48 hours.  No baths, hot tubs, swimming, or soaking for 48 hours to reduce the risk of infection.     When should I call the doctor?  Call us if you have any of the following:   Fever more than 100.5 degrees Fahrenheit   Signs of infection   Severe headache   Severe back pain   Increased numbness or weakness in your legs or arms   Loss of bladder or bowel control  Nausea   Other concerns    What is the contact information?  During business hours Monday-Friday 8a-5p call [phone].   After business hours, on weekends and holidays call [phone] and ask for the PM&R doctor on call

## 2023-12-11 NOTE — TELEPHONE ENCOUNTER
FYI - Status Update    Who is Calling: patient    Update:  unable to schedule a MA . Nurse for BP check for the next two week. Pt will bring in her reading at home HTN  to the office.     Does caller want a call/response back: No. Pt want in email.  '

## 2023-12-11 NOTE — NURSING NOTE
Chief Complaint   Patient presents with    RECHECK     Follow up after the MRI      Vitals were taken and medications were reconciled.   Jose Maria Rendon, EMT  12:15 PM

## 2023-12-12 ENCOUNTER — HOSPITAL ENCOUNTER (OUTPATIENT)
Dept: MAMMOGRAPHY | Facility: CLINIC | Age: 73
Discharge: HOME | End: 2023-12-12
Attending: FAMILY MEDICINE | Admitting: FAMILY MEDICINE
Payer: COMMERCIAL

## 2023-12-12 DIAGNOSIS — Z12.31 VISIT FOR SCREENING MAMMOGRAM: ICD-10-CM

## 2023-12-12 PROCEDURE — 77067 SCR MAMMO BI INCL CAD: CPT

## 2023-12-12 NOTE — TELEPHONE ENCOUNTER
Patient called to make MA only appointment for a blood pressure check and was told there was no availability for 2 months. We have many appointments available prior.    Please help her schedule. Thanks.    Debbie Ford, RN, BSN, PHN  [facility]  [phone]

## 2023-12-12 NOTE — TELEPHONE ENCOUNTER
Sent patient a [facility] message to call clinic and ask to talk with a triage nurse to schedule blood pressure check.    Debbie Ford, RN, BSN, PHN  [facility]  [phone]

## 2023-12-15 ENCOUNTER — TELEPHONE (OUTPATIENT)
Dept: FAMILY MEDICINE | Facility: CLINIC | Age: 73
End: 2023-12-15
Payer: COMMERCIAL

## 2023-12-15 ENCOUNTER — TELEPHONE (OUTPATIENT)
Dept: CARDIOLOGY | Facility: CLINIC | Age: 73
End: 2023-12-15
Payer: COMMERCIAL

## 2023-12-15 DIAGNOSIS — I10 BENIGN ESSENTIAL HYPERTENSION: ICD-10-CM

## 2023-12-15 RX ORDER — LOSARTAN POTASSIUM 50 MG/1
50 TABLET ORAL 2 TIMES DAILY
Qty: 180 TABLET | Refills: 3 | Status: SHIPPED | OUTPATIENT
Start: 2023-12-15 | End: 2024-02-28

## 2023-12-15 NOTE — TELEPHONE ENCOUNTER
Called and spoke to patient and reviewed that Dr Johnson's last note recommended to follow-up as needed. Patient does not endorse any symptoms currently. She will call when symptomatic where she will then be scheduled to see any EP provider (including APPs).

## 2023-12-15 NOTE — TELEPHONE ENCOUNTER
Patient wanted to schedule blood pressure check and appointment for chronic issues related to sleep.     Writer scheduled MA visit for blood pressure check.   Scheduled virtual visit with PCP.     SANJIV Valencia RN  [facility]

## 2023-12-15 NOTE — TELEPHONE ENCOUNTER
[facility] Call Center    Phone Message    May a detailed message be left on voicemail: no     Reason for Call: Other: Patient called stating they received a missed call from [place], but did not see any notes indicating of a recent call being made. Patient last saw Dr. Johnson in July and stated they were supposed to return in the spring to do some more monitoring. Please review and call patient back to further discuss.     Action Taken: Other: Cardiology    Travel Screening: Not Applicable    Thank you!  [facility]

## 2023-12-16 NOTE — TELEPHONE ENCOUNTER
Patient left me a hand written note with her home BPs.  They have been too high (140/73, 146/72)    Can you tell her to increase her losartan to 50 mg BID (I'll send to pharmacy) and then schedule nurse only in ~1 month to follow up on blood pressure?    Thanks,  Dr. Sun Person MD / [facility]

## 2023-12-18 ENCOUNTER — THERAPY VISIT (OUTPATIENT)
Dept: PHYSICAL THERAPY | Facility: CLINIC | Age: 73
End: 2023-12-18
Payer: COMMERCIAL

## 2023-12-18 ENCOUNTER — MYC MEDICAL ADVICE (OUTPATIENT)
Dept: FAMILY MEDICINE | Facility: CLINIC | Age: 73
End: 2023-12-18

## 2023-12-18 DIAGNOSIS — M54.41 ACUTE BILATERAL LOW BACK PAIN WITH BILATERAL SCIATICA: Primary | ICD-10-CM

## 2023-12-18 DIAGNOSIS — R29.898 WEAKNESS OF LEFT LOWER EXTREMITY: ICD-10-CM

## 2023-12-18 DIAGNOSIS — M25.551 HIP PAIN, BILATERAL: ICD-10-CM

## 2023-12-18 DIAGNOSIS — M54.42 ACUTE BILATERAL LOW BACK PAIN WITH BILATERAL SCIATICA: Primary | ICD-10-CM

## 2023-12-18 DIAGNOSIS — M25.552 HIP PAIN, BILATERAL: ICD-10-CM

## 2023-12-18 DIAGNOSIS — M54.16 LUMBAR RADICULOPATHY: ICD-10-CM

## 2023-12-18 PROCEDURE — 97110 THERAPEUTIC EXERCISES: CPT | Mod: GP | Performed by: PHYSICAL THERAPIST

## 2023-12-18 PROCEDURE — 97161 PT EVAL LOW COMPLEX 20 MIN: CPT | Mod: GP | Performed by: PHYSICAL THERAPIST

## 2023-12-18 NOTE — PROGRESS NOTES
PHYSICAL THERAPY EVALUATION  Type of Visit: Evaluation    See electronic medical record for Abuse and Falls Screening details.    Subjective     Pt is a 72 year old female presenting with complaints of low back and LLE pain above knee .   The symptoms started many years ago. She has a hx of microdiscectomy in 1983 or 1984.  Since that surgery, she reports off-and-on back pain as well as some residual weakness.  Pt describes the pain as achy.   Prior treatments: PT  The resulting functional limitations include lifting, walking (more discomfort), prolonged sitting, HH chores .   Pain is better with OTC meds, lumbar support  Sleep Quality: poor. Reports that she does not know that she has slept in the last 6 month   Current level of activity: not much at this time. Walks occasionally.   Red Flags: She notes some B&B troubles but is being followed by other providers  Goals of therapy: return to biking, improve sleep, improve tolerance to lifting    MRI 12/8/23  IMPRESSION:  Multilevel lumbar spondylosis greatest at L4-5 with severe spinal  canal stenosis at L3-4 with moderate to severe spinal canal stenosis.  Multilevel neural foraminal narrowing        Presenting condition or subjective complaint: back and leg pain  Date of onset: 12/05/23 (Date of PT referral)    Relevant medical history: Arthritis; Asthma; Chest pain; Foot drop; Menopause; Migraines or headaches; High blood pressure; Vision problems; Osteoporosis   Dates & types of surgery: breast bx 1980, microdiscectomy, cataracts    Prior diagnostic imaging/testing results: MRI; X-ray     Prior therapy history for the same diagnosis, illness or injury: Yes PT at previous [facility] location    Prior Level of Function  Transfers: Independent  Ambulation: Independent  ADL: Independent    Living Environment  Social support: With a significant other or spouse   Type of home:     Stairs to enter the home: Yes       Ramp: No   Stairs inside the home: Yes   Is there a  railing: Yes   Help at home: Home and Yard maintenance tasks  Equipment owned:       Employment:   retired nurse  Hobbies/Interests: sewing, baking/cooking, singing, crafts, knitting Etc    Patient goals for therapy: be active    Pain assessment: Pain present     Objective       LUMBAR:    Posture: rounded    Motor Deficit:  Myotomes L R   L1-2 (hip flexion) 5 5   L3 (knee extension) 5 5   L4 (ankle DF) 5 5   L5 (g. toe ext) 5 5   S1 (ankle PF or knee flex) 5 5       Dural Signs:   L R   Slump     SLR + (LBP) -       AROM: (min, mod, max, + indicates positive pain)  Lumbar Movement ROM    Flexion Min- no change with repeated movements in standing. Slight increase in symptoms with repeated movements in sitting   Extension Mod- increase LLE symptoms with repeated movements    Side Gliding/Bend L min   Side Gliding/Bend R min     LE Flexibility (Supine) ROM    Trunk Rotation    Hamstring 90-90 Tighter L than R. <70 deg B   Piriformis Tighter L than R   Single/Double Knee to Chest      Directional Preference: inconclusive    Functional Core/Gluteal Strength:   -Bridging: bears weight mostly through right leg. Cramp in L when cued for equal pressure   -Sit to Stand: weight shifts to R. Needed cues to equally bear weight   -SLS: Wobbly but able to stand ~3-5 sec B      Assessment & Plan   CLINICAL IMPRESSIONS  Medical Diagnosis: Weakness of left lower extremity,  Acute bilateral low back pain with bilateral sciatica,  Lumbar radiculopathy,  Hip pain, bilateral    Treatment Diagnosis: Low Back Pain and L LE Pain and Weakness   Impression/Assessment: Patient is a 72 year old female with low back and left lower extremity complaints.  The following significant findings have been identified: Pain, Decreased ROM/flexibility, Decreased joint mobility, Decreased strength, Impaired balance, Impaired gait, Impaired muscle performance, Decreased activity tolerance, and Impaired posture. These impairments interfere with their ability  to perform self care tasks, recreational activities, household chores, household mobility, and community mobility as compared to previous level of function.     Clinical Decision Making (Complexity):  Clinical Presentation: Stable/Uncomplicated  Clinical Presentation Rationale: based on medical and personal factors listed in PT evaluation  Clinical Decision Making (Complexity): Low complexity    PLAN OF CARE  Treatment Interventions:  Modalities: Cryotherapy, Dry Needling, Mechanical Traction  Interventions: Gait Training, Manual Therapy, Neuromuscular Re-education, Therapeutic Activity, Therapeutic Exercise, Self-Care/Home Management    Long Term Goals     PT Goal 1  Goal Identifier: Sleep  Goal Description: Pt will be able to sleep through the night, waking <2x, without increase of symptoms while in bed  Rationale: to maximize safety and independence with performance of ADLs and functional tasks;to maximize safety and independence within the home;to maximize safety and independence with self cares  Target Date: 02/26/24  PT Goal 2  Goal Identifier: Lifting  Goal Description: Pt will be able to lift household objects such as groceries and laundry without increase in pain.  Rationale: to maximize safety and independence with performance of ADLs and functional tasks;to maximize safety and independence with self cares  Target Date: 02/26/24      Frequency of Treatment: 1x/week  Duration of Treatment: 10 weeks    Recommended Referrals to Other Professionals:  None  Education Assessment:   Learner/Method: Patient  Education Comments: Eager to participate in therapy    Risks and benefits of evaluation/treatment have been explained.   Patient/Family/caregiver agrees with Plan of Care.     Evaluation Time:     PT Eval, Low Complexity Minutes (87004): 18       Signing Clinician: Soco Dexter, PT      [facility]                                                                                    OUTPATIENT PHYSICAL THERAPY      PLAN OF TREATMENT FOR OUTPATIENT REHABILITATION   Patient's Last Name, First Name, Jennifer Hood YOB: 1950   Provider's Name   [facility]   Medical Record No.  [id number]     Onset Date: 12/05/23 (Date of PT referral)  Start of Care Date: 12/18/23     Medical Diagnosis:  Weakness of left lower extremity,  Acute bilateral low back pain with bilateral sciatica,  Lumbar radiculopathy,  Hip pain, bilateral      PT Treatment Diagnosis:  Low Back Pain and L LE Pain and Weakness Plan of Treatment  Frequency/Duration: 1x/week/ 10 weeks    Certification date from 12/18/23 to 02/26/24         See note for plan of treatment details and functional goals     Soco Dexter, PT                         I CERTIFY THE NEED FOR THESE SERVICES FURNISHED UNDER        THIS PLAN OF TREATMENT AND WHILE UNDER MY CARE     (Physician attestation of this document indicates review and certification of the therapy plan).              Referring Provider:  Megan Menon    Initial Assessment  See Epic Evaluation- Start of Care Date: 12/18/23

## 2023-12-20 NOTE — TELEPHONE ENCOUNTER
Message reviewed and information noted.  No further action at this time.  Thank you,  Dr. Sun Person MD/[facility]

## 2023-12-20 NOTE — TELEPHONE ENCOUNTER
"Dr. Person-Please review and may close encounter.  Patient reported vitals updated. Patient is scheduled for clinic BP check on 12/29/23.    \"I have started the bid Losartan . B/p today 115/69  I also started taking melatonin last night to help me sleep. It seems like it's been 6 months since l have been able to sleep through the night .\"    Thank you!  SANJIV Rodgers, RN-BC  [facility]    "

## 2023-12-27 ENCOUNTER — THERAPY VISIT (OUTPATIENT)
Dept: PHYSICAL THERAPY | Facility: CLINIC | Age: 73
End: 2023-12-27
Payer: COMMERCIAL

## 2023-12-27 DIAGNOSIS — M54.42 ACUTE BILATERAL LOW BACK PAIN WITH BILATERAL SCIATICA: Primary | ICD-10-CM

## 2023-12-27 DIAGNOSIS — R29.898 WEAKNESS OF LEFT LOWER EXTREMITY: ICD-10-CM

## 2023-12-27 DIAGNOSIS — M54.41 ACUTE BILATERAL LOW BACK PAIN WITH BILATERAL SCIATICA: Primary | ICD-10-CM

## 2023-12-27 PROCEDURE — 97110 THERAPEUTIC EXERCISES: CPT | Mod: GP | Performed by: PHYSICAL THERAPIST

## 2023-12-29 ENCOUNTER — ALLIED HEALTH/NURSE VISIT (OUTPATIENT)
Dept: FAMILY MEDICINE | Facility: CLINIC | Age: 73
End: 2023-12-29
Payer: COMMERCIAL

## 2023-12-29 VITALS — DIASTOLIC BLOOD PRESSURE: 75 MMHG | SYSTOLIC BLOOD PRESSURE: 128 MMHG | HEART RATE: 73 BPM

## 2023-12-29 DIAGNOSIS — I10 BENIGN ESSENTIAL HYPERTENSION: Primary | ICD-10-CM

## 2023-12-29 PROCEDURE — 99207 PR NO CHARGE NURSE ONLY: CPT

## 2023-12-29 NOTE — PROGRESS NOTES
Jennifer Jane is a 73 year old year old patient who comes in today for a Blood Pressure check because of medication change and ongoing blood pressure monitoring.    Vital Signs as repeated by RN    /75, HR 73    Patient is taking medication as prescribed  Patient is tolerating medications well.  Patient is monitoring Blood Pressure at home.  Average readings if yes are 120s-130s/60-70s.    Current complaints: chronic cough  Disposition:  Patient was seen for a bp check. BP is WNL. Education provided on a healthy balanced diet with low sodium, daily physical activity, signs and symptoms to report, and stress management. Patient stated she will continue to monitor bp at home. Patient to continue with the same medication and reminded to call as needed. Patient in agreement with plan and will follow up with PCP in a month. Encounter routed to PCP.     Devan Thapa RN  [facility]

## 2024-01-03 ENCOUNTER — VIRTUAL VISIT (OUTPATIENT)
Dept: FAMILY MEDICINE | Facility: CLINIC | Age: 74
End: 2024-01-03
Payer: COMMERCIAL

## 2024-01-03 DIAGNOSIS — F51.04 CHRONIC INSOMNIA: Primary | ICD-10-CM

## 2024-01-03 PROBLEM — K57.30 DIVERTICULAR DISEASE OF LARGE INTESTINE: Status: ACTIVE | Noted: 2023-10-19

## 2024-01-03 PROBLEM — D12.2 BENIGN NEOPLASM OF ASCENDING COLON: Status: ACTIVE | Noted: 2023-10-19

## 2024-01-03 PROBLEM — K63.5 POLYP OF COLON: Status: ACTIVE | Noted: 2023-10-17

## 2024-01-03 PROCEDURE — 99442 PR PHYSICIAN TELEPHONE EVALUATION 11-20 MIN: CPT | Mod: 93 | Performed by: FAMILY MEDICINE

## 2024-01-03 RX ORDER — FLUTICASONE PROPIONATE AND SALMETEROL XINAFOATE 230; 21 UG/1; UG/1
2 AEROSOL, METERED RESPIRATORY (INHALATION) 2 TIMES DAILY
COMMUNITY
Start: 2023-09-30

## 2024-01-03 RX ORDER — TRAZODONE HYDROCHLORIDE 50 MG/1
25-100 TABLET, FILM COATED ORAL AT BEDTIME
Qty: 90 TABLET | Refills: 1 | Status: SHIPPED | OUTPATIENT
Start: 2024-01-03 | End: 2024-02-28

## 2024-01-03 NOTE — PROGRESS NOTES
"Jennifer is a 73 year old who is being evaluated via a billable telephone visit.      What phone number would you like to be contacted at? [phone]   How would you like to obtain your AVS? Addeparlibia    Distant Location (provider location):  On-site    Assessment & Plan     Chronic insomnia  Has been on amitriptyline in past but not good choice at this point BEERS criteria given age  Will trial trazodone, range below.  Can trial  mg to see what is most effective but doesn't leave her groggy.  Also - stop napping, and set regular wake time each morning to get back on regular schedule.  Follow up in ~4 weeks with [employer] message to see how is doing!  Side note - for back pain managed by Dr. Menon, if symptoms don't improve I'd call to get scheduled for injection sooner rather than later.  Discussed with patient, all questions answered, in agreement with this plan, will return or seek further care if not improving or worsening.  - traZODone (DESYREL) 50 MG tablet; Take 0.5-2 tablets ( mg) by mouth at bedtime             BMI:   Estimated body mass index is 27.37 kg/m  as calculated from the following:    Height as of 12/11/23: 1.667 m (5' 5.63\").    Weight as of 12/11/23: 76.1 kg (167 lb 11.2 oz).       Sun Person MD  [facility]    Subjective   Jennifer is a 73 year old, presenting for the following health issues:  No chief complaint on file.        HPI     Jennifer, 73 year old well known to me here today for phone visit.  Says \"not very good year\" - having problem with neck and having physical therapy and seeing specialist.    Now - waking up every morning with pain in hands.  Pain and stiffness.  Goes away within 10 minutes.  For last month.    Dr. Menon seeing again in a few weeks. Will call for discussion re: injections sooner rather than later.    Also - not sleeping.  Tried benadryl and melatonin.  Helped.    Goes to sleep - josiane nilly - between 11 pm to 1 pm.  Waits until " tired to go to bed.  Has had this issue for a long time.    A long time ago, took amitriptyline at night for fibromyalgia.  Not waking up with pain now.    Naps most days.  4 pm.                    Review of Systems   Constitutional, HEENT, cardiovascular, pulmonary, gi and gu systems are negative, except as otherwise noted.      Objective           Vitals:  No vitals were obtained today due to virtual visit.    Physical Exam   healthy, alert, and no distress  PSYCH: Alert and oriented times 3; coherent speech, normal   rate and volume, able to articulate logical thoughts, able   to abstract reason, no tangential thoughts, no hallucinations   or delusions  Her affect is normal  RESP: No cough, no audible wheezing, able to talk in full sentences  Remainder of exam unable to be completed due to telephone visits          Phone call duration: 18 minutes

## 2024-01-04 ENCOUNTER — THERAPY VISIT (OUTPATIENT)
Dept: PHYSICAL THERAPY | Facility: CLINIC | Age: 74
End: 2024-01-04
Payer: COMMERCIAL

## 2024-01-04 DIAGNOSIS — M54.41 ACUTE BILATERAL LOW BACK PAIN WITH BILATERAL SCIATICA: Primary | ICD-10-CM

## 2024-01-04 DIAGNOSIS — M54.42 ACUTE BILATERAL LOW BACK PAIN WITH BILATERAL SCIATICA: Primary | ICD-10-CM

## 2024-01-04 DIAGNOSIS — R29.898 WEAKNESS OF LEFT LOWER EXTREMITY: ICD-10-CM

## 2024-01-04 PROCEDURE — 97110 THERAPEUTIC EXERCISES: CPT | Mod: GP | Performed by: PHYSICAL THERAPIST

## 2024-01-11 ENCOUNTER — THERAPY VISIT (OUTPATIENT)
Dept: PHYSICAL THERAPY | Facility: CLINIC | Age: 74
End: 2024-01-11
Payer: COMMERCIAL

## 2024-01-11 DIAGNOSIS — M54.41 ACUTE BILATERAL LOW BACK PAIN WITH BILATERAL SCIATICA: Primary | ICD-10-CM

## 2024-01-11 DIAGNOSIS — R29.898 WEAKNESS OF LEFT LOWER EXTREMITY: ICD-10-CM

## 2024-01-11 DIAGNOSIS — M54.42 ACUTE BILATERAL LOW BACK PAIN WITH BILATERAL SCIATICA: Primary | ICD-10-CM

## 2024-01-11 PROCEDURE — 97110 THERAPEUTIC EXERCISES: CPT | Mod: GP | Performed by: PHYSICAL THERAPIST

## 2024-01-18 ENCOUNTER — OFFICE VISIT (OUTPATIENT)
Dept: PHYSICAL MEDICINE AND REHAB | Facility: CLINIC | Age: 74
End: 2024-01-18
Payer: COMMERCIAL

## 2024-01-18 VITALS
WEIGHT: 167.2 LBS | DIASTOLIC BLOOD PRESSURE: 78 MMHG | HEART RATE: 79 BPM | OXYGEN SATURATION: 95 % | SYSTOLIC BLOOD PRESSURE: 132 MMHG | BODY MASS INDEX: 27.29 KG/M2

## 2024-01-18 DIAGNOSIS — M47.816 FACET ARTHROPATHY, LUMBAR: ICD-10-CM

## 2024-01-18 DIAGNOSIS — M48.062 SPINAL STENOSIS, LUMBAR REGION, WITH NEUROGENIC CLAUDICATION: Primary | ICD-10-CM

## 2024-01-18 DIAGNOSIS — M48.061 SPINAL STENOSIS OF LUMBAR REGION WITHOUT NEUROGENIC CLAUDICATION: ICD-10-CM

## 2024-01-18 DIAGNOSIS — R29.898 WEAKNESS OF LEFT LOWER EXTREMITY: ICD-10-CM

## 2024-01-18 DIAGNOSIS — M48.061 NEUROFORAMINAL STENOSIS OF LUMBAR SPINE: ICD-10-CM

## 2024-01-18 DIAGNOSIS — Z91.81 PERSONAL HISTORY OF FALL: ICD-10-CM

## 2024-01-18 DIAGNOSIS — M54.16 LUMBAR RADICULOPATHY: ICD-10-CM

## 2024-01-18 PROCEDURE — 99215 OFFICE O/P EST HI 40 MIN: CPT | Performed by: NURSE PRACTITIONER

## 2024-01-18 RX ORDER — VITAMIN B COMPLEX
TABLET ORAL DAILY
COMMUNITY

## 2024-01-18 RX ORDER — FEXOFENADINE HCL 180 MG/1
180 TABLET ORAL DAILY
COMMUNITY

## 2024-01-18 RX ORDER — GABAPENTIN 300 MG/1
300 CAPSULE ORAL 3 TIMES DAILY
Qty: 90 CAPSULE | Refills: 3 | Status: SHIPPED | OUTPATIENT
Start: 2024-01-18 | End: 2024-02-28

## 2024-01-18 ASSESSMENT — PAIN SCALES - GENERAL: PAINLEVEL: NO PAIN (0)

## 2024-01-18 NOTE — LETTER
1/18/2024       RE: Jennifer Jane  [address]  [facility] [address]     Dear Colleague,    Thank you for referring your patient, Jennifer Jane, to the [facility] [place] [facility]. Please see a copy of my visit note below.    PM&R Follow-Up Visit -     Date of Initial Visit: 12/5/23  LOV: 12/11/23  TD: 1/18/2024     Recall: Jennifer Jane is a 72 year old female who presents with a chief complaint of Low back pain with sciatica.       INTERVAL HISTORY:  Patient was last seen in clinic 12/11/23. At that visit, the plan was to begin PT and home exercise program.  Since last time, she began PT and has done 4 sessions so far.  I received a message from her physical therapist who noted that she was having a very difficult time tolerating PT, despite modifications the therapist made to the exercise program.    Today in the clinic, Fidelina says that the physical therapy exercises have been aggravating her pain.  She had been going to PT and also trying to keep up with the home exercise program at the . Unfortunately, she has a significant level of pain after doing the program.     She continues to experience bilateral low back pain.  The pain radiating to her legs is worse on the left side now. She has pain into the posterolateral left leg.  There is numbness along the side of the left lower leg.  She has tingling in the outside edge of the left foot.     She has right side posterolateral right thigh pain which does not past the knee, but says the left leg symptoms are much worse.    She also experiences pain radiating up her back after the exercises.    She had a fall 2 weeks ago when she tripped on her left leg.  Over the last month or so, she notices her left leg feeling weak when she is walking.  She notices that she has to keep a tight  on a hand railing and pull herself up the stairs.        RECALL HISTORY OF  "PRESENT ILLNESS:  Jennifer Jane is a 72 year old female who presents with a chief complaint of Low back pain with sciatica.     She was seen in the family medicine clinic 11/17/2023 and recommended ibuprofen, lidocaine patches, and Tylenol referred to the spine clinic.    She was seen today in the clinic. She reports a longstanding history of chronic low back pain.  In 1983 or 1984 she had a Chymopapain injection for the pain.  Unfortunately she suffered recurrent pain after a fall and underwent a microdiscectomy in 1983 or 1984.  Since surgery, she reports off-and-on back pain as well as some residual weakness.  She notices the weakness with walking, occasionally feeling like her knees may buckle.  She states the weakness has remained unchanged over the years.    Today she describes acute on chronic low back pain.  She carried some heavy grocery bags and began noticing some subsequent pain and altered gait like she was taking \"half steps\".  She describes bilateral low back pain, radiating to the posterolateral hips.  There is intermittent pain in the posterolateral surfaces of both legs.  In particular today the left leg is more bothersome.    The back pain is the most bothersome for her as it is constant.  The paresthesias in her legs are intermittent and transient, lasting less than an hour.    She states that the pain has improved 50% since the initial onset.  Today she rates the pain 0-1/10.    The pain is aggravated with leaning back against a chair while sitting, walking, changing positions in general, walking up stairs, or carrying groceries.  She has been unable to do day-to-day activities like cleaning her house due to pain.  Her hips feel sore if she is laying on her side. The pain is relieved by laying down or sitting up straight while sitting and not leaning her back against the chair.    She endorses an episode recently when she tripped but cannot recall if she indeed fell.    She describes bowel or " "bladder issues which sound functional. She wears a pad for urinary incontinence. She endorses chronic constipation with intermittent diarrhea.  She states that she has had some \"smears\" of stool which she did not anticipate, but thought it may have been related to passing gas or possibly having diarrhea that particular day.      She denies saddle anesthesia. She denies weakness, unintentional weight loss, fevers/chills, or night sweats.       12/11/2023:  Patient was last seen in clinic 12/5/23. At that visit, the plan was to obtain an x-ray of the lumbar spine, MRI of the lumbar spine, and begin physical therapy with a home exercise program.    She was seen today in the clinic for follow-up after the MRI. She reports bilat LBP (L>R) radiating to the posterolateral legs.  At today's visit she denies any bowel or bladder incontinence issue - she reports having normal sensation that she needs to urinate or defecate. She did not yet start PT.      PRIOR INJURIES/TREATMENT:   Ice/Heat: ice    Brace: occasional elastic support brace    Physical Therapy:   PT for knee pain in 2022     - Current Pain Medications -   Tylenol 500 mg  ibuprofen      - Prior/Trialed Pain Medications -   Prednisone, Rx 9/20/2023 - for asthma exacerbation      Prior Procedures:  Date    Procedure   Improvement (%)  1980s  Chymopapain injection Some relief             Prior Related Surgery:    Microdiscetomy, 1983 or 1984    Other (acupuncture, OMT, CMM, TENS, DME, etc.):   Massage, remotely    Specialists Seen - (with most recent, available notes and clinic visits reviewed)   1. Orthopedics-right knee OA  2.  Neurology-memory changes  3.  Orthopedics-right thumb arthritis  4. Spine surgery in 1980s - Dr Spence at the [facility]now [facility])       IMAGING - reviewed   MR LUMBAR SPINE W/O CONTRAST  12/8/2023   FINDINGS:  There are 5 lumbar type vertebrae, used for the purposes of this  dictation. Conus tip at " approximately T12-L1. Grade 1 anterolisthesis  of L4 over L5. Marked loss of disc height at L5-S1 is moderate to  severe. Degenerative endplate changes at L5-S1. No loss of vertebral  body height. No abnormal diffusion restriction.     On a level by level basis, the findings are as follows:     T12-L1: No spinal canal or neural foraminal stenosis.     L1-2: Mild disc bulge. Ligamentum flavum hypertrophy. No spinal canal  or neural foraminal stenosis.     L2-3: Diffuse disc bulge. Ligamentum flavum hypertrophy. Facet  arthropathy. No spinal canal stenosis. No significant neural foraminal  stenosis.      L3-4: Large disc bulge with superimposed right central disc extrusion  extending cranially along the posterior aspect of the L3 vertebral  body. Marked facet arthropathy and thickening of ligamentum flavum.  There is moderate to severe spinal canal stenosis. There is mild  bilateral neural foraminal stenosis.     L4-5: Grade 1 anterolisthesis. Large disc bulge, asymmetric to the  right. Marked facet arthropathy and ligamentum flavum thickening.  Severe spinal canal stenosis. Mild to moderate bilateral neural  foraminal stenosis.     L5-S1: Circumferential disc osteophyte complex with facet arthropathy  but no spinal canal stenosis. There is a left hemilaminectomy. The  left neural foramen is mildly to moderately narrowed in the right  neural foramen is mildly narrowed.     The visualized paraspinous soft tissues are within normal limits.                                                      IMPRESSION:  Multilevel lumbar spondylosis greatest at L4-5 with severe spinal  canal stenosis at L3-4 with moderate to severe spinal canal stenosis.  Multilevel neural foraminal narrowings as detailed above.      XR LUMBAR SPINE 2/3 VIEWS  12/8/2023   FINDINGS:   Standing frontal and lateral views of the lumbar spine with L5-S1 spot  view.     5 lumbar type vertebral bodies are assumed for the purpose of this  dictation.     No  acute osseous abnormality. The vertebral body heights are  maintained. Intervertebral disc space narrowing at L3-4, L4-5, and  L5-S1. Trace anterolisthesis of L3 on L4 and L4 on L5 and trace  retrolisthesis of L5 on S1. Degenerative changes with osteophytic  spurring and lower level predominant facet arthropathy. Likely neural  foraminal narrowing at L4-5 and L5-S1.      The visualized bowel gas pattern is nonobstructive. Vascular  calcifications.                                                                      IMPRESSION:   1. No acute osseous abnormality.  2. Moderate degenerative changes of the lumbar spine are greatest at  L5-S1.      DEXA 10/10/2022  Osteopenia    CT ABDOMEN PELVIS W CONTRAST, 9/16/2022   FINDINGS:     Lower thorax: No effusion or focal consolidation.  Normal heart size.   Small hiatal hernia. Calcified granulomas in the right lower lobe  associated with calcified right hilar lymph nodes.     Liver: No mass. No intrahepatic biliary ductal dilation.     Biliary System: Gallbladder surgically absent.     Pancreas: Mild atrophy of the pancreatic head. No mass or pancreatic  ductal dilation.     Adrenal glands: No mass or nodules     Spleen: Normal.     Kidneys: No suspicious mass, obstructing calculus or hydronephrosis.     Gastrointestinal tract :Normal appendix. Normal caliber small bowel.     Mesentery/peritoneum/retroperitoneum: No mass. No free fluid or air.     Lymph nodes: No significant lymphadenopathy.     Vasculature: Patent major abdominal vasculature.     Pelvis: Urinary bladder is normal.  Descent of the anorectal junction,  vaginal apex, and bladder neck to or below the pubococcygeal line.     Osseous structures: No aggressive or acute osseous lesion.  Multilevel  degenerative changes visualized spine. Grade 1 anterolisthesis of L4  on L5.      Soft tissues: Within normal limits.                                               IMPRESSION:   1. No acute or suspicious abnormality in  the abdomen or pelvis to  explain patient's symptoms.  2. Evidence of pelvic floor laxity.      XR THORACIC SPINE 3 VW, 7/11/2018   Findings:   AP and lateral views of the thoracic spine were obtained. The upper  thoracic spine is not well-visualized in the lateral view. No  significant compression deformities appreciated. The visualized lung  fields are clear.                                                     Impression:   No thoracic compression deformities identified.      CT CERVICAL SPINE W/O CONTRAST 7/11/2018   Findings:  The cervical vertebrae are normally aligned. Mild straightening of the  normal cervical lordosis. No acute fracture or subluxation. No  prevertebral edema. Multilevel cervical spondylosis with loss of  intervertebral disc height at C2-T1. Posterior osteophyte disc  complexes from C3 through C7 result in mild to moderate spinal canal  narrowing, most pronounced at C3-4. Prominent anterior bridging  osteophytes. Multilevel uncinate process hypertrophy and bilateral  facet joint arthropathy. Moderate to severe right neural foraminal  narrowing at C6-7 and moderate left neuroforaminal narrowing at C6-7.     No abnormality of the paraspinous soft tissues.                                              Impression:   1. No fracture or traumatic subluxation of the cervical spine.  2. Multilevel cervical spondylosis.        Review Of Systems:  I am responding to those symptoms which are directly relevant to the specific indication for my consultation. I recommend that the patient follow up with their primary or referring provider to pursue any other symptoms which may be of concern.       Medical History:  She has a past medical history of Arthritis, Back injury, Glaucoma, Hiatal hernia, HTN (hypertension), Hypercalcemia (11/30/2019), Insomnia (12/26/2012), Reduced vision (09/2016), and Uncomplicated asthma.     She  has a past surgical history that includes Cholecystectomy (1990); Breast surgery  (1984); back surgery (1984); tubal ligation (1989); STOMACH SURGERY PROCEDURE UNLISTED (03/1990); cataract extraction with intraocular lens (Left, 12/06/2018);  cataract extraction with intraocular lens implant (Right, 01/31/2019); IRIDOTOMY/IRIDECTOMY LASER SURGERY (Left, 09/27/2016); and IRIDOTOMY/IRIDECTOMY LASER SURGERY (Right, 10/18/2016).    Family History  Her family history includes Anesthesia Reaction in her mother; Asthma in her daughter, maternal grandmother, and mother; Cerebrovascular Disease in her paternal grandfather; Coronary Artery Disease in her father, maternal grandfather, and mother; Diabetes in her father, mother, and sister; Genetic Disease in her daughter; Heart Disease in her father and mother; Hip fracture in her sister; Hyperlipidemia in her father; Hypertension in her father, mother, and sister; Low Back Problems in her brother, father, mother, and sister; Thyroid Disease in her mother and sister.     Social History:  Work: Retired, formerly worked as a school nurse & at [employer]  Current living situation: lives with .   She  reports that she has never smoked. She has been exposed to tobacco smoke. She has never used smokeless tobacco. She reports current alcohol use. She reports that she does not use drugs.        Current Medications:   She has a current medication list which includes the following prescription(s): albuterol, alendronate, amlodipine, atorvastatin, azelastine, calcium carbonate antacid, fluticasone-salmeterol, ibuprofen, latanoprost, losartan, montelukast, mupirocin, nitroglycerin, and prednisone.     Allergies:    -- Canola Oil [Vegetable Oil] -- Anaphylaxis and GI Disturbance   -- Animal Dander -- Unknown   -- Dust Mites -- Unknown   -- Grass    -- Hydroxyzine -- Other (See Comments)    --  Passed out   -- Hydroxyzine Hcl    -- Pollen Extract -- Unknown   -- Trees    -- Chlorhexidine -- Itching and Rash    --  Hibiclens    PHYSICAL EXAMINATION:  /78 (BP  Location: Right arm, Patient Position: Sitting, Cuff Size: Adult Regular)   Pulse 79   Wt 75.8 kg (167 lb 3.2 oz)   LMP  (LMP Unknown)   SpO2 95%   BMI 27.29 kg/m     --CONSTITUTIONAL: Vital signs as above. No acute distress. The patient is well nourished and well groomed.  --PSYCHIATRIC: The patient is awake, alert, oriented to person, place, time and answering questions appropriately with clear speech. Appropriate mood and affect   --SKIN:  Posterior torso is clean, dry, intact without rashes.   --RESPIRATORY: Normal rhythm and effort. No abnormal accessory muscle breathing patterns noted.   --GROSS MOTOR: Easily arises from a seated position. Toe walking and heel walking are normal.    --STANDING EXAMINATION: Gait is non-antalgic. Normal lumbar lordosis noted, no lateral shift.   --LOWER EXTREMITY MOTOR TESTING:  Plantar flexion left 5/5, right 5/5   Dorsiflexion left 4+/5, right 5/5   Great toe MTP extension left 2/5, right 5/5  Knee flexion eft 4+/5, right 5/5  Knee extension left 5/5, right 5/5   Hip flexion left 5/5, right 5/5  --MUSCULOSKELETAL: Lumbar spine inspection reveals no evidence of deformity. Range of motion is not limited in lumbar flexion.  Range of motion is limited with lumbar extension and causes pain.   Facet loading maneuvers are positive bilaterally R>L.  --HIPS:  There is no pain with palpation of greater trochanters.   --NEUROLOGIC: 3/4 patellar and 2/4 achilles reflexes bilaterally.  Sensation to light touch is intact in the bilateral L4, L5, and S1 dermatomes. No clonus.    --VASCULAR:  Warm lower limbs bilaterally. There is no pitting edema of the bilateral lower extremities.     ASSESSMENT:  Jennifer Jane is a pleasant 72 year old female who presented with atraumatic acute on chronic low back pain which began in November.  There is constant bilateral low back pain, intermittently radiating to both posterolateral hips L>R in a L5 and S1 distribution.    Her ability to participate  with PT is limited by pain and she is noted with LLE weakness which has worsened since last visit which has affected her gait and she endorses fall. Suspect her symptoms are attributable to the moderate/severe spinal canal stenosis at L3-4 and severe spinal canal stenosis at L4-5, along with mild/mild to moderate NFS at L5-S1.  There may be component of facet mediated pain as well.      Complicating comorbidities include:  # Osteopenia on fosamax (patient discontinued    X-ray of the lumbar spine 12/8/2023 shows:  -Trace listhesis L3/L4, L4/L5, L5/S1  -Lumbar spondylosis with bone spurs and facet arthropathy    MRI of the lumbar spine 12/8/2023 shows  -Moderate to severe spinal canal stenosis at L3-4  -Severe spinal canal stenosis at L4-5  -Multilevel neuroforaminal stenosis, including at L5-S1 bilaterally  -Marked facet arthropathy at L3-4 and L4-5      PLAN:  -Add L5-S1 ILESI with Dr. Tsang at the [facility] location  -Add BLE EMG  -Unfortunately PT and the home exercise program are aggravating the pain and so she will hold off on those for now.  -Add gabapentin, 300 mg, that can be used as tolerated up to 3 times a day according the chart provided the visit summary  -We discussed red flag symptoms which would necessitate her being seen in the emergency department right away and she expressed understanding  -RTC for procedure with Dr. Tsang      Ready to learn, no apparent learning barriers.  Education provided on treatment plan according to patient's preferred learning style.  Patient verbalizes understanding.   _________________________________      40 minutes spent by me on the date of the encounter doing chart review, history and exam, documentation and further activities per the note         Again, thank you for allowing me to participate in the care of your patient.      Sincerely,    GEMINI Chappell CNP

## 2024-01-18 NOTE — PROGRESS NOTES
PM&R Follow-Up Visit -     Date of Initial Visit: 12/5/23  LOV: 12/11/23  TD: 1/18/2024     Recall: Jennifer Jane is a 72 year old female who presents with a chief complaint of Low back pain with sciatica.       INTERVAL HISTORY:  Patient was last seen in clinic 12/11/23. At that visit, the plan was to begin PT and home exercise program.  Since last time, she began PT and has done 4 sessions so far.  I received a message from her physical therapist who noted that she was having a very difficult time tolerating PT, despite modifications the therapist made to the exercise program.    Today in the clinic, Fidelina says that the physical therapy exercises have been aggravating her pain.  She had been going to PT and also trying to keep up with the home exercise program at the . Unfortunately, she has a significant level of pain after doing the program.     She continues to experience bilateral low back pain.  The pain radiating to her legs is worse on the left side now. She has pain into the posterolateral left leg.  There is numbness along the side of the left lower leg.  She has tingling in the outside edge of the left foot.     She has right side posterolateral right thigh pain which does not past the knee, but says the left leg symptoms are much worse.    She also experiences pain radiating up her back after the exercises.    She had a fall 2 weeks ago when she tripped on her left leg.  Over the last month or so, she notices her left leg feeling weak when she is walking.  She notices that she has to keep a tight  on a hand railing and pull herself up the stairs.        RECALL HISTORY OF PRESENT ILLNESS:  Jennifer Jane is a 72 year old female who presents with a chief complaint of Low back pain with sciatica.     She was seen in the family medicine clinic 11/17/2023 and recommended ibuprofen, lidocaine patches, and Tylenol referred to the spine clinic.    She was seen today in the clinic. She reports a longstanding  "history of chronic low back pain.  In 1983 or 1984 she had a Chymopapain injection for the pain.  Unfortunately she suffered recurrent pain after a fall and underwent a microdiscectomy in 1983 or 1984.  Since surgery, she reports off-and-on back pain as well as some residual weakness.  She notices the weakness with walking, occasionally feeling like her knees may buckle.  She states the weakness has remained unchanged over the years.    Today she describes acute on chronic low back pain.  She carried some heavy grocery bags and began noticing some subsequent pain and altered gait like she was taking \"half steps\".  She describes bilateral low back pain, radiating to the posterolateral hips.  There is intermittent pain in the posterolateral surfaces of both legs.  In particular today the left leg is more bothersome.    The back pain is the most bothersome for her as it is constant.  The paresthesias in her legs are intermittent and transient, lasting less than an hour.    She states that the pain has improved 50% since the initial onset.  Today she rates the pain 0-1/10.    The pain is aggravated with leaning back against a chair while sitting, walking, changing positions in general, walking up stairs, or carrying groceries.  She has been unable to do day-to-day activities like cleaning her house due to pain.  Her hips feel sore if she is laying on her side. The pain is relieved by laying down or sitting up straight while sitting and not leaning her back against the chair.    She endorses an episode recently when she tripped but cannot recall if she indeed fell.    She describes bowel or bladder issues which sound functional. She wears a pad for urinary incontinence. She endorses chronic constipation with intermittent diarrhea.  She states that she has had some \"smears\" of stool which she did not anticipate, but thought it may have been related to passing gas or possibly having diarrhea that particular day.      She " denies saddle anesthesia. She denies weakness, unintentional weight loss, fevers/chills, or night sweats.       12/11/2023:  Patient was last seen in clinic 12/5/23. At that visit, the plan was to obtain an x-ray of the lumbar spine, MRI of the lumbar spine, and begin physical therapy with a home exercise program.    She was seen today in the clinic for follow-up after the MRI. She reports bilat LBP (L>R) radiating to the posterolateral legs.  At today's visit she denies any bowel or bladder incontinence issue - she reports having normal sensation that she needs to urinate or defecate. She did not yet start PT.      PRIOR INJURIES/TREATMENT:   Ice/Heat: ice    Brace: occasional elastic support brace    Physical Therapy:   PT for knee pain in 2022     - Current Pain Medications -   Tylenol 500 mg  ibuprofen      - Prior/Trialed Pain Medications -   Prednisone, Rx 9/20/2023 - for asthma exacerbation      Prior Procedures:  Date    Procedure   Improvement (%)  1980s  Chymopapain injection Some relief             Prior Related Surgery:    Microdiscetomy, 1983 or 1984    Other (acupuncture, OMT, CMM, TENS, DME, etc.):   Massage, remotely    Specialists Seen - (with most recent, available notes and clinic visits reviewed)   1. Orthopedics-right knee OA  2.  Neurology-memory changes  3.  Orthopedics-right thumb arthritis  4. Spine surgery in 1980s - Dr Spence at the [facility] ([facility])       IMAGING - reviewed   MR LUMBAR SPINE W/O CONTRAST  12/8/2023   FINDINGS:  There are 5 lumbar type vertebrae, used for the purposes of this  dictation. Conus tip at approximately T12-L1. Grade 1 anterolisthesis  of L4 over L5. Marked loss of disc height at L5-S1 is moderate to  severe. Degenerative endplate changes at L5-S1. No loss of vertebral  body height. No abnormal diffusion restriction.     On a level by level basis, the findings are as follows:     T12-L1: No spinal canal or neural foraminal  stenosis.     L1-2: Mild disc bulge. Ligamentum flavum hypertrophy. No spinal canal  or neural foraminal stenosis.     L2-3: Diffuse disc bulge. Ligamentum flavum hypertrophy. Facet  arthropathy. No spinal canal stenosis. No significant neural foraminal  stenosis.      L3-4: Large disc bulge with superimposed right central disc extrusion  extending cranially along the posterior aspect of the L3 vertebral  body. Marked facet arthropathy and thickening of ligamentum flavum.  There is moderate to severe spinal canal stenosis. There is mild  bilateral neural foraminal stenosis.     L4-5: Grade 1 anterolisthesis. Large disc bulge, asymmetric to the  right. Marked facet arthropathy and ligamentum flavum thickening.  Severe spinal canal stenosis. Mild to moderate bilateral neural  foraminal stenosis.     L5-S1: Circumferential disc osteophyte complex with facet arthropathy  but no spinal canal stenosis. There is a left hemilaminectomy. The  left neural foramen is mildly to moderately narrowed in the right  neural foramen is mildly narrowed.     The visualized paraspinous soft tissues are within normal limits.                                                      IMPRESSION:  Multilevel lumbar spondylosis greatest at L4-5 with severe spinal  canal stenosis at L3-4 with moderate to severe spinal canal stenosis.  Multilevel neural foraminal narrowings as detailed above.      XR LUMBAR SPINE 2/3 VIEWS  12/8/2023   FINDINGS:   Standing frontal and lateral views of the lumbar spine with L5-S1 spot  view.     5 lumbar type vertebral bodies are assumed for the purpose of this  dictation.     No acute osseous abnormality. The vertebral body heights are  maintained. Intervertebral disc space narrowing at L3-4, L4-5, and  L5-S1. Trace anterolisthesis of L3 on L4 and L4 on L5 and trace  retrolisthesis of L5 on S1. Degenerative changes with osteophytic  spurring and lower level predominant facet arthropathy. Likely neural  foraminal  narrowing at L4-5 and L5-S1.      The visualized bowel gas pattern is nonobstructive. Vascular  calcifications.                                                                      IMPRESSION:   1. No acute osseous abnormality.  2. Moderate degenerative changes of the lumbar spine are greatest at  L5-S1.      DEXA 10/10/2022  Osteopenia    CT ABDOMEN PELVIS W CONTRAST, 9/16/2022   FINDINGS:     Lower thorax: No effusion or focal consolidation.  Normal heart size.   Small hiatal hernia. Calcified granulomas in the right lower lobe  associated with calcified right hilar lymph nodes.     Liver: No mass. No intrahepatic biliary ductal dilation.     Biliary System: Gallbladder surgically absent.     Pancreas: Mild atrophy of the pancreatic head. No mass or pancreatic  ductal dilation.     Adrenal glands: No mass or nodules     Spleen: Normal.     Kidneys: No suspicious mass, obstructing calculus or hydronephrosis.     Gastrointestinal tract :Normal appendix. Normal caliber small bowel.     Mesentery/peritoneum/retroperitoneum: No mass. No free fluid or air.     Lymph nodes: No significant lymphadenopathy.     Vasculature: Patent major abdominal vasculature.     Pelvis: Urinary bladder is normal.  Descent of the anorectal junction,  vaginal apex, and bladder neck to or below the pubococcygeal line.     Osseous structures: No aggressive or acute osseous lesion.  Multilevel  degenerative changes visualized spine. Grade 1 anterolisthesis of L4  on L5.      Soft tissues: Within normal limits.                                               IMPRESSION:   1. No acute or suspicious abnormality in the abdomen or pelvis to  explain patient's symptoms.  2. Evidence of pelvic floor laxity.      XR THORACIC SPINE 3 VW, 7/11/2018   Findings:   AP and lateral views of the thoracic spine were obtained. The upper  thoracic spine is not well-visualized in the lateral view. No  significant compression deformities appreciated. The visualized  lung  fields are clear.                                                     Impression:   No thoracic compression deformities identified.      CT CERVICAL SPINE W/O CONTRAST 7/11/2018   Findings:  The cervical vertebrae are normally aligned. Mild straightening of the  normal cervical lordosis. No acute fracture or subluxation. No  prevertebral edema. Multilevel cervical spondylosis with loss of  intervertebral disc height at C2-T1. Posterior osteophyte disc  complexes from C3 through C7 result in mild to moderate spinal canal  narrowing, most pronounced at C3-4. Prominent anterior bridging  osteophytes. Multilevel uncinate process hypertrophy and bilateral  facet joint arthropathy. Moderate to severe right neural foraminal  narrowing at C6-7 and moderate left neuroforaminal narrowing at C6-7.     No abnormality of the paraspinous soft tissues.                                              Impression:   1. No fracture or traumatic subluxation of the cervical spine.  2. Multilevel cervical spondylosis.        Review Of Systems:  I am responding to those symptoms which are directly relevant to the specific indication for my consultation. I recommend that the patient follow up with their primary or referring provider to pursue any other symptoms which may be of concern.       Medical History:  She has a past medical history of Arthritis, Back injury, Glaucoma, Hiatal hernia, HTN (hypertension), Hypercalcemia (11/30/2019), Insomnia (12/26/2012), Reduced vision (09/2016), and Uncomplicated asthma.     She  has a past surgical history that includes Cholecystectomy (1990); Breast surgery (1984); back surgery (1984); tubal ligation (1989); STOMACH SURGERY PROCEDURE UNLISTED (03/1990); cataract extraction with intraocular lens (Left, 12/06/2018);  cataract extraction with intraocular lens implant (Right, 01/31/2019); IRIDOTOMY/IRIDECTOMY LASER SURGERY (Left, 09/27/2016); and IRIDOTOMY/IRIDECTOMY LASER SURGERY (Right,  10/18/2016).    Family History  Her family history includes Anesthesia Reaction in her mother; Asthma in her daughter, maternal grandmother, and mother; Cerebrovascular Disease in her paternal grandfather; Coronary Artery Disease in her father, maternal grandfather, and mother; Diabetes in her father, mother, and sister; Genetic Disease in her daughter; Heart Disease in her father and mother; Hip fracture in her sister; Hyperlipidemia in her father; Hypertension in her father, mother, and sister; Low Back Problems in her brother, father, mother, and sister; Thyroid Disease in her mother and sister.     Social History:  Work: Retired, formerly worked as a school nurse & at [employer]  Current living situation: lives with .   She  reports that she has never smoked. She has been exposed to tobacco smoke. She has never used smokeless tobacco. She reports current alcohol use. She reports that she does not use drugs.        Current Medications:   She has a current medication list which includes the following prescription(s): albuterol, alendronate, amlodipine, atorvastatin, azelastine, calcium carbonate antacid, fluticasone-salmeterol, ibuprofen, latanoprost, losartan, montelukast, mupirocin, nitroglycerin, and prednisone.     Allergies:    -- Canola Oil [Vegetable Oil] -- Anaphylaxis and GI Disturbance   -- Animal Dander -- Unknown   -- Dust Mites -- Unknown   -- Grass    -- Hydroxyzine -- Other (See Comments)    --  Passed out   -- Hydroxyzine Hcl    -- Pollen Extract -- Unknown   -- Trees    -- Chlorhexidine -- Itching and Rash    --  Hibiclens    PHYSICAL EXAMINATION:  /78 (BP Location: Right arm, Patient Position: Sitting, Cuff Size: Adult Regular)   Pulse 79   Wt 75.8 kg (167 lb 3.2 oz)   LMP  (LMP Unknown)   SpO2 95%   BMI 27.29 kg/m     --CONSTITUTIONAL: Vital signs as above. No acute distress. The patient is well nourished and well groomed.  --PSYCHIATRIC: The patient is awake, alert, oriented to  person, place, time and answering questions appropriately with clear speech. Appropriate mood and affect   --SKIN:  Posterior torso is clean, dry, intact without rashes.   --RESPIRATORY: Normal rhythm and effort. No abnormal accessory muscle breathing patterns noted.   --GROSS MOTOR: Easily arises from a seated position. Toe walking and heel walking are normal.    --STANDING EXAMINATION: Gait is non-antalgic. Normal lumbar lordosis noted, no lateral shift.   --LOWER EXTREMITY MOTOR TESTING:  Plantar flexion left 5/5, right 5/5   Dorsiflexion left 4+/5, right 5/5   Great toe MTP extension left 2/5, right 5/5  Knee flexion eft 4+/5, right 5/5  Knee extension left 5/5, right 5/5   Hip flexion left 5/5, right 5/5  --MUSCULOSKELETAL: Lumbar spine inspection reveals no evidence of deformity. Range of motion is not limited in lumbar flexion.  Range of motion is limited with lumbar extension and causes pain.   Facet loading maneuvers are positive bilaterally R>L.  --HIPS:  There is no pain with palpation of greater trochanters.   --NEUROLOGIC: 3/4 patellar and 2/4 achilles reflexes bilaterally.  Sensation to light touch is intact in the bilateral L4, L5, and S1 dermatomes. No clonus.    --VASCULAR:  Warm lower limbs bilaterally. There is no pitting edema of the bilateral lower extremities.     ASSESSMENT:  Jennifer Jane is a pleasant 72 year old female who presented with atraumatic acute on chronic low back pain which began in November.  There is constant bilateral low back pain, intermittently radiating to both posterolateral hips L>R in a L5 and S1 distribution.    Her ability to participate with PT is limited by pain and she is noted with LLE weakness which has worsened since last visit which has affected her gait and she endorses fall. Suspect her symptoms are attributable to the moderate/severe spinal canal stenosis at L3-4 and severe spinal canal stenosis at L4-5, along with mild/mild to moderate NFS at L5-S1.  There  may be component of facet mediated pain as well.      Complicating comorbidities include:  # Osteopenia on fosamax (patient discontinued    X-ray of the lumbar spine 12/8/2023 shows:  -Trace listhesis L3/L4, L4/L5, L5/S1  -Lumbar spondylosis with bone spurs and facet arthropathy    MRI of the lumbar spine 12/8/2023 shows  -Moderate to severe spinal canal stenosis at L3-4  -Severe spinal canal stenosis at L4-5  -Multilevel neuroforaminal stenosis, including at L5-S1 bilaterally  -Marked facet arthropathy at L3-4 and L4-5      PLAN:  -Add L5-S1 ILESI with Dr. Tsang at the [facility] location  -Add BLE EMG  -Unfortunately PT and the home exercise program are aggravating the pain and so she will hold off on those for now.  -Add gabapentin, 300 mg, that can be used as tolerated up to 3 times a day according the chart provided the visit summary  -We discussed red flag symptoms which would necessitate her being seen in the emergency department right away and she expressed understanding  -RTC for procedure with Dr. Tsang      Ready to learn, no apparent learning barriers.  Education provided on treatment plan according to patient's preferred learning style.  Patient verbalizes understanding.   __________________________________  Megan Menon NP  Physical Medicine & Rehabilitation        40 minutes spent by me on the date of the encounter doing chart review, history and exam, documentation and further activities per the note

## 2024-01-18 NOTE — PATIENT INSTRUCTIONS
It was a pleasure seeing you in the clinic today.  As discussed, we made the following updates to your plan of care:      I will enter the order for epidural steroid injection at the [facility] location and you will receive a call from the schedulers to set up the time.       Gabapentin (Neurontin) was prescribed that you can slowly increase as tolerated, according to the chart below:             AM        PM       BEDTIME    Day 0-5         -            -             300mg  Day 5-10      300       -              300mg  Day 10+       300       300         300mg    Continue to increase your dose as discussed above if you are not experiencing any side effects (in other words - if you experience side effects do not progress to the next week). If you are experiencing any side effects, return to the previous dose that was tolerable and continue until follow-up.     The most common side effect is sedation. Do not drive a motor vehicle until after you are familiar with how the medication may impact your attention and reaction.    Note that it may take several weeks to notice the benefits of this medication.   Do not abruptly stopped this medication prior to consulting with a physician.        If you lose control of bowel or bladder, numbness between the legs/groin area, or increased weakness, significant increase in pain/numbness/tingling you would need to be seen right away, in emergency department if needed      Let's plan to otherwise follow up in the clinic 2 weeks after the steroid injection.     Thank you,  Megan Menon NP  Physical Medicine and Rehabilitation, Medical Spine  PM&R clinic Phone: [phone]  [facility] clinic Fax: [phone]             Preparing for Spine Injection Therapy              Why Do I Need Spine Injection Therapy?  Your Health Care Provider is recommending spine injection therapy to  help relieve your back and neck pain. This will be in addition to other therapies such as medications and  physical therapy. The purpose of these injections is to reduce the amount of inflammation (swelling, pain, heat, redness, loss of body function) around the nerves thus reducing the amount of pain.     The medications you will receive with the injections will include:   Anesthetic - medication to numb the painful area.    Steroid - medication that prevents or reduces swelling and pain (anti-inflammatory).   To reduce your discomfort during the injection procedure, you will receive a numbing medication injection prior to the placement of the needles. You will be lying on your stomach during the injection procedure. We will use a low-dose x-ray (fluoroscopy) to help guide needle placement.  You must have a  for this procedure. We will need to reschedule your injection if you do not have a  with you.      What are the different types of injections and procedure?  Below, is a brief description of the different types of injections we use to deliver pain medication as close as possible to the nerves in the painful area:    Epidural injection: (picture on the right) Epidural injections place 2 medications in your epidural space.  This is the space alongside your spinal canal (not inside of it). Nerves from your spinal cord pass through this space. The medications will bathe those nerves.       Facet joint injection: These injections place 2 medications into the joints of your neck or spine.   SI joint injection: (picture on the left) deliver pain medications into the Sacroiliac joint that connects the hip bones.   Hip joint injection: deliver pain medication to the joint that connect your hip and femur bones (the femur is the bone in the center of the leg that extends from the knee with the hip).  You will be lying on your side with your affected side up. For example, if we are injecting your left hip, then you will be lying on your right side.   Medial Branch Block injections: This is a test to see if your pain  is coming from a specific nerve. This injection is similar to a facet joint injection but contains only the numbing medication.  You will keep a pain score diary for the rest of the day and the following morning after receiving the injection.    Radiofrequency ablation: This is a procedure that uses radio waves and numbing medication to block the nerves that feel pain at the joint. The pain relief effects can last for a long time, but are not permanent. The procedure is similar to the Medial Branch Block but requires additional testing to ensure that the needle is near the nerve before numbing and ablating it.  Doctors often order sedation for this procedure.  Please see the sections on sedation below.      What Should I Expect if I Receive Sedation? THIS IS ORDERED BY THE PHYSICIAN  This is conscious sedation.  The medications we give to you will help you relax and reduce your anxiety.  You will still be awake for the procedure so we can ask you questions and hear your answers.     What Are My Responsibilities With Sedation?  You must stop eating and drinking 8 (eight) hours before your procedure. You can have clear fluids (water, coffee/tea without milk) up to 2 hours prior to the injections. Nothing by mouth for 2 hours prior to injection.  This includes gum, mints, or chew.  Take your morning medications with a small sip of water.    You must have a  who will check-in with you, and stay in the building while the procedure is underway.  If you do not have a  your sedation will be cancelled.  We will monitor you for at least 30 minutes after the procedure before being discharged home.      What Are the Risks and Complications For This Procedure?  Risks and complication are rare, but can still occur.  You should understand, discuss, and accept these risks before agreeing to the procedure. They include, but are not limited to:  infection  nerve damage   paralysis  injection failure or a need for further  injections or additional procedures  continued or worsening of symptoms/pain,   medication reaction,  dural leak (into the hole covering around the spinal cord. This may cause a a spinal headache)  leak of the medication into the spinal canal, nerves, or blood vessel.  death       What do I need to do before the procedure?   If you do not follow these instructions your procedure may be cancelled.  Tell us if you are on major blood thinners such as Coumadin, Xarelto , Plavix, Eliquis , Pradaxa , or others.    Contact the doctor who prescribed your blood thinner to ask for permission to stop taking it before you have the injection.    Schedule your pain injection procedure after your doctor gave their permission.   We will notify you when to stop and re-start your blood thinner.  Tell us if you have any allergies to contrast dye.  If you do, we may give additional medications to take before the procedure.  Tell us if you are pregnant, or possibly pregnant.  If so, you cannot receive steroid medications or be exposed to fluoroscopic X-rays.  Tell us if you have been sick during the 10 days before the procedure. This includes:   colds   gastrointestinal illness or discomfort  dental sores,   skin infection, or any other type of infection.  Tell us if you have taken antibiotics during the 10 days prior to the procedure.  Do not drink alcohol the night before or on the day of the procedure.  You must shower the night before and on the day of your procedure.  Wear comfortable, clean clothing.  If you have an outside MRI (Magnetic Resonance Imaging photo), please bring it with you.    What Will Happen After the Procedure?  If you did not receive sedation we will monitor you for 15 minutes after the procedure. If you received sedation, we will monitor you for at least 30 minutes after the procedure     How soon can I expect pain relief?  You have received 2 types of medications with your injection:    Anesthetic - numbing  medication which only acts for a few hours    Steroid which may take 3-14 days to be effective.   You can expect to feel your normal pain after the anesthetic wears off, until the steroid becomes effective.    How should I care for myself at home?  Get plenty of rest and avoid twisting, bending movements, heavy lifting, or strenuous activity for the first 24 hours. This will help the steroid be more effective. Medial Branch Block injections will have different discharge instructions.  Those will be discussed at that injection appointment.  Resume your pain medications  Apply ice packs (on for 20 minutes at a time), every 2-3 hours to your injection area for the first 2-3 days to help with pain control.    Avoid heat (pads or water bottles), which can cause the veins to open up, making the steroid less effective. You can use heat after 48 hours.  Take showers only for the first 48 hours.  No baths, hot tubs, swimming, or soaking for 48 hours to reduce the risk of infection.     When should I call the doctor?  Call us if you have any of the following:   Fever more than 100.5 degrees Fahrenheit   Signs of infection   Severe headache   Severe back pain   Increased numbness or weakness in your legs or arms   Loss of bladder or bowel control  Nausea   Other concerns    What is the contact information?  During business hours Monday-Friday 8a-5p call [phone].   After business hours, on weekends and holidays call [phone] and ask for the PM&R doctor on call

## 2024-01-19 ENCOUNTER — TELEPHONE (OUTPATIENT)
Dept: PHYSICAL MEDICINE AND REHAB | Facility: CLINIC | Age: 74
End: 2024-01-19
Payer: COMMERCIAL

## 2024-01-23 ENCOUNTER — TELEPHONE (OUTPATIENT)
Dept: PHYSICAL MEDICINE AND REHAB | Facility: CLINIC | Age: 74
End: 2024-01-23
Payer: COMMERCIAL

## 2024-01-23 ENCOUNTER — HOSPITAL ENCOUNTER (OUTPATIENT)
Facility: AMBULATORY SURGERY CENTER | Age: 74
End: 2024-01-23
Attending: PHYSICAL MEDICINE & REHABILITATION
Payer: COMMERCIAL

## 2024-01-23 PROBLEM — M48.061 NEUROFORAMINAL STENOSIS OF LUMBAR SPINE: Status: ACTIVE | Noted: 2024-01-18

## 2024-01-23 PROBLEM — R29.898 WEAKNESS OF LEFT LOWER EXTREMITY: Status: ACTIVE | Noted: 2023-12-18

## 2024-01-23 PROBLEM — M54.16 LUMBAR RADICULOPATHY: Status: ACTIVE | Noted: 2023-12-18

## 2024-01-23 PROBLEM — M48.061 SPINAL STENOSIS OF LUMBAR REGION WITHOUT NEUROGENIC CLAUDICATION: Status: ACTIVE | Noted: 2024-01-18

## 2024-01-23 NOTE — TELEPHONE ENCOUNTER
Patient is scheduled for surgery with Dr. Tsang    Spoke with: patient     Date of Surgery: 1/31/24    [place]: [facility] [place]    Informed patient they will need an adult  yes    Additional comments: patient is aware of date and time of the procedure.        Katie Salmeron MA on 1/23/2024 at 11:21 AM    no

## 2024-01-24 ENCOUNTER — OFFICE VISIT (OUTPATIENT)
Dept: NEUROLOGY | Facility: CLINIC | Age: 74
End: 2024-01-24
Attending: NURSE PRACTITIONER
Payer: COMMERCIAL

## 2024-01-24 DIAGNOSIS — M48.061 NEUROFORAMINAL STENOSIS OF LUMBAR SPINE: ICD-10-CM

## 2024-01-24 DIAGNOSIS — M54.41 ACUTE BILATERAL LOW BACK PAIN WITH BILATERAL SCIATICA: ICD-10-CM

## 2024-01-24 DIAGNOSIS — M54.16 LUMBAR RADICULOPATHY: Primary | ICD-10-CM

## 2024-01-24 DIAGNOSIS — M54.42 ACUTE BILATERAL LOW BACK PAIN WITH BILATERAL SCIATICA: ICD-10-CM

## 2024-01-24 PROCEDURE — 95910 NRV CNDJ TEST 7-8 STUDIES: CPT | Performed by: PSYCHIATRY & NEUROLOGY

## 2024-01-24 PROCEDURE — 95886 MUSC TEST DONE W/N TEST COMP: CPT | Performed by: PSYCHIATRY & NEUROLOGY

## 2024-01-24 NOTE — PROGRESS NOTES
[facility]  Electrodiagnostic Laboratory                 Department of Neurology                                                                                                         Test Date:  2024    Patient: Fidelina Jane : 1950 Physician: Soco Flowers MD   Sex: Female AGE: 73 year Ref Phys: Megan Menon CNP   ID#: [id number]   Technician: Kristy Behling     History and Examination:  Fidelina Jane is a 73 year old woman who has chronic low back pain. The pain can radiate down the legs , more so on the left than the right. She had a previous microdiscectomy in the  that resulted in a foot drop for 3 months.     Results:  All nerve conduction studies (as indicated in the following tables) were within normal limits.  All left vs. right side differences were within normal limits.      All F Wave latencies were within normal limits.      Needle examination:  See tabulated results below. In the left lower extremity needle examination of muscles innervated by the L5 nerve root revealed inactive chronic neurogenic changes that were mild. In the right lower extremity isolate chronic neurogenic changes were seen in the medial gastrocnemius.     Interpretation:    This is an abnormal EMG. 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 The isolated changes in the right medial gastrocnemius could represent chronic S1 injury or could be more localized in nature. Clinical correlation is recommended.         ___________________________  Soco Flowers MD        Nerve Conduction Studies  Motor Sites      Latency Amplitude Neg. Amp Diff Segment Distance Velocity Neg. Dur Neg Area Diff Temperature Comment   Site (ms) Norm (mV) Norm (%)  cm m/s Norm (ms) (%) ( C)    Left Fibular (EDB) Motor   Ankle 3.6  < 6.0 3.1  > 2.0  Ankle-EDB 8   5.6  29.8    Bel Fib Head 11.2 - 2.1 - -32 Bel Fib Head-Ankle 36 47  > 38 6.2 -13 29.8    Pop Fossa 13.3 -  1.78 - -15 Pop Fossa-Bel Fib Head 9 43  > 38 8.4 33 29.7    Right Fibular (EDB) Motor   Ankle 3.9  < 6.0 3.3  > 2.0  Ankle-EDB 8   5.3  30.6    Left Tibial (AHB) Motor   Ankle 3.1  < 6.5 10.9  > 5.0  Ankle-AHB 8   7.0  30.2    Knee 12.8 - 5.7 - -48 Knee-Ankle 44 45  > 38 8.5 -22 30.4    Right Tibial (AHB) Motor   Ankle 3.5  < 6.5 7.0  > 5.0  Ankle-AHB 8   6.6  30.6        Motor                     Sensory Sites      Onset Lat Peak Lat Amp (O-P) Amp (P-P) Segment Distance Velocity Temperature Comment   Site ms (ms)  V Norm ( V)  cm m/s Norm ( C)    Left Superficial Fibular Sensory   14 cm-Ankle 2.4 3.0 4  > 3 5 14 cm-Ankle 12.5 52  > 38 28.2    Left Sural Sensory   Calf-Lat Mall 2.7 3.5 7  > 5 6 Calf-Lat Mall 14 52  > 38 30.6    Right Sural Sensory   Calf 2.8 3.5 7  > 5 11 Calf-Lat Mall 14 50  > 38 30.7        Sensory                   Electromyography     Side Muscle Ins Act Fibs/PSW Fasc HF Amp Dur Poly Recrt Int Pat   Left Tib Anterior Nml None Nml 0 1+ 1+ 0 Coco Nml   Left Gastroc, M Nml None Nml 0 Nml Nml 0 Nml Nml   Left Vastus Lat Nml None Nml 0 Nml Nml 0 Nml Nml   Left TensFascLat Nml None Nml 0 1+ 1+ 0 Coco Nml   Left Gluteus Max Nml None Nml 0 Nml Nml 0 Nml Nml   Right Tib Anterior Nml None Nml 0 Nml Nml 0 Nml Nml   Right Gastroc, M Nml None Nml 0 1+ 1+ 0 Coco Nml   Right Vastus Lat Nml None Nml 0 Nml Nml 0 Nml Nml   Right Gluteus Med Nml None Nml 0 Nml Nml 0 Nml Nml   Right Gluteus Max Nml None Nml 0 Nml Nml 0 Nml Nml

## 2024-01-24 NOTE — PROGRESS NOTES
1/24/24: After reviewing patient's surgical history and technical access with Dr Tsang, updated epidural steroid injection case request to bilateral L5-S1 transforaminal (rather than ILESI).  Megan Menon NP  Physical Medicine and Rehabilitation, Medical Spine

## 2024-01-24 NOTE — LETTER
2024         RE: Jennifer Jane  [address]  [place] [address]        Dear Colleague,    Thank you for referring your patient, Jennifer Jane, to the [facility]. Please see a copy of my visit note below.                            [facility]  Electrodiagnostic Laboratory                 Department of Neurology                                                                                                         Test Date:  2024    Patient: Fidelina Jane : 1950 Physician: Soco Flowers MD   Sex: Female AGE: 73 year Ref Phys: Megan Lynsey CNP   ID#: [id number]   Technician: Kristy Behling     History and Examination:  Fidelina Jnae is a 73 year old woman who has chronic low back pain. The pain can radiate down the legs , more so on the left than the right. She had a previous microdiscectomy in the  that resulted in a foot drop for 3 months.     Results:  All nerve conduction studies (as indicated in the following tables) were within normal limits.  All left vs. right side differences were within normal limits.      All F Wave latencies were within normal limits.      Needle examination:  See tabulated results below. In the left lower extremity needle examination of muscles innervated by the L5 nerve root revealed inactive chronic neurogenic changes that were mild. In the right lower extremity isolate chronic neurogenic changes were seen in the medial gastrocnemius.     Interpretation:    This is an abnormal EMG. 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 The isolated changes in the right medial gastrocnemius could represent chronic S1 injury or could be more localized in nature. Clinical correlation is recommended.         ___________________________  Soco Flowers MD        Nerve Conduction Studies  Motor Sites      Latency Amplitude Neg. Amp Diff Segment Distance Velocity Neg. Dur Neg Area  Diff Temperature Comment   Site (ms) Norm (mV) Norm (%)  cm m/s Norm (ms) (%) ( C)    Left Fibular (EDB) Motor   Ankle 3.6  < 6.0 3.1  > 2.0  Ankle-EDB 8   5.6  29.8    Bel Fib Head 11.2 - 2.1 - -32 Bel Fib Head-Ankle 36 47  > 38 6.2 -13 29.8    Pop Fossa 13.3 - 1.78 - -15 Pop Fossa-Bel Fib Head 9 43  > 38 8.4 33 29.7    Right Fibular (EDB) Motor   Ankle 3.9  < 6.0 3.3  > 2.0  Ankle-EDB 8   5.3  30.6    Left Tibial (AHB) Motor   Ankle 3.1  < 6.5 10.9  > 5.0  Ankle-AHB 8   7.0  30.2    Knee 12.8 - 5.7 - -48 Knee-Ankle 44 45  > 38 8.5 -22 30.4    Right Tibial (AHB) Motor   Ankle 3.5  < 6.5 7.0  > 5.0  Ankle-AHB 8   6.6  30.6        Motor                     Sensory Sites      Onset Lat Peak Lat Amp (O-P) Amp (P-P) Segment Distance Velocity Temperature Comment   Site ms (ms)  V Norm ( V)  cm m/s Norm ( C)    Left Superficial Fibular Sensory   14 cm-Ankle 2.4 3.0 4  > 3 5 14 cm-Ankle 12.5 52  > 38 28.2    Left Sural Sensory   Calf-Lat Mall 2.7 3.5 7  > 5 6 Calf-Lat Mall 14 52  > 38 30.6    Right Sural Sensory   Calf 2.8 3.5 7  > 5 11 Calf-Lat Mall 14 50  > 38 30.7        Sensory                   Electromyography     Side Muscle Ins Act Fibs/PSW Fasc HF Amp Dur Poly Recrt Int Pat   Left Tib Anterior Nml None Nml 0 1+ 1+ 0 Coco Nml   Left Gastroc, M Nml None Nml 0 Nml Nml 0 Nml Nml   Left Vastus Lat Nml None Nml 0 Nml Nml 0 Nml Nml   Left TensFascLat Nml None Nml 0 1+ 1+ 0 Coco Nml   Left Gluteus Max Nml None Nml 0 Nml Nml 0 Nml Nml   Right Tib Anterior Nml None Nml 0 Nml Nml 0 Nml Nml   Right Gastroc, M Nml None Nml 0 1+ 1+ 0 Coco Nml   Right Vastus Lat Nml None Nml 0 Nml Nml 0 Nml Nml   Right Gluteus Med Nml None Nml 0 Nml Nml 0 Nml Nml   Right Gluteus Max Nml None Nml 0 Nml Nml 0 Nml Nml           Again, thank you for allowing me to participate in the care of your patient.        Sincerely,        Soco Flowers MD

## 2024-01-26 DIAGNOSIS — I10 BENIGN ESSENTIAL HYPERTENSION: ICD-10-CM

## 2024-01-26 RX ORDER — AMLODIPINE BESYLATE 5 MG/1
5 TABLET ORAL AT BEDTIME
Qty: 90 TABLET | Refills: 0 | Status: SHIPPED | OUTPATIENT
Start: 2024-01-26 | End: 2024-02-28

## 2024-01-26 NOTE — TELEPHONE ENCOUNTER
Rx approved to get patient through already scheduled first available appointment.  Dr. Sun Person MD / [facility]

## 2024-01-29 DIAGNOSIS — E78.00 HYPERCHOLESTEROLEMIA: ICD-10-CM

## 2024-01-30 RX ORDER — ATORVASTATIN CALCIUM 20 MG/1
20 TABLET, FILM COATED ORAL DAILY
Qty: 30 TABLET | Refills: 0 | Status: SHIPPED | OUTPATIENT
Start: 2024-01-30 | End: 2024-02-27

## 2024-01-31 ENCOUNTER — HOSPITAL ENCOUNTER (OUTPATIENT)
Facility: AMBULATORY SURGERY CENTER | Age: 74
Discharge: HOME | End: 2024-01-31
Attending: PHYSICAL MEDICINE & REHABILITATION | Admitting: PHYSICAL MEDICINE & REHABILITATION
Payer: COMMERCIAL

## 2024-01-31 ENCOUNTER — ANCILLARY PROCEDURE (OUTPATIENT)
Dept: RADIOLOGY | Facility: AMBULATORY SURGERY CENTER | Age: 74
End: 2024-01-31
Attending: PHYSICAL MEDICINE & REHABILITATION
Payer: COMMERCIAL

## 2024-01-31 VITALS
RESPIRATION RATE: 15 BRPM | DIASTOLIC BLOOD PRESSURE: 56 MMHG | HEART RATE: 75 BPM | TEMPERATURE: 97.5 F | SYSTOLIC BLOOD PRESSURE: 119 MMHG | OXYGEN SATURATION: 98 %

## 2024-01-31 DIAGNOSIS — M54.16 LUMBAR RADICULOPATHY: ICD-10-CM

## 2024-01-31 PROCEDURE — 64483 NJX AA&/STRD TFRM EPI L/S 1: CPT | Mod: LT,GC

## 2024-01-31 RX ORDER — LIDOCAINE HYDROCHLORIDE 10 MG/ML
INJECTION, SOLUTION EPIDURAL; INFILTRATION; INTRACAUDAL; PERINEURAL DAILY PRN
Status: DISCONTINUED | OUTPATIENT
Start: 2024-01-31 | End: 2024-01-31 | Stop reason: HOSPADM

## 2024-01-31 RX ORDER — IOPAMIDOL 408 MG/ML
INJECTION, SOLUTION INTRATHECAL DAILY PRN
Status: DISCONTINUED | OUTPATIENT
Start: 2024-01-31 | End: 2024-01-31 | Stop reason: HOSPADM

## 2024-01-31 RX ORDER — DEXAMETHASONE SODIUM PHOSPHATE 10 MG/ML
INJECTION INTRAMUSCULAR; INTRAVENOUS DAILY PRN
Status: DISCONTINUED | OUTPATIENT
Start: 2024-01-31 | End: 2024-01-31 | Stop reason: HOSPADM

## 2024-01-31 NOTE — DISCHARGE INSTRUCTIONS

## 2024-01-31 NOTE — OP NOTE
PROCEDURE NOTE: Lumbar Transforaminal Epidural Steroid Injection Under Fluoroscopic Guidance    PROCEDURE DATE: 1/31/2024    PATIENT NAME: Jennifer Jane   YOB: 1950    ATTENDING PHYSICIAN:  Sarahy Tsang MD   RESIDENT/FELLOW PHYSICIAN: Ashkan Heshmatzadeh Behzadi, MD, Interventional Radiology Fellow     PREOPERATIVE DIAGNOSIS:   Lumbar Radiculopathy   POSTOPERATIVE DIAGNOSIS: same    PROCEDURE PERFORMED: Bilateral Lumbar Transforaminal Epidural Steroid Injection at the L5-S1 level(s)    FLUOROSCOPY WAS USED.    INDICATIONS FOR PROCEDURE:   Jennifer Jane is a 73 year old female with a clinical picture consistent with the above-mentioned diagnosis, resulting in radicular pain to the Bilateral lower extremities. She has a history of prior discectomy and hemilaminectomy. MRI lumbar spine 12/08/23 shows multilevel lumbar spondylosis greatest at L4-5 with severe spinal canal stenosis at L3-4 with moderate to severe spinal canal stenosis.      PROCEDURE AND FINDINGS:    She was greeted in the pre-procedure holding area. The risk, benefits and alternatives to the procedure were again reviewed with the patient and written informed consent was placed in the chart. An IV line was not placed.  A 500 mL bag of NS was not connected to the patient. She was taken to the procedure room and positioned prone on the fluoroscopy table.  Routine monitors were applied including EKG leads (with sedation only), blood pressure cuff, and pulse oximetry. Prior to the procedure a time out was completed, verifying correct patient, procedure, site, positioning, and implants and/or special equipment.     An AP fluoroscopic  film was taken to identify the L5 pedicle and the S1 superior articulating process. The skin was prepped with DuraPrep and draped in the usual sterile fashion. The skin and subcutaneous tissue overlying the aforementioned anatomic targets were anesthetized using a 27-gauge 1-1/4 inch needle with 1%  preservative-free lidocaine for a total volume of 3mls.      Then a 22-gauge 5 inch Quincke spinal needle was advanced under fluoroscopic guidance using an oblique view just inferior to the pedicle of the L5 level(s) on the Bilateral side(s).  Then, utilizing AP and lateral fluoroscopic views, we confirmed the position of the needle tip to be within the neural foramen. After negative aspiration, 1 mls of Isovue-M contrast was injected under AP view at each level and confirmed adequate spread along the nerve root and in the epidural space. There was no evidence of intravascular uptake or intrathecal spread on imaging.     At this point, after negative aspiration, a total  1.5 mL volume of treatment injectate, consisting of 0.75mL Dexamethasone (10mg/mL) (15mg total dose), and  1.5 mL of 1% Lidocaine, was injected easily, per level.  The needle was then flushed with 0.5 ml of local anesthetic and removed. The needle insertion site was dressed appropriately.     Jennifer Jane was taken to the recovery room where she was monitored for a brief period of time. She tolerated the procedure well and was discharged home in stable condition with post procedural instructions.     Before the procedure, she reported a pain score of 2/10.   After the procedure, she reported a pain score of 1/10.      Follow-up will be [facility] with Megan Menon NP     COMPLICATIONS:  None    Comment(s):  None

## 2024-02-10 ENCOUNTER — VIRTUAL VISIT (OUTPATIENT)
Dept: URGENT CARE | Facility: CLINIC | Age: 74
End: 2024-02-10
Payer: COMMERCIAL

## 2024-02-10 ENCOUNTER — NURSE TRIAGE (OUTPATIENT)
Dept: NURSING | Facility: CLINIC | Age: 74
End: 2024-02-10
Payer: COMMERCIAL

## 2024-02-10 DIAGNOSIS — J45.20 INTERMITTENT ASTHMA WITH ALLERGIC RHINITIS: ICD-10-CM

## 2024-02-10 DIAGNOSIS — U07.1 INFECTION DUE TO 2019 NOVEL CORONAVIRUS: Primary | ICD-10-CM

## 2024-02-10 PROCEDURE — 99442 PR PHYSICIAN TELEPHONE EVALUATION 11-20 MIN: CPT | Mod: 93

## 2024-02-10 RX ORDER — PREDNISONE 20 MG/1
20 TABLET ORAL 2 TIMES DAILY
Qty: 10 TABLET | Refills: 0 | Status: SHIPPED | OUTPATIENT
Start: 2024-02-10 | End: 2024-02-15

## 2024-02-10 NOTE — TELEPHONE ENCOUNTER
COVID Positive/Requesting COVID treatment    Patient is positive for COVID and requesting treatment options.    Date of positive COVID test (PCR or at home)? 02/10/24  Current COVID symptoms: cough, fatigue, muscle or body aches, headache, congestion or runny nose, and nausea or vomiting  Date COVID symptoms began: 02/03/24    Message should be routed to clinic RN pool. Best phone number to use for call back: [phone]     Reason for Disposition   [1] HIGH RISK patient (e.g., weak immune system, age > 64 years, obesity with BMI 30 or higher, pregnant, chronic lung disease or other chronic medical condition) AND [2] COVID symptoms (e.g., cough, fever)  (Exceptions: Already seen by PCP and no new or worsening symptoms.)    Additional Information   Negative: SEVERE difficulty breathing (e.g., struggling for each breath, speaks in single words)   Negative: Difficult to awaken or acting confused (e.g., disoriented, slurred speech)   Negative: Bluish (or gray) lips or face now   Negative: Shock suspected (e.g., cold/pale/clammy skin, too weak to stand, low BP, rapid pulse)   Negative: Sounds like a life-threatening emergency to the triager   Negative: [1] Diagnosed or suspected COVID-19 AND [2] symptoms lasting 3 or more weeks   Negative: [1] COVID-19 exposure AND [2] no symptoms   Negative: COVID-19 vaccine reaction suspected (e.g., fever, headache, muscle aches) occurring 1 to 3 days after getting vaccine   Negative: COVID-19 vaccine, questions about   Negative: [1] Lives with someone known to have influenza (flu test positive) AND [2] flu-like symptoms (e.g., cough, runny nose, sore throat, SOB; with or without fever)   Negative: [1] Possible COVID-19 symptoms AND [2] triager concerned about severity of symptoms or other causes   Negative: COVID-19 and breastfeeding, questions about   Negative: SEVERE or constant chest pain or pressure  (Exception: Mild central chest pain, present only when coughing.)   Negative:  MODERATE difficulty breathing (e.g., speaks in phrases, SOB even at rest, pulse 100-120)   Negative: [1] Headache AND [2] stiff neck (can't touch chin to chest)   Negative: Oxygen level (e.g., pulse oximetry) 90 percent or lower   Negative: Chest pain or pressure  (Exception: MILD central chest pain, present only when coughing.)   Negative: [1] Drinking very little AND [2] dehydration suspected (e.g., no urine > 12 hours, very dry mouth, very lightheaded)   Negative: Patient sounds very sick or weak to the triager   Negative: MILD difficulty breathing (e.g., minimal/no SOB at rest, SOB with walking, pulse <100)   Negative: Fever > 103 F (39.4 C)   Negative: [1] Fever > 101 F (38.3 C) AND [2] age > 60 years   Negative: [1] Fever > 100.0 F (37.8 C) AND [2] bedridden (e.g., CVA, chronic illness, recovering from surgery)   Negative: Oxygen level (e.g., pulse oximetry) 91 to 94 percent    Protocols used: Coronavirus (COVID-19) Diagnosed or [id number]

## 2024-02-10 NOTE — PROGRESS NOTES
"Fidelina is a 73 year old who is being evaluated via a billable telephone visit.      What phone number would you like to be contacted at?   How would you like to obtain your AVS? Zaheer    Distant Location (provider location):  On-site    Assessment & Plan     Infection due to 2019 novel coronavirus    - nirmatrelvir and ritonavir (PAXLOVID) 300 mg/100 mg therapy pack; Take 3 tablets by mouth 2 times daily for 5 days (Take 2 Nirmatrelvir tablets and 1 Ritonavir tablet twice daily for 5 days)  - predniSONE (DELTASONE) 20 MG tablet; Take 1 tablet (20 mg) by mouth 2 times daily for 5 days    Intermittent asthma with allergic rhinitis  Will only use if needed since we are decreasing her Advair while on Paxlovid.   - predniSONE (DELTASONE) 20 MG tablet; Take 1 tablet (20 mg) by mouth 2 times daily for 5 days          BMI  Estimated body mass index is 27.29 kg/m  as calculated from the following:    Height as of 12/11/23: 1.667 m (5' 5.63\").    Weight as of 1/18/24: 75.8 kg (167 lb 3.2 oz).         COVID-19 positive patient.  Encounter for consideration of medication intervention. Patient does qualify for a prescription. Full discussion with patient including medication options, risks and benefits. Potential drug interactions reviewed with patient.     Treatment Planned  Paxlovid sent to [facility] in [facility] in [place]    Temporary change to home medications:   Will hold atorvastatin for 8 days starting today  Will hold trazodone while on Paxlovid  Will decrease amlodipine to 2.5 mg daily while on Paxlovid and monitor blood pressure  Will try to hold Advair while on Paxlovid.  If this is not possible can use 1 inhalation daily.  Was given prescription for prednisone twice daily if her asthma flares up.  Will only use if needed    Estimated body mass index is 27.29 kg/m  as calculated from the following:    Height as of 12/11/23: 1.667 m (5' 5.63\").    Weight as of 1/18/24: 75.8 kg (167 lb 3.2 oz).  GFR Estimate   Date Value Ref " "Range Status   10/31/2023 73 >60 mL/min/1.73m2 Final   12/02/2020 68 >60 mL/min/[1.73_m2] Final     Comment:     Non  GFR Calc  Starting 12/18/2018, serum creatinine based estimated GFR (eGFR) will be   calculated using the Chronic Kidney Disease Epidemiology Collaboration   (CKD-EPI) equation.       GFR, ESTIMATED POCT   Date Value Ref Range Status   09/16/2022 >60 >60 mL/min/1.73m2 Final     No results found for: \"[id number]\"    Return in about 1 week (around 2/17/2024), or if symptoms worsen or fail to improve.    Henrry Kitchen is a 73 year old, presenting for the following health issues:  No chief complaint on file.    HPI       COVID-19 Symptom Review  How many days ago did these symptoms start? 5 days ago    Are any of the following symptoms significant for you?  New or worsening difficulty breathing? No  Worsening cough? Yes, I am coughing up mucus.  Fever or chills? Yes, the highest temperature was 100.6  Headache: YES  Sore throat: No  Chest pain: No  Diarrhea: No  Body aches? YES    What treatments has patient tried? Acetaminophen   Does patient live in a nursing home, group home, or shelter? No  Does patient have a way to get food/medications during quarantined? Yes, I have a friend or family member who can help me.                Review of Systems  Constitutional, HEENT, cardiovascular, pulmonary, gi and gu systems are negative, except as otherwise noted.        Exam  No vital with phone visit  EXAM:  Constitutional: healthy, alert, and no distress   Respiratory: negative.  No cough or wheeze heard throughout phone visit.  Able to speak in complete sentence without any difficulty.  Rest of exam was not able to be completed due to phone visit          Phone call duration: 15 minutes  Signed Electronically by: [facility]  The patient has been notified of following:     \"This telephone visit will be conducted via a call between you and your physician/provider. We have found that " certain health care needs can be provided without the need for a physical exam.  This service lets us provide the care you need with a short phone conversation.  If a prescription is necessary we can send it directly to your pharmacy.  If lab work is needed we can place an order for that and you can then stop by our lab to have the test done at a later time.    Telephone visits are billed at different rates depending o

## 2024-02-10 NOTE — PATIENT INSTRUCTIONS
For informational purposes only. Not to replace the advice of your health care provider. Copyright   2022 [facility]. All rights reserved. Clinically reviewed by Donna Pantoja, PharmD, BCACP. [place]ova [id number] - REV 06/23.  COVID-19 Outpatient Treatments  Your care team can help you find the best treatments for COVID-19. Talk to a health care provider or refer to the FDA medicine fact sheets below.  Paxlovid (nirmatrelvir and ritonavir): https://www.paxlovid.Punchh/resources  Molnupiravir (Lagevrio): https://www.fda.gov/media/934243/download  Important: We can only prescribe Paxlovid or Molnupiravir when it can be started within 5 days of first having symptoms.  Paxlovid (nimatrelvir and ritonavir)  How it works  Two medicines (nirmatrelvir and ritonavir) are taken together. They stop the virus from growing. Less amount of virus is easier for your body to fight.  Benefits  Lowers risk of a hospital stay or death from COVID-19.  How to take  Medicine comes in a daily container with both medicine tablets. Take by mouth twice daily (once in the morning, once at night) for 5 days.  The number of tablets to take varies by patient.  Don't chew or break capsules. Swallow whole.  When to take  Take it as soon as possible and within 5 days of your first symptoms.  Who can take it  Patients must be 12 years or older weigh at least 88 pounds. Paxlovid is the preferred treatment for pregnant patients.  Possible side effects  Can cause altered sense of taste, diarrhea (loose, watery stools), high blood pressure, muscle aches.  Medicine conflicts  Some medicines may conflict with Paxlovid and may cause serious side effects.  Tell your care team about all the medicines you take, including prescription and over-the-counter medicines, vitamins, and herbal supplements.  Your care team will review your medicines to make sure that you can safely take Paxlovid.  Cautions  Paxlovid is not advised for patients with severe  kidney or liver disease. If you have kidney or liver problems, the dose may need to be changed.  If you're pregnant or breastfeeding, talk to your care team about your options.  If you take hormonal birth control (such as the Pill), then you or your partner should also use a non-hormonal form of birth control (such as a condom). Keep doing this for 1 menstrual cycle after your last dose of Paxlovid.  Molnupiravir (lagevrio)  How it works  Stops the virus from growing. Less amount of virus is easier for your body to fight.  Benefits  Lowers risk of a hospital stay or death from COVID-19.  How to take  Take 4 capsules by mouth every 12 hours (4 in the morning and 4 at night) for 5 days. Don't chew or break capsules. Swallow whole.  When to take  Take as soon as possible and within 5 days of your first symptoms.  Who can take it  Patients must be 18 years or older.   Possible side effects  Diarrhea (loose, watery stools), nausea (feeling sick to your stomach), dizziness, headaches.  Medicine conflicts  Right now, there are no known conflicts with other drugs. But tell your care team about all medicines you take.  Cautions  This medicine is not advised for patients who are pregnant.  If you are someone who could become pregnant, use trusted birth control until 4 days after your last dose of molnupiravir.  If your partner could become pregnant, you should use trusted birth control until 3 months after your last dose of molnupiravir.      Coping with Life After COVID-19  Being in the hospital because of COVID-19 is scary. Going home can be scary, too. You may face changes to your life, the way you work or what you can eat. It s hard to adjust to change, and it s normal to feel afraid, frustrated or even angry. These feelings usually go away over time. If your feelings don t start to get better, it s called  adjustment disorder.      What signs should I look out for?  Adjustment disorder can happen to anyone in a time of  stress. It makes it hard to cope with daily life. You may feel lonely or fight with loved ones, even if you re glad to be home. Watch for these signs:  Fear or worry  Hard time focusing  Sadness or anger  Trouble talking to family or friends  Feeling like you don t fit in or isolating yourself  Problems with sleep   Drinking alcohol or taking drugs to cope    What can I do?  You can help yourself get better. Feeling you have control helps you move forward. You may wonder if you ll be able to do things you did before. Be patient. Do your best to make the most of every day. Try to build relationships, be as active as you can, eat right and keep a sense of humor. Avoid smoking and drinking too much alcohol. Call your family doctor or clinic if you re not sure what to do. They can guide you to care or other services.    When should I get help?  Think about getting counseling if your sadness or frustration gets worse. Together with a trained counselor, you can talk about your problems adjusting to life after your hospital stay. You can come up with new ways to handle changes that give you more control. Your family doctor or care team can help you find a counselor.     Get help if you re thinking about hurting yourself. If you need help right away, call 911 or go to the nearest emergency room. You can also try the Crisis Text Line.    Crisis Text Line: 676-697 (http://www.crisistextline.org)  The Crisis Text Line serves anyone, in any crisis. It gives free, 24/7 support. Here's how it works:  Text HOME to 480504 from anywhere in the [place], anytime, about any type of crisis.  A live, trained Crisis Counselor will text you back quickly.  The volunteer Crisis Counselor can help you move from a  hot moment  to a  cool moment.  They can also help you work out a safety plan.

## 2024-02-14 ENCOUNTER — TRANSFERRED RECORDS (OUTPATIENT)
Dept: HEALTH INFORMATION MANAGEMENT | Facility: CLINIC | Age: 74
End: 2024-02-14
Payer: COMMERCIAL

## 2024-02-27 DIAGNOSIS — E78.00 HYPERCHOLESTEROLEMIA: ICD-10-CM

## 2024-02-27 RX ORDER — ATORVASTATIN CALCIUM 20 MG/1
20 TABLET, FILM COATED ORAL DAILY
Qty: 30 TABLET | Refills: 0 | Status: SHIPPED | OUTPATIENT
Start: 2024-02-27 | End: 2024-02-28

## 2024-02-28 ENCOUNTER — OFFICE VISIT (OUTPATIENT)
Dept: FAMILY MEDICINE | Facility: CLINIC | Age: 74
End: 2024-02-28
Attending: FAMILY MEDICINE
Payer: COMMERCIAL

## 2024-02-28 VITALS
WEIGHT: 173.4 LBS | OXYGEN SATURATION: 97 % | DIASTOLIC BLOOD PRESSURE: 62 MMHG | BODY MASS INDEX: 29.6 KG/M2 | HEIGHT: 64 IN | RESPIRATION RATE: 16 BRPM | TEMPERATURE: 98.3 F | SYSTOLIC BLOOD PRESSURE: 118 MMHG | HEART RATE: 80 BPM

## 2024-02-28 DIAGNOSIS — E78.00 HYPERCHOLESTEROLEMIA: ICD-10-CM

## 2024-02-28 DIAGNOSIS — Z29.11 NEED FOR VACCINATION AGAINST RESPIRATORY SYNCYTIAL VIRUS: ICD-10-CM

## 2024-02-28 DIAGNOSIS — I10 BENIGN ESSENTIAL HYPERTENSION: ICD-10-CM

## 2024-02-28 DIAGNOSIS — Z00.00 ENCOUNTER FOR MEDICARE ANNUAL WELLNESS EXAM: Primary | ICD-10-CM

## 2024-02-28 DIAGNOSIS — Z23 NEED FOR SHINGLES VACCINE: ICD-10-CM

## 2024-02-28 DIAGNOSIS — M85.9 LOW BONE DENSITY: ICD-10-CM

## 2024-02-28 DIAGNOSIS — K59.04 CHRONIC IDIOPATHIC CONSTIPATION: ICD-10-CM

## 2024-02-28 DIAGNOSIS — E21.0 PRIMARY HYPERPARATHYROIDISM (H): ICD-10-CM

## 2024-02-28 DIAGNOSIS — R73.03 PRE-DIABETES: ICD-10-CM

## 2024-02-28 DIAGNOSIS — F51.04 CHRONIC INSOMNIA: ICD-10-CM

## 2024-02-28 DIAGNOSIS — M54.16 LUMBAR RADICULOPATHY: ICD-10-CM

## 2024-02-28 PROBLEM — M25.561 CHRONIC PAIN OF RIGHT KNEE: Status: ACTIVE | Noted: 2017-10-23

## 2024-02-28 PROBLEM — M54.41 ACUTE BILATERAL LOW BACK PAIN WITH BILATERAL SCIATICA: Status: RESOLVED | Noted: 2023-12-18 | Resolved: 2024-02-28

## 2024-02-28 PROBLEM — D12.2 BENIGN NEOPLASM OF ASCENDING COLON: Status: RESOLVED | Noted: 2023-10-19 | Resolved: 2024-02-28

## 2024-02-28 PROBLEM — G89.29 CHRONIC LEFT-SIDED HEADACHE: Status: RESOLVED | Noted: 2020-03-04 | Resolved: 2024-02-28

## 2024-02-28 PROBLEM — E83.52 SERUM CALCIUM ELEVATED: Status: RESOLVED | Noted: 2019-11-30 | Resolved: 2024-02-28

## 2024-02-28 PROBLEM — R51.9 CHRONIC LEFT-SIDED HEADACHE: Status: RESOLVED | Noted: 2020-03-04 | Resolved: 2024-02-28

## 2024-02-28 PROBLEM — M79.671 RIGHT FOOT PAIN: Status: RESOLVED | Noted: 2018-03-06 | Resolved: 2024-02-28

## 2024-02-28 PROBLEM — R19.7 DIARRHEA, UNSPECIFIED TYPE: Status: RESOLVED | Noted: 2020-12-02 | Resolved: 2024-02-28

## 2024-02-28 PROBLEM — Z96.1 PSEUDOPHAKIA, BOTH EYES: Status: RESOLVED | Noted: 2019-02-01 | Resolved: 2024-02-28

## 2024-02-28 PROBLEM — M48.061 SPINAL STENOSIS OF LUMBAR REGION WITHOUT NEUROGENIC CLAUDICATION: Status: RESOLVED | Noted: 2024-01-18 | Resolved: 2024-02-28

## 2024-02-28 PROBLEM — K57.30 DIVERTICULAR DISEASE OF LARGE INTESTINE: Status: RESOLVED | Noted: 2023-10-19 | Resolved: 2024-02-28

## 2024-02-28 PROBLEM — M22.41 CHONDROMALACIA OF PATELLA, RIGHT: Status: RESOLVED | Noted: 2017-10-10 | Resolved: 2024-02-28

## 2024-02-28 PROBLEM — R82.994 HYPERCALCIURIA: Status: RESOLVED | Noted: 2023-07-12 | Resolved: 2024-02-28

## 2024-02-28 PROBLEM — M25.561 ACUTE PAIN OF RIGHT KNEE: Status: RESOLVED | Noted: 2017-10-04 | Resolved: 2024-02-28

## 2024-02-28 PROBLEM — H40.1431: Status: ACTIVE | Noted: 2017-11-06

## 2024-02-28 PROBLEM — G89.29 CHRONIC PAIN OF RIGHT KNEE: Status: ACTIVE | Noted: 2017-10-23

## 2024-02-28 PROBLEM — R60.0 BILATERAL EDEMA OF LOWER EXTREMITY: Status: ACTIVE | Noted: 2020-12-02

## 2024-02-28 PROBLEM — R29.898 WEAKNESS OF LEFT LOWER EXTREMITY: Status: RESOLVED | Noted: 2023-12-18 | Resolved: 2024-02-28

## 2024-02-28 PROBLEM — M54.42 ACUTE BILATERAL LOW BACK PAIN WITH BILATERAL SCIATICA: Status: RESOLVED | Noted: 2023-12-18 | Resolved: 2024-02-28

## 2024-02-28 PROBLEM — K63.5 POLYP OF COLON: Status: RESOLVED | Noted: 2023-10-17 | Resolved: 2024-02-28

## 2024-02-28 PROBLEM — R79.89 ELEVATED PARATHYROID HORMONE: Status: RESOLVED | Noted: 2022-10-26 | Resolved: 2024-02-28

## 2024-02-28 PROBLEM — N81.11 CYSTOCELE, MIDLINE: Status: ACTIVE | Noted: 2017-10-04

## 2024-02-28 LAB — HBA1C MFR BLD: 6.8 % (ref 0–5.6)

## 2024-02-28 PROCEDURE — 83036 HEMOGLOBIN GLYCOSYLATED A1C: CPT | Performed by: FAMILY MEDICINE

## 2024-02-28 PROCEDURE — 80061 LIPID PANEL: CPT | Performed by: FAMILY MEDICINE

## 2024-02-28 PROCEDURE — G0439 PPPS, SUBSEQ VISIT: HCPCS | Performed by: FAMILY MEDICINE

## 2024-02-28 PROCEDURE — 80053 COMPREHEN METABOLIC PANEL: CPT | Performed by: FAMILY MEDICINE

## 2024-02-28 PROCEDURE — 36415 COLL VENOUS BLD VENIPUNCTURE: CPT | Performed by: FAMILY MEDICINE

## 2024-02-28 PROCEDURE — 82248 BILIRUBIN DIRECT: CPT | Performed by: FAMILY MEDICINE

## 2024-02-28 PROCEDURE — 99214 OFFICE O/P EST MOD 30 MIN: CPT | Mod: 25 | Performed by: FAMILY MEDICINE

## 2024-02-28 RX ORDER — TRAZODONE HYDROCHLORIDE 50 MG/1
50 TABLET, FILM COATED ORAL AT BEDTIME
Qty: 90 TABLET | Refills: 4 | Status: SHIPPED | OUTPATIENT
Start: 2024-02-28 | End: 2024-06-21

## 2024-02-28 RX ORDER — AMLODIPINE BESYLATE 5 MG/1
5 TABLET ORAL AT BEDTIME
Qty: 90 TABLET | Refills: 4 | Status: SHIPPED | OUTPATIENT
Start: 2024-02-28

## 2024-02-28 RX ORDER — RESPIRATORY SYNCYTIAL VIRUS VACCINE 120MCG/0.5
0.5 KIT INTRAMUSCULAR ONCE
Qty: 1 EACH | Refills: 0 | Status: SHIPPED | OUTPATIENT
Start: 2024-02-28 | End: 2024-02-28

## 2024-02-28 RX ORDER — LOSARTAN POTASSIUM 50 MG/1
50 TABLET ORAL 2 TIMES DAILY
Qty: 180 TABLET | Refills: 4 | Status: SHIPPED | OUTPATIENT
Start: 2024-02-28

## 2024-02-28 RX ORDER — ATORVASTATIN CALCIUM 20 MG/1
20 TABLET, FILM COATED ORAL DAILY
Qty: 90 TABLET | Refills: 4 | Status: SHIPPED | OUTPATIENT
Start: 2024-02-28

## 2024-02-28 SDOH — HEALTH STABILITY: PHYSICAL HEALTH: ON AVERAGE, HOW MANY MINUTES DO YOU ENGAGE IN EXERCISE AT THIS LEVEL?: 20 MIN

## 2024-02-28 SDOH — HEALTH STABILITY: PHYSICAL HEALTH: ON AVERAGE, HOW MANY DAYS PER WEEK DO YOU ENGAGE IN MODERATE TO STRENUOUS EXERCISE (LIKE A BRISK WALK)?: 4 DAYS

## 2024-02-28 ASSESSMENT — ASTHMA QUESTIONNAIRES
QUESTION_4 LAST FOUR WEEKS HOW OFTEN HAVE YOU USED YOUR RESCUE INHALER OR NEBULIZER MEDICATION (SUCH AS ALBUTEROL): THREE OR MORE TIMES PER DAY
ACT_TOTALSCORE: 8
QUESTION_3 LAST FOUR WEEKS HOW OFTEN DID YOUR ASTHMA SYMPTOMS (WHEEZING, COUGHING, SHORTNESS OF BREATH, CHEST TIGHTNESS OR PAIN) WAKE YOU UP AT NIGHT OR EARLIER THAN USUAL IN THE MORNING: FOUR OR MORE NIGHTS A WEEK
QUESTION_1 LAST FOUR WEEKS HOW MUCH OF THE TIME DID YOUR ASTHMA KEEP YOU FROM GETTING AS MUCH DONE AT WORK, SCHOOL OR AT HOME: MOST OF THE TIME
QUESTION_2 LAST FOUR WEEKS HOW OFTEN HAVE YOU HAD SHORTNESS OF BREATH: MORE THAN ONCE A DAY
QUESTION_5 LAST FOUR WEEKS HOW WOULD YOU RATE YOUR ASTHMA CONTROL: SOMEWHAT CONTROLLED
ACT_TOTALSCORE: 8

## 2024-02-28 ASSESSMENT — SOCIAL DETERMINANTS OF HEALTH (SDOH): HOW OFTEN DO YOU GET TOGETHER WITH FRIENDS OR RELATIVES?: ONCE A WEEK

## 2024-02-28 ASSESSMENT — PAIN SCALES - GENERAL: PAINLEVEL: MILD PAIN (2)

## 2024-02-28 NOTE — PROGRESS NOTES
Preventive Care Visit  [facility]  Sun Person MD, Family Medicine  Feb 28, 2024    Assessment & Plan     Fidelina was seen today for medicare visit.    Diagnoses and all orders for this visit:    Encounter for Medicare annual wellness exam  -     PRIMARY CARE FOLLOW-UP SCHEDULING  -     REVIEW OF HEALTH MAINTENANCE PROTOCOL ORDERS  -     PRIMARY CARE FOLLOW-UP SCHEDULING; Future    Need for shingles vaccine  -     zoster vaccine recombinant adjuvanted (SHINGRIX) injection; Inject 0.5 mLs into the muscle once for 1 dose Pharmacist administered    Need for vaccination against respiratory syncytial virus  -     respiratory syncytial virus vaccine, bivalent (ABRYSVO) injection; Inject 0.5 mLs into the muscle once for 1 dose    Pre-diabetes  Comments:  Check A1C today  Orders:  -     HEMOGLOBIN A1C; Future  -     HEMOGLOBIN A1C    Lumbar radiculopathy  Comments:  2024: Had injection L5-S1 ftransforaminal with Dr. Tsang - helped so much!!!    Chronic idiopathic constipation  Comments:  2024: uses prunes, miralax, fiber, stool.  Okay to add otc med such as colace or PEG daily if needed    Primary hyperparathyroidism (H24)  Comments:  2024: Mild. Following with Dr. Eid.  Next appointment in 2 months. At this point monitoring as no localization on US.    Hypercholesterolemia  Comments:  Atorvastatin work well  Recheck lipids today  Refilled  Stable  Orders:  -     Lipid panel reflex to direct LDL Non-fasting; Future  -     atorvastatin (LIPITOR) 20 MG tablet; Take 1 tablet (20 mg) by mouth daily    Benign essential hypertension  Comments:  BP well controlled on amlodipine 5 mg with losartan 50 mg.  Refilled today.  BMP check today and yearly.  Orders:  -     BASIC METABOLIC PANEL; Future  -     amLODIPine (NORVASC) 5 MG tablet; Take 1 tablet (5 mg) by mouth at bedtime  -     losartan (COZAAR) 50 MG tablet; Take 1 tablet (50 mg) by mouth 2 times daily  -     Hepatic panel (Albumin, ALT,  "AST, Bili, Alk Phos, TP); Future    Chronic insomnia  Comments:  Trazodone 50 mg been working very well!  Continue, refilled  Orders:  -     traZODone (DESYREL) 50 MG tablet; Take 1 tablet (50 mg) by mouth at bedtime    Low bone density  Comments:  2024: Following with Dr. Stanton Vines - they started alendronate, she couldn't tolerate.  Would like to reconsider/restart at upcoming appt in 2 months    Other orders  -     BASIC METABOLIC PANEL  -     Lipid panel reflex to direct LDL Non-fasting  -     Hepatic panel (Albumin, ALT, AST, Bili, Alk Phos, TP)          BMI  Estimated body mass index is 29.32 kg/m  as calculated from the following:    Height as of this encounter: 1.638 m (5' 4.49\").    Weight as of this encounter: 78.7 kg (173 lb 6.4 oz).       Counseling  Appropriate preventive services were discussed with this patient, including applicable screening as appropriate for fall prevention, nutrition, physical activity, Tobacco-use cessation, weight loss and cognition.  Checklist reviewing preventive services available has been given to the patient.  Reviewed patient's diet, addressing concerns and/or questions.   Discussed possible causes of fatigue. Updated plan of care.  Patient reported difficulty with activities of daily living were addressed today.Information on urinary incontinence and treatment options given to patient.           Henrry Kitchen is a 73 year old, presenting for the following:  Medicare Visit        2/28/2024    10:42 AM   Additional Questions   Roomed by Estelle   Accompanied by Self         2/28/2024    10:42 AM   Patient Reported Additional Medications   Patient reports taking the following new medications Plaxlovid, Prednisone,Zithromax         Health Care Directive  Patient does not have a Health Care Directive or Living Will: Discussed advance care planning with patient; information given to patient to review.    HPI    Constipation - eats 10 prunes a day.              2/28/2024 "   General Health   How would you rate your overall physical health? (!) FAIR   Feel stress (tense, anxious, or unable to sleep) Not at all         2/28/2024   Nutrition   Diet: Regular (no restrictions)         2/28/2024   Exercise   Days per week of moderate/strenous exercise 4 days   Average minutes spent exercising at this level 20 min         2/28/2024   Social Factors   Frequency of gathering with friends or relatives Once a week   Worry food won't last until get money to buy more No   Food not last or not have enough money for food? No   Do you have housing?  Yes   Are you worried about losing your housing? No   Lack of transportation? No   Unable to get utilities (heat,electricity)? No         2/28/2024   Fall Risk   Fallen 2 or more times in the past year? Yes   Trouble with walking or balance? Yes           2/28/2024   Activities of Daily Living- Home Safety   Needs help with the following daily activites Housework   Safety concerns in the home None of the above         2/28/2024   Dental   Dentist two times every year? Yes         2/28/2024   Hearing Screening   Hearing concerns? None of the above         2/28/2024   Driving Risk Screening   Patient/family members have concerns about driving No         2/28/2024   General Alertness/Fatigue Screening   Have you been more tired than usual lately? (!) YES         2/28/2024   Urinary Incontinence Screening   Bothered by leaking urine in past 6 months Yes            Today's PHQ-2 Score:       2/28/2024    10:21 AM   PHQ-2 ( 1999 Pf[employer])   Q1: Little interest or pleasure in doing things 0   Q2: Feeling down, depressed or hopeless 0   PHQ-2 Score 0   Q1: Little interest or pleasure in doing things Not at all   Q2: Feeling down, depressed or hopeless Not at all   PHQ-2 Score 0           2/28/2024   Substance Use   Alcohol more than 3/day or more than 7/wk No   Do you have a current opioid prescription? No   How severe/bad is pain from 1 to 10? 1/10   Do you use  any other substances recreationally? No     Social History     Tobacco Use    Smoking status: Never     Passive exposure: Past    Smokeless tobacco: Never   Vaping Use    Vaping Use: Never used   Substance Use Topics    Alcohol use: Yes     Alcohol/week: 0.0 standard drinks of alcohol     Comment: 0 -2 times a year    Drug use: No           12/12/2023   LAST FHS-7 RESULTS   1st degree relative breast or ovarian cancer No   Any relative bilateral breast cancer No   Any male have breast cancer No   Any ONE woman have BOTH breast AND ovarian cancer No   Any woman with breast cancer before 50yrs No   2 or more relatives with breast AND/OR ovarian cancer No   2 or more relatives with breast AND/OR bowel cancer No            ASCVD Risk   The 10-year ASCVD risk score (Rosibel CHAO, et al., 2019) is: 16.5%    Values used to calculate the score:      Age: 73 years      Sex: Female      Is Non- : No      Diabetic: No      Tobacco smoker: No      Systolic Blood Pressure: 118 mmHg      Is BP treated: Yes      HDL Cholesterol: 83 mg/dL      Total Cholesterol: 231 mg/dL        Reviewed and updated as needed this visit by Provider   Tobacco  Allergies  Meds  Problems  Med Hx  Surg Hx  Fam Hx     Sexual Activity            Current providers sharing in care for this patient include:  Patient Care Team:  Sun Person MD as PCP - General (Family Medicine)  Yuriy Petersen MD as MD (Orthopedics)  Sun Person MD as Assigned PCP  Katina Sanchez, RN as Specialty Care Coordinator (Cardiology)  Alexander Johnson MD (Cardiovascular Disease)  Indigo Eid MD as Assigned Endocrinology Provider  Alexander Johnson MD as Assigned Heart and Vascular Provider  Demarco Dutta MD as MD (Internal Medicine)  Soco Flowers MD as Assigned Neuroscience Provider  Angie Beltrna MD as MD (Allergy & Immunology)    The following health maintenance items are reviewed in Epic and  "correct as of today:  Health Maintenance   Topic Date Due    RSV VACCINE (Pregnancy & 60+) (1 - 1-dose 60+ series) Never done    ZOSTER IMMUNIZATION (2 of 3) 01/14/2011    COVID-19 Vaccine (5 - 2023-24 season) 09/01/2023    BMP  02/17/2024    LIPID  02/17/2024    ASTHMA ACTION PLAN  12/31/2030 (Originally 11/3/2020)    DEXA  10/10/2024    MEDICARE ANNUAL WELLNESS VISIT  02/28/2025    A1C  02/28/2025    ANNUAL REVIEW OF HM ORDERS  02/28/2025    ASTHMA CONTROL TEST  02/28/2025    FALL RISK ASSESSMENT  02/28/2025    MAMMO SCREENING  12/12/2025    GLUCOSE  02/17/2026    COLORECTAL CANCER SCREENING  10/17/2026    ADVANCE CARE PLANNING  02/17/2028    DTAP/TDAP/TD IMMUNIZATION (3 - Td or Tdap) 10/27/2031    HEPATITIS C SCREENING  Completed    PHQ-2 (once per calendar year)  Completed    INFLUENZA VACCINE  Completed    Pneumococcal Vaccine: 65+ Years  Completed    IPV IMMUNIZATION  Aged Out    HPV IMMUNIZATION  Aged Out    MENINGITIS IMMUNIZATION  Aged Out    RSV MONOCLONAL ANTIBODY  Aged Out            Objective    Exam  /62 (BP Location: Right arm, Patient Position: Sitting, Cuff Size: Adult Large)   Pulse 80   Temp 98.3  F (36.8  C) (Temporal)   Resp 16   Ht 1.638 m (5' 4.49\")   Wt 78.7 kg (173 lb 6.4 oz)   LMP  (LMP Unknown)   SpO2 97%   Breastfeeding No   BMI 29.32 kg/m     Estimated body mass index is 29.32 kg/m  as calculated from the following:    Height as of this encounter: 1.638 m (5' 4.49\").    Weight as of this encounter: 78.7 kg (173 lb 6.4 oz).    Physical Exam  GENERAL: alert and no distress  EYES: Eyes grossly normal to inspection, PERRL and conjunctivae and sclerae normal  HENT: ear canals and TM's normal, nose and mouth without ulcers or lesions  NECK: no adenopathy, no asymmetry, masses, or scars  RESP: lungs clear to auscultation - no rales, rhonchi or wheezes  CV: regular rate and rhythm, normal S1 S2, no S3 or S4, no murmur, click or rub, no peripheral edema  ABDOMEN: soft, nontender, " no hepatosplenomegaly, no masses and bowel sounds normal  MS: no gross musculoskeletal defects noted, no edema  SKIN: no suspicious lesions or rashes  NEURO: Normal strength and tone, mentation intact and speech normal  PSYCH: mentation appears normal, affect normal/bright        2/28/2024   Mini Cog   Clock Draw Score 2 Normal   3 Item Recall 3 objects recalled   Mini Cog Total Score 5            Signed Electronically by: Sun Person MD

## 2024-02-28 NOTE — PATIENT INSTRUCTIONS
Preventive Care Advice   This is general advice given by our system to help you stay healthy. However, your care team may have specific advice just for you. Please talk to your care team about your preventive care needs.  Nutrition  Eat 5 or more servings of fruits and vegetables each day.  Try wheat bread, brown rice and whole grain pasta (instead of white bread, rice, and pasta).  Get enough calcium and vitamin D. Check the label on foods and aim for 100% of the RDA (recommended daily allowance).  Lifestyle  Exercise at least 150 minutes each week   (30 minutes a day, 5 days a week).  Do muscle strengthening activities 2 days a week. These help control your weight and prevent disease.  No smoking.  Wear sunscreen to prevent skin cancer.  Have a dental exam and cleaning every 6 months.  Yearly exams  See your health care team every year to talk about:  Any changes in your health.  Any medicines your care team has prescribed.  Preventive care, family planning, and ways to prevent chronic diseases.  Shots (vaccines)   HPV shots (up to age 26), if you've never had them before.  Hepatitis B shots (up to age 59), if you've never had them before.  COVID-19 shot: Get this shot when it's due.  Flu shot: Get a flu shot every year.  Tetanus shot: Get a tetanus shot every 10 years.  Pneumococcal, hepatitis A, and RSV shots: Ask your care team if you need these based on your risk.  Shingles shot (for age 50 and up).  General health tests  Diabetes screening:  Starting at age 35, Get screened for diabetes at least every 3 years.  If you are younger than age 35, ask your care team if you should be screened for diabetes.  Cholesterol test: At age 39, start having a cholesterol test every 5 years, or more often if advised.  Bone density scan (DEXA): At age 50, ask your care team if you should have this scan for osteoporosis (brittle bones).  Hepatitis C: Get tested at least once in your life.  STIs (sexually transmitted  infections)  Before age 24: Ask your care team if you should be screened for STIs.  After age 24: Get screened for STIs if you're at risk. You are at risk for STIs (including HIV) if:  You are sexually active with more than one person.  You don't use condoms every time.  You or a partner was diagnosed with a sexually transmitted infection.  If you are at risk for HIV, ask about PrEP medicine to prevent HIV.  Get tested for HIV at least once in your life, whether you are at risk for HIV or not.  Cancer screening tests  Cervical cancer screening: If you have a cervix, begin getting regular cervical cancer screening tests at age 21. Most people who have regular screenings with normal results can stop after age 65. Talk about this with your provider.  Breast cancer scan (mammogram): If you've ever had breasts, begin having regular mammograms starting at age 40. This is a scan to check for breast cancer.  Colon cancer screening: It is important to start screening for colon cancer at age 45.  Have a colonoscopy test every 10 years (or more often if you're at risk) Or, ask your provider about stool tests like a FIT test every year or Cologuard test every 3 years.  To learn more about your testing options, visit: [email or web address].  For help making a decision, visit: https://bit.ly/tk04335.  Prostate cancer screening test: If you have a prostate and are age 55 to 69, ask your provider if you would benefit from a yearly prostate cancer screening test.  Lung cancer screening: If you are a current or former smoker age 50 to 80, ask your care team if ongoing lung cancer screenings are right for you.  For informational purposes only. Not to replace the advice of your health care provider. Copyright   2023 [employer]. All rights reserved. Clinically reviewed by the [facility]. Rudder 129430 - REV 01/24.    Learning About Activities of Daily Living  What are activities  of daily living?     Activities of daily living (ADLs) are the basic self-care tasks you do every day. As you age, and if you have health problems, you may find that it's harder to do these things for yourself. That's when you may need some help.  Your doctor uses ADLs to measure how much help you need. Knowing what you can and can't do for yourself is an important first step to getting help. And when you have the help you need, you can stay as independent as possible.  Your doctor will want to know if you are able to do tasks such as:  Take a bath or shower without help.  Go to the bathroom by yourself.  Dress and undress without help.  Shave, comb your hair, and brush teeth on your own.  Get in and out of bed or a chair without help.  Feed yourself without help.  If you are having trouble doing basic self-care tasks, talk with your doctor. You may want to bring a caregiver or family member who can help the doctor understand your needs and abilities.  How will a doctor assess your ADLs?  Asking about ADLs is part of a routine health checkup your doctor will likely do as you age. Your health check might be done in a doctor's office, in your home, or at a hospital. The goal is to find out if you are having any problems that could make your health problems worse or that make it unsafe for you to be on your own.  To measure your ADLs, your doctor will ask how hard it is for you to do routine tasks. He or she may also want to know if you have changed the way you do a task because of a health problem. He or she may watch how you:  Walk back and forth.  Keep your balance while you stand or walk.  Move from sitting to standing or from a bed to a chair.  Button or unbutton a shirt or sweater.  Remove and put on your shoes.  It's normal to feel a little worried or anxious if you find you can't do all the things you used to be able to do. Talking with your doctor about ADLs isn't a test that you either pass or fail. It's just  a way to get more information about your health and safety.  Follow-up care is a key part of your treatment and safety. Be sure to make and go to all appointments, and call your doctor if you are having problems. It's also a good idea to know your test results and keep a list of the medicines you take.  Current as of: February 26, 2023               Content Version: 13.8    3444-2024 [employer].   Care instructions adapted under license by your healthcare professional. If you have questions about a medical condition or this instruction, always ask your healthcare professional. [employer] disclaims any warranty or liability for your use of this information.      Preventing Falls: Care Instructions  Injuries and health problems such as trouble walking or poor eyesight can increase your risk of falling. So can some medicines. But there are things you can do to help prevent falls. You can exercise to get stronger. You can also arrange your home to make it safer.    Talk to your doctor about the medicines you take. Ask if any of them increase the risk of falls and whether they can be changed or stopped.   Try to exercise regularly. It can help improve your strength and balance. This can help lower your risk of falling.     Practice fall safety and prevention.    Wear low-heeled shoes that fit well and give your feet good support. Talk to your doctor if you have foot problems that make this hard.  Carry a cellphone or wear a medical alert device that you can use to call for help.  Use stepladders instead of chairs to reach high objects. Don't climb if you're at risk for falls. Ask for help, if needed.  Wear the correct eyeglasses, if you need them.    Make your home safer.    Remove rugs, cords, clutter, and furniture from walkways.  Keep your house well lit. Use night-lights in hallways and bathrooms.  Install and use sturdy handrails on stairways.  Wear nonskid footwear, even inside. Don't  "walk barefoot or in socks without shoes.    Be safe outside.    Use handrails, curb cuts, and ramps whenever possible.  Keep your hands free by using a shoulder bag or backpack.  Try to walk in well-lit areas. Watch out for uneven ground, changes in pavement, and debris.  Be careful in the winter. Walk on the grass or gravel when sidewalks are slippery. Use de-icer on steps and walkways. Add non-slip devices to shoes.    Put grab bars and nonskid mats in your shower or tub and near the toilet. Try to use a shower chair or bath bench when bathing.   Get into a tub or shower by putting in your weaker leg first. Get out with your strong side first. Have a phone or medical alert device in the bathroom with you.   Where can you learn more?  Go to https://www.[employer].Let's Talk/patiented  Enter G117 in the search box to learn more about \"Preventing Falls: Care Instructions.\"  Current as of: July 18, 2023               Content Version: 13.8    0915-4842 [employer].   Care instructions adapted under license by your healthcare professional. If you have questions about a medical condition or this instruction, always ask your healthcare professional. [employer] disclaims any warranty or liability for your use of this information.      Learning About Sleeping Well  What does sleeping well mean?     Sleeping well means getting enough sleep to feel good and stay healthy. How much sleep is enough varies among people.  The number of hours you sleep and how you feel when you wake up are both important. If you do not feel refreshed, you probably need more sleep. Another sign of not getting enough sleep is feeling tired during the day.  Experts recommend that adults get at least 7 or more hours of sleep per day. Children and older adults need more sleep.  Why is getting enough sleep important?  Getting enough quality sleep is a basic part of good health. When your sleep suffers, your physical health, mood, and " "your thoughts can suffer too. You may find yourself feeling more grumpy or stressed. Not getting enough sleep also can lead to serious problems, including injury, accidents, anxiety, and depression.  What might cause poor sleeping?  Many things can cause sleep problems, including:  Changes to your sleep schedule.  Stress. Stress can be caused by fear about a single event, such as giving a speech. Or you may have ongoing stress, such as worry about work or school.  Depression, anxiety, and other mental or emotional conditions.  Changes in your sleep habits or surroundings. This includes changes that happen where you sleep, such as noise, light, or sleeping in a different bed. It also includes changes in your sleep pattern, such as having jet lag or working a late shift.  Health problems, such as pain, breathing problems, and restless legs syndrome.  Lack of regular exercise.  Using alcohol, nicotine, or caffeine before bed.  How can you help yourself?  Here are some tips that may help you sleep more soundly and wake up feeling more refreshed.  Your sleeping area   Use your bedroom only for sleeping and sex. A bit of light reading may help you fall asleep. But if it doesn't, do your reading elsewhere in the house. Try not to use your TV, computer, smartphone, or tablet while you are in bed.  Be sure your bed is big enough to stretch out comfortably, especially if you have a sleep partner.  Keep your bedroom quiet, dark, and cool. Use curtains, blinds, or a sleep mask to block out light. To block out noise, use earplugs, soothing music, or a \"white noise\" machine.  Your evening and bedtime routine   Create a relaxing bedtime routine. You might want to take a warm shower or bath, or listen to soothing music.  Go to bed at the same time every night. And get up at the same time every morning, even if you feel tired.  What to avoid   Limit caffeine (coffee, tea, caffeinated sodas) during the day, and don't have any for at " "least 6 hours before bedtime.  Avoid drinking alcohol before bedtime. Alcohol can cause you to wake up more often during the night.  Try not to smoke or use tobacco, especially in the evening. Nicotine can keep you awake.  Limit naps during the day, especially close to bedtime.  Avoid lying in bed awake for too long. If you can't fall asleep or if you wake up in the middle of the night and can't get back to sleep within about 20 minutes, get out of bed and go to another room until you feel sleepy.  Avoid taking medicine right before bed that may keep you awake or make you feel hyper or energized. Your doctor can tell you if your medicine may do this and if you can take it earlier in the day.  If you can't sleep   Imagine yourself in a peaceful, pleasant scene. Focus on the details and feelings of being in a place that is relaxing.  Get up and do a quiet or boring activity until you feel sleepy.  Avoid drinking any liquids before going to bed to help prevent waking up often to use the bathroom.  Where can you learn more?  Go to https://www.[employer].net/patiented  Enter J942 in the search box to learn more about \"Learning About Sleeping Well.\"  Current as of: July 11, 2023               Content Version: 13.8    3617-9025 [employer].   Care instructions adapted under license by your healthcare professional. If you have questions about a medical condition or this instruction, always ask your healthcare professional. [employer] disclaims any warranty or liability for your use of this information.      Bladder Training: Care Instructions  Your Care Instructions     Bladder training is used to treat urge incontinence and stress incontinence. Urge incontinence means that the need to urinate comes on so fast that you can't get to a toilet in time. Stress incontinence means that you leak urine because of pressure on your bladder. For example, it may happen when you laugh, cough, or lift something " heavy.  Bladder training can increase how long you can wait before you have to urinate. It can also help your bladder hold more urine. And it can give you better control over the urge to urinate.  It is important to remember that bladder training takes a few weeks to a few months to make a difference. You may not see results right away, but don't give up.  Follow-up care is a key part of your treatment and safety. Be sure to make and go to all appointments, and call your doctor if you are having problems. It's also a good idea to know your test results and keep a list of the medicines you take.  How can you care for yourself at home?  Work with your doctor to come up with a bladder training program that is right for you. You may use one or more of the following methods.  Delayed urination  In the beginning, try to keep from urinating for 5 minutes after you first feel the need to go.  While you wait, take deep, slow breaths to relax. Kegel exercises can also help you delay the need to go to the bathroom.  After some practice, when you can easily wait 5 minutes to urinate, try to wait 10 minutes before you urinate.  Slowly increase the waiting period until you are able to control when you have to urinate.  Scheduled urination  Empty your bladder when you first wake up in the morning.  Schedule times throughout the day when you will urinate.  Start by going to the bathroom every hour, even if you don't need to go.  Slowly increase the time between trips to the bathroom.  When you have found a schedule that works well for you, keep doing it.  If you wake up during the night and have to urinate, do it. Apply your schedule to waking hours only.  Kegel exercises  These tighten and strengthen pelvic muscles, which can help you control the flow of urine. (If doing these exercises causes pain, stop doing them and talk with your doctor.) To do Kegel exercises:  Squeeze your muscles as if you were trying not to pass gas. Or  "squeeze your muscles as if you were stopping the flow of urine. Your belly, legs, and buttocks shouldn't move.  Hold the squeeze for 3 seconds, then relax for 5 to 10 seconds.  Start with 3 seconds, then add 1 second each week until you are able to squeeze for 10 seconds.  Repeat the exercise 10 times a session. Do 3 to 8 sessions a day.  When should you call for help?  Watch closely for changes in your health, and be sure to contact your doctor if:    Your incontinence is getting worse.     You do not get better as expected.   Where can you learn more?  Go to https://www.[employer].net/patiented  Enter V684 in the search box to learn more about \"Bladder Training: Care Instructions.\"  Current as of: February 28, 2023               Content Version: 13.8    4865-3902 [employer].   Care instructions adapted under license by your healthcare professional. If you have questions about a medical condition or this instruction, always ask your healthcare professional. [employer] disclaims any warranty or liability for your use of this information.      "

## 2024-02-29 LAB
ALBUMIN SERPL BCG-MCNC: 4.5 G/DL (ref 3.5–5.2)
ALP SERPL-CCNC: 59 U/L (ref 40–150)
ALT SERPL W P-5'-P-CCNC: 47 U/L (ref 0–50)
ANION GAP SERPL CALCULATED.3IONS-SCNC: 10 MMOL/L (ref 7–15)
AST SERPL W P-5'-P-CCNC: 26 U/L (ref 0–45)
BILIRUB DIRECT SERPL-MCNC: <0.2 MG/DL (ref 0–0.3)
BILIRUB SERPL-MCNC: 0.6 MG/DL
BUN SERPL-MCNC: 17.3 MG/DL (ref 8–23)
CALCIUM SERPL-MCNC: 10 MG/DL (ref 8.8–10.2)
CHLORIDE SERPL-SCNC: 103 MMOL/L (ref 98–107)
CHOLEST SERPL-MCNC: 222 MG/DL
CREAT SERPL-MCNC: 0.81 MG/DL (ref 0.51–0.95)
DEPRECATED HCO3 PLAS-SCNC: 27 MMOL/L (ref 22–29)
EGFRCR SERPLBLD CKD-EPI 2021: 76 ML/MIN/1.73M2
FASTING STATUS PATIENT QL REPORTED: ABNORMAL
GLUCOSE SERPL-MCNC: 125 MG/DL (ref 70–99)
HDLC SERPL-MCNC: 107 MG/DL
LDLC SERPL CALC-MCNC: 91 MG/DL
NONHDLC SERPL-MCNC: 115 MG/DL
POTASSIUM SERPL-SCNC: 4.3 MMOL/L (ref 3.4–5.3)
PROT SERPL-MCNC: 6.7 G/DL (ref 6.4–8.3)
SODIUM SERPL-SCNC: 140 MMOL/L (ref 135–145)
TRIGL SERPL-MCNC: 119 MG/DL

## 2024-04-09 ENCOUNTER — TELEPHONE (OUTPATIENT)
Dept: ENDOCRINOLOGY | Facility: CLINIC | Age: 74
End: 2024-04-09
Payer: COMMERCIAL

## 2024-04-09 DIAGNOSIS — E21.0 PRIMARY HYPERPARATHYROIDISM (H): Primary | ICD-10-CM

## 2024-04-09 DIAGNOSIS — M85.9 LOW BONE DENSITY: ICD-10-CM

## 2024-04-09 DIAGNOSIS — Z78.0 ASYMPTOMATIC MENOPAUSE: ICD-10-CM

## 2024-04-09 RX ORDER — ALENDRONATE SODIUM 70 MG/1
70 TABLET ORAL
Qty: 12 TABLET | Refills: 3 | Status: SHIPPED | OUTPATIENT
Start: 2024-04-09

## 2024-04-09 NOTE — TELEPHONE ENCOUNTER
[facility] Call Center    Phone Message    May a detailed message be left on voicemail: yes     Reason for Call: Other:     The patient would like a call from the care team to discuss getting back on medication because she's not taking any at the moment, she's been dealing with body pain and don't know what to do next.     I asked if she expressed this to her PCP she said yes but wanted Dr. Eid to get her in to see her much sooner then August, please review and follow up thank you.     Action Taken: Message routed to:  [facility] ([facility] Endo    Travel Screening: Not Applicable

## 2024-04-09 NOTE — TELEPHONE ENCOUNTER
I spoke with Fidelina who wanted to be seen sooner than 8/28/24 by Dr Eid . She had stopped taking her alendronate back in November  and is having increased pain now in her body including her fingers . She wanted to discuss starting back on the Alendronate . Per her notes she had chest doscomfort and  nausea and once she stopped the medication  those symptoms left. She had COvid  in February and also has had spine surgery . After speaking awhile she mentioned she had been on gabapentin  but stopped so maybe that was her reason for pain. Calcium level normal in February. She will call her PCP for pain management.  She is questioning what labs to get done for the August visit . I do see 24 hour urine  placed 7/2023 but she thought she already had them done in May of 2023 so wasn't needed . Last DExa was 10/10/22 so not due until 10/10/2024. Tatiana Davenport RN on 4/9/2024 at 4:30 PM

## 2024-04-09 NOTE — TELEPHONE ENCOUNTER
Please tell Fidelina she may start alendronate again at 70 mg once/week, as before.  We don't need an appointment to resume the medication.  The orders for 7/2024 are already on the system and noted in the AVS    Indigo Eid MD

## 2024-04-09 NOTE — PROGRESS NOTES
ELECTROPHYSIOLOGY CLINIC VISIT    Assessment/Recommendations   Assessment/Plan:    Ms. Jane is a 73 year old female who has a medical history significant for HTN, preDM, hypercholesterolemia, elevated parathyroid hormone, hypercalcemia, and PVCs    Premature Ventricular Contractions:  We had a long discussion with patient regarding PVCs. We discussed various options for treatment of PVCs. In a structurally normal heart, PVCs are considered relatively benign. Treatment is therefore indicated primarily for relief of symptoms or if associated with a cardiomyopathy. Medications such as calcium channel blockers or beta blockers in some cases reduce the frequency and duration of the episodes. The other option discussed was an electrophysiology study (EPS) and possible ablation. The procedural risk of EPS and PVC ablation were discussed in detail. Risks discussed include: vascular complications, CVA, AVB, pericardial effusion and tamponade. We also discussed that if PVC burden is somewhat variable, it is possible that there would be sufficient amount of PVCs during procedure to allow for adequate mapping. We also discussed that at times the PVCs will be traced to an origin in the heart where it is not safe to proceed with ablation. We agreed to continue conservative measures as her PVCs are not really bothering her much and she has normal LVEF.       Follow up with EP on an as needed basis.        History of Present Illness/Subjective    Ms. Jennifer Jane is a 73 year old female who comes in today for EP follow-up of PVCs.    Ms. Jane is a 73 year old female who has a medical history significant for HTN, preDM, hypercholesterolemia, elevated parathyroid hormone, hypercalcemia, and PVCs.     She has occasional dizziness and skipped beats. She had a zio patch monitor from 11/2022 that showed 5.7% PVC burden. An echo showed LVEF 60-65% and no significant valvular abnormalities. Another zio patch monitor from 5/2023  showed <1% PVC burden. She has followed by Dr. Johnson who advised conservative measures.     She reports feeling well. She states she does not feel her PVCs much anymore. She denies chest discomfort, palpitations, abdominal fullness/bloating or peripheral edema, shortness of breath, paroxysmal nocturnal dyspnea, orthopnea, lightheadedness, dizziness, pre-syncope, or syncope. Presenting 12 lead ECG shows NSR Vent Rate 82 bpm,  ms, QRS 80 ms, QTc 450 ms. Current cardiac medications include: Losartan, Norvasc, and Lipitor.         I have reviewed and updated the patient's Past Medical History, Social History, Family History and Medication List.     Cardiographics (Personally Reviewed) :   11/21/22 Echo:   Interpretation Summary  Global and regional left ventricular function is normal with an EF of 60-65%.  Right ventricular function, chamber size, wall motion, and thickness are normal.  No hemodynamically significant valve abnormalities.  The inferior vena cava is normal.  Frequent PVCs noted throughout the study.  There is no prior study for direct comparison.    10/6/22 Exercise Stress Test:  Interpretation Summary  This was an exercise stress test, no images were submitted for review. The indication for the test was exertional chest pain.  Patient exercised for 9 minutes and 22 seconds on the [place] ramp protocol. She achieved a maximum heart rate of 148BPM, corresponding to 8 METS and representing 100% of the maximum predicted heart rate. Heart rate and blood pressure response to exercise were normal. Patient reported no symtpoms during stress and the test was terminated due to shortness of breath.  Baseline ECG shows normal sinus rhythm with a heart rate of 85BPM. Extremely difficult stress ECG due to artifact yet there is no clear evidence for ST segment or T wave changes to suggest underlying myocardial ischemia. FRequent PVCs noted during stress as well as recovery.         Physical Examination   LMP  (LMP  Unknown)   Wt Readings from Last 3 Encounters:   02/28/24 78.7 kg (173 lb 6.4 oz)   01/18/24 75.8 kg (167 lb 3.2 oz)   12/11/23 76.1 kg (167 lb 11.2 oz)            Medications  Allergies   Current Outpatient Medications   Medication Sig Dispense Refill    ADVAIR -21 MCG/ACT inhaler Inhale 2 puffs into the lungs 2 times daily      albuterol (VENTOLIN HFA) 108 (90 Base) MCG/ACT inhaler Inhale 2 puffs into the lungs every 6 hours as needed for shortness of breath 8.5 g 5    amLODIPine (NORVASC) 5 MG tablet Take 1 tablet (5 mg) by mouth at bedtime 90 tablet 4    atorvastatin (LIPITOR) 20 MG tablet Take 1 tablet (20 mg) by mouth daily 90 tablet 4    azelastine (ASTELIN) 0.1 % nasal spray Spray 1 spray into both nostrils 2 times daily      Calcium Carbonate Antacid (TUMS PO) Take  by mouth At Bedtime.      fexofenadine (ALLEGRA) 180 MG tablet Take 180 mg by mouth daily      ibuprofen (ADVIL/MOTRIN) 100 MG tablet Take 100 mg by mouth every 4 hours as needed      latanoprost (XALATAN) 0.005 % ophthalmic solution Place 1 drop into the right eye At Bedtime      losartan (COZAAR) 50 MG tablet Take 1 tablet (50 mg) by mouth 2 times daily 180 tablet 4    montelukast (SINGULAIR) 10 MG tablet TAKE ONE TABLET BY MOUTH EVERY DAY AS DIRECTED  1    traZODone (DESYREL) 50 MG tablet Take 1 tablet (50 mg) by mouth at bedtime 90 tablet 4    Vitamin D3 (CHOLECALCIFEROL) 25 mcg (1000 units) tablet Take by mouth daily      Allergies   Allergen Reactions    Canola Oil [Vegetable Oil] Anaphylaxis and GI Disturbance    Animal Dander Unknown    Dust Mites Unknown    Grass     Hydroxyzine Other (See Comments)     Passed out        Hydroxyzine Hcl     Pollen Extract Unknown    Trees     Chlorhexidine Itching and Rash     Hibiclens           Lab Results (Personally Reviewed)    Chemistry/lipid CBC Cardiac Enzymes/BNP/TSH/INR   Lab Results   Component Value Date    BUN 17.3 02/28/2024     02/28/2024    CO2 27 02/28/2024     Creatinine    Date Value Ref Range Status   02/28/2024 0.81 0.51 - 0.95 mg/dL Final   12/02/2020 0.86 0.52 - 1.04 mg/dL Final       Lab Results   Component Value Date    CHOL 222 (H) 02/28/2024     02/28/2024    LDL 91 02/28/2024      Lab Results   Component Value Date    WBC 11.2 (H) 02/17/2023    HGB 13.7 02/17/2023    HCT 37.3 02/17/2023    MCV 80 02/17/2023     02/17/2023    Lab Results   Component Value Date    TSH 1.36 02/17/2023        The patient states understanding and is agreeable with the plan.   Jim Bernardo MD [facility]  Cardiology - Electrophysiology      Total time spent on patient visit, reviewing notes, imaging, labs, orders, and completing necessary documentation: 45 minutes.

## 2024-04-09 NOTE — TELEPHONE ENCOUNTER
I tried to reach Fidelina by phone  with no answer to see when she stopped the alendronate prescribed by Dr Eid  and why she stopped it.  I wanted to know more detail on the reported body pain and if she has reached out to her PCP . The last DEXA was 10/2022 making the next one  due 10/2024 .  Current calcium level is normal .   I did leave a message  asking if she wanted to switch providers to be seen sooner as well as when she stopped the medication and  why. I will try her again to see if I can reach her but Dr Eid doesn't have a sooner  appointment to offer. Tatiana Davenport, RN on 4/9/2024 at 3:57 PM

## 2024-04-10 ENCOUNTER — OFFICE VISIT (OUTPATIENT)
Dept: CARDIOLOGY | Facility: CLINIC | Age: 74
End: 2024-04-10
Attending: INTERNAL MEDICINE
Payer: COMMERCIAL

## 2024-04-10 VITALS
HEART RATE: 86 BPM | WEIGHT: 173.5 LBS | DIASTOLIC BLOOD PRESSURE: 70 MMHG | BODY MASS INDEX: 29.33 KG/M2 | SYSTOLIC BLOOD PRESSURE: 123 MMHG | OXYGEN SATURATION: 96 %

## 2024-04-10 DIAGNOSIS — I49.3 PVC'S (PREMATURE VENTRICULAR CONTRACTIONS): Primary | ICD-10-CM

## 2024-04-10 PROCEDURE — 93005 ELECTROCARDIOGRAM TRACING: CPT

## 2024-04-10 PROCEDURE — 99215 OFFICE O/P EST HI 40 MIN: CPT | Performed by: INTERNAL MEDICINE

## 2024-04-10 PROCEDURE — G0463 HOSPITAL OUTPT CLINIC VISIT: HCPCS | Performed by: INTERNAL MEDICINE

## 2024-04-10 RX ORDER — GABAPENTIN 300 MG/1
300 CAPSULE ORAL PRN
COMMUNITY
End: 2024-06-21

## 2024-04-10 ASSESSMENT — PAIN SCALES - GENERAL: PAINLEVEL: NO PAIN (0)

## 2024-04-10 NOTE — TELEPHONE ENCOUNTER
My chart sent  with Alendronate  ordered per Dr Eid  to restart. Tatiana Davenport RN on 4/10/2024 at 11:24 AM  .

## 2024-04-10 NOTE — PATIENT INSTRUCTIONS
Plan:    Follow-up as needed      Your Care Team:  EP Cardiology   Telephone Number     Katina Sanchez RN [phone]    After business hours: [phone], ask for cardiologist on-call   Non-procedure scheduling:    Gale DUBOIS   [phone]   Procedure scheduling:    Danette Siddiqi   [phone]   Device Clinic (Pacemakers, ICDs, Loop Recorders)    During business hours: [phone]  After business hours:   [phone]- select option 4 and ask for job code [id number].       Cardiovascular Clinic:   [address] [place] [address]. [place] [address]      As always, thank you for trusting us with your health care needs!

## 2024-04-10 NOTE — NURSING NOTE
Chief Complaint   Patient presents with    Follow Up     Follow-up PVCs per patient. Past Dr Johnson patient.       Vitals were taken, medications reviewed, and EKG performed.    Eloisa Martinez, EMT   3:15 PM

## 2024-04-10 NOTE — LETTER
4/10/2024      RE: Jennifer Jane  [address]  [facility] [address]       Dear Colleague,    Thank you for the opportunity to participate in the care of your patient, Jennifer Jane, at the [facility] [place] [facility]. Please see a copy of my visit note below.        ELECTROPHYSIOLOGY CLINIC VISIT    Assessment/Recommendations   Assessment/Plan:    Ms. Jane is a 73 year old female who has a medical history significant for HTN, preDM, hypercholesterolemia, elevated parathyroid hormone, hypercalcemia, and PVCs    Premature Ventricular Contractions:  We had a long discussion with patient regarding PVCs. We discussed various options for treatment of PVCs. In a structurally normal heart, PVCs are considered relatively benign. Treatment is therefore indicated primarily for relief of symptoms or if associated with a cardiomyopathy. Medications such as calcium channel blockers or beta blockers in some cases reduce the frequency and duration of the episodes. The other option discussed was an electrophysiology study (EPS) and possible ablation. The procedural risk of EPS and PVC ablation were discussed in detail. Risks discussed include: vascular complications, CVA, AVB, pericardial effusion and tamponade. We also discussed that if PVC burden is somewhat variable, it is possible that there would be sufficient amount of PVCs during procedure to allow for adequate mapping. We also discussed that at times the PVCs will be traced to an origin in the heart where it is not safe to proceed with ablation. We agreed to continue conservative measures as her PVCs are not really bothering her much and she has normal LVEF.       Follow up with EP on an as needed basis.        History of Present Illness/Subjective    Ms. Jennifer Jane is a 73 year old female who comes in today for EP follow-up of PVCs.    Ms. Jane is a 73 year old female who has a medical history  significant for HTN, preDM, hypercholesterolemia, elevated parathyroid hormone, hypercalcemia, and PVCs.     She has occasional dizziness and skipped beats. She had a zio patch monitor from 11/2022 that showed 5.7% PVC burden. An echo showed LVEF 60-65% and no significant valvular abnormalities. Another zio patch monitor from 5/2023 showed <1% PVC burden. She has followed by Dr. Johnson who advised conservative measures.     She reports feeling well. She states she does not feel her PVCs much anymore. She denies chest discomfort, palpitations, abdominal fullness/bloating or peripheral edema, shortness of breath, paroxysmal nocturnal dyspnea, orthopnea, lightheadedness, dizziness, pre-syncope, or syncope. Presenting 12 lead ECG shows NSR Vent Rate 82 bpm,  ms, QRS 80 ms, QTc 450 ms. Current cardiac medications include: Losartan, Norvasc, and Lipitor.         I have reviewed and updated the patient's Past Medical History, Social History, Family History and Medication List.     Cardiographics (Personally Reviewed) :   11/21/22 Echo:   Interpretation Summary  Global and regional left ventricular function is normal with an EF of 60-65%.  Right ventricular function, chamber size, wall motion, and thickness are normal.  No hemodynamically significant valve abnormalities.  The inferior vena cava is normal.  Frequent PVCs noted throughout the study.  There is no prior study for direct comparison.    10/6/22 Exercise Stress Test:  Interpretation Summary  This was an exercise stress test, no images were submitted for review. The indication for the test was exertional chest pain.  Patient exercised for 9 minutes and 22 seconds on the [place] ramp protocol. She achieved a maximum heart rate of 148BPM, corresponding to 8 METS and representing 100% of the maximum predicted heart rate. Heart rate and blood pressure response to exercise were normal. Patient reported no symtpoms during stress and the test was terminated due to  shortness of breath.  Baseline ECG shows normal sinus rhythm with a heart rate of 85BPM. Extremely difficult stress ECG due to artifact yet there is no clear evidence for ST segment or T wave changes to suggest underlying myocardial ischemia. FRequent PVCs noted during stress as well as recovery.         Physical Examination   LMP  (LMP Unknown)   Wt Readings from Last 3 Encounters:   02/28/24 78.7 kg (173 lb 6.4 oz)   01/18/24 75.8 kg (167 lb 3.2 oz)   12/11/23 76.1 kg (167 lb 11.2 oz)            Medications  Allergies   Current Outpatient Medications   Medication Sig Dispense Refill    ADVAIR -21 MCG/ACT inhaler Inhale 2 puffs into the lungs 2 times daily      albuterol (VENTOLIN HFA) 108 (90 Base) MCG/ACT inhaler Inhale 2 puffs into the lungs every 6 hours as needed for shortness of breath 8.5 g 5    amLODIPine (NORVASC) 5 MG tablet Take 1 tablet (5 mg) by mouth at bedtime 90 tablet 4    atorvastatin (LIPITOR) 20 MG tablet Take 1 tablet (20 mg) by mouth daily 90 tablet 4    azelastine (ASTELIN) 0.1 % nasal spray Spray 1 spray into both nostrils 2 times daily      Calcium Carbonate Antacid (TUMS PO) Take  by mouth At Bedtime.      fexofenadine (ALLEGRA) 180 MG tablet Take 180 mg by mouth daily      ibuprofen (ADVIL/MOTRIN) 100 MG tablet Take 100 mg by mouth every 4 hours as needed      latanoprost (XALATAN) 0.005 % ophthalmic solution Place 1 drop into the right eye At Bedtime      losartan (COZAAR) 50 MG tablet Take 1 tablet (50 mg) by mouth 2 times daily 180 tablet 4    montelukast (SINGULAIR) 10 MG tablet TAKE ONE TABLET BY MOUTH EVERY DAY AS DIRECTED  1    traZODone (DESYREL) 50 MG tablet Take 1 tablet (50 mg) by mouth at bedtime 90 tablet 4    Vitamin D3 (CHOLECALCIFEROL) 25 mcg (1000 units) tablet Take by mouth daily      Allergies   Allergen Reactions    Canola Oil [Vegetable Oil] Anaphylaxis and GI Disturbance    Animal Dander Unknown    Dust Mites Unknown    Grass     Hydroxyzine Other (See  Comments)     Passed out        Hydroxyzine Hcl     Pollen Extract Unknown    Trees     Chlorhexidine Itching and Rash     Hibiclens           Lab Results (Personally Reviewed)    Chemistry/lipid CBC Cardiac Enzymes/BNP/TSH/INR   Lab Results   Component Value Date    BUN 17.3 02/28/2024     02/28/2024    CO2 27 02/28/2024     Creatinine   Date Value Ref Range Status   02/28/2024 0.81 0.51 - 0.95 mg/dL Final   12/02/2020 0.86 0.52 - 1.04 mg/dL Final       Lab Results   Component Value Date    CHOL 222 (H) 02/28/2024     02/28/2024    LDL 91 02/28/2024      Lab Results   Component Value Date    WBC 11.2 (H) 02/17/2023    HGB 13.7 02/17/2023    HCT 37.3 02/17/2023    MCV 80 02/17/2023     02/17/2023    Lab Results   Component Value Date    TSH 1.36 02/17/2023        The patient states understanding and is agreeable with the plan.   Jim Bernardo MD [facility]  Cardiology - Electrophysiology      Total time spent on patient visit, reviewing notes, imaging, labs, orders, and completing necessary documentation: 45 minutes.

## 2024-04-11 LAB
ATRIAL RATE - MUSE: 82 BPM
DIASTOLIC BLOOD PRESSURE - MUSE: NORMAL MMHG
INTERPRETATION ECG - MUSE: NORMAL
P AXIS - MUSE: 2 DEGREES
PR INTERVAL - MUSE: 154 MS
QRS DURATION - MUSE: 80 MS
QT - MUSE: 386 MS
QTC - MUSE: 450 MS
R AXIS - MUSE: 73 DEGREES
SYSTOLIC BLOOD PRESSURE - MUSE: NORMAL MMHG
T AXIS - MUSE: 20 DEGREES
VENTRICULAR RATE- MUSE: 82 BPM

## 2024-04-16 PROBLEM — I49.3 PVC'S (PREMATURE VENTRICULAR CONTRACTIONS): Status: ACTIVE | Noted: 2024-04-16

## 2024-04-29 ENCOUNTER — LAB (OUTPATIENT)
Dept: LAB | Facility: CLINIC | Age: 74
End: 2024-04-29
Payer: COMMERCIAL

## 2024-04-29 DIAGNOSIS — R82.994 HYPERCALCIURIA: ICD-10-CM

## 2024-04-29 DIAGNOSIS — E21.0 PRIMARY HYPERPARATHYROIDISM (H): ICD-10-CM

## 2024-04-29 NOTE — PROGRESS NOTES
01/11/24 0500   Appointment Info   Signing clinician's name / credentials Soco Dexter, PT, DPT   Total/Authorized Visits E&T (10 planned)   Visits Used 4   Medical Diagnosis Weakness of left lower extremity,  Acute bilateral low back pain with bilateral sciatica,  Lumbar radiculopathy,  Hip pain, bilateral   PT Tx Diagnosis Low Back Pain and L LE Pain and Weakness   Quick Adds Certification   Progress Note/Certification   Start of Care Date 12/18/23   Onset of illness/injury or Date of Surgery 12/05/23  (Date of PT referral)   Therapy Frequency 1x/week   Predicted Duration 10 weeks   Certification date from 12/18/23   Certification date to 02/26/24   Progress Note Due Date 02/16/24   Progress Note Completed Date 12/18/23   GOALS   PT Goals 2   PT Goal 1   Goal Identifier Sleep   Goal Description Pt will be able to sleep through the night, waking <2x, without increase of symptoms while in bed   Rationale to maximize safety and independence with performance of ADLs and functional tasks;to maximize safety and independence within the home;to maximize safety and independence with self cares   Target Date 02/26/24   PT Goal 2   Goal Identifier Lifting   Goal Description Pt will be able to lift household objects such as groceries and laundry without increase in pain.   Rationale to maximize safety and independence with performance of ADLs and functional tasks;to maximize safety and independence with self cares   Target Date 02/26/24   Subjective Report   Subjective Report Pt notes continued pain and increase in symptoms   Objective Measures   Objective Measures Objective Measure 1;Objective Measure 2   Objective Measure 1   Objective Measure TTP   Details L piriformis   Objective Measure 2   Objective Measure Lumbar LLE distraction pull   Details LLE increased symptoms RLE no change   Treatment Interventions (PT)   Interventions Therapeutic Procedure/Exercise   Therapeutic Procedure/Exercise   Therapeutic  Procedures: strength, endurance, ROM, flexibility minutes (62031) 40   Therapeutic Procedures Ther Proc 2   Ther Proc 1 pelvic tilts   Ther Proc 1 - Details on chair then exercise ball   Ther Proc 2 palloff press   PTRx Ther Proc 1 Seated Hamstring Stretch   PTRx Ther Proc 1 - Details Reviewed; VC to complete in pain free ROM    PTRx Ther Proc 2 Seated Piriformis Stretch   PTRx Ther Proc 2 - Details Reviewed; VC to complete in pain free ROM    PTRx Ther Proc 3 Sit to Stand   PTRx Ther Proc 3 - Details VR to continue for HEP   PTRx Ther Proc 4 Clamshell Feet together   PTRx Ther Proc 4 - Details DC'd to seated version for increased comfort    PTRx Ther Proc 5 Seated Hip Abduction with Band   PTRx Ther Proc 5 - Details Introduced as modification to clamshells; 5 sec holds x 10 reps    PTRx Ther Proc 6 Ball Exercise Seated Pelvic Tilt   PTRx Ther Proc 6 - Details Introduced; Can sit on exercise ball or in a chair   Skilled Intervention Assessment of strength and functional deficits to determine exercise prescription; tactile and verbal cuing to assist with proper performance; education in loading principles and expected response   Patient Response/Progress Tolerated well.   Neuromuscular Re-education   PTRx Neuro Re-ed 1 Pallof Press   PTRx Neuro Re-ed 1 - Details Introduced core tolerable core exercise    Education   Learner/Method Patient   Education Comments Eager to participate in therapy   Plan   Home program See PTRX- printed   Updates to plan of care increased symptoms with both flexion and extension directional preference   Plan for next session Pain free strength   Total Session Time   Timed Code Treatment Minutes 40   Total Treatment Time (sum of timed and untimed services) 40         DISCHARGE  Reason for Discharge: Patient has not made expected progress. She sought other pain management options.  Patient has failed to schedule further appointments.      Discharge Plan: Patient to continue home program as  able .    Referring Provider:  Megan Menon

## 2024-04-30 LAB
CALCIUM 24H UR-MRATE: 0.21 G/SPEC (ref 0.1–0.3)
CALCIUM UR-MCNC: 5.8 MG/DL
COLLECT DURATION TIME UR: 24 H
SPECIMEN VOL UR: 3700 ML

## 2024-04-30 PROCEDURE — 81050 URINALYSIS VOLUME MEASURE: CPT

## 2024-04-30 PROCEDURE — 82340 ASSAY OF CALCIUM IN URINE: CPT

## 2024-05-30 ENCOUNTER — LAB (OUTPATIENT)
Dept: LAB | Facility: CLINIC | Age: 74
End: 2024-05-30
Payer: COMMERCIAL

## 2024-05-30 DIAGNOSIS — R73.03 PRE-DIABETES: ICD-10-CM

## 2024-05-30 LAB — HBA1C MFR BLD: 5.7 % (ref 0–5.6)

## 2024-05-30 PROCEDURE — 83036 HEMOGLOBIN GLYCOSYLATED A1C: CPT

## 2024-05-30 PROCEDURE — 36415 COLL VENOUS BLD VENIPUNCTURE: CPT

## 2024-06-06 ENCOUNTER — NURSE TRIAGE (OUTPATIENT)
Dept: NURSING | Facility: CLINIC | Age: 74
End: 2024-06-06
Payer: COMMERCIAL

## 2024-06-07 ENCOUNTER — OFFICE VISIT (OUTPATIENT)
Dept: URGENT CARE | Facility: URGENT CARE | Age: 74
End: 2024-06-07
Payer: COMMERCIAL

## 2024-06-07 ENCOUNTER — ANCILLARY PROCEDURE (OUTPATIENT)
Dept: GENERAL RADIOLOGY | Facility: CLINIC | Age: 74
End: 2024-06-07
Attending: EMERGENCY MEDICINE
Payer: COMMERCIAL

## 2024-06-07 VITALS
HEART RATE: 74 BPM | DIASTOLIC BLOOD PRESSURE: 84 MMHG | SYSTOLIC BLOOD PRESSURE: 150 MMHG | OXYGEN SATURATION: 98 % | TEMPERATURE: 97.4 F

## 2024-06-07 DIAGNOSIS — R07.89 CHEST WALL PAIN: ICD-10-CM

## 2024-06-07 DIAGNOSIS — J20.9 ACUTE BRONCHITIS WITH SYMPTOMS > 10 DAYS: Primary | ICD-10-CM

## 2024-06-07 DIAGNOSIS — J20.9 ACUTE BRONCHITIS WITH SYMPTOMS > 10 DAYS: ICD-10-CM

## 2024-06-07 PROCEDURE — 71046 X-RAY EXAM CHEST 2 VIEWS: CPT | Mod: TC | Performed by: RADIOLOGY

## 2024-06-07 PROCEDURE — 99214 OFFICE O/P EST MOD 30 MIN: CPT | Performed by: EMERGENCY MEDICINE

## 2024-06-07 NOTE — PROGRESS NOTES
"Assessment & Plan     Diagnosis:    ICD-10-CM    1. Acute bronchitis with symptoms > 10 days  J20.9 XR Chest 2 Views     DISCONTINUED: budesonide (PULMICORT FLEXHALER) 90 MCG/ACT inhaler      2. Chest wall pain  R07.89 XR Chest 2 Views    right side        Medical Decision Making:  Jennifer Jane is a 73 year old female who presents for evaluation of ongoing cough intermittently for months; more noticeable over the past 2 weeks. She also had a mechanical fall on to the right chest wall yesterday. No visible sings of bruising. CXR without fracture, pneumothorax or signs of pneumonia or pleural effusion on my read. Patient does have history of asthma but has no wheezing on exam here, this appears well-controlled at this juncture. She has Advair and albuterol at home; I do not feel that systemic steroids are indicated. Close followup with PCP is indicated.  Go to ED for fever > 102 F, worsening shortness of breath, chest pain or other concerns.  Patient verbalized understanding and agreement with the plan was discharged in stable condition.      Anthony Silver PA-C  [facility]    Subjective     Jennifer Jane is a 73 year old female who presents to clinic today for the following health issues:  Chief Complaint   Patient presents with    Urgent Care    Breathing Problem     Rattles when taking a deep breath in    Cough     On and off x's 2 years of coughing spasms        HPI  Patient states that since January when she had COVID she has been having some coughing spasm issues, also has history of asthma and takes Advair and albuterol daily.  She notes over the last couple of weeks she has had a little bit more of \"rattling\" in her chest.  Yesterday she did have a mechanical fall while outside and landed on her right ribs, is not having a lot of pain but wants to make sure that she did not injure anything.  She denies any difficulties breathing, shortness of breath at rest or with minimal exertion, leg " swelling, chest pain with breathing in, fevers or other concerns.    Review of Systems    See HPI    Objective      Vitals: BP (!) 150/84   Pulse 74   Temp 97.4  F (36.3  C) (Temporal)   LMP  (LMP Unknown)   SpO2 98%       Patient Vitals for the past 24 hrs:   BP Temp Temp src Pulse SpO2   06/07/24 1100 (!) 150/84 97.4  F (36.3  C) Temporal 74 98 %       Vital signs reviewed by: Anthony Silver PA-C    Physical Exam   Constitutional: Patient is alert and cooperative. No acute distress.  Cardiovascular: Regular rate and rhythm  Pulmonary/Chest: faint rhonchi lower lobes. No wheezing or rales. Effort normal. No respiratory distress.  Neurological: Alert and oriented x3.   Skin: No rash noted on visualized skin.  Psychiatric:The patient has a normal mood and affect.     Labs/Imaging:  Results for orders placed or performed in visit on 06/07/24   XR Chest 2 Views     Status: None    Narrative    CHEST TWO VIEWS 6/7/2024 12:01 PM     HISTORY: Acute bronchitis with symptoms > 10 days; Chest wall pain.    COMPARISON: November 29, 2022       Impression    IMPRESSION: There are no acute infiltrates. The cardiac silhouette is  not enlarged. Pulmonary vasculature is unremarkable.    SHARDA ADAMS MD         SYSTEM ID:  [id number]     Reading per radiology    Anthony Silver PA-C, June 7, 2024

## 2024-06-07 NOTE — TELEPHONE ENCOUNTER
Nurse Triage SBAR    Is this a 2nd Level Triage? NO    Situation: Chest Tightness    Background: :Patient has history of asthma    Assessment: Patient calling to report hearing crackles in bases of bilateral lungs. She reports tightness of chest near diaphragm that is mild, L>R. She reports oxygen is 95% with pulse of 90. Denies wheezing, stridor, confusion, weak breaths, or recent bed rest. She does report recent weakness in hand grasps. She reports bilateral eye pain earlier today, pain has subsided, denies any visible changes to outside of eye.    Protocol Recommended Disposition:   According to the protocol, patient should call EMS.  Care advice given. Patient verbalizes understanding and disagrees with disposition. She plans to try rescue inhaler, if no improvement will go to ED.    Ángela Meeks RN  06/06/24 9:32 PM  [facility] Nurse Advisor    Reason for Disposition   Chest pain lasting longer than 5 minutes and ANY of the following:    history of heart disease  (i.e., heart attack, bypass surgery, angina, angioplasty, CHF; not just a heart murmur)    described as crushing, pressure-like, or heavy    age > 50    age > 30 AND at least one cardiac risk factor (i.e., hypertension, diabetes, obesity, smoker or strong family history of heart disease)    not relieved with nitroglycerin    Additional Information   Negative: Followed an eye injury   Negative: Eye pain from chemical in the eye   Negative: Eye pain from foreign body in eye   Negative: [1] Tender, red lump or pimple AND [2] located along the eyelid margin   Negative: Has sinus pain or pressure   Negative: SEVERE difficulty breathing (e.g., struggling for each breath, speaks in single words)   Negative: [1] Breathing stopped AND [2] hasn't returned   Negative: Choking on something   Negative: Bluish (or gray) lips or face now   Negative: Difficult to awaken or acting confused (e.g., disoriented, slurred speech)   Negative: Passed out (i.e.,  "lost consciousness, collapsed and was not responding)   Negative: Wheezing started suddenly after medicine, an allergic food or bee sting   Negative: Stridor (harsh sound while breathing in)   Negative: Slow, shallow and weak breathing   Negative: Sounds like a life-threatening emergency to the triager   Negative: Chest pain   Negative: [1] Wheezing (high pitched whistling sound) AND [2] previous asthma attacks or use of asthma medicines   Negative: [1] Difficulty breathing AND [2] only present when coughing   Negative: [1] Difficulty breathing AND [2] only from stuffy or runny nose   Negative: [1] Difficulty breathing AND [2] within 14 days of COVID-19 Exposure   Negative: [1] MODERATE difficulty breathing (e.g., speaks in phrases, SOB even at rest, pulse 100-120) AND [2] NEW-onset or WORSE than normal   Negative: Oxygen level (e.g., pulse oximetry) 90 percent or lower   Negative: Wheezing can be heard across the room   Negative: Drooling or spitting out saliva (because can't swallow)   Negative: History of prior \"blood clot\" in leg or lungs (i.e., deep vein thrombosis, pulmonary embolism)   Negative: History of inherited increased risk of blood clots (e.g., Factor 5 Leiden, Anti-thrombin 3, Protein C or Protein S deficiency, Prothrombin mutation)   Negative: Major surgery in the past month   Negative: Hip or leg fracture (broken bone) in past month (or had cast on leg or ankle in past month)   Negative: Illness requiring prolonged bedrest in past month (e.g., immobilization, long hospital stay)   Negative: Long-distance travel in past month (e.g., car, bus, train, plane; with trip lasting 6 or more hours)   Negative: Cancer treatment in past six months (or has cancer now)   Negative: Extra heartbeats, irregular heart beating, or heart is beating very fast  (i.e., \"palpitations\")   Negative: Fever > 103 F (39.4 C)   Negative: [1] Fever > 101 F (38.3 C) AND [2] age > 60 years   Negative: [1] Fever > 100.0 F (37.8 C) " AND [2] bedridden (e.g., CVA, chronic illness, recovering from surgery)   Negative: [1] Fever > 100.0 F (37.8 C) AND [2] diabetes mellitus or weak immune system (e.g., HIV positive, cancer chemo, splenectomy, organ transplant, chronic steroids)   Negative: [1] Periods where breathing stops and then resumes normally AND [2] bedridden (e.g., CVA)   Negative: Pregnant or postpartum (from 0 to 6 weeks after delivery)   Negative: Patient sounds very sick or weak to the triager   Negative: [1] MILD difficulty breathing (e.g., minimal/no SOB at rest, SOB with walking, pulse <100) AND [2] NEW-onset or WORSE than normal   Negative: [1] Longstanding difficulty breathing (e.g., CHF, COPD, emphysema) AND [2] WORSE than normal   Negative: [1] Longstanding difficulty breathing AND [2] not responding to usual therapy   Negative: Continuous (nonstop) coughing interferes with work, school, or sleeping   Negative: Oxygen level (e.g., pulse oximetry) 91 to 94 percent   Negative: [1] MODERATE longstanding difficulty breathing (e.g., speaks in phrases, SOB even at rest, pulse 100-120) AND [2] SAME as normal   Negative: [1] MILD longstanding difficulty breathing AND [2]  SAME as normal   Negative: SEVERE asthma attack (e.g., struggling for each breath, speaks in single words, loud wheezes, pulse >120)  (RED  Zone)   Negative: Bluish (or gray) lips or face now   Negative: Difficult to awaken or acting confused (e.g., disoriented, slurred speech)   Negative: Passed out (i.e., lost consciousness, collapsed and was not responding)   Negative: Wheezing started suddenly after medicine, an allergic food, or bee sting   Negative: Sounds like a life-threatening emergency to the triager   Negative: [1] MODERATE asthma attack (e.g., SOB at rest, speaks in phrases, audible wheezes) AND [2] doesn't have neb or inhaler available   Negative: Peak flow rate less than 50% of baseline level  (RED  Zone)   Negative: Oxygen level (e.g., pulse oximetry) 90  percent or lower   Negative: [1] Hospitalized before with asthma AND [2] feels the same now   Negative: Chest pain  (Exception: Mild chest tightness that feels the same as prior asthma attacks.)   Negative: SEVERE difficulty breathing (e.g., struggling for each breath, speaks in single words)   Negative: Difficult to awaken or acting confused (e.g., disoriented, slurred speech)   Negative: Shock suspected (e.g., cold/pale/clammy skin, too weak to stand, low BP, rapid pulse)   Negative: Passed out (i.e., lost consciousness, collapsed and was not responding)    Protocols used: Eye Pain-A-AH, Breathing Difficulty-A-AH, Asthma Attack-A-AH, Chest Pain-A-AH

## 2024-06-12 ENCOUNTER — TRANSFERRED RECORDS (OUTPATIENT)
Dept: HEALTH INFORMATION MANAGEMENT | Facility: CLINIC | Age: 74
End: 2024-06-12
Payer: COMMERCIAL

## 2024-06-21 ENCOUNTER — OFFICE VISIT (OUTPATIENT)
Dept: FAMILY MEDICINE | Facility: CLINIC | Age: 74
End: 2024-06-21
Payer: COMMERCIAL

## 2024-06-21 ENCOUNTER — ANCILLARY PROCEDURE (OUTPATIENT)
Dept: GENERAL RADIOLOGY | Facility: CLINIC | Age: 74
End: 2024-06-21
Attending: FAMILY MEDICINE
Payer: COMMERCIAL

## 2024-06-21 VITALS
OXYGEN SATURATION: 97 % | RESPIRATION RATE: 16 BRPM | WEIGHT: 159.5 LBS | BODY MASS INDEX: 27.23 KG/M2 | SYSTOLIC BLOOD PRESSURE: 126 MMHG | DIASTOLIC BLOOD PRESSURE: 82 MMHG | HEIGHT: 64 IN | TEMPERATURE: 97.7 F | HEART RATE: 71 BPM

## 2024-06-21 DIAGNOSIS — M54.16 LUMBAR RADICULOPATHY: ICD-10-CM

## 2024-06-21 DIAGNOSIS — F51.04 CHRONIC INSOMNIA: ICD-10-CM

## 2024-06-21 DIAGNOSIS — M48.061 NEUROFORAMINAL STENOSIS OF LUMBAR SPINE: ICD-10-CM

## 2024-06-21 DIAGNOSIS — M25.552 HIP PAIN, LEFT: ICD-10-CM

## 2024-06-21 DIAGNOSIS — I10 BENIGN ESSENTIAL HYPERTENSION: ICD-10-CM

## 2024-06-21 DIAGNOSIS — U09.9 LONG COVID: ICD-10-CM

## 2024-06-21 DIAGNOSIS — R93.7 ABNORMAL X-RAY OF PELVIS: ICD-10-CM

## 2024-06-21 DIAGNOSIS — M25.50 MULTIPLE JOINT PAIN: ICD-10-CM

## 2024-06-21 DIAGNOSIS — R53.83 OTHER FATIGUE: Primary | ICD-10-CM

## 2024-06-21 LAB
ALBUMIN SERPL BCG-MCNC: 4.7 G/DL (ref 3.5–5.2)
ALP SERPL-CCNC: 70 U/L (ref 40–150)
ALT SERPL W P-5'-P-CCNC: 37 U/L (ref 0–50)
ANION GAP SERPL CALCULATED.3IONS-SCNC: 8 MMOL/L (ref 7–15)
AST SERPL W P-5'-P-CCNC: 33 U/L (ref 0–45)
BASOPHILS # BLD AUTO: 0 10E3/UL (ref 0–0.2)
BASOPHILS NFR BLD AUTO: 0 %
BILIRUB SERPL-MCNC: 0.8 MG/DL
BUN SERPL-MCNC: 10.8 MG/DL (ref 8–23)
CALCIUM SERPL-MCNC: 10.4 MG/DL (ref 8.8–10.2)
CHLORIDE SERPL-SCNC: 104 MMOL/L (ref 98–107)
CHOLEST SERPL-MCNC: 153 MG/DL
CREAT SERPL-MCNC: 0.8 MG/DL (ref 0.51–0.95)
CRP SERPL-MCNC: <3 MG/L
DEPRECATED HCO3 PLAS-SCNC: 28 MMOL/L (ref 22–29)
EGFRCR SERPLBLD CKD-EPI 2021: 77 ML/MIN/1.73M2
EOSINOPHIL # BLD AUTO: 0.2 10E3/UL (ref 0–0.7)
EOSINOPHIL NFR BLD AUTO: 2 %
ERYTHROCYTE [DISTWIDTH] IN BLOOD BY AUTOMATED COUNT: 12.3 % (ref 10–15)
ERYTHROCYTE [SEDIMENTATION RATE] IN BLOOD BY WESTERGREN METHOD: 3 MM/HR (ref 0–30)
FASTING STATUS PATIENT QL REPORTED: YES
FASTING STATUS PATIENT QL REPORTED: YES
GLUCOSE SERPL-MCNC: 120 MG/DL (ref 70–99)
HCT VFR BLD AUTO: 45.6 % (ref 35–47)
HDLC SERPL-MCNC: 84 MG/DL
HGB BLD-MCNC: 14.9 G/DL (ref 11.7–15.7)
IMM GRANULOCYTES # BLD: 0 10E3/UL
IMM GRANULOCYTES NFR BLD: 0 %
LDLC SERPL CALC-MCNC: 57 MG/DL
LYMPHOCYTES # BLD AUTO: 2.3 10E3/UL (ref 0.8–5.3)
LYMPHOCYTES NFR BLD AUTO: 24 %
MCH RBC QN AUTO: 29.9 PG (ref 26.5–33)
MCHC RBC AUTO-ENTMCNC: 32.7 G/DL (ref 31.5–36.5)
MCV RBC AUTO: 91 FL (ref 78–100)
MONOCYTES # BLD AUTO: 0.6 10E3/UL (ref 0–1.3)
MONOCYTES NFR BLD AUTO: 6 %
NEUTROPHILS # BLD AUTO: 6.3 10E3/UL (ref 1.6–8.3)
NEUTROPHILS NFR BLD AUTO: 67 %
NONHDLC SERPL-MCNC: 69 MG/DL
PLATELET # BLD AUTO: 269 10E3/UL (ref 150–450)
POTASSIUM SERPL-SCNC: 4 MMOL/L (ref 3.4–5.3)
PROT SERPL-MCNC: 7 G/DL (ref 6.4–8.3)
RBC # BLD AUTO: 4.99 10E6/UL (ref 3.8–5.2)
SODIUM SERPL-SCNC: 140 MMOL/L (ref 135–145)
TRIGL SERPL-MCNC: 59 MG/DL
TSH SERPL DL<=0.005 MIU/L-ACNC: 1.75 UIU/ML (ref 0.3–4.2)
VIT B12 SERPL-MCNC: 534 PG/ML (ref 232–1245)
VIT D+METAB SERPL-MCNC: 28 NG/ML (ref 20–50)
WBC # BLD AUTO: 9.3 10E3/UL (ref 4–11)

## 2024-06-21 PROCEDURE — 86140 C-REACTIVE PROTEIN: CPT | Performed by: FAMILY MEDICINE

## 2024-06-21 PROCEDURE — 85025 COMPLETE CBC W/AUTO DIFF WBC: CPT | Performed by: FAMILY MEDICINE

## 2024-06-21 PROCEDURE — 36415 COLL VENOUS BLD VENIPUNCTURE: CPT | Performed by: FAMILY MEDICINE

## 2024-06-21 PROCEDURE — 82306 VITAMIN D 25 HYDROXY: CPT | Performed by: FAMILY MEDICINE

## 2024-06-21 PROCEDURE — G2211 COMPLEX E/M VISIT ADD ON: HCPCS | Performed by: FAMILY MEDICINE

## 2024-06-21 PROCEDURE — 80061 LIPID PANEL: CPT | Performed by: FAMILY MEDICINE

## 2024-06-21 PROCEDURE — 73522 X-RAY EXAM HIPS BI 3-4 VIEWS: CPT | Mod: TC | Performed by: RADIOLOGY

## 2024-06-21 PROCEDURE — 82607 VITAMIN B-12: CPT | Performed by: FAMILY MEDICINE

## 2024-06-21 PROCEDURE — 80053 COMPREHEN METABOLIC PANEL: CPT | Performed by: FAMILY MEDICINE

## 2024-06-21 PROCEDURE — 99214 OFFICE O/P EST MOD 30 MIN: CPT | Performed by: FAMILY MEDICINE

## 2024-06-21 PROCEDURE — 84443 ASSAY THYROID STIM HORMONE: CPT | Performed by: FAMILY MEDICINE

## 2024-06-21 PROCEDURE — 85652 RBC SED RATE AUTOMATED: CPT | Performed by: FAMILY MEDICINE

## 2024-06-21 RX ORDER — ACETAMINOPHEN 325 MG/1
325-650 TABLET ORAL EVERY 6 HOURS PRN
COMMUNITY

## 2024-06-21 RX ORDER — TIOTROPIUM BROMIDE INHALATION SPRAY 1.56 UG/1
SPRAY, METERED RESPIRATORY (INHALATION)
COMMUNITY
Start: 2024-06-13

## 2024-06-21 NOTE — PROGRESS NOTES
Assessment & Plan     Other fatigue  and   Multiple joint pain  Long COVID  Will do lab workup below  Discussed possibility of long COVID given she has had 3 times and has had increasing fatigue, joint pain, balance issues in the last year.  Referred to the long COVID clinic if lab workup is negative  Does have family history of inflammatory arthritis as well so we will check inflammation rates.  - acetaminophen (TYLENOL) 325 MG tablet; Take 325-650 mg by mouth every 6 hours as needed for mild pain  - Comprehensive metabolic panel; Future  - CBC with Platelets & Differential; Future  - Vitamin D Deficiency; Future  - Vitamin B12; Future  - Lipid panel reflex to direct LDL Fasting; Future  - TSH with free T4 reflex; Future  - ESR: Erythrocyte sedimentation rate; Future  - CRP, inflammation; Future  - XR Pelvis and Hip Bilateral 2 Views; Future  - Adult Post Covid Clinic  Referral; Future      Hip pain, left  Could be recurrence of back issues that she had injection for last year but is now has anterior hip pain we discussed it could also be hip arthritis  Will get x-ray  If positive for arthritis refer to Ortho  If negative refer back to Dr. Tsang who did injection for back  - acetaminophen (TYLENOL) 325 MG tablet; Take 325-650 mg by mouth every 6 hours as needed for mild pain  - CBC with Platelets & Differential; Future  - ESR: Erythrocyte sedimentation rate; Future  - CRP, inflammation; Future  - XR Pelvis and Hip Bilateral 2 Views; Future  - CBC with Platelets & Differential  - ESR: Erythrocyte sedimentation rate  - CRP, inflammation      Also discussed:    Problem List as of 6/21/2024 Reviewed: 6/21/2024 12:16 PM by Sun Person MD            Noted       Medium    1. Benign essential hypertension 10/18/2016     Overview Addendum 6/21/2024 12:13 PM by Sun Person MD      06/21/2024 A/P:  Losartan 50 mg BID & amlodipine 5 mg every day works well    History:  2018:hctz dries her out,  Lotrel not covered.  2018:Will try amlodipine, check BP at home.  2019: increase to 10 mg. Check Bp at home          Last Assessment & Plan 12/2/2020 Office Visit Edited 12/2/2020  9:15 AM by Sun Person MD      Too high today - on amlodipine 10  mg and losartan 25 mg nightly.  Decrease amlodipine to 5 mg nightly (to help swelling)  Increase losartan to 50 mg nightly.  Check blood pressure daily for 2 weeks.  Send me on EarlyS[employer] your blood pressure numbers.  If not at goal, we'll do an E-visit to adjust your medications.  If at goal, we'll keep things the same!  Recheck BMP today and yearly.          Relevant Medications     acetaminophen (TYLENOL) 325 MG tablet     Other Relevant Orders     Comprehensive metabolic panel     CBC with Platelets & Differential (Completed)     Vitamin D Deficiency     Vitamin B12     Lipid panel reflex to direct LDL Fasting     TSH with free T4 reflex     ESR: Erythrocyte sedimentation rate (Completed)     CRP, inflammation     XR Pelvis and Hip Bilateral 2 Views    2. Chronic insomnia 12/26/2012     Overview Addendum 6/21/2024 12:15 PM by Sun Person MD      06/21/2024 A/P:  Stopped trazodone    2024: trazodone 50 mg been helpful  2012: Since 30s. Amitriptyline helps.          Last Assessment & Plan 12/2/2020 Office Visit Written 12/2/2020  8:58 AM by Sun Person MD      Has had since her 30s.  Low dose amitriptyline helpful.  Continue this as only 10 mg and has been stable.  Refilled         3. Lumbar radiculopathy with sciatica 12/18/2023     Overview Addendum 6/21/2024 12:17 PM by Sun Person MD      06/21/2024 A/P:  Now pain worse again, can't tell if back or hip  Will get xray pelvis/hips today.  If no pathology refer back to Dr. Tsang    2024: Had injection L5-S1 transforaminal with Dr. Tsang - helped so much!!!          Relevant Medications     acetaminophen (TYLENOL) 325 MG tablet     Other Relevant Orders     CBC with Platelets & Differential  "(Completed)     Vitamin B12     ESR: Erythrocyte sedimentation rate (Completed)     CRP, inflammation    4. Neuroforaminal stenosis of lumbar spine 1/18/2024     Overview Addendum 6/21/2024 12:16 PM by Sun Person MD      06/21/2024 A/P:  Now pain worse again, can't tell if back or hip  Will get xray pelvis/hips today.  If no pathology refer back to Dr. Tsang    2024: Had injection L5-S1 transforaminal with Dr. Tsang - helped so much!!!          Relevant Medications     acetaminophen (TYLENOL) 325 MG tablet     The longitudinal plan of care for the diagnosis(es)/condition(s) as documented were addressed during this visit. Due to the added complexity in care, I will continue to support Fidelina in the subsequent management and with ongoing continuity of care.    BMI  Estimated body mass index is 26.98 kg/m  as calculated from the following:    Height as of this encounter: 1.638 m (5' 4.47\").    Weight as of this encounter: 72.3 kg (159 lb 8 oz).         Henrry Kitchen is a 73 year old, presenting for the following health issues:  Follow Up (Hgb A1c and health concerns)      6/21/2024    10:38 AM   Additional Questions   Roomed by Devan   Accompanied by self     History of Present Illness       Reason for visit:  Follow up    She eats 4 or more servings of fruits and vegetables daily.She consumes 0 sweetened beverage(s) daily.She exercises with enough effort to increase her heart rate 10 to 19 minutes per day.  She exercises with enough effort to increase her heart rate 3 or less days per week.   She is taking medications regularly.     Wonders if has long Covid?  Short term memory down the tubes  Went to asthma doctor last week - has been using controller and rescue every day and never had such a low score.  They started Spiriva    Hip bothering her.  Gait is off.    Knickes freeze and can hardly move them when walks in the morning.    Daughter has psoriatic arthritis.    Hips - bothers her.  Gait.  Had " "injection in her back - 12/2023.  It helped.  Improvement, but     Lungs - started new inhaler, helped a lot.    Also - heaviness on left.    Back thing          Objective    /82 (BP Location: Right arm, Patient Position: Sitting, Cuff Size: Adult Regular)   Pulse 71   Temp 97.7  F (36.5  C) (Temporal)   Resp 16   Ht 1.638 m (5' 4.47\")   Wt 72.3 kg (159 lb 8 oz)   LMP  (LMP Unknown)   SpO2 97%   BMI 26.98 kg/m    Body mass index is 26.98 kg/m .  Physical Exam   GENERAL: alert, no distress, fatigued, and tearful  GAIT: Walks with limp, favors left side, pain in left anterior groin          Signed Electronically by: Sun Person MD    "

## 2024-06-25 ENCOUNTER — LAB (OUTPATIENT)
Dept: LAB | Facility: CLINIC | Age: 74
End: 2024-06-25
Payer: COMMERCIAL

## 2024-06-25 DIAGNOSIS — E21.0 PRIMARY HYPERPARATHYROIDISM (H): ICD-10-CM

## 2024-06-25 DIAGNOSIS — R82.994 HYPERCALCIURIA: ICD-10-CM

## 2024-06-25 DIAGNOSIS — R53.83 OTHER FATIGUE: ICD-10-CM

## 2024-06-25 LAB — CA-I BLD-MCNC: 5.2 MG/DL (ref 4.4–5.2)

## 2024-06-25 PROCEDURE — 82310 ASSAY OF CALCIUM: CPT

## 2024-06-25 PROCEDURE — 83970 ASSAY OF PARATHORMONE: CPT

## 2024-06-25 PROCEDURE — 84100 ASSAY OF PHOSPHORUS: CPT

## 2024-06-25 PROCEDURE — 82310 ASSAY OF CALCIUM: CPT | Mod: 91

## 2024-06-25 PROCEDURE — 82330 ASSAY OF CALCIUM: CPT

## 2024-06-25 PROCEDURE — 36415 COLL VENOUS BLD VENIPUNCTURE: CPT

## 2024-06-25 PROCEDURE — 82565 ASSAY OF CREATININE: CPT

## 2024-06-26 LAB
CALCIUM SERPL-MCNC: 10 MG/DL (ref 8.8–10.2)
CALCIUM SERPL-MCNC: 10.1 MG/DL (ref 8.8–10.2)
CREAT SERPL-MCNC: 0.68 MG/DL (ref 0.51–0.95)
EGFRCR SERPLBLD CKD-EPI 2021: >90 ML/MIN/1.73M2
PHOSPHATE SERPL-MCNC: 2 MG/DL (ref 2.5–4.5)
PTH-INTACT SERPL-MCNC: 61 PG/ML (ref 15–65)

## 2024-07-01 ENCOUNTER — LAB (OUTPATIENT)
Dept: LAB | Facility: CLINIC | Age: 74
End: 2024-07-01
Payer: COMMERCIAL

## 2024-07-01 DIAGNOSIS — E21.0 PRIMARY HYPERPARATHYROIDISM (H): ICD-10-CM

## 2024-07-01 DIAGNOSIS — R82.994 HYPERCALCIURIA: ICD-10-CM

## 2024-07-02 PROCEDURE — 81050 URINALYSIS VOLUME MEASURE: CPT

## 2024-07-02 PROCEDURE — 82570 ASSAY OF URINE CREATININE: CPT

## 2024-07-03 ENCOUNTER — TRANSFERRED RECORDS (OUTPATIENT)
Dept: HEALTH INFORMATION MANAGEMENT | Facility: CLINIC | Age: 74
End: 2024-07-03
Payer: COMMERCIAL

## 2024-07-03 LAB
COLLECT DURATION TIME UR: 24 H
CREAT 24H UR-MRATE: 1.14 G/SPEC (ref 0.72–1.51)
CREAT UR-MCNC: 26.8 MG/DL
SPECIMEN VOL UR: 4250 ML

## 2024-07-05 ENCOUNTER — TELEPHONE (OUTPATIENT)
Dept: FAMILY MEDICINE | Facility: CLINIC | Age: 74
End: 2024-07-05
Payer: COMMERCIAL

## 2024-07-05 NOTE — TELEPHONE ENCOUNTER
Reason for Call:  Appointment Request    Patient requesting this type of appt: Chronic Diease Management/Medication/Follow-Up    Requested provider: Sun Person    Reason patient unable to be scheduled: Not within requested timeframe    When does patient want to be seen/preferred time: 1-2 weeks    Comments: Dr Person wants pt to be seen by July 20. No avail appts Please contact pt with appt date avail    Could we send this information to you in [employer] or would you prefer to receive a phone call?:   No preference   Okay to leave a detailed message?: Yes at Cell number on file:    Telephone Information:   Mobile [phone]       Call taken on 7/5/2024 at 3:03 PM by Michaela Robles

## 2024-07-25 ENCOUNTER — VIRTUAL VISIT (OUTPATIENT)
Dept: PHYSICAL MEDICINE AND REHAB | Facility: CLINIC | Age: 74
End: 2024-07-25
Attending: FAMILY MEDICINE
Payer: COMMERCIAL

## 2024-07-25 DIAGNOSIS — M25.50 MULTIPLE JOINT PAIN: ICD-10-CM

## 2024-07-25 DIAGNOSIS — Z81.8 FAMILY HISTORY OF DEMENTIA: ICD-10-CM

## 2024-07-25 DIAGNOSIS — U09.9 LONG COVID: ICD-10-CM

## 2024-07-25 DIAGNOSIS — U09.9 POST-COVID CHRONIC CONCENTRATION DEFICIT: Primary | ICD-10-CM

## 2024-07-25 DIAGNOSIS — R41.840 POST-COVID CHRONIC CONCENTRATION DEFICIT: Primary | ICD-10-CM

## 2024-07-25 DIAGNOSIS — R53.83 OTHER FATIGUE: ICD-10-CM

## 2024-07-25 PROCEDURE — 99204 OFFICE O/P NEW MOD 45 MIN: CPT | Mod: 95 | Performed by: PHYSICIAN ASSISTANT

## 2024-07-25 SDOH — SOCIAL STABILITY: SOCIAL NETWORK

## 2024-07-25 SDOH — SOCIAL STABILITY: SOCIAL NETWORK: I HAVE TROUBLE DOING ALL OF THE ACTIVITIES WITH FRIENDS THAT I WANT TO DO: RARELY

## 2024-07-25 SDOH — SOCIAL STABILITY: SOCIAL NETWORK: I HAVE TROUBLE DOING ALL OF MY USUAL WORK (INCLUDE WORK AT HOME): USUALLY

## 2024-07-25 SDOH — SOCIAL STABILITY: SOCIAL NETWORK: I HAVE TROUBLE DOING ALL OF MY REGULAR LEISURE ACTIVITIES WITH OTHERS: RARELY

## 2024-07-25 SDOH — SOCIAL STABILITY: SOCIAL NETWORK: PROMIS ABILITY TO PARTICIPATE IN SOCIAL ROLES & ACTIVITIES T-SCORE: 48

## 2024-07-25 SDOH — SOCIAL STABILITY: SOCIAL NETWORK: I HAVE TROUBLE DOING ALL OF THE FAMILY ACTIVITIES THAT I WANT TO DO: RARELY

## 2024-07-25 ASSESSMENT — ANXIETY QUESTIONNAIRES
1. FEELING NERVOUS, ANXIOUS, OR ON EDGE: NOT AT ALL
3. WORRYING TOO MUCH ABOUT DIFFERENT THINGS: SEVERAL DAYS
GAD7 TOTAL SCORE: 2
7. FEELING AFRAID AS IF SOMETHING AWFUL MIGHT HAPPEN: NOT AT ALL
IF YOU CHECKED OFF ANY PROBLEMS ON THIS QUESTIONNAIRE, HOW DIFFICULT HAVE THESE PROBLEMS MADE IT FOR YOU TO DO YOUR WORK, TAKE CARE OF THINGS AT HOME, OR GET ALONG WITH OTHER PEOPLE: SOMEWHAT DIFFICULT
5. BEING SO RESTLESS THAT IT IS HARD TO SIT STILL: NOT AT ALL
7. FEELING AFRAID AS IF SOMETHING AWFUL MIGHT HAPPEN: NOT AT ALL
6. BECOMING EASILY ANNOYED OR IRRITABLE: SEVERAL DAYS
8. IF YOU CHECKED OFF ANY PROBLEMS, HOW DIFFICULT HAVE THESE MADE IT FOR YOU TO DO YOUR WORK, TAKE CARE OF THINGS AT HOME, OR GET ALONG WITH OTHER PEOPLE?: SOMEWHAT DIFFICULT
2. NOT BEING ABLE TO STOP OR CONTROL WORRYING: NOT AT ALL
4. TROUBLE RELAXING: NOT AT ALL
GAD7 TOTAL SCORE: 2
GAD7 TOTAL SCORE: 2

## 2024-07-25 ASSESSMENT — PATIENT HEALTH QUESTIONNAIRE - PHQ9
SUM OF ALL RESPONSES TO PHQ QUESTIONS 1-9: 4
SUM OF ALL RESPONSES TO PHQ QUESTIONS 1-9: 4
10. IF YOU CHECKED OFF ANY PROBLEMS, HOW DIFFICULT HAVE THESE PROBLEMS MADE IT FOR YOU TO DO YOUR WORK, TAKE CARE OF THINGS AT HOME, OR GET ALONG WITH OTHER PEOPLE: SOMEWHAT DIFFICULT

## 2024-07-25 NOTE — PATIENT INSTRUCTIONS
To Do:   Occupational therapy  Physical therapy  Neurology referral   N-Acetylcysteine 600mg for cognitive function ( side effect GI)  Return in 2 months    Post COVID Self Care Suggestions:     Fatigue Management:       [email or web address]-[email or web address]/action/[email or web address]?[email or web address]       Self Care:      [email or web address]  Recovery World Health Organization:    https://apps.who.int/iris/bitstream/handle/65491/086488/AIJ-KRNO-4493-029-51720-[address]-eng.pdf  Breathing exercises:    [email or web address]      Assessment of sense of smell and taste    Smell training:  Flowery - Vangie  Fruity - Lemon  Spicy - Cloves  Resinous - Eucalyptus      1 - Pour a few droplets of one of the oils on to a cotton pad or ball  2 - Do not try to sniff the pad immediately; leave it for a few minutes for the fragrance to develop  3 - Hold the first stick/pad up to your nose, about an inch away.  The order in which you test the oils does not matter  4 - Relax and try to inhale naturally through the nose - sniffing too quickly and deeply is likely to result in you not being able to detect anything  5 - Try this a couple more times, then rest for five minutes  6 - Move on to the next oil and repeat as above.    After three months switch to a new set of odors: menthol, thyme, tangerine, and celeste, training with them twice daily.    After another three months, switch to a third new set of odors: green tea, bergamot, rosemary, and sanford, again training with them twice daily.    Studies:   [place] Paxlovid [place]  [email or web address]    Cognitive Post-COVID study  z.[email or web address]    Supplements:  Fatigue- COQ10 100mg 3x day  ( side effects GI)  Brain fog-N-acetylcysteine 600 mg daily (side effects GI)

## 2024-07-25 NOTE — PROGRESS NOTES
Jennifer Jane is a 73 year old female who presents to be evaluated for a billable video visit.    Video-Visit Details    Video visit Start time:11:58 AM    Type of service:  Video Visit    Video End Time: 12:29 PM    Originating Location (pt. Location): Home    Distant Location (provider location):  Off- Site    Platform used for Video Visit: Zazom    Assessment/ Impression:   1. Post-COVID chronic concentration deficit/Family history of dementia  Patient with increased short-term memory loss and forgetfulness since COVID.  Of note patient has had COVID 3 times with the most recent infection being February 2024.  Discussed starting occupational therapy for patient's fatigue and concentration.  Also discussed starting N-acetylcysteine 600 mg due to benefit shown through [facility] study for post-COVID concentration deficits.  Of note patient does have family history of dementia with her mom having possible Alzheimer's and brother with Lewy body dementia.  Will place referral to neurology for evaluation.  - Adult Neurology  Referral; Future    2. Other fatigue/ Multiple Joint pain  Patient also reports chronic fatigue which is likely connected to patient's poor sleep due to her joint pain.  Discussed starting physical therapy and encouraged to take vitamin D with food to help for better absorption.  - Adult Post Covid Clinic  Referral  - Physical Therapy  Referral; Future    3. Long COVID  Discussed COVID and Post COVID with patient.  Educational materials provided and all questions answered.  Encouraged patient to receive booster vaccine as there is some evidence this can help with long COVID symptoms.  - Adult Post Covid Clinic  Referral      Plan:  I reviewed present knowledge on long-Covid.  Education was provided and question were answered.  Orders/Referrals as above  Will advised patient on test results  I will follow up with Jennifer Jane in 2 months. I will review progress  and consider need for any other therapeutic interventions. If there are any questions and/or concerns she will call the clinic.      On day of encounter time spent in chart review and with patient in consultation, exam, education, coordination of care, review of outside charts/data and documentation:  45 minutes     I have attempted to proof read for major spelling errors and apologize for any minor errors I may have missed.      This note was dictated using voice recognition software. Any grammatical or context distortions are unintentional and inherent to the software.    _____________  Apoorva Nelson PA-C  [facility] PHYSICAL MEDICINE AND REHABILITATION CLINIC [facility]   This 73 year old female presents to the [facility] Medicine Post-COVID clinic as a new consult to evaluate continuing symptoms after COVID infection most recently diagnosed 2/10/24.  Jennifer Jane presented to urgent care on 2/10/24 complaining of cough, fever, chills, headache, generalized aches and pains, fatigue, and weakness. Treatment was Paxlovid.  She also tested positive in nov 2022, and February 2023. Jennifer Jane experienced complications of cognitive decline.  Continuing symptoms include fatigue, generalized aches, brain fog, and distractibility.  Patient has fatigue and has tired consistently.  Patient does have some hard time sleeping at night.  Patient falls asleep within 15 minutes.  She is normally sleeping between 11-12 pm. She does wake up during the night.  She is able to fall asleep  again and will wake up around 9 am.  She is napping ever other day for 1 hr.  She does wake up feeling rested half the time.  She will get some energy from exercise.  Does not feel more fatigue from exercise.  Patient does forget things quickly, and feels short term memory is poor.  Patient has forgot to turn the stove off but recognized this in 10 minutes.   No issues with concentration or  processing. Patient does have some pain and noticed her muscle pain but does notice some joint pain.  contributing to her Past medical history is significant for  Chronic insomnia, Fibromyalagia, angle closure glaucoma.  . The patient was vaccinated against COVID x 4, last vaccine 1013/22 .  Previous activity was  Retired .     History of COVID-19 infection: 2/10/24  Date of first symptoms: 2/5/24  Diagnosis: antigen  Hospitalization: No  Treatment: Paxlovid  Current Symptoms: See subjective  Goals of Care: increase energy, improve thinking, and improve quality of life            7/25/2024    11:31 AM   PHQ Assesment Total Score(s)   PHQ-9 Score 4           7/25/2024    11:36 AM   GÉNESIS-7 Results   GÉNESIS 7 TOTAL SCORE 2 (minimal anxiety)   GÉNESIS-7 Total Score 2         7/25/2024    11:38 AM   PTSD Screen Score   Have you ever experienced this kind of event? No         7/25/2024    11:46 AM   PROMIS-29   PROMIS Physical Function T-Score 40 (mild dysfunction)   PROMIS Anxiety T-Score 48 (within normal limits)   PROMIS Depression T-Score 56 (mild)   PROMIS Fatigue T-Score 63 (moderate)   PROMIS Sleep Disturbance T-Score 52 (within normal limits)   PROMIS Ability to Participate in Social Roles & Activities T-Score 48 (within normal limits)   PROMIS Pain Interference T-Score 61 (moderate)   PROMIS Pain Intensity 3     Past Medical History:   Diagnosis Date    Acute bilateral low back pain with bilateral sciatica     Acute pain of right knee     Pain for months, worse since helping friend move.  Exam consistent with meniscal tear.  Check xray today, depending on results MRI.  Ice, NSAIDS for now.  ACE.    Amaurosis fugax     Arthritis     Fibromyalgia, ? Osteoarthritis    Back injury     Micro discectomy, approx date 1983    Benign neoplasm of ascending colon     Chondromalacia of patella, right     Severe, tri-compartmental on MRI 10/2017.  Referred to ortho.    Chronic left-sided headache     Negative MRI and CT of head 2018  and 2016  Normal labs TSH, CBC, CMP, ESR 12/2019.  See plan below in pt instructions.    Diarrhea, unspecified type     Diverticular disease of large intestine     Elevated parathyroid hormone     Encounter for Medicare annual wellness exam     Up to date on mammogram and colon cancer screening.  Is due for shingles vaccine - do at pharmacy.    Glaucoma     Glaucoma suspect     Hiatal hernia     HTN (hypertension)     Hypercalcemia 11/30/2019    Hypercalciuria     Insomnia 12/26/2012     Problem list name updated by automated process. Provider to review    Overweight (BMI 25.0-29.9)     Polyp of colon     Pseudophakia, both eyes     Reduced vision 09/2016    L vision block, bilateral elevated eye presures,poss. Glacom    Right foot pain     Sciatica     On and off - exacerbates when lifts heavy things  Bilaterally.    Serum calcium elevated     Spinal stenosis of lumbar region without neurogenic claudication     Uncomplicated asthma     Diagnosed as 2 yo    Weakness of left lower extremity        Past Surgical History:   Procedure Laterality Date     cataract extraction with intraocular lens implant Right 01/31/2019    BACK SURGERY  1984    micro-disc-ectomy    BREAST SURGERY  1984    L Breast bx    cataract extraction with intraocular lens Left 12/06/2018    CHOLECYSTECTOMY  1990    INJECT EPIDURAL TRANSFORAMINAL Bilateral 1/31/2024    Procedure: Bilateral L5-S1 Transforaminal epidural steroid injection;  Surgeon: Sarahy Tsang MD;  Location: [facility] OR    IRIDOTOMY/IRIDECTOMY LASER SURGERY Left 09/27/2016    IRIDOTOMY/IRIDECTOMY LASER SURGERY Right 10/18/2016    TUBAL LIGATION  1989    Z STOMACH SURGERY PROCEDURE UNLISTED  03/1990    Cholecystectomy, 11/1989 tubal ligation       Family History   Problem Relation Age of Onset    Heart Disease Mother     Diabetes Mother     Coronary Artery Disease Mother     Hypertension Mother     Anesthesia Reaction Mother         Anaphylaxis, oral opioixd    Asthma Mother      Thyroid Disease Mother     Low Back Problems Mother     Heart Disease Father     Diabetes Father     Coronary Artery Disease Father     Hypertension Father     Hyperlipidemia Father     Low Back Problems Father     Diabetes Sister     Hypertension Sister     Low Back Problems Sister     Thyroid Disease Sister     Hip fracture Sister     Low Back Problems Brother     Asthma Maternal Grandmother     Coronary Artery Disease Maternal Grandfather     Cerebrovascular Disease Paternal Grandfather     Asthma Daughter     Genetic Disease Daughter         Kallman syndrome; no olfactory bulb;    Calcium Disorder No family hx of        Social History     Tobacco Use    Smoking status: Never     Passive exposure: Past    Smokeless tobacco: Never   Vaping Use    Vaping status: Never Used   Substance Use Topics    Alcohol use: Yes     Alcohol/week: 0.0 standard drinks of alcohol     Comment: 0 -2 times a year    Drug use: No         Current Outpatient Medications:     acetaminophen (TYLENOL) 325 MG tablet, Take 325-650 mg by mouth every 6 hours as needed for mild pain, Disp: , Rfl:     ADVAIR -21 MCG/ACT inhaler, Inhale 2 puffs into the lungs 2 times daily, Disp: , Rfl:     albuterol (VENTOLIN HFA) 108 (90 Base) MCG/ACT inhaler, Inhale 2 puffs into the lungs every 6 hours as needed for shortness of breath, Disp: 8.5 g, Rfl: 5    alendronate (FOSAMAX) 70 MG tablet, Take 1 tablet (70 mg) by mouth every 7 days Take 60 minutes before am meal with 8 oz. water. Remain upright for 30 minutes., Disp: 12 tablet, Rfl: 3    amLODIPine (NORVASC) 5 MG tablet, Take 1 tablet (5 mg) by mouth at bedtime, Disp: 90 tablet, Rfl: 4    atorvastatin (LIPITOR) 20 MG tablet, Take 1 tablet (20 mg) by mouth daily, Disp: 90 tablet, Rfl: 4    azelastine (ASTELIN) 0.1 % nasal spray, Spray 1 spray into both nostrils 2 times daily, Disp: , Rfl:     Calcium Carbonate Antacid (TUMS PO), Take  by mouth At Bedtime., Disp: , Rfl:     fexofenadine (ALLEGRA) 180  "MG tablet, Take 180 mg by mouth daily, Disp: , Rfl:     latanoprost (XALATAN) 0.005 % ophthalmic solution, Place 1 drop into the right eye At Bedtime, Disp: , Rfl:     losartan (COZAAR) 50 MG tablet, Take 1 tablet (50 mg) by mouth 2 times daily, Disp: 180 tablet, Rfl: 4    montelukast (SINGULAIR) 10 MG tablet, TAKE ONE TABLET BY MOUTH EVERY DAY AS DIRECTED, Disp: , Rfl: 1    SPIRIVA RESPIMAT 1.25 MCG/ACT inhaler, INHALE 2 PUFFS ONCE PER DAY, Disp: , Rfl:     Vitamin D3 (CHOLECALCIFEROL) 25 mcg (1000 units) tablet, Take by mouth daily, Disp: , Rfl:     Review of Systems   Constitutional, HEENT, cardiovascular, pulmonary, GI, , musculoskeletal, neuro, skin, endocrine and psych systems are negative, except as otherwise noted.      Objective    Vitals:  No vitals were obtained today due to virtual visit.    Physical Exam   EYES: Eyes grossly normal to inspection.  No discharge or erythema, or obvious scleral/conjunctival abnormalities.  SKIN: Visible skin clear. No significant rash, abnormal pigmentation or lesions.  NEURO: Cranial nerves grossly intact.  Mentation and speech appropriate for age.  GENERAL: Healthy, alert and no distress  RESP: No audible wheeze, cough, or visible cyanosis.  No visible retractions or increased work of breathing.    PSYCH: Mentation appears normal, affect normal/bright, judgement and insight intact, normal speech and appearance well-groomed.            No results found for: \"CRP\"   Sed Rate   Date Value Ref Range Status   12/13/2019 10 0 - 30 mm/h Final     Erythrocyte Sedimentation Rate   Date Value Ref Range Status   06/21/2024 3 0 - 30 mm/hr Final      Last Renal Panel:  Sodium   Date Value Ref Range Status   06/21/2024 140 135 - 145 mmol/L Final     Comment:     Reference intervals for this test were updated on 09/26/2023 to more accurately reflect our healthy population. There may be differences in the flagging of prior results with similar values performed with this method. " Interpretation of those prior results can be made in the context of the updated reference intervals.    12/02/2020 139 133 - 144 mmol/L Final     Potassium   Date Value Ref Range Status   06/21/2024 4.0 3.4 - 5.3 mmol/L Final   09/16/2022 4.3 3.4 - 5.3 mmol/L Final   12/02/2020 4.1 3.4 - 5.3 mmol/L Final     Chloride   Date Value Ref Range Status   06/21/2024 104 98 - 107 mmol/L Final   09/16/2022 108 94 - 109 mmol/L Final   12/02/2020 107 94 - 109 mmol/L Final     Carbon Dioxide   Date Value Ref Range Status   12/02/2020 27 20 - 32 mmol/L Final     Carbon Dioxide (CO2)   Date Value Ref Range Status   06/21/2024 28 22 - 29 mmol/L Final   09/16/2022 28 20 - 32 mmol/L Final     Anion Gap   Date Value Ref Range Status   06/21/2024 8 7 - 15 mmol/L Final   09/16/2022 5 3 - 14 mmol/L Final   12/02/2020 5 3 - 14 mmol/L Final     Glucose   Date Value Ref Range Status   06/21/2024 120 (H) 70 - 99 mg/dL Final   09/16/2022 118 (H) 70 - 99 mg/dL Final   12/02/2020 121 (H) 70 - 99 mg/dL Final     Comment:     Fasting specimen     Urea Nitrogen   Date Value Ref Range Status   06/21/2024 10.8 8.0 - 23.0 mg/dL Final   09/16/2022 12 7 - 30 mg/dL Final   12/02/2020 17 7 - 30 mg/dL Final     Creatinine   Date Value Ref Range Status   06/25/2024 0.68 0.51 - 0.95 mg/dL Final   12/02/2020 0.86 0.52 - 1.04 mg/dL Final     GFR Estimate   Date Value Ref Range Status   06/25/2024 >90 >60 mL/min/1.73m2 Final     Comment:     eGFR calculated using 2021 CKD-EPI equation.   12/02/2020 68 >60 mL/min/[1.73_m2] Final     Comment:     Non  GFR Calc  Starting 12/18/2018, serum creatinine based estimated GFR (eGFR) will be   calculated using the Chronic Kidney Disease Epidemiology Collaboration   (CKD-EPI) equation.       GFR, ESTIMATED POCT   Date Value Ref Range Status   09/16/2022 >60 >60 mL/min/1.73m2 Final     Calcium   Date Value Ref Range Status   06/25/2024 10.1 8.8 - 10.2 mg/dL Final   06/25/2024 10.0 8.8 - 10.2 mg/dL Final  "  12/02/2020 10.1 8.5 - 10.1 mg/dL Final     Phosphorus   Date Value Ref Range Status   06/25/2024 2.0 (L) 2.5 - 4.5 mg/dL Final     Albumin   Date Value Ref Range Status   06/21/2024 4.7 3.5 - 5.2 g/dL Final   09/16/2022 4.0 3.4 - 5.0 g/dL Final   12/10/2019 4.1 3.4 - 5.0 g/dL Final      Lab Results   Component Value Date    WBC 9.3 06/21/2024    WBC 8.8 12/13/2019     Lab Results   Component Value Date    RBC 4.99 06/21/2024    RBC 4.84 12/13/2019     Lab Results   Component Value Date    HGB 14.9 06/21/2024    HGB 14.2 12/13/2019     Lab Results   Component Value Date    HCT 45.6 06/21/2024    HCT 42.9 12/13/2019     No components found for: \"MCT\"  Lab Results   Component Value Date    MCV 91 06/21/2024    MCV 89 12/13/2019     Lab Results   Component Value Date    MCH 29.9 06/21/2024    MCH 29.3 12/13/2019     Lab Results   Component Value Date    MCHC 32.7 06/21/2024    MCHC 33.1 12/13/2019     Lab Results   Component Value Date    RDW 12.3 06/21/2024    RDW 13.0 12/13/2019     Lab Results   Component Value Date     06/21/2024     12/13/2019      Lab Results   Component Value Date    A1C 5.7 05/30/2024    A1C 6.8 02/28/2024    A1C 6.3 02/17/2023    A1C 5.8 02/16/2022    A1C 5.6 12/02/2020    A1C 5.6 11/27/2019    A1C 5.4 10/04/2017    A1C 5.6 10/14/2016    A1C 5.9 02/26/2015      TSH   Date Value Ref Range Status   06/21/2024 1.75 0.30 - 4.20 uIU/mL Final   09/16/2022 1.61 0.40 - 4.00 mU/L Final   12/21/2020 1.13 0.40 - 4.00 mU/L Final      Lab Results   Component Value Date    VITDT 28 06/21/2024    VITDT 20 08/15/2023    VITDT 17 (L) 02/17/2023    VITDT 25 09/26/2022      Recent Labs   Lab Test 10/04/22  2223 02/16/22  1423   MAG 2.0 2.2     Last Comprehensive Metabolic Panel:  Sodium   Date Value Ref Range Status   06/21/2024 140 135 - 145 mmol/L Final     Comment:     Reference intervals for this test were updated on 09/26/2023 to more accurately reflect our healthy population. There may be " differences in the flagging of prior results with similar values performed with this method. Interpretation of those prior results can be made in the context of the updated reference intervals.    12/02/2020 139 133 - 144 mmol/L Final     Potassium   Date Value Ref Range Status   06/21/2024 4.0 3.4 - 5.3 mmol/L Final   09/16/2022 4.3 3.4 - 5.3 mmol/L Final   12/02/2020 4.1 3.4 - 5.3 mmol/L Final     Chloride   Date Value Ref Range Status   06/21/2024 104 98 - 107 mmol/L Final   09/16/2022 108 94 - 109 mmol/L Final   12/02/2020 107 94 - 109 mmol/L Final     Carbon Dioxide   Date Value Ref Range Status   12/02/2020 27 20 - 32 mmol/L Final     Carbon Dioxide (CO2)   Date Value Ref Range Status   06/21/2024 28 22 - 29 mmol/L Final   09/16/2022 28 20 - 32 mmol/L Final     Anion Gap   Date Value Ref Range Status   06/21/2024 8 7 - 15 mmol/L Final   09/16/2022 5 3 - 14 mmol/L Final   12/02/2020 5 3 - 14 mmol/L Final     Glucose   Date Value Ref Range Status   06/21/2024 120 (H) 70 - 99 mg/dL Final   09/16/2022 118 (H) 70 - 99 mg/dL Final   12/02/2020 121 (H) 70 - 99 mg/dL Final     Comment:     Fasting specimen     Urea Nitrogen   Date Value Ref Range Status   06/21/2024 10.8 8.0 - 23.0 mg/dL Final   09/16/2022 12 7 - 30 mg/dL Final   12/02/2020 17 7 - 30 mg/dL Final     Creatinine   Date Value Ref Range Status   06/25/2024 0.68 0.51 - 0.95 mg/dL Final   12/02/2020 0.86 0.52 - 1.04 mg/dL Final     GFR Estimate   Date Value Ref Range Status   06/25/2024 >90 >60 mL/min/1.73m2 Final     Comment:     eGFR calculated using 2021 CKD-EPI equation.   12/02/2020 68 >60 mL/min/[1.73_m2] Final     Comment:     Non  GFR Calc  Starting 12/18/2018, serum creatinine based estimated GFR (eGFR) will be   calculated using the Chronic Kidney Disease Epidemiology Collaboration   (CKD-EPI) equation.       GFR, ESTIMATED POCT   Date Value Ref Range Status   09/16/2022 >60 >60 mL/min/1.73m2 Final     Calcium   Date Value Ref  Range Status   06/25/2024 10.1 8.8 - 10.2 mg/dL Final   06/25/2024 10.0 8.8 - 10.2 mg/dL Final   12/02/2020 10.1 8.5 - 10.1 mg/dL Final     Bilirubin Total   Date Value Ref Range Status   06/21/2024 0.8 <=1.2 mg/dL Final   12/10/2019 0.5 0.2 - 1.3 mg/dL Final     Alkaline Phosphatase   Date Value Ref Range Status   06/21/2024 70 40 - 150 U/L Final   12/10/2019 99 40 - 150 U/L Final     ALT   Date Value Ref Range Status   06/21/2024 37 0 - 50 U/L Final     Comment:     Reference intervals for this test were updated on 6/12/2023 to more accurately reflect our healthy population. There may be differences in the flagging of prior results with similar values performed with this method. Interpretation of those prior results can be made in the context of the updated reference intervals.     12/10/2019 33 0 - 50 U/L Final     AST   Date Value Ref Range Status   06/21/2024 33 0 - 45 U/L Final     Comment:     Reference intervals for this test were updated on 6/12/2023 to more accurately reflect our healthy population. There may be differences in the flagging of prior results with similar values performed with this method. Interpretation of those prior results can be made in the context of the updated reference intervals.   12/10/2019 24 0 - 45 U/L Final     Most Recent D-dimer:  Recent Labs   Lab Test 03/21/22  1730   DD 0.75*           Imaging:    I personally reviewed the following imaging results today and those on care everywhere, if indicated     Nothing new to review    Reviewed imaging from [facility] sites     Medical Records Reviewed:    Reviewed consults/documents from [facility] including  Endocrinology, ENT, Family Ptactice and Pulmonology

## 2024-07-25 NOTE — NURSING NOTE
Current patient location: [address] [place], [place] [address]E [facility] [address]    Is the patient currently in the state of [place]? YES    Visit mode:VIDEO    If the visit is dropped, the patient can be reconnected by: TELEPHONE VISIT: Phone number:   Telephone Information:   Mobile [phone]       Will anyone else be joining the visit? NO  (If patient encounters technical issues they should call [phone]    How would you like to obtain your AVS? MyChart    Are changes needed to the allergy or medication list? Pt stated no med changes    Are refills needed on medications prescribed by this physician? NO    Reason for visit: Video Visit (Follow-up )    Parvin JUAN

## 2024-07-25 NOTE — LETTER
7/25/2024       RE: Jennifer Jane  [address]  [facility] [address]       Dear Colleague,    Thank you for referring your patient, Jennifer Jane, to the [facility] PHYSICAL MEDICINE AND REHABILITATION CLINIC [place] at [facility]. Please see a copy of my visit note below.    Jennifer Jane is a 73 year old female who presents to be evaluated for a billable video visit.    Assessment/ Impression:   1. Post-COVID chronic concentration deficit/Family history of dementia  Patient with increased short-term memory loss and forgetfulness since COVID.  Of note patient has had COVID 3 times with the most recent infection being February 2024.  Discussed starting occupational therapy for patient's fatigue and concentration.  Also discussed starting N-acetylcysteine 600 mg due to benefit shown through [facility] study for post-COVID concentration deficits.  Of note patient does have family history of dementia with her mom having possible Alzheimer's and brother with Lewy body dementia.  Will place referral to neurology for evaluation.  - Adult Neurology  Referral; Future    2. Other fatigue/ Multiple Joint pain  Patient also reports chronic fatigue which is likely connected to patient's poor sleep due to her joint pain.  Discussed starting physical therapy and encouraged to take vitamin D with food to help for better absorption.  - Adult Post Covid Clinic  Referral  - Physical Therapy  Referral; Future    3. Long COVID  Discussed COVID and Post COVID with patient.  Educational materials provided and all questions answered.  Encouraged patient to receive booster vaccine as there is some evidence this can help with long COVID symptoms.  - Adult Post Covid Clinic  Referral      Plan:  I reviewed present knowledge on long-Covid.  Education was provided and question were answered.  Orders/Referrals as above  Will advised patient on test  results  I will follow up with Jennifer Jane in 2 months. I will review progress and consider need for any other therapeutic interventions. If there are any questions and/or concerns she will call the clinic.      On day of encounter time spent in chart review and with patient in consultation, exam, education, coordination of care, review of outside charts/data and documentation:  45 minutes     I have attempted to proof read for major spelling errors and apologize for any minor errors I may have missed.      This note was dictated using voice recognition software. Any grammatical or context distortions are unintentional and inherent to the software.    _____________  Apoorva Nelson PA-C  [facility] PHYSICAL MEDICINE AND REHABILITATION CLINIC [facility]   This 73 year old female presents to the [facility] Post-COVID clinic as a new consult to evaluate continuing symptoms after COVID infection most recently diagnosed 2/10/24.  Jennifer Jane presented to urgent care on 2/10/24 complaining of cough, fever, chills, headache, generalized aches and pains, fatigue, and weakness. Treatment was Paxlovid.  She also tested positive in nov 2022, and February 2023. Jennifer Jane experienced complications of cognitive decline.  Continuing symptoms include fatigue, generalized aches, brain fog, and distractibility.  Patient has fatigue and has tired consistently.  Patient does have some hard time sleeping at night.  Patient falls asleep within 15 minutes.  She is normally sleeping between 11-12 pm. She does wake up during the night.  She is able to fall asleep  again and will wake up around 9 am.  She is napping ever other day for 1 hr.  She does wake up feeling rested half the time.  She will get some energy from exercise.  Does not feel more fatigue from exercise.  Patient does forget things quickly, and feels short term memory is poor.  Patient has forgot to turn  the stove off but recognized this in 10 minutes.   No issues with concentration or processing. Patient does have some pain and noticed her muscle pain but does notice some joint pain.  contributing to her Past medical history is significant for  Chronic insomnia, Fibromyalagia, angle closure glaucoma.  . The patient was vaccinated against COVID x 4, last vaccine 1013/22 .  Previous activity was  Retired .     History of COVID-19 infection: 2/10/24  Date of first symptoms: 2/5/24  Diagnosis: antigen  Hospitalization: No  Treatment: Paxlovid  Current Symptoms: See subjective  Goals of Care: increase energy, improve thinking, and improve quality of life            7/25/2024    11:31 AM   PHQ Assesment Total Score(s)   PHQ-9 Score 4           7/25/2024    11:36 AM   GÉNESIS-7 Results   GÉNESIS 7 TOTAL SCORE 2 (minimal anxiety)   GÉNESIS-7 Total Score 2         7/25/2024    11:38 AM   PTSD Screen Score   Have you ever experienced this kind of event? No         7/25/2024    11:46 AM   PROMIS-29   PROMIS Physical Function T-Score 40 (mild dysfunction)   PROMIS Anxiety T-Score 48 (within normal limits)   PROMIS Depression T-Score 56 (mild)   PROMIS Fatigue T-Score 63 (moderate)   PROMIS Sleep Disturbance T-Score 52 (within normal limits)   PROMIS Ability to Participate in Social Roles & Activities T-Score 48 (within normal limits)   PROMIS Pain Interference T-Score 61 (moderate)   PROMIS Pain Intensity 3     Past Medical History:   Diagnosis Date    Acute bilateral low back pain with bilateral sciatica     Acute pain of right knee     Pain for months, worse since helping friend move.  Exam consistent with meniscal tear.  Check xray today, depending on results MRI.  Ice, NSAIDS for now.  ACE.    Amaurosis fugax     Arthritis     Fibromyalgia, ? Osteoarthritis    Back injury     Micro discectomy, approx date 1983    Benign neoplasm of ascending colon     Chondromalacia of patella, right     Severe, tri-compartmental on MRI 10/2017.   Referred to ortho.    Chronic left-sided headache     Negative MRI and CT of head 2018 and 2016  Normal labs TSH, CBC, CMP, ESR 12/2019.  See plan below in pt instructions.    Diarrhea, unspecified type     Diverticular disease of large intestine     Elevated parathyroid hormone     Encounter for Medicare annual wellness exam     Up to date on mammogram and colon cancer screening.  Is due for shingles vaccine - do at pharmacy.    Glaucoma     Glaucoma suspect     Hiatal hernia     HTN (hypertension)     Hypercalcemia 11/30/2019    Hypercalciuria     Insomnia 12/26/2012     Problem list name updated by automated process. Provider to review    Overweight (BMI 25.0-29.9)     Polyp of colon     Pseudophakia, both eyes     Reduced vision 09/2016    L vision block, bilateral elevated eye presures,poss. Glacom    Right foot pain     Sciatica     On and off - exacerbates when lifts heavy things  Bilaterally.    Serum calcium elevated     Spinal stenosis of lumbar region without neurogenic claudication     Uncomplicated asthma     Diagnosed as 1 yo    Weakness of left lower extremity        Past Surgical History:   Procedure Laterality Date     cataract extraction with intraocular lens implant Right 01/31/2019    BACK SURGERY  1984    micro-disc-ectomy    BREAST SURGERY  1984    L Breast bx    cataract extraction with intraocular lens Left 12/06/2018    CHOLECYSTECTOMY  1990    INJECT EPIDURAL TRANSFORAMINAL Bilateral 1/31/2024    Procedure: Bilateral L5-S1 Transforaminal epidural steroid injection;  Surgeon: Sarahy Tsang MD;  Location: [facility] OR    IRIDOTOMY/IRIDECTOMY LASER SURGERY Left 09/27/2016    IRIDOTOMY/IRIDECTOMY LASER SURGERY Right 10/18/2016    TUBAL LIGATION  1989    ZZC STOMACH SURGERY PROCEDURE UNLISTED  03/1990    Cholecystectomy, 11/1989 tubal ligation       Family History   Problem Relation Age of Onset    Heart Disease Mother     Diabetes Mother     Coronary Artery Disease Mother     Hypertension Mother      Anesthesia Reaction Mother         Anaphylaxis, oral opioixd    Asthma Mother     Thyroid Disease Mother     Low Back Problems Mother     Heart Disease Father     Diabetes Father     Coronary Artery Disease Father     Hypertension Father     Hyperlipidemia Father     Low Back Problems Father     Diabetes Sister     Hypertension Sister     Low Back Problems Sister     Thyroid Disease Sister     Hip fracture Sister     Low Back Problems Brother     Asthma Maternal Grandmother     Coronary Artery Disease Maternal Grandfather     Cerebrovascular Disease Paternal Grandfather     Asthma Daughter     Genetic Disease Daughter         Kallman syndrome; no olfactory bulb;    Calcium Disorder No family hx of        Social History     Tobacco Use    Smoking status: Never     Passive exposure: Past    Smokeless tobacco: Never   Vaping Use    Vaping status: Never Used   Substance Use Topics    Alcohol use: Yes     Alcohol/week: 0.0 standard drinks of alcohol     Comment: 0 -2 times a year    Drug use: No         Current Outpatient Medications:     acetaminophen (TYLENOL) 325 MG tablet, Take 325-650 mg by mouth every 6 hours as needed for mild pain, Disp: , Rfl:     ADVAIR -21 MCG/ACT inhaler, Inhale 2 puffs into the lungs 2 times daily, Disp: , Rfl:     albuterol (VENTOLIN HFA) 108 (90 Base) MCG/ACT inhaler, Inhale 2 puffs into the lungs every 6 hours as needed for shortness of breath, Disp: 8.5 g, Rfl: 5    alendronate (FOSAMAX) 70 MG tablet, Take 1 tablet (70 mg) by mouth every 7 days Take 60 minutes before am meal with 8 oz. water. Remain upright for 30 minutes., Disp: 12 tablet, Rfl: 3    amLODIPine (NORVASC) 5 MG tablet, Take 1 tablet (5 mg) by mouth at bedtime, Disp: 90 tablet, Rfl: 4    atorvastatin (LIPITOR) 20 MG tablet, Take 1 tablet (20 mg) by mouth daily, Disp: 90 tablet, Rfl: 4    azelastine (ASTELIN) 0.1 % nasal spray, Spray 1 spray into both nostrils 2 times daily, Disp: , Rfl:     Calcium Carbonate  "Antacid (TUMS PO), Take  by mouth At Bedtime., Disp: , Rfl:     fexofenadine (ALLEGRA) 180 MG tablet, Take 180 mg by mouth daily, Disp: , Rfl:     latanoprost (XALATAN) 0.005 % ophthalmic solution, Place 1 drop into the right eye At Bedtime, Disp: , Rfl:     losartan (COZAAR) 50 MG tablet, Take 1 tablet (50 mg) by mouth 2 times daily, Disp: 180 tablet, Rfl: 4    montelukast (SINGULAIR) 10 MG tablet, TAKE ONE TABLET BY MOUTH EVERY DAY AS DIRECTED, Disp: , Rfl: 1    SPIRIVA RESPIMAT 1.25 MCG/ACT inhaler, INHALE 2 PUFFS ONCE PER DAY, Disp: , Rfl:     Vitamin D3 (CHOLECALCIFEROL) 25 mcg (1000 units) tablet, Take by mouth daily, Disp: , Rfl:     Review of Systems   Constitutional, HEENT, cardiovascular, pulmonary, GI, , musculoskeletal, neuro, skin, endocrine and psych systems are negative, except as otherwise noted.      Objective    Vitals:  No vitals were obtained today due to virtual visit.    Physical Exam   EYES: Eyes grossly normal to inspection.  No discharge or erythema, or obvious scleral/conjunctival abnormalities.  SKIN: Visible skin clear. No significant rash, abnormal pigmentation or lesions.  NEURO: Cranial nerves grossly intact.  Mentation and speech appropriate for age.  GENERAL: Healthy, alert and no distress  RESP: No audible wheeze, cough, or visible cyanosis.  No visible retractions or increased work of breathing.    PSYCH: Mentation appears normal, affect normal/bright, judgement and insight intact, normal speech and appearance well-groomed.            No results found for: \"CRP\"   Sed Rate   Date Value Ref Range Status   12/13/2019 10 0 - 30 mm/h Final     Erythrocyte Sedimentation Rate   Date Value Ref Range Status   06/21/2024 3 0 - 30 mm/hr Final      Last Renal Panel:  Sodium   Date Value Ref Range Status   06/21/2024 140 135 - 145 mmol/L Final     Comment:     Reference intervals for this test were updated on 09/26/2023 to more accurately reflect our healthy population. There may be " differences in the flagging of prior results with similar values performed with this method. Interpretation of those prior results can be made in the context of the updated reference intervals.    12/02/2020 139 133 - 144 mmol/L Final     Potassium   Date Value Ref Range Status   06/21/2024 4.0 3.4 - 5.3 mmol/L Final   09/16/2022 4.3 3.4 - 5.3 mmol/L Final   12/02/2020 4.1 3.4 - 5.3 mmol/L Final     Chloride   Date Value Ref Range Status   06/21/2024 104 98 - 107 mmol/L Final   09/16/2022 108 94 - 109 mmol/L Final   12/02/2020 107 94 - 109 mmol/L Final     Carbon Dioxide   Date Value Ref Range Status   12/02/2020 27 20 - 32 mmol/L Final     Carbon Dioxide (CO2)   Date Value Ref Range Status   06/21/2024 28 22 - 29 mmol/L Final   09/16/2022 28 20 - 32 mmol/L Final     Anion Gap   Date Value Ref Range Status   06/21/2024 8 7 - 15 mmol/L Final   09/16/2022 5 3 - 14 mmol/L Final   12/02/2020 5 3 - 14 mmol/L Final     Glucose   Date Value Ref Range Status   06/21/2024 120 (H) 70 - 99 mg/dL Final   09/16/2022 118 (H) 70 - 99 mg/dL Final   12/02/2020 121 (H) 70 - 99 mg/dL Final     Comment:     Fasting specimen     Urea Nitrogen   Date Value Ref Range Status   06/21/2024 10.8 8.0 - 23.0 mg/dL Final   09/16/2022 12 7 - 30 mg/dL Final   12/02/2020 17 7 - 30 mg/dL Final     Creatinine   Date Value Ref Range Status   06/25/2024 0.68 0.51 - 0.95 mg/dL Final   12/02/2020 0.86 0.52 - 1.04 mg/dL Final     GFR Estimate   Date Value Ref Range Status   06/25/2024 >90 >60 mL/min/1.73m2 Final     Comment:     eGFR calculated using 2021 CKD-EPI equation.   12/02/2020 68 >60 mL/min/[1.73_m2] Final     Comment:     Non  GFR Calc  Starting 12/18/2018, serum creatinine based estimated GFR (eGFR) will be   calculated using the Chronic Kidney Disease Epidemiology Collaboration   (CKD-EPI) equation.       GFR, ESTIMATED POCT   Date Value Ref Range Status   09/16/2022 >60 >60 mL/min/1.73m2 Final     Calcium   Date Value Ref  "Range Status   06/25/2024 10.1 8.8 - 10.2 mg/dL Final   06/25/2024 10.0 8.8 - 10.2 mg/dL Final   12/02/2020 10.1 8.5 - 10.1 mg/dL Final     Phosphorus   Date Value Ref Range Status   06/25/2024 2.0 (L) 2.5 - 4.5 mg/dL Final     Albumin   Date Value Ref Range Status   06/21/2024 4.7 3.5 - 5.2 g/dL Final   09/16/2022 4.0 3.4 - 5.0 g/dL Final   12/10/2019 4.1 3.4 - 5.0 g/dL Final      Lab Results   Component Value Date    WBC 9.3 06/21/2024    WBC 8.8 12/13/2019     Lab Results   Component Value Date    RBC 4.99 06/21/2024    RBC 4.84 12/13/2019     Lab Results   Component Value Date    HGB 14.9 06/21/2024    HGB 14.2 12/13/2019     Lab Results   Component Value Date    HCT 45.6 06/21/2024    HCT 42.9 12/13/2019     No components found for: \"MCT\"  Lab Results   Component Value Date    MCV 91 06/21/2024    MCV 89 12/13/2019     Lab Results   Component Value Date    MCH 29.9 06/21/2024    MCH 29.3 12/13/2019     Lab Results   Component Value Date    MCHC 32.7 06/21/2024    MCHC 33.1 12/13/2019     Lab Results   Component Value Date    RDW 12.3 06/21/2024    RDW 13.0 12/13/2019     Lab Results   Component Value Date     06/21/2024     12/13/2019      Lab Results   Component Value Date    A1C 5.7 05/30/2024    A1C 6.8 02/28/2024    A1C 6.3 02/17/2023    A1C 5.8 02/16/2022    A1C 5.6 12/02/2020    A1C 5.6 11/27/2019    A1C 5.4 10/04/2017    A1C 5.6 10/14/2016    A1C 5.9 02/26/2015      TSH   Date Value Ref Range Status   06/21/2024 1.75 0.30 - 4.20 uIU/mL Final   09/16/2022 1.61 0.40 - 4.00 mU/L Final   12/21/2020 1.13 0.40 - 4.00 mU/L Final      Lab Results   Component Value Date    VITDT 28 06/21/2024    VITDT 20 08/15/2023    VITDT 17 (L) 02/17/2023    VITDT 25 09/26/2022      Recent Labs   Lab Test 10/04/22  2223 02/16/22  1423   MAG 2.0 2.2     Last Comprehensive Metabolic Panel:  Sodium   Date Value Ref Range Status   06/21/2024 140 135 - 145 mmol/L Final     Comment:     Reference intervals for this " test were updated on 09/26/2023 to more accurately reflect our healthy population. There may be differences in the flagging of prior results with similar values performed with this method. Interpretation of those prior results can be made in the context of the updated reference intervals.    12/02/2020 139 133 - 144 mmol/L Final     Potassium   Date Value Ref Range Status   06/21/2024 4.0 3.4 - 5.3 mmol/L Final   09/16/2022 4.3 3.4 - 5.3 mmol/L Final   12/02/2020 4.1 3.4 - 5.3 mmol/L Final     Chloride   Date Value Ref Range Status   06/21/2024 104 98 - 107 mmol/L Final   09/16/2022 108 94 - 109 mmol/L Final   12/02/2020 107 94 - 109 mmol/L Final     Carbon Dioxide   Date Value Ref Range Status   12/02/2020 27 20 - 32 mmol/L Final     Carbon Dioxide (CO2)   Date Value Ref Range Status   06/21/2024 28 22 - 29 mmol/L Final   09/16/2022 28 20 - 32 mmol/L Final     Anion Gap   Date Value Ref Range Status   06/21/2024 8 7 - 15 mmol/L Final   09/16/2022 5 3 - 14 mmol/L Final   12/02/2020 5 3 - 14 mmol/L Final     Glucose   Date Value Ref Range Status   06/21/2024 120 (H) 70 - 99 mg/dL Final   09/16/2022 118 (H) 70 - 99 mg/dL Final   12/02/2020 121 (H) 70 - 99 mg/dL Final     Comment:     Fasting specimen     Urea Nitrogen   Date Value Ref Range Status   06/21/2024 10.8 8.0 - 23.0 mg/dL Final   09/16/2022 12 7 - 30 mg/dL Final   12/02/2020 17 7 - 30 mg/dL Final     Creatinine   Date Value Ref Range Status   06/25/2024 0.68 0.51 - 0.95 mg/dL Final   12/02/2020 0.86 0.52 - 1.04 mg/dL Final     GFR Estimate   Date Value Ref Range Status   06/25/2024 >90 >60 mL/min/1.73m2 Final     Comment:     eGFR calculated using 2021 CKD-EPI equation.   12/02/2020 68 >60 mL/min/[1.73_m2] Final     Comment:     Non  GFR Calc  Starting 12/18/2018, serum creatinine based estimated GFR (eGFR) will be   calculated using the Chronic Kidney Disease Epidemiology Collaboration   (CKD-EPI) equation.       GFR, ESTIMATED POCT   Date  Value Ref Range Status   09/16/2022 >60 >60 mL/min/1.73m2 Final     Calcium   Date Value Ref Range Status   06/25/2024 10.1 8.8 - 10.2 mg/dL Final   06/25/2024 10.0 8.8 - 10.2 mg/dL Final   12/02/2020 10.1 8.5 - 10.1 mg/dL Final     Bilirubin Total   Date Value Ref Range Status   06/21/2024 0.8 <=1.2 mg/dL Final   12/10/2019 0.5 0.2 - 1.3 mg/dL Final     Alkaline Phosphatase   Date Value Ref Range Status   06/21/2024 70 40 - 150 U/L Final   12/10/2019 99 40 - 150 U/L Final     ALT   Date Value Ref Range Status   06/21/2024 37 0 - 50 U/L Final     Comment:     Reference intervals for this test were updated on 6/12/2023 to more accurately reflect our healthy population. There may be differences in the flagging of prior results with similar values performed with this method. Interpretation of those prior results can be made in the context of the updated reference intervals.     12/10/2019 33 0 - 50 U/L Final     AST   Date Value Ref Range Status   06/21/2024 33 0 - 45 U/L Final     Comment:     Reference intervals for this test were updated on 6/12/2023 to more accurately reflect our healthy population. There may be differences in the flagging of prior results with similar values performed with this method. Interpretation of those prior results can be made in the context of the updated reference intervals.   12/10/2019 24 0 - 45 U/L Final     Most Recent D-dimer:  Recent Labs   Lab Test 03/21/22  1730   DD 0.75*           Imaging:    I personally reviewed the following imaging results today and those on care everywhere, if indicated     Nothing new to review    Reviewed imaging from [facility] sites     Medical Records Reviewed:    Reviewed consults/documents from [facility] including  Endocrinology, ENT, Family Ptactice and Pulmonology           Again, thank you for allowing me to participate in the care of your patient.      Sincerely,    Apoorva Nelson PA-C

## 2024-07-26 ENCOUNTER — OFFICE VISIT (OUTPATIENT)
Dept: FAMILY MEDICINE | Facility: CLINIC | Age: 74
End: 2024-07-26
Payer: COMMERCIAL

## 2024-07-26 VITALS
BODY MASS INDEX: 27.76 KG/M2 | HEART RATE: 69 BPM | OXYGEN SATURATION: 98 % | SYSTOLIC BLOOD PRESSURE: 134 MMHG | WEIGHT: 166.6 LBS | TEMPERATURE: 97.2 F | HEIGHT: 65 IN | RESPIRATION RATE: 16 BRPM | DIASTOLIC BLOOD PRESSURE: 74 MMHG

## 2024-07-26 DIAGNOSIS — F51.04 CHRONIC INSOMNIA: ICD-10-CM

## 2024-07-26 DIAGNOSIS — R93.89 ABNORMAL X-RAY: ICD-10-CM

## 2024-07-26 DIAGNOSIS — R49.9 HOARSENESS OR CHANGING VOICE: ICD-10-CM

## 2024-07-26 DIAGNOSIS — Z23 NEED FOR SHINGLES VACCINE: ICD-10-CM

## 2024-07-26 DIAGNOSIS — R09.82 POST-NASAL DRIP: ICD-10-CM

## 2024-07-26 DIAGNOSIS — U09.9 POST-COVID CHRONIC CONCENTRATION DEFICIT: Primary | ICD-10-CM

## 2024-07-26 DIAGNOSIS — M25.552 HIP PAIN, LEFT: ICD-10-CM

## 2024-07-26 DIAGNOSIS — Z29.11 NEED FOR VACCINATION AGAINST RESPIRATORY SYNCYTIAL VIRUS: ICD-10-CM

## 2024-07-26 DIAGNOSIS — R60.0 BILATERAL EDEMA OF LOWER EXTREMITY: ICD-10-CM

## 2024-07-26 DIAGNOSIS — R41.840 POST-COVID CHRONIC CONCENTRATION DEFICIT: Primary | ICD-10-CM

## 2024-07-26 DIAGNOSIS — M85.9 LOW BONE DENSITY: ICD-10-CM

## 2024-07-26 PROCEDURE — 91320 SARSCV2 VAC 30MCG TRS-SUC IM: CPT | Performed by: FAMILY MEDICINE

## 2024-07-26 PROCEDURE — 99215 OFFICE O/P EST HI 40 MIN: CPT | Mod: 25 | Performed by: FAMILY MEDICINE

## 2024-07-26 PROCEDURE — 90480 ADMN SARSCOV2 VAC 1/ONLY CMP: CPT | Performed by: FAMILY MEDICINE

## 2024-07-26 RX ORDER — TRAZODONE HYDROCHLORIDE 50 MG/1
50-150 TABLET, FILM COATED ORAL AT BEDTIME
Qty: 130 TABLET | Refills: 4 | Status: SHIPPED | OUTPATIENT
Start: 2024-07-26

## 2024-07-26 RX ORDER — UBIDECARENONE 100 MG
100 CAPSULE ORAL DAILY
Qty: 90 CAPSULE | Refills: 4 | Status: SHIPPED | OUTPATIENT
Start: 2024-07-26

## 2024-07-26 ASSESSMENT — ASTHMA QUESTIONNAIRES
ACT_TOTALSCORE: 18
QUESTION_5 LAST FOUR WEEKS HOW WOULD YOU RATE YOUR ASTHMA CONTROL: SOMEWHAT CONTROLLED
QUESTION_4 LAST FOUR WEEKS HOW OFTEN HAVE YOU USED YOUR RESCUE INHALER OR NEBULIZER MEDICATION (SUCH AS ALBUTEROL): TWO OR THREE TIMES PER WEEK
QUESTION_2 LAST FOUR WEEKS HOW OFTEN HAVE YOU HAD SHORTNESS OF BREATH: THREE TO SIX TIMES A WEEK
ACT_TOTALSCORE: 18
QUESTION_1 LAST FOUR WEEKS HOW MUCH OF THE TIME DID YOUR ASTHMA KEEP YOU FROM GETTING AS MUCH DONE AT WORK, SCHOOL OR AT HOME: A LITTLE OF THE TIME
QUESTION_3 LAST FOUR WEEKS HOW OFTEN DID YOUR ASTHMA SYMPTOMS (WHEEZING, COUGHING, SHORTNESS OF BREATH, CHEST TIGHTNESS OR PAIN) WAKE YOU UP AT NIGHT OR EARLIER THAN USUAL IN THE MORNING: NOT AT ALL

## 2024-07-26 ASSESSMENT — ENCOUNTER SYMPTOMS: COUGH: 1

## 2024-07-26 ASSESSMENT — PAIN SCALES - GENERAL: PAINLEVEL: NO PAIN (0)

## 2024-07-26 NOTE — PATIENT INSTRUCTIONS
For the swelling in your legs:   First step is wearing compression sock (knee high) every day.  Put them on first thing in the morning and keep them on until you go to bed.  This will be difficult/uncomfortable for first week but get better as the extra fluid is removed from your legs.            Here's an example of some over the counter compression socks you can buy.

## 2024-07-26 NOTE — PROGRESS NOTES
"  Assessment & Plan       Need for shingles vaccine  Will do at pharmacy  - zoster vaccine recombinant adjuvanted (SHINGRIX) injection; Inject 0.5 mLs into the muscle once for 1 dose Pharmacist administered    Need for vaccination against respiratory syncytial virus  Will do at pharmacy  - RSV vaccine, bivalent, ABRYSVO, injection; Inject 0.5 mLs into the muscle once for 1 dose    Hoarseness or changing voice  Post-nasal drip  Referred ENT for \"chronic laryngitis\" in the last year or so.  - Adult ENT  Referral; Future    Hip pain, left  In setting of   Possible lucency left intertrochanter left femur, Abnormal x-ray  In setting of   Low bone density  Sharp left pain anterior groin when standing from sitting, resolves after a few minutes of walking however  Reviewed last xray - they stated lucency left femur favored to be shadow, but now with symptoms concerned this may represent fracture from low bone density  Referred ASAP to ortho; defer next step imaging to them  - Orthopedic  Referral; Future      Also:  Problem List as of 7/26/2024 Reviewed: 7/26/2024  7:38 AM by Sun Person MD            Noted       Medium    1. Bilateral edema of lower extremity 12/2/2020     Overview Addendum 7/26/2024  8:10 AM by Sun Person MD      07/26/2024 A/P:  Resent compression socks today    2024- wears compression socks to the knee          Last Assessment & Plan 12/2/2020 Office Visit Written 12/2/2020  9:21 AM by Sun Person MD      No diuretics, dries her out too much.  Will lower amlodipine dose to 2.5 mg  Compression socks.  If persists and bothersome let me know, we could cut out amlodipine completely to see if this helps.          Relevant Orders     Compression Sleeve/Stocking Order for DME - ONLY FOR DME    2. Chronic insomnia 12/26/2012     Overview Addendum 7/26/2024  7:50 AM by Sun Person MD      07/26/2024 A/P:  Restart trazodone (had stopped, but now not sleeping " "well)   mg nightly    2024: trazodone 50 mg been helpful  2012: Since 30s. Amitriptyline helps.          Last Assessment & Plan 12/2/2020 Office Visit Written 12/2/2020  8:58 AM by Sun Person MD      Has had since her 30s.  Low dose amitriptyline helpful.  Continue this as only 10 mg and has been stable.  Refilled          Relevant Medications     traZODone (DESYREL) 50 MG tablet    3. Hoarseness or changing voice 7/26/2024     Overview Signed 7/26/2024  8:10 AM by Sun Person MD      07/26/2024 A/P:  Referred ENT for \"chronic laryngitis\" in the last year or so.            Relevant Orders     Adult ENT  Referral    4. Low bone density 10/26/2022     Overview Signed 2/28/2024 12:14 PM by Sun Person MD      2024: Following with Dr. Stanton Vines - they started alendronate, she couldn't tolerate. Would like to reconsider/restart at upcoming appt in 2 months           Relevant Orders     Orthopedic  Referral    5. Post-COVID chronic concentration deficit - Primary 7/26/2024     Overview Signed 7/26/2024  8:08 AM by Sun Person MD      07/26/2024 A/P:  Saw long Covid clinic yesterday  They recommended repeat COVID booster (done today)  Also  mg daily and coQ10 100 mg daily.  Sent to pharmacy.  Also physical therapy/OT.  They referred.  Also they referred to neurology.  Mom with dementia.            Relevant Medications     co-enzyme Q-10 100 MG CAPS capsule     acetylcysteine (N-ACETYL CYSTEINE) 600 MG CAPS capsule       BMI  Estimated body mass index is 28.16 kg/m  as calculated from the following:    Height as of this encounter: 1.638 m (5' 4.5\").    Weight as of this encounter: 75.6 kg (166 lb 9.6 oz).   Weight management plan: Discussed healthy diet and exercise guidelines      I spent a total of 40 minutes on the day of the visit.   Time spent by me doing chart review, history and exam, documentation and further activities per the noteThe " longitudinal plan of care for the diagnosis(es)/condition(s) as documented were addressed during this visit. Due to the added complexity in care, I will continue to support Fidelina in the subsequent management and with ongoing continuity of care.      Henrry Kitchen is a 73 year old, presenting for the following health issues:  Follow Up (Covid/Refill on TRAZODONE/Having a little headache ) and Cough        7/26/2024     7:29 AM   Additional Questions   Roomed by Marielos Ascencio   Accompanied by Self     History of Present Illness       Reason for visit:  Follow up covid    She eats 4 or more servings of fruits and vegetables daily.She consumes 1 sweetened beverage(s) daily.She exercises with enough effort to increase her heart rate 30 to 60 minutes per day.  She exercises with enough effort to increase her heart rate 3 or less days per week. She is missing 1 dose(s) of medications per week.     Since last visit:    Had appointment with phys med yesterday.  Can we prescribe coq10 aand naceylycysteine?    Chronic laryngitis.  Had scope - a long time ago.    Not sleeping.  Can she restart trazodone?    Also - needs lower compression socks.    DME (Durable Medical Equipment) Orders and Documentation  Orders Placed This Encounter   Procedures    Compression Sleeve/Stocking Order for DME - ONLY FOR DME      The patient was assessed and it was determined the patient is in need of the following listed DME Supplies/Equipment. Please complete supporting documentation below to demonstrate medical necessity.      Compression Sleeve/Stocking(s) Supplies Documentation  The patient needs compression stockings for Other reason: bilateral lower extremity edema .      DME All Other Item(s) Documentation    List reason for need and supporting documentation for medical necessity below for each DME item.     1. Edema bilateral lower extremity           Objective    /74 (BP Location: Right arm, Patient Position: Sitting, Cuff Size: Adult  "Regular)   Pulse 69   Temp 97.2  F (36.2  C) (Temporal)   Resp 16   Ht 1.638 m (5' 4.5\")   Wt 75.6 kg (166 lb 9.6 oz)   LMP  (LMP Unknown)   SpO2 98%   Breastfeeding No   BMI 28.16 kg/m    Body mass index is 28.16 kg/m .  Physical Exam   GENERAL: alert and no distress    Lab on 07/01/2024   Component Date Value Ref Range Status    Creatinine Urine mg/dL 07/02/2024 26.8  mg/dL Final    The reference ranges have not been established in urine creatinine. The results should be integrated into the clinical context for interpretation.    Duration in hours 07/02/2024 24.0  h Final    Volume in mL 07/02/2024 4,250  mL Final    Creatinine Urine Timed g/spec 07/02/2024 1.14  0.72 - 1.51 g/spec Final           Signed Electronically by: Sun Person MD    "

## 2024-07-29 NOTE — TELEPHONE ENCOUNTER
Patient called and now has a conflict due to seeing their eye doctor the same day in regards to their caterax. Wondering if provider would be okay scheduling patient on 8/13 or 8/21?     Patient would like a call back on their home phone [phone] and okay to leave a detailed message.

## 2024-07-29 NOTE — TELEPHONE ENCOUNTER
Hmm.    I already saw this patient last week for her appointment on the 26th.    Is she confused and thinks she needs an additional follow up?  Or am I confused and forgetting that I told her I needed a sooner follow up?  Or is something else going on entirely?    Thanks for helping clarify.    Dr. Sun Person MD / [facility]

## 2024-08-03 ENCOUNTER — TELEPHONE (OUTPATIENT)
Dept: FAMILY MEDICINE | Facility: CLINIC | Age: 74
End: 2024-08-03
Payer: COMMERCIAL

## 2024-08-05 NOTE — TELEPHONE ENCOUNTER
Awaiting clarification from BHUPINDER Mayen as she scheduled patient and PCP is unsure what need for that visit is at this time-

## 2024-08-05 NOTE — TELEPHONE ENCOUNTER
Clarification from [facility] is this visit is most likely no longer needed as patient was seen 7/26. Dr. Person, just to clarify you do not need visit with patient? Once confirmed we will connect with patient to cancel-thanks!

## 2024-08-09 NOTE — TELEPHONE ENCOUNTER
DIAGNOSIS: Sun Person MD  Hip pain, left   Abnormal x-ray   Low bone density   Left hip pain with possible intertrochanteric fracture on xray - may need imaging first, please order MRI first if needed or let me know best test and I can order     APPOINTMENT DATE: 8.12.24   NOTES STATUS DETAILS   OFFICE NOTE from referring provider Internal 7.26.24, 6.21.24  Raza ANDERSON   MEDICATION LIST Internal    XRAYS (IMAGES & REPORTS) Internal 8.9.24  XR Pelvis and Hip Left

## 2024-08-09 NOTE — TELEPHONE ENCOUNTER
FUTURE VISIT INFORMATION      FUTURE VISIT INFORMATION:  Date: 8/14/24  Time: 10:20 AM  Location: [facility] - ENT  REFERRAL INFORMATION:  Referring provider:    Referring providers clinic:    Reason for visit/diagnosis:  per patient post nasal drip confirmed [facility]     RECORDS REQUESTED FROM      Clinic name Comments Records Status Imaging Status   [facility] 7/26/24  notes/ referral -Sun Person MD Epic    [facility] 1/7/21 MR calvert Epic PACS

## 2024-08-12 ENCOUNTER — ANCILLARY PROCEDURE (OUTPATIENT)
Dept: GENERAL RADIOLOGY | Facility: CLINIC | Age: 74
End: 2024-08-12
Attending: FAMILY MEDICINE
Payer: COMMERCIAL

## 2024-08-12 ENCOUNTER — PRE VISIT (OUTPATIENT)
Dept: ORTHOPEDICS | Facility: CLINIC | Age: 74
End: 2024-08-12

## 2024-08-12 ENCOUNTER — OFFICE VISIT (OUTPATIENT)
Dept: ORTHOPEDICS | Facility: CLINIC | Age: 74
End: 2024-08-12
Attending: FAMILY MEDICINE
Payer: COMMERCIAL

## 2024-08-12 DIAGNOSIS — R93.89 ABNORMAL X-RAY: ICD-10-CM

## 2024-08-12 DIAGNOSIS — M85.9 LOW BONE DENSITY: ICD-10-CM

## 2024-08-12 DIAGNOSIS — M25.552 HIP PAIN, LEFT: ICD-10-CM

## 2024-08-12 PROCEDURE — 73502 X-RAY EXAM HIP UNI 2-3 VIEWS: CPT | Mod: LT | Performed by: RADIOLOGY

## 2024-08-12 PROCEDURE — 99213 OFFICE O/P EST LOW 20 MIN: CPT | Performed by: FAMILY MEDICINE

## 2024-08-12 NOTE — LETTER
8/12/2024      RE: Jennifer Jane  [address]  [place] [facility] [address]     Dear Colleague,    Thank you for referring your patient, Jennifer Jane, to the [facility] [place]. Please see a copy of my visit note below.    Sports Medicine Clinic Visit    PCP: Sun Person    Jennifer Jane is a 73 year old female who is seen  in consultation at the request of Dr. Person presenting with left hip pain.    Patient has noted for the last 6 months, worse in the last 2 months, that she has pain about the left groin whenever she tries to weight-bear.  It makes her limp.  She cannot find a comfortable position when she walks.  It also bothers her when she sits in a chair.  It is uncomfortable to make the motion from seated to rising.  She points to the anterior left groin as the area of discomfort.    Injury: No CIRILO     Location of Pain: left hip  Duration of Pain: 6 months   Rating of Pain: 3/10  Pain is better with: Nothing  Pain is worse with: sitting for longer periods of time   Getting out of bed   Treatment so far consists of: Nothing  Prior History of related problems: bilateral lumbar transforaminal epidural steroid injection at L5-S1 was done in the pain clinic 1/31/2024    LMP  (LMP Unknown)        X-ray left hip 6/21/2024, 2 months ago, consistent with mild bilateral hip DJD, with faint lucency in the intertrochanteric region noted on x-ray      Patient history of lumbar spine left-sided hemilaminectomy 1984, Dr Spence at the [facility] .  Patient is most recently followed with [facility] spine and physical medicine for spinal stenosis, most recent visit 1/18/2024 reviewed by me.  Patient at that time was dealing with low back pain and left-sided sciatica with numbness in the left lower leg and tingling on the outside of the foot.  At that time the patient was tolerating physical therapy poorly, and it was aggravating discomfort.  Patient  was placed on gabapentin and referred for a left-sided L5-S1 transforaminal epidural steroid injection.  A bilateral lumbar transforaminal epidural steroid injection at L5-S1 was done in the pain clinic 1/31/2024      DEXA scan 10/10/2022 consistent with low bone density      Work: Retired, formerly worked as a school nurse & at [employer]       Imaging studies below reviewed by me:    XR PELVIS AND HIP BILATERAL 2 VIEWS  6/21/2024 12:05 PM      HISTORY: Hip pain, left  COMPARISON: None                                                                      IMPRESSION: No definite fracture is identified. There is faint linear  lucency projecting over the intertrochanteric left femur without  cortical defect; favor to represent overlying shadows. Normal joint  alignment. Mild bilateral hip joint degenerative changes with  chondrocalcinosis. Mild bilateral sacroiliac joint degenerative  changes. Moderate degenerative changes of the lower lumbar spine.  Bilateral hamstring tendon calcific tendinopathy.      MAGUI JOAQUIN MD           MRI lumbar spine 12/8/2023:  L3-4: Large disc bulge with superimposed right central disc extrusion  extending cranially along the posterior aspect of the L3 vertebral  body. Marked facet arthropathy and thickening of ligamentum flavum.  There is moderate to severe spinal canal stenosis. There is mild  bilateral neural foraminal stenosis.     L4-5: Grade 1 anterolisthesis. Large disc bulge, asymmetric to the  right. Marked facet arthropathy and ligamentum flavum thickening.  Severe spinal canal stenosis. Mild to moderate bilateral neural  foraminal stenosis.     L5-S1: Circumferential disc osteophyte complex with facet arthropathy  but no spinal canal stenosis. There is a left hemilaminectomy. The  left neural foramen is mildly to moderately narrowed in the right  neural foramen is mildly narrowed.  IMPRESSION:  Multilevel lumbar spondylosis greatest at L4-5 with severe spinal  canal stenosis  at L3-4 with moderate to severe spinal canal stenosis.  Multilevel neural foraminal narrowings as detailed above.          PMH:  Past Medical History:   Diagnosis Date     Acute bilateral low back pain with bilateral sciatica      Acute pain of right knee     Pain for months, worse since helping friend move.  Exam consistent with meniscal tear.  Check xray today, depending on results MRI.  Ice, NSAIDS for now.  ACE.     Amaurosis fugax      Arthritis     Fibromyalgia, ? Osteoarthritis     Back injury     Micro discectomy, approx date 1983     Benign neoplasm of ascending colon      Chondromalacia of patella, right     Severe, tri-compartmental on MRI 10/2017.  Referred to ortho.     Chronic left-sided headache     Negative MRI and CT of head 2018 and 2016  Normal labs TSH, CBC, CMP, ESR 12/2019.  See plan below in pt instructions.     Diarrhea, unspecified type      Diverticular disease of large intestine      Elevated parathyroid hormone      Encounter for Medicare annual wellness exam     Up to date on mammogram and colon cancer screening.  Is due for shingles vaccine - do at pharmacy.     Glaucoma      Glaucoma suspect      Hiatal hernia      HTN (hypertension)      Hypercalcemia 11/30/2019     Hypercalciuria      Insomnia 12/26/2012     Problem list name updated by automated process. Provider to review     Overweight (BMI 25.0-29.9)      Polyp of colon      Pseudophakia, both eyes      Reduced vision 09/2016    L vision block, bilateral elevated eye presures,poss. Glacom     Right foot pain      Sciatica     On and off - exacerbates when lifts heavy things  Bilaterally.     Serum calcium elevated      Spinal stenosis of lumbar region without neurogenic claudication      Uncomplicated asthma     Diagnosed as 1 yo     Weakness of left lower extremity      Past Surgical History:   Procedure Laterality Date     BACK SURGERY   1984     micro-disc-ectomy     BREAST SURGERY   1984     L Breast bx     CHOLECYSTECTOMY    1990     TUBAL LIGATION   1989     Active problem list:  Patient Active Problem List   Diagnosis     Pre-diabetes     Chronic rhinitis     Chronic dacryocystitis     Chronic insomnia     Moderate persistent asthma with allergic rhinitis without complication     Fibromyalgia     Chronic angle-closure glaucoma     Hypercholesterolemia     Benign essential hypertension     Cystocele, midline     Chronic pain of right knee     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, mild stage     Bilateral edema of lower extremity     Low bone density     Primary hyperparathyroidism (H24)     Lumbar radiculopathy with sciatica     Neuroforaminal stenosis of lumbar spine     Chronic idiopathic constipation     PVC's (premature ventricular contractions)     Post-COVID chronic concentration deficit     Hoarseness or changing voice       FH:  Family History   Problem Relation Age of Onset     Heart Disease Mother      Diabetes Mother      Coronary Artery Disease Mother      Hypertension Mother      Anesthesia Reaction Mother         Anaphylaxis, oral opioixd     Asthma Mother      Thyroid Disease Mother      Low Back Problems Mother      Heart Disease Father      Diabetes Father      Coronary Artery Disease Father      Hypertension Father      Hyperlipidemia Father      Low Back Problems Father      Diabetes Sister      Hypertension Sister      Low Back Problems Sister      Thyroid Disease Sister      Hip fracture Sister      Low Back Problems Brother      Asthma Maternal Grandmother      Coronary Artery Disease Maternal Grandfather      Cerebrovascular Disease Paternal Grandfather      Asthma Daughter      Genetic Disease Daughter         Kallman syndrome; no olfactory bulb;     Calcium Disorder No family hx of        SH:  Social History     Socioeconomic History     Marital status:      Spouse name: Not on file     Number of children: Not on file     Years of education: Not on file     Highest education  level: Not on file   Occupational History     Not on file   Tobacco Use     Smoking status: Never     Passive exposure: Past     Smokeless tobacco: Never   Vaping Use     Vaping status: Never Used   Substance and Sexual Activity     Alcohol use: Yes     Alcohol/week: 0.0 standard drinks of alcohol     Comment: 0 -2 times a year     Drug use: No     Sexual activity: Yes     Partners: Male     Birth control/protection: Post-menopausal, Female Surgical   Other Topics Concern     Parent/sibling w/ CABG, MI or angioplasty before 65F 55M? Not Asked   Social History Narrative     Not on file     Social Determinants of Health     Financial Resource Strain: Low Risk  (2/28/2024)    Financial Resource Strain      Within the past 12 months, have you or your family members you live with been unable to get utilities (heat, electricity) when it was really needed?: No   Food Insecurity: Low Risk  (2/28/2024)    Food Insecurity      Within the past 12 months, did you worry that your food would run out before you got money to buy more?: No      Within the past 12 months, did the food you bought just not last and you didn t have money to get more?: No   Transportation Needs: Low Risk  (2/28/2024)    Transportation Needs      Within the past 12 months, has lack of transportation kept you from medical appointments, getting your medicines, non-medical meetings or appointments, work, or from getting things that you need?: No   Physical Activity: Insufficiently Active (2/28/2024)    Exercise Vital Sign      Days of Exercise per Week: 4 days      Minutes of Exercise per Session: 20 min   Stress: No Stress Concern Present (2/28/2024)    [employer] [employer] - Occupational Stress Questionnaire      Feeling of Stress : Not at all   Social Connections: Unknown (2/28/2024)    Social Connection and Isolation Panel [NHANES]      Frequency of Communication with Friends and Family: Not on file      Frequency of Social Gatherings  with Friends and Family: Once a week      Attends Druze Services: Not on file      Active Member of Clubs or Organizations: Not on file      Attends Club or Organization Meetings: Not on file      Marital Status: Not on file   Interpersonal Safety: Low Risk  (2/28/2024)    Interpersonal Safety      Do you feel physically and emotionally safe where you currently live?: Yes      Within the past 12 months, have you been hit, slapped, kicked or otherwise physically hurt by someone?: No      Within the past 12 months, have you been humiliated or emotionally abused in other ways by your partner or ex-partner?: No   Housing Stability: Low Risk  (2/28/2024)    Housing Stability      Do you have housing? : Yes      Are you worried about losing your housing?: No       MEDS:  See EMR, reviewed  ALL:  See EMR, reviewed    REVIEW OF SYSTEMS:  CONSTITUTIONAL:NEGATIVE for fever, chills, change in weight  INTEGUMENTARY/SKIN: NEGATIVE for worrisome rashes, moles or lesions  EYES: NEGATIVE for vision changes or irritation  ENT/MOUTH: NEGATIVE for ear, mouth and throat problems  RESP:NEGATIVE for significant cough or SOB  BREAST: NEGATIVE for masses, tenderness or discharge  CV: NEGATIVE for chest pain, palpitations or peripheral edema  GI: NEGATIVE for nausea, abdominal pain, heartburn, or change in bowel habits  :NEGATIVE for frequency, dysuria, or hematuria  :NEGATIVE for frequency, dysuria, or hematuria  NEURO: NEGATIVE for weakness, dizziness or paresthesias  ENDOCRINE: NEGATIVE for temperature intolerance, skin/hair changes  HEME/ALLERGY/IMMUNE: NEGATIVE for bleeding problems  PSYCHIATRIC: NEGATIVE for changes in mood or affect          Objective: She moves slowly about the exam room and needs assistance getting up on the table.  Logrolling of the left hip is uncomfortable.  Internal and external rotation is uncomfortable.  Compression of the pelvis without discomfort.  Nontender over the SI joint or sacrum.  Straight  leg raise is negative.  Strength is intact at hip, knee, ankle and foot.  Sensation is normal distally.  Appropriate in conversation and affect.    I personally viewed the patient updated x-rays of the hip that show mild bilateral hip DJD/chondrocalcinosis      Assessment: Left-sided hip discomfort with history of abnormal hip x-ray, mild bilateral hip DJD, low bone density, rule out insufficiency fracture    Plan: MRI of the pelvis and left hip are pending to rule out insufficiency fracture.  Face-to-face follow-up after the MRI to go over options.                    Again, thank you for allowing me to participate in the care of your patient.      Sincerely,    Bright Yousif MD

## 2024-08-12 NOTE — PROGRESS NOTES
Sports Medicine Clinic Visit    PCP: Sun Person    Jennifer Jane is a 73 year old female who is seen  in consultation at the request of Dr. Person presenting with left hip pain.    Patient has noted for the last 6 months, worse in the last 2 months, that she has pain about the left groin whenever she tries to weight-bear.  It makes her limp.  She cannot find a comfortable position when she walks.  It also bothers her when she sits in a chair.  It is uncomfortable to make the motion from seated to rising.  She points to the anterior left groin as the area of discomfort.    Injury: No CIRILO     Location of Pain: left hip  Duration of Pain: 6 months   Rating of Pain: 3/10  Pain is better with: Nothing  Pain is worse with: sitting for longer periods of time   Getting out of bed   Treatment so far consists of: Nothing  Prior History of related problems: bilateral lumbar transforaminal epidural steroid injection at L5-S1 was done in the pain clinic 1/31/2024    LMP  (LMP Unknown)        X-ray left hip 6/21/2024, 2 months ago, consistent with mild bilateral hip DJD, with faint lucency in the intertrochanteric region noted on x-ray      Patient history of lumbar spine left-sided hemilaminectomy 1984, Dr Spence at the [facility] (now [facility]) .  Patient is most recently followed with [facility] spine and physical medicine for spinal stenosis, most recent visit 1/18/2024 reviewed by me.  Patient at that time was dealing with low back pain and left-sided sciatica with numbness in the left lower leg and tingling on the outside of the foot.  At that time the patient was tolerating physical therapy poorly, and it was aggravating discomfort.  Patient was placed on gabapentin and referred for a left-sided L5-S1 transforaminal epidural steroid injection.  A bilateral lumbar transforaminal epidural steroid injection at L5-S1 was done in the pain clinic 1/31/2024      DEXA scan 10/10/2022 consistent with low  bone density      Work: Retired, formerly worked as a school nurse & at [employer]       Imaging studies below reviewed by me:    XR PELVIS AND HIP BILATERAL 2 VIEWS  6/21/2024 12:05 PM      HISTORY: Hip pain, left  COMPARISON: None                                                                      IMPRESSION: No definite fracture is identified. There is faint linear  lucency projecting over the intertrochanteric left femur without  cortical defect; favor to represent overlying shadows. Normal joint  alignment. Mild bilateral hip joint degenerative changes with  chondrocalcinosis. Mild bilateral sacroiliac joint degenerative  changes. Moderate degenerative changes of the lower lumbar spine.  Bilateral hamstring tendon calcific tendinopathy.      MAGUI JOAQUIN MD           MRI lumbar spine 12/8/2023:  L3-4: Large disc bulge with superimposed right central disc extrusion  extending cranially along the posterior aspect of the L3 vertebral  body. Marked facet arthropathy and thickening of ligamentum flavum.  There is moderate to severe spinal canal stenosis. There is mild  bilateral neural foraminal stenosis.     L4-5: Grade 1 anterolisthesis. Large disc bulge, asymmetric to the  right. Marked facet arthropathy and ligamentum flavum thickening.  Severe spinal canal stenosis. Mild to moderate bilateral neural  foraminal stenosis.     L5-S1: Circumferential disc osteophyte complex with facet arthropathy  but no spinal canal stenosis. There is a left hemilaminectomy. The  left neural foramen is mildly to moderately narrowed in the right  neural foramen is mildly narrowed.  IMPRESSION:  Multilevel lumbar spondylosis greatest at L4-5 with severe spinal  canal stenosis at L3-4 with moderate to severe spinal canal stenosis.  Multilevel neural foraminal narrowings as detailed above.          PMH:  Past Medical History:   Diagnosis Date    Acute bilateral low back pain with bilateral sciatica     Acute pain of right knee      Pain for months, worse since helping friend move.  Exam consistent with meniscal tear.  Check xray today, depending on results MRI.  Ice, NSAIDS for now.  ACE.    Amaurosis fugax     Arthritis     Fibromyalgia, ? Osteoarthritis    Back injury     Micro discectomy, approx date 1983    Benign neoplasm of ascending colon     Chondromalacia of patella, right     Severe, tri-compartmental on MRI 10/2017.  Referred to ortho.    Chronic left-sided headache     Negative MRI and CT of head 2018 and 2016  Normal labs TSH, CBC, CMP, ESR 12/2019.  See plan below in pt instructions.    Diarrhea, unspecified type     Diverticular disease of large intestine     Elevated parathyroid hormone     Encounter for Medicare annual wellness exam     Up to date on mammogram and colon cancer screening.  Is due for shingles vaccine - do at pharmacy.    Glaucoma     Glaucoma suspect     Hiatal hernia     HTN (hypertension)     Hypercalcemia 11/30/2019    Hypercalciuria     Insomnia 12/26/2012     Problem list name updated by automated process. Provider to review    Overweight (BMI 25.0-29.9)     Polyp of colon     Pseudophakia, both eyes     Reduced vision 09/2016    L vision block, bilateral elevated eye presures,poss. Glacom    Right foot pain     Sciatica     On and off - exacerbates when lifts heavy things  Bilaterally.    Serum calcium elevated     Spinal stenosis of lumbar region without neurogenic claudication     Uncomplicated asthma     Diagnosed as 1 yo    Weakness of left lower extremity      Past Surgical History:   Procedure Laterality Date    BACK SURGERY   1984     micro-disc-ectomy    BREAST SURGERY   1984     L Breast bx    CHOLECYSTECTOMY   1990    TUBAL LIGATION   1989     Active problem list:  Patient Active Problem List   Diagnosis    Pre-diabetes    Chronic rhinitis    Chronic dacryocystitis    Chronic insomnia    Moderate persistent asthma with allergic rhinitis without complication    Fibromyalgia    Chronic  angle-closure glaucoma    Hypercholesterolemia    Benign essential hypertension    Cystocele, midline    Chronic pain of right knee    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, mild stage    Bilateral edema of lower extremity    Low bone density    Primary hyperparathyroidism (H24)    Lumbar radiculopathy with sciatica    Neuroforaminal stenosis of lumbar spine    Chronic idiopathic constipation    PVC's (premature ventricular contractions)    Post-COVID chronic concentration deficit    Hoarseness or changing voice       FH:  Family History   Problem Relation Age of Onset    Heart Disease Mother     Diabetes Mother     Coronary Artery Disease Mother     Hypertension Mother     Anesthesia Reaction Mother         Anaphylaxis, oral opioixd    Asthma Mother     Thyroid Disease Mother     Low Back Problems Mother     Heart Disease Father     Diabetes Father     Coronary Artery Disease Father     Hypertension Father     Hyperlipidemia Father     Low Back Problems Father     Diabetes Sister     Hypertension Sister     Low Back Problems Sister     Thyroid Disease Sister     Hip fracture Sister     Low Back Problems Brother     Asthma Maternal Grandmother     Coronary Artery Disease Maternal Grandfather     Cerebrovascular Disease Paternal Grandfather     Asthma Daughter     Genetic Disease Daughter         Kallman syndrome; no olfactory bulb;    Calcium Disorder No family hx of        SH:  Social History     Socioeconomic History    Marital status:      Spouse name: Not on file    Number of children: Not on file    Years of education: Not on file    Highest education level: Not on file   Occupational History    Not on file   Tobacco Use    Smoking status: Never     Passive exposure: Past    Smokeless tobacco: Never   Vaping Use    Vaping status: Never Used   Substance and Sexual Activity    Alcohol use: Yes     Alcohol/week: 0.0 standard drinks of alcohol     Comment: 0 -2 times a year    Drug  use: No    Sexual activity: Yes     Partners: Male     Birth control/protection: Post-menopausal, Female Surgical   Other Topics Concern    Parent/sibling w/ CABG, MI or angioplasty before 65F 55M? Not Asked   Social History Narrative    Not on file     Social Determinants of Health     Financial Resource Strain: Low Risk  (2/28/2024)    Financial Resource Strain     Within the past 12 months, have you or your family members you live with been unable to get utilities (heat, electricity) when it was really needed?: No   Food Insecurity: Low Risk  (2/28/2024)    Food Insecurity     Within the past 12 months, did you worry that your food would run out before you got money to buy more?: No     Within the past 12 months, did the food you bought just not last and you didn t have money to get more?: No   Transportation Needs: Low Risk  (2/28/2024)    Transportation Needs     Within the past 12 months, has lack of transportation kept you from medical appointments, getting your medicines, non-medical meetings or appointments, work, or from getting things that you need?: No   Physical Activity: Insufficiently Active (2/28/2024)    Exercise Vital Sign     Days of Exercise per Week: 4 days     Minutes of Exercise per Session: 20 min   Stress: No Stress Concern Present (2/28/2024)    [place] [employer] - Occupational Stress Questionnaire     Feeling of Stress : Not at all   Social Connections: Unknown (2/28/2024)    Social Connection and Isolation Panel [NHANES]     Frequency of Communication with Friends and Family: Not on file     Frequency of Social Gatherings with Friends and Family: Once a week     Attends Buddhism Services: Not on file     Active Member of Clubs or Organizations: Not on file     Attends Club or Organization Meetings: Not on file     Marital Status: Not on file   Interpersonal Safety: Low Risk  (2/28/2024)    Interpersonal Safety     Do you feel physically and emotionally safe where  you currently live?: Yes     Within the past 12 months, have you been hit, slapped, kicked or otherwise physically hurt by someone?: No     Within the past 12 months, have you been humiliated or emotionally abused in other ways by your partner or ex-partner?: No   Housing Stability: Low Risk  (2/28/2024)    Housing Stability     Do you have housing? : Yes     Are you worried about losing your housing?: No       MEDS:  See EMR, reviewed  ALL:  See EMR, reviewed    REVIEW OF SYSTEMS:  CONSTITUTIONAL:NEGATIVE for fever, chills, change in weight  INTEGUMENTARY/SKIN: NEGATIVE for worrisome rashes, moles or lesions  EYES: NEGATIVE for vision changes or irritation  ENT/MOUTH: NEGATIVE for ear, mouth and throat problems  RESP:NEGATIVE for significant cough or SOB  BREAST: NEGATIVE for masses, tenderness or discharge  CV: NEGATIVE for chest pain, palpitations or peripheral edema  GI: NEGATIVE for nausea, abdominal pain, heartburn, or change in bowel habits  :NEGATIVE for frequency, dysuria, or hematuria  :NEGATIVE for frequency, dysuria, or hematuria  NEURO: NEGATIVE for weakness, dizziness or paresthesias  ENDOCRINE: NEGATIVE for temperature intolerance, skin/hair changes  HEME/ALLERGY/IMMUNE: NEGATIVE for bleeding problems  PSYCHIATRIC: NEGATIVE for changes in mood or affect          Objective: She moves slowly about the exam room and needs assistance getting up on the table.  Logrolling of the left hip is uncomfortable.  Internal and external rotation is uncomfortable.  Compression of the pelvis without discomfort.  Nontender over the SI joint or sacrum.  Straight leg raise is negative.  Strength is intact at hip, knee, ankle and foot.  Sensation is normal distally.  Appropriate in conversation and affect.    I personally viewed the patient updated x-rays of the hip that show mild bilateral hip DJD/chondrocalcinosis      Assessment: Left-sided hip discomfort with history of abnormal hip x-ray, mild bilateral hip DJD,  low bone density, rule out insufficiency fracture    Plan: MRI of the pelvis and left hip are pending to rule out insufficiency fracture.  Face-to-face follow-up after the MRI to go over options.

## 2024-08-13 NOTE — PROGRESS NOTES
[facility]  New Patient Visit      Encounter date:   August 13, 2024    Referring Provider:   Sun Person MD  [address] [place] [address]    Chief Complaint: consult    History of Present Illness:   Jennifer Jane is a 73 year old female who presents for consultation regarding post nasal drip and voice hoarseness.    7/26/24 OV notes/ referral -Sun Person MD: Her A/P detailed that she was referred to the pharmacy for her shingles and RSV vaccines. Additionally, she had left hip pain in the setting of possible lucency left intertrochanter left femur, abnormal x-ray, in the setting of low bone density. She was also dx with hoarseness or changing in voice and post nasal drip, which prompted an ENT referral.    Today, she reports that she is on Zyrtec and Astelin with no improvement in morning post nasal drip and voice hoarseness. She states that she feels phlegm in her throat, especially in the morning when she takes a deep breath and coughs. She states that she has acid reflux intermittently but not recently and controls her diet. She reports that she gets headaches and has lifelong allergies. She states that she has had recurrent sinus infections the last 2 years and has been treated with prednisone and antibiotics. She reports a hx of pneumonia with COVID that took her a month to recover from. She uses a steroid inhaler prn.    Feels that her voice is weaker and breaks up over the last few months. Thinks it may be due to allergies.     Review of systems: A 14-point review of systems has been conducted and was negative for any notable symptoms, except as dictated in the history of present illness.     Medical History:  Past Medical History:   Diagnosis Date    Acute bilateral low back pain with bilateral sciatica     Acute pain of right knee     Pain for months, worse since helping friend move.  Exam consistent with meniscal tear.  Check xray today, depending on  results MRI.  Ice, NSAIDS for now.  ACE.    Amaurosis fugax     Arthritis     Fibromyalgia, ? Osteoarthritis    Back injury     Micro discectomy, approx date 1983    Benign neoplasm of ascending colon     Chondromalacia of patella, right     Severe, tri-compartmental on MRI 10/2017.  Referred to ortho.    Chronic left-sided headache     Negative MRI and CT of head 2018 and 2016  Normal labs TSH, CBC, CMP, ESR 12/2019.  See plan below in pt instructions.    Diarrhea, unspecified type     Diverticular disease of large intestine     Elevated parathyroid hormone     Encounter for Medicare annual wellness exam     Up to date on mammogram and colon cancer screening.  Is due for shingles vaccine - do at pharmacy.    Glaucoma     Glaucoma suspect     Hiatal hernia     HTN (hypertension)     Hypercalcemia 11/30/2019    Hypercalciuria     Insomnia 12/26/2012     Problem list name updated by automated process. Provider to review    Overweight (BMI 25.0-29.9)     Polyp of colon     Pseudophakia, both eyes     Reduced vision 09/2016    L vision block, bilateral elevated eye presures,poss. Glacom    Right foot pain     Sciatica     On and off - exacerbates when lifts heavy things  Bilaterally.    Serum calcium elevated     Spinal stenosis of lumbar region without neurogenic claudication     Uncomplicated asthma     Diagnosed as 2 yo    Weakness of left lower extremity         Surgical History:   Past Surgical History:   Procedure Laterality Date     cataract extraction with intraocular lens implant Right 01/31/2019    BACK SURGERY  1984    micro-disc-ectomy    BREAST SURGERY  1984    L Breast bx    cataract extraction with intraocular lens Left 12/06/2018    CHOLECYSTECTOMY  1990    INJECT EPIDURAL TRANSFORAMINAL Bilateral 1/31/2024    Procedure: Bilateral L5-S1 Transforaminal epidural steroid injection;  Surgeon: Sarahy Tsang MD;  Location: [facility] OR    IRIDOTOMY/IRIDECTOMY LASER SURGERY Left 09/27/2016    IRIDOTOMY/IRIDECTOMY  LASER SURGERY Right 10/18/2016    TUBAL LIGATION  1989    [facility] STOMACH SURGERY PROCEDURE UNLISTED  03/1990    Cholecystectomy, 11/1989 tubal ligation        Family History:  Family History   Problem Relation Age of Onset    Heart Disease Mother     Diabetes Mother     Coronary Artery Disease Mother     Hypertension Mother     Anesthesia Reaction Mother         Anaphylaxis, oral opioixd    Asthma Mother     Thyroid Disease Mother     Low Back Problems Mother     Heart Disease Father     Diabetes Father     Coronary Artery Disease Father     Hypertension Father     Hyperlipidemia Father     Low Back Problems Father     Diabetes Sister     Hypertension Sister     Low Back Problems Sister     Thyroid Disease Sister     Hip fracture Sister     Low Back Problems Brother     Asthma Maternal Grandmother     Coronary Artery Disease Maternal Grandfather     Cerebrovascular Disease Paternal Grandfather     Asthma Daughter     Genetic Disease Daughter         Kallman syndrome; no olfactory bulb;    Calcium Disorder No family hx of         Social History:   Social History     Socioeconomic History    Marital status:    Tobacco Use    Smoking status: Never     Passive exposure: Past    Smokeless tobacco: Never   Vaping Use    Vaping status: Never Used   Substance and Sexual Activity    Alcohol use: Yes     Alcohol/week: 0.0 standard drinks of alcohol     Comment: 0 -2 times a year    Drug use: No    Sexual activity: Yes     Partners: Male     Birth control/protection: Post-menopausal, Female Surgical     Social Determinants of Health     Financial Resource Strain: Low Risk  (2/28/2024)    Financial Resource Strain     Within the past 12 months, have you or your family members you live with been unable to get utilities (heat, electricity) when it was really needed?: No   Food Insecurity: Low Risk  (2/28/2024)    Food Insecurity     Within the past 12 months, did you worry that your food would run out before you got money to  "buy more?: No     Within the past 12 months, did the food you bought just not last and you didn t have money to get more?: No   Transportation Needs: Low Risk  (2/28/2024)    Transportation Needs     Within the past 12 months, has lack of transportation kept you from medical appointments, getting your medicines, non-medical meetings or appointments, work, or from getting things that you need?: No   Physical Activity: Insufficiently Active (2/28/2024)    Exercise Vital Sign     Days of Exercise per Week: 4 days     Minutes of Exercise per Session: 20 min   Stress: No Stress Concern Present (2/28/2024)    [place] [employer]ational [employer] - Occupational Stress Questionnaire     Feeling of Stress : Not at all   Social Connections: Unknown (2/28/2024)    Social Connection and Isolation Panel [NHANES]     Frequency of Social Gatherings with Friends and Family: Once a week   Interpersonal Safety: Low Risk  (2/28/2024)    Interpersonal Safety     Do you feel physically and emotionally safe where you currently live?: Yes     Within the past 12 months, have you been hit, slapped, kicked or otherwise physically hurt by someone?: No     Within the past 12 months, have you been humiliated or emotionally abused in other ways by your partner or ex-partner?: No   Housing Stability: Low Risk  (2/28/2024)    Housing Stability     Do you have housing? : Yes     Are you worried about losing your housing?: No        Physical Exam:  Vital signs: /77 (BP Location: Left arm, Patient Position: Sitting, Cuff Size: Adult Regular)   Pulse 78   Ht 1.638 m (5' 4.5\")   Wt 74.6 kg (164 lb 8 oz)   LMP  (LMP Unknown)   SpO2 98%   BMI 27.80 kg/m     General Appearance: No acute distress, appropriate demeanor, conversant  Eyes: moist conjunctivae; EOMI; pupils symmetric; visual acuity grossly intact; no proptosis  Head: normocephalic; overall symmetric appearance without deformity  Face: overall symmetric without deformity  Ears: " Normal appearance of external ear; external meatus normal in appearance  Nose: No external deformity  Oral Cavity/oropharynx: Normal appearance of mucosa  Neck: no palpable lymphadenopathy; thyroid without palpable nodules  Lungs: symmetric chest rise; no wheezing  CV: Good distal perfusion; normal hear rate  Extremities: No deformity  Neurologic Exam: Cranial nerves II-XII are grossly intact; no focal deficit    Procedure Note  Procedure performed: Rigid nasal endoscopy  Indication: To evaluate for sinonasal pathology not visualized on routine anterior rhinoscopy  Anesthesia: 4% topical lidocaine with 0.05% oxymetazoline  Description of procedure: A 30 degree, 3 mm rigid endoscope was inserted into bilateral nasal cavities and the nasal valves, nasal cavity, middle meatus, sphenoethmoid recess, and nasopharynx were thoroughly evaluated for evidence of obstruction, edema, purulence, polyps and/or mass/lesion.     Dane-Joaquin Endoscopic Scoring System  Endoscopic observation Right Left   Polyps in middle meatus (0 = absent, 1 = restricted to middle meatus, 2 = Beyond middle meatus) 0 0   Discharge (0 = absent, 1 = thin and clear, 2 = thick, purulent) 1 1   Edema (0 = absent, 1 = mild-moderate, 2 = moderate-severe) 0 0   Crusting (0 = absent, 1 = mild-moderate, 2 = moderate-severe) 0 0   Scarring (0= absent, 1 = mild-moderate, 2 = moderate-severe) 0 0   Total 1 1     Findings  RT: no evidence of active infection, polyps, or masses. Does have allergic stranding with characteristics consistent with allergic rhinitis.  LT: no evidence of active infection, polyps, or masses. Does have allergic stranding with characteristics consistent with allergic rhinitis. Significant caudal septal deviation    The patient tolerated the procedure well without complication.     Imaging Review:  1/7/21 MR brain  Impression:   Unremarkable MRI of the brain.    Assessment/Medical Decision Making:  Jennifer Jane is a 73 year old female with  post nasal drip, chronic rhinitis, and hoarseness . Her endoscopy showed allergic stranding with characteristics consistent with allergic rhinitis and significant caudal septal deviation in the left nostril with no evidence of active infection, polyps, or masses. Questions and concerns were addressed.    On oral exam able to visualize the epiglottis and portion of the vocal folds without mass. Some inflammation     Discussed that cough can be caused by post-nasal drip, GERD, asthma, and neurogenic cough.    Plan:  Start ipratropium 0.03% nasal spray 1-2 times daily  Start sinus rinses  Start OTC reflux gourmet if she would like to trial it for possible contribution of silent reflux  Follow up with me in 6 months prn  Referral to speech language pathology for voice hoarseness if the problem continues and the patient is compliant with this referral  Discussed use of cough clinic in the future if her cough continues despite treatment    Scribe Disclosure:   I, Sonia Patton, am serving as a scribe; to document services personally performed by Elier Corrigan MD -based on data collection and the provider's statements to me.     Provider Disclosure:  I agree with above History, Review of Systems, Physical exam and Plan.  I have reviewed the content of the documentation and have edited it as needed. I have personally performed the services documented here and the documentation accurately represents those services and the decisions I have made.      Electronically signed by:    Elier Corrigan MD    [facility]  Rhinology  Endoscopic Skull Base Surgery  [facility]  Department of Otolaryngology - Head & Neck Surgery

## 2024-08-14 ENCOUNTER — OFFICE VISIT (OUTPATIENT)
Dept: OTOLARYNGOLOGY | Facility: CLINIC | Age: 74
End: 2024-08-14
Payer: COMMERCIAL

## 2024-08-14 ENCOUNTER — PRE VISIT (OUTPATIENT)
Dept: OTOLARYNGOLOGY | Facility: CLINIC | Age: 74
End: 2024-08-14

## 2024-08-14 VITALS
SYSTOLIC BLOOD PRESSURE: 131 MMHG | DIASTOLIC BLOOD PRESSURE: 77 MMHG | OXYGEN SATURATION: 98 % | BODY MASS INDEX: 27.41 KG/M2 | HEART RATE: 78 BPM | HEIGHT: 65 IN | WEIGHT: 164.5 LBS

## 2024-08-14 DIAGNOSIS — R09.82 POST-NASAL DRIP: ICD-10-CM

## 2024-08-14 DIAGNOSIS — J31.0 CHRONIC RHINITIS: Primary | ICD-10-CM

## 2024-08-14 DIAGNOSIS — R49.9 HOARSENESS OR CHANGING VOICE: ICD-10-CM

## 2024-08-14 PROCEDURE — 99203 OFFICE O/P NEW LOW 30 MIN: CPT | Mod: 25 | Performed by: STUDENT IN AN ORGANIZED HEALTH CARE EDUCATION/TRAINING PROGRAM

## 2024-08-14 PROCEDURE — 31231 NASAL ENDOSCOPY DX: CPT | Performed by: STUDENT IN AN ORGANIZED HEALTH CARE EDUCATION/TRAINING PROGRAM

## 2024-08-14 RX ORDER — PREDNISONE 10 MG/1
TABLET ORAL
COMMUNITY
Start: 2024-02-14 | End: 2024-08-28

## 2024-08-14 RX ORDER — IPRATROPIUM BROMIDE 21 UG/1
2 SPRAY, METERED NASAL 2 TIMES DAILY
Qty: 30 ML | Refills: 6 | Status: SHIPPED | OUTPATIENT
Start: 2024-08-14

## 2024-08-14 ASSESSMENT — PAIN SCALES - GENERAL: PAINLEVEL: MILD PAIN (3)

## 2024-08-14 NOTE — PATIENT INSTRUCTIONS
You were seen in the ENT Clinic today by Dr. Corrigan. If you have any questions or concerns after your appointment, please contact us (see below)       2.   The following recommendations have been made based upon your appointment today:   -Atrovent (ipratropium bromide) nasal spray: 2 sprays per nostril twice daily.   -Increase the frequency of your sinus rinses.      3.   Plan to return to the ENT clinic in 6 months. You can cancel this appointment if your symptoms improve.           How to Contact Us:  Send a [employer]ago message to your provider. Our team will respond to you via Pelago. Occasionally, we will need to call you to get further information.  For urgent matters (Monday-Friday), call the ENT Clinic: [phone] and speak with a call center team member - they will route your call appropriately.   If you'd like to speak directly with a nurse, please find our contact information below. We do our best to check voicemail frequently throughout the day, and will work to call you back within 1-2 days. For urgent matters, please use the general clinic phone numbers listed above.     Dania MORENO RN  Direct: [phone]  Sun MOSES LPN  Direct: [phone]         [facility]  Department of Otolaryngology

## 2024-08-14 NOTE — NURSING NOTE
"Chief Complaint   Patient presents with    Consult   Blood pressure 131/77, pulse 78, height 1.638 m (5' 4.5\"), weight 74.6 kg (164 lb 8 oz), SpO2 98%, not currently breastfeeding. Stephane Sánchez, EMT    "

## 2024-08-14 NOTE — LETTER
8/14/2024       RE: Jennifer Jane  [address]  [facility] [address]     Dear Colleague,    Thank you for referring your patient, Jennifer Jane, to the [facility] [place] at [facility]. Please see a copy of my visit note below.      [facility]  New Patient Visit      Encounter date:   August 13, 2024    Referring Provider:   Sun Person MD  [address]  [place] [address]    Chief Complaint: consult    History of Present Illness:   Jennifer Jane is a 73 year old female who presents for consultation regarding post nasal drip and voice hoarseness.    7/26/24 OV notes/ referral -Sun Person MD: Her A/P detailed that she was referred to the pharmacy for her shingles and RSV vaccines. Additionally, she had left hip pain in the setting of possible lucency left intertrochanter left femur, abnormal x-ray, in the setting of low bone density. She was also dx with hoarseness or changing in voice and post nasal drip, which prompted an ENT referral.    Today, she reports that she is on Zyrtec and Astelin with no improvement in morning post nasal drip and voice hoarseness. She states that she feels phlegm in her throat, especially in the morning when she takes a deep breath and coughs. She states that she has acid reflux intermittently but not recently and controls her diet. She reports that she gets headaches and has lifelong allergies. She states that she has had recurrent sinus infections the last 2 years and has been treated with prednisone and antibiotics. She reports a hx of pneumonia with COVID that took her a month to recover from. She uses a steroid inhaler prn.    Feels that her voice is weaker and breaks up over the last few months. Thinks it may be due to allergies.     Review of systems: A 14-point review of systems has been conducted and was negative for any notable symptoms, except as  dictated in the history of present illness.     Medical History:  Past Medical History:   Diagnosis Date     Acute bilateral low back pain with bilateral sciatica      Acute pain of right knee     Pain for months, worse since helping friend move.  Exam consistent with meniscal tear.  Check xray today, depending on results MRI.  Ice, NSAIDS for now.  ACE.     Amaurosis fugax      Arthritis     Fibromyalgia, ? Osteoarthritis     Back injury     Micro discectomy, approx date 1983     Benign neoplasm of ascending colon      Chondromalacia of patella, right     Severe, tri-compartmental on MRI 10/2017.  Referred to ortho.     Chronic left-sided headache     Negative MRI and CT of head 2018 and 2016  Normal labs TSH, CBC, CMP, ESR 12/2019.  See plan below in pt instructions.     Diarrhea, unspecified type      Diverticular disease of large intestine      Elevated parathyroid hormone      Encounter for Medicare annual wellness exam     Up to date on mammogram and colon cancer screening.  Is due for shingles vaccine - do at pharmacy.     Glaucoma      Glaucoma suspect      Hiatal hernia      HTN (hypertension)      Hypercalcemia 11/30/2019     Hypercalciuria      Insomnia 12/26/2012     Problem list name updated by automated process. Provider to review     Overweight (BMI 25.0-29.9)      Polyp of colon      Pseudophakia, both eyes      Reduced vision 09/2016    L vision block, bilateral elevated eye presures,poss. Glacom     Right foot pain      Sciatica     On and off - exacerbates when lifts heavy things  Bilaterally.     Serum calcium elevated      Spinal stenosis of lumbar region without neurogenic claudication      Uncomplicated asthma     Diagnosed as 1 yo     Weakness of left lower extremity         Surgical History:   Past Surgical History:   Procedure Laterality Date      cataract extraction with intraocular lens implant Right 01/31/2019     BACK SURGERY  1984    micro-disc-ectomy     BREAST SURGERY  1984    L  Breast bx     cataract extraction with intraocular lens Left 12/06/2018     CHOLECYSTECTOMY  1990     INJECT EPIDURAL TRANSFORAMINAL Bilateral 1/31/2024    Procedure: Bilateral L5-S1 Transforaminal epidural steroid injection;  Surgeon: Sarahy Tsang MD;  Location: [facility] OR     IRIDOTOMY/IRIDECTOMY LASER SURGERY Left 09/27/2016     IRIDOTOMY/IRIDECTOMY LASER SURGERY Right 10/18/2016     TUBAL LIGATION  1989     ZZC STOMACH SURGERY PROCEDURE UNLISTED  03/1990    Cholecystectomy, 11/1989 tubal ligation        Family History:  Family History   Problem Relation Age of Onset     Heart Disease Mother      Diabetes Mother      Coronary Artery Disease Mother      Hypertension Mother      Anesthesia Reaction Mother         Anaphylaxis, oral opioixd     Asthma Mother      Thyroid Disease Mother      Low Back Problems Mother      Heart Disease Father      Diabetes Father      Coronary Artery Disease Father      Hypertension Father      Hyperlipidemia Father      Low Back Problems Father      Diabetes Sister      Hypertension Sister      Low Back Problems Sister      Thyroid Disease Sister      Hip fracture Sister      Low Back Problems Brother      Asthma Maternal Grandmother      Coronary Artery Disease Maternal Grandfather      Cerebrovascular Disease Paternal Grandfather      Asthma Daughter      Genetic Disease Daughter         Kallman syndrome; no olfactory bulb;     Calcium Disorder No family hx of         Social History:   Social History     Socioeconomic History     Marital status:    Tobacco Use     Smoking status: Never     Passive exposure: Past     Smokeless tobacco: Never   Vaping Use     Vaping status: Never Used   Substance and Sexual Activity     Alcohol use: Yes     Alcohol/week: 0.0 standard drinks of alcohol     Comment: 0 -2 times a year     Drug use: No     Sexual activity: Yes     Partners: Male     Birth control/protection: Post-menopausal, Female Surgical     Social Determinants of Health      Financial Resource Strain: Low Risk  (2/28/2024)    Financial Resource Strain      Within the past 12 months, have you or your family members you live with been unable to get utilities (heat, electricity) when it was really needed?: No   Food Insecurity: Low Risk  (2/28/2024)    Food Insecurity      Within the past 12 months, did you worry that your food would run out before you got money to buy more?: No      Within the past 12 months, did the food you bought just not last and you didn t have money to get more?: No   Transportation Needs: Low Risk  (2/28/2024)    Transportation Needs      Within the past 12 months, has lack of transportation kept you from medical appointments, getting your medicines, non-medical meetings or appointments, work, or from getting things that you need?: No   Physical Activity: Insufficiently Active (2/28/2024)    Exercise Vital Sign      Days of Exercise per Week: 4 days      Minutes of Exercise per Session: 20 min   Stress: No Stress Concern Present (2/28/2024)    [employer] - Occupational Stress Questionnaire      Feeling of Stress : Not at all   Social Connections: Unknown (2/28/2024)    Social Connection and Isolation Panel [NHANES]      Frequency of Social Gatherings with Friends and Family: Once a week   Interpersonal Safety: Low Risk  (2/28/2024)    Interpersonal Safety      Do you feel physically and emotionally safe where you currently live?: Yes      Within the past 12 months, have you been hit, slapped, kicked or otherwise physically hurt by someone?: No      Within the past 12 months, have you been humiliated or emotionally abused in other ways by your partner or ex-partner?: No   Housing Stability: Low Risk  (2/28/2024)    Housing Stability      Do you have housing? : Yes      Are you worried about losing your housing?: No        Physical Exam:  Vital signs: /77 (BP Location: Left arm, Patient Position: Sitting, Cuff Size: Adult  "Regular)   Pulse 78   Ht 1.638 m (5' 4.5\")   Wt 74.6 kg (164 lb 8 oz)   LMP  (LMP Unknown)   SpO2 98%   BMI 27.80 kg/m     General Appearance: No acute distress, appropriate demeanor, conversant  Eyes: moist conjunctivae; EOMI; pupils symmetric; visual acuity grossly intact; no proptosis  Head: normocephalic; overall symmetric appearance without deformity  Face: overall symmetric without deformity  Ears: Normal appearance of external ear; external meatus normal in appearance  Nose: No external deformity  Oral Cavity/oropharynx: Normal appearance of mucosa  Neck: no palpable lymphadenopathy; thyroid without palpable nodules  Lungs: symmetric chest rise; no wheezing  CV: Good distal perfusion; normal hear rate  Extremities: No deformity  Neurologic Exam: Cranial nerves II-XII are grossly intact; no focal deficit    Procedure Note  Procedure performed: Rigid nasal endoscopy  Indication: To evaluate for sinonasal pathology not visualized on routine anterior rhinoscopy  Anesthesia: 4% topical lidocaine with 0.05% oxymetazoline  Description of procedure: A 30 degree, 3 mm rigid endoscope was inserted into bilateral nasal cavities and the nasal valves, nasal cavity, middle meatus, sphenoethmoid recess, and nasopharynx were thoroughly evaluated for evidence of obstruction, edema, purulence, polyps and/or mass/lesion.     Dane-Joaquin Endoscopic Scoring System  Endoscopic observation Right Left   Polyps in middle meatus (0 = absent, 1 = restricted to middle meatus, 2 = Beyond middle meatus) 0 0   Discharge (0 = absent, 1 = thin and clear, 2 = thick, purulent) 1 1   Edema (0 = absent, 1 = mild-moderate, 2 = moderate-severe) 0 0   Crusting (0 = absent, 1 = mild-moderate, 2 = moderate-severe) 0 0   Scarring (0= absent, 1 = mild-moderate, 2 = moderate-severe) 0 0   Total 1 1     Findings  RT: no evidence of active infection, polyps, or masses. Does have allergic stranding with characteristics consistent with allergic " rhinitis.  LT: no evidence of active infection, polyps, or masses. Does have allergic stranding with characteristics consistent with allergic rhinitis. Significant caudal septal deviation    The patient tolerated the procedure well without complication.     Imaging Review:  1/7/21 MR brain  Impression:   Unremarkable MRI of the brain.    Assessment/Medical Decision Making:  Jennifer Jane is a 73 year old female with post nasal drip, chronic rhinitis, and hoarseness . Her endoscopy showed allergic stranding with characteristics consistent with allergic rhinitis and significant caudal septal deviation in the left nostril with no evidence of active infection, polyps, or masses. Questions and concerns were addressed.    On oral exam able to visualize the epiglottis and portion of the vocal folds without mass. Some inflammation     Discussed that cough can be caused by post-nasal drip, GERD, asthma, and neurogenic cough.    Plan:  Start ipratropium 0.03% nasal spray 1-2 times daily  Start sinus rinses  Start OTC reflux gourmet if she would like to trial it for possible contribution of silent reflux  Follow up with me in 6 months prn  Referral to speech language pathology for voice hoarseness if the problem continues and the patient is compliant with this referral  Discussed use of cough clinic in the future if her cough continues despite treatment    Scribe Disclosure:   I, Sonia Patton, am serving as a scribe; to document services personally performed by Elier Corrigan MD -based on data collection and the provider's statements to me.     Provider Disclosure:  I agree with above History, Review of Systems, Physical exam and Plan.  I have reviewed the content of the documentation and have edited it as needed. I have personally performed the services documented here and the documentation accurately represents those services and the decisions I have made.      Electronically signed by:    Elier Corrigan MD  Assistant    [facility]  Rhinology  Endoscopic Skull Base Surgery  [facility]  Department of Otolaryngology - Head & Neck Surgery      Again, thank you for allowing me to participate in the care of your patient.      Sincerely,    Elier Corrigan MD

## 2024-08-16 ENCOUNTER — TELEPHONE (OUTPATIENT)
Dept: OTOLARYNGOLOGY | Facility: CLINIC | Age: 74
End: 2024-08-16
Payer: COMMERCIAL

## 2024-08-16 NOTE — TELEPHONE ENCOUNTER
Confirmed sprays that were given in clinic. No further questions or concerns.    Radha Hernandez LPN

## 2024-08-16 NOTE — TELEPHONE ENCOUNTER
[facility] Call Center    Phone Message    May a detailed message be left on voicemail: yes     Reason for Call: Other: Pt is requesting to speak with care team regarding her recent visit with provider. She states a spray or drops were given to her in her nose and she would like to know what it was. Also, she got a prescription sent to her pharmacy for some nose spray that she can't use. Please call to discuss. Did confirm phone number. Thanks       Action Taken: Message routed to:  [facility] ENT     Travel Screening: Not Applicable     Date of Service:

## 2024-08-26 NOTE — PROGRESS NOTES
Endocrine     Attending Assessment/Plan :     Hypercalcemia with high PTH and borderline hypercalcuria .  The pattern is that of mild primary hyperparathyroidism.   Labs have remained stable over the last year.   Indications for surgical treatment of hyperparathyroidism include the following:   Calcium > 1 mg/dl over normal not met   Kidney stones- not met   Osteoporosis- not met   Hypercalciuria -   improved on most recent test.   Plan to repeat yearly for now  Declining renal function  Discussed again.  I showed her the images again.  She is interested in talking to parathyroid surgeon - referral placed.     Hypercalciuria - as above.     Low bone density. Family history hip fracture. High FRAX  On alendronate (intermittently ) since 7/2023.  Plan to treat until 7/2028 and then take drug holiday  DXA every 2 years including Next DXA 10/10/24  See me after the 2026 DXA      Post menopausal     Prediabetes - check A1c every 6-12 months or sooner prn     History of covid x 3 - this is a risk for DM progression     The Longitudinal plan of care for low BMD, primary hyperparathyroidism, prediabetes was addressed during this visit. Due to added complexity of care, we will continue to support Jennifer , and the subsequent management of this condition(s) and with the ongoing continuity of care of this condition.    51__ minutes spent on the date of the encounter doing chart review, history and exam, documentation and further activities as noted above.      Indigo Eid MD    Chief complaint: HISTORY OF PRESENT ILLNESS    Jennifer presents to follow up on primary hyperparathyroidism, low bone density.  I last saw her 7/12/23.  Since then, we started alendronate and we continued to search for the abnormal parathyroid responsible for the hyperparathyroidism.   Jennifer has sent mychart notes with concern of alendronate side effects in August, 2023, causing us to hold alendronate. She resumed alendronate 10/31/23 and again  reported symptoms in November 2023. After stopping the alendronate, the symptoms did not resolve.   She restarted alendronate for the third time on 4/9/2024. Today she notes she recently stopped it again for 2 weeks and the resumed.  She can't remember if it helped her when she stopped it.     Hypercalcemia has been intermittently noted since 11/27/19.  She was on hydrochlorothiazide since at least 2017 until 11/27/19.  Despite this, the urine calcium is high.  We saw a parathyroid candidate on the RIGHT by US.  By parathyroid scan the parathyroid candidate is on the LEFT.     Endocrine relevant labs are as follows:  5/15/2023 Ca 10.4, phos 3.3 , PTH 58 ,  creatinine 0.78  5/16/23 244 hour urine calcium 260 mg/24 hours, 7.4 mg/dl, urine creatinine 1.06 g/spec; 30.7 mg/dl, volume 3450.   Urine calcium/creatinine clearance ratio 0.019  8/15/23 Ca 10, 25OHD 20  10/31/23 Ca 10.4, phosphorus 2.9, creatinine 0.84  4/30/24 24 hour urine calcium 210 mg/24 hours   6/25/24 CA 10.1, iCa 5.2, phos 2.0, PTH 61-     Relevant imaging is as follows: (as read by me as it pertains to endocrine relevant organs)  11/17/2021 MRI brain: there are no T1 coronal pituitary images.  Pituitary appears normal on sagittal;  Slight convex up upper border on T2  3/21/22 CT Chest: thyroid appears normal;   9/16/22 CT abdomen with contrast - no kidney stones  10/10/22 DXA lowest T-score -1.2 right femoral neck   11/7/2022 123I sestamibi subtraction SPECT - localizes to the left   7/12/23 parathyroid US following appt - compared with 10/26/22 parathyroid US:   Right thyroid nodule # 1 0.5 x 0.3 x  Was  0.5 x 0.3 x 0.6 cm   right # 2  0.5 x 0.6 x  0.6 cm Was 0.8 x 0.5 x 0.4 - by stills.  This is again not seen on cine .  It is also more superior in location than typically seen  Left cine does not show parathyroid candidate today - last study it showed ? Parathyroid candidate inferiorly - but only seen in trans, not long     REVIEW OF SYSTEMS  Could  be better  A little cough  Last had covid Feb 10 2024 - onset days after a back procedure injection 1/31  Reading me sequence of recent tests/ visits   Some heart burn but similar to baseline  Teeth hurt - broke a tooth   Headaches  Neck  Can't stand up straight due to back and neck pain, hurts from here down (she poitns to her upper legs)  Walking holding walls when getting up the bathroom in the night   Unsteadiness in left hip and knee   Hands are so stiff- can't close them in the morning  A little foggy  Fell once in the garden     Past Medical History  Past Medical History:   Diagnosis Date    Acute bilateral low back pain with bilateral sciatica     Acute pain of right knee     Pain for months, worse since helping friend move.  Exam consistent with meniscal tear.  Check xray today, depending on results MRI.  Ice, NSAIDS for now.  ACE.    Amaurosis fugax     Arthritis     Fibromyalgia, ? Osteoarthritis    Back injury     Micro discectomy, approx date 1983    Benign neoplasm of ascending colon     Chondromalacia of patella, right     Severe, tri-compartmental on MRI 10/2017.  Referred to ortho.    Chronic left-sided headache     Negative MRI and CT of head 2018 and 2016  Normal labs TSH, CBC, CMP, ESR 12/2019.  See plan below in pt instructions.    Diarrhea, unspecified type     Diverticular disease of large intestine     Encounter for Medicare annual wellness exam     Up to date on mammogram and colon cancer screening.  Is due for shingles vaccine - do at pharmacy.    Glaucoma     Glaucoma suspect     Hiatal hernia     HTN (hypertension)     Hypercalciuria     Infection due to 2019 novel coronavirus 02/10/2024    x3    Insomnia 12/26/2012     Problem list name updated by automated process. Provider to review    Low bone density 2022    Overweight (BMI 25.0-29.9)     Polyp of colon     Prediabetes     Primary hyperparathyroidism (H24)     mild; borderline hypercalciuria    Pseudophakia, both eyes     Reduced  vision 09/2016    L vision block, bilateral elevated eye presures,poss. Glacom    Right foot pain     Sciatica     On and off - exacerbates when lifts heavy things  Bilaterally.    Spinal stenosis of lumbar region without neurogenic claudication     Uncomplicated asthma     Diagnosed as 2 yo    Weakness of left lower extremity      Past Surgical History:   Procedure Laterality Date     cataract extraction with intraocular lens implant Right 01/31/2019    BACK SURGERY  1984    micro-disc-ectomy    BREAST SURGERY  1984    L Breast bx    cataract extraction with intraocular lens Left 12/06/2018    CHOLECYSTECTOMY  1990    INJECT EPIDURAL TRANSFORAMINAL Bilateral 1/31/2024    Procedure: Bilateral L5-S1 Transforaminal epidural steroid injection;  Surgeon: Sarahy Tsang MD;  Location: [facility] OR    IRIDOTOMY/IRIDECTOMY LASER SURGERY Left 09/27/2016    IRIDOTOMY/IRIDECTOMY LASER SURGERY Right 10/18/2016    TUBAL LIGATION  1989    ZZC STOMACH SURGERY PROCEDURE UNLISTED  03/1990    Cholecystectomy, 11/1989 tubal ligation     Medications  Current Outpatient Medications   Medication Sig Dispense Refill    acetaminophen (TYLENOL) 325 MG tablet Take 325-650 mg by mouth every 6 hours as needed for mild pain      acetylcysteine (N-ACETYL CYSTEINE) 600 MG CAPS capsule Take 1 capsule (600 mg) by mouth daily 90 capsule 4    ADVAIR -21 MCG/ACT inhaler Inhale 2 puffs into the lungs 2 times daily      albuterol (VENTOLIN HFA) 108 (90 Base) MCG/ACT inhaler Inhale 2 puffs into the lungs every 6 hours as needed for shortness of breath 8.5 g 5    alendronate (FOSAMAX) 70 MG tablet Take 1 tablet (70 mg) by mouth every 7 days Take 60 minutes before am meal with 8 oz. water. Remain upright for 30 minutes. 12 tablet 3    amLODIPine (NORVASC) 5 MG tablet Take 1 tablet (5 mg) by mouth at bedtime 90 tablet 4    atorvastatin (LIPITOR) 20 MG tablet Take 1 tablet (20 mg) by mouth daily 90 tablet 4    azelastine (ASTELIN) 0.1 % nasal spray  Spray 1 spray into both nostrils 2 times daily      Calcium Carbonate Antacid (TUMS PO) Take  by mouth At Bedtime.      co-enzyme Q-10 100 MG CAPS capsule Take 1 capsule (100 mg) by mouth daily 90 capsule 4    fexofenadine (ALLEGRA) 180 MG tablet Take 180 mg by mouth daily      latanoprost (XALATAN) 0.005 % ophthalmic solution Place 1 drop into the right eye At Bedtime      losartan (COZAAR) 50 MG tablet Take 1 tablet (50 mg) by mouth 2 times daily 180 tablet 4    montelukast (SINGULAIR) 10 MG tablet TAKE ONE TABLET BY MOUTH EVERY DAY AS DIRECTED  1    SPIRIVA RESPIMAT 1.25 MCG/ACT inhaler INHALE 2 PUFFS ONCE PER DAY      traZODone (DESYREL) 50 MG tablet Take 1-3 tablets ( mg) by mouth at bedtime 130 tablet 4    Vitamin D3 (CHOLECALCIFEROL) 25 mcg (1000 units) tablet Take by mouth daily      ipratropium (ATROVENT) 0.03 % nasal spray Spray 2 sprays into both nostrils 2 times daily (Patient not taking: Reported on 8/28/2024) 30 mL 6     Calcium once/day - dose unknown -- its not on her med list -- she is now uncertain if she is taking it   Vitamin D3 25 mcg/day      Allergies  Allergies   Allergen Reactions    Canola Oil [Vegetable Oil] Anaphylaxis and GI Disturbance    Animal Dander Unknown    Dust Mites Unknown    Grass     Hydroxyzine Other (See Comments)     Passed out        Hydroxyzine Hcl     Pollen Extract Unknown    Trees     Ragweeds     Chlorhexidine Itching and Rash     Hibiclens       Family History  Family History   Problem Relation Age of Onset    Heart Disease Mother     Diabetes Mother     Coronary Artery Disease Mother     Hypertension Mother     Anesthesia Reaction Mother         Anaphylaxis, oral opioixd    Asthma Mother     Thyroid Disease Mother     Low Back Problems Mother     Heart Disease Father     Diabetes Father     Coronary Artery Disease Father     Hypertension Father     Hyperlipidemia Father     Low Back Problems Father     Diabetes Sister     Hypertension Sister     Low Back  "Problems Sister     Thyroid Disease Sister     Hip fracture Sister     Low Back Problems Brother     Asthma Maternal Grandmother     Coronary Artery Disease Maternal Grandfather     Cerebrovascular Disease Paternal Grandfather     Asthma Daughter     Genetic Disease Daughter         Kallman syndrome; no olfactory bulb;    Calcium Disorder No family hx of      Social History  Social History     Tobacco Use    Smoking status: Never     Passive exposure: Past    Smokeless tobacco: Never   Vaping Use    Vaping status: Never Used   Substance Use Topics    Alcohol use: Yes     Alcohol/week: 0.0 standard drinks of alcohol     Comment: 0 -2 times a year    Drug use: No     Retired nurse; ;   Drink a lot of milk (4 12 ounce glasses/day)  Eat ice cream    Physical Exam  GENERAL surgical mask  Ht 1.651 m (5' 5\")   Wt 72.6 kg (160 lb)   LMP  (LMP Unknown)   BMI 26.63 kg/m    SKIN: normal color, temperature, texture   HEENT: PER, no scleral icterus, eyelid retraction, stare, lid lag, proptosis or conjunctival injection.  .    NECK: supple.  No visible or palpable neck masses, cervical adenopathy, or goiter.   LUNGS: clear to auscultation bilaterally.   CARDIAC: RRR, S1, S2 without murmurs, rubs or gallops.    BACK: normal spinal contour.    NEURO: Alert, responds appropriately to questions,  moves all extremities, DTRs 1/4, gait normal, no tremor of the outstretched hand    DATA REVIEW     Latest Ref Rng 10/4/2022  10:23 PM 10/5/2022  1:21 AM   ENDO CALCIUM LABS-[facility]      Vitamin D Deficiency screening 20 - 75 ug/L     Albumin 3.4 - 5.0 g/dL     Albumin 3.5 - 5.2 g/dL 4.5     Alkaline Phosphatase 35 - 104 U/L 87     CALCIUM IONIZED WB 4.4 - 5.2 mg/dL  4.8    Calcium 8.8 - 10.2 mg/dL 10.4 (H)     Calcium Urine g/24 h 0.10 - 0.30 g/spec     Calcium Urine mg/dL mg/dL     Urea Nitrogen 7 - 30 mg/dL     Urea Nitrogen 8.0 - 23.0 mg/dL 12.7     Creatinine 0.51 - 0.95 mg/dL 0.81     Lipase 13 - 60 U/L 18     Magnesium 1.7 - 2.3 " mg/dL 2.0     Parathyroid Hormone Intact 15 - 65 pg/mL        Latest Ref [facility] 2/17/2023  9:50 AM 5/15/2023  10:21 AM   ENDO CALCIUM LABS-[facility]      Vitamin D Deficiency screening 20 - 75 ug/L 17 (L)     Albumin 3.4 - 5.0 g/dL     Albumin 3.5 - 5.2 g/dL 4.4     Alkaline Phosphatase 35 - 104 U/L 79     CALCIUM IONIZED WB 4.4 - 5.2 mg/dL     Calcium 8.8 - 10.2 mg/dL 10.5 (H)  10.4 (H)    Calcium Urine g/24 h 0.10 - 0.30 g/spec     Calcium Urine mg/dL mg/dL     Urea Nitrogen 7 - 30 mg/dL     Urea Nitrogen 8.0 - 23.0 mg/dL 14.6     Creatinine 0.51 - 0.95 mg/dL 0.77  0.78    Lipase 13 - 60 U/L     Magnesium 1.7 - 2.3 mg/dL     Parathyroid Hormone Intact 15 - 65 pg/mL  58       Latest Ref [facility] 5/16/2023  11:16 AM   ENDO CALCIUM LABS-[facility]     Vitamin D Deficiency screening 20 - 75 ug/L    Albumin 3.4 - 5.0 g/dL    Albumin 3.5 - 5.2 g/dL    Alkaline Phosphatase 35 - 104 U/L    CALCIUM IONIZED WB 4.4 - 5.2 mg/dL    Calcium 8.8 - 10.2 mg/dL    Calcium Urine g/24 h 0.10 - 0.30 g/spec 0.26    Calcium Urine mg/dL mg/dL 7.4    Urea Nitrogen 7 - 30 mg/dL    Urea Nitrogen 8.0 - 23.0 mg/dL    Creatinine 0.51 - 0.95 mg/dL    Lipase 13 - 60 U/L    Magnesium 1.7 - 2.3 mg/dL    Parathyroid Hormone Intact 15 - 65 pg/mL       Legend:  (H) High  (L) Low    US THYROID 10/26/2022 3:00 PMCOMPARISON: None available.HISTORY: Serum calcium elevated; Elevated parathyroid hormone   FINDINGS:   Thyroid parenchyma: heterogenous  The right lobe of the thyroid measures: 1.8 x 1.4 x 4.3 cm  The left lobe of the thyroid measures: 1.2 x 1.6 x 4.0 cm  The thyroid isthmus measures: 2.2 mm     Nodule 1:  Lobe: Right  Location: Mid lobe  Size: 0.5 x 0.3 x 0.6 cm  Composition: Spongiform (0 points)  Echogenicity: Hyperechoic or isoechoic (1 point)  Shape: Wider than tall (0 points)  Margin: Smooth (0 points)  Echogenic Foci: None or large comet tail artifact (0 points)  Stability: Not previously imaged  TIRADS: TR2 (1-2 points) Not suspicious     Posteromedial to  the right thyroid lobe there is a 0.5 x 0.8 x 0.4 cmhypoechoic lesion with no definite involvement of the thyroid parenchyma.                                                     Impression: 0.8 cm hypoechoic lesion posteromedial to the rightthyroid lobe, which may represent a parathyroid adenoma. Consider  further evaluation with technetium 99 sestamibi scan     ACR TI-RADS recommendations  TR2 (2 points) & TR1 (0 points) -No FNA or follow-up  TR3 (3 points) - FNA if ? 2.5cm, follow-up if 1.5 -2.4 cm in 1, 3 and5 years  TR4 (4-6 points) - FNA if ? 1.5cm, follow-up if 1 -1.4 cm in 1, 2, 3and 5 years  TR5 (?7 points) - FNA if ? 1cm, follow-up if 0.5 -0.9 cm every yearfor 5 years   I have personally reviewed the examination and initial interpretationand I agree with the findings.  LUMA ZHANG MD     Examination: Nuclear medicine parathyroid examination with SPECT CTand High Resolution CT images of the Neck.  Date:  11/7/2022 3:16 PM   Indication:  Serum calcium elevated; Elevated parathyroid hormone   Comparison: Ultrasound 10/26/2022  Technique:  The patient received 8.29 mCi of I-123 orally and 23.4 mCi of Tc-99Cardiolite.  Three separate phases of images were obtained.  1. Dynamic images were obtained of the thyroid gland for 30 minutesfollowing Cardiolite administration.   2. Iodine images were obtained of the thyroid  followed by manualsubtraction from the Cardiolite images.   3. SPECT CT images were also obtained of the thyroid using a dualenergy technique for both I-123 and for Tc-99m followed by 3 mm high resolution CT images of the neck from the base of the skull to thebase of the heart.  After normalization of the I-123 images to the  Tc-99m- Cardiolite images the reconstructed axial slices weresubtracted, yielding neck images highlighting abnormally perfused  tissues.  Findings:The images of the thyroid gland on both the I-123 images and on the Tc-99m Cardiolite images demonstrates homogeneous  appearance of the gland. There are no thyroid lesions identified. The SPECT CT imageswith subtraction images demonstrates a subtle focal area uptakeposterior medial to the superior aspect of the h left thyroid glandseries 3 image 105.                                                             Impression:  Focal uptake along the superior posterior medial aspect of the leftthyroid gland. While the suspected parathyroid adenoma is not clearly  delineated on CT, this is the most likely location based on perfusion.Note this location is contralateral to the finding reported on  ultrasound 10/26/22.  I have personally reviewed the examination and initial interpretationand I agree with the findings.  STEPHANIE ADHIKARI MD     EXAMINATION: Chest CT  3/28/2023 10:19 AM  CLINICAL HISTORY: Recurrent cough; Moderate persistent asthma withexacerbation  COMPARISON: CTA chest 3/21/2022.  TECHNIQUE: CT imaging obtained through the chest without contrast.Axial, coronal, and sagittal reconstructions and axial MIP reformattedimages are reviewed.   CONTRAST: None  FINDINGS:  Lungs: The airway is patent. Minimal subpleuralreticulation/groundglass opacities in the inferior lateral rightmiddle lobe and inferior lateral lingula. No airspace consolidation.No pleural effusion or pneumothorax. Right lower lobe calcified  granulomas. Several sub-4 mm solid pulmonary nodules, for example 2 mm  solid nodule in the right lower lobe (series 4, image 124).  Mediastinum: The thyroid is unremarkable. Cardiac size is normal.Normal caliber aorta and main pulmonary artery. No pericardial  effusion. Mild coronary artery calcium. No thoracic lymphadenopathy.Partially calcified right hilar nodes. Esophagus is normal in caliber.  Bones and soft tissues: No suspicious bone findings. Mild degenerativechanges of the thoracic spine and shoulders.  Upper Abdomen: Limited evaluation of the upper abdomen. Smallaccessory splenule adjacent to the pancreatic tail.                                                             IMPRESSION:   Minimal subpleural reticulation and groundglass opacities in the inferolateral right middle lobe and lingula. The findings suggest  subpleural non-fibrotic mild early interstitial lung process such as NSIP or organizing pneumonia, chronic eosinophilic pneumonia or  infection. Otherwise no abnormal findings in the chest to explain the  patient's recurrent cough and continued follow up suggested.  I have personally reviewed the examination and initial interpretationand I agree with the findings.  PENELOPE COLEMAN MD

## 2024-08-28 ENCOUNTER — OFFICE VISIT (OUTPATIENT)
Dept: ENDOCRINOLOGY | Facility: CLINIC | Age: 74
End: 2024-08-28
Payer: COMMERCIAL

## 2024-08-28 VITALS — BODY MASS INDEX: 26.66 KG/M2 | HEIGHT: 65 IN | WEIGHT: 160 LBS

## 2024-08-28 DIAGNOSIS — E21.0 PRIMARY HYPERPARATHYROIDISM (H): ICD-10-CM

## 2024-08-28 DIAGNOSIS — R73.03 PRE-DIABETES: ICD-10-CM

## 2024-08-28 DIAGNOSIS — M85.9 LOW BONE DENSITY: Primary | ICD-10-CM

## 2024-08-28 DIAGNOSIS — Z78.0 POSTMENOPAUSAL STATUS: ICD-10-CM

## 2024-08-28 DIAGNOSIS — R82.994 HYPERCALCIURIA: ICD-10-CM

## 2024-08-28 PROCEDURE — 99215 OFFICE O/P EST HI 40 MIN: CPT

## 2024-08-28 PROCEDURE — G2211 COMPLEX E/M VISIT ADD ON: HCPCS

## 2024-08-28 ASSESSMENT — PAIN SCALES - GENERAL: PAINLEVEL: NO PAIN (0)

## 2024-08-28 NOTE — LETTER
8/28/2024       RE: Jennifer Jane  [address]  [facility] [address]     Dear Colleague,    Thank you for referring your patient, Jennifer Jane, to the [facility] [place] [facility]. Please see a copy of my visit note below.    07/30/24 10:39 AM  PATIENT LAB/IMAGING STATUS : No pending lab orders   Tania Lawson CMA      Endocrine     Attending Assessment/Plan :     Hypercalcemia with high PTH and borderline hypercalcuria .  The pattern is that of mild primary hyperparathyroidism.   Labs have remained stable over the last year.   Indications for surgical treatment of hyperparathyroidism include the following:   Calcium > 1 mg/dl over normal not met   Kidney stones- not met   Osteoporosis- not met   Hypercalciuria -   improved on most recent test.   Plan to repeat yearly for now  Declining renal function  Discussed again.  I showed her the images again.  She is interested in talking to parathyroid surgeon - referral placed.     Hypercalciuria - as above.     Low bone density. Family history hip fracture. High FRAX  On alendronate (intermittently ) since 7/2023.  Plan to treat until 7/2028 and then take drug holiday  DXA every 2 years including Next DXA 10/10/24  See me after the 2026 DXA      Post menopausal     Prediabetes - check A1c every 6-12 months or sooner prn     History of covid x 3 - this is a risk for DM progression     The Longitudinal plan of care for low BMD, primary hyperparathyroidism, prediabetes was addressed during this visit. Due to added complexity of care, we will continue to support Jennifer , and the subsequent management of this condition(s) and with the ongoing continuity of care of this condition.    51__ minutes spent on the date of the encounter doing chart review, history and exam, documentation and further activities as noted above.      Indigo Eid MD    Chief complaint: HISTORY OF PRESENT  TAVARES Rodriguez presents to follow up on primary hyperparathyroidism, low bone density.  I last saw her 7/12/23.  Since then, we started alendronate and we continued to search for the abnormal parathyroid responsible for the hyperparathyroidism.   Jennifer has sent mychart notes with concern of alendronate side effects in August, 2023, causing us to hold alendronate. She resumed alendronate 10/31/23 and again reported symptoms in November 2023. After stopping the alendronate, the symptoms did not resolve.   She restarted alendronate for the third time on 4/9/2024. Today she notes she recently stopped it again for 2 weeks and the resumed.  She can't remember if it helped her when she stopped it.     Hypercalcemia has been intermittently noted since 11/27/19.  She was on hydrochlorothiazide since at least 2017 until 11/27/19.  Despite this, the urine calcium is high.  We saw a parathyroid candidate on the RIGHT by US.  By parathyroid scan the parathyroid candidate is on the LEFT.     Endocrine relevant labs are as follows:  5/15/2023 Ca 10.4, phos 3.3 , PTH 58 ,  creatinine 0.78  5/16/23 244 hour urine calcium 260 mg/24 hours, 7.4 mg/dl, urine creatinine 1.06 g/spec; 30.7 mg/dl, volume 3450.   Urine calcium/creatinine clearance ratio 0.019  8/15/23 Ca 10, 25OHD 20  10/31/23 Ca 10.4, phosphorus 2.9, creatinine 0.84  4/30/24 24 hour urine calcium 210 mg/24 hours   6/25/24 CA 10.1, iCa 5.2, phos 2.0, PTH 61-     Relevant imaging is as follows: (as read by me as it pertains to endocrine relevant organs)  11/17/2021 MRI brain: there are no T1 coronal pituitary images.  Pituitary appears normal on sagittal;  Slight convex up upper border on T2  3/21/22 CT Chest: thyroid appears normal;   9/16/22 CT abdomen with contrast - no kidney stones  10/10/22 DXA lowest T-score -1.2 right femoral neck   11/7/2022 123I sestamibi subtraction SPECT - localizes to the left   7/12/23 parathyroid US following appt - compared with 10/26/22  parathyroid US:   Right thyroid nodule # 1 0.5 x 0.3 x  Was  0.5 x 0.3 x 0.6 cm   right # 2  0.5 x 0.6 x  0.6 cm Was 0.8 x 0.5 x 0.4 - by stills.  This is again not seen on cine .  It is also more superior in location than typically seen  Left cine does not show parathyroid candidate today - last study it showed ? Parathyroid candidate inferiorly - but only seen in trans, not long     REVIEW OF SYSTEMS  Could be better  A little cough  Last had covid Feb 10 2024 - onset days after a back procedure injection 1/31  Reading me sequence of recent tests/ visits   Some heart burn but similar to baseline  Teeth hurt - broke a tooth   Headaches  Neck  Can't stand up straight due to back and neck pain, hurts from here down (she poitns to her upper legs)  Walking holding walls when getting up the bathroom in the night   Unsteadiness in left hip and knee   Hands are so stiff- can't close them in the morning  A little foggy  Fell once in the garden     Past Medical History  Past Medical History:   Diagnosis Date     Acute bilateral low back pain with bilateral sciatica      Acute pain of right knee     Pain for months, worse since helping friend move.  Exam consistent with meniscal tear.  Check xray today, depending on results MRI.  Ice, NSAIDS for now.  ACE.     Amaurosis fugax      Arthritis     Fibromyalgia, ? Osteoarthritis     Back injury     Micro discectomy, approx date 1983     Benign neoplasm of ascending colon      Chondromalacia of patella, right     Severe, tri-compartmental on MRI 10/2017.  Referred to ortho.     Chronic left-sided headache     Negative MRI and CT of head 2018 and 2016  Normal labs TSH, CBC, CMP, ESR 12/2019.  See plan below in pt instructions.     Diarrhea, unspecified type      Diverticular disease of large intestine      Encounter for Medicare annual wellness exam     Up to date on mammogram and colon cancer screening.  Is due for shingles vaccine - do at pharmacy.     Glaucoma      Glaucoma  suspect      Hiatal hernia      HTN (hypertension)      Hypercalciuria      Infection due to 2019 novel coronavirus 02/10/2024    x3     Insomnia 12/26/2012     Problem list name updated by automated process. Provider to review     Low bone density 2022     Overweight (BMI 25.0-29.9)      Polyp of colon      Prediabetes      Primary hyperparathyroidism (H24)     mild; borderline hypercalciuria     Pseudophakia, both eyes      Reduced vision 09/2016    L vision block, bilateral elevated eye presures,poss. Glacom     Right foot pain      Sciatica     On and off - exacerbates when lifts heavy things  Bilaterally.     Spinal stenosis of lumbar region without neurogenic claudication      Uncomplicated asthma     Diagnosed as 2 yo     Weakness of left lower extremity      Past Surgical History:   Procedure Laterality Date      cataract extraction with intraocular lens implant Right 01/31/2019     BACK SURGERY  1984    micro-disc-ectomy     BREAST SURGERY  1984    L Breast bx     cataract extraction with intraocular lens Left 12/06/2018     CHOLECYSTECTOMY  1990     INJECT EPIDURAL TRANSFORAMINAL Bilateral 1/31/2024    Procedure: Bilateral L5-S1 Transforaminal epidural steroid injection;  Surgeon: Sarahy Tsang MD;  Location: [facility] OR     IRIDOTOMY/IRIDECTOMY LASER SURGERY Left 09/27/2016     IRIDOTOMY/IRIDECTOMY LASER SURGERY Right 10/18/2016     TUBAL LIGATION  1989     ZZC STOMACH SURGERY PROCEDURE UNLISTED  03/1990    Cholecystectomy, 11/1989 tubal ligation     Medications  Current Outpatient Medications   Medication Sig Dispense Refill     acetaminophen (TYLENOL) 325 MG tablet Take 325-650 mg by mouth every 6 hours as needed for mild pain       acetylcysteine (N-ACETYL CYSTEINE) 600 MG CAPS capsule Take 1 capsule (600 mg) by mouth daily 90 capsule 4     ADVAIR -21 MCG/ACT inhaler Inhale 2 puffs into the lungs 2 times daily       albuterol (VENTOLIN HFA) 108 (90 Base) MCG/ACT inhaler Inhale 2 puffs into the  lungs every 6 hours as needed for shortness of breath 8.5 g 5     alendronate (FOSAMAX) 70 MG tablet Take 1 tablet (70 mg) by mouth every 7 days Take 60 minutes before am meal with 8 oz. water. Remain upright for 30 minutes. 12 tablet 3     amLODIPine (NORVASC) 5 MG tablet Take 1 tablet (5 mg) by mouth at bedtime 90 tablet 4     atorvastatin (LIPITOR) 20 MG tablet Take 1 tablet (20 mg) by mouth daily 90 tablet 4     azelastine (ASTELIN) 0.1 % nasal spray Spray 1 spray into both nostrils 2 times daily       Calcium Carbonate Antacid (TUMS PO) Take  by mouth At Bedtime.       co-enzyme Q-10 100 MG CAPS capsule Take 1 capsule (100 mg) by mouth daily 90 capsule 4     fexofenadine (ALLEGRA) 180 MG tablet Take 180 mg by mouth daily       latanoprost (XALATAN) 0.005 % ophthalmic solution Place 1 drop into the right eye At Bedtime       losartan (COZAAR) 50 MG tablet Take 1 tablet (50 mg) by mouth 2 times daily 180 tablet 4     montelukast (SINGULAIR) 10 MG tablet TAKE ONE TABLET BY MOUTH EVERY DAY AS DIRECTED  1     SPIRIVA RESPIMAT 1.25 MCG/ACT inhaler INHALE 2 PUFFS ONCE PER DAY       traZODone (DESYREL) 50 MG tablet Take 1-3 tablets ( mg) by mouth at bedtime 130 tablet 4     Vitamin D3 (CHOLECALCIFEROL) 25 mcg (1000 units) tablet Take by mouth daily       ipratropium (ATROVENT) 0.03 % nasal spray Spray 2 sprays into both nostrils 2 times daily (Patient not taking: Reported on 8/28/2024) 30 mL 6     Calcium once/day - dose unknown -- its not on her med list -- she is now uncertain if she is taking it   Vitamin D3 25 mcg/day      Allergies  Allergies   Allergen Reactions     Canola Oil [Vegetable Oil] Anaphylaxis and GI Disturbance     Animal Dander Unknown     Dust Mites Unknown     Grass      Hydroxyzine Other (See Comments)     Passed out         Hydroxyzine Hcl      Pollen Extract Unknown     Trees      Ragweeds      Chlorhexidine Itching and Rash     Hibiclens       Family History  Family History   Problem  "Relation Age of Onset     Heart Disease Mother      Diabetes Mother      Coronary Artery Disease Mother      Hypertension Mother      Anesthesia Reaction Mother         Anaphylaxis, oral opioixd     Asthma Mother      Thyroid Disease Mother      Low Back Problems Mother      Heart Disease Father      Diabetes Father      Coronary Artery Disease Father      Hypertension Father      Hyperlipidemia Father      Low Back Problems Father      Diabetes Sister      Hypertension Sister      Low Back Problems Sister      Thyroid Disease Sister      Hip fracture Sister      Low Back Problems Brother      Asthma Maternal Grandmother      Coronary Artery Disease Maternal Grandfather      Cerebrovascular Disease Paternal Grandfather      Asthma Daughter      Genetic Disease Daughter         Kallman syndrome; no olfactory bulb;     Calcium Disorder No family hx of      Social History  Social History     Tobacco Use     Smoking status: Never     Passive exposure: Past     Smokeless tobacco: Never   Vaping Use     Vaping status: Never Used   Substance Use Topics     Alcohol use: Yes     Alcohol/week: 0.0 standard drinks of alcohol     Comment: 0 -2 times a year     Drug use: No     Retired nurse; ;   Drink a lot of milk (4 12 ounce glasses/day)  Eat ice cream    Physical Exam  GENERAL surgical mask  Ht 1.651 m (5' 5\")   Wt 72.6 kg (160 lb)   LMP  (LMP Unknown)   BMI 26.63 kg/m    SKIN: normal color, temperature, texture   HEENT: PER, no scleral icterus, eyelid retraction, stare, lid lag, proptosis or conjunctival injection.  .    NECK: supple.  No visible or palpable neck masses, cervical adenopathy, or goiter.   LUNGS: clear to auscultation bilaterally.   CARDIAC: RRR, S1, S2 without murmurs, rubs or gallops.    BACK: normal spinal contour.    NEURO: Alert, responds appropriately to questions,  moves all extremities, DTRs 1/4, gait normal, no tremor of the outstretched hand    DATA REVIEW     Latest Ref Rng " 10/4/2022  10:23 PM 10/5/2022  1:21 AM   ENDO CALCIUM LABS-UMP      Vitamin D Deficiency screening 20 - 75 ug/L     Albumin 3.4 - 5.0 g/dL     Albumin 3.5 - 5.2 g/dL 4.5     Alkaline Phosphatase 35 - 104 U/L 87     CALCIUM IONIZED WB 4.4 - 5.2 mg/dL  4.8    Calcium 8.8 - 10.2 mg/dL 10.4 (H)     Calcium Urine g/24 h 0.10 - 0.30 g/spec     Calcium Urine mg/dL mg/dL     Urea Nitrogen 7 - 30 mg/dL     Urea Nitrogen 8.0 - 23.0 mg/dL 12.7     Creatinine 0.51 - 0.95 mg/dL 0.81     Lipase 13 - 60 U/L 18     Magnesium 1.7 - 2.3 mg/dL 2.0     Parathyroid Hormone Intact 15 - 65 pg/mL        Latest Ref Rn 2/17/2023  9:50 AM 5/15/2023  10:21 AM   ENDO CALCIUM LABS-UMP      Vitamin D Deficiency screening 20 - 75 ug/L 17 (L)     Albumin 3.4 - 5.0 g/dL     Albumin 3.5 - 5.2 g/dL 4.4     Alkaline Phosphatase 35 - 104 U/L 79     CALCIUM IONIZED WB 4.4 - 5.2 mg/dL     Calcium 8.8 - 10.2 mg/dL 10.5 (H)  10.4 (H)    Calcium Urine g/24 h 0.10 - 0.30 g/spec     Calcium Urine mg/dL mg/dL     Urea Nitrogen 7 - 30 mg/dL     Urea Nitrogen 8.0 - 23.0 mg/dL 14.6     Creatinine 0.51 - 0.95 mg/dL 0.77  0.78    Lipase 13 - 60 U/L     Magnesium 1.7 - 2.3 mg/dL     Parathyroid Hormone Intact 15 - 65 pg/mL  58       Latest Ref Rn 5/16/2023  11:16 AM   ENDO CALCIUM LABS-UMP     Vitamin D Deficiency screening 20 - 75 ug/L    Albumin 3.4 - 5.0 g/dL    Albumin 3.5 - 5.2 g/dL    Alkaline Phosphatase 35 - 104 U/L    CALCIUM IONIZED WB 4.4 - 5.2 mg/dL    Calcium 8.8 - 10.2 mg/dL    Calcium Urine g/24 h 0.10 - 0.30 g/spec 0.26    Calcium Urine mg/dL mg/dL 7.4    Urea Nitrogen 7 - 30 mg/dL    Urea Nitrogen 8.0 - 23.0 mg/dL    Creatinine 0.51 - 0.95 mg/dL    Lipase 13 - 60 U/L    Magnesium 1.7 - 2.3 mg/dL    Parathyroid Hormone Intact 15 - 65 pg/mL       Legend:  (H) High  (L) Low    US THYROID 10/26/2022 3:00 PMCOMPARISON: None available.HISTORY: Serum calcium elevated; Elevated parathyroid hormone   FINDINGS:   Thyroid parenchyma: heterogenous  The right  lobe of the thyroid measures: 1.8 x 1.4 x 4.3 cm  The left lobe of the thyroid measures: 1.2 x 1.6 x 4.0 cm  The thyroid isthmus measures: 2.2 mm     Nodule 1:  Lobe: Right  Location: Mid lobe  Size: 0.5 x 0.3 x 0.6 cm  Composition: Spongiform (0 points)  Echogenicity: Hyperechoic or isoechoic (1 point)  Shape: Wider than tall (0 points)  Margin: Smooth (0 points)  Echogenic Foci: None or large comet tail artifact (0 points)  Stability: Not previously imaged  TIRADS: TR2 (1-2 points) Not suspicious     Posteromedial to the right thyroid lobe there is a 0.5 x 0.8 x 0.4 cmhypoechoic lesion with no definite involvement of the thyroid parenchyma.                                                     Impression: 0.8 cm hypoechoic lesion posteromedial to the rightthyroid lobe, which may represent a parathyroid adenoma. Consider  further evaluation with technetium 99 sestamibi scan     ACR TI-RADS recommendations  TR2 (2 points) & TR1 (0 points) -No FNA or follow-up  TR3 (3 points) - FNA if ? 2.5cm, follow-up if 1.5 -2.4 cm in 1, 3 and5 years  TR4 (4-6 points) - FNA if ? 1.5cm, follow-up if 1 -1.4 cm in 1, 2, 3and 5 years  TR5 (?7 points) - FNA if ? 1cm, follow-up if 0.5 -0.9 cm every yearfor 5 years   I have personally reviewed the examination and initial interpretationand I agree with the findings.  LUMA ZHANG MD     Examination: Nuclear medicine parathyroid examination with SPECT CTand High Resolution CT images of the Neck.  Date:  11/7/2022 3:16 PM   Indication:  Serum calcium elevated; Elevated parathyroid hormone   Comparison: Ultrasound 10/26/2022  Technique:  The patient received 8.29 mCi of I-123 orally and 23.4 mCi of Tc-99Cardiolite.  Three separate phases of images were obtained.  1. Dynamic images were obtained of the thyroid gland for 30 minutesfollowing Cardiolite administration.   2. Iodine images were obtained of the thyroid  followed by manualsubtraction from the Cardiolite images.   3. SPECT CT  images were also obtained of the thyroid using a dualenergy technique for both I-123 and for Tc-99m followed by 3 mm high resolution CT images of the neck from the base of the skull to thebase of the heart.  After normalization of the I-123 images to the  Tc-99m- Cardiolite images the reconstructed axial slices weresubtracted, yielding neck images highlighting abnormally perfused  tissues.  Findings:The images of the thyroid gland on both the I-123 images and on the Tc-99m Cardiolite images demonstrates homogeneous appearance of the gland. There are no thyroid lesions identified. The SPECT CT imageswith subtraction images demonstrates a subtle focal area uptakeposterior medial to the superior aspect of the h left thyroid glandseries 3 image 105.                                                             Impression:  Focal uptake along the superior posterior medial aspect of the leftthyroid gland. While the suspected parathyroid adenoma is not clearly  delineated on CT, this is the most likely location based on perfusion.Note this location is contralateral to the finding reported on  ultrasound 10/26/22.  I have personally reviewed the examination and initial interpretationand I agree with the findings.  STEPHANIE ADHIKARI MD     EXAMINATION: Chest CT  3/28/2023 10:19 AM  CLINICAL HISTORY: Recurrent cough; Moderate persistent asthma withexacerbation  COMPARISON: CTA chest 3/21/2022.  TECHNIQUE: CT imaging obtained through the chest without contrast.Axial, coronal, and sagittal reconstructions and axial MIP reformattedimages are reviewed.   CONTRAST: None  FINDINGS:  Lungs: The airway is patent. Minimal subpleuralreticulation/groundglass opacities in the inferior lateral rightmiddle lobe and inferior lateral lingula. No airspace consolidation.No pleural effusion or pneumothorax. Right lower lobe calcified  granulomas. Several sub-4 mm solid pulmonary nodules, for example 2 mm  solid nodule in the right lower lobe  (series 4, image 124).  Mediastinum: The thyroid is unremarkable. Cardiac size is normal.Normal caliber aorta and main pulmonary artery. No pericardial  effusion. Mild coronary artery calcium. No thoracic lymphadenopathy.Partially calcified right hilar nodes. Esophagus is normal in caliber.  Bones and soft tissues: No suspicious bone findings. Mild degenerativechanges of the thoracic spine and shoulders.  Upper Abdomen: Limited evaluation of the upper abdomen. Smallaccessory splenule adjacent to the pancreatic tail.                                                            IMPRESSION:   Minimal subpleural reticulation and groundglass opacities in the inferolateral right middle lobe and lingula. The findings suggest  subpleural non-fibrotic mild early interstitial lung process such as NSIP or organizing pneumonia, chronic eosinophilic pneumonia or  infection. Otherwise no abnormal findings in the chest to explain the  patient's recurrent cough and continued follow up suggested.  I have personally reviewed the examination and initial interpretationand I agree with the findings.  PENELOPE COLEMAN MD          Again, thank you for allowing me to participate in the care of your patient.      Sincerely,    Indigo Eid MD

## 2024-08-28 NOTE — PATIENT INSTRUCTIONS
Plan to treat with alendronate until 7/2028    Bone density every 2 years,   Next DXA bone density 10/10/2024 or after - must be on same machine as the last one for comparison   See me after the 2026 bone density    Yearly calcium related labs and 24 hour urine for calcium and creatinine - Next can be summer 2025     CALCIUM Recommendation 1200 mg/day in divided dose of no more than 500 mg/dose. This includes what is in your food and also what is in any supplements you are taking.      Dietary sources of calcium: These also contain vitamin D  Milk / buttermilk              8 oz            300 mg  Dry milk powder       5 Tbsp             300 mg  Yogurt                          1 cup           400 mg  Ice cream   1/2 cup          100 mg  Hard cheese                     1.5 oz          300 mg  Cottage cheese                1/2 cup        65 mg  Spinach, cooked  1 cup  240 mg  Tofu, soft regular           4 oz          120 to 390 mg  Sardines                       3 oz               370 mg  Mackerel canned         3 oz                250 mg  Canned salmon with bones 3 oz        170-210 mg  Calcium fortified cereals are available  SILK soy and almond milk products are calcium fortified  Orange juice  Fortified with Calcium       8 oz            300 mg  Low fat dairy sources are recommended      Recommended exercise goals:    30 minutes of moderate exercise on most days of the week .  Weight bearing exercise (ie, walking, jogging, hiking, dancing)    Strength training 2 or more times/week in addition to other weight -being exercise.  Strength training -- uses weight or resistance beyond that seen in everyday activities -(pilates, weight training with free weights, weight machines or resistance bands)    Bone density DXA may be performed every 2 years.  It must be performed on the same machine for comparison.       Refer to parathyroid surgeon  -- I can highly recommend Dr Katina Regan or Renny Smith

## 2024-08-29 ENCOUNTER — THERAPY VISIT (OUTPATIENT)
Dept: PHYSICAL THERAPY | Facility: CLINIC | Age: 74
End: 2024-08-29
Attending: PHYSICIAN ASSISTANT
Payer: COMMERCIAL

## 2024-08-29 DIAGNOSIS — R53.83 OTHER FATIGUE: ICD-10-CM

## 2024-08-29 DIAGNOSIS — M25.50 MULTIPLE JOINT PAIN: Primary | ICD-10-CM

## 2024-08-29 PROCEDURE — 97110 THERAPEUTIC EXERCISES: CPT | Mod: GP

## 2024-08-29 PROCEDURE — 97161 PT EVAL LOW COMPLEX 20 MIN: CPT | Mod: GP

## 2024-08-29 ASSESSMENT — ACTIVITIES OF DAILY LIVING (ADL)
GOING_DOWN_1_FLIGHT_OF_STAIRS: MODERATE DIFFICULTY
GO UP STAIRS: ACTIVITY IS FAIRLY DIFFICULT
GETTING_INTO_AND_OUT_OF_A_BATHTUB: EXTREME DIFFICULTY
LIGHT_TO_MODERATE_WORK: MODERATE DIFFICULTY
ADL_HIGHEST_POTENTIAL_SCORE: 68
DEEP_SQUATTING: EXTREME DIFFICULTY
PLEASE_INDICATE_YOR_PRIMARY_REASON_FOR_REFERRAL_TO_THERAPY:: HIP
RECREATIONAL_ACTIVITIES: MODERATE DIFFICULTY
PUTTING_ON_SOCKS_AND_SHOES: MODERATE DIFFICULTY
PLEASE_INDICATE_YOR_PRIMARY_REASON_FOR_REFERRAL_TO_THERAPY:: FOOT AND/OR ANKLE
STIFFNESS: THE SYMPTOM AFFECTS MY ACTIVITY MODERATELY
JUMPING: UNABLE TO DO
WALKING_INITIALLY: MODERATE DIFFICULTY
HEAVY_WORK: EXTREME DIFFICULTY
LANDING: UNABLE TO DO
ABILITY_TO_PERFORM_ACTIVITY_WITH_YOUR_NORMAL_TECHNIQUE: MODERATE DIFFICULTY
GOING_UP_1_FLIGHT_OF_STAIRS: MODERATE DIFFICULTY
GO DOWN STAIRS: ACTIVITY IS SOMEWHAT DIFFICULT
STAND: ACTIVITY IS FAIRLY DIFFICULT
SPORTS_COUNT: 9
ADL_SCORE(%): 0
SWINGING_OBJECTS_LIKE_A_GOLF_CLUB: UNABLE TO DO
ADL_TOTAL_ITEM_SCORE: 0
SPORTS_HIGHEST_POTENTIAL_SCORE: 36
SQUAT: I AM UNABLE TO DO THE ACTIVITY
PLEASE_INDICATE_YOR_PRIMARY_REASON_FOR_REFERRAL_TO_THERAPY:: KNEE
ADL_COUNT: 17
SITTING_FOR_15_MINUTES: NO DIFFICULTY AT ALL
ABILITY_TO_PARTICIPATE_IN_YOUR_DESIRED_SPORT_AS_LONG_AS_YOU_WOULD_LIKE: UNABLE TO DO
GETTING_INTO_AND_OUT_OF_AN_AVERAGE_CAR: MODERATE DIFFICULTY
KNEEL ON THE FRONT OF YOUR KNEE: ACTIVITY IS VERY DIFFICULT
TWISTING/PIVOTING_ON_INVOLVED_LEG: MODERATE DIFFICULTY
SPORTS_SCORE(%): 0
SWELLING: I HAVE THE SYMPTOM BUT IT DOES NOT AFFECT MY ACTIVITY
WALKING_FOR_APPROXIMATELY_10_MINUTES: MODERATE DIFFICULTY
WEAKNESS: THE SYMPTOM AFFECTS MY ACTIVITY SLIGHTLY
CUTTING/LATERAL_MOVEMENTS: MODERATE DIFFICULTY
WALK: ACTIVITY IS SOMEWHAT DIFFICULT
RUNNING_ONE_MILE: UNABLE TO DO
ROLLING_OVER_IN_BED: MODERATE DIFFICULTY
STARTING_AND_STOPPING_QUICKLY: MODERATE DIFFICULTY
SIT WITH YOUR KNEE BENT: ACTIVITY IS FAIRLY DIFFICULT
LOW_IMPACT_ACTIVITIES_LIKE_FAST_WALKING: MODERATE DIFFICULTY
STEPPING_UP_AND_DOWN_CURBS: SLIGHT DIFFICULTY
GIVING WAY, BUCKLING OR SHIFTING OF KNEE: THE SYMPTOM AFFECTS MY ACTIVITY SLIGHTLY
STANDING_FOR_15_MINUTES: MODERATE DIFFICULTY
HOW_WOULD_YOU_RATE_THE_OVERALL_FUNCTION_OF_YOUR_KNEE_DURING_YOUR_USUAL_DAILY_ACTIVITIES?: ABNORMAL
HOW_WOULD_YOU_RATE_YOUR_CURRENT_LEVEL_OF_FUNCTION_DURING_YOUR_USUAL_ACTIVITIES_OF_DAILY_LIVING_FROM_0_TO_100_WITH_100_BEING_YOUR_LEVEL_OF_FUNCTION_PRIOR_TO_YOUR_HIP_PROBLEM_AND_0_BEING_THE_INABILITY_TO_PERFORM_ANY_OF_YOUR_USUAL_DAILY_ACTIVITIES?: 40
HOW_WOULD_YOU_RATE_YOUR_CURRENT_LEVEL_OF_FUNCTION?: ABNORMAL
WALKING_UP_STEEP_HILLS: MODERATE DIFFICULTY
SPORTS_TOTAL_ITEM_SCORE: 0
WALKING_DOWN_STEEP_HILLS: MODERATE DIFFICULTY
RISE FROM A CHAIR: ACTIVITY IS FAIRLY DIFFICULT
WALKING_15_MINUTES_OR_GREATER: SLIGHT DIFFICULTY
PAIN: THE SYMPTOM AFFECTS MY ACTIVITY MODERATELY
LIMPING: THE SYMPTOM AFFECTS MY ACTIVITY SLIGHTLY

## 2024-08-29 NOTE — PROGRESS NOTES
PHYSICAL THERAPY EVALUATION  Type of Visit: Evaluation       Fall Risk Screen:  Fall screen completed by: PT  Have you fallen 2 or more times in the past year?: No  Have you fallen and had an injury in the past year?: No  Is patient a fall risk?: No    Pt reports stiffness of back and hands. Opening jars is challenging as well as dexterity of fingers. Pt reports hip and knee stiffness as well. Pt reports injections in lower spine and back in January. Some relief after the injections and carried something and made it worse. Pt reports previous microdiscectomy in the 80s.     Pt reports difficulty with flexibility of hips and LE. Pt would previously walk 3 miles around [place]. Pt [place] [facility] and was very sore in her back.     Pt feels that her posture is poor as well.       Employment:      Hobbies/Interests:  Activity level:   CIRILO:       Patient goals for therapy:  work on posture, walk more, be stronger, clean home     Pain assessment:  Location: hips, knees, low back, upper back   Quality: achy   Worse with: lifting,   Better with: movement   Pain at rest (0-10):   Pain at most painful (0-10):    Sleep Quality: changing positions at night is hard     History of falls: fell in garden but no more than 2 in past year. Concerns about balance     Relevant PMH:  Osteoporosis     Subjective       Presenting condition or subjective complaint:    Date of onset: 08/29/24    Relevant medical history:     Dates & types of surgery:      Prior diagnostic imaging/testing results:       Prior therapy history for the same diagnosis, illness or injury:        Living Environment  Social support:     Type of home:     Stairs to enter the home:         Ramp:     Stairs inside the home:         Help at home:    Equipment owned:       Employment:      Hobbies/Interests:      Patient goals for therapy:      Pain assessment: see above      Objective     INTEGUMENTARY (edema, incisions):  WNL    PALPATION: NT    POSTURE: Sitting Posture: Thoracic kyphosis increased    GAIT:   Weightbearing Status: WBAT  Assistive Device(s): None  Gait Deviations: WFL    LUMBAR REPEATED MOTION TESTING:  STRENGTH:     Pain: - none + mild ++ moderate +++ severe  Strength Scale: 0-5/5 Left Right   Hip Flexion 4- 4-   Hip Extension (glute max)     Hip Abduction (glute med) 5 5   Hip Adduction 5 5   Hip Internal Rotation     Hip External Rotation     Knee Flexion 5 5   Knee Extension 4- 4-         SPECIAL TESTS:    LUMBAR/ HIP SPECIAL TESTS:    Left Right   IVELISSE Pain in groin Positive, pain in back    FADIR/Labrum/YOGESH Positive Negative    Femoral Nerve     Letha's     Piriformis     Quadrant Testing     SLR     Slump     Stork with Extension     Maikol     Scour Positive Negative        Assessment & Plan   CLINICAL IMPRESSIONS  Medical Diagnosis: Other fatigue  Multiple joint pain    Treatment Diagnosis:     Impression/Assessment: Patient is a 73 year old female with complaints of thoracic, lumbar, hip and knee stiffness and pain.  The following significant findings have been identified: Pain, Decreased ROM/flexibility, and Decreased strength. These impairments interfere with their ability to perform self care tasks, household chores, household mobility, and community mobility as compared to previous level of function.     Clinical Decision Making (Complexity):  Clinical Presentation: Evolving/Changing  Clinical Presentation Rationale: based on medical and personal factors listed in PT evaluation  Clinical Decision Making (Complexity): Low complexity    PLAN OF CARE  Treatment Interventions:  Interventions: Gait Training, Manual Therapy, Neuromuscular Re-education, Therapeutic Activity, Therapeutic Exercise    Long Term Goals     PT Goal 1  Goal Identifier: HEP  Goal Description: Pt will deomstrate understanding and compliance of HEP to manage low back, hip and knee stiffness and pain  Goal Progress: progressing  Target  Date: 11/26/24  PT Goal 2  Goal Identifier: Clean  Goal Description: Pt will be able to stand to clean home for 15 minutes without pain  Rationale: to maximize safety and independence with performance of ADLs and functional tasks;to maximize safety and independence within the home  Goal Progress: progressing  Target Date: 11/26/24      Frequency of Treatment: every other week  Duration of Treatment: 90 days    Education Assessment:   Learner/Method: Patient  Education Comments: Pt ammenabe to tx    Risks and benefits of evaluation/treatment have been explained.   Patient/Family/caregiver agrees with Plan of Care.     Evaluation Time:     PT Eval, Low Complexity Minutes (49021): 15     Signing Clinician: Tere Larsen, PT        [facility]                                                                                   OUTPATIENT PHYSICAL THERAPY      PLAN OF TREATMENT FOR OUTPATIENT REHABILITATION   Patient's Last Name, First Name, Jennifer Hood YOB: 1950   Provider's Name   [facility]   Medical Record No.  [id number]     Onset Date: 08/29/24  Start of Care Date: 08/29/24     Medical Diagnosis:  Other fatigue  Multiple joint pain      PT Treatment Diagnosis:    Plan of Treatment  Frequency/Duration: every other week/ 90 days    Certification date from 08/29/24 to 11/26/24         See note for plan of treatment details and functional goals     Tere Larsen, PT                         I CERTIFY THE NEED FOR THESE SERVICES FURNISHED UNDER        THIS PLAN OF TREATMENT AND WHILE UNDER MY CARE     (Physician attestation of this document indicates review and certification of the therapy plan).              Referring Provider:  Apoorva Nelson    Initial Assessment  See Epic Evaluation- Start of Care Date: 08/29/24

## 2024-09-04 ENCOUNTER — THERAPY VISIT (OUTPATIENT)
Dept: OCCUPATIONAL THERAPY | Facility: CLINIC | Age: 74
End: 2024-09-04
Attending: PHYSICIAN ASSISTANT
Payer: COMMERCIAL

## 2024-09-04 DIAGNOSIS — U09.9 POST-COVID CHRONIC CONCENTRATION DEFICIT: ICD-10-CM

## 2024-09-04 DIAGNOSIS — Z81.8 FAMILY HISTORY OF DEMENTIA: ICD-10-CM

## 2024-09-04 DIAGNOSIS — R41.840 POST-COVID CHRONIC CONCENTRATION DEFICIT: ICD-10-CM

## 2024-09-04 PROCEDURE — 97166 OT EVAL MOD COMPLEX 45 MIN: CPT | Mod: GO | Performed by: OCCUPATIONAL THERAPIST

## 2024-09-04 PROCEDURE — 97535 SELF CARE MNGMENT TRAINING: CPT | Mod: GO | Performed by: OCCUPATIONAL THERAPIST

## 2024-09-04 NOTE — PROGRESS NOTES
OCCUPATIONAL THERAPY EVALUATION  Type of Visit: Evaluation        Fall Risk Screen:  Fall screen completed by: PT  Have you fallen 2 or more times in the past year?: No  Have you fallen and had an injury in the past year?: No  Is patient a fall risk?: No    Subjective      Fidelina Jane is a 73 y.o. female with referral for OP occupational therapy from Apoorva Nelson PA-C.  Per chart: Patient with increased short-term memory loss and forgetfulness since COVID. Of note patient has had COVID 3 times with the most recent infection being February 2024. Discussed starting occupational therapy for patient's fatigue and concentration. Also discussed starting N-acetylcysteine 600 mg due to benefit shown through [facility] study for post-COVID concentration deficits. Of note patient does have family history of dementia with her mom having possible Alzheimer's and brother with Lewy body dementia. Will place referral to neurology for evaluation.     MoCA 24/30 on 12/17/2020.   Pt arrives on time, not sure what this appt is for. Pt reports having forgetfulness at home from one room to another. Pt notes forgetting items while at the grocery store. Pt reports she does a lot of volunteer work, activities with [employer]. Evaluation completed as ordered.    Presenting condition or subjective complaint: pain and unable to use hands for many tasks  Date of onset: 07/25/24    Relevant medical history: Asthma; Chest pain; Concussions; Dizziness; Fibromyalgia; Foot drop; Heart problems; High blood pressure; Menopause; Migraines or headaches; Osteoarthritis; Osteoporosis; Overweight; Pain at night or rest; Vision problems   Dates & types of surgery: toncilectomy age1  tubal ligation 1989 won  1990    Prior diagnostic imaging/testing results: X-ray     Prior therapy history for the same diagnosis, illness or injury:        Prior Level of Function  Transfers: Independent  Ambulation: Independent  ADL: Independent  IADL: Driving, Finances,  Housekeeping, Laundry, Meal preparation, Medication management    Living Environment  Social support: With a significant other or spouse   Type of home: House; 2-story; Multi-level   Stairs to enter the home:         Ramp: No   Stairs inside the home: Yes 20 Is there a railing: Yes     Help at home: None  Equipment owned: Walker; Crutches     Employment: Not Applicable    Hobbies/Interests: sewing     volunteer at [employer]  crafts      gardening    Patient goals for therapy: open and close fingers   increase strength in hands and flexiblilty    Pain assessment: Pain present  Location: left hip/groin/Rating: 3/10  Location: headache/Ratin10     Objective   Cognitive Status Examination  Orientation: Oriented to person, place and time   Level of Consciousness: Alert  Follows Commands and Answers Questions: 100% of the time  Personal Safety and Judgement: Intact  Memory: Impaired  Attention: No deficits identified  Organization/Problem Solving: No deficits identified  Executive Function: No deficits identified    MoCA  The Webster Cognitive Assessment (MOCA) is a 30 point cognitive screening assessment.  Version 8.1  Visuospatial/Executive 5/5  Naming 3/3  Attention 5/6  Language 2/3  Abstraction 2/2  Delayed Recall 0/5  Orientation 6/6  MIS: 4/15  Total: 23/30  Normal score is considered = or > 26/30.  The following ranges may be used to grade severity: 18-26 = mild cognitive impairment, 10-17= moderate cognitive impairment (MCI) and less than 10= severe cognitive impairment. These have not been researched. Average score for MCI is 22 and for mild Alzheimer's disease is 16.      VISUAL SKILLS  Visual Acuity: Wears glasses, progressives  Visual Field: Appears normal  Visual Attention: Appears normal  Oculomotor: intact    SENSATION: UE Sensation WNL    POSTURE: WFL  RANGE OF MOTION: UE AROM WFL  STRENGTH: UE Strength WFL  MUSCLE TONE: WFL  COORDINATION: WFL  BALANCE: WFL    FUNCTIONAL  MOBILITY  Assistive Device(s): None  Ambulation: independent  Wheelchair: n/a    BED MOBILITY: Independent    TRANSFERS: Independent    BATHING: Independent    UPPER BODY DRESSING: Independent    LOWER BODY DRESSING: Independent    TOILETING: Independent    GROOMING: Independent    EATING/SELF FEEDING: Independent     ACTIVITY TOLERANCE: Pt reports limitations due to pain in back and hip. Decreased motivations for cleaning and household chores.     INSTRUMENTAL ACTIVITIES OF DAILY LIVING (IADL):   Meal Planning/Prep: independent, no problems.   Home/Financial Management: shared with spouse, no problems.   Communication/Computer Use: no difficulty  Community Mobility: does drive, no limitations. Pt with use of directions as needed.   Care of Others:- n/a  Sleep: Pt reports alternating sleeping positions due to pain levels. Pt with plenty of pillows for positioning.     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  The following significant findings have been identified: Impaired cognition, Impaired strength, and Pain.  These identified deficits interfere with their ability to perform self care tasks and household chores as compared to previous level of function.     Clinical Decision Making (Complexity):  Assessment of Occupational Performance: 3-5 Performance Deficits  Occupational Performance Limitations: sleep, volunteer activities, leisure activities, and social participation  Clinical Decision Making (Complexity): Moderate  complexity    PLAN OF CARE  Treatment Interventions:  Interventions: Self-Care/Home Management, Therapeutic Activity, Therapeutic Exercise    Long Term Goals   OT Goal 1  Goal Identifier: memory  Goal Description: Pt will verbalize 3 compensatory techniques for memory impairment in order to improve carry over of HEP and improve safety with ADL/IADL tasks.  Target Date: 12/03/24  OT Goal 2  Goal Identifier: BUE HEP  Goal Description: Pt will demonstrate understanding of BUE HEP in order to increase safety and independence with ADL tasks.  Target Date: 12/03/24  OT Goal 3  Goal Identifier: fatigue  Goal Description: Patient will identify at least 3 methods to manage fatigue/energy to demonstrate at least a 5 point improvement in her perceived level of fatigue as measured by the FACIT, in order to increase safety and independence with ADL/IADL tasks.  Target Date: 12/03/24      Frequency of Treatment: on time a week  Duration of Treatment: up to 90 days     Recommended Referrals to Other Professionals:  none  Education Assessment: Learner/Method: Patient;Listening     Risks and benefits of evaluation/treatment have been explained.   Patient/Family/caregiver agrees with Plan of Care.     Evaluation Time:    OT Eval, Moderate Complexity Minutes (27199): 30    Signing Clinician: ROSINA Jackson [facility]                                                                                   OUTPATIENT OCCUPATIONAL THERAPY      PLAN OF TREATMENT FOR OUTPATIENT REHABILITATION   Patient's Last Name, First Name, Jennifer Hood YOB: 1950   Provider's Name   [facility]   Medical Record No.  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, U09.9)  Family history of dementia (Z81.8)      OT Treatment Diagnosis:  Post-COVID chronic concentration  deficit (R41.840, U09.9)  Family history of dementia (Z81.8) Plan of Treatment  Frequency/Duration:on time a week/up to 90 days    Certification date from 09/04/24   To 12/03/24        See note for plan of treatment details and functional goals     Caio Taylor, OT                         I CERTIFY THE NEED FOR THESE SERVICES FURNISHED UNDER        THIS PLAN OF TREATMENT AND WHILE UNDER MY CARE     (Physician attestation of this document indicates review and certification of the therapy plan).              Referring Provider:  Apoorva Nelson    Initial Assessment  See Epic Evaluation- 09/04/24

## 2024-09-06 ENCOUNTER — THERAPY VISIT (OUTPATIENT)
Dept: PHYSICAL THERAPY | Facility: CLINIC | Age: 74
End: 2024-09-06
Attending: PHYSICIAN ASSISTANT
Payer: COMMERCIAL

## 2024-09-06 DIAGNOSIS — M25.50 MULTIPLE JOINT PAIN: Primary | ICD-10-CM

## 2024-09-06 PROCEDURE — 97110 THERAPEUTIC EXERCISES: CPT | Mod: GP | Performed by: PHYSICAL THERAPIST

## 2024-09-06 PROCEDURE — 97112 NEUROMUSCULAR REEDUCATION: CPT | Mod: GP | Performed by: PHYSICAL THERAPIST

## 2024-09-10 ENCOUNTER — THERAPY VISIT (OUTPATIENT)
Dept: OCCUPATIONAL THERAPY | Facility: CLINIC | Age: 74
End: 2024-09-10
Attending: PHYSICIAN ASSISTANT
Payer: COMMERCIAL

## 2024-09-10 DIAGNOSIS — R41.840 POST-COVID CHRONIC CONCENTRATION DEFICIT: Primary | ICD-10-CM

## 2024-09-10 DIAGNOSIS — U09.9 POST-COVID CHRONIC CONCENTRATION DEFICIT: Primary | ICD-10-CM

## 2024-09-10 DIAGNOSIS — Z81.8 FAMILY HISTORY OF DEMENTIA: ICD-10-CM

## 2024-09-10 PROCEDURE — 97110 THERAPEUTIC EXERCISES: CPT | Mod: GO | Performed by: OCCUPATIONAL THERAPIST

## 2024-09-10 NOTE — PROGRESS NOTES
09/10/24 0500   Appointment Info   Treating Provider Caio Taylor, OTR/L   Total/Authorized Visits [employer] 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  (Physician co-signature of this document indicates review and certification of the therapy plan)   Goals   OT Goals 1;2;3;4   OT Goal 1   Goal Identifier memory   Goal Description Pt will verbalize 3 compensatory techniques for memory impairment in order to improve carry over of HEP and improve safety with ADL/IADL tasks.   Goal Progress handout and education provided   Target Date 12/03/24   Date Met 09/04/24   OT Goal 2   Goal Identifier BUE HEP   Goal Description Pt will demonstrate understanding of BUE HEP in order to increase safety and independence with ADL tasks.   Goal Progress theraputty and hand ROM exercises provided, GOAL MET   Target Date 12/03/24   Date Met 09/10/24   OT Goal 3   Goal Identifier fatigue   Goal Description Patient will identify at least 3 methods to manage fatigue/energy to demonstrate at least a 5 point improvement in her perceived level of fatigue as measured by the FACIT, in order to increase safety and independence with ADL/IADL tasks.   Goal Progress Pt provided education on memory and cognitive pyramid. not met, no longer applicable.   Target Date 12/03/24   Subjective Report   Subjective Report Pt arrives on time, reports continued  difficulty with hands. Pt reports increased stiffness in hands in the morning. Pt reports hands have been giving her trouble for 5+ years.   Objective Measures   Objective Measures Objective Measure 1;Objective Measure 2;Objective Measure 3;Objective Measure 4;Objective Measure 5;Objective Measure 6   Objective Measure 1   Objective Measure Facit   Details 26/52   Objective Measure 2   Objective Measure MoCA   Details 23/30   Objective Measure 3   Objective Measure    Details R: 57 lbs (N: 49 lbs), L: 51 lbs (N: 42 lbs)   Objective Measure 4   Objective Measure lateral pinch   Details R: 14 lbs (N:15 lbs), L: 12.5 lbs (N: 14 lbs)   Objective Measure 5   Objective Measure 3 pt pinch   Details R: 12 lbs (N: 14 lbs), L: 9 lbs (N: 14 lbs)   Objective Measure 6   Objective Measure 9 hole peg test   Details R: 21 sec (N: 20 sec), L: 21 sec (N: 22 sec)   Treatment Interventions (OT)   Interventions Self Care/Home Management;Therapeutic Procedure/Exercise   Therapeutic Procedure/Exercise   Therapeutic Procedure: strength, endurance, ROM, flexibillity minutes (34679) 32   Skilled Intervention Skilled education and training provided in order to increase safety and independence with ADL/IADL tasks.   Ther Proc 1 objective measures, theraputty, hand ROM   Ther Proc 1 - Details BUE exercises indicated to increase strength and endurance for functional transfers and ADL routine.Therapy putty hand strength/coordination exercise training, including methods of grading amount of resistance (distal placement on fingers more resistive than proximal; less putty=less resistance) and precautions to prevent over-fatigue of hand muscles (alternating R/L): Exercise training included:  taffy pulls (shoulder horizontal abduction) followed by hand gripping; fingers & thumb flexion/extension; fingers & thumb abduction/adduction; 3 point & lateral pinch; PIP/DIP flexion w/MCPs extended; thumb extension. Pt completed 10 reps of each, and will  perform 5-7x/week. Tactile and verbal cues provided throughout for correct technique. Rest breaks throughout for energy conservation. Issued hand coordination handout and reviewed functional coordination activities. Spent time discussing how patient could implement tasks at home for hand strengthening and coordination.  Pt instructed with hand ROM exercises to complete in order to increase digit function with arthritis. Pt educated with use of digit add/abduction, PIP ROM, DIP ROM, digit flexion/extension. Pt provided handout, demonstrates competency.   Education   Learner/Method Patient;Listening   Plan   Plan for next session discharge   Comments   Comments Pt has been seen on two occasions between 9/4/24-9/10/24 to address complaints with cognition related to post-COVID. Pt completes MoCA with score reflecting no changes in past years with performance. Pt provided education on cognitive pyramid, cognitive performance, memory techniques. Pt provided education and training related to bilateral hand complaints of decreased ROM and strength. Pt demonstrates strength at or better than age norms, provided theraputty exercises for further BUE HEP. Pt to follow up with hand therapy for further concerns with hand function. Pt has met goal area for memory, no longer applicable for fatigue mgmt goal. Pt appropriate for discharge at this time.   Total Session Time   Timed Code Treatment Minutes 32   Total Treatment Time (sum of timed and untimed services) 32         DISCHARGE  Reason for Discharge: No further expectation of progress.    Equipment Issued: BUE HEP    Discharge Plan: Patient to continue home program.    Referring Provider:  Apoorva Nelson

## 2024-09-19 ENCOUNTER — HOSPITAL ENCOUNTER (OUTPATIENT)
Dept: MRI IMAGING | Facility: CLINIC | Age: 74
Discharge: HOME | End: 2024-09-19
Attending: FAMILY MEDICINE
Payer: COMMERCIAL

## 2024-09-19 DIAGNOSIS — M85.9 LOW BONE DENSITY: ICD-10-CM

## 2024-09-19 DIAGNOSIS — R93.89 ABNORMAL X-RAY: ICD-10-CM

## 2024-09-19 DIAGNOSIS — M25.552 HIP PAIN, LEFT: ICD-10-CM

## 2024-09-19 PROCEDURE — 73721 MRI JNT OF LWR EXTRE W/O DYE: CPT | Mod: 26 | Performed by: RADIOLOGY

## 2024-09-19 PROCEDURE — 72195 MRI PELVIS W/O DYE: CPT

## 2024-09-19 PROCEDURE — 73721 MRI JNT OF LWR EXTRE W/O DYE: CPT | Mod: LT

## 2024-09-19 PROCEDURE — 72195 MRI PELVIS W/O DYE: CPT | Mod: 26 | Performed by: RADIOLOGY

## 2024-09-25 ENCOUNTER — OFFICE VISIT (OUTPATIENT)
Dept: ORTHOPEDICS | Facility: CLINIC | Age: 74
End: 2024-09-25
Attending: FAMILY MEDICINE
Payer: COMMERCIAL

## 2024-09-25 DIAGNOSIS — M85.9 LOW BONE DENSITY: ICD-10-CM

## 2024-09-25 DIAGNOSIS — M16.12 PRIMARY OSTEOARTHRITIS OF LEFT HIP: Primary | ICD-10-CM

## 2024-09-25 DIAGNOSIS — M25.552 HIP PAIN, LEFT: ICD-10-CM

## 2024-09-25 PROCEDURE — 99213 OFFICE O/P EST LOW 20 MIN: CPT | Performed by: FAMILY MEDICINE

## 2024-09-25 RX ORDER — BRIMONIDINE TARTRATE 1.5 MG/ML
SOLUTION/ DROPS OPHTHALMIC
COMMUNITY

## 2024-09-25 NOTE — PROGRESS NOTES
Follow-up MRI left hip and pelvis.  MRI showed no evidence of insufficiency fracture involving the hip and pelvis.  Mild bilateral hip osteoarthritis.  Mild bilateral hip hamstring tendinosis.  No tendon disruption.  No stress reaction of the bone.  No focal osseous lesion.    We discussed her MRI results in detail.  Patient notes that her pain is intermittent.  It is usually present when she first rises from a chair and starts to walk.  It is not always consistent.  There are days where she does not have discomfort at all.  When she notices that she will notice it localized to the anterior hip.  She is currently seeing physical therapy.      X-ray left hip 6/21/2024, 2 months ago, consistent with mild bilateral hip DJD, with faint lucency in the intertrochanteric region noted on x-ray        Patient history of lumbar spine left-sided hemilaminectomy 1984, Dr Spence at the [facility] (now [facility]) .  Patient has most recently followed with [facility] spine and physical medicine for spinal stenosis, most recent visit 1/18/2024 reviewed by me.  Patient at that time was dealing with low back pain and left-sided sciatica with numbness in the left lower leg and tingling on the outside of the foot.  At that time the patient was tolerating physical therapy poorly, and it was aggravating discomfort.  Patient was placed on gabapentin and referred for a left-sided L5-S1 transforaminal epidural steroid injection.  A bilateral lumbar transforaminal epidural steroid injection at L5-S1 was done in the pain clinic 1/31/2024        DEXA scan 10/10/2022 consistent with low bone density        Work: Retired, formerly worked as a school nurse & at [employer]             MR left hip without  contrast 9/19/2024 3:58 PM  MRI pelvis without contrast     Techniques: Multiplanar multisequence imaging of the left hip and  pelvis was obtained without  administration of intra-articular or  intravenous contrast using routine  protocol.     History: Hip pain; Arthritis, known or r/o; Osteoarthritis; Fracture,  known or r/o; Hip x-ray result available; No known/automatically  detected potential contraindications to imaging; Hip pain, left;  Abnormal x-ray; Low bone density      Comparison: Radiograph's, CT abdomen and pelvis 9/16/2022 8/12/2024     Findings:     Osseous structures  Osseous structures: No fracture, stress reaction, avascular necrosis,  or focal osseous lesion is seen.     Articular cartilage and labrum  Assessment limited on this non-arthrographic study due to relative  lack of joint distension.     Bilateral SI joint degenerative change with sacrosciatic subchondral  cysts, left greater than right. No SI joint erosion or ankylosis.     Articular cartilage: Full-thickness fissuring of cartilage over the  anterior superior acetabulum with subchondral cystic change.     Labrum: Left-sided anterior superior labral tearing and degeneration  with superior laterally dissecting paralabral cyst measuring up to 2.2  cm.     Left ligament teres and transverse ligament of acetabulum: Intact.     Joint or bursal effusion     Joint effusion: A physiologic amount of joint fluid.     Bursal effusion: Minimal nonspecific edema over the greater  trochanter. No substantial iliopsoas or trochanteric bursal effusion.     Muscles and tendons  Muscles and tendons: Bilateral proximal hamstring tendinosis with  peritendinitis. The rectus femoris, sartorius, and iliopsoas tendons  intact. The hip abductors are intact. The visualized adductor muscles  are unremarkable.      Nerves:  The visualized courses of the sciatic nerves are unremarkable.     Other Findings:  None.                                                                      Impression:     1. No insufficiency fracture, as clinically questioned.     2. Mild bilateral hip osteoarthrosis. Left-sided labral tearing with  paralabral cyst measuring up to 2.2 cm.     3. Bilateral proximal  hamstring tendinosis and peritendinitis.     SHIRLEY SOTOMAYOR MD (Joe)         PMH:  Past Medical History:   Diagnosis Date    Acute bilateral low back pain with bilateral sciatica     Acute pain of right knee     Pain for months, worse since helping friend move.  Exam consistent with meniscal tear.  Check xray today, depending on results MRI.  Ice, NSAIDS for now.  ACE.    Amaurosis fugax     Arthritis     Fibromyalgia, ? Osteoarthritis    Back injury     Micro discectomy, approx date 1983    Benign neoplasm of ascending colon     Chondromalacia of patella, right     Severe, tri-compartmental on MRI 10/2017.  Referred to ortho.    Chronic left-sided headache     Negative MRI and CT of head 2018 and 2016  Normal labs TSH, CBC, CMP, ESR 12/2019.  See plan below in pt instructions.    Diarrhea, unspecified type     Diverticular disease of large intestine     Encounter for Medicare annual wellness exam     Up to date on mammogram and colon cancer screening.  Is due for shingles vaccine - do at pharmacy.    Glaucoma     Glaucoma suspect     Hiatal hernia     HTN (hypertension)     Hypercalciuria     Infection due to 2019 novel coronavirus 02/10/2024    x3    Insomnia 12/26/2012     Problem list name updated by automated process. Provider to review    Low bone density 2022    Overweight (BMI 25.0-29.9)     Polyp of colon     Prediabetes     Primary hyperparathyroidism (H24)     mild; borderline hypercalciuria    Pseudophakia, both eyes     Reduced vision 09/2016    L vision block, bilateral elevated eye presures,poss. Glacom    Right foot pain     Sciatica     On and off - exacerbates when lifts heavy things  Bilaterally.    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, mild stage    Bilateral edema of lower extremity    Low bone density    Primary hyperparathyroidism (H24)    Hypercalciuria    Lumbar radiculopathy with sciatica    Neuroforaminal stenosis of lumbar spine    Chronic idiopathic constipation    PVC's (premature ventricular contractions)    Post-COVID chronic concentration deficit    Hoarseness or changing voice    Postmenopausal status    Multiple joint pain    Other fatigue       FH:  Family History   Problem Relation Age of Onset    Heart Disease Mother     Diabetes Mother     Coronary Artery Disease Mother     Hypertension Mother     Anesthesia Reaction Mother         Anaphylaxis, oral opioixd    Asthma Mother     Thyroid Disease Mother     Low Back Problems Mother     Heart Disease Father     Diabetes Father     Coronary Artery Disease Father     Hypertension Father     Hyperlipidemia Father     Low Back Problems Father     Diabetes Sister     Hypertension Sister     Low Back Problems Sister     Thyroid Disease Sister     Hip fracture Sister     Low Back Problems Brother     Asthma Maternal Grandmother     Coronary Artery Disease Maternal Grandfather     Cerebrovascular Disease Paternal Grandfather     Asthma Daughter     Genetic Disease Daughter         Kallman syndrome; no olfactory bulb;    Calcium Disorder No family hx of        SH:  Social History     Socioeconomic History    Marital status:      Spouse name: Not on file    Number of children: Not on file    Years of education: Not on file    Highest education level: Not on file   Occupational History    Not on file   Tobacco Use    Smoking status: Never     Passive exposure: Past    Smokeless  tobacco: Never   Vaping Use    Vaping status: Never Used   Substance and Sexual Activity    Alcohol use: Yes     Alcohol/week: 0.0 standard drinks of alcohol     Comment: 0 -2 times a year    Drug use: No    Sexual activity: Yes     Partners: Male     Birth control/protection: Post-menopausal, Female Surgical   Other Topics Concern    Parent/sibling w/ CABG, MI or angioplasty before 65F 55M? Not Asked   Social History Narrative    Not on file     Social Determinants of Health     Financial Resource Strain: Low Risk  (2/28/2024)    Financial Resource Strain     Within the past 12 months, have you or your family members you live with been unable to get utilities (heat, electricity) when it was really needed?: No   Food Insecurity: Low Risk  (2/28/2024)    Food Insecurity     Within the past 12 months, did you worry that your food would run out before you got money to buy more?: No     Within the past 12 months, did the food you bought just not last and you didn t have money to get more?: No   Transportation Needs: Low Risk  (2/28/2024)    Transportation Needs     Within the past 12 months, has lack of transportation kept you from medical appointments, getting your medicines, non-medical meetings or appointments, work, or from getting things that you need?: No   Physical Activity: Insufficiently Active (2/28/2024)    Exercise Vital Sign     Days of Exercise per Week: 4 days     Minutes of Exercise per Session: 20 min   Stress: No Stress Concern Present (2/28/2024)    [employer] - Occupational Stress Questionnaire     Feeling of Stress : Not at all   Social Connections: Unknown (2/28/2024)    Social Connection and Isolation Panel [NHANES]     Frequency of Communication with Friends and Family: Not on file     Frequency of Social Gatherings with Friends and Family: Once a week     Attends Restorationist Services: Not on file     Active Member of Clubs or Organizations: Not on file     Attends  Club or Organization Meetings: Not on file     Marital Status: Not on file   Interpersonal Safety: Low Risk  (8/29/2024)    Interpersonal Safety     Do you feel physically and emotionally safe where you currently live?: Yes     Within the past 12 months, have you been hit, slapped, kicked or otherwise physically hurt by someone?: No     Within the past 12 months, have you been humiliated or emotionally abused in other ways by your partner or ex-partner?: No   Housing Stability: Low Risk  (2/28/2024)    Housing Stability     Do you have housing? : Yes     Are you worried about losing your housing?: No       MEDS:  See EMR, reviewed  ALL:  See EMR, reviewed    REVIEW OF SYSTEMS:  CONSTITUTIONAL:NEGATIVE for fever, chills, change in weight  INTEGUMENTARY/SKIN: NEGATIVE for worrisome rashes, moles or lesions  EYES: NEGATIVE for vision changes or irritation  ENT/MOUTH: NEGATIVE for ear, mouth and throat problems  RESP:NEGATIVE for significant cough or SOB  BREAST: NEGATIVE for masses, tenderness or discharge  CV: NEGATIVE for chest pain, palpitations or peripheral edema  GI: NEGATIVE for nausea, abdominal pain, heartburn, or change in bowel habits  :NEGATIVE for frequency, dysuria, or hematuria  :NEGATIVE for frequency, dysuria, or hematuria  NEURO: NEGATIVE for weakness, dizziness or paresthesias  ENDOCRINE: NEGATIVE for temperature intolerance, skin/hair changes  HEME/ALLERGY/IMMUNE: NEGATIVE for bleeding problems  PSYCHIATRIC: NEGATIVE for changes in mood or affect      Objective: She has adequate range of motion of the left hip to internal rotation external rotation and abduction.  Strength is normal in the left lower extremity.  Sensation is intact distally.    Assessment: Left-sided hip DJD    Plan: She plans on maximizing land based physical therapy.  She uses occasional ibuprofen.  We discussed the option of pool physical therapy, declined for now.  We discussed indications for intra-articular cortisone  injection for pain relief, declined for now.  She knows in the future that she could notify me and arrangements could be made for a left-sided intra-articular hip cortisone injection in the ultrasound clinic.  She had no further questions and will follow-up as needed.

## 2024-09-25 NOTE — LETTER
9/25/2024      RE: Jennifer Jane  [address]  [facility] [address]     Dear Colleague,    Thank you for referring your patient, Jennifer Jane, to the [facility] [place]. Please see a copy of my visit note below.    Follow-up MRI left hip and pelvis.  MRI showed no evidence of insufficiency fracture involving the hip and pelvis.  Mild bilateral hip osteoarthritis.  Mild bilateral hip hamstring tendinosis.  No tendon disruption.  No stress reaction of the bone.  No focal osseous lesion.    We discussed her MRI results in detail.  Patient notes that her pain is intermittent.  It is usually present when she first rises from a chair and starts to walk.  It is not always consistent.  There are days where she does not have discomfort at all.  When she notices that she will notice it localized to the anterior hip.  She is currently seeing physical therapy.      X-ray left hip 6/21/2024, 2 months ago, consistent with mild bilateral hip DJD, with faint lucency in the intertrochanteric region noted on x-ray        Patient history of lumbar spine left-sided hemilaminectomy 1984, Dr Spence at the [facility] (now [facility]) .  Patient has most recently followed with [facility] spine and physical medicine for spinal stenosis, most recent visit 1/18/2024 reviewed by me.  Patient at that time was dealing with low back pain and left-sided sciatica with numbness in the left lower leg and tingling on the outside of the foot.  At that time the patient was tolerating physical therapy poorly, and it was aggravating discomfort.  Patient was placed on gabapentin and referred for a left-sided L5-S1 transforaminal epidural steroid injection.  A bilateral lumbar transforaminal epidural steroid injection at L5-S1 was done in the pain clinic 1/31/2024        DEXA scan 10/10/2022 consistent with low bone density        Work: Retired, formerly worked as a school nurse & at [employer]              MR left hip without  contrast 9/19/2024 3:58 PM  MRI pelvis without contrast     Techniques: Multiplanar multisequence imaging of the left hip and  pelvis was obtained without  administration of intra-articular or  intravenous contrast using routine protocol.     History: Hip pain; Arthritis, known or r/o; Osteoarthritis; Fracture,  known or r/o; Hip x-ray result available; No known/automatically  detected potential contraindications to imaging; Hip pain, left;  Abnormal x-ray; Low bone density      Comparison: Radiograph's, CT abdomen and pelvis 9/16/2022 8/12/2024     Findings:     Osseous structures  Osseous structures: No fracture, stress reaction, avascular necrosis,  or focal osseous lesion is seen.     Articular cartilage and labrum  Assessment limited on this non-arthrographic study due to relative  lack of joint distension.     Bilateral SI joint degenerative change with sacrosciatic subchondral  cysts, left greater than right. No SI joint erosion or ankylosis.     Articular cartilage: Full-thickness fissuring of cartilage over the  anterior superior acetabulum with subchondral cystic change.     Labrum: Left-sided anterior superior labral tearing and degeneration  with superior laterally dissecting paralabral cyst measuring up to 2.2  cm.     Left ligament teres and transverse ligament of acetabulum: Intact.     Joint or bursal effusion     Joint effusion: A physiologic amount of joint fluid.     Bursal effusion: Minimal nonspecific edema over the greater  trochanter. No substantial iliopsoas or trochanteric bursal effusion.     Muscles and tendons  Muscles and tendons: Bilateral proximal hamstring tendinosis with  peritendinitis. The rectus femoris, sartorius, and iliopsoas tendons  intact. The hip abductors are intact. The visualized adductor muscles  are unremarkable.      Nerves:  The visualized courses of the sciatic nerves are unremarkable.     Other Findings:  None.                                                                       Impression:     1. No insufficiency fracture, as clinically questioned.     2. Mild bilateral hip osteoarthrosis. Left-sided labral tearing with  paralabral cyst measuring up to 2.2 cm.     3. Bilateral proximal hamstring tendinosis and peritendinitis.     SHIRLEY SOTOMAYOR MD (Joe)         PMH:  Past Medical History:   Diagnosis Date     Acute bilateral low back pain with bilateral sciatica      Acute pain of right knee     Pain for months, worse since helping friend move.  Exam consistent with meniscal tear.  Check xray today, depending on results MRI.  Ice, NSAIDS for now.  ACE.     Amaurosis fugax      Arthritis     Fibromyalgia, ? Osteoarthritis     Back injury     Micro discectomy, approx date 1983     Benign neoplasm of ascending colon      Chondromalacia of patella, right     Severe, tri-compartmental on MRI 10/2017.  Referred to ortho.     Chronic left-sided headache     Negative MRI and CT of head 2018 and 2016  Normal labs TSH, CBC, CMP, ESR 12/2019.  See plan below in pt instructions.     Diarrhea, unspecified type      Diverticular disease of large intestine      Encounter for Medicare annual wellness exam     Up to date on mammogram and colon cancer screening.  Is due for shingles vaccine - do at pharmacy.     Glaucoma      Glaucoma suspect      Hiatal hernia      HTN (hypertension)      Hypercalciuria      Infection due to 2019 novel coronavirus 02/10/2024    x3     Insomnia 12/26/2012     Problem list name updated by automated process. Provider to review     Low bone density 2022     Overweight (BMI 25.0-29.9)      Polyp of colon      Prediabetes      Primary hyperparathyroidism (H24)     mild; borderline hypercalciuria     Pseudophakia, both eyes      Reduced vision 09/2016    L vision block, bilateral elevated eye presures,poss. Glacom     Right foot pain      Sciatica     On and off - exacerbates when lifts heavy things  Bilaterally.     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, mild stage     Bilateral edema of lower extremity     Low bone density     Primary hyperparathyroidism (H24)     Hypercalciuria     Lumbar radiculopathy with sciatica     Neuroforaminal stenosis of lumbar spine     Chronic idiopathic constipation     PVC's (premature ventricular contractions)     Post-COVID chronic concentration deficit     Hoarseness or changing voice     Postmenopausal status     Multiple joint pain     Other fatigue       FH:  Family History   Problem Relation Age of Onset     Heart Disease Mother      Diabetes Mother      Coronary Artery Disease Mother      Hypertension Mother      Anesthesia Reaction Mother         Anaphylaxis, oral opioixd     Asthma Mother      Thyroid Disease Mother      Low Back Problems Mother      Heart Disease Father      Diabetes Father      Coronary Artery Disease Father      Hypertension Father      Hyperlipidemia Father      Low Back Problems Father      Diabetes Sister      Hypertension Sister      Low Back Problems Sister      Thyroid Disease Sister      Hip fracture Sister      Low Back Problems Brother      Asthma Maternal Grandmother      Coronary Artery Disease Maternal Grandfather      Cerebrovascular Disease Paternal Grandfather      Asthma Daughter      Genetic Disease Daughter         Kallman syndrome; no olfactory bulb;     Calcium Disorder No family hx of         SH:  Social History     Socioeconomic History     Marital status:      Spouse name: Not on file     Number of children: Not on file     Years of education: Not on file     Highest education level: Not on file   Occupational History     Not on file   Tobacco Use     Smoking status: Never     Passive exposure: Past     Smokeless tobacco: Never   Vaping Use     Vaping status: Never Used   Substance and Sexual Activity     Alcohol use: Yes     Alcohol/week: 0.0 standard drinks of alcohol     Comment: 0 -2 times a year     Drug use: No     Sexual activity: Yes     Partners: Male     Birth control/protection: Post-menopausal, Female Surgical   Other Topics Concern     Parent/sibling w/ CABG, MI or angioplasty before 65F 55M? Not Asked   Social History Narrative     Not on file     Social Determinants of Health     Financial Resource Strain: Low Risk  (2/28/2024)    Financial Resource Strain      Within the past 12 months, have you or your family members you live with been unable to get utilities (heat, electricity) when it was really needed?: No   Food Insecurity: Low Risk  (2/28/2024)    Food Insecurity      Within the past 12 months, did you worry that your food would run out before you got money to buy more?: No      Within the past 12 months, did the food you bought just not last and you didn t have money to get more?: No   Transportation Needs: Low Risk  (2/28/2024)    Transportation Needs      Within the past 12 months, has lack of transportation kept you from medical appointments, getting your medicines, non-medical meetings or appointments, work, or from getting things that you need?: No   Physical Activity: Insufficiently Active (2/28/2024)    Exercise Vital Sign      Days of Exercise per Week: 4 days      Minutes of Exercise per Session: 20 min   Stress: No Stress Concern Present (2/28/2024)    [employer] - Occupational Stress Questionnaire      Feeling of Stress : Not  at all   Social Connections: Unknown (2/28/2024)    Social Connection and Isolation Panel [NHANES]      Frequency of Communication with Friends and Family: Not on file      Frequency of Social Gatherings with Friends and Family: Once a week      Attends Protestant Services: Not on file      Active Member of Clubs or Organizations: Not on file      Attends Club or Organization Meetings: Not on file      Marital Status: Not on file   Interpersonal Safety: Low Risk  (8/29/2024)    Interpersonal Safety      Do you feel physically and emotionally safe where you currently live?: Yes      Within the past 12 months, have you been hit, slapped, kicked or otherwise physically hurt by someone?: No      Within the past 12 months, have you been humiliated or emotionally abused in other ways by your partner or ex-partner?: No   Housing Stability: Low Risk  (2/28/2024)    Housing Stability      Do you have housing? : Yes      Are you worried about losing your housing?: No       MEDS:  See EMR, reviewed  ALL:  See EMR, reviewed    REVIEW OF SYSTEMS:  CONSTITUTIONAL:NEGATIVE for fever, chills, change in weight  INTEGUMENTARY/SKIN: NEGATIVE for worrisome rashes, moles or lesions  EYES: NEGATIVE for vision changes or irritation  ENT/MOUTH: NEGATIVE for ear, mouth and throat problems  RESP:NEGATIVE for significant cough or SOB  BREAST: NEGATIVE for masses, tenderness or discharge  CV: NEGATIVE for chest pain, palpitations or peripheral edema  GI: NEGATIVE for nausea, abdominal pain, heartburn, or change in bowel habits  :NEGATIVE for frequency, dysuria, or hematuria  :NEGATIVE for frequency, dysuria, or hematuria  NEURO: NEGATIVE for weakness, dizziness or paresthesias  ENDOCRINE: NEGATIVE for temperature intolerance, skin/hair changes  HEME/ALLERGY/IMMUNE: NEGATIVE for bleeding problems  PSYCHIATRIC: NEGATIVE for changes in mood or affect      Objective: She has adequate range of motion of the left hip to internal rotation external  rotation and abduction.  Strength is normal in the left lower extremity.  Sensation is intact distally.    Assessment: Left-sided hip DJD    Plan: She plans on maximizing land based physical therapy.  She uses occasional ibuprofen.  We discussed the option of pool physical therapy, declined for now.  We discussed indications for intra-articular cortisone injection for pain relief, declined for now.  She knows in the future that she could notify me and arrangements could be made for a left-sided intra-articular hip cortisone injection in the ultrasound clinic.  She had no further questions and will follow-up as needed.                  Again, thank you for allowing me to participate in the care of your patient.      Sincerely,    Bright Yousif MD

## 2024-09-30 ENCOUNTER — VIRTUAL VISIT (OUTPATIENT)
Dept: PHYSICAL MEDICINE AND REHAB | Facility: CLINIC | Age: 74
End: 2024-09-30
Payer: COMMERCIAL

## 2024-09-30 VITALS — BODY MASS INDEX: 26.66 KG/M2 | WEIGHT: 160 LBS | HEIGHT: 65 IN

## 2024-09-30 DIAGNOSIS — R41.840 POST-COVID CHRONIC CONCENTRATION DEFICIT: Primary | ICD-10-CM

## 2024-09-30 DIAGNOSIS — Z81.8 FAMILY HISTORY OF DEMENTIA: ICD-10-CM

## 2024-09-30 DIAGNOSIS — M25.50 MULTIPLE JOINT PAIN: ICD-10-CM

## 2024-09-30 DIAGNOSIS — U09.9 LONG COVID: ICD-10-CM

## 2024-09-30 DIAGNOSIS — U09.9 POST-COVID CHRONIC CONCENTRATION DEFICIT: Primary | ICD-10-CM

## 2024-09-30 DIAGNOSIS — R53.83 OTHER FATIGUE: ICD-10-CM

## 2024-09-30 SDOH — SOCIAL STABILITY: SOCIAL NETWORK: I HAVE TROUBLE DOING ALL OF THE FAMILY ACTIVITIES THAT I WANT TO DO: RARELY

## 2024-09-30 SDOH — SOCIAL STABILITY: SOCIAL NETWORK: I HAVE TROUBLE DOING ALL OF MY USUAL WORK (INCLUDE WORK AT HOME): SOMETIMES

## 2024-09-30 SDOH — SOCIAL STABILITY: SOCIAL NETWORK

## 2024-09-30 SDOH — SOCIAL STABILITY: SOCIAL NETWORK: I HAVE TROUBLE DOING ALL OF THE ACTIVITIES WITH FRIENDS THAT I WANT TO DO: RARELY

## 2024-09-30 SDOH — SOCIAL STABILITY: SOCIAL NETWORK: I HAVE TROUBLE DOING ALL OF MY REGULAR LEISURE ACTIVITIES WITH OTHERS: SOMETIMES

## 2024-09-30 SDOH — SOCIAL STABILITY: SOCIAL NETWORK: PROMIS ABILITY TO PARTICIPATE IN SOCIAL ROLES & ACTIVITIES T-SCORE: 48

## 2024-09-30 ASSESSMENT — ANXIETY QUESTIONNAIRES
7. FEELING AFRAID AS IF SOMETHING AWFUL MIGHT HAPPEN: NOT AT ALL
GAD7 TOTAL SCORE: 0
5. BEING SO RESTLESS THAT IT IS HARD TO SIT STILL: NOT AT ALL
GAD7 TOTAL SCORE: 0
7. FEELING AFRAID AS IF SOMETHING AWFUL MIGHT HAPPEN: NOT AT ALL
GAD7 TOTAL SCORE: 0
6. BECOMING EASILY ANNOYED OR IRRITABLE: NOT AT ALL
3. WORRYING TOO MUCH ABOUT DIFFERENT THINGS: NOT AT ALL
4. TROUBLE RELAXING: NOT AT ALL
2. NOT BEING ABLE TO STOP OR CONTROL WORRYING: NOT AT ALL
8. IF YOU CHECKED OFF ANY PROBLEMS, HOW DIFFICULT HAVE THESE MADE IT FOR YOU TO DO YOUR WORK, TAKE CARE OF THINGS AT HOME, OR GET ALONG WITH OTHER PEOPLE?: NOT DIFFICULT AT ALL
1. FEELING NERVOUS, ANXIOUS, OR ON EDGE: NOT AT ALL
IF YOU CHECKED OFF ANY PROBLEMS ON THIS QUESTIONNAIRE, HOW DIFFICULT HAVE THESE PROBLEMS MADE IT FOR YOU TO DO YOUR WORK, TAKE CARE OF THINGS AT HOME, OR GET ALONG WITH OTHER PEOPLE: NOT DIFFICULT AT ALL

## 2024-09-30 ASSESSMENT — PATIENT HEALTH QUESTIONNAIRE - PHQ9
SUM OF ALL RESPONSES TO PHQ QUESTIONS 1-9: 4
10. IF YOU CHECKED OFF ANY PROBLEMS, HOW DIFFICULT HAVE THESE PROBLEMS MADE IT FOR YOU TO DO YOUR WORK, TAKE CARE OF THINGS AT HOME, OR GET ALONG WITH OTHER PEOPLE: SOMEWHAT DIFFICULT
SUM OF ALL RESPONSES TO PHQ QUESTIONS 1-9: 4

## 2024-09-30 ASSESSMENT — PAIN SCALES - GENERAL: PAINLEVEL: NO PAIN (0)

## 2024-09-30 NOTE — PATIENT INSTRUCTIONS
Post COVID Self Care Suggestions:     Fatigue Management:       https://www.archives-pmr.org/action/showPdf?ncj=-[email or web address]%[email or web address]       Self Care:      [email or web address]  Recovery World Health Organization:    https://apps.who.int/iris/Advebstream/handle/02805/756678/KHC-HUFN-5049-701-26938-70674-eng.pdf  Breathing exercises:    [email or web address]-[email or web address]/[email or web address]-recovery-[email or web address]-exercises      Assessment of sense of smell and taste    Smell training:  Flowery - Vangie  Fruity - Lemon  Spicy - Cloves  Resinous - Eucalyptus      1 - Pour a few droplets of one of the oils on to a cotton pad or ball  2 - Do not try to sniff the pad immediately; leave it for a few minutes for the fragrance to develop  3 - Hold the first stick/pad up to your nose, about an inch away.  The order in which you test the oils does not matter  4 - Relax and try to inhale naturally through the nose - sniffing too quickly and deeply is likely to result in you not being able to detect anything  5 - Try this a couple more times, then rest for five minutes  6 - Move on to the next oil and repeat as above.    After three months switch to a new set of odors: menthol, thyme, tangerine, and celeste, training with them twice daily.    After another three months, switch to a third new set of odors: green tea, bergamot, rosemary, and sanford, again training with them twice daily.    Studies:   [place] Paxlovid [place]  [email or web address]/research/paxlc-study/?[email or web address]    Cognitive Post-COVID study  [email or web address].[email or web address]/[email or web address]-ema    Supplements:  Fatigue- COQ10 100mg 3x day  ( side effects GI)  Brain fog-N-acetylcysteine 600 mg daily (side effects GI)

## 2024-09-30 NOTE — LETTER
9/30/2024       RE: Jennifer Jane  [address]  [facility] [address]     Dear Colleague,    Thank you for referring your patient, Jennifer Jane, to the [facility] PHYSICAL MEDICINE AND REHABILITATION CLINIC [place] at [facility]. Please see a copy of my visit note below.    Jennifer Jane is a 73 year old female who presents to be evaluated for a billable video visit.    Video-Visit Details    Video visit Start time: 9:31    Type of service:  Video Visit    Video End Time: 9:44 AM    Originating Location (pt. Location): Home    Distant Location (provider location):  Off- Site    Platform used for Video Visit: MobileRQ    Assessment/Impression   1. Post-COVID chronic concentration deficit/Family history of dementia  Patient with improving short-term memory loss and forgetfulness since COVID.  Encouraged to continue exercises for occupational therapy for fatigue and concentration.  Encouraged to continue N-acetylcysteine 600 mg for a 1 more month then decrease to every other day. Will plan to have her discontinuing prior to next meeting. Discussed if worsening concentration then to start NAC again.       2. Other fatigue/ Multiple Joint pain  Patient also reports chronic fatigue which is likely connected to patient's poor sleep due to her joint pain.  Encouraged to continue physical therapy.       3. Long COVID  Discussed COVID and Post COVID with patient.  Educational materials provided and all questions answered.  Encouraged patient to receive booster vaccine as there is some evidence this can help with long COVID symptoms.  - Adult Post Covid Clinic  Referral       Plan:  I reviewed present knowledge on long-Covid.  Education was provided and question were answered.  Orders/Referrals as above  I will advised patient on test results  I will follow up with Jennifer Jane in 4 months I will review progress and consider need for any other therapeutic  interventions. If there are any questions and/or concerns she will call the clinic.      On day of encounter time spent in chart review and with patient in consultation, exam, education,coordination of care,  review of outside charts/documentation and documentation:  25 minutes     I have attempted to proof read for major spelling errors and apologize for any minor errors I may have missed.     This note was dictated using voice recognition software. Any grammatical or context distortions are unintentional and inherent to the software.  _________________________________  Apoorva Nelson PA-C  [facility] PHYSICAL MEDICINE AND REHABILITATION CLINIC [facility]   Patient returns for a follow up. Briefly, this 73 year old female presents to the [facility] Rehabilitation Medicine Post-COVID clinic as a new consult on 7/25/24 to evaluate continuing symptoms after COVID infection most recently diagnosed 2/10/24.  Jennifer Jane presented to urgent care on 2/10/24 complaining of cough, fever, chills, headache, generalized aches and pains, fatigue, and weakness. Treatment was Paxlovid.  She also tested positive in nov 2022, and February 2023. Jennifer Jane experienced complications of cognitive decline.  Continuing symptoms include fatigue, generalized aches, brain fog, and distractibility.       Today, 09/30/23, patient feels she has improved with PT and OT.   She is doing hand exercises with PT. Patient does notice her short term memory loss and forgetfulness is improved.  She is taking the  mg and does not feel she has GI upset.  Patient is noticing some difficultly sleeping.  She was able to clean her room yesterday and still had energy.     Current concerns: Health Concerns        9/30/2024     9:16 AM   PHQ Assesment Total Score(s)   PHQ-9 Score 4           9/30/2024     9:18 AM   GÉNESIS-7 Results   GÉNESIS 7 TOTAL SCORE 0 (minimal anxiety)   GÉNESIS-7 Total Score 0          9/30/2024     9:19 AM   PTSD Screen Score   Have you ever experienced this kind of event? No         9/30/2024     9:24 AM   PROMIS-29   PROMIS Physical Function T-Score 43 (mild dysfunction)   PROMIS Anxiety T-Score 40 (within normal limits)   PROMIS Depression T-Score 54 (within normal limits)   PROMIS Fatigue T-Score 59 (mild)   PROMIS Sleep Disturbance T-Score Incomplete   PROMIS Ability to Participate in Social Roles & Activities T-Score 48 (within normal limits)   PROMIS Pain Interference T-Score 61 (moderate)   PROMIS Pain Intensity 3       Past Medical History:   Diagnosis Date     Acute bilateral low back pain with bilateral sciatica      Acute pain of right knee     Pain for months, worse since helping friend move.  Exam consistent with meniscal tear.  Check xray today, depending on results MRI.  Ice, NSAIDS for now.  ACE.     Amaurosis fugax      Arthritis     Fibromyalgia, ? Osteoarthritis     Back injury     Micro discectomy, approx date 1983     Benign neoplasm of ascending colon      Chondromalacia of patella, right     Severe, tri-compartmental on MRI 10/2017.  Referred to ortho.     Chronic left-sided headache     Negative MRI and CT of head 2018 and 2016  Normal labs TSH, CBC, CMP, ESR 12/2019.  See plan below in pt instructions.     Diarrhea, unspecified type      Diverticular disease of large intestine      Encounter for Medicare annual wellness exam     Up to date on mammogram and colon cancer screening.  Is due for shingles vaccine - do at pharmacy.     Glaucoma      Glaucoma suspect      Hiatal hernia      HTN (hypertension)      Hypercalciuria      Infection due to 2019 novel coronavirus 02/10/2024    x3     Insomnia 12/26/2012     Problem list name updated by automated process. Provider to review     Low bone density 2022     Overweight (BMI 25.0-29.9)      Polyp of colon      Prediabetes      Primary hyperparathyroidism (H)     mild; borderline hypercalciuria     Pseudophakia, both eyes       Reduced vision 09/2016    L vision block, bilateral elevated eye presures,poss. Glacom     Right foot pain      Sciatica     On and off - exacerbates when lifts heavy things  Bilaterally.     Spinal stenosis of lumbar region without neurogenic claudication      Uncomplicated asthma     Diagnosed as 2 yo     Weakness of left lower extremity        Past Surgical History:   Procedure Laterality Date      cataract extraction with intraocular lens implant Right 01/31/2019     BACK SURGERY  1984    micro-disc-ectomy     BREAST SURGERY  1984    L Breast bx     cataract extraction with intraocular lens Left 12/06/2018     CHOLECYSTECTOMY  1990     INJECT EPIDURAL TRANSFORAMINAL Bilateral 1/31/2024    Procedure: Bilateral L5-S1 Transforaminal epidural steroid injection;  Surgeon: Sarahy Tsang MD;  Location: [facility] OR     IRIDOTOMY/IRIDECTOMY LASER SURGERY Left 09/27/2016     IRIDOTOMY/IRIDECTOMY LASER SURGERY Right 10/18/2016     TUBAL LIGATION  1989     ZZC STOMACH SURGERY PROCEDURE UNLISTED  03/1990    Cholecystectomy, 11/1989 tubal ligation       Family History   Problem Relation Age of Onset     Heart Disease Mother      Diabetes Mother      Coronary Artery Disease Mother      Hypertension Mother      Anesthesia Reaction Mother         Anaphylaxis, oral opioixd     Asthma Mother      Thyroid Disease Mother      Low Back Problems Mother      Heart Disease Father      Diabetes Father      Coronary Artery Disease Father      Hypertension Father      Hyperlipidemia Father      Low Back Problems Father      Diabetes Sister      Hypertension Sister      Low Back Problems Sister      Thyroid Disease Sister      Hip fracture Sister      Low Back Problems Brother      Asthma Maternal Grandmother      Coronary Artery Disease Maternal Grandfather      Cerebrovascular Disease Paternal Grandfather      Asthma Daughter      Genetic Disease Daughter         Kallman syndrome; no olfactory bulb;     Calcium Disorder No family hx of         Social History     Tobacco Use     Smoking status: Never     Passive exposure: Past     Smokeless tobacco: Never   Vaping Use     Vaping status: Never Used   Substance Use Topics     Alcohol use: Yes     Alcohol/week: 0.0 standard drinks of alcohol     Comment: 0 -2 times a year     Drug use: No         Current Outpatient Medications:      acetaminophen (TYLENOL) 325 MG tablet, Take 325-650 mg by mouth every 6 hours as needed for mild pain, Disp: , Rfl:      acetylcysteine (N-ACETYL CYSTEINE) 600 MG CAPS capsule, Take 1 capsule (600 mg) by mouth daily, Disp: 90 capsule, Rfl: 4     ADVAIR -21 MCG/ACT inhaler, Inhale 2 puffs into the lungs 2 times daily, Disp: , Rfl:      albuterol (VENTOLIN HFA) 108 (90 Base) MCG/ACT inhaler, Inhale 2 puffs into the lungs every 6 hours as needed for shortness of breath, Disp: 8.5 g, Rfl: 5     alendronate (FOSAMAX) 70 MG tablet, Take 1 tablet (70 mg) by mouth every 7 days Take 60 minutes before am meal with 8 oz. water. Remain upright for 30 minutes., Disp: 12 tablet, Rfl: 3     amLODIPine (NORVASC) 5 MG tablet, Take 1 tablet (5 mg) by mouth at bedtime, Disp: 90 tablet, Rfl: 4     atorvastatin (LIPITOR) 20 MG tablet, Take 1 tablet (20 mg) by mouth daily, Disp: 90 tablet, Rfl: 4     azelastine (ASTELIN) 0.1 % nasal spray, Spray 1 spray into both nostrils 2 times daily, Disp: , Rfl:      brimonidine (ALPHAGAN-P) 0.15 % ophthalmic solution, PLACE 1 DROP INTO BOTH EYES TWO TIMES A DAY., Disp: , Rfl:      Calcium Carbonate Antacid (TUMS PO), Take  by mouth At Bedtime., Disp: , Rfl:      co-enzyme Q-10 100 MG CAPS capsule, Take 1 capsule (100 mg) by mouth daily, Disp: 90 capsule, Rfl: 4     fexofenadine (ALLEGRA) 180 MG tablet, Take 180 mg by mouth daily, Disp: , Rfl:      ipratropium (ATROVENT) 0.03 % nasal spray, Spray 2 sprays into both nostrils 2 times daily, Disp: 30 mL, Rfl: 6     latanoprost (XALATAN) 0.005 % ophthalmic solution, Place 1 drop into the right eye At  Bedtime, Disp: , Rfl:      losartan (COZAAR) 50 MG tablet, Take 1 tablet (50 mg) by mouth 2 times daily, Disp: 180 tablet, Rfl: 4     montelukast (SINGULAIR) 10 MG tablet, TAKE ONE TABLET BY MOUTH EVERY DAY AS DIRECTED, Disp: , Rfl: 1     SPIRIVA RESPIMAT 1.25 MCG/ACT inhaler, INHALE 2 PUFFS ONCE PER DAY, Disp: , Rfl:      traZODone (DESYREL) 50 MG tablet, Take 1-3 tablets ( mg) by mouth at bedtime, Disp: 130 tablet, Rfl: 4     Vitamin D3 (CHOLECALCIFEROL) 25 mcg (1000 units) tablet, Take by mouth daily, Disp: , Rfl:     Review of Systems   Constitutional, HEENT, cardiovascular, pulmonary, gi and gu systems are negative, except as otherwise noted.      Objective         Vitals:  No vitals were obtained today due to virtual visit.    Physical Exam   EYES: Eyes grossly normal to inspection.  No discharge or erythema, or obvious scleral/conjunctival abnormalities.  SKIN: Visible skin clear. No significant rash, abnormal pigmentation or lesions.  NEURO: Cranial nerves grossly intact.  Mentation and speech appropriate for age.  GENERAL: Healthy, alert and no distress  RESP: No audible wheeze, cough, or visible cyanosis.  No visible retractions or increased work of breathing.    PSYCH: Mentation appears normal, affect normal/bright, judgement and insight intact, normal speech and appearance well-groomed.    Labs : No new labs    Imaging:    I personally reviewed the following imaging results today and those on care everywhere, if indicated     MR Left Hip wo contrast  9/19/24  Impression:     1. No insufficiency fracture, as clinically questioned.     2. Mild bilateral hip osteoarthrosis. Left-sided labral tearing with  paralabral cyst measuring up to 2.2 cm.     3. Bilateral proximal hamstring tendinosis and peritendinitis.     SHIRLEY SOTOMAYOR MD (Joe)     MR Pelvic  bones wo contrast 9/19/24  Impression:     1. No insufficiency fracture, as clinically questioned.     2. Mild bilateral hip osteoarthrosis.  Left-sided labral tearing with  paralabral cyst measuring up to 2.2 cm.     3. Bilateral proximal hamstring tendinosis and peritendinitis.     SHIRLEY SOTOMAYOR MD (Joe)     Reviewed imaging from [facility] sites     Medical Records Reviewed:    Reviewed consults/documents from [facility] including  Endocrinology, OT, PT, Ophthalmology, Sports Medicine and GI       Again, thank you for allowing me to participate in the care of your patient.      Sincerely,    Apoorva Nelson PA-C

## 2024-09-30 NOTE — NURSING NOTE
Current patient location: [address] [place], [place] [address]E [facility] [address]    Is the patient currently in the state of [place]? YES    Visit mode:VIDEO    If the visit is dropped, the patient can be reconnected by: VIDEO VISIT: Text to cell phone:   Telephone Information:   Mobile [phone]       Will anyone else be joining the visit? NO  (If patient encounters technical issues they should call [phone]    How would you like to obtain your AVS? MyChart    Are changes needed to the allergy or medication list? No    Are refills needed on medications prescribed by this physician? NO    Rooming Documentation:  Questionnaire(s) completed    Reason for visit: Follow Up    Tiana Mccallum MA

## 2024-09-30 NOTE — PROGRESS NOTES
Jennifer Jane is a 73 year old female who presents to be evaluated for a billable video visit.    Video-Visit Details    Video visit Start time: 9:31    Type of service:  Video Visit    Video End Time: 9:44 AM    Originating Location (pt. Location): Home    Distant Location (provider location):  Off- Site    Platform used for Video Visit: [email or web address]    Assessment/Impression   1. Post-COVID chronic concentration deficit/Family history of dementia  Patient with improving short-term memory loss and forgetfulness since COVID.  Encouraged to continue exercises for occupational therapy for fatigue and concentration.  Encouraged to continue N-acetylcysteine 600 mg for a 1 more month then decrease to every other day. Will plan to have her discontinuing prior to next meeting. Discussed if worsening concentration then to start NAC again.       2. Other fatigue/ Multiple Joint pain  Patient also reports chronic fatigue which is likely connected to patient's poor sleep due to her joint pain.  Encouraged to continue physical therapy.       3. Long COVID  Discussed COVID and Post COVID with patient.  Educational materials provided and all questions answered.  Encouraged patient to receive booster vaccine as there is some evidence this can help with long COVID symptoms.  - Adult Post Covid Clinic  Referral       Plan:  I reviewed present knowledge on long-Covid.  Education was provided and question were answered.  Orders/Referrals as above  I will advised patient on test results  I will follow up with Jennifer Jane in 4 months I will review progress and consider need for any other therapeutic interventions. If there are any questions and/or concerns she will call the clinic.      On day of encounter time spent in chart review and with patient in consultation, exam, education,coordination of care,  review of outside charts/documentation and documentation:  25 minutes     I have attempted to proof read for major spelling errors  and apologize for any minor errors I may have missed.     This note was dictated using voice recognition software. Any grammatical or context distortions are unintentional and inherent to the software.  _________________________________  Apoorva Nelson PA-C  [facility] PHYSICAL MEDICINE AND REHABILITATION CLINIC [facility]   Patient returns for a follow up. Briefly, this 73 year old female presents to the [facility] Medicine Post-COVID clinic as a new consult on 7/25/24 to evaluate continuing symptoms after COVID infection most recently diagnosed 2/10/24.  Jennifer Jane presented to urgent care on 2/10/24 complaining of cough, fever, chills, headache, generalized aches and pains, fatigue, and weakness. Treatment was Paxlovid.  She also tested positive in nov 2022, and February 2023. Jennifer Jane experienced complications of cognitive decline.  Continuing symptoms include fatigue, generalized aches, brain fog, and distractibility.       Today, 09/30/23, patient feels she has improved with PT and OT.   She is doing hand exercises with PT. Patient does notice her short term memory loss and forgetfulness is improved.  She is taking the  mg and does not feel she has GI upset.  Patient is noticing some difficultly sleeping.  She was able to clean her room yesterday and still had energy.     Current concerns: Health Concerns        9/30/2024     9:16 AM   PHQ Assesment Total Score(s)   PHQ-9 Score 4           9/30/2024     9:18 AM   GÉNESIS-7 Results   GÉNESIS 7 TOTAL SCORE 0 (minimal anxiety)   GÉNESIS-7 Total Score 0         9/30/2024     9:19 AM   PTSD Screen Score   Have you ever experienced this kind of event? No         9/30/2024     9:24 AM   PROMIS-29   PROMIS Physical Function T-Score 43 (mild dysfunction)   PROMIS Anxiety T-Score 40 (within normal limits)   PROMIS Depression T-Score 54 (within normal limits)   PROMIS Fatigue T-Score 59 (mild)   PROMIS Sleep  Disturbance T-Score Incomplete   PROMIS Ability to Participate in Social Roles & Activities T-Score 48 (within normal limits)   PROMIS Pain Interference T-Score 61 (moderate)   PROMIS Pain Intensity 3       Past Medical History:   Diagnosis Date    Acute bilateral low back pain with bilateral sciatica     Acute pain of right knee     Pain for months, worse since helping friend move.  Exam consistent with meniscal tear.  Check xray today, depending on results MRI.  Ice, NSAIDS for now.  ACE.    Amaurosis fugax     Arthritis     Fibromyalgia, ? Osteoarthritis    Back injury     Micro discectomy, approx date 1983    Benign neoplasm of ascending colon     Chondromalacia of patella, right     Severe, tri-compartmental on MRI 10/2017.  Referred to ortho.    Chronic left-sided headache     Negative MRI and CT of head 2018 and 2016  Normal labs TSH, CBC, CMP, ESR 12/2019.  See plan below in pt instructions.    Diarrhea, unspecified type     Diverticular disease of large intestine     Encounter for Medicare annual wellness exam     Up to date on mammogram and colon cancer screening.  Is due for shingles vaccine - do at pharmacy.    Glaucoma     Glaucoma suspect     Hiatal hernia     HTN (hypertension)     Hypercalciuria     Infection due to 2019 novel coronavirus 02/10/2024    x3    Insomnia 12/26/2012     Problem list name updated by automated process. Provider to review    Low bone density 2022    Overweight (BMI 25.0-29.9)     Polyp of colon     Prediabetes     Primary hyperparathyroidism (H)     mild; borderline hypercalciuria    Pseudophakia, both eyes     Reduced vision 09/2016    L vision block, bilateral elevated eye presures,poss. Glacom    Right foot pain     Sciatica     On and off - exacerbates when lifts heavy things  Bilaterally.    Spinal stenosis of lumbar region without neurogenic claudication     Uncomplicated asthma     Diagnosed as 2 yo    Weakness of left lower extremity        Past Surgical History:    Procedure Laterality Date     cataract extraction with intraocular lens implant Right 01/31/2019    BACK SURGERY  1984    micro-disc-ectomy    BREAST SURGERY  1984    L Breast bx    cataract extraction with intraocular lens Left 12/06/2018    CHOLECYSTECTOMY  1990    INJECT EPIDURAL TRANSFORAMINAL Bilateral 1/31/2024    Procedure: Bilateral L5-S1 Transforaminal epidural steroid injection;  Surgeon: Sarahy Tsang MD;  Location: [facility] OR    IRIDOTOMY/IRIDECTOMY LASER SURGERY Left 09/27/2016    IRIDOTOMY/IRIDECTOMY LASER SURGERY Right 10/18/2016    TUBAL LIGATION  1989    ZZC STOMACH SURGERY PROCEDURE UNLISTED  03/1990    Cholecystectomy, 11/1989 tubal ligation       Family History   Problem Relation Age of Onset    Heart Disease Mother     Diabetes Mother     Coronary Artery Disease Mother     Hypertension Mother     Anesthesia Reaction Mother         Anaphylaxis, oral opioixd    Asthma Mother     Thyroid Disease Mother     Low Back Problems Mother     Heart Disease Father     Diabetes Father     Coronary Artery Disease Father     Hypertension Father     Hyperlipidemia Father     Low Back Problems Father     Diabetes Sister     Hypertension Sister     Low Back Problems Sister     Thyroid Disease Sister     Hip fracture Sister     Low Back Problems Brother     Asthma Maternal Grandmother     Coronary Artery Disease Maternal Grandfather     Cerebrovascular Disease Paternal Grandfather     Asthma Daughter     Genetic Disease Daughter         Kallman syndrome; no olfactory bulb;    Calcium Disorder No family hx of        Social History     Tobacco Use    Smoking status: Never     Passive exposure: Past    Smokeless tobacco: Never   Vaping Use    Vaping status: Never Used   Substance Use Topics    Alcohol use: Yes     Alcohol/week: 0.0 standard drinks of alcohol     Comment: 0 -2 times a year    Drug use: No         Current Outpatient Medications:     acetaminophen (TYLENOL) 325 MG tablet, Take 325-650 mg by mouth  every 6 hours as needed for mild pain, Disp: , Rfl:     acetylcysteine (N-ACETYL CYSTEINE) 600 MG CAPS capsule, Take 1 capsule (600 mg) by mouth daily, Disp: 90 capsule, Rfl: 4    ADVAIR -21 MCG/ACT inhaler, Inhale 2 puffs into the lungs 2 times daily, Disp: , Rfl:     albuterol (VENTOLIN HFA) 108 (90 Base) MCG/ACT inhaler, Inhale 2 puffs into the lungs every 6 hours as needed for shortness of breath, Disp: 8.5 g, Rfl: 5    alendronate (FOSAMAX) 70 MG tablet, Take 1 tablet (70 mg) by mouth every 7 days Take 60 minutes before am meal with 8 oz. water. Remain upright for 30 minutes., Disp: 12 tablet, Rfl: 3    amLODIPine (NORVASC) 5 MG tablet, Take 1 tablet (5 mg) by mouth at bedtime, Disp: 90 tablet, Rfl: 4    atorvastatin (LIPITOR) 20 MG tablet, Take 1 tablet (20 mg) by mouth daily, Disp: 90 tablet, Rfl: 4    azelastine (ASTELIN) 0.1 % nasal spray, Spray 1 spray into both nostrils 2 times daily, Disp: , Rfl:     brimonidine (ALPHAGAN-P) 0.15 % ophthalmic solution, PLACE 1 DROP INTO BOTH EYES TWO TIMES A DAY., Disp: , Rfl:     Calcium Carbonate Antacid (TUMS PO), Take  by mouth At Bedtime., Disp: , Rfl:     co-enzyme Q-10 100 MG CAPS capsule, Take 1 capsule (100 mg) by mouth daily, Disp: 90 capsule, Rfl: 4    fexofenadine (ALLEGRA) 180 MG tablet, Take 180 mg by mouth daily, Disp: , Rfl:     ipratropium (ATROVENT) 0.03 % nasal spray, Spray 2 sprays into both nostrils 2 times daily, Disp: 30 mL, Rfl: 6    latanoprost (XALATAN) 0.005 % ophthalmic solution, Place 1 drop into the right eye At Bedtime, Disp: , Rfl:     losartan (COZAAR) 50 MG tablet, Take 1 tablet (50 mg) by mouth 2 times daily, Disp: 180 tablet, Rfl: 4    montelukast (SINGULAIR) 10 MG tablet, TAKE ONE TABLET BY MOUTH EVERY DAY AS DIRECTED, Disp: , Rfl: 1    SPIRIVA RESPIMAT 1.25 MCG/ACT inhaler, INHALE 2 PUFFS ONCE PER DAY, Disp: , Rfl:     traZODone (DESYREL) 50 MG tablet, Take 1-3 tablets ( mg) by mouth at bedtime, Disp: 130 tablet, Rfl:  4    Vitamin D3 (CHOLECALCIFEROL) 25 mcg (1000 units) tablet, Take by mouth daily, Disp: , Rfl:     Review of Systems   Constitutional, HEENT, cardiovascular, pulmonary, gi and gu systems are negative, except as otherwise noted.      Objective         Vitals:  No vitals were obtained today due to virtual visit.    Physical Exam   EYES: Eyes grossly normal to inspection.  No discharge or erythema, or obvious scleral/conjunctival abnormalities.  SKIN: Visible skin clear. No significant rash, abnormal pigmentation or lesions.  NEURO: Cranial nerves grossly intact.  Mentation and speech appropriate for age.  GENERAL: Healthy, alert and no distress  RESP: No audible wheeze, cough, or visible cyanosis.  No visible retractions or increased work of breathing.    PSYCH: Mentation appears normal, affect normal/bright, judgement and insight intact, normal speech and appearance well-groomed.    Labs : No new labs    Imaging:    I personally reviewed the following imaging results today and those on care everywhere, if indicated     MR Left Hip wo contrast  9/19/24  Impression:     1. No insufficiency fracture, as clinically questioned.     2. Mild bilateral hip osteoarthrosis. Left-sided labral tearing with  paralabral cyst measuring up to 2.2 cm.     3. Bilateral proximal hamstring tendinosis and peritendinitis.     SHIRLEY SOTOMAYOR MD (Joe)     MR Pelvic  bones wo contrast 9/19/24  Impression:     1. No insufficiency fracture, as clinically questioned.     2. Mild bilateral hip osteoarthrosis. Left-sided labral tearing with  paralabral cyst measuring up to 2.2 cm.     3. Bilateral proximal hamstring tendinosis and peritendinitis.     SHIRLEY SOTOMAYOR MD (Joe)     Reviewed imaging from [facility] sites     Medical Records Reviewed:    Reviewed consults/documents from [facility] including  Endocrinology, OT, PT, Ophthalmology, Sports Medicine and GI

## 2024-10-03 ENCOUNTER — TRANSFERRED RECORDS (OUTPATIENT)
Dept: HEALTH INFORMATION MANAGEMENT | Facility: CLINIC | Age: 74
End: 2024-10-03
Payer: COMMERCIAL

## 2024-10-04 ENCOUNTER — OFFICE VISIT (OUTPATIENT)
Dept: FAMILY MEDICINE | Facility: CLINIC | Age: 74
End: 2024-10-04
Payer: COMMERCIAL

## 2024-10-04 VITALS
DIASTOLIC BLOOD PRESSURE: 77 MMHG | HEART RATE: 68 BPM | HEIGHT: 65 IN | RESPIRATION RATE: 16 BRPM | BODY MASS INDEX: 27.26 KG/M2 | TEMPERATURE: 98 F | OXYGEN SATURATION: 97 % | SYSTOLIC BLOOD PRESSURE: 120 MMHG | WEIGHT: 163.6 LBS

## 2024-10-04 DIAGNOSIS — H00.015 HORDEOLUM EXTERNUM OF LEFT LOWER EYELID: ICD-10-CM

## 2024-10-04 DIAGNOSIS — H44.001 INFECTION OF RIGHT EYE: Primary | ICD-10-CM

## 2024-10-04 PROCEDURE — G2211 COMPLEX E/M VISIT ADD ON: HCPCS | Performed by: STUDENT IN AN ORGANIZED HEALTH CARE EDUCATION/TRAINING PROGRAM

## 2024-10-04 PROCEDURE — 99214 OFFICE O/P EST MOD 30 MIN: CPT | Performed by: STUDENT IN AN ORGANIZED HEALTH CARE EDUCATION/TRAINING PROGRAM

## 2024-10-04 ASSESSMENT — ASTHMA QUESTIONNAIRES
ACT_TOTALSCORE: 19
QUESTION_5 LAST FOUR WEEKS HOW WOULD YOU RATE YOUR ASTHMA CONTROL: SOMEWHAT CONTROLLED
QUESTION_1 LAST FOUR WEEKS HOW MUCH OF THE TIME DID YOUR ASTHMA KEEP YOU FROM GETTING AS MUCH DONE AT WORK, SCHOOL OR AT HOME: A LITTLE OF THE TIME
QUESTION_3 LAST FOUR WEEKS HOW OFTEN DID YOUR ASTHMA SYMPTOMS (WHEEZING, COUGHING, SHORTNESS OF BREATH, CHEST TIGHTNESS OR PAIN) WAKE YOU UP AT NIGHT OR EARLIER THAN USUAL IN THE MORNING: ONCE OR TWICE
QUESTION_4 LAST FOUR WEEKS HOW OFTEN HAVE YOU USED YOUR RESCUE INHALER OR NEBULIZER MEDICATION (SUCH AS ALBUTEROL): TWO OR THREE TIMES PER WEEK
ACT_TOTALSCORE: 19
QUESTION_2 LAST FOUR WEEKS HOW OFTEN HAVE YOU HAD SHORTNESS OF BREATH: NOT AT ALL

## 2024-10-04 ASSESSMENT — ENCOUNTER SYMPTOMS: EYE PAIN: 1

## 2024-10-04 NOTE — PROGRESS NOTES
Assessment & Plan     (H44.001) Infection of right eye  (primary encounter diagnosis)  Comment: Acute, uncontrolled. History of glaucoma, use of topical  medications- no improvement in symptoms. Previous history of use of antibiotics- due to introduction of a foreign object will treat with augmentin. Pt encouraged to call eye specialists for further evaluation  Plan: amoxicillin-clavulanate (AUGMENTIN) 875-125 MG         tablet            (H00.015) Hordeolum externum of left lower eyelid  Comment: Acute, uncontrolled. Pt provided topical ointment by eye care specialists  Plan: amoxicillin-clavulanate (AUGMENTIN) 875-125 MG         tablet                    Dictation Disclaimer: Some of this Note has been completed with voice-recognition dictation software. Although errors are generally corrected real-time, there is the potential for a rare error to be present in the completed chart.     The longitudinal plan of care for the diagnosis(es)/condition(s) as documented were addressed during this visit. Due to the added complexity in care, I will continue to support Fidelina in the subsequent management and with ongoing continuity of care.      Henrry Kitchen is a 73 year old, presenting for the following health issues:  Eye Problem    History of Present Illness       Reason for visit:  Eye pain  Symptom onset:  1-3 days ago  Symptoms include:  Eye pain and injury to eyes  Symptom intensity:  Moderate  Symptom progression:  Staying the same  Had these symptoms before:  No  What makes it worse:  No  What makes it better:  No   She is taking medications regularly.   Pt reports having glaucoma and sees an eye specialists. She reports being on medications that causes her eye lashes to grow into your eyes and she plucks her eyelashes out. She reports on 9/30 she was plucking her eyelashes out and ended up puncturing her eye  She reports normally having an oral antibiotic which have been helpful in the past                ROS:  "10 point ROS neg other than the symptoms noted above in the HPI.        Objective    /77   Pulse 68   Temp 98  F (36.7  C) (Temporal)   Resp 16   Ht 1.65 m (5' 4.96\")   Wt 74.2 kg (163 lb 9.6 oz)   LMP  (LMP Unknown)   SpO2 97%   BMI 27.26 kg/m    Body mass index is 27.26 kg/m .  Physical Exam   GENERAL: alert and no distress  EYES: right eye: lower eyelid inner- swollen, erythematous, left eye: stye in lower eyelid   MS: no gross musculoskeletal defects noted, no edema  PSYCH: mentation appears normal, affect normal/bright            Signed Electronically by: BETTY OLSON MD    "

## 2024-10-09 NOTE — TELEPHONE ENCOUNTER
FUTURE VISIT INFORMATION      FUTURE VISIT INFORMATION:  Date: 12/30/24  Time: 1:40 PM  Location: [facility] - ENT  REFERRAL INFORMATION:  Referring provider:  Indigo Eid MD  Referring providers clinic:  [facility] ENDO DIABETES,  Reason for visit/diagnosis:  New per pt-ref, Primary hyperparathyroidism (H24), hyperparathyoridism, mild; request Dr Regan, ref'd by Indigo Eid MD in [facility] DI[facility]ETES, ref-recs in epic, confirmed [facility]     RECORDS REQUESTED FROM      Clinic name Comments Records Status Imaging Status   [facility] ENDO DIAB[facility]ES, 8/28/24 referral/ OV- Indigo Eid MD [facility]    [facility] Imaging 7/12/23 US head neck  11/7/22 NM Parathyroid  Epic PACS

## 2024-10-22 ENCOUNTER — THERAPY VISIT (OUTPATIENT)
Dept: PHYSICAL THERAPY | Facility: CLINIC | Age: 74
End: 2024-10-22
Payer: COMMERCIAL

## 2024-10-22 DIAGNOSIS — M25.50 MULTIPLE JOINT PAIN: Primary | ICD-10-CM

## 2024-10-22 PROCEDURE — 97110 THERAPEUTIC EXERCISES: CPT | Mod: GP

## 2024-10-28 ENCOUNTER — OFFICE VISIT (OUTPATIENT)
Dept: URGENT CARE | Facility: URGENT CARE | Age: 74
End: 2024-10-28
Payer: COMMERCIAL

## 2024-10-28 ENCOUNTER — NURSE TRIAGE (OUTPATIENT)
Dept: FAMILY MEDICINE | Facility: CLINIC | Age: 74
End: 2024-10-28

## 2024-10-28 VITALS
BODY MASS INDEX: 26.66 KG/M2 | SYSTOLIC BLOOD PRESSURE: 120 MMHG | HEIGHT: 65 IN | TEMPERATURE: 97.3 F | HEART RATE: 79 BPM | OXYGEN SATURATION: 96 % | WEIGHT: 160 LBS | RESPIRATION RATE: 16 BRPM | DIASTOLIC BLOOD PRESSURE: 56 MMHG

## 2024-10-28 DIAGNOSIS — M26.609 TMJ (TEMPOROMANDIBULAR JOINT SYNDROME): Primary | ICD-10-CM

## 2024-10-28 PROCEDURE — 99213 OFFICE O/P EST LOW 20 MIN: CPT | Performed by: PHYSICIAN ASSISTANT

## 2024-10-28 RX ORDER — MOXIFLOXACIN 5 MG/ML
1 SOLUTION/ DROPS OPHTHALMIC 4 TIMES DAILY
COMMUNITY
Start: 2024-10-02

## 2024-10-28 SDOH — SOCIAL STABILITY: SOCIAL NETWORK: I HAVE TROUBLE DOING ALL OF THE FAMILY ACTIVITIES THAT I WANT TO DO: SOMETIMES

## 2024-10-28 SDOH — SOCIAL STABILITY: SOCIAL NETWORK

## 2024-10-28 SDOH — SOCIAL STABILITY: SOCIAL NETWORK: PROMIS ABILITY TO PARTICIPATE IN SOCIAL ROLES & ACTIVITIES T-SCORE: 42

## 2024-10-28 SDOH — SOCIAL STABILITY: SOCIAL NETWORK: I HAVE TROUBLE DOING ALL OF MY USUAL WORK (INCLUDE WORK AT HOME): USUALLY

## 2024-10-28 SDOH — SOCIAL STABILITY: SOCIAL NETWORK: I HAVE TROUBLE DOING ALL OF THE ACTIVITIES WITH FRIENDS THAT I WANT TO DO: SOMETIMES

## 2024-10-28 SDOH — SOCIAL STABILITY: SOCIAL NETWORK: I HAVE TROUBLE DOING ALL OF MY REGULAR LEISURE ACTIVITIES WITH OTHERS: USUALLY

## 2024-10-28 ASSESSMENT — PATIENT HEALTH QUESTIONNAIRE - PHQ9
SUM OF ALL RESPONSES TO PHQ QUESTIONS 1-9: 5
SUM OF ALL RESPONSES TO PHQ QUESTIONS 1-9: 5
10. IF YOU CHECKED OFF ANY PROBLEMS, HOW DIFFICULT HAVE THESE PROBLEMS MADE IT FOR YOU TO DO YOUR WORK, TAKE CARE OF THINGS AT HOME, OR GET ALONG WITH OTHER PEOPLE: EXTREMELY DIFFICULT

## 2024-10-28 ASSESSMENT — ANXIETY QUESTIONNAIRES
7. FEELING AFRAID AS IF SOMETHING AWFUL MIGHT HAPPEN: NOT AT ALL
4. TROUBLE RELAXING: NOT AT ALL
GAD7 TOTAL SCORE: 1
3. WORRYING TOO MUCH ABOUT DIFFERENT THINGS: SEVERAL DAYS
GAD7 TOTAL SCORE: 1
6. BECOMING EASILY ANNOYED OR IRRITABLE: NOT AT ALL
5. BEING SO RESTLESS THAT IT IS HARD TO SIT STILL: NOT AT ALL
1. FEELING NERVOUS, ANXIOUS, OR ON EDGE: NOT AT ALL
IF YOU CHECKED OFF ANY PROBLEMS ON THIS QUESTIONNAIRE, HOW DIFFICULT HAVE THESE PROBLEMS MADE IT FOR YOU TO DO YOUR WORK, TAKE CARE OF THINGS AT HOME, OR GET ALONG WITH OTHER PEOPLE: VERY DIFFICULT
7. FEELING AFRAID AS IF SOMETHING AWFUL MIGHT HAPPEN: NOT AT ALL
GAD7 TOTAL SCORE: 1
2. NOT BEING ABLE TO STOP OR CONTROL WORRYING: NOT AT ALL
8. IF YOU CHECKED OFF ANY PROBLEMS, HOW DIFFICULT HAVE THESE MADE IT FOR YOU TO DO YOUR WORK, TAKE CARE OF THINGS AT HOME, OR GET ALONG WITH OTHER PEOPLE?: VERY DIFFICULT

## 2024-10-28 NOTE — TELEPHONE ENCOUNTER
"Nurse Triage SBAR    Is this a 2nd Level Triage? NO    Situation: Patient calling to report she has jaw pain on her left lower side of her jaw only when she chews. No fevers, redness or warmth to jaw. NO chest pain, SOB or other cardiac symptoms. She denies any broken tooth. She reports she hears a \"crunching sound\" in her jaw with chewing.     Background: New onset started this morning with jaw pain with chewing    Assessment: Patient is unable to eat without pain on L side of jaw. She did call her dentist and is able to have some x-rays done if needed today if she cannot see her PCP    Protocol Recommended Disposition:   See in Office Within 3 Days    Recommendation: Patient would like to be seen in clinic today if possible, otherwise she will go to her dental office     Routed to provider    Does the patient meet one of the following criteria for ADS visit consideration?   16+ years old, with an [facility] PCP     TIP  Providers, please consider if this condition is appropriate for management at one of our Acute and Diagnostic Services sites.     If patient is a good candidate, please use dotphrase <dot>triageresponse and select Refer to ADS to document.      KERRI HuizarN, RN      Reason for Disposition   MILD pain that is constant and present > 24 hours    Additional Information   Negative: Shock suspected (e.g., cold/pale/clammy skin, too weak to stand, low BP, rapid pulse)   Negative: Similar pain previously and it was from 'heart attack'   Negative: Similar pain previously and it was from 'angina' and not relieved by nitroglycerin   Negative: Sounds like a life-threatening emergency to the triager   Negative: Chest pain   Negative: Sinus pain and congestion   Negative: Headache or pain in upper forehead   Negative: Toothache is main symptom   Negative: Followed a face injury   Negative: Difficulty breathing or unusual sweating (e.g., sweating without exertion)   Negative: Can't close the mouth fully (i.e., " "\"mouth is locked open\", patient will have difficulty talking)   Negative: Fever and localized red rash   Negative: Fever and area of face is swollen   Negative: New-onset jaw pain of unknown cause AND at least one cardiac risk factor (e.g., 45 years or older, diabetes, high cholesterol, hypertension, obesity, smoker or strong family history of heart disease)   Negative: Patient sounds very sick or weak to the triager   Negative: SEVERE pain (e.g., excruciating, unable to do any normal activities)   Negative: Localized red rash and painful to the touch   Negative: Painful rash with multiple small blisters grouped together (i.e., dermatomal distribution or 'band' or 'stripe')   Negative: Swollen area of face and toothache   Negative: Swollen area of face that is painful to touch   Negative: Swelling around the eye   Negative: MODERATE pain that is constant and present > 24 hours   Negative: Fever present > 3 days (72 hours)   Negative: Looks like a boil or infected sore   Negative: Patient wants to be seen    Protocols used: Face Pain-A-[place]    "

## 2024-10-28 NOTE — PROGRESS NOTES
TMJ (temporomandibular joint syndrome)  Patient was advised to reduce stress and anxiety.  Use warm compresses on the left side of the jaw frequently throughout the day.  I would like her to use Tylenol on a regular basis.  Be mindful of clenching her jaw during the day.  Consider following up with dentist for x-ray and possible nightguard.  Follow-up with primary care provider if pain continues longer than 2 weeks.    Raf Gibson PA-C  [facility]    Subjective   73 year old who presents to clinic today for the following health issues:    Urgent Care       HPI     Where in your body do you have pain? Musculoskeletal problem/pain  Onset/Duration: Sore left sided jaw and cracking when eating. Patient states that she has had this before but seems to be worsening   Description  Location: Left jaw  Joint Swelling: No  Redness: No  Pain: YES  Warmth: No  Intensity:  mild  Progression of Symptoms:  same  Accompanying signs and symptoms:   Fevers: No  Numbness/tingling/weakness: No  History  Trauma to the area: No  Recent illness:  No  Previous similar problem: No  Previous evaluation:  No  Precipitating or alleviating factors:  Aggravating factors include: none  Therapies tried and outcome: nothing      Review of Systems   Review of Systems   See HPI    Objective    Temp: 97.3  F (36.3  C) Temp src: Temporal BP: 120/56 Pulse: 79   Resp: 16 SpO2: 96 %       Physical Exam   Physical Exam  Constitutional:       General: She is not in acute distress.     Appearance: Normal appearance. She is normal weight. She is not ill-appearing, toxic-appearing or diaphoretic.   HENT:      Head: Normocephalic and atraumatic.      Jaw: Pain on movement present. No trismus, tenderness, swelling or malocclusion.     Cardiovascular:      Rate and Rhythm: Normal rate.      Pulses: Normal pulses.   Pulmonary:      Effort: Pulmonary effort is normal. No respiratory distress.   Neurological:      General: No focal deficit  present.      Mental Status: She is alert and oriented to person, place, and time. Mental status is at baseline.      Gait: Gait normal.   Psychiatric:         Mood and Affect: Mood normal.         Behavior: Behavior normal.         Thought Content: Thought content normal.         Judgment: Judgment normal.          No results found for this or any previous visit (from the past 24 hours).

## 2024-10-28 NOTE — TELEPHONE ENCOUNTER
Call received from patient:  Wondering if needs appt for Urgent Care evaluation  Would like to be seen in Urgent Care today    Writer informed patient no appt necessary for Urgent Care.  The [facility] is open today fro 0610-1001.  Please arrive by 1800 as they often reach capacity by that time of day.    Patient verbalized understanding and in agreement with plan.    KERRI RodgersN, RN-[facility]

## 2024-10-29 ENCOUNTER — VIRTUAL VISIT (OUTPATIENT)
Dept: PHYSICAL MEDICINE AND REHAB | Facility: CLINIC | Age: 74
End: 2024-10-29
Payer: COMMERCIAL

## 2024-10-29 DIAGNOSIS — Z81.8 FAMILY HISTORY OF DEMENTIA: Primary | ICD-10-CM

## 2024-10-29 DIAGNOSIS — U09.9 POST-COVID CHRONIC CONCENTRATION DEFICIT: ICD-10-CM

## 2024-10-29 DIAGNOSIS — U09.9 LONG COVID: ICD-10-CM

## 2024-10-29 DIAGNOSIS — M25.50 MULTIPLE JOINT PAIN: ICD-10-CM

## 2024-10-29 DIAGNOSIS — R41.840 POST-COVID CHRONIC CONCENTRATION DEFICIT: ICD-10-CM

## 2024-10-29 DIAGNOSIS — R53.83 OTHER FATIGUE: ICD-10-CM

## 2024-10-29 PROCEDURE — 99213 OFFICE O/P EST LOW 20 MIN: CPT | Mod: 95 | Performed by: PHYSICIAN ASSISTANT

## 2024-10-29 ASSESSMENT — PAIN SCALES - GENERAL: PAINLEVEL_OUTOF10: MILD PAIN (3)

## 2024-10-29 NOTE — LETTER
10/29/2024       RE: Jennifer Jane  [address]  [facility] [address]     Dear Colleague,    Thank you for referring your patient, Jennifer Jane, to the [facility] PHYSICAL MEDICINE AND REHABILITATION CLINIC [place] [facility]. Please see a copy of my visit note below.    Jennifer Jane is a 73 year old female who presents to be evaluated for a billable video visit.    Video-Visit Details    Video visit Start time: 1:05 PM    Type of service:  Video Visit    Video End Time: 1:26 PM    Originating Location (pt. Location): Home    Distant Location (provider location):  Off- Site    Platform used for Video Visit: [employer]    Assessment/Impression   1. Post-COVID chronic concentration deficit/Family history of dementia  Patient with improving short-term memory loss and forgetfulness since COVID.  Encouraged to continue exercises for occupational therapy for fatigue and concentration.  Encouraged to continue N-acetylcysteine 600 mg.   Due to worsening concentration in past month will refer to Neurology. Encouraged to work on sleep as this is likely contributing.   -Adult Neurology  Referral;Future    2. Other fatigue/ Multiple Joint pain  Patient also reports chronic fatigue which is likely connected to patient's poor sleep due to her joint pain.  Encouraged to continue physical therapy.       3. Long COVID  Discussed COVID and Post COVID with patient.  Educational materials provided and all questions answered. Encouraged patient to receive booster vaccine as there is some evidence this can help with long COVID symptoms.  - Adult Post Covid Clinic  Referral    Plan:  I reviewed present knowledge on long-Covid.  Education was provided and question were answered.  Orders/Referrals as above  I will advised patient on test results  I will follow up with Jennifer Jane in 3 months. I will review progress and consider  need for any other therapeutic interventions. If there are any questions and/or concerns she will call the clinic.      On day of encounter time spent in chart review and with patient in consultation, exam, education,coordination of care,  review of outside charts/documentation and documentation:  25 minutes     I have attempted to proof read for major spelling errors and apologize for any minor errors I may have missed.     This note was dictated using voice recognition software. Any grammatical or context distortions are unintentional and inherent to the software.  _________________________________  Apoorva Nelson PA-C  M [facility] PHYSICAL MEDICINE AND REHABILITATION CLINIC [place]    Subjective   HPI   Patient returns for a follow up.  Patient is not sleeping as well due the hip pain. She is unable to do tasks in her house due the pain.  Patient feels her its hard to get comfortable with her sleep.  Patient still having short term memory issues. Feels this is more noticeable now.  Patient feels discouraged due to her memory and fatigue.     Current concerns: Health Concerns        10/28/2024     1:12 PM   PHQ Assesment Total Score(s)   PHQ-9 Score 5        Patient-reported           10/28/2024     1:16 PM   GÉNESIS-7 Results   GÉNESIS 7 TOTAL SCORE 1 (minimal anxiety)   GÉNESIS-7 Total Score 1        Patient-reported         10/28/2024     1:18 PM   PTSD Screen Score   Have you ever experienced this kind of event? No        Patient-reported         10/28/2024     1:23 PM   PROMIS-29   PROMIS Physical Function T-Score 33 (moderate dysfunction)   PROMIS Anxiety T-Score 51 (within normal limits)   PROMIS Depression T-Score 59 (mild)   PROMIS Fatigue T-Score 59 (mild)   PROMIS Sleep Disturbance T-Score 56 (mild)   PROMIS Ability to Participate in Social Roles & Activities T-Score 42 (mild dysfunction)   PROMIS Pain Interference T-Score 76 (severe)   PROMIS Pain Intensity 5       Past Medical History:   Diagnosis  Date     Acute bilateral low back pain with bilateral sciatica      Acute pain of right knee     Pain for months, worse since helping friend move.  Exam consistent with meniscal tear.  Check xray today, depending on results MRI.  Ice, NSAIDS for now.  ACE.     Amaurosis fugax      Arthritis     Fibromyalgia, ? Osteoarthritis     Back injury     Micro discectomy, approx date 1983     Benign neoplasm of ascending colon      Chondromalacia of patella, right     Severe, tri-compartmental on MRI 10/2017.  Referred to ortho.     Chronic left-sided headache     Negative MRI and CT of head 2018 and 2016  Normal labs TSH, CBC, CMP, ESR 12/2019.  See plan below in pt instructions.     Diarrhea, unspecified type      Diverticular disease of large intestine      Encounter for Medicare annual wellness exam     Up to date on mammogram and colon cancer screening.  Is due for shingles vaccine - do at pharmacy.     Glaucoma      Glaucoma suspect      Hiatal hernia      HTN (hypertension)      Hypercalciuria      Infection due to 2019 novel coronavirus 02/10/2024    x3     Insomnia 12/26/2012     Problem list name updated by automated process. Provider to review     Low bone density 2022     Overweight (BMI 25.0-29.9)      Polyp of colon      Prediabetes      Primary hyperparathyroidism (H)     mild; borderline hypercalciuria     Pseudophakia, both eyes      Reduced vision 09/2016    L vision block, bilateral elevated eye presures,poss. Glacom     Right foot pain      Sciatica     On and off - exacerbates when lifts heavy things  Bilaterally.     Spinal stenosis of lumbar region without neurogenic claudication      Uncomplicated asthma     Diagnosed as 2 yo     Weakness of left lower extremity        Past Surgical History:   Procedure Laterality Date      cataract extraction with intraocular lens implant Right 01/31/2019     BACK SURGERY  1984    micro-disc-ectomy     BREAST SURGERY  1984    L Breast bx     cataract extraction with  intraocular lens Left 12/06/2018     CHOLECYSTECTOMY  1990     INJECT EPIDURAL TRANSFORAMINAL Bilateral 1/31/2024    Procedure: Bilateral L5-S1 Transforaminal epidural steroid injection;  Surgeon: Sarahy Tsang MD;  Location: [facility] OR     IRIDOTOMY/IRIDECTOMY LASER SURGERY Left 09/27/2016     IRIDOTOMY/IRIDECTOMY LASER SURGERY Right 10/18/2016     TUBAL LIGATION  1989     Z STOMACH SURGERY PROCEDURE UNLISTED  03/1990    Cholecystectomy, 11/1989 tubal ligation       Family History   Problem Relation Age of Onset     Heart Disease Mother      Diabetes Mother      Coronary Artery Disease Mother      Hypertension Mother      Anesthesia Reaction Mother         Anaphylaxis, oral opioixd     Asthma Mother      Thyroid Disease Mother      Low Back Problems Mother      Heart Disease Father      Diabetes Father      Coronary Artery Disease Father      Hypertension Father      Hyperlipidemia Father      Low Back Problems Father      Diabetes Sister      Hypertension Sister      Low Back Problems Sister      Thyroid Disease Sister      Hip fracture Sister      Low Back Problems Brother      Asthma Maternal Grandmother      Coronary Artery Disease Maternal Grandfather      Cerebrovascular Disease Paternal Grandfather      Asthma Daughter      Genetic Disease Daughter         Kallman syndrome; no olfactory bulb;     Calcium Disorder No family hx of        Social History     Tobacco Use     Smoking status: Never     Passive exposure: Past     Smokeless tobacco: Never   Vaping Use     Vaping status: Never Used   Substance Use Topics     Alcohol use: Yes     Alcohol/week: 0.0 standard drinks of alcohol     Comment: 0 -2 times a year     Drug use: No         Current Outpatient Medications:      acetaminophen (TYLENOL) 325 MG tablet, Take 325-650 mg by mouth every 6 hours as needed for mild pain, Disp: , Rfl:      acetylcysteine (N-ACETYL CYSTEINE) 600 MG CAPS capsule, Take 1 capsule (600 mg) by mouth daily, Disp: 90 capsule, Rfl:  4     ADVAIR -21 MCG/ACT inhaler, Inhale 2 puffs into the lungs 2 times daily, Disp: , Rfl:      albuterol (VENTOLIN HFA) 108 (90 Base) MCG/ACT inhaler, Inhale 2 puffs into the lungs every 6 hours as needed for shortness of breath, Disp: 8.5 g, Rfl: 5     alendronate (FOSAMAX) 70 MG tablet, Take 1 tablet (70 mg) by mouth every 7 days Take 60 minutes before am meal with 8 oz. water. Remain upright for 30 minutes., Disp: 12 tablet, Rfl: 3     amLODIPine (NORVASC) 5 MG tablet, Take 1 tablet (5 mg) by mouth at bedtime, Disp: 90 tablet, Rfl: 4     atorvastatin (LIPITOR) 20 MG tablet, Take 1 tablet (20 mg) by mouth daily, Disp: 90 tablet, Rfl: 4     azelastine (ASTELIN) 0.1 % nasal spray, Spray 1 spray into both nostrils 2 times daily, Disp: , Rfl:      brimonidine (ALPHAGAN-P) 0.15 % ophthalmic solution, PLACE 1 DROP INTO BOTH EYES TWO TIMES A DAY., Disp: , Rfl:      Calcium Carbonate Antacid (TUMS PO), Take  by mouth At Bedtime., Disp: , Rfl:      co-enzyme Q-10 100 MG CAPS capsule, Take 1 capsule (100 mg) by mouth daily, Disp: 90 capsule, Rfl: 4     fexofenadine (ALLEGRA) 180 MG tablet, Take 180 mg by mouth daily, Disp: , Rfl:      ipratropium (ATROVENT) 0.03 % nasal spray, Spray 2 sprays into both nostrils 2 times daily, Disp: 30 mL, Rfl: 6     latanoprost (XALATAN) 0.005 % ophthalmic solution, Place 1 drop into the right eye At Bedtime, Disp: , Rfl:      losartan (COZAAR) 50 MG tablet, Take 1 tablet (50 mg) by mouth 2 times daily, Disp: 180 tablet, Rfl: 4     melatonin 5 MG CAPS, Take 1 capsule by mouth at bedtime., Disp: , Rfl:      montelukast (SINGULAIR) 10 MG tablet, TAKE ONE TABLET BY MOUTH EVERY DAY AS DIRECTED, Disp: , Rfl: 1     moxifloxacin (VIGAMOX) 0.5 % ophthalmic solution, Place 1 drop into both eyes 4 times daily., Disp: , Rfl:      SPIRIVA RESPIMAT 1.25 MCG/ACT inhaler, INHALE 2 PUFFS ONCE PER DAY, Disp: , Rfl:      traZODone (DESYREL) 50 MG tablet, Take 1-3 tablets ( mg) by mouth at  bedtime, Disp: 130 tablet, Rfl: 4     Vitamin D3 (CHOLECALCIFEROL) 25 mcg (1000 units) tablet, Take by mouth daily, Disp: , Rfl:     Review of Systems   Constitutional, HEENT, cardiovascular, pulmonary, gi and gu systems are negative, except as otherwise noted.      Objective         Vitals:  No vitals were obtained today due to virtual visit.    Physical Exam   EYES: Eyes grossly normal to inspection.  No discharge or erythema, or obvious scleral/conjunctival abnormalities.  SKIN: Visible skin clear. No significant rash, abnormal pigmentation or lesions.  NEURO: Cranial nerves grossly intact.  Mentation and speech appropriate for age.  GENERAL: Healthy, alert and no distress  RESP: No audible wheeze, cough, or visible cyanosis.  No visible retractions or increased work of breathing.    PSYCH: Mentation appears normal, affect normal/bright, judgement and insight intact, normal speech and appearance well-groomed.    Labs : No recent labs    Imaging:    I personally reviewed the following imaging results today and those on care everywhere, if indicated     Nothing new to review    Reviewed imaging from [facility] sites     Medical Records Reviewed:    Reviewed consults/documents from [facility] including  Ophthalmology, Family, Practice, Urgent care, ENT, and PT       Again, thank you for allowing me to participate in the care of your patient.      Sincerely,    Apoorva Nelson PA-C

## 2024-10-29 NOTE — PROGRESS NOTES
Jennifer Jane is a 73 year old female who presents to be evaluated for a billable video visit.    Video-Visit Details    Video visit Start time: 1:05 PM    Type of service:  Video Visit    Video End Time: 1:26 PM    Originating Location (pt. Location): Home    Distant Location (provider location):  Off- Site    Platform used for Video Visit: [facility]    Assessment/Impression   1. Post-COVID chronic concentration deficit/Family history of dementia  Patient with improving short-term memory loss and forgetfulness since COVID.  Encouraged to continue exercises for occupational therapy for fatigue and concentration.  Encouraged to continue N-acetylcysteine 600 mg.   Due to worsening concentration in past month will refer to Neurology. Encouraged to work on sleep as this is likely contributing.   -Adult Neurology  Referral;Future    2. Other fatigue/ Multiple Joint pain  Patient also reports chronic fatigue which is likely connected to patient's poor sleep due to her joint pain.  Encouraged to continue physical therapy.       3. Long COVID  Discussed COVID and Post COVID with patient.  Educational materials provided and all questions answered. Encouraged patient to receive booster vaccine as there is some evidence this can help with long COVID symptoms.  - Adult Post Covid Clinic  Referral    Plan:  I reviewed present knowledge on long-Covid.  Education was provided and question were answered.  Orders/Referrals as above  I will advised patient on test results  I will follow up with Jennifer Jane in 3 months. I will review progress and consider need for any other therapeutic interventions. If there are any questions and/or concerns she will call the clinic.      On day of encounter time spent in chart review and with patient in consultation, exam, education,coordination of care,  review of outside charts/documentation and documentation:  25 minutes     I have attempted to proof read for  major spelling errors and apologize for any minor errors I may have missed.     This note was dictated using voice recognition software. Any grammatical or context distortions are unintentional and inherent to the software.  _________________________________  Apoorva Nelson PA-C  M [facility] PHYSICAL MEDICINE AND REHABILITATION CLINIC [place]    Subjective   HPI   Patient returns for a follow up.  Patient is not sleeping as well due the hip pain. She is unable to do tasks in her house due the pain.  Patient feels her its hard to get comfortable with her sleep.  Patient still having short term memory issues. Feels this is more noticeable now.  Patient feels discouraged due to her memory and fatigue.     Current concerns: Health Concerns        10/28/2024     1:12 PM   PHQ Assesment Total Score(s)   PHQ-9 Score 5        Patient-reported           10/28/2024     1:16 PM   GÉNESIS-7 Results   GÉNESIS 7 TOTAL SCORE 1 (minimal anxiety)   GÉNESIS-7 Total Score 1        Patient-reported         10/28/2024     1:18 PM   PTSD Screen Score   Have you ever experienced this kind of event? No        Patient-reported         10/28/2024     1:23 PM   PROMIS-29   PROMIS Physical Function T-Score 33 (moderate dysfunction)   PROMIS Anxiety T-Score 51 (within normal limits)   PROMIS Depression T-Score 59 (mild)   PROMIS Fatigue T-Score 59 (mild)   PROMIS Sleep Disturbance T-Score 56 (mild)   PROMIS Ability to Participate in Social Roles & Activities T-Score 42 (mild dysfunction)   PROMIS Pain Interference T-Score 76 (severe)   PROMIS Pain Intensity 5       Past Medical History:   Diagnosis Date    Acute bilateral low back pain with bilateral sciatica     Acute pain of right knee     Pain for months, worse since helping friend move.  Exam consistent with meniscal tear.  Check xray today, depending on results MRI.  Ice, NSAIDS for now.  ACE.    Amaurosis fugax     Arthritis     Fibromyalgia, ? Osteoarthritis    Back injury     Micro  discectomy, approx date 1983    Benign neoplasm of ascending colon     Chondromalacia of patella, right     Severe, tri-compartmental on MRI 10/2017.  Referred to ortho.    Chronic left-sided headache     Negative MRI and CT of head 2018 and 2016  Normal labs TSH, CBC, CMP, ESR 12/2019.  See plan below in pt instructions.    Diarrhea, unspecified type     Diverticular disease of large intestine     Encounter for Medicare annual wellness exam     Up to date on mammogram and colon cancer screening.  Is due for shingles vaccine - do at pharmacy.    Glaucoma     Glaucoma suspect     Hiatal hernia     HTN (hypertension)     Hypercalciuria     Infection due to 2019 novel coronavirus 02/10/2024    x3    Insomnia 12/26/2012     Problem list name updated by automated process. Provider to review    Low bone density 2022    Overweight (BMI 25.0-29.9)     Polyp of colon     Prediabetes     Primary hyperparathyroidism (H)     mild; borderline hypercalciuria    Pseudophakia, both eyes     Reduced vision 09/2016    L vision block, bilateral elevated eye presures,poss. Glacom    Right foot pain     Sciatica     On and off - exacerbates when lifts heavy things  Bilaterally.    Spinal stenosis of lumbar region without neurogenic claudication     Uncomplicated asthma     Diagnosed as 2 yo    Weakness of left lower extremity        Past Surgical History:   Procedure Laterality Date     cataract extraction with intraocular lens implant Right 01/31/2019    BACK SURGERY  1984    micro-disc-ectomy    BREAST SURGERY  1984    L Breast bx    cataract extraction with intraocular lens Left 12/06/2018    CHOLECYSTECTOMY  1990    INJECT EPIDURAL TRANSFORAMINAL Bilateral 1/31/2024    Procedure: Bilateral L5-S1 Transforaminal epidural steroid injection;  Surgeon: Sarahy Tsang MD;  Location: [facility] OR    IRIDOTOMY/IRIDECTOMY LASER SURGERY Left 09/27/2016    IRIDOTOMY/IRIDECTOMY LASER SURGERY Right 10/18/2016    TUBAL LIGATION  1989    [facility] STOMACH  SURGERY PROCEDURE UNLISTED  03/1990    Cholecystectomy, 11/1989 tubal ligation       Family History   Problem Relation Age of Onset    Heart Disease Mother     Diabetes Mother     Coronary Artery Disease Mother     Hypertension Mother     Anesthesia Reaction Mother         Anaphylaxis, oral opioixd    Asthma Mother     Thyroid Disease Mother     Low Back Problems Mother     Heart Disease Father     Diabetes Father     Coronary Artery Disease Father     Hypertension Father     Hyperlipidemia Father     Low Back Problems Father     Diabetes Sister     Hypertension Sister     Low Back Problems Sister     Thyroid Disease Sister     Hip fracture Sister     Low Back Problems Brother     Asthma Maternal Grandmother     Coronary Artery Disease Maternal Grandfather     Cerebrovascular Disease Paternal Grandfather     Asthma Daughter     Genetic Disease Daughter         Kallman syndrome; no olfactory bulb;    Calcium Disorder No family hx of        Social History     Tobacco Use    Smoking status: Never     Passive exposure: Past    Smokeless tobacco: Never   Vaping Use    Vaping status: Never Used   Substance Use Topics    Alcohol use: Yes     Alcohol/week: 0.0 standard drinks of alcohol     Comment: 0 -2 times a year    Drug use: No         Current Outpatient Medications:     acetaminophen (TYLENOL) 325 MG tablet, Take 325-650 mg by mouth every 6 hours as needed for mild pain, Disp: , Rfl:     acetylcysteine (N-ACETYL CYSTEINE) 600 MG CAPS capsule, Take 1 capsule (600 mg) by mouth daily, Disp: 90 capsule, Rfl: 4    ADVAIR -21 MCG/ACT inhaler, Inhale 2 puffs into the lungs 2 times daily, Disp: , Rfl:     albuterol (VENTOLIN HFA) 108 (90 Base) MCG/ACT inhaler, Inhale 2 puffs into the lungs every 6 hours as needed for shortness of breath, Disp: 8.5 g, Rfl: 5    alendronate (FOSAMAX) 70 MG tablet, Take 1 tablet (70 mg) by mouth every 7 days Take 60 minutes before am meal with 8 oz. water. Remain upright for 30 minutes.,  Disp: 12 tablet, Rfl: 3    amLODIPine (NORVASC) 5 MG tablet, Take 1 tablet (5 mg) by mouth at bedtime, Disp: 90 tablet, Rfl: 4    atorvastatin (LIPITOR) 20 MG tablet, Take 1 tablet (20 mg) by mouth daily, Disp: 90 tablet, Rfl: 4    azelastine (ASTELIN) 0.1 % nasal spray, Spray 1 spray into both nostrils 2 times daily, Disp: , Rfl:     brimonidine (ALPHAGAN-P) 0.15 % ophthalmic solution, PLACE 1 DROP INTO BOTH EYES TWO TIMES A DAY., Disp: , Rfl:     Calcium Carbonate Antacid (TUMS PO), Take  by mouth At Bedtime., Disp: , Rfl:     co-enzyme Q-10 100 MG CAPS capsule, Take 1 capsule (100 mg) by mouth daily, Disp: 90 capsule, Rfl: 4    fexofenadine (ALLEGRA) 180 MG tablet, Take 180 mg by mouth daily, Disp: , Rfl:     ipratropium (ATROVENT) 0.03 % nasal spray, Spray 2 sprays into both nostrils 2 times daily, Disp: 30 mL, Rfl: 6    latanoprost (XALATAN) 0.005 % ophthalmic solution, Place 1 drop into the right eye At Bedtime, Disp: , Rfl:     losartan (COZAAR) 50 MG tablet, Take 1 tablet (50 mg) by mouth 2 times daily, Disp: 180 tablet, Rfl: 4    melatonin 5 MG CAPS, Take 1 capsule by mouth at bedtime., Disp: , Rfl:     montelukast (SINGULAIR) 10 MG tablet, TAKE ONE TABLET BY MOUTH EVERY DAY AS DIRECTED, Disp: , Rfl: 1    moxifloxacin (VIGAMOX) 0.5 % ophthalmic solution, Place 1 drop into both eyes 4 times daily., Disp: , Rfl:     SPIRIVA RESPIMAT 1.25 MCG/ACT inhaler, INHALE 2 PUFFS ONCE PER DAY, Disp: , Rfl:     traZODone (DESYREL) 50 MG tablet, Take 1-3 tablets ( mg) by mouth at bedtime, Disp: 130 tablet, Rfl: 4    Vitamin D3 (CHOLECALCIFEROL) 25 mcg (1000 units) tablet, Take by mouth daily, Disp: , Rfl:     Review of Systems   Constitutional, HEENT, cardiovascular, pulmonary, gi and gu systems are negative, except as otherwise noted.      Objective         Vitals:  No vitals were obtained today due to virtual visit.    Physical Exam   EYES: Eyes grossly normal to inspection.  No discharge or erythema, or obvious  scleral/conjunctival abnormalities.  SKIN: Visible skin clear. No significant rash, abnormal pigmentation or lesions.  NEURO: Cranial nerves grossly intact.  Mentation and speech appropriate for age.  GENERAL: Healthy, alert and no distress  RESP: No audible wheeze, cough, or visible cyanosis.  No visible retractions or increased work of breathing.    PSYCH: Mentation appears normal, affect normal/bright, judgement and insight intact, normal speech and appearance well-groomed.    Labs : No recent labs    Imaging:    I personally reviewed the following imaging results today and those on care everywhere, if indicated     Nothing new to review    Reviewed imaging from [facility] sites     Medical Records Reviewed:    Reviewed consults/documents from [facility] including  Ophthalmology, Family, Practice, Urgent care, ENT, and PT

## 2024-10-29 NOTE — NURSING NOTE
Current patient location: [address] [place] [id number]    Is the patient currently in the state of [place]? YES    Visit mode:VIDEO    If the visit is dropped, the patient can be reconnected by: VIDEO VISIT: Text to cell phone:   Telephone Information:   Mobile [phone]       Will anyone else be joining the visit? NO  (If patient encounters technical issues they should call [phone]    Are changes needed to the allergy or medication list? No, Pt stated no changes to allergies, and Pt stated no med changes    Are refills needed on medications prescribed by this physician? Discuss with provider    Rooming Documentation:  Questionnaire(s) completed    Reason for visit: RECHVITO Das VVF    Pt stated her hands, left hip, and back have been hurting more lately, stated her hands have been a bit shaky as well.

## 2024-11-01 ENCOUNTER — TELEPHONE (OUTPATIENT)
Dept: NEUROLOGY | Facility: CLINIC | Age: 74
End: 2024-11-01
Payer: COMMERCIAL

## 2024-11-01 NOTE — TELEPHONE ENCOUNTER
[facility] Call Center    Phone Message    May a detailed message be left on voicemail: yes     Reason for Call: Appointment Intake    Referring Provider Name: Apoorva Nelson PA-C in [facility] PHYS MED & REHAB   Diagnosis and/or Symptoms: Family history of dementia [Z81.8]  Post-COVID chronic concentration deficit [R41.840, U09.9]    Please review referral, per neurology protocol  does not see for Dementia. Patient is not diagnosed with dementia, is this appropriate to schedule with ?     Action Taken: Other: [facility] neurology    Travel Screening: Not Applicable     Date of Service:

## 2024-11-01 NOTE — TELEPHONE ENCOUNTER
Patient confirmed scheduled appointment:  Date: 3/17/2025  Time: 2:00pm  Visit type: New Neurology  Provider: Trini  Location: [facility]  Testing/imaging: NA  Additional notes: Per ION [place]. Dr. Flowers does not see memory loss    Amy Colunga on 11/1/2024 at 5:18 PM

## 2024-11-05 ENCOUNTER — THERAPY VISIT (OUTPATIENT)
Dept: PHYSICAL THERAPY | Facility: CLINIC | Age: 74
End: 2024-11-05
Payer: COMMERCIAL

## 2024-11-05 DIAGNOSIS — M25.50 MULTIPLE JOINT PAIN: Primary | ICD-10-CM

## 2024-11-05 PROCEDURE — 97112 NEUROMUSCULAR REEDUCATION: CPT | Mod: GP

## 2024-11-06 ENCOUNTER — TRANSFERRED RECORDS (OUTPATIENT)
Dept: HEALTH INFORMATION MANAGEMENT | Facility: CLINIC | Age: 74
End: 2024-11-06
Payer: COMMERCIAL

## 2024-11-07 ENCOUNTER — ANCILLARY PROCEDURE (OUTPATIENT)
Dept: BONE DENSITY | Facility: CLINIC | Age: 74
End: 2024-11-07
Payer: COMMERCIAL

## 2024-11-07 DIAGNOSIS — E21.0 PRIMARY HYPERPARATHYROIDISM (H): ICD-10-CM

## 2024-11-07 DIAGNOSIS — R82.994 HYPERCALCIURIA: ICD-10-CM

## 2024-11-07 DIAGNOSIS — M85.9 LOW BONE DENSITY: ICD-10-CM

## 2024-11-07 DIAGNOSIS — Z78.0 POSTMENOPAUSAL STATUS: ICD-10-CM

## 2024-11-07 PROCEDURE — 77080 DXA BONE DENSITY AXIAL: CPT | Performed by: INTERNAL MEDICINE

## 2024-11-19 ENCOUNTER — THERAPY VISIT (OUTPATIENT)
Dept: PHYSICAL THERAPY | Facility: CLINIC | Age: 74
End: 2024-11-19
Payer: COMMERCIAL

## 2024-11-19 DIAGNOSIS — M25.50 MULTIPLE JOINT PAIN: Primary | ICD-10-CM

## 2024-11-19 PROCEDURE — 97110 THERAPEUTIC EXERCISES: CPT | Mod: GP

## 2024-11-19 NOTE — PROGRESS NOTES
11/19/24 0500   Appointment Info   Signing clinician's name / credentials Tere Larsen DPT   Total/Authorized Visits E&T   Visits Used 5 - (are Medicare)   Medical Diagnosis Other fatigue  Multiple joint pain   Progress Note/Certification   Start of Care Date 08/29/24   Onset of illness/injury or Date of Surgery 08/29/24   Therapy Frequency every other week   Predicted Duration 90 days   Certification date from 08/29/24   Certification date to 11/26/24   Progress Note Due Date 11/26/24   GOALS   PT Goals 2   PT Goal 1   Goal Identifier HEP   Goal Description Pt will deomstrate understanding and compliance of HEP to manage low back, hip and knee stiffness and pain   Goal Progress goal met   Target Date 11/26/24   Date Met 11/19/24   PT Goal 2   Goal Identifier Clean   Goal Description Pt will be able to stand to clean home for 15 minutes without pain   Rationale to maximize safety and independence with performance of ADLs and functional tasks;to maximize safety and independence within the home   Goal Progress goal met   Target Date 11/26/24   Date Met 11/19/24   Subjective Report   Subjective Report Pt reports 70% improvement in symptoms. Pain is only still present after sitting for long periods of time. Pt's pain goes away shortly after getting up. She feels comfortable with d/c today.   Objective Measures   Objective Measures Objective Measure 1;Objective Measure 2   Objective Measure 1   Objective Measure 30 sec sit<>stand   Details 11 reps no hands   Objective Measure 2   Objective Measure Strength   Details 5/5 for all strength, 4- for L hip flexion   Treatment Interventions (PT)   Interventions Manual Therapy;Therapeutic Procedure/Exercise;Neuromuscular Re-education;Therapeutic Activity   Therapeutic Procedure/Exercise   Therapeutic Procedures: strength, endurance, ROM, flexibility minutes (12592) 30   Ther Proc 1 Education   Ther Proc 1 - Details Pt edu on improtance of mobility and strength to  continue to progress functional mobility. Pt verbalizes understanding.   PTRx Ther Proc 1 Supine Lumbar Hip Roll   PTRx Ther Proc 1 - Details rev   PTRx Ther Proc 2 Single Knee to Chest   PTRx Ther Proc 2 - Details Inferior hip mob with black band in doorway: x15 reps B    PTRx Ther Proc 3 Deshler and Arrow Stretch   PTRx Ther Proc 3 - Details rev   PTRx Ther Proc 4 Thoracic Extension   PTRx Ther Proc 4 - Details rev   PTRx Ther Proc 5 Prone Positioning   PTRx Ther Proc 5 - Details rev   Skilled Intervention Intervention selection, POC, goal of imporving general mobility   Patient Response/Progress tolerated well, verbalizes understanding   PTRx Ther Proc 6 Hip AROM Standing Extension   PTRx Ther Proc 6 - Details rev   Neuromuscular Re-education   PTRx Neuro Re-ed 1 Supine Abdominal Exercise #3 (Marching)   PTRx Neuro Re-ed 1 - Details rev   PTRx Neuro Re-ed 2 Bridging #1   PTRx Neuro Re-ed 2 - Details rev   PTRx Neuro Re-ed 3 Shoulder Theraband Rows   PTRx Neuro Re-ed 3 - Details rev   PTRx Neuro Re-ed 4 Shoulder Theraband Low Row/Pulldown   PTRx Neuro Re-ed 4 - Details rev   PTRx Neuro Re-ed 5 Ball Exercise Seated Pelvic Tilt   PTRx Neuro Re-ed 5 - Details rev   PTRx Neuro Re-ed 6 Tandem Stance   PTRx Neuro Re-ed 6 - Details rev   PTRx Neuro Re-ed 7 Sit to Stand   PTRx Neuro Re-ed 7 - Details rev   Skilled Intervention Intervention selection, POC   Patient Response/Progress not today   Education   Learner/Method Patient   Education Comments Pt ammenabe to tx   Plan   Home program See PTRX printed and on phone   Plan for next session d/c   Total Session Time   Timed Code Treatment Minutes 30   Total Treatment Time (sum of timed and untimed services) 30       DISCHARGE  Reason for Discharge: Patient has met all goals.    Equipment Issued: n/a    Discharge Plan: Patient to continue home program.    Referring Provider:  Apoorva Nelson

## 2024-12-18 ENCOUNTER — HOSPITAL ENCOUNTER (OUTPATIENT)
Dept: MAMMOGRAPHY | Facility: CLINIC | Age: 74
Discharge: HOME | End: 2024-12-18
Attending: FAMILY MEDICINE
Payer: COMMERCIAL

## 2024-12-18 DIAGNOSIS — Z12.31 VISIT FOR SCREENING MAMMOGRAM: ICD-10-CM

## 2024-12-18 PROCEDURE — 77067 SCR MAMMO BI INCL CAD: CPT

## 2024-12-18 PROCEDURE — 77063 BREAST TOMOSYNTHESIS BI: CPT

## 2024-12-28 ENCOUNTER — HOSPITAL ENCOUNTER (EMERGENCY)
Facility: CLINIC | Age: 74
Discharge: HOME | End: 2024-12-28
Attending: EMERGENCY MEDICINE
Payer: COMMERCIAL

## 2024-12-28 ENCOUNTER — APPOINTMENT (OUTPATIENT)
Dept: GENERAL RADIOLOGY | Facility: CLINIC | Age: 74
End: 2024-12-28
Attending: EMERGENCY MEDICINE
Payer: COMMERCIAL

## 2024-12-28 VITALS
TEMPERATURE: 97.2 F | HEART RATE: 92 BPM | SYSTOLIC BLOOD PRESSURE: 127 MMHG | DIASTOLIC BLOOD PRESSURE: 103 MMHG | BODY MASS INDEX: 26.98 KG/M2 | WEIGHT: 158 LBS | RESPIRATION RATE: 19 BRPM | HEIGHT: 64 IN | OXYGEN SATURATION: 97 %

## 2024-12-28 DIAGNOSIS — M65.949 TENOSYNOVITIS OF HAND: ICD-10-CM

## 2024-12-28 DIAGNOSIS — R07.9 CHEST PAIN, UNSPECIFIED TYPE: ICD-10-CM

## 2024-12-28 LAB
ALBUMIN SERPL BCG-MCNC: 4.3 G/DL (ref 3.5–5.2)
ALP SERPL-CCNC: 66 U/L (ref 40–150)
ALT SERPL W P-5'-P-CCNC: 32 U/L (ref 0–50)
ANION GAP SERPL CALCULATED.3IONS-SCNC: 9 MMOL/L (ref 7–15)
AST SERPL W P-5'-P-CCNC: 26 U/L (ref 0–45)
BASOPHILS # BLD AUTO: 0 10E3/UL (ref 0–0.2)
BASOPHILS NFR BLD AUTO: 1 %
BILIRUB SERPL-MCNC: 0.5 MG/DL
BUN SERPL-MCNC: 10.4 MG/DL (ref 8–23)
CALCIUM SERPL-MCNC: 10.2 MG/DL (ref 8.8–10.4)
CHLORIDE SERPL-SCNC: 105 MMOL/L (ref 98–107)
CREAT SERPL-MCNC: 0.73 MG/DL (ref 0.51–0.95)
D DIMER PPP FEU-MCNC: 0.43 UG/ML FEU (ref 0–0.5)
EGFRCR SERPLBLD CKD-EPI 2021: 85 ML/MIN/1.73M2
EOSINOPHIL # BLD AUTO: 0.2 10E3/UL (ref 0–0.7)
EOSINOPHIL NFR BLD AUTO: 3 %
ERYTHROCYTE [DISTWIDTH] IN BLOOD BY AUTOMATED COUNT: 12.3 % (ref 10–15)
FLUAV RNA SPEC QL NAA+PROBE: NEGATIVE
FLUBV RNA RESP QL NAA+PROBE: NEGATIVE
GLUCOSE SERPL-MCNC: 140 MG/DL (ref 70–99)
HCO3 SERPL-SCNC: 26 MMOL/L (ref 22–29)
HCT VFR BLD AUTO: 41.9 % (ref 35–47)
HGB BLD-MCNC: 14.1 G/DL (ref 11.7–15.7)
HOLD SPECIMEN: NORMAL
IMM GRANULOCYTES # BLD: 0 10E3/UL
IMM GRANULOCYTES NFR BLD: 0 %
LYMPHOCYTES # BLD AUTO: 1.5 10E3/UL (ref 0.8–5.3)
LYMPHOCYTES NFR BLD AUTO: 17 %
MCH RBC QN AUTO: 30.1 PG (ref 26.5–33)
MCHC RBC AUTO-ENTMCNC: 33.7 G/DL (ref 31.5–36.5)
MCV RBC AUTO: 89 FL (ref 78–100)
MONOCYTES # BLD AUTO: 0.5 10E3/UL (ref 0–1.3)
MONOCYTES NFR BLD AUTO: 6 %
NEUTROPHILS # BLD AUTO: 6.2 10E3/UL (ref 1.6–8.3)
NEUTROPHILS NFR BLD AUTO: 73 %
NRBC # BLD AUTO: 0 10E3/UL
NRBC BLD AUTO-RTO: 0 /100
PLATELET # BLD AUTO: 258 10E3/UL (ref 150–450)
POTASSIUM SERPL-SCNC: 3.8 MMOL/L (ref 3.4–5.3)
PROT SERPL-MCNC: 6.6 G/DL (ref 6.4–8.3)
RBC # BLD AUTO: 4.69 10E6/UL (ref 3.8–5.2)
RSV RNA SPEC NAA+PROBE: NEGATIVE
SARS-COV-2 RNA RESP QL NAA+PROBE: NEGATIVE
SODIUM SERPL-SCNC: 140 MMOL/L (ref 135–145)
TROPONIN T SERPL HS-MCNC: 31 NG/L
TROPONIN T SERPL HS-MCNC: 40 NG/L
WBC # BLD AUTO: 8.5 10E3/UL (ref 4–11)

## 2024-12-28 PROCEDURE — 85025 COMPLETE CBC W/AUTO DIFF WBC: CPT | Performed by: EMERGENCY MEDICINE

## 2024-12-28 PROCEDURE — 85379 FIBRIN DEGRADATION QUANT: CPT | Performed by: EMERGENCY MEDICINE

## 2024-12-28 PROCEDURE — 87637 SARSCOV2&INF A&B&RSV AMP PRB: CPT | Performed by: EMERGENCY MEDICINE

## 2024-12-28 PROCEDURE — 84484 ASSAY OF TROPONIN QUANT: CPT | Performed by: SOCIAL WORKER

## 2024-12-28 PROCEDURE — 99284 EMERGENCY DEPT VISIT MOD MDM: CPT | Mod: 25

## 2024-12-28 PROCEDURE — 80053 COMPREHEN METABOLIC PANEL: CPT | Performed by: EMERGENCY MEDICINE

## 2024-12-28 PROCEDURE — 71046 X-RAY EXAM CHEST 2 VIEWS: CPT

## 2024-12-28 PROCEDURE — 85025 COMPLETE CBC W/AUTO DIFF WBC: CPT | Performed by: SOCIAL WORKER

## 2024-12-28 PROCEDURE — 36415 COLL VENOUS BLD VENIPUNCTURE: CPT | Performed by: SOCIAL WORKER

## 2024-12-28 PROCEDURE — 82040 ASSAY OF SERUM ALBUMIN: CPT | Performed by: SOCIAL WORKER

## 2024-12-28 PROCEDURE — 84484 ASSAY OF TROPONIN QUANT: CPT | Performed by: EMERGENCY MEDICINE

## 2024-12-28 PROCEDURE — 85018 HEMOGLOBIN: CPT | Performed by: SOCIAL WORKER

## 2024-12-28 RX ORDER — PREDNISONE 20 MG/1
TABLET ORAL
Qty: 10 TABLET | Refills: 0 | Status: SHIPPED | OUTPATIENT
Start: 2024-12-28 | End: 2025-01-06

## 2024-12-28 ASSESSMENT — COLUMBIA-SUICIDE SEVERITY RATING SCALE - C-SSRS
6. HAVE YOU EVER DONE ANYTHING, STARTED TO DO ANYTHING, OR PREPARED TO DO ANYTHING TO END YOUR LIFE?: NO
1. IN THE PAST MONTH, HAVE YOU WISHED YOU WERE DEAD OR WISHED YOU COULD GO TO SLEEP AND NOT WAKE UP?: NO
2. HAVE YOU ACTUALLY HAD ANY THOUGHTS OF KILLING YOURSELF IN THE PAST MONTH?: NO

## 2024-12-28 ASSESSMENT — ACTIVITIES OF DAILY LIVING (ADL)
ADLS_ACUITY_SCORE: 41

## 2024-12-28 NOTE — ED TRIAGE NOTES
Pt reports having gradual onset of chest pain that started this morning. Pt noticed it was worse at 1315. Pt took tylenol for the pain which helped a little bit. Pt does not report any cardiac history. Reports pain a 1/10 and is described as pressure in the midsternal area with no radiation. Denies recent travel or swelling in the legs, no hx of blood clots. Pt reports some palpitations at times. Pt reports recent nose bleeds that lasted 2-3 hours within the last week.     Triage Assessment (Adult)       Row Name 12/28/24 1523          Triage Assessment    Airway WDL WDL        Respiratory WDL    Respiratory WDL WDL        Skin Circulation/Temperature WDL    Skin Circulation/Temperature WDL WDL        Cardiac WDL    Cardiac WDL X;chest pain     Cardiac Rhythm NSR;Other (Comment)  PVC        Chest Pain Assessment    Chest Pain Location midsternal     Character pressure        Peripheral/Neurovascular WDL    Peripheral Neurovascular WDL WDL        Cognitive/Neuro/Behavioral WDL    Cognitive/Neuro/Behavioral WDL WDL

## 2024-12-29 NOTE — ED PROVIDER NOTES
History     Chief Complaint:  Chest Pain       HPI   Jennifer Jane is a 74 year old female who presents to the emergency department with chest discomfort.  She noted at this morning soon after waking she felt discomfort in her anterior chest as well as her back she did not take anything but spent the day lying about.  Getting up seem to make a little worse as did coughing.  She has had a cough for a few days she denies radiation to her neck jaw.  She denies sweats nausea shortness of breath.  She states she has had several stress tests in the past which have always been negative.  She states her father had cardiac disease in his 70s.  She does not smoke have high cholesterol or diabetes.  She is on blood pressure medicine      Independent Historian:     old charts reviewed    Review of External Notes:  Old charts reviewed    Medications:    predniSONE (DELTASONE) 20 MG tablet  acetaminophen (TYLENOL) 325 MG tablet  acetylcysteine (N-ACETYL CYSTEINE) 600 MG CAPS capsule  ADVAIR -21 MCG/ACT inhaler  albuterol (VENTOLIN HFA) 108 (90 Base) MCG/ACT inhaler  alendronate (FOSAMAX) 70 MG tablet  amLODIPine (NORVASC) 5 MG tablet  atorvastatin (LIPITOR) 20 MG tablet  azelastine (ASTELIN) 0.1 % nasal spray  brimonidine (ALPHAGAN-P) 0.15 % ophthalmic solution  Calcium Carbonate Antacid (TUMS PO)  co-enzyme Q-10 100 MG CAPS capsule  fexofenadine (ALLEGRA) 180 MG tablet  ipratropium (ATROVENT) 0.03 % nasal spray  latanoprost (XALATAN) 0.005 % ophthalmic solution  loratadine (CLARITIN) 10 MG tablet  losartan (COZAAR) 50 MG tablet  melatonin 5 MG CAPS  montelukast (SINGULAIR) 10 MG tablet  moxifloxacin (VIGAMOX) 0.5 % ophthalmic solution  SPIRIVA RESPIMAT 1.25 MCG/ACT inhaler  traZODone (DESYREL) 50 MG tablet  Vitamin D3 (CHOLECALCIFEROL) 25 mcg (1000 units) tablet        Past Medical History:    Past Medical History:   Diagnosis Date    Acute bilateral low back pain with bilateral sciatica     Acute pain  "of right knee     Amaurosis fugax     Arthritis     Back injury     Benign neoplasm of ascending colon     Chondromalacia of patella, right     Chronic left-sided headache     Diarrhea, unspecified type     Diverticular disease of large intestine     Encounter for Medicare annual wellness exam     Glaucoma     Glaucoma suspect     Hiatal hernia     HTN (hypertension)     Hypercalciuria     Infection due to 2019 novel coronavirus 02/10/2024    Insomnia 12/26/2012    Low bone density 2022    Overweight (BMI 25.0-29.9)     Polyp of colon     Prediabetes     Primary hyperparathyroidism (H)     Pseudophakia, both eyes     Reduced vision 09/2016    Right foot pain     Sciatica     Spinal stenosis of lumbar region without neurogenic claudication     Uncomplicated asthma     Weakness of left lower extremity        Past Surgical History:    Past Surgical History:   Procedure Laterality Date     cataract extraction with intraocular lens implant Right 01/31/2019    BACK SURGERY  1984    micro-disc-ectomy    BREAST SURGERY  1984    L Breast bx    cataract extraction with intraocular lens Left 12/06/2018    CHOLECYSTECTOMY  1990    INJECT EPIDURAL TRANSFORAMINAL Bilateral 1/31/2024    Procedure: Bilateral L5-S1 Transforaminal epidural steroid injection;  Surgeon: Sarahy Tsang MD;  Location: [facility] OR    IRIDOTOMY/IRIDECTOMY LASER SURGERY Left 09/27/2016    IRIDOTOMY/IRIDECTOMY LASER SURGERY Right 10/18/2016    TUBAL LIGATION  1989    [facility] STOMACH SURGERY PROCEDURE UNLISTED  03/1990    Cholecystectomy, 11/1989 tubal ligation          Physical Exam   Patient Vitals for the past 24 hrs:   BP Temp Temp src Pulse Resp SpO2 Height Weight   12/28/24 1930 -- -- -- 92 -- 97 % -- --   12/28/24 1925 (!) 127/103 -- -- -- -- -- -- --   12/28/24 1715 (!) 157/106 -- -- -- -- -- -- --   12/28/24 1517 135/81 97.2  F (36.2  C) Tympanic 80 19 97 % 1.626 m (5' 4\") 71.7 kg (158 lb)        Physical Exam  Constitutional: Middle-aged white female " sitting no respiratory distress  HENT: No signs of trauma.   Eyes: EOM are normal. Pupils are equal, round, and reactive to light.   Neck: Normal range of motion. No JVD present. No cervical adenopathy.  Cardiovascular: Regular rhythm.  Exam reveals no gallop and no friction rub.    No murmur heard.  Pulmonary/Chest: Bilateral breath sounds normal. No wheezes, rhonchi or rales.  There is some reproducible tenderness on palpation of the left anterior costochondral margins there is no redness or swelling.  Abdominal: Soft. No tenderness. No rebound or guarding.   Musculoskeletal: No edema.  Mild tenderness over radial aspect of right wrist no redness or swelling positive Finkelstein test  Lymphadenopathy: No lymphadenopathy.   Neurological: Alert and oriented to person, place, and time. Normal strength. Coordination normal.   Skin: Skin is warm and dry. No rash noted. No erythema.      Emergency Department Course   ECG  Normal sinus rhythm with occasional aberrant PACs and or PVCs left axis deviation no acute ischemic or hypertrophic changes  Rate 75 bpm. MN interval 142 ms. QRS duration 78 ms. QT/QTc 382/426 ms. P-R-T axes 55 -1446.    Imaging:  Chest XR,  PA & LAT   Final Result   IMPRESSION: Negative chest.      Echo Stress Echocardiogram    (Results Pending)       Laboratory:  Labs Ordered and Resulted from Time of ED Arrival to Time of ED Departure   COMPREHENSIVE METABOLIC PANEL - Abnormal       Result Value    Sodium 140      Potassium 3.8      Carbon Dioxide (CO2) 26      Anion Gap 9      Urea Nitrogen 10.4      Creatinine 0.73      GFR Estimate 85      Calcium 10.2      Chloride 105      Glucose 140 (*)     Alkaline Phosphatase 66      AST 26      ALT 32      Protein Total 6.6      Albumin 4.3      Bilirubin Total 0.5     TROPONIN T, HIGH SENSITIVITY - Abnormal    Troponin T, High Sensitivity 40 (*)    TROPONIN T, HIGH SENSITIVITY - Abnormal    Troponin T, High Sensitivity 31 (*)    D DIMER QUANTITATIVE -  Normal    D-Dimer Quantitative 0.43     INFLUENZA A/B, RSV AND SARS-COV2 PCR - Normal    Influenza A PCR Negative      Influenza B PCR Negative      RSV PCR Negative      SARS CoV2 PCR Negative     CBC WITH PLATELETS AND DIFFERENTIAL    WBC Count 8.5      RBC Count 4.69      Hemoglobin 14.1      Hematocrit 41.9      MCV 89      MCH 30.1      MCHC 33.7      RDW 12.3      Platelet Count 258      % Neutrophils 73      % Lymphocytes 17      % Monocytes 6      % Eosinophils 3      % Basophils 1      % Immature Granulocytes 0      NRBCs per 100 WBC 0      Absolute Neutrophils 6.2      Absolute Lymphocytes 1.5      Absolute Monocytes 0.5      Absolute Eosinophils 0.2      Absolute Basophils 0.0      Absolute Immature Granulocytes 0.0      Absolute NRBCs 0.0          Procedures   None    Emergency Department Course & Assessments:    Interventions:  Medications - No data to display     Assessments:  Seen and evaluated    Independent Interpretation (X-rays, CTs, rhythm strip):  Chest x-ray reviewed by me    Consultations/Discussion of Management or Tests:  None       Social Drivers of Health affecting care:  None     Disposition:  Discharge    Impression & Plan    CMS Diagnoses: None       Medical Decision Making:  This middle-aged female presents with some chest discomfort.  She sat around most of the day and did not take anything for it she says it is not too bad when I push on the left side of the chest or she coughs it seems to be worse.  Her workup did show some minimally elevated troponin but on repeat it was less.  EKG did not show acute ischemic changes and chest x-ray was unremarkable D-dimer was also within normal limits.  I think this is unlikely to be acute coronary disease PE or dissection.  More likely chest wall pain due to coughing.  However given her risk factors I did offer her admission for chest pain evaluation.  She preferred to follow-up as outpatient and did agree to me ordering a outpatient stress echo  to be done at the [facility].  I have told the patient she needs to follow-up with her PMD after this and if symptoms change or worsen recheck in the ED.  She states she cannot take aspirin or Advil due to her asthma medication.  However she also complained of wrist discomfort and I think this is likely tenosynovitis I will start her on a short course of prednisone which may also help with the chest pain.        Diagnosis:    ICD-10-CM    1. Chest pain, unspecified type  R07.9 Echo Stress Echocardiogram      2. Tenosynovitis of hand  M65.949            Discharge Medications:  Discharge Medication List as of 12/28/2024  7:20 PM        START taking these medications    Details   predniSONE (DELTASONE) 20 MG tablet Take two tablets (= 40mg) each day for 5 (five) days, Disp-10 tablet, R-0, Local Print                Sohail Presley MD  12/29/2024   No att. providers found              Sohail Presley MD  12/29/24 0009

## 2024-12-30 ENCOUNTER — OFFICE VISIT (OUTPATIENT)
Dept: OTOLARYNGOLOGY | Facility: CLINIC | Age: 74
End: 2024-12-30
Payer: COMMERCIAL

## 2024-12-30 ENCOUNTER — TELEPHONE (OUTPATIENT)
Dept: CARDIOLOGY | Facility: CLINIC | Age: 74
End: 2024-12-30

## 2024-12-30 ENCOUNTER — PRE VISIT (OUTPATIENT)
Dept: OTOLARYNGOLOGY | Facility: CLINIC | Age: 74
End: 2024-12-30

## 2024-12-30 VITALS
HEART RATE: 83 BPM | WEIGHT: 158 LBS | DIASTOLIC BLOOD PRESSURE: 66 MMHG | HEIGHT: 64 IN | SYSTOLIC BLOOD PRESSURE: 125 MMHG | BODY MASS INDEX: 26.98 KG/M2

## 2024-12-30 DIAGNOSIS — E21.0 PRIMARY HYPERPARATHYROIDISM: ICD-10-CM

## 2024-12-30 PROCEDURE — 99203 OFFICE O/P NEW LOW 30 MIN: CPT | Performed by: SURGERY

## 2024-12-30 NOTE — PROGRESS NOTES
Parathyroid at [place]  CT parathyroid ordered    SURGERY CLINIC CONSULTATION    REASON FOR CONSULTATION:  Jennifer Jane was referred by Dr. Eid for evaluation and discussion of treatment options for hyperparathyroidism     HISTORY OF PRESENT ILLNESS:  Jennifer Jane is a 74 year old female who has been followed by Dr. Eid since at least 2023 with mild hyper calcium Andreea and elevated parathyroid hormone.  She also has known osteo porosis.  However her most recent labs from June 2024 include: Parathyroid hormone level 61, ionized calcium 5.2, total calcium 10.0, phosphorus 2.0.  24-hour urine calcium was not repeated.  Her images of the neck include an ultrasound that identified an area of concern possibly in the right inferior position and a nuclear medicine scan from 2 years ago identified an area of concern possibly in the left inferior position.    Symptoms associated with hyperparathyroidism essentially her only osteoporosis for which she is on alendronate in the past.  She denies any fatigue muscle aches or joint pain.  No nephrolithiasis.  No recent fractures.  No family or previous history of hyperparathyroidism.  No problems with voice quality inspiration or swallowing.      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, mild stage    Bilateral edema of lower extremity    Low bone density    Primary hyperparathyroidism (H)    Hypercalciuria    Lumbar radiculopathy with sciatica    Neuroforaminal stenosis of lumbar spine     Chronic idiopathic constipation    PVC's (premature ventricular contractions)    Post-COVID chronic concentration deficit    Hoarseness or changing voice    Postmenopausal status    Multiple joint pain    Other fatigue       Past Surgical History:   Procedure Laterality Date     cataract extraction with intraocular lens implant Right 01/31/2019    BACK SURGERY  1984    micro-disc-ectomy    BREAST SURGERY  1984    L Breast bx    cataract extraction with intraocular lens Left 12/06/2018    CHOLECYSTECTOMY  1990    INJECT EPIDURAL TRANSFORAMINAL Bilateral 1/31/2024    Procedure: Bilateral L5-S1 Transforaminal epidural steroid injection;  Surgeon: Sarahy Tsang MD;  Location: [facility] OR    IRIDOTOMY/IRIDECTOMY LASER SURGERY Left 09/27/2016    IRIDOTOMY/IRIDECTOMY LASER SURGERY Right 10/18/2016    TUBAL LIGATION  1989    [facility] STOMACH SURGERY PROCEDURE UNLISTED  03/1990    Cholecystectomy, 11/1989 tubal ligation       Allergies   Allergen Reactions    Canola Oil [Vegetable Oil] Anaphylaxis and GI Disturbance    Animal Dander Unknown    Dust Mites Unknown    Grass     Hydroxyzine Other (See Comments)     Passed out        Hydroxyzine Hcl     Pollen Extract Unknown    Trees     Ragweeds     Chlorhexidine Itching and Rash     Hibiclens         Medications reviewed in EMR        Family History   Problem Relation Age of Onset    Heart Disease Mother     Diabetes Mother     Coronary Artery Disease Mother     Hypertension Mother     Anesthesia Reaction Mother         Anaphylaxis, oral opioixd    Asthma Mother     Thyroid Disease Mother     Low Back Problems Mother     Heart Disease Father     Diabetes Father     Coronary Artery Disease Father     Hypertension Father     Hyperlipidemia Father     Low Back Problems Father     Diabetes Sister     Hypertension Sister     Low Back Problems Sister     Thyroid Disease Sister     Hip fracture Sister     Low Back Problems Brother     Asthma Maternal Grandmother     Coronary Artery Disease  "Maternal Grandfather     Cerebrovascular Disease Paternal Grandfather     Asthma Daughter     Genetic Disease Daughter         Kallman syndrome; no olfactory bulb;    Calcium Disorder No family hx of           PHYSICAL EXAM:  /66 (BP Location: Left arm, Patient Position: Sitting, Cuff Size: Adult Regular)   Pulse 83   Ht 1.626 m (5' 4\")   Wt 71.7 kg (158 lb)   LMP  (LMP Unknown)   BMI 27.12 kg/m      Neck: Her thyroid gland is within normal limits.  Trachea is midline.  No cervical lymphadenopathy.    I personally reviewed the radiographic images and laboratory data    ASSESSMENT:   1. Primary hyperparathyroidism (H)        PLAN:   The patient does have hyperparathyroidism although her numbers have been improved.  I would like to see the results of her recent 24-hour urine calcium.  She has not had that done and I recommend that she go downstairs and  with jug for her 24-hour urine calcium and repeat her labs prior to her surgery.  I am also placing an order for her to have a CT parathyroid scan.    Following all of the above I would then recommend the patient undergo a neck exploration resection of a parathyroid adenoma or adenomas.  The surgical procedure was discussed with the patient including but not limited to the risks of bleeding infection injury to the recurrent laryngeal nerve or nerves potential permanent hypocalcemia or missed parathyroid adenoma.  Patient is aware of this is agreed to proceed with surgery and also has agreed to proceed with further workup.    Katina Regan MD            "

## 2024-12-30 NOTE — TELEPHONE ENCOUNTER
1st attempt- Left voicemail for the patient to call back and schedule the following:    Appointment type:  Urgent Stress Test  Provider:  [facility]  Return date:  [facility]  Additional appointment(s) needed:  n/a  Additonal Notes:    Specialty phone number: [phone]

## 2024-12-30 NOTE — TELEPHONE ENCOUNTER
RECORDS RECEIVED FROM: Internal    REASON FOR VISIT: Family history of dementia [Z81.8]  Post-COVID chronic concentration deficit [R41.840, U09.9]    PROVIDER: Carlitos Feliz MD   DATE OF APPT: 3/17/25 @ 2:00 pm    NOTES (FOR ALL VISITS) STATUS DETAILS   OFFICE NOTE from referring provider Internal 10/29/24, 9/30/24, 7/25/24 Apoorva Nelson PA-C @[facility]     OFFICE NOTE from other specialist Internal 7/26/24 Sun Person MD @[facility]     MEDICATION LIST Internal    IMAGING  (FOR ALL VISITS)     ULTRASOUND (CAROTID BILAT) *VASCULAR* Internal [facility]  7/12/23 US Head Neck     MRI (HEAD, NECK, SPINE) Internal [facility]  1/7/21 MR Brain

## 2024-12-30 NOTE — LETTER
12/30/2024       RE: Jennifer Jane  [address]  [facility] [address]     Dear Colleague,    Thank you for referring your patient, Jennifer Jane, to the [facility] [place] [facility]. Please see a copy of my visit note below.    Parathyroid at [facility]  CT parathyroid ordered    SURGERY CLINIC CONSULTATION    REASON FOR CONSULTATION:  Jennifer Jane was referred by Dr. Eid for evaluation and discussion of treatment options for hyperparathyroidism     HISTORY OF PRESENT ILLNESS:  Jennifer Jane is a 74 year old female who has been followed by Dr. Eid since at least 2023 with mild hyper calcium Andreea and elevated parathyroid hormone.  She also has known osteo porosis.  However her most recent labs from June 2024 include: Parathyroid hormone level 61, ionized calcium 5.2, total calcium 10.0, phosphorus 2.0.  24-hour urine calcium was not repeated.  Her images of the neck include an ultrasound that identified an area of concern possibly in the right inferior position and a nuclear medicine scan from 2 years ago identified an area of concern possibly in the left inferior position.    Symptoms associated with hyperparathyroidism essentially her only osteoporosis for which she is on alendronate in the past.  She denies any fatigue muscle aches or joint pain.  No nephrolithiasis.  No recent fractures.  No family or previous history of hyperparathyroidism.  No problems with voice quality inspiration or swallowing.      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, mild stage     Bilateral edema of lower extremity     Low bone density     Primary hyperparathyroidism (H)     Hypercalciuria     Lumbar radiculopathy with sciatica     Neuroforaminal stenosis of lumbar spine     Chronic idiopathic constipation     PVC's (premature ventricular contractions)     Post-COVID chronic concentration deficit     Hoarseness or changing voice     Postmenopausal status     Multiple joint pain     Other fatigue       Past Surgical History:   Procedure Laterality Date      cataract extraction with intraocular lens implant Right 01/31/2019     BACK SURGERY  1984    micro-disc-ectomy     BREAST SURGERY  1984    L Breast bx     cataract extraction with intraocular lens Left 12/06/2018     CHOLECYSTECTOMY  1990     INJECT EPIDURAL TRANSFORAMINAL Bilateral 1/31/2024    Procedure: Bilateral L5-S1 Transforaminal epidural steroid injection;  Surgeon: Sarahy Tsang MD;  Location: [facility] OR     IRIDOTOMY/IRIDECTOMY LASER SURGERY Left 09/27/2016     IRIDOTOMY/IRIDECTOMY LASER SURGERY Right 10/18/2016     TUBAL LIGATION  1989     ZZC STOMACH SURGERY PROCEDURE UNLISTED  03/1990    Cholecystectomy, 11/1989 tubal ligation       Allergies   Allergen Reactions     Canola Oil [Vegetable Oil] Anaphylaxis and GI Disturbance     Animal Dander Unknown     Dust Mites Unknown     Grass      Hydroxyzine Other (See Comments)     Passed out         Hydroxyzine Hcl      Pollen Extract Unknown     Trees      Ragweeds      Chlorhexidine Itching and Rash     Hibiclens         Medications reviewed in EMR        Family History   Problem Relation Age of Onset     Heart Disease Mother      Diabetes Mother      Coronary Artery Disease Mother      Hypertension Mother      Anesthesia Reaction Mother         Anaphylaxis, oral opioixd     Asthma Mother      Thyroid Disease Mother      Low  "Back Problems Mother      Heart Disease Father      Diabetes Father      Coronary Artery Disease Father      Hypertension Father      Hyperlipidemia Father      Low Back Problems Father      Diabetes Sister      Hypertension Sister      Low Back Problems Sister      Thyroid Disease Sister      Hip fracture Sister      Low Back Problems Brother      Asthma Maternal Grandmother      Coronary Artery Disease Maternal Grandfather      Cerebrovascular Disease Paternal Grandfather      Asthma Daughter      Genetic Disease Daughter         Kallman syndrome; no olfactory bulb;     Calcium Disorder No family hx of           PHYSICAL EXAM:  /66 (BP Location: Left arm, Patient Position: Sitting, Cuff Size: Adult Regular)   Pulse 83   Ht 1.626 m (5' 4\")   Wt 71.7 kg (158 lb)   LMP  (LMP Unknown)   BMI 27.12 kg/m      Neck: Her thyroid gland is within normal limits.  Trachea is midline.  No cervical lymphadenopathy.    I personally reviewed the radiographic images and laboratory data    ASSESSMENT:   1. Primary hyperparathyroidism (H)        PLAN:   The patient does have hyperparathyroidism although her numbers have been improved.  I would like to see the results of her recent 24-hour urine calcium.  She has not had that done and I recommend that she go downstairs and  with jug for her 24-hour urine calcium and repeat her labs prior to her surgery.  I am also placing an order for her to have a CT parathyroid scan.    Following all of the above I would then recommend the patient undergo a neck exploration resection of a parathyroid adenoma or adenomas.  The surgical procedure was discussed with the patient including but not limited to the risks of bleeding infection injury to the recurrent laryngeal nerve or nerves potential permanent hypocalcemia or missed parathyroid adenoma.  Patient is aware of this is agreed to proceed with surgery and also has agreed to proceed with further workup.    Katina Regan, " MD              Again, thank you for allowing me to participate in the care of your patient.      Sincerely,    Katina Regan MD

## 2025-01-03 ENCOUNTER — HOSPITAL ENCOUNTER (OUTPATIENT)
Dept: CARDIOLOGY | Facility: CLINIC | Age: 75
Discharge: HOME | End: 2025-01-03
Attending: EMERGENCY MEDICINE | Admitting: EMERGENCY MEDICINE
Payer: COMMERCIAL

## 2025-01-03 DIAGNOSIS — R07.9 CHEST PAIN, UNSPECIFIED TYPE: ICD-10-CM

## 2025-01-03 PROCEDURE — 93350 STRESS TTE ONLY: CPT | Mod: TC

## 2025-01-03 PROCEDURE — 93321 DOPPLER ECHO F-UP/LMTD STD: CPT | Mod: 26 | Performed by: INTERNAL MEDICINE

## 2025-01-03 PROCEDURE — 93325 DOPPLER ECHO COLOR FLOW MAPG: CPT | Mod: TC

## 2025-01-03 PROCEDURE — 93018 CV STRESS TEST I&R ONLY: CPT | Performed by: INTERNAL MEDICINE

## 2025-01-03 PROCEDURE — 93350 STRESS TTE ONLY: CPT | Mod: 26 | Performed by: INTERNAL MEDICINE

## 2025-01-03 PROCEDURE — 93325 DOPPLER ECHO COLOR FLOW MAPG: CPT | Mod: 26 | Performed by: INTERNAL MEDICINE

## 2025-01-03 PROCEDURE — 93016 CV STRESS TEST SUPVJ ONLY: CPT | Performed by: INTERNAL MEDICINE

## 2025-01-06 ENCOUNTER — VIRTUAL VISIT (OUTPATIENT)
Dept: FAMILY MEDICINE | Facility: CLINIC | Age: 75
End: 2025-01-06
Payer: COMMERCIAL

## 2025-01-06 ENCOUNTER — ANCILLARY PROCEDURE (OUTPATIENT)
Dept: CT IMAGING | Facility: CLINIC | Age: 75
End: 2025-01-06
Attending: SURGERY
Payer: COMMERCIAL

## 2025-01-06 DIAGNOSIS — Z09 HOSPITAL DISCHARGE FOLLOW-UP: Primary | ICD-10-CM

## 2025-01-06 DIAGNOSIS — J45.40 MODERATE PERSISTENT ASTHMA WITH ALLERGIC RHINITIS WITHOUT COMPLICATION: ICD-10-CM

## 2025-01-06 DIAGNOSIS — J01.90 ACUTE SINUSITIS WITH SYMPTOMS > 10 DAYS: ICD-10-CM

## 2025-01-06 DIAGNOSIS — R07.9 CHEST PAIN, UNSPECIFIED TYPE: ICD-10-CM

## 2025-01-06 DIAGNOSIS — I10 BENIGN ESSENTIAL HYPERTENSION: ICD-10-CM

## 2025-01-06 DIAGNOSIS — R41.3 MEMORY DIFFICULTIES: ICD-10-CM

## 2025-01-06 DIAGNOSIS — E21.0 PRIMARY HYPERPARATHYROIDISM: ICD-10-CM

## 2025-01-06 DIAGNOSIS — E78.00 HYPERCHOLESTEROLEMIA: ICD-10-CM

## 2025-01-06 DIAGNOSIS — H40.2230 CHRONIC ANGLE-CLOSURE GLAUCOMA OF BOTH EYES, UNSPECIFIED GLAUCOMA STAGE: ICD-10-CM

## 2025-01-06 DIAGNOSIS — F51.04 CHRONIC INSOMNIA: ICD-10-CM

## 2025-01-06 PROCEDURE — 98006 SYNCH AUDIO-VIDEO EST MOD 30: CPT

## 2025-01-06 PROCEDURE — 70492 CT SFT TSUE NCK W/O & W/DYE: CPT | Performed by: RADIOLOGY

## 2025-01-06 RX ORDER — IOPAMIDOL 755 MG/ML
70 INJECTION, SOLUTION INTRAVASCULAR ONCE
Status: COMPLETED | OUTPATIENT
Start: 2025-01-06 | End: 2025-01-06

## 2025-01-06 RX ORDER — DOXYCYCLINE HYCLATE 100 MG
100 TABLET ORAL 2 TIMES DAILY
Qty: 14 TABLET | Refills: 0 | Status: SHIPPED | OUTPATIENT
Start: 2025-01-06 | End: 2025-01-13

## 2025-01-06 RX ADMIN — IOPAMIDOL 70 ML: 755 INJECTION, SOLUTION INTRAVASCULAR at 16:08

## 2025-01-06 ASSESSMENT — ASTHMA QUESTIONNAIRES
QUESTION_3 LAST FOUR WEEKS HOW OFTEN DID YOUR ASTHMA SYMPTOMS (WHEEZING, COUGHING, SHORTNESS OF BREATH, CHEST TIGHTNESS OR PAIN) WAKE YOU UP AT NIGHT OR EARLIER THAN USUAL IN THE MORNING: ONCE A WEEK
ACT_TOTALSCORE: 19
ACT_TOTALSCORE: 19
QUESTION_2 LAST FOUR WEEKS HOW OFTEN HAVE YOU HAD SHORTNESS OF BREATH: ONCE OR TWICE A WEEK
QUESTION_4 LAST FOUR WEEKS HOW OFTEN HAVE YOU USED YOUR RESCUE INHALER OR NEBULIZER MEDICATION (SUCH AS ALBUTEROL): ONCE A WEEK OR LESS
QUESTION_5 LAST FOUR WEEKS HOW WOULD YOU RATE YOUR ASTHMA CONTROL: WELL CONTROLLED
QUESTION_1 LAST FOUR WEEKS HOW MUCH OF THE TIME DID YOUR ASTHMA KEEP YOU FROM GETTING AS MUCH DONE AT WORK, SCHOOL OR AT HOME: A LITTLE OF THE TIME

## 2025-01-06 NOTE — PROGRESS NOTES
Fidelina is a 74 year old who is being evaluated via a billable video visit.    How would you like to obtain your AVS? MyChart  If the video visit is dropped, the invitation should be resent by: Text to cell phone: [phone]  Will anyone else be joining your video visit? No      Assessment & Plan     (Z09) Hospital discharge follow-up  (primary encounter diagnosis)  Comment: Status improving    (R07.9) Chest pain, unspecified type  Comment: Chest pains resolved, continues to have chest congestion    (E21.0) Primary hyperparathyroidism (H)  Comment: Follows with endo, on weely Fosamax. Has parathyroid CT today    (J45.40) Moderate persistent asthma with allergic rhinitis without complication  Comment: Follows with asthma/allergist, on immunotherapy. Feels overall well-controlled on current regimen    (I10) Benign essential hypertension  Comment: Controlled, on amlodipine and losartan    (R41.3) Memory difficulties  Comment: Has had some ongoing memory troubles, both short and long-term. Mother w/ hx of Alzheimer's dementia and brother with Lewy Body dementia. Discussed OT referral for cognitive therapy vs geriatrics referral which she is interested in  Plan: Geriatrics Referral    (P78.2310) Chronic angle-closure glaucoma of both eyes, unspecified glaucoma stage  Comment: Follows with opthamology, on brimonide and latanoprost    (F51.04) Chronic insomnia  Comment: On trazodone    (J01.90) Acute sinusitis with symptoms > 10 days  Comment: Continues to have sinus pressure/nasal drainage, has not previously been treated but would like to pursue antibiotic therapy at this time given ongoing symptoms for >1 month  Plan: doxycycline hyclate (VIBRA-TABS) 100 MG tablet    (E78.00) Hypercholesterolemia  Comment: On statin, continue          MED REC REQUIRED  Post Medication Reconciliation Status:       CONSULTATION/REFERRAL to Geriatrics  FUTURE APPOINTMENTS:       - Follow-up for annual visit or as needed    Henrry Kitchen  is a 74 year old, presenting for the following health issues:  ER F/U      Video Start Time: 10:37 AM    Here for ED follow-up for chest pains as well as establish care. Overall workup was negative for ACS. Was provided with steroid burst. Continues to have some chest congestion. Feels she has been battling a sinus infection - symptoms have been present for months but continues to have nasal drainage. Took Afrin for a short time which helped. Some mild sinus pains/HA  Had negative stress echo done 1/3  Sees endocrinology for hyperparathyroidism. Also has asthma/allergy provider  Hx of long covid, reports being very impactful on her life over the past year  Reports memory is getting worse  Also notes some nasal issues - has ongoing nose bleeds  Retired RN         ED/UC Followup:    Facility:  [facility] Emergency Dept  Date of visit: 12/28/24  Reason for visit: Chest Pain  Current Status: stable        Review of Systems  Constitutional, HEENT, cardiovascular, pulmonary, gi and gu systems are negative, except as otherwise noted.      Objective    Vitals - Patient Reported  Pain Score: No Pain (0)      Vitals:  No vitals were obtained today due to virtual visit.    Physical Exam   GENERAL: alert and no distress  EYES: Eyes grossly normal to inspection.  No discharge or erythema, or obvious scleral/conjunctival abnormalities.  RESP: No audible wheeze, cough, or visible cyanosis.    SKIN: Visible skin clear. No significant rash, abnormal pigmentation or lesions.  NEURO: Cranial nerves grossly intact.  Mentation and speech appropriate for age.  PSYCH: Appropriate affect, tone, and pace of words          Video-Visit Details    Type of service:  Video Visit   Video End Time: 10:59 AM  Originating Location (pt. Location): Home  Distant Location (provider location):  On-site  Platform used for Video Visit: [employer]  Signed Electronically by: Destiny Shaikh, DNP, APRN, CNP

## 2025-01-06 NOTE — DISCHARGE INSTRUCTIONS

## 2025-01-20 ENCOUNTER — LAB (OUTPATIENT)
Dept: LAB | Facility: CLINIC | Age: 75
End: 2025-01-20
Payer: COMMERCIAL

## 2025-01-20 DIAGNOSIS — E21.0 PRIMARY HYPERPARATHYROIDISM: Primary | ICD-10-CM

## 2025-01-21 LAB
CALCIUM 24H UR-MRATE: 0.31 G/SPEC (ref 0.1–0.3)
CALCIUM UR-MCNC: 7.1 MG/DL
COLLECT DURATION TIME UR: 24 H
SPECIMEN VOL UR: 4350 ML

## 2025-01-30 ENCOUNTER — VIRTUAL VISIT (OUTPATIENT)
Dept: PHYSICAL MEDICINE AND REHAB | Facility: CLINIC | Age: 75
End: 2025-01-30
Payer: COMMERCIAL

## 2025-01-30 VITALS — HEIGHT: 65 IN | BODY MASS INDEX: 26.16 KG/M2 | WEIGHT: 157 LBS

## 2025-01-30 DIAGNOSIS — R41.840 POST-COVID CHRONIC CONCENTRATION DEFICIT: ICD-10-CM

## 2025-01-30 DIAGNOSIS — U09.9 LONG COVID: ICD-10-CM

## 2025-01-30 DIAGNOSIS — U09.9 POST-COVID CHRONIC CONCENTRATION DEFICIT: ICD-10-CM

## 2025-01-30 DIAGNOSIS — Z81.8 FAMILY HISTORY OF DEMENTIA: Primary | ICD-10-CM

## 2025-01-30 SDOH — SOCIAL STABILITY: SOCIAL NETWORK: PROMIS ABILITY TO PARTICIPATE IN SOCIAL ROLES & ACTIVITIES T-SCORE: 54

## 2025-01-30 SDOH — SOCIAL STABILITY: SOCIAL NETWORK: I HAVE TROUBLE DOING ALL OF MY USUAL WORK (INCLUDE WORK AT HOME): NEVER

## 2025-01-30 SDOH — SOCIAL STABILITY: SOCIAL NETWORK: I HAVE TROUBLE DOING ALL OF THE ACTIVITIES WITH FRIENDS THAT I WANT TO DO: RARELY

## 2025-01-30 SDOH — SOCIAL STABILITY: SOCIAL NETWORK: I HAVE TROUBLE DOING ALL OF THE FAMILY ACTIVITIES THAT I WANT TO DO: NEVER

## 2025-01-30 SDOH — SOCIAL STABILITY: SOCIAL NETWORK: I HAVE TROUBLE DOING ALL OF MY REGULAR LEISURE ACTIVITIES WITH OTHERS: SOMETIMES

## 2025-01-30 SDOH — SOCIAL STABILITY: SOCIAL NETWORK

## 2025-01-30 ASSESSMENT — ANXIETY QUESTIONNAIRES
4. TROUBLE RELAXING: NOT AT ALL
6. BECOMING EASILY ANNOYED OR IRRITABLE: NOT AT ALL
8. IF YOU CHECKED OFF ANY PROBLEMS, HOW DIFFICULT HAVE THESE MADE IT FOR YOU TO DO YOUR WORK, TAKE CARE OF THINGS AT HOME, OR GET ALONG WITH OTHER PEOPLE?: NOT DIFFICULT AT ALL
2. NOT BEING ABLE TO STOP OR CONTROL WORRYING: NOT AT ALL
1. FEELING NERVOUS, ANXIOUS, OR ON EDGE: NOT AT ALL
3. WORRYING TOO MUCH ABOUT DIFFERENT THINGS: NOT AT ALL
5. BEING SO RESTLESS THAT IT IS HARD TO SIT STILL: NOT AT ALL
GAD7 TOTAL SCORE: 0
IF YOU CHECKED OFF ANY PROBLEMS ON THIS QUESTIONNAIRE, HOW DIFFICULT HAVE THESE PROBLEMS MADE IT FOR YOU TO DO YOUR WORK, TAKE CARE OF THINGS AT HOME, OR GET ALONG WITH OTHER PEOPLE: NOT DIFFICULT AT ALL
GAD7 TOTAL SCORE: 0
GAD7 TOTAL SCORE: 0
7. FEELING AFRAID AS IF SOMETHING AWFUL MIGHT HAPPEN: NOT AT ALL
7. FEELING AFRAID AS IF SOMETHING AWFUL MIGHT HAPPEN: NOT AT ALL

## 2025-01-30 ASSESSMENT — PAIN SCALES - GENERAL: PAINLEVEL_OUTOF10: NO PAIN (0)

## 2025-01-30 ASSESSMENT — PATIENT HEALTH QUESTIONNAIRE - PHQ9
SUM OF ALL RESPONSES TO PHQ QUESTIONS 1-9: 5
10. IF YOU CHECKED OFF ANY PROBLEMS, HOW DIFFICULT HAVE THESE PROBLEMS MADE IT FOR YOU TO DO YOUR WORK, TAKE CARE OF THINGS AT HOME, OR GET ALONG WITH OTHER PEOPLE: SOMEWHAT DIFFICULT
SUM OF ALL RESPONSES TO PHQ QUESTIONS 1-9: 5

## 2025-01-30 NOTE — PROGRESS NOTES
Jennifer Jane is a 73 year old female who presents to be evaluated for a billable video visit.    Video-Visit Details    Video visit Start time: 12:38 PM    Type of service:  Video Visit    Video End Time: 12:54 PM    Originating Location (pt. Location): Home    Distant Location (provider location):  Off- Site    Platform used for Video Visit: [facility]    Assessment/Impression   1. Post-COVID chronic concentration deficit/Family history of dementia  Patient with improving short-term memory loss and forgetfulness since COVID.  Encouraged to continue exercises for occupational therapy for fatigue and concentration.  Encouraged to continue N-acetylcysteine 600 mg.   Encouraged to keep appointment with Neurology. Encouraged to continue working on sleep as this is likely contributing.     2. Long COVID  Discussed COVID and Post COVID with patient.  Educational materials provided and all questions answered. Encouraged patient to receive booster vaccine as there is some evidence this can help with long COVID symptoms.      Plan:  I reviewed present knowledge on long-Covid.  Education was provided and question were answered.  Orders/Referrals as above  I will advised patient on test results  I will follow up with Jennifer Jane in 6 months. I will review progress and consider need for any other therapeutic interventions. If there are any questions and/or concerns she will call the clinic.      On day of encounter time spent in chart review and with patient in consultation, exam, education,coordination of care,  review of outside charts/documentation and documentation:  20 minutes     I have attempted to proof read for major spelling errors and apologize for any minor errors I may have missed.     This note was dictated using voice recognition software. Any grammatical or context distortions are unintentional and inherent to the software.  _________________________________  Apoorva Nelson PA-C   HEALTH  [place] [facility]   HPI   Patient returns for a follow up.  Patient feel she is getting better.  Having issues with her sinus. Patient still having short term memory issues. Feels this is more noticeable now.  She still writes a lot of things downs. She has a chart for her medicines as well.  She is having some forgetfulness every 3 weeks with her medicines.   NO other safety concerns.  Patient is not sleeping as well due nasal congestion. Fatigue is better and was able to clean house yesterday.   Patient did do Physical therapy for her joint pain and is doing exercises at home.     Current concerns: Health Concerns        1/6/2025     9:50 AM   PHQ Assesment Total Score(s)   PHQ-2 Score 0           10/28/2024     1:16 PM   GÉNESIS-7 Results   GÉNESIS 7 TOTAL SCORE 1 (minimal anxiety)   GÉNESIS-7 Total Score 1        Patient-reported         10/28/2024     1:18 PM   PTSD Screen Score   Have you ever experienced this kind of event? No         10/28/2024     1:23 PM   PROMIS-29   PROMIS Physical Function T-Score 33 (moderate dysfunction)   PROMIS Anxiety T-Score 51 (within normal limits)   PROMIS Depression T-Score 59 (mild)   PROMIS Fatigue T-Score 59 (mild)   PROMIS Sleep Disturbance T-Score 56 (mild)   PROMIS Ability to Participate in Social Roles & Activities T-Score 42 (mild dysfunction)   PROMIS Pain Interference T-Score 76 (severe)   PROMIS Pain Intensity 5       Past Medical History:   Diagnosis Date    Acute bilateral low back pain with bilateral sciatica     Acute pain of right knee     Pain for months, worse since helping friend move.  Exam consistent with meniscal tear.  Check xray today, depending on results MRI.  Ice, NSAIDS for now.  ACE.    Amaurosis fugax     Arthritis     Fibromyalgia, ? Osteoarthritis    Back injury     Micro discectomy, approx date 1983    Benign neoplasm of ascending colon     Chondromalacia of patella, right     Severe,  tri-compartmental on MRI 10/2017.  Referred to ortho.    Chronic left-sided headache     Negative MRI and CT of head 2018 and 2016  Normal labs TSH, CBC, CMP, ESR 12/2019.  See plan below in pt instructions.    Diarrhea, unspecified type     Diverticular disease of large intestine     Encounter for Medicare annual wellness exam     Up to date on mammogram and colon cancer screening.  Is due for shingles vaccine - do at pharmacy.    Glaucoma     Glaucoma suspect     Hiatal hernia     HTN (hypertension)     Hypercalciuria     Infection due to 2019 novel coronavirus 02/10/2024    x3    Insomnia 12/26/2012     Problem list name updated by automated process. Provider to review    Low bone density 2022    Overweight (BMI 25.0-29.9)     Polyp of colon     Prediabetes     Primary hyperparathyroidism     mild; borderline hypercalciuria    Pseudophakia, both eyes     Reduced vision 09/2016    L vision block, bilateral elevated eye presures,poss. Glacom    Right foot pain     Sciatica     On and off - exacerbates when lifts heavy things  Bilaterally.    Spinal stenosis of lumbar region without neurogenic claudication     Uncomplicated asthma     Diagnosed as 2 yo    Weakness of left lower extremity        Past Surgical History:   Procedure Laterality Date     cataract extraction with intraocular lens implant Right 01/31/2019    BACK SURGERY  1984    micro-disc-ectomy    BREAST SURGERY  1984    L Breast bx    cataract extraction with intraocular lens Left 12/06/2018    CHOLECYSTECTOMY  1990    INJECT EPIDURAL TRANSFORAMINAL Bilateral 1/31/2024    Procedure: Bilateral L5-S1 Transforaminal epidural steroid injection;  Surgeon: Sarahy Tsang MD;  Location: [facility] OR    IRIDOTOMY/IRIDECTOMY LASER SURGERY Left 09/27/2016    IRIDOTOMY/IRIDECTOMY LASER SURGERY Right 10/18/2016    TUBAL LIGATION  1989    [facility] STOMACH SURGERY PROCEDURE UNLISTED  03/1990    Cholecystectomy, 11/1989 tubal ligation       Family History   Problem Relation  Age of Onset    Heart Disease Mother     Diabetes Mother     Coronary Artery Disease Mother     Hypertension Mother     Anesthesia Reaction Mother         Anaphylaxis, oral opioixd    Asthma Mother     Thyroid Disease Mother     Low Back Problems Mother     Alzheimer Disease Mother     Heart Disease Father         CABG x2    Diabetes Father     Coronary Artery Disease Father     Hypertension Father     Hyperlipidemia Father     Low Back Problems Father     Diabetes Sister         insulin dependent    Hypertension Sister     Low Back Problems Sister     Thyroid Disease Sister     Hip fracture Sister     Low Back Problems Brother     Dementia Brother         Lewy Body    Asthma Maternal Grandmother     Coronary Artery Disease Maternal Grandfather     Cerebrovascular Disease Paternal Grandfather     Asthma Daughter     Genetic Disease Daughter         Kallman syndrome; no olfactory bulb;    Calcium Disorder No family hx of        Social History     Tobacco Use    Smoking status: Never     Passive exposure: Past    Smokeless tobacco: Never   Vaping Use    Vaping status: Never Used   Substance Use Topics    Alcohol use: Yes     Alcohol/week: 0.0 standard drinks of alcohol     Comment: 0 -2 times a year    Drug use: No         Current Outpatient Medications:     acetaminophen (TYLENOL) 325 MG tablet, Take 325-650 mg by mouth every 6 hours as needed for mild pain, Disp: , Rfl:     acetylcysteine (N-ACETYL CYSTEINE) 600 MG CAPS capsule, Take 1 capsule (600 mg) by mouth daily, Disp: 90 capsule, Rfl: 4    ADVAIR -21 MCG/ACT inhaler, Inhale 2 puffs into the lungs 2 times daily, Disp: , Rfl:     albuterol (VENTOLIN HFA) 108 (90 Base) MCG/ACT inhaler, Inhale 2 puffs into the lungs every 6 hours as needed for shortness of breath, Disp: 8.5 g, Rfl: 5    alendronate (FOSAMAX) 70 MG tablet, Take 1 tablet (70 mg) by mouth every 7 days Take 60 minutes before am meal with 8 oz. water. Remain upright for 30 minutes., Disp: 12  tablet, Rfl: 3    amLODIPine (NORVASC) 5 MG tablet, Take 1 tablet (5 mg) by mouth at bedtime, Disp: 90 tablet, Rfl: 4    atorvastatin (LIPITOR) 20 MG tablet, Take 1 tablet (20 mg) by mouth daily, Disp: 90 tablet, Rfl: 4    azelastine (ASTELIN) 0.1 % nasal spray, Spray 1 spray into both nostrils 2 times daily, Disp: , Rfl:     brimonidine (ALPHAGAN-P) 0.15 % ophthalmic solution, PLACE 1 DROP INTO BOTH EYES TWO TIMES A DAY., Disp: , Rfl:     Calcium Carbonate Antacid (TUMS PO), Take  by mouth At Bedtime., Disp: , Rfl:     co-enzyme Q-10 100 MG CAPS capsule, Take 1 capsule (100 mg) by mouth daily, Disp: 90 capsule, Rfl: 4    fexofenadine (ALLEGRA) 180 MG tablet, Take 180 mg by mouth daily, Disp: , Rfl:     ipratropium (ATROVENT) 0.03 % nasal spray, Spray 2 sprays into both nostrils 2 times daily, Disp: 30 mL, Rfl: 6    latanoprost (XALATAN) 0.005 % ophthalmic solution, Place 1 drop into the right eye At Bedtime, Disp: , Rfl:     loratadine (CLARITIN) 10 MG tablet, Take 10 mg by mouth daily., Disp: , Rfl:     losartan (COZAAR) 50 MG tablet, Take 1 tablet (50 mg) by mouth 2 times daily, Disp: 180 tablet, Rfl: 4    melatonin 5 MG CAPS, Take 1 capsule by mouth at bedtime., Disp: , Rfl:     montelukast (SINGULAIR) 10 MG tablet, TAKE ONE TABLET BY MOUTH EVERY DAY AS DIRECTED, Disp: , Rfl: 1    moxifloxacin (VIGAMOX) 0.5 % ophthalmic solution, Place 1 drop into both eyes 4 times daily., Disp: , Rfl:     SPIRIVA RESPIMAT 1.25 MCG/ACT inhaler, INHALE 2 PUFFS ONCE PER DAY, Disp: , Rfl:     traZODone (DESYREL) 50 MG tablet, Take 1-3 tablets ( mg) by mouth at bedtime, Disp: 130 tablet, Rfl: 4    Vitamin D3 (CHOLECALCIFEROL) 25 mcg (1000 units) tablet, Take by mouth daily, Disp: , Rfl:     Review of Systems   Constitutional, HEENT, cardiovascular, pulmonary, gi and gu systems are negative, except as otherwise noted.      Objective         Vitals:  No vitals were obtained today due to virtual visit.    Physical Exam   EYES:  Eyes grossly normal to inspection.  No discharge or erythema, or obvious scleral/conjunctival abnormalities.  SKIN: Visible skin clear. No significant rash, abnormal pigmentation or lesions.  NEURO: Cranial nerves grossly intact.  Mentation and speech appropriate for age.  GENERAL: Healthy, alert and no distress  RESP: No audible wheeze, cough, or visible cyanosis.  No visible retractions or increased work of breathing.    PSYCH: Mentation appears normal, affect normal/bright, judgement and insight intact, normal speech and appearance well-groomed.    Labs : No recent labs    Imaging:    I personally reviewed the following imaging results today and those on care everywhere, if indicated     Nothing new to review    Reviewed imaging from [facility] sites     Medical Records Reviewed:    Reviewed consults/documents from [facility] including  Emergency Medicine, Cardiology,Family, Practice, Urgent care, ENT, and PT

## 2025-01-30 NOTE — LETTER
1/30/2025       RE: Jennifer Jane  [address]  [facility] [address]     Dear Colleague,    Thank you for referring your patient, Jennifer Jane, to the [facility] PHYSICAL MEDICINE AND REHABILITATION CLINIC [place] at [facility]. Please see a copy of my visit note below.    Jennifer Jane is a 73 year old female who presents to be evaluated for a billable video visit.    Video-Visit Details    Video visit Start time: 12:38 PM    Type of service:  Video Visit    Video End Time: 12:54 PM    Originating Location (pt. Location): Home    Distant Location (provider location):  Off- Site    Platform used for Video Visit: Gemmyo    Assessment/Impression   1. Post-COVID chronic concentration deficit/Family history of dementia  Patient with improving short-term memory loss and forgetfulness since COVID.  Encouraged to continue exercises for occupational therapy for fatigue and concentration.  Encouraged to continue N-acetylcysteine 600 mg.   Encouraged to keep appointment with Neurology. Encouraged to continue working on sleep as this is likely contributing.     2. Long COVID  Discussed COVID and Post COVID with patient.  Educational materials provided and all questions answered. Encouraged patient to receive booster vaccine as there is some evidence this can help with long COVID symptoms.      Plan:  I reviewed present knowledge on long-Covid.  Education was provided and question were answered.  Orders/Referrals as above  I will advised patient on test results  I will follow up with Jennifer Jane in 3 months. I will review progress and consider need for any other therapeutic interventions. If there are any questions and/or concerns she will call the clinic.      On day of encounter time spent in chart review and with patient in consultation, exam, education,coordination of care,  review of outside charts/documentation and  documentation:  20 minutes     I have attempted to proof read for major spelling errors and apologize for any minor errors I may have missed.     This note was dictated using voice recognition software. Any grammatical or context distortions are unintentional and inherent to the software.  _________________________________  GILLIAN Yang [facility] PHYSICAL MEDICINE AND REHABILITATION CLINIC [place]    Subjective   HPI   Patient returns for a follow up.  Patient feel she is getting better.  Having issues with her sinus. Patient still having short term memory issues. Feels this is more noticeable now.  She still writes a lot of things downs. She has a chart for her medicines as well.  She is having some forgetfulness every 3 weeks with her medicines.   NO other safety concerns.  Patient is not sleeping as well due nasal congestion. Fatigue is better and was able to clean house yesterday.   Patient did do Physical therapy for her joint pain and is doing exercises at home.     Current concerns: Health Concerns        1/6/2025     9:50 AM   PHQ Assesment Total Score(s)   PHQ-2 Score 0           10/28/2024     1:16 PM   GÉNESIS-7 Results   GÉNESIS 7 TOTAL SCORE 1 (minimal anxiety)   GÉNESIS-7 Total Score 1        Patient-reported         10/28/2024     1:18 PM   PTSD Screen Score   Have you ever experienced this kind of event? No         10/28/2024     1:23 PM   PROMIS-29   PROMIS Physical Function T-Score 33 (moderate dysfunction)   PROMIS Anxiety T-Score 51 (within normal limits)   PROMIS Depression T-Score 59 (mild)   PROMIS Fatigue T-Score 59 (mild)   PROMIS Sleep Disturbance T-Score 56 (mild)   PROMIS Ability to Participate in Social Roles & Activities T-Score 42 (mild dysfunction)   PROMIS Pain Interference T-Score 76 (severe)   PROMIS Pain Intensity 5       Past Medical History:   Diagnosis Date     Acute bilateral low back pain with bilateral sciatica      Acute pain of right knee     Pain for months,  worse since helping friend move.  Exam consistent with meniscal tear.  Check xray today, depending on results MRI.  Ice, NSAIDS for now.  ACE.     Amaurosis fugax      Arthritis     Fibromyalgia, ? Osteoarthritis     Back injury     Micro discectomy, approx date 1983     Benign neoplasm of ascending colon      Chondromalacia of patella, right     Severe, tri-compartmental on MRI 10/2017.  Referred to ortho.     Chronic left-sided headache     Negative MRI and CT of head 2018 and 2016  Normal labs TSH, CBC, CMP, ESR 12/2019.  See plan below in pt instructions.     Diarrhea, unspecified type      Diverticular disease of large intestine      Encounter for Medicare annual wellness exam     Up to date on mammogram and colon cancer screening.  Is due for shingles vaccine - do at pharmacy.     Glaucoma      Glaucoma suspect      Hiatal hernia      HTN (hypertension)      Hypercalciuria      Infection due to 2019 novel coronavirus 02/10/2024    x3     Insomnia 12/26/2012     Problem list name updated by automated process. Provider to review     Low bone density 2022     Overweight (BMI 25.0-29.9)      Polyp of colon      Prediabetes      Primary hyperparathyroidism     mild; borderline hypercalciuria     Pseudophakia, both eyes      Reduced vision 09/2016    L vision block, bilateral elevated eye presures,poss. Glacom     Right foot pain      Sciatica     On and off - exacerbates when lifts heavy things  Bilaterally.     Spinal stenosis of lumbar region without neurogenic claudication      Uncomplicated asthma     Diagnosed as 2 yo     Weakness of left lower extremity        Past Surgical History:   Procedure Laterality Date      cataract extraction with intraocular lens implant Right 01/31/2019     BACK SURGERY  1984    micro-disc-ectomy     BREAST SURGERY  1984    L Breast bx     cataract extraction with intraocular lens Left 12/06/2018     CHOLECYSTECTOMY  1990     INJECT EPIDURAL TRANSFORAMINAL Bilateral 1/31/2024     Procedure: Bilateral L5-S1 Transforaminal epidural steroid injection;  Surgeon: Sarahy Tsang MD;  Location: [facility] OR     IRIDOTOMY/IRIDECTOMY LASER SURGERY Left 09/27/2016     IRIDOTOMY/IRIDECTOMY LASER SURGERY Right 10/18/2016     TUBAL LIGATION  1989     [facility] STOMACH SURGERY PROCEDURE UNLISTED  03/1990    Cholecystectomy, 11/1989 tubal ligation       Family History   Problem Relation Age of Onset     Heart Disease Mother      Diabetes Mother      Coronary Artery Disease Mother      Hypertension Mother      Anesthesia Reaction Mother         Anaphylaxis, oral opioixd     Asthma Mother      Thyroid Disease Mother      Low Back Problems Mother      Alzheimer Disease Mother      Heart Disease Father         CABG x2     Diabetes Father      Coronary Artery Disease Father      Hypertension Father      Hyperlipidemia Father      Low Back Problems Father      Diabetes Sister         insulin dependent     Hypertension Sister      Low Back Problems Sister      Thyroid Disease Sister      Hip fracture Sister      Low Back Problems Brother      Dementia Brother         Lewy Body     Asthma Maternal Grandmother      Coronary Artery Disease Maternal Grandfather      Cerebrovascular Disease Paternal Grandfather      Asthma Daughter      Genetic Disease Daughter         Kallman syndrome; no olfactory bulb;     Calcium Disorder No family hx of        Social History     Tobacco Use     Smoking status: Never     Passive exposure: Past     Smokeless tobacco: Never   Vaping Use     Vaping status: Never Used   Substance Use Topics     Alcohol use: Yes     Alcohol/week: 0.0 standard drinks of alcohol     Comment: 0 -2 times a year     Drug use: No         Current Outpatient Medications:      acetaminophen (TYLENOL) 325 MG tablet, Take 325-650 mg by mouth every 6 hours as needed for mild pain, Disp: , Rfl:      acetylcysteine (N-ACETYL CYSTEINE) 600 MG CAPS capsule, Take 1 capsule (600 mg) by mouth daily, Disp: 90 capsule, Rfl: 4      ADVAIR -21 MCG/ACT inhaler, Inhale 2 puffs into the lungs 2 times daily, Disp: , Rfl:      albuterol (VENTOLIN HFA) 108 (90 Base) MCG/ACT inhaler, Inhale 2 puffs into the lungs every 6 hours as needed for shortness of breath, Disp: 8.5 g, Rfl: 5     alendronate (FOSAMAX) 70 MG tablet, Take 1 tablet (70 mg) by mouth every 7 days Take 60 minutes before am meal with 8 oz. water. Remain upright for 30 minutes., Disp: 12 tablet, Rfl: 3     amLODIPine (NORVASC) 5 MG tablet, Take 1 tablet (5 mg) by mouth at bedtime, Disp: 90 tablet, Rfl: 4     atorvastatin (LIPITOR) 20 MG tablet, Take 1 tablet (20 mg) by mouth daily, Disp: 90 tablet, Rfl: 4     azelastine (ASTELIN) 0.1 % nasal spray, Spray 1 spray into both nostrils 2 times daily, Disp: , Rfl:      brimonidine (ALPHAGAN-P) 0.15 % ophthalmic solution, PLACE 1 DROP INTO BOTH EYES TWO TIMES A DAY., Disp: , Rfl:      Calcium Carbonate Antacid (TUMS PO), Take  by mouth At Bedtime., Disp: , Rfl:      co-enzyme Q-10 100 MG CAPS capsule, Take 1 capsule (100 mg) by mouth daily, Disp: 90 capsule, Rfl: 4     fexofenadine (ALLEGRA) 180 MG tablet, Take 180 mg by mouth daily, Disp: , Rfl:      ipratropium (ATROVENT) 0.03 % nasal spray, Spray 2 sprays into both nostrils 2 times daily, Disp: 30 mL, Rfl: 6     latanoprost (XALATAN) 0.005 % ophthalmic solution, Place 1 drop into the right eye At Bedtime, Disp: , Rfl:      loratadine (CLARITIN) 10 MG tablet, Take 10 mg by mouth daily., Disp: , Rfl:      losartan (COZAAR) 50 MG tablet, Take 1 tablet (50 mg) by mouth 2 times daily, Disp: 180 tablet, Rfl: 4     melatonin 5 MG CAPS, Take 1 capsule by mouth at bedtime., Disp: , Rfl:      montelukast (SINGULAIR) 10 MG tablet, TAKE ONE TABLET BY MOUTH EVERY DAY AS DIRECTED, Disp: , Rfl: 1     moxifloxacin (VIGAMOX) 0.5 % ophthalmic solution, Place 1 drop into both eyes 4 times daily., Disp: , Rfl:      SPIRIVA RESPIMAT 1.25 MCG/ACT inhaler, INHALE 2 PUFFS ONCE PER DAY, Disp: , Rfl:       traZODone (DESYREL) 50 MG tablet, Take 1-3 tablets ( mg) by mouth at bedtime, Disp: 130 tablet, Rfl: 4     Vitamin D3 (CHOLECALCIFEROL) 25 mcg (1000 units) tablet, Take by mouth daily, Disp: , Rfl:     Review of Systems   Constitutional, HEENT, cardiovascular, pulmonary, gi and gu systems are negative, except as otherwise noted.      Objective         Vitals:  No vitals were obtained today due to virtual visit.    Physical Exam   EYES: Eyes grossly normal to inspection.  No discharge or erythema, or obvious scleral/conjunctival abnormalities.  SKIN: Visible skin clear. No significant rash, abnormal pigmentation or lesions.  NEURO: Cranial nerves grossly intact.  Mentation and speech appropriate for age.  GENERAL: Healthy, alert and no distress  RESP: No audible wheeze, cough, or visible cyanosis.  No visible retractions or increased work of breathing.    PSYCH: Mentation appears normal, affect normal/bright, judgement and insight intact, normal speech and appearance well-groomed.    Labs : No recent labs    Imaging:    I personally reviewed the following imaging results today and those on care everywhere, if indicated     Nothing new to review    Reviewed imaging from [facility] sites     Medical Records Reviewed:    Reviewed consults/documents from [facility] including  Emergency Medicine, Cardiology,Family, Practice, Urgent care, ENT, and PT       Again, thank you for allowing me to participate in the care of your patient.      Sincerely,    Apoorva Nelson PA-C

## 2025-01-30 NOTE — PATIENT INSTRUCTIONS
Post COVID Self Care Suggestions:     Fatigue Management:       https://www.archives-pmr.org/action/showPdf?qtp=-[email or web address]       Self Care:      [email or web address]  Recovery World Health Organization:    https://apps.who.int/iris/[employer]/handle/93444/994941/OEQ-AWUI-9535-987-87379-04713-eng.pdf  Breathing exercises:    [email or web address]://[email or web address]/health/conditions-and-diseases/coronavirus/coronavirus-recovery-breathing-exercises      Assessment of sense of smell and taste    Smell training:  Flowery - Vangie  Fruity - Lemon  Spicy - Cloves  Resinous - Eucalyptus      1 - Pour a few droplets of one of the oils on to a cotton pad or ball  2 - Do not try to sniff the pad immediately; leave it for a few minutes for the fragrance to develop  3 - Hold the first stick/pad up to your nose, about an inch away.  The order in which you test the oils does not matter  4 - Relax and try to inhale naturally through the nose - sniffing too quickly and deeply is likely to result in you not being able to detect anything  5 - Try this a couple more times, then rest for five minutes  6 - Move on to the next oil and repeat as above.    After three months switch to a new set of odors: menthol, thyme, tangerine, and celeste, training with them twice daily.    After another three months, switch to a third new set of odors: green tea, bergamot, rosemary, and sanford, again training with them twice daily.    Studies:   [place] Paxlovid Pool  [email or web address]/[email or web address]/research/paxlc-study/?[email or web address]    Cognitive Post-COVID study  z.[facility]/covid-ema    Supplements:  Fatigue- COQ10 100mg 3x day  ( side effects GI)  Brain fog-N-acetylcysteine 600 mg daily (side effects GI)

## 2025-02-03 ENCOUNTER — TELEPHONE (OUTPATIENT)
Dept: PHYSICAL MEDICINE AND REHAB | Facility: CLINIC | Age: 75
End: 2025-02-03
Payer: COMMERCIAL

## 2025-02-03 NOTE — TELEPHONE ENCOUNTER
Patient confirmed scheduled appointment:  Date: 7/28/2025  Time: 10:15 am  Visit type: Post Covid return video  Provider: Apoorva Nelson  Location: virtual  Testing/imaging: NA  Additional notes: 6 month follow up    Amy Colunga on 2/3/2025 at 11:35 AM

## 2025-02-18 NOTE — PROGRESS NOTES
[facility]  Return Visit  Encounter date:   February 18, 2025    Chief Complaint:   Follow-up    ID: Chronic rhinitis, hoarseness, and post nasal drip.     Interval History:   Jennifer Jane is a 74 year old female who presents for follow up regarding chronic rhinitis, hoarseness, and post nasal drip.    8/13/24: She reports that she is on Zyrtec and Astelin with no improvement in morning post nasal drip and voice hoarseness. She states that she feels phlegm in her throat, especially in the morning when she takes a deep breath and coughs. She states that she has acid reflux intermittently but not recently and controls her diet. She reports that she gets headaches and has lifelong allergies. She states that she has had recurrent sinus infections the last 2 years and has been treated with prednisone and antibiotics. She reports a hx of pneumonia with COVID that took her a month to recover from. She uses a steroid inhaler prn. Feels that her voice is weaker and breaks up over the last few months. Thinks it may be due to allergies.     2/19/25: Today, she reports she has been experiencing bad nose bleeds. One being so bad she had to go to the ED. She used Afrin for about 2 weeks, but has since stopped. She has also used Azelastine as she was unable to use the Ipratropium due to concern of her underlying glaucoma. She is still experiencing a chronic cough though it is improved. Biggest thing she notices now is change in voice and dysphonia. She endorses congestion. Her symptoms bother her while she is sleeping, she states she is waking up 3-4 times a night.     Review of systems: A 14-point review of systems has been conducted and is negative for any notable symptoms, except as dictated in the history of present illness.     Physical Exam:  Vital signs: LMP  (LMP Unknown)    General Appearance: No acute distress, appropriate demeanor, conversant  Eyes: moist conjunctivae; EOMI; pupils symmetric;  visual acuity grossly intact; no proptosis  Head: normocephalic; overall symmetric appearance without deformity  Face: overall symmetric without deformity  Ears: Normal appearance of external ear  Nose: No external deformity  Oral Cavity/oropharynx: Normal appearance of mucosa  Neck: no palpable lymphadenopathy; thyroid without palpable nodules  Lungs: symmetric chest rise; no wheezing  CV: Good distal perfusion; normal hear rate  Extremities: No deformity  Neurologic Exam: Cranial nerves II-XII are grossly intact      Procedure Note  Procedure performed: Rigid nasal endoscopy  Indication: To evaluate for sinonasal pathology not visualized on routine anterior rhinoscopy  Anesthesia: 4% topical lidocaine with 0.05 % oxymetazoline  Description of procedure: A 30 degree, 3 mm rigid endoscope was inserted into bilateral nasal cavities and the nasal valves, nasal cavity, middle meatus, sphenoethmoid recess, nasopharynx were evaluated for evidence of obstruction, edema, purulence, polyps and/or mass/lesion.     Epping-Joaquin Endoscopic Scoring System  Endoscopic observation Right Left   Polyps in middle meatus (0 = absent, 1 = restricted to middle meatus, 2 = Beyond middle meatus) 0 0   Discharge (0 = absent, 1 = thin and clear, 2 = thick, purulent) 0 0   Edema (0 = absent, 1 = mild-moderate, 2 = moderate-severe) 1 1   Crusting (0 = absent, 1 = mild-moderate, 2 = moderate-severe) 0 0   Scarring (0= absent, 1 = mild-moderate, 2 = moderate-severe) 0 0   Total 1 1     Findings  RT/LT: Persistent underlying inflammation without evidence of an active sinus infection. Did have a small area of excoriation on the left septum which was cauterized.     The patient tolerated the procedure well without complication.     Silver nitrate applied to area of excoriation and bleeding along the left septum. Patient tolerated the procedure without issues.      Assessment/Medical Decision Making:  Jennifer Jane is a 74 year old female  who presents for follow up regarding chronic rhinitis, hoarseness, and post nasal drip. Her physical exam showed an area of excoriation along the left septum that I cauterized today in office. I will also order a sinus CT scan to be completed in the next week or so. We discussed that this CT is important to make sure we aren't missing anything that could be causing her symptoms. After cauterization, I recommended her to use Aquaphor in the left nostril. We discussed getting her referred to laryngology for dysphonia. Any further treatment plan will be determined by what she CT scan shows.    Plan:  Discussed using Aquaphor to the left opening of the naris.  Do this twice a day and not use a Q-tip in order to prevent any further trauma.  Instructed to place it on the thumb and then gently put it over the opening of the nose and allow it to melt.   Schedule sinus CT scan at next available opening for chronic sinusitis  Referral to Laryngology   Treatment will be determined upon CT results    Scribe Disclosure:   By signing my name below, I, Brenna Del Angel, attest that this documentation has been prepared under the direction and in the presence of Elier Corrigan MD.  - Electronically Signed: Brenna Del Angel 02/19/25     Provider Disclosure:  I agree with above History, Review of Systems, Physical exam and Plan.  I have reviewed the content of the documentation and have edited it as needed. I have personally performed the services documented here and the documentation accurately represents those services and the decisions I have made.      Electronically signed by:    Elier Corrigan MD    [facility]  Rhinology, Endoscopic Skull Base Surgery  [facility]  Department of Otolaryngology - Head & Neck Surgery    ~~~~~~~~~~~~~~~~~~~~~~~~~~~~~~~~~~~~~~~~~~~~~~~~~~~~~~~~~~~~~~~~~~~~~~~~~~~~~~~~~~~~~~~~~~~~~~~~~~~~~~~~~~~~~~~~~~~~~~~~~~~~~~~~~~~~~~~    Past Medical History:   Diagnosis  Date    Acute bilateral low back pain with bilateral sciatica     Acute pain of right knee     Pain for months, worse since helping friend move.  Exam consistent with meniscal tear.  Check xray today, depending on results MRI.  Ice, NSAIDS for now.  ACE.    Amaurosis fugax     Arthritis     Fibromyalgia, ? Osteoarthritis    Back injury     Micro discectomy, approx date 1983    Benign neoplasm of ascending colon     Chondromalacia of patella, right     Severe, tri-compartmental on MRI 10/2017.  Referred to ortho.    Chronic left-sided headache     Negative MRI and CT of head 2018 and 2016  Normal labs TSH, CBC, CMP, ESR 12/2019.  See plan below in pt instructions.    Diarrhea, unspecified type     Diverticular disease of large intestine     Encounter for Medicare annual wellness exam     Up to date on mammogram and colon cancer screening.  Is due for shingles vaccine - do at pharmacy.    Glaucoma     Glaucoma suspect     Hiatal hernia     HTN (hypertension)     Hypercalciuria     Infection due to 2019 novel coronavirus 02/10/2024    x3    Insomnia 12/26/2012     Problem list name updated by automated process. Provider to review    Low bone density 2022    Overweight (BMI 25.0-29.9)     Polyp of colon     Prediabetes     Primary hyperparathyroidism     mild; borderline hypercalciuria    Pseudophakia, both eyes     Reduced vision 09/2016    L vision block, bilateral elevated eye presures,poss. Glacom    Right foot pain     Sciatica     On and off - exacerbates when lifts heavy things  Bilaterally.    Spinal stenosis of lumbar region without neurogenic claudication     Uncomplicated asthma     Diagnosed as 2 yo    Weakness of left lower extremity         Past Surgical History:   Procedure Laterality Date     cataract extraction with intraocular lens implant Right 01/31/2019    BACK SURGERY  1984    micro-disc-ectomy    BREAST SURGERY  1984    L Breast bx    cataract extraction with intraocular lens Left 12/06/2018     CHOLECYSTECTOMY  1990    INJECT EPIDURAL TRANSFORAMINAL Bilateral 1/31/2024    Procedure: Bilateral L5-S1 Transforaminal epidural steroid injection;  Surgeon: Sarahy Tsang MD;  Location: [facility] OR    IRIDOTOMY/IRIDECTOMY LASER SURGERY Left 09/27/2016    IRIDOTOMY/IRIDECTOMY LASER SURGERY Right 10/18/2016    TUBAL LIGATION  1989    ZZC STOMACH SURGERY PROCEDURE UNLISTED  03/1990    Cholecystectomy, 11/1989 tubal ligation        Family History   Problem Relation Age of Onset    Heart Disease Mother     Diabetes Mother     Coronary Artery Disease Mother     Hypertension Mother     Anesthesia Reaction Mother         Anaphylaxis, oral opioixd    Asthma Mother     Thyroid Disease Mother     Low Back Problems Mother     Alzheimer Disease Mother     Heart Disease Father         CABG x2    Diabetes Father     Coronary Artery Disease Father     Hypertension Father     Hyperlipidemia Father     Low Back Problems Father     Diabetes Sister         insulin dependent    Hypertension Sister     Low Back Problems Sister     Thyroid Disease Sister     Hip fracture Sister     Low Back Problems Brother     Dementia Brother         Lewy Body    Asthma Maternal Grandmother     Coronary Artery Disease Maternal Grandfather     Cerebrovascular Disease Paternal Grandfather     Asthma Daughter     Genetic Disease Daughter         Kallman syndrome; no olfactory bulb;    Calcium Disorder No family hx of         Social History     Socioeconomic History    Marital status:    Tobacco Use    Smoking status: Never     Passive exposure: Past    Smokeless tobacco: Never   Vaping Use    Vaping status: Never Used   Substance and Sexual Activity    Alcohol use: Yes     Alcohol/week: 0.0 standard drinks of alcohol     Comment: 0 -2 times a year    Drug use: No    Sexual activity: Yes     Partners: Male     Birth control/protection: Post-menopausal, Female Surgical     Social Drivers of Health     Financial Resource Strain: Low Risk  (2/28/2024)     Financial Resource Strain     Within the past 12 months, have you or your family members you live with been unable to get utilities (heat, electricity) when it was really needed?: No   Food Insecurity: Low Risk  (2/28/2024)    Food Insecurity     Within the past 12 months, did you worry that your food would run out before you got money to buy more?: No     Within the past 12 months, did the food you bought just not last and you didn t have money to get more?: No   Transportation Needs: Low Risk  (2/28/2024)    Transportation Needs     Within the past 12 months, has lack of transportation kept you from medical appointments, getting your medicines, non-medical meetings or appointments, work, or from getting things that you need?: No   Physical Activity: Insufficiently Active (2/28/2024)    Exercise Vital Sign     Days of Exercise per Week: 4 days     Minutes of Exercise per Session: 20 min   Stress: No Stress Concern Present (2/28/2024)    [employer] - Occupational Stress Questionnaire     Feeling of Stress : Not at all   Social Connections: Unknown (2/28/2024)    Social Connection and Isolation Panel [NHANES]     Frequency of Social Gatherings with Friends and Family: Once a week   Interpersonal Safety: Low Risk  (8/29/2024)    Interpersonal Safety     Do you feel physically and emotionally safe where you currently live?: Yes     Within the past 12 months, have you been hit, slapped, kicked or otherwise physically hurt by someone?: No     Within the past 12 months, have you been humiliated or emotionally abused in other ways by your partner or ex-partner?: No   Housing Stability: Low Risk  (2/28/2024)    Housing Stability     Do you have housing? : Yes     Are you worried about losing your housing?: No

## 2025-02-19 ENCOUNTER — OFFICE VISIT (OUTPATIENT)
Dept: OTOLARYNGOLOGY | Facility: CLINIC | Age: 75
End: 2025-02-19
Attending: STUDENT IN AN ORGANIZED HEALTH CARE EDUCATION/TRAINING PROGRAM
Payer: COMMERCIAL

## 2025-02-19 DIAGNOSIS — R49.9 HOARSENESS OR CHANGING VOICE: ICD-10-CM

## 2025-02-19 DIAGNOSIS — R49.0 DYSPHONIA: ICD-10-CM

## 2025-02-19 DIAGNOSIS — Z87.09 HX OF CHRONIC SINUSITIS: Primary | ICD-10-CM

## 2025-02-19 PROCEDURE — 31231 NASAL ENDOSCOPY DX: CPT | Performed by: STUDENT IN AN ORGANIZED HEALTH CARE EDUCATION/TRAINING PROGRAM

## 2025-02-19 PROCEDURE — 99213 OFFICE O/P EST LOW 20 MIN: CPT | Mod: 25 | Performed by: STUDENT IN AN ORGANIZED HEALTH CARE EDUCATION/TRAINING PROGRAM

## 2025-02-19 RX ORDER — OXYMETAZOLINE HYDROCHLORIDE 0.05 G/100ML
2 SPRAY NASAL
COMMUNITY

## 2025-02-19 RX ORDER — PREDNISONE 20 MG/1
TABLET ORAL
COMMUNITY
Start: 2025-01-22

## 2025-02-19 RX ORDER — AMOXICILLIN AND CLAVULANATE POTASSIUM 500; 125 MG/1; MG/1
1 TABLET, FILM COATED ORAL
COMMUNITY
Start: 2025-01-22

## 2025-02-19 ASSESSMENT — PAIN SCALES - GENERAL: PAINLEVEL_OUTOF10: NO PAIN (0)

## 2025-02-19 NOTE — PATIENT INSTRUCTIONS
You were seen in the ENT Clinic today by Dr. Corrigan. If you have any questions or concerns after your appointment, please contact us (see below)       2.   The following recommendations have been made based upon your appointment today:   -Obtain CT scan. We will follow up with you on results once Dr. Corrigan has reviewed the images and we will determine a plan of care at that point.      3.   We have placed a referral to our laryngology team to consult for your voice changes.           How to Contact Us:  Send a [employer] message to your provider. Our team will respond to you via [employer]. Occasionally, we will need to call you to get further information.  For urgent matters (Monday-Friday), call the ENT Clinic: [phone] and speak with a call center team member - they will route your call appropriately.   If you'd like to speak directly with a nurse, please find our contact information below. We do our best to check voicemail frequently throughout the day, and will work to call you back within 1-2 days. For urgent matters, please use the general clinic phone numbers listed above.     Dania MORENO RN  Direct: [phone]  Sun [facility]ES LPN  Direct: [phone]         [facility]  Department of Otolaryngology

## 2025-02-19 NOTE — LETTER
2/19/2025       RE: Jennifer Jane  [address]  [facility] [address]     Dear Colleague,    Thank you for referring your patient, Jennifer Jane, to the [facility] [place] at [facility]. Please see a copy of my visit note below.      [facility]  Return Visit  Encounter date:   February 18, 2025    Chief Complaint:   Follow-up    ID: Chronic rhinitis, hoarseness, and post nasal drip.     Interval History:   Jennifer Jane is a 74 year old female who presents for follow up regarding chronic rhinitis, hoarseness, and post nasal drip.    8/13/24: She reports that she is on Zyrtec and Astelin with no improvement in morning post nasal drip and voice hoarseness. She states that she feels phlegm in her throat, especially in the morning when she takes a deep breath and coughs. She states that she has acid reflux intermittently but not recently and controls her diet. She reports that she gets headaches and has lifelong allergies. She states that she has had recurrent sinus infections the last 2 years and has been treated with prednisone and antibiotics. She reports a hx of pneumonia with COVID that took her a month to recover from. She uses a steroid inhaler prn. Feels that her voice is weaker and breaks up over the last few months. Thinks it may be due to allergies.     2/19/25: Today, she reports she has been experiencing bad nose bleeds. One being so bad she had to go to the ED. She used Afrin for about 2 weeks, but has since stopped. She has also used Azelastine as she was unable to use the Ipratropium due to concern of her underlying glaucoma. She is still experiencing a chronic cough though it is improved. Biggest thing she notices now is change in voice and dysphonia. She endorses congestion. Her symptoms bother her while she is sleeping, she states she is waking up 3-4 times a night.     Review of  systems: A 14-point review of systems has been conducted and is negative for any notable symptoms, except as dictated in the history of present illness.     Physical Exam:  Vital signs: LMP  (LMP Unknown)    General Appearance: No acute distress, appropriate demeanor, conversant  Eyes: moist conjunctivae; EOMI; pupils symmetric; visual acuity grossly intact; no proptosis  Head: normocephalic; overall symmetric appearance without deformity  Face: overall symmetric without deformity  Ears: Normal appearance of external ear  Nose: No external deformity  Oral Cavity/oropharynx: Normal appearance of mucosa  Neck: no palpable lymphadenopathy; thyroid without palpable nodules  Lungs: symmetric chest rise; no wheezing  CV: Good distal perfusion; normal hear rate  Extremities: No deformity  Neurologic Exam: Cranial nerves II-XII are grossly intact      Procedure Note  Procedure performed: Rigid nasal endoscopy  Indication: To evaluate for sinonasal pathology not visualized on routine anterior rhinoscopy  Anesthesia: 4% topical lidocaine with 0.05 % oxymetazoline  Description of procedure: A 30 degree, 3 mm rigid endoscope was inserted into bilateral nasal cavities and the nasal valves, nasal cavity, middle meatus, sphenoethmoid recess, nasopharynx were evaluated for evidence of obstruction, edema, purulence, polyps and/or mass/lesion.     [place]-Joaquin Endoscopic Scoring System  Endoscopic observation Right Left   Polyps in middle meatus (0 = absent, 1 = restricted to middle meatus, 2 = Beyond middle meatus) 0 0   Discharge (0 = absent, 1 = thin and clear, 2 = thick, purulent) 0 0   Edema (0 = absent, 1 = mild-moderate, 2 = moderate-severe) 1 1   Crusting (0 = absent, 1 = mild-moderate, 2 = moderate-severe) 0 0   Scarring (0= absent, 1 = mild-moderate, 2 = moderate-severe) 0 0   Total 1 1     Findings  RT/LT: Persistent underlying inflammation without evidence of an active sinus infection. Did have a small area of excoriation  on the left septum which was cauterized.     The patient tolerated the procedure well without complication.     Silver nitrate applied to area of excoriation and bleeding along the left septum. Patient tolerated the procedure without issues.      Assessment/Medical Decision Making:  Jennifer Jane is a 74 year old female who presents for follow up regarding chronic rhinitis, hoarseness, and post nasal drip. Her physical exam showed an area of excoriation along the left septum that I cauterized today in office. I will also order a sinus CT scan to be completed in the next week or so. We discussed that this CT is important to make sure we aren't missing anything that could be causing her symptoms. After cauterization, I recommended her to use Aquaphor in the left nostril. We discussed getting her referred to laryngology for dysphonia. Any further treatment plan will be determined by what she CT scan shows.    Plan:  Discussed using Aquaphor to the left opening of the naris.  Do this twice a day and not use a Q-tip in order to prevent any further trauma.  Instructed to place it on the thumb and then gently put it over the opening of the nose and allow it to melt.   Schedule sinus CT scan at next available opening for chronic sinusitis  Referral to Laryngology   Treatment will be determined upon CT results    Scribe Disclosure:   By signing my name below, I, Brenna Del Angel, attest that this documentation has been prepared under the direction and in the presence of Elier Corrigan MD.  - Electronically Signed: Brenna Del Angel 02/19/25     Provider Disclosure:  I agree with above History, Review of Systems, Physical exam and Plan.  I have reviewed the content of the documentation and have edited it as needed. I have personally performed the services documented here and the documentation accurately represents those services and the decisions I have made.      Electronically signed by:    Elier Corrigan MD  Assistant    [facility]  Rhinology, Endoscopic Skull Base Surgery  [facility]  Department of Otolaryngology - Head & Neck Surgery    ~~~~~~~~~~~~~~~~~~~~~~~~~~~~~~~~~~~~~~~~~~~~~~~~~~~~~~~~~~~~~~~~~~~~~~~~~~~~~~~~~~~~~~~~~~~~~~~~~~~~~~~~~~~~~~~~~~~~~~~~~~~~~~~~~~~~~~~    Past Medical History:   Diagnosis Date     Acute bilateral low back pain with bilateral sciatica      Acute pain of right knee     Pain for months, worse since helping friend move.  Exam consistent with meniscal tear.  Check xray today, depending on results MRI.  Ice, NSAIDS for now.  ACE.     Amaurosis fugax      Arthritis     Fibromyalgia, ? Osteoarthritis     Back injury     Micro discectomy, approx date 1983     Benign neoplasm of ascending colon      Chondromalacia of patella, right     Severe, tri-compartmental on MRI 10/2017.  Referred to ortho.     Chronic left-sided headache     Negative MRI and CT of head 2018 and 2016  Normal labs TSH, CBC, CMP, ESR 12/2019.  See plan below in pt instructions.     Diarrhea, unspecified type      Diverticular disease of large intestine      Encounter for Medicare annual wellness exam     Up to date on mammogram and colon cancer screening.  Is due for shingles vaccine - do at pharmacy.     Glaucoma      Glaucoma suspect      Hiatal hernia      HTN (hypertension)      Hypercalciuria      Infection due to 2019 novel coronavirus 02/10/2024    x3     Insomnia 12/26/2012     Problem list name updated by automated process. Provider to review     Low bone density 2022     Overweight (BMI 25.0-29.9)      Polyp of colon      Prediabetes      Primary hyperparathyroidism     mild; borderline hypercalciuria     Pseudophakia, both eyes      Reduced vision 09/2016    L vision block, bilateral elevated eye presures,poss. Glacom     Right foot pain      Sciatica     On and off - exacerbates when lifts heavy things  Bilaterally.     Spinal stenosis of lumbar region without neurogenic claudication       Uncomplicated asthma     Diagnosed as 1 yo     Weakness of left lower extremity         Past Surgical History:   Procedure Laterality Date      cataract extraction with intraocular lens implant Right 01/31/2019     BACK SURGERY  1984    micro-disc-ectomy     BREAST SURGERY  1984    L Breast bx     cataract extraction with intraocular lens Left 12/06/2018     CHOLECYSTECTOMY  1990     INJECT EPIDURAL TRANSFORAMINAL Bilateral 1/31/2024    Procedure: Bilateral L5-S1 Transforaminal epidural steroid injection;  Surgeon: Sarahy Tsang MD;  Location: [facility] OR     IRIDOTOMY/IRIDECTOMY LASER SURGERY Left 09/27/2016     IRIDOTOMY/IRIDECTOMY LASER SURGERY Right 10/18/2016     TUBAL LIGATION  1989     ZZC STOMACH SURGERY PROCEDURE UNLISTED  03/1990    Cholecystectomy, 11/1989 tubal ligation        Family History   Problem Relation Age of Onset     Heart Disease Mother      Diabetes Mother      Coronary Artery Disease Mother      Hypertension Mother      Anesthesia Reaction Mother         Anaphylaxis, oral opioixd     Asthma Mother      Thyroid Disease Mother      Low Back Problems Mother      Alzheimer Disease Mother      Heart Disease Father         CABG x2     Diabetes Father      Coronary Artery Disease Father      Hypertension Father      Hyperlipidemia Father      Low Back Problems Father      Diabetes Sister         insulin dependent     Hypertension Sister      Low Back Problems Sister      Thyroid Disease Sister      Hip fracture Sister      Low Back Problems Brother      Dementia Brother         Lewy Body     Asthma Maternal Grandmother      Coronary Artery Disease Maternal Grandfather      Cerebrovascular Disease Paternal Grandfather      Asthma Daughter      Genetic Disease Daughter         Kallman syndrome; no olfactory bulb;     Calcium Disorder No family hx of         Social History     Socioeconomic History     Marital status:    Tobacco Use     Smoking status: Never     Passive exposure: Past      Smokeless tobacco: Never   Vaping Use     Vaping status: Never Used   Substance and Sexual Activity     Alcohol use: Yes     Alcohol/week: 0.0 standard drinks of alcohol     Comment: 0 -2 times a year     Drug use: No     Sexual activity: Yes     Partners: Male     Birth control/protection: Post-menopausal, Female Surgical     Social Drivers of Health     Financial Resource Strain: Low Risk  (2/28/2024)    Financial Resource Strain      Within the past 12 months, have you or your family members you live with been unable to get utilities (heat, electricity) when it was really needed?: No   Food Insecurity: Low Risk  (2/28/2024)    Food Insecurity      Within the past 12 months, did you worry that your food would run out before you got money to buy more?: No      Within the past 12 months, did the food you bought just not last and you didn t have money to get more?: No   Transportation Needs: Low Risk  (2/28/2024)    Transportation Needs      Within the past 12 months, has lack of transportation kept you from medical appointments, getting your medicines, non-medical meetings or appointments, work, or from getting things that you need?: No   Physical Activity: Insufficiently Active (2/28/2024)    Exercise Vital Sign      Days of Exercise per Week: 4 days      Minutes of Exercise per Session: 20 min   Stress: No Stress Concern Present (2/28/2024)    [employer] [place] [employer] - Occupational Stress Questionnaire      Feeling of Stress : Not at all   Social Connections: Unknown (2/28/2024)    Social Connection and Isolation Panel [NHANES]      Frequency of Social Gatherings with Friends and Family: Once a week   Interpersonal Safety: Low Risk  (8/29/2024)    Interpersonal Safety      Do you feel physically and emotionally safe where you currently live?: Yes      Within the past 12 months, have you been hit, slapped, kicked or otherwise physically hurt by someone?: No      Within the past 12 months, have you  been humiliated or emotionally abused in other ways by your partner or ex-partner?: No   Housing Stability: Low Risk  (2/28/2024)    Housing Stability      Do you have housing? : Yes      Are you worried about losing your housing?: No          Again, thank you for allowing me to participate in the care of your patient.      Sincerely,    Elier Corrigan MD

## 2025-02-24 NOTE — TELEPHONE ENCOUNTER
FUTURE VISIT INFORMATION        FUTURE VISIT INFORMATION:  Date: 3/27/25  Time: 3 PM  Location: [facility] - ENT  REFERRAL INFORMATION:  Referring provider:    Referring providers clinic:    Reason for visit/diagnosis:       RECORDS REQUESTED FROM        Clinic name Comments Records Status Imaging Status   [facility] ENT  2/19/25 Elier Jones MD   12/30/24 [facility] -- schedule  1/6/25 CT Parathyroid   12/28/24 XR chest  7/12/23 US head neck  3/28/23 CT chest  *more images in PACS [facility] PACS    [facility]R LUNG SCIENCE  5/10/23 Alisha Matt MD   5/10/23 PFT [facility]

## 2025-02-27 ENCOUNTER — OFFICE VISIT (OUTPATIENT)
Dept: OTOLARYNGOLOGY | Facility: CLINIC | Age: 75
End: 2025-02-27
Payer: COMMERCIAL

## 2025-02-27 VITALS — WEIGHT: 159.6 LBS | BODY MASS INDEX: 26.59 KG/M2 | HEIGHT: 65 IN

## 2025-02-27 DIAGNOSIS — J31.0 CHRONIC RHINITIS: ICD-10-CM

## 2025-02-27 DIAGNOSIS — R09.82 POST-NASAL DRIP: Primary | ICD-10-CM

## 2025-02-27 ASSESSMENT — PAIN SCALES - GENERAL: PAINLEVEL_OUTOF10: NO PAIN (0)

## 2025-02-27 NOTE — LETTER
2/27/2025       RE: Jennifer Jane  [address]  [facility] [address]     Dear Colleague,    Thank you for referring your patient, Jennifer Jane, to the [facility] [place] at [facility]. Please see a copy of my visit note below.      [facility]  Return Visit  Encounter date:   February 27, 2025    Chief Complaint:   Follow-up    ID: Chronic rhinitis, hoarseness, and post nasal drip.    Interval History:   Jennifer Jane is a 74 year old female who presents for follow up.    8/13/24: She reports that she is on Zyrtec and Astelin with no improvement in morning post nasal drip and voice hoarseness. She states that she feels phlegm in her throat, especially in the morning when she takes a deep breath and coughs. She states that she has acid reflux intermittently but not recently and controls her diet. She reports that she gets headaches and has lifelong allergies. She states that she has had recurrent sinus infections the last 2 years and has been treated with prednisone and antibiotics. She reports a hx of pneumonia with COVID that took her a month to recover from. She uses a steroid inhaler prn. Feels that her voice is weaker and breaks up over the last few months. Thinks it may be due to allergies.      2/19/25: She reports she has been experiencing bad nose bleeds. One being so bad she had to go to the ED. She used Afrin for about 2 weeks, but has since stopped. She has also used Azelastine as she was unable to use the Ipratropium due to concern of her underlying glaucoma. She is still experiencing a chronic cough though it is improved. Biggest thing she notices now is change in voice and dysphonia. She endorses congestion. Her symptoms bother her while she is sleeping, she states she is waking up 3-4 times a night.     2/27/25: Today, the patient reports she noticed that while she is quilting  with polyester blankets, she began getting congested. 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 She believes that it's possible that the polyester is causing her symptoms. CT scan demonstrated no sinusitis. Does have left deviated septum.     Review of systems: A 14-point review of systems has been conducted and is negative for any notable symptoms, except as dictated in the history of present illness.     Physical Exam:  Vital signs: LMP  (LMP Unknown)    General Appearance: No acute distress, appropriate demeanor, conversant  Eyes: moist conjunctivae; EOMI; pupils symmetric; visual acuity grossly intact; no proptosis  Head: normocephalic; overall symmetric appearance without deformity  Face: overall symmetric without deformity  Ears: Normal appearance of external ear  Nose: No external deformity  Oral Cavity/oropharynx: Normal appearance of mucosa  Neck: no palpable lymphadenopathy; thyroid without palpable nodules  Lungs: symmetric chest rise; no wheezing  CV: Good distal perfusion; normal hear rate  Extremities: No deformity  Neurologic Exam: Cranial nerves II-XII are grossly intact      Procedure Note  Procedure performed: Rigid nasal endoscopy  Indication: To evaluate for sinonasal pathology not visualized on routine anterior rhinoscopy  Anesthesia: 4% topical lidocaine with 0.05 % oxymetazoline  Description of procedure: A 30 degree, 3 mm rigid endoscope was inserted into bilateral nasal cavities and the nasal valves, nasal cavity, middle meatus, sphenoethmoid recess, nasopharynx were evaluated for evidence of obstruction, edema, purulence, polyps and/or mass/lesion.     [place]-Joaquin Endoscopic Scoring System  Endoscopic observation Right Left   Polyps in middle meatus (0 = absent, 1 = restricted to middle meatus, 2 = Beyond middle meatus) 0 0   Discharge (0 = absent, 1 = thin and clear, 2 = thick, purulent) 0 0   Edema (0 = absent, 1 = mild-moderate, 2 = moderate-severe) 0 0   Crusting (0 =  absent, 1 = mild-moderate, 2 = moderate-severe) 0 0   Scarring (0= absent, 1 = mild-moderate, 2 = moderate-severe) 0 0   Total 0 0     Findings  RT/LT: Continued improved in underlying inflammation. Persistent left caudal septal deviation.     The patient tolerated the procedure well without complication.     Imaging Review:  CT Sinus w/o contrast 2/26/25  IMPRESSION:  1.  No evidence for inflammatory sinusitis.  2.  Paranasal sinus drainage pathways are patent bilaterally.  3.  No air-fluid levels.  4.  Leftward deviation of the bony nasal septum.    Assessment/Medical Decision Making:  Jennifer Jane is a 74 year old female who presents for follow up. We reviewed her CT scan, which was good. We talked about the possible etiology of her symptoms, and it seems reasonable that the polyester from her blankets could be causing her symptoms. I have recommended that she do a sinus rinse right after she's done sewing. We discussed her deviated septum and what a possible septoplasty would look like. Should she decide to do this in the future, I would refer her to my colleague in facial plastics. She is interested in the septoplasty in the future, but not at this time. She will reach out when she ready for the referral to facial plastics.     Plan:  Continue sinus rinses (Specifically after you're done sewing)  Reach out in the future when you would like to be referred to facial plastics for an open septoplasty.   Follow up with me as needed    Scribe Disclosure:   By signing my name below, I, Brenna Del Angel, attest that this documentation has been prepared under the direction and in the presence of Elier Corrigan MD.  - Electronically Signed: Brenna Del Angel 02/27/25    Provider Disclosure:  I agree with above History, Review of Systems, Physical exam and Plan.  I have reviewed the content of the documentation and have edited it as needed. I have personally performed the services documented here and the documentation  accurately represents those services and the decisions I have made.      Electronically signed by:    Elier Corrigan MD    [facility]  Rhinology, Endoscopic Skull Base Surgery  [facility]  Department of Otolaryngology - Head & Neck Surgery    ~~~~~~~~~~~~~~~~~~~~~~~~~~~~~~~~~~~~~~~~~~~~~~~~~~~~~~~~~~~~~~~~~~~~~~~~~~~~~~~~~~~~~~~~~~~~~~~~~~~~~~~~~~~~~~~~~~~~~~~~~~~~~~~~~~~~~~~    Past Medical History:   Diagnosis Date     Acute bilateral low back pain with bilateral sciatica      Acute pain of right knee     Pain for months, worse since helping friend move.  Exam consistent with meniscal tear.  Check xray today, depending on results MRI.  Ice, NSAIDS for now.  ACE.     Amaurosis fugax      Arthritis     Fibromyalgia, ? Osteoarthritis     Back injury     Micro discectomy, approx date 1983     Benign neoplasm of ascending colon      Chondromalacia of patella, right     Severe, tri-compartmental on MRI 10/2017.  Referred to ortho.     Chronic left-sided headache     Negative MRI and CT of head 2018 and 2016  Normal labs TSH, CBC, CMP, ESR 12/2019.  See plan below in pt instructions.     Diarrhea, unspecified type      Diverticular disease of large intestine      Encounter for Medicare annual wellness exam     Up to date on mammogram and colon cancer screening.  Is due for shingles vaccine - do at pharmacy.     Glaucoma      Glaucoma suspect      Hiatal hernia      HTN (hypertension)      Hypercalciuria      Infection due to 2019 novel coronavirus 02/10/2024    x3     Insomnia 12/26/2012     Problem list name updated by automated process. Provider to review     Low bone density 2022     Overweight (BMI 25.0-29.9)      Polyp of colon      Prediabetes      Primary hyperparathyroidism     mild; borderline hypercalciuria     Pseudophakia, both eyes      Reduced vision 09/2016    L vision block, bilateral elevated eye presures,poss. Glacom     Right foot pain      Sciatica     On and  off - exacerbates when lifts heavy things  Bilaterally.     Spinal stenosis of lumbar region without neurogenic claudication      Uncomplicated asthma     Diagnosed as 1 yo     Weakness of left lower extremity         Past Surgical History:   Procedure Laterality Date      cataract extraction with intraocular lens implant Right 01/31/2019     BACK SURGERY  1984    micro-disc-ectomy     BREAST SURGERY  1984    L Breast bx     cataract extraction with intraocular lens Left 12/06/2018     CHOLECYSTECTOMY  1990     INJECT EPIDURAL TRANSFORAMINAL Bilateral 1/31/2024    Procedure: Bilateral L5-S1 Transforaminal epidural steroid injection;  Surgeon: Sarahy Tsang MD;  Location: [facility] OR     IRIDOTOMY/IRIDECTOMY LASER SURGERY Left 09/27/2016     IRIDOTOMY/IRIDECTOMY LASER SURGERY Right 10/18/2016     TUBAL LIGATION  1989     ZZC STOMACH SURGERY PROCEDURE UNLISTED  03/1990    Cholecystectomy, 11/1989 tubal ligation        Family History   Problem Relation Age of Onset     Heart Disease Mother      Diabetes Mother      Coronary Artery Disease Mother      Hypertension Mother      Anesthesia Reaction Mother         Anaphylaxis, oral opioixd     Asthma Mother      Thyroid Disease Mother      Low Back Problems Mother      Alzheimer Disease Mother      Heart Disease Father         CABG x2     Diabetes Father      Coronary Artery Disease Father      Hypertension Father      Hyperlipidemia Father      Low Back Problems Father      Diabetes Sister         insulin dependent     Hypertension Sister      Low Back Problems Sister      Thyroid Disease Sister      Hip fracture Sister      Low Back Problems Brother      Dementia Brother         Lewy Body     Asthma Maternal Grandmother      Coronary Artery Disease Maternal Grandfather      Cerebrovascular Disease Paternal Grandfather      Asthma Daughter      Genetic Disease Daughter         Kallman syndrome; no olfactory bulb;     Calcium Disorder No family hx of         Social History      Socioeconomic History     Marital status:    Tobacco Use     Smoking status: Never     Passive exposure: Past     Smokeless tobacco: Never   Vaping Use     Vaping status: Never Used   Substance and Sexual Activity     Alcohol use: Yes     Alcohol/week: 0.0 standard drinks of alcohol     Comment: 0 -2 times a year     Drug use: No     Sexual activity: Yes     Partners: Male     Birth control/protection: Post-menopausal, Female Surgical     Social Drivers of Health     Financial Resource Strain: Low Risk  (2/28/2024)    Financial Resource Strain      Within the past 12 months, have you or your family members you live with been unable to get utilities (heat, electricity) when it was really needed?: No   Food Insecurity: Low Risk  (2/28/2024)    Food Insecurity      Within the past 12 months, did you worry that your food would run out before you got money to buy more?: No      Within the past 12 months, did the food you bought just not last and you didn t have money to get more?: No   Transportation Needs: Low Risk  (2/28/2024)    Transportation Needs      Within the past 12 months, has lack of transportation kept you from medical appointments, getting your medicines, non-medical meetings or appointments, work, or from getting things that you need?: No   Physical Activity: Insufficiently Active (2/28/2024)    Exercise Vital Sign      Days of Exercise per Week: 4 days      Minutes of Exercise per Session: 20 min   Stress: No Stress Concern Present (2/28/2024)    [employer] [place] [employer] - Occupational Stress Questionnaire      Feeling of Stress : Not at all   Social Connections: Unknown (2/28/2024)    Social Connection and Isolation Panel [NHANES]      Frequency of Social Gatherings with Friends and Family: Once a week   Interpersonal Safety: Low Risk  (8/29/2024)    Interpersonal Safety      Do you feel physically and emotionally safe where you currently live?: Yes      Within the past 12  months, have you been hit, slapped, kicked or otherwise physically hurt by someone?: No      Within the past 12 months, have you been humiliated or emotionally abused in other ways by your partner or ex-partner?: No   Housing Stability: Low Risk  (2/28/2024)    Housing Stability      Do you have housing? : Yes      Are you worried about losing your housing?: No          Again, thank you for allowing me to participate in the care of your patient.      Sincerely,    Elier Corrigan MD

## 2025-02-27 NOTE — PROGRESS NOTES
[facility]  Return Visit  Encounter date:   February 27, 2025    Chief Complaint:   Follow-up    ID: Chronic rhinitis, hoarseness, and post nasal drip.    Interval History:   Jennifer Jane is a 74 year old female who presents for follow up.    8/13/24: She reports that she is on Zyrtec and Astelin with no improvement in morning post nasal drip and voice hoarseness. She states that she feels phlegm in her throat, especially in the morning when she takes a deep breath and coughs. She states that she has acid reflux intermittently but not recently and controls her diet. She reports that she gets headaches and has lifelong allergies. She states that she has had recurrent sinus infections the last 2 years and has been treated with prednisone and antibiotics. She reports a hx of pneumonia with COVID that took her a month to recover from. She uses a steroid inhaler prn. Feels that her voice is weaker and breaks up over the last few months. Thinks it may be due to allergies.      2/19/25: She reports she has been experiencing bad nose bleeds. One being so bad she had to go to the ED. She used Afrin for about 2 weeks, but has since stopped. She has also used Azelastine as she was unable to use the Ipratropium due to concern of her underlying glaucoma. She is still experiencing a chronic cough though it is improved. Biggest thing she notices now is change in voice and dysphonia. She endorses congestion. Her symptoms bother her while she is sleeping, she states she is waking up 3-4 times a night.     2/27/25: Today, the patient reports she noticed that while she is quilting with polyester blankets, she began getting congested. 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 She believes that it's possible that the polyester is causing her symptoms. CT scan demonstrated no sinusitis. Does have left deviated septum.     Review of systems: A 14-point review of systems has been conducted and is  negative for any notable symptoms, except as dictated in the history of present illness.     Physical Exam:  Vital signs: LMP  (LMP Unknown)    General Appearance: No acute distress, appropriate demeanor, conversant  Eyes: moist conjunctivae; EOMI; pupils symmetric; visual acuity grossly intact; no proptosis  Head: normocephalic; overall symmetric appearance without deformity  Face: overall symmetric without deformity  Ears: Normal appearance of external ear  Nose: No external deformity  Oral Cavity/oropharynx: Normal appearance of mucosa  Neck: no palpable lymphadenopathy; thyroid without palpable nodules  Lungs: symmetric chest rise; no wheezing  CV: Good distal perfusion; normal hear rate  Extremities: No deformity  Neurologic Exam: Cranial nerves II-XII are grossly intact      Procedure Note  Procedure performed: Rigid nasal endoscopy  Indication: To evaluate for sinonasal pathology not visualized on routine anterior rhinoscopy  Anesthesia: 4% topical lidocaine with 0.05 % oxymetazoline  Description of procedure: A 30 degree, 3 mm rigid endoscope was inserted into bilateral nasal cavities and the nasal valves, nasal cavity, middle meatus, sphenoethmoid recess, nasopharynx were evaluated for evidence of obstruction, edema, purulence, polyps and/or mass/lesion.     [place]-Joaquin Endoscopic Scoring System  Endoscopic observation Right Left   Polyps in middle meatus (0 = absent, 1 = restricted to middle meatus, 2 = Beyond middle meatus) 0 0   Discharge (0 = absent, 1 = thin and clear, 2 = thick, purulent) 0 0   Edema (0 = absent, 1 = mild-moderate, 2 = moderate-severe) 0 0   Crusting (0 = absent, 1 = mild-moderate, 2 = moderate-severe) 0 0   Scarring (0= absent, 1 = mild-moderate, 2 = moderate-severe) 0 0   Total 0 0     Findings  RT/LT: Continued improved in underlying inflammation. Persistent left caudal septal deviation.     The patient tolerated the procedure well without complication.     Imaging Review:  CT  Sinus w/o contrast 2/26/25  IMPRESSION:  1.  No evidence for inflammatory sinusitis.  2.  Paranasal sinus drainage pathways are patent bilaterally.  3.  No air-fluid levels.  4.  Leftward deviation of the bony nasal septum.    Assessment/Medical Decision Making:  Jennifer Jane is a 74 year old female who presents for follow up. We reviewed her CT scan, which was good. We talked about the possible etiology of her symptoms, and it seems reasonable that the polyester from her blankets could be causing her symptoms. I have recommended that she do a sinus rinse right after she's done sewing. We discussed her deviated septum and what a possible septoplasty would look like. Should she decide to do this in the future, I would refer her to my colleague in facial plastics. She is interested in the septoplasty in the future, but not at this time. She will reach out when she ready for the referral to facial plastics.     Plan:  Continue sinus rinses (Specifically after you're done sewing)  Reach out in the future when you would like to be referred to facial plastics for an open septoplasty.   Follow up with me as needed    Scribe Disclosure:   By signing my name below, I, Brenna Bean, attest that this documentation has been prepared under the direction and in the presence of Elier Corrigan MD.  - Electronically Signed: Brenna Del Angel 02/27/25    Provider Disclosure:  I agree with above History, Review of Systems, Physical exam and Plan.  I have reviewed the content of the documentation and have edited it as needed. I have personally performed the services documented here and the documentation accurately represents those services and the decisions I have made.      Electronically signed by:    Elier Corrigan MD    [facility]  Rhinology, Endoscopic Skull Base Surgery  [facility]  Department of Otolaryngology - Head & Neck  Surgery    ~~~~~~~~~~~~~~~~~~~~~~~~~~~~~~~~~~~~~~~~~~~~~~~~~~~~~~~~~~~~~~~~~~~~~~~~~~~~~~~~~~~~~~~~~~~~~~~~~~~~~~~~~~~~~~~~~~~~~~~~~~~~~~~~~~~~~~~    Past Medical History:   Diagnosis Date    Acute bilateral low back pain with bilateral sciatica     Acute pain of right knee     Pain for months, worse since helping friend move.  Exam consistent with meniscal tear.  Check xray today, depending on results MRI.  Ice, NSAIDS for now.  ACE.    Amaurosis fugax     Arthritis     Fibromyalgia, ? Osteoarthritis    Back injury     Micro discectomy, approx date 1983    Benign neoplasm of ascending colon     Chondromalacia of patella, right     Severe, tri-compartmental on MRI 10/2017.  Referred to ortho.    Chronic left-sided headache     Negative MRI and CT of head 2018 and 2016  Normal labs TSH, CBC, CMP, ESR 12/2019.  See plan below in pt instructions.    Diarrhea, unspecified type     Diverticular disease of large intestine     Encounter for Medicare annual wellness exam     Up to date on mammogram and colon cancer screening.  Is due for shingles vaccine - do at pharmacy.    Glaucoma     Glaucoma suspect     Hiatal hernia     HTN (hypertension)     Hypercalciuria     Infection due to 2019 novel coronavirus 02/10/2024    x3    Insomnia 12/26/2012     Problem list name updated by automated process. Provider to review    Low bone density 2022    Overweight (BMI 25.0-29.9)     Polyp of colon     Prediabetes     Primary hyperparathyroidism     mild; borderline hypercalciuria    Pseudophakia, both eyes     Reduced vision 09/2016    L vision block, bilateral elevated eye presures,poss. Glacom    Right foot pain     Sciatica     On and off - exacerbates when lifts heavy things  Bilaterally.    Spinal stenosis of lumbar region without neurogenic claudication     Uncomplicated asthma     Diagnosed as 2 yo    Weakness of left lower extremity         Past Surgical History:   Procedure Laterality Date     cataract extraction with  intraocular lens implant Right 01/31/2019    BACK SURGERY  1984    micro-disc-ectomy    BREAST SURGERY  1984    L Breast bx    cataract extraction with intraocular lens Left 12/06/2018    CHOLECYSTECTOMY  1990    INJECT EPIDURAL TRANSFORAMINAL Bilateral 1/31/2024    Procedure: Bilateral L5-S1 Transforaminal epidural steroid injection;  Surgeon: Sarahy Tsang MD;  Location: [facility] OR    IRIDOTOMY/IRIDECTOMY LASER SURGERY Left 09/27/2016    IRIDOTOMY/IRIDECTOMY LASER SURGERY Right 10/18/2016    TUBAL LIGATION  1989    ZZC STOMACH SURGERY PROCEDURE UNLISTED  03/1990    Cholecystectomy, 11/1989 tubal ligation        Family History   Problem Relation Age of Onset    Heart Disease Mother     Diabetes Mother     Coronary Artery Disease Mother     Hypertension Mother     Anesthesia Reaction Mother         Anaphylaxis, oral opioixd    Asthma Mother     Thyroid Disease Mother     Low Back Problems Mother     Alzheimer Disease Mother     Heart Disease Father         CABG x2    Diabetes Father     Coronary Artery Disease Father     Hypertension Father     Hyperlipidemia Father     Low Back Problems Father     Diabetes Sister         insulin dependent    Hypertension Sister     Low Back Problems Sister     Thyroid Disease Sister     Hip fracture Sister     Low Back Problems Brother     Dementia Brother         Lewy Body    Asthma Maternal Grandmother     Coronary Artery Disease Maternal Grandfather     Cerebrovascular Disease Paternal Grandfather     Asthma Daughter     Genetic Disease Daughter         Kallman syndrome; no olfactory bulb;    Calcium Disorder No family hx of         Social History     Socioeconomic History    Marital status:    Tobacco Use    Smoking status: Never     Passive exposure: Past    Smokeless tobacco: Never   Vaping Use    Vaping status: Never Used   Substance and Sexual Activity    Alcohol use: Yes     Alcohol/week: 0.0 standard drinks of alcohol     Comment: 0 -2 times a year    Drug use: No     Sexual activity: Yes     Partners: Male     Birth control/protection: Post-menopausal, Female Surgical     Social Drivers of Health     Financial Resource Strain: Low Risk  (2/28/2024)    Financial Resource Strain     Within the past 12 months, have you or your family members you live with been unable to get utilities (heat, electricity) when it was really needed?: No   Food Insecurity: Low Risk  (2/28/2024)    Food Insecurity     Within the past 12 months, did you worry that your food would run out before you got money to buy more?: No     Within the past 12 months, did the food you bought just not last and you didn t have money to get more?: No   Transportation Needs: Low Risk  (2/28/2024)    Transportation Needs     Within the past 12 months, has lack of transportation kept you from medical appointments, getting your medicines, non-medical meetings or appointments, work, or from getting things that you need?: No   Physical Activity: Insufficiently Active (2/28/2024)    Exercise Vital Sign     Days of Exercise per Week: 4 days     Minutes of Exercise per Session: 20 min   Stress: No Stress Concern Present (2/28/2024)    [place] [place] [employer] - Occupational Stress Questionnaire     Feeling of Stress : Not at all   Social Connections: Unknown (2/28/2024)    Social Connection and Isolation Panel [NHANES]     Frequency of Social Gatherings with Friends and Family: Once a week   Interpersonal Safety: Low Risk  (8/29/2024)    Interpersonal Safety     Do you feel physically and emotionally safe where you currently live?: Yes     Within the past 12 months, have you been hit, slapped, kicked or otherwise physically hurt by someone?: No     Within the past 12 months, have you been humiliated or emotionally abused in other ways by your partner or ex-partner?: No   Housing Stability: Low Risk  (2/28/2024)    Housing Stability     Do you have housing? : Yes     Are you worried about losing your housing?:  No

## 2025-02-27 NOTE — PATIENT INSTRUCTIONS
You were seen in the ENT Clinic today by Dr. Corrigan. If you have any questions or concerns after your appointment, please contact us (see below)       2.   The following recommendations have been made based upon your appointment today:   -Plan to follow up with Dr. Corrigan as needed.   -Please let us know when you are ready for us to send over a referral to facial plastics to consult for an open septorhinoplasty.             How to Contact Us:  Send a [facility] message to your provider. Our team will respond to you via [facility]. Occasionally, we will need to call you to get further information.  For urgent matters (Monday-Friday), call the ENT Clinic: [phone] and speak with a call center team member - they will route your call appropriately.   If you'd like to speak directly with a nurse, please find our contact information below. We do our best to check voicemail frequently throughout the day, and will work to call you back within 1-2 days. For urgent matters, please use the general clinic phone numbers listed above.     Dania MORENO RN  Direct: [phone]  Sun MOSES LPN  Direct: [phone]         [facility]  Department of Otolaryngology

## 2025-03-08 DIAGNOSIS — M85.9 LOW BONE DENSITY: ICD-10-CM

## 2025-03-08 DIAGNOSIS — Z78.0 ASYMPTOMATIC MENOPAUSE: ICD-10-CM

## 2025-03-08 DIAGNOSIS — E21.0 PRIMARY HYPERPARATHYROIDISM: ICD-10-CM

## 2025-03-12 RX ORDER — ALENDRONATE SODIUM 70 MG/1
TABLET ORAL
Qty: 12 TABLET | Refills: 3 | Status: SHIPPED | OUTPATIENT
Start: 2025-03-12

## 2025-03-12 NOTE — TELEPHONE ENCOUNTER
Last Written Prescription:  alendronate (FOSAMAX) 70 MG tablet 12 tablet 3 4/9/2024   Sig - Route: Take 1 tablet (70 mg) by mouth every 7 days Take 60 minutes before am meal with 8 oz. water. Remain upright for 30 minutes. - Oral   ----------------------  Last Visit Date: 8/28/24  Future Visit Date: none  ----------------------      Refill decision: Medication unable to be refilled by RN due to: Other:    Provider to refill    Kim Dahl RN  [facility] Triage & Med Refill Team       Request from pharmacy:  Requested Prescriptions   Pending Prescriptions Disp Refills    alendronate (FOSAMAX) 70 MG tablet [Pharmacy Med Name: ALENDRONATE SODIUM 70 MG TAB] 12 tablet 3     Sig: TAKE 1 TABLET BY MOUTH EVERY 7 DAYS TAKE 60 MINUTES BEFORE AM MEAL WITH 8 OZ. WATER. REMAIN UPRIGHT FOR 30 MINUTES.       Bisphosphonates Failed - 3/12/2025  3:09 PM        Failed - Medication is active on med list and the sig matches. RN to manually verify dose and sig if red X/fail.     If the protocol passes (green check), you do not need to verify med dose and sig.    A prescription matches if they are the same clinical intention.    For Example: once daily and every morning are the same.    The protocol can not identify upper and lower case letters as matching and will fail.     For Example: Take 1 tablet (50 mg) by mouth daily     TAKE 1 TABLET (50 MG) BY MOUTH DAILY    For all fails (red x), verify dose and sig.    If the refill does match what is on file, the RN can still proceed to approve the refill request.       If they do not match, route to the appropriate provider.             Passed - Dexa scan completed in the past 48-months     Please review last Dexa result.           Passed - Medication indicated for associated diagnosis     The medication is prescribed for one or more of the following conditions:     Osteoporosis   Osteitis Deformans (Paget's Disease)   Postmenopausal  patient    Osteopenia   Arthroplasty   Crohn's Disease   Cystic Fibrosis   Fibrous Dysplasia of bone   Growth Hormone Deficiency   Hypercalcemia   Juvenile Osteoporosis   Hypogonadism          Passed - Recent (12 mo) or future (90 days) visit within the authorizing provider's specialty     The patient must have completed an in-person or virtual visit within the past 12 months or has a future visit scheduled within the next 90 days with the authorizing provider s specialty.  Urgent care and e-visits do not qualify as an office visit for this protocol.          Passed - Most recent Creatinine Clearance in last 12 months >35        Passed - Patient is age 18 or older

## 2025-03-15 DIAGNOSIS — I10 BENIGN ESSENTIAL HYPERTENSION: ICD-10-CM

## 2025-03-17 ENCOUNTER — LAB (OUTPATIENT)
Dept: LAB | Facility: CLINIC | Age: 75
End: 2025-03-17
Payer: COMMERCIAL

## 2025-03-17 ENCOUNTER — OFFICE VISIT (OUTPATIENT)
Dept: NEUROLOGY | Facility: CLINIC | Age: 75
End: 2025-03-17
Payer: COMMERCIAL

## 2025-03-17 ENCOUNTER — PRE VISIT (OUTPATIENT)
Dept: NEUROLOGY | Facility: CLINIC | Age: 75
End: 2025-03-17

## 2025-03-17 VITALS
DIASTOLIC BLOOD PRESSURE: 78 MMHG | RESPIRATION RATE: 16 BRPM | OXYGEN SATURATION: 98 % | HEART RATE: 77 BPM | SYSTOLIC BLOOD PRESSURE: 133 MMHG

## 2025-03-17 DIAGNOSIS — R41.3 MEMORY CHANGE: ICD-10-CM

## 2025-03-17 DIAGNOSIS — R41.3 MEMORY CHANGE: Primary | ICD-10-CM

## 2025-03-17 DIAGNOSIS — U09.9 POST-COVID CHRONIC CONCENTRATION DEFICIT: ICD-10-CM

## 2025-03-17 DIAGNOSIS — Z81.8 FAMILY HISTORY OF DEMENTIA: ICD-10-CM

## 2025-03-17 DIAGNOSIS — R41.840 POST-COVID CHRONIC CONCENTRATION DEFICIT: ICD-10-CM

## 2025-03-17 PROCEDURE — G2211 COMPLEX E/M VISIT ADD ON: HCPCS | Performed by: INTERNAL MEDICINE

## 2025-03-17 PROCEDURE — 82234 BETA-AMYLOID 1-42 (ABETA 42): CPT | Performed by: INTERNAL MEDICINE

## 2025-03-17 PROCEDURE — 82233 BETA-AMYLOID 1-40 (ABETA 40): CPT | Performed by: INTERNAL MEDICINE

## 2025-03-17 PROCEDURE — 99214 OFFICE O/P EST MOD 30 MIN: CPT | Performed by: INTERNAL MEDICINE

## 2025-03-17 PROCEDURE — 99000 SPECIMEN HANDLING OFFICE-LAB: CPT | Performed by: PATHOLOGY

## 2025-03-17 PROCEDURE — 3078F DIAST BP <80 MM HG: CPT | Performed by: INTERNAL MEDICINE

## 2025-03-17 PROCEDURE — 84999 UNLISTED CHEMISTRY PROCEDURE: CPT | Performed by: INTERNAL MEDICINE

## 2025-03-17 PROCEDURE — 1126F AMNT PAIN NOTED NONE PRSNT: CPT | Performed by: INTERNAL MEDICINE

## 2025-03-17 PROCEDURE — 36415 COLL VENOUS BLD VENIPUNCTURE: CPT | Performed by: PATHOLOGY

## 2025-03-17 PROCEDURE — 3075F SYST BP GE 130 - 139MM HG: CPT | Performed by: INTERNAL MEDICINE

## 2025-03-17 RX ORDER — LOSARTAN POTASSIUM 50 MG/1
50 TABLET ORAL 2 TIMES DAILY
Qty: 180 TABLET | Refills: 1 | Status: SHIPPED | OUTPATIENT
Start: 2025-03-17

## 2025-03-17 RX ORDER — ASPIRIN 81 MG/1
81 TABLET ORAL EVERY 4 HOURS PRN
COMMUNITY

## 2025-03-17 RX ORDER — BISACODYL 5 MG/1
5 TABLET, DELAYED RELEASE ORAL DAILY PRN
COMMUNITY

## 2025-03-17 ASSESSMENT — PAIN SCALES - GENERAL: PAINLEVEL_OUTOF10: NO PAIN (0)

## 2025-03-17 NOTE — PROGRESS NOTES
[facility] Neurology Consultation    Jennifer Jane MRN# [id number]   Age: 74 year old YOB: 1950     Requesting physician: Destiny Vital     Reason for Consultation: memory concerns           Assessment and Plan:   Assessment:  Jennifer Jane is a 74 year old female who presents today for evaluation of memory concerns. Memory concerns started in the last few years, potentially longer. She believes there was a worsening after COVID infection. She remains independent in activities of daily living. MoCA today is 22/30. We discussed repeating MRI brain and doing alzheimer's serologic testing. Neuropsychology testing could be considered in the future. Poor sleep may be playing a role in symptoms as well.      Plan:  - MRI brain without contrast  - Serum AD-detect    Follow up in Neurology clinic pending above    Carlitos Feliz MD   of Neurology  [facility]  ---------------------------------------------------------------------------------------------------------------------------        History of Presenting Symptoms:   Jennifer Jane is a 74 year old female who presents today for evaluation of memory concerns.     Patient reports that previously she was very sharp. She thinks memory problems have come up in the last few years. However, she also saw neurology in 2020 for memory concerns. She repeatedly asks her  questions that she forgets. Her  has noticed changes. It is more of a short term memory issues. She denies word finding issues. She had COVID a few years ago. She thinks memory problems started after COVID.     She stays up late and doesn't sleep well at night. Generally she feels refreshed in the morning.      She does her own taxes. It is more stressful now.    Her driving has been ok. She uses her phone for directions.     She denies any hallucinations or vivid dreams or acting out dreams.     She relies on her  "calendar a lot for events.     She has a chart for her medications to remember if she took then.     She tries to stay active and volunteers.     When asked about mood, she says she feels \"discouraged\". She is on trazodone for sleep.     She previously was a nurse at [employer]. She last worked at the [employer] in 2017.     Mom had Alzheimer's disease in her 70s. She  at 88. She had \"hallucinations\".     Her brother had Lewy Body Dementia.       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, mild stage    Bilateral edema of lower extremity    Low bone density    Primary hyperparathyroidism    Hypercalciuria    Lumbar radiculopathy with sciatica    Neuroforaminal stenosis of lumbar spine    Chronic idiopathic constipation    PVC's (premature ventricular contractions)    Post-COVID chronic concentration deficit    Hoarseness or changing voice    Postmenopausal status    Multiple joint pain    Other fatigue     Past Medical History:   Diagnosis Date    Acute bilateral low back pain with bilateral sciatica     Acute pain of right knee     Pain for months, worse since helping friend move.  Exam consistent with meniscal tear.  Check xray today, depending on results MRI.  Ice, NSAIDS for now.  ACE.    Amaurosis fugax     Arthritis     Fibromyalgia, ? Osteoarthritis    Back injury     Micro discectomy, approx date     Benign neoplasm of ascending colon     Chondromalacia of patella, right     Severe, tri-compartmental on MRI 10/2017.  Referred to ortho.    Chronic left-sided headache     Negative MRI and CT of head  and   " Normal labs TSH, CBC, CMP, ESR 12/2019.  See plan below in pt instructions.    Diarrhea, unspecified type     Diverticular disease of large intestine     Encounter for Medicare annual wellness exam     Up to date on mammogram and colon cancer screening.  Is due for shingles vaccine - do at pharmacy.    Glaucoma     Glaucoma suspect     Hiatal hernia     HTN (hypertension)     Hypercalciuria     Infection due to 2019 novel coronavirus 02/10/2024    x3    Insomnia 12/26/2012     Problem list name updated by automated process. Provider to review    Low bone density 2022    Overweight (BMI 25.0-29.9)     Polyp of colon     Prediabetes     Primary hyperparathyroidism     mild; borderline hypercalciuria    Pseudophakia, both eyes     Reduced vision 09/2016    L vision block, bilateral elevated eye presures,poss. Glacom    Right foot pain     Sciatica     On and off - exacerbates when lifts heavy things  Bilaterally.    Spinal stenosis of lumbar region without neurogenic claudication     Uncomplicated asthma     Diagnosed as 2 yo    Weakness of left lower extremity         Past Surgical History:     Past Surgical History:   Procedure Laterality Date     cataract extraction with intraocular lens implant Right 01/31/2019    BACK SURGERY  1984    micro-disc-ectomy    BREAST SURGERY  1984    L Breast bx    cataract extraction with intraocular lens Left 12/06/2018    CHOLECYSTECTOMY  1990    INJECT EPIDURAL TRANSFORAMINAL Bilateral 1/31/2024    Procedure: Bilateral L5-S1 Transforaminal epidural steroid injection;  Surgeon: Sarahy Tsang MD;  Location: [facility] OR    IRIDOTOMY/IRIDECTOMY LASER SURGERY Left 09/27/2016    IRIDOTOMY/IRIDECTOMY LASER SURGERY Right 10/18/2016    TUBAL LIGATION  1989    Z STOMACH SURGERY PROCEDURE UNLISTED  03/1990    Cholecystectomy, 11/1989 tubal ligation        Social History:     Social History     Tobacco Use    Smoking status: Never     Passive exposure: Past    Smokeless tobacco: Never    Vaping Use    Vaping status: Never Used   Substance Use Topics    Alcohol use: Yes     Alcohol/week: 0.0 standard drinks of alcohol     Comment: 0 -2 times a year    Drug use: No        Family History:     Family History   Problem Relation Age of Onset    Heart Disease Mother     Diabetes Mother     Coronary Artery Disease Mother     Hypertension Mother     Anesthesia Reaction Mother         Anaphylaxis, oral opioixd    Asthma Mother     Thyroid Disease Mother     Low Back Problems Mother     Alzheimer Disease Mother     Heart Disease Father         CABG x2    Diabetes Father     Coronary Artery Disease Father     Hypertension Father     Hyperlipidemia Father     Low Back Problems Father     Diabetes Sister         insulin dependent    Hypertension Sister     Low Back Problems Sister     Thyroid Disease Sister     Hip fracture Sister     Low Back Problems Brother     Dementia Brother         Lewy Body    Asthma Maternal Grandmother     Coronary Artery Disease Maternal Grandfather     Cerebrovascular Disease Paternal Grandfather     Asthma Daughter     Genetic Disease Daughter         Kallman syndrome; no olfactory bulb;    Calcium Disorder No family hx of         Medications:     Current Outpatient Medications   Medication Sig Dispense Refill    acetaminophen (TYLENOL) 325 MG tablet Take 325-650 mg by mouth every 6 hours as needed for mild pain      acetylcysteine (N-ACETYL CYSTEINE) 600 MG CAPS capsule Take 1 capsule (600 mg) by mouth daily 90 capsule 4    ADVAIR -21 MCG/ACT inhaler Inhale 2 puffs into the lungs 2 times daily      albuterol (VENTOLIN HFA) 108 (90 Base) MCG/ACT inhaler Inhale 2 puffs into the lungs every 6 hours as needed for shortness of breath 8.5 g 5    alendronate (FOSAMAX) 70 MG tablet TAKE 1 TABLET BY MOUTH EVERY 7 DAYS TAKE 60 MINUTES BEFORE AM MEAL WITH 8 OZ. WATER. REMAIN UPRIGHT FOR 30 MINUTES. 12 tablet 3    amLODIPine (NORVASC) 5 MG tablet Take 1 tablet (5 mg) by mouth at  bedtime 90 tablet 4    amoxicillin-clavulanate (AUGMENTIN) 500-125 MG tablet Take 1 tablet by mouth 3 times daily. (Patient not taking: Reported on 2/19/2025)      atorvastatin (LIPITOR) 20 MG tablet Take 1 tablet (20 mg) by mouth daily 90 tablet 4    azelastine (ASTELIN) 0.1 % nasal spray Spray 1 spray into both nostrils 2 times daily      brimonidine (ALPHAGAN-P) 0.15 % ophthalmic solution PLACE 1 DROP INTO BOTH EYES TWO TIMES A DAY.      Calcium Carbonate Antacid (TUMS PO) Take  by mouth At Bedtime.      co-enzyme Q-10 100 MG CAPS capsule Take 1 capsule (100 mg) by mouth daily 90 capsule 4    fexofenadine (ALLEGRA) 180 MG tablet Take 180 mg by mouth daily      ipratropium (ATROVENT) 0.03 % nasal spray Spray 2 sprays into both nostrils 2 times daily 30 mL 6    latanoprost (XALATAN) 0.005 % ophthalmic solution Place 1 drop into the right eye At Bedtime      loratadine (CLARITIN) 10 MG tablet Take 10 mg by mouth daily.      losartan (COZAAR) 50 MG tablet TAKE 1 TABLET BY MOUTH TWICE A  tablet 1    melatonin 5 MG CAPS Take 1 capsule by mouth at bedtime.      montelukast (SINGULAIR) 10 MG tablet TAKE ONE TABLET BY MOUTH EVERY DAY AS DIRECTED  1    moxifloxacin (VIGAMOX) 0.5 % ophthalmic solution Place 1 drop into both eyes 4 times daily.      oxymetazoline (AFRIN) 0.05 % nasal spray 2 sprays.      predniSONE (DELTASONE) 20 MG tablet TAKE 2 TABLETS BY MOUTH DAILY X 4 DAYS. (Patient not taking: Reported on 2/19/2025)      SPIRIVA RESPIMAT 1.25 MCG/ACT inhaler INHALE 2 PUFFS ONCE PER DAY      traZODone (DESYREL) 50 MG tablet Take 1-3 tablets ( mg) by mouth at bedtime 130 tablet 4    Vitamin D3 (CHOLECALCIFEROL) 25 mcg (1000 units) tablet Take by mouth daily       No current facility-administered medications for this visit.        Allergies:     Allergies   Allergen Reactions    Canola Oil [Vegetable Oil] Anaphylaxis and GI Disturbance    Animal Dander Unknown    Dust Mites Unknown    Grass     Hydroxyzine  Other (See Comments)     Passed out        Hydroxyzine Hcl     Pollen Extract Unknown    Trees     Ragweeds     Chlorhexidine Itching and Rash     Hibiclens          Review of Systems:   As above      Physical Exam:   Vitals: /78 (BP Location: Left arm, Patient Position: Sitting, Cuff Size: Adult Regular)   Pulse 77   Resp 16   LMP  (LMP Unknown)   SpO2 98%    General: Seated comfortably in no acute distress.  Lungs: breathing comfortably  Neurologic:     Mental Status: MoCA 22/30 (-5 delayed recall, -2 repeating sentences, -1 year (corrected herself after))     Cranial Nerves: Visual fields intact. PERRL. EOMI with normal smooth pursuit. Facial sensation intact/symmetric. Facial movements symmetric. Hearing not formally tested but intact to conversation. Palate elevation symmetric, uvula midline. No dysarthria. Shoulder shrug strong bilaterally. Tongue protrusion midline.     Motor: No significant tremors or other abnormal movements observed. Muscle tone normal throughout. Normal/symmetric rapid finger tapping. Strength 5/5 throughout upper and lower extremities.     Deep Tendon Reflexes: 2+/symmetric throughout upper and lower extremities. No clonus.      Sensory: Intact/symmetric to light touch throughout upper and lower extremities. Negative Romberg.      Coordination: Finger-nose-finger and heel-shin intact without dysmetria.      Gait: Normal, steady casual gait. Able to walk in tandem without difficulty.         Data: Pertinent prior to visit   Imaging:  MRI brain 1/2021  Impression:   Unremarkable MRI of the brain.    Laboratory:  TSH/B12 normal    The longitudinal plan of care for the diagnosis(es)/condition(s) as documented were addressed during this visit. Due to the added complexity in care, I will continue to support Fidelina in the subsequent management and with ongoing continuity of care.

## 2025-03-17 NOTE — LETTER
3/17/2025       RE: Jennifer Jane  [address]  [facility] [address]     Dear Colleague,    Thank you for referring your patient, Jennifer Jane, to the [facility] NEUROLOGY CLINIC [place] [facility]. Please see a copy of my visit note below.    [facility] Neurology Consultation    Jennifer Jane MRN# [id number]   Age: 74 year old YOB: 1950     Requesting physician: Destiny Vital     Reason for Consultation: memory concerns           Assessment and Plan:   Assessment:  Jennifer Jane is a 74 year old female who presents today for evaluation of memory concerns. Memory concerns started in the last few years, potentially longer. She believes there was a worsening after COVID infection. She remains independent in activities of daily living. MoCA today is 22/30. We discussed repeating MRI brain and doing alzheimer's serologic testing. Neuropsychology testing could be considered in the future. Poor sleep may be playing a role in symptoms as well.      Plan:  - MRI brain without contrast  - Serum AD-detect    Follow up in Neurology clinic pending above    Carlitos Feliz MD   of Neurology  [facility]  ---------------------------------------------------------------------------------------------------------------------------        History of Presenting Symptoms:   Jennifer Jane is a 74 year old female who presents today for evaluation of memory concerns.     Patient reports that previously she was very sharp. She thinks memory problems have come up in the last few years. However, she also saw neurology in 2020 for memory concerns. She repeatedly asks her  questions that she forgets. Her  has noticed changes. It is more of a short term memory issues. She denies word finding issues. She had COVID a few years ago. She thinks memory problems started after  "COVID.     She stays up late and doesn't sleep well at night. Generally she feels refreshed in the morning.      She does her own taxes. It is more stressful now.    Her driving has been ok. She uses her phone for directions.     She denies any hallucinations or vivid dreams or acting out dreams.     She relies on her calendar a lot for events.     She has a chart for her medications to remember if she took then.     She tries to stay active and volunteers.     When asked about mood, she says she feels \"discouraged\". She is on trazodone for sleep.     She previously was a nurse at [employer]. She last worked at the [employer] in 2017.     Mom had Alzheimer's disease in her 70s. She  at 88. She had \"hallucinations\".     Her brother had Lewy Body Dementia.       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, mild stage     Bilateral edema of lower extremity     Low bone density     Primary hyperparathyroidism     Hypercalciuria     Lumbar radiculopathy with sciatica     Neuroforaminal stenosis of lumbar spine     Chronic idiopathic constipation     PVC's (premature ventricular contractions)     Post-COVID chronic concentration deficit     Hoarseness or changing voice     Postmenopausal status     Multiple joint pain     Other fatigue     Past Medical History:   Diagnosis Date     Acute bilateral low back pain with bilateral sciatica      Acute pain of right knee     Pain for months, worse since helping friend move.  Exam consistent with meniscal tear.  Check xray today, depending on results MRI.  Ice, NSAIDS for now. "  ACE.     Amaurosis fugax      Arthritis     Fibromyalgia, ? Osteoarthritis     Back injury     Micro discectomy, approx date 1983     Benign neoplasm of ascending colon      Chondromalacia of patella, right     Severe, tri-compartmental on MRI 10/2017.  Referred to ortho.     Chronic left-sided headache     Negative MRI and CT of head 2018 and 2016  Normal labs TSH, CBC, CMP, ESR 12/2019.  See plan below in pt instructions.     Diarrhea, unspecified type      Diverticular disease of large intestine      Encounter for Medicare annual wellness exam     Up to date on mammogram and colon cancer screening.  Is due for shingles vaccine - do at pharmacy.     Glaucoma      Glaucoma suspect      Hiatal hernia      HTN (hypertension)      Hypercalciuria      Infection due to 2019 novel coronavirus 02/10/2024    x3     Insomnia 12/26/2012     Problem list name updated by automated process. Provider to review     Low bone density 2022     Overweight (BMI 25.0-29.9)      Polyp of colon      Prediabetes      Primary hyperparathyroidism     mild; borderline hypercalciuria     Pseudophakia, both eyes      Reduced vision 09/2016    L vision block, bilateral elevated eye presures,poss. Glacom     Right foot pain      Sciatica     On and off - exacerbates when lifts heavy things  Bilaterally.     Spinal stenosis of lumbar region without neurogenic claudication      Uncomplicated asthma     Diagnosed as 1 yo     Weakness of left lower extremity         Past Surgical History:     Past Surgical History:   Procedure Laterality Date      cataract extraction with intraocular lens implant Right 01/31/2019     BACK SURGERY  1984    micro-disc-ectomy     BREAST SURGERY  1984    L Breast bx     cataract extraction with intraocular lens Left 12/06/2018     CHOLECYSTECTOMY  1990     INJECT EPIDURAL TRANSFORAMINAL Bilateral 1/31/2024    Procedure: Bilateral L5-S1 Transforaminal epidural steroid injection;  Surgeon: Sarahy Tsang MD;  Location:  [facility] OR     IRIDOTOMY/IRIDECTOMY LASER SURGERY Left 09/27/2016     IRIDOTOMY/IRIDECTOMY LASER SURGERY Right 10/18/2016     TUBAL LIGATION  1989     Z STOMACH SURGERY PROCEDURE UNLISTED  03/1990    Cholecystectomy, 11/1989 tubal ligation        Social History:     Social History     Tobacco Use     Smoking status: Never     Passive exposure: Past     Smokeless tobacco: Never   Vaping Use     Vaping status: Never Used   Substance Use Topics     Alcohol use: Yes     Alcohol/week: 0.0 standard drinks of alcohol     Comment: 0 -2 times a year     Drug use: No        Family History:     Family History   Problem Relation Age of Onset     Heart Disease Mother      Diabetes Mother      Coronary Artery Disease Mother      Hypertension Mother      Anesthesia Reaction Mother         Anaphylaxis, oral opioixd     Asthma Mother      Thyroid Disease Mother      Low Back Problems Mother      Alzheimer Disease Mother      Heart Disease Father         CABG x2     Diabetes Father      Coronary Artery Disease Father      Hypertension Father      Hyperlipidemia Father      Low Back Problems Father      Diabetes Sister         insulin dependent     Hypertension Sister      Low Back Problems Sister      Thyroid Disease Sister      Hip fracture Sister      Low Back Problems Brother      Dementia Brother         Lewy Body     Asthma Maternal Grandmother      Coronary Artery Disease Maternal Grandfather      Cerebrovascular Disease Paternal Grandfather      Asthma Daughter      Genetic Disease Daughter         Kallman syndrome; no olfactory bulb;     Calcium Disorder No family hx of         Medications:     Current Outpatient Medications   Medication Sig Dispense Refill     acetaminophen (TYLENOL) 325 MG tablet Take 325-650 mg by mouth every 6 hours as needed for mild pain       acetylcysteine (N-ACETYL CYSTEINE) 600 MG CAPS capsule Take 1 capsule (600 mg) by mouth daily 90 capsule 4     ADVAIR -21 MCG/ACT inhaler Inhale 2 puffs  into the lungs 2 times daily       albuterol (VENTOLIN HFA) 108 (90 Base) MCG/ACT inhaler Inhale 2 puffs into the lungs every 6 hours as needed for shortness of breath 8.5 g 5     alendronate (FOSAMAX) 70 MG tablet TAKE 1 TABLET BY MOUTH EVERY 7 DAYS TAKE 60 MINUTES BEFORE AM MEAL WITH 8 OZ. WATER. REMAIN UPRIGHT FOR 30 MINUTES. 12 tablet 3     amLODIPine (NORVASC) 5 MG tablet Take 1 tablet (5 mg) by mouth at bedtime 90 tablet 4     amoxicillin-clavulanate (AUGMENTIN) 500-125 MG tablet Take 1 tablet by mouth 3 times daily. (Patient not taking: Reported on 2/19/2025)       atorvastatin (LIPITOR) 20 MG tablet Take 1 tablet (20 mg) by mouth daily 90 tablet 4     azelastine (ASTELIN) 0.1 % nasal spray Spray 1 spray into both nostrils 2 times daily       brimonidine (ALPHAGAN-P) 0.15 % ophthalmic solution PLACE 1 DROP INTO BOTH EYES TWO TIMES A DAY.       Calcium Carbonate Antacid (TUMS PO) Take  by mouth At Bedtime.       co-enzyme Q-10 100 MG CAPS capsule Take 1 capsule (100 mg) by mouth daily 90 capsule 4     fexofenadine (ALLEGRA) 180 MG tablet Take 180 mg by mouth daily       ipratropium (ATROVENT) 0.03 % nasal spray Spray 2 sprays into both nostrils 2 times daily 30 mL 6     latanoprost (XALATAN) 0.005 % ophthalmic solution Place 1 drop into the right eye At Bedtime       loratadine (CLARITIN) 10 MG tablet Take 10 mg by mouth daily.       losartan (COZAAR) 50 MG tablet TAKE 1 TABLET BY MOUTH TWICE A  tablet 1     melatonin 5 MG CAPS Take 1 capsule by mouth at bedtime.       montelukast (SINGULAIR) 10 MG tablet TAKE ONE TABLET BY MOUTH EVERY DAY AS DIRECTED  1     moxifloxacin (VIGAMOX) 0.5 % ophthalmic solution Place 1 drop into both eyes 4 times daily.       oxymetazoline (AFRIN) 0.05 % nasal spray 2 sprays.       predniSONE (DELTASONE) 20 MG tablet TAKE 2 TABLETS BY MOUTH DAILY X 4 DAYS. (Patient not taking: Reported on 2/19/2025)       SPIRIVA RESPIMAT 1.25 MCG/ACT inhaler INHALE 2 PUFFS ONCE PER DAY        traZODone (DESYREL) 50 MG tablet Take 1-3 tablets ( mg) by mouth at bedtime 130 tablet 4     Vitamin D3 (CHOLECALCIFEROL) 25 mcg (1000 units) tablet Take by mouth daily       No current facility-administered medications for this visit.        Allergies:     Allergies   Allergen Reactions     Canola Oil [Vegetable Oil] Anaphylaxis and GI Disturbance     Animal Dander Unknown     Dust Mites Unknown     Grass      Hydroxyzine Other (See Comments)     Passed out         Hydroxyzine Hcl      Pollen Extract Unknown     Trees      Ragweeds      Chlorhexidine Itching and Rash     Hibiclens          Review of Systems:   As above      Physical Exam:   Vitals: /78 (BP Location: Left arm, Patient Position: Sitting, Cuff Size: Adult Regular)   Pulse 77   Resp 16   LMP  (LMP Unknown)   SpO2 98%    General: Seated comfortably in no acute distress.  Lungs: breathing comfortably  Neurologic:     Mental Status: MoCA 22/30 (-5 delayed recall, -2 repeating sentences, -1 year (corrected herself after))     Cranial Nerves: Visual fields intact. PERRL. EOMI with normal smooth pursuit. Facial sensation intact/symmetric. Facial movements symmetric. Hearing not formally tested but intact to conversation. Palate elevation symmetric, uvula midline. No dysarthria. Shoulder shrug strong bilaterally. Tongue protrusion midline.     Motor: No significant tremors or other abnormal movements observed. Muscle tone normal throughout. Normal/symmetric rapid finger tapping. Strength 5/5 throughout upper and lower extremities.     Deep Tendon Reflexes: 2+/symmetric throughout upper and lower extremities. No clonus.      Sensory: Intact/symmetric to light touch throughout upper and lower extremities. Negative Romberg.      Coordination: Finger-nose-finger and heel-shin intact without dysmetria.      Gait: Normal, steady casual gait. Able to walk in tandem without difficulty.         Data: Pertinent prior to visit   Imaging:  MRI brain  1/2021  Impression:   Unremarkable MRI of the brain.    Laboratory:  TSH/B12 normal    The longitudinal plan of care for the diagnosis(es)/condition(s) as documented were addressed during this visit. Due to the added complexity in care, I will continue to support Fidelina in the subsequent management and with ongoing continuity of care.      Again, thank you for allowing me to participate in the care of your patient.      Sincerely,    Carlitos Feliz MD

## 2025-03-17 NOTE — NURSING NOTE
Chief Complaint   Patient presents with    New Patient     New Neurology            /78 (BP Location: Left arm, Patient Position: Sitting, Cuff Size: Adult Regular)   Pulse 77   Resp 16   LMP  (LMP Unknown)   SpO2 98%       Maddie Cervantes

## 2025-03-18 LAB
Lab: NORMAL
PERFORMING LABORATORY: NORMAL
SPECIMEN STATUS: NORMAL
TEST NAME: NORMAL

## 2025-03-22 ENCOUNTER — NURSE TRIAGE (OUTPATIENT)
Dept: NURSING | Facility: CLINIC | Age: 75
End: 2025-03-22
Payer: COMMERCIAL

## 2025-03-23 ENCOUNTER — OFFICE VISIT (OUTPATIENT)
Dept: URGENT CARE | Facility: URGENT CARE | Age: 75
End: 2025-03-23
Payer: COMMERCIAL

## 2025-03-23 VITALS
WEIGHT: 161.8 LBS | HEART RATE: 72 BPM | OXYGEN SATURATION: 96 % | RESPIRATION RATE: 18 BRPM | BODY MASS INDEX: 27.34 KG/M2 | SYSTOLIC BLOOD PRESSURE: 137 MMHG | DIASTOLIC BLOOD PRESSURE: 75 MMHG | TEMPERATURE: 96.6 F

## 2025-03-23 DIAGNOSIS — L03.213 PRESEPTAL CELLULITIS OF LEFT UPPER EYELID: Primary | ICD-10-CM

## 2025-03-23 PROCEDURE — 99214 OFFICE O/P EST MOD 30 MIN: CPT

## 2025-03-23 PROCEDURE — 3075F SYST BP GE 130 - 139MM HG: CPT

## 2025-03-23 PROCEDURE — 3078F DIAST BP <80 MM HG: CPT

## 2025-03-23 NOTE — PATIENT INSTRUCTIONS
Continue the polytrim eyedrops as prescribed.  Continue the warm packs  Start augmentin - twice daily for 5 days to treat skin infection above the eye.  Call your ophthalmologist and see if you can get in next week for an acute visit.  If things are getting worse please come back in.

## 2025-03-23 NOTE — TELEPHONE ENCOUNTER
"Nurse Triage SBAR    Is this a 2nd Level Triage? NO    Situation: Worsening Eyelid redness & Eye discharge on Abx drops    Background: Jennifer was seen yesterday in [facility] for new onset of Lt Eye discomfort and watering.    Sx's started that morning.     The day before she was at a  and wiped her eye with a tissue after wiping her nose.  She wonders if she scratched her inner upper Eyelid    She was started on Polytrim drops yesterday, which she has been using 4x a day.    Today  - Lt Eyelid is more swollen & reddened.  - Lt Eyelid is tender to touch  - New onset of Greenish discharge from eye  - Sclera slightly pink  - Currently, \"small amt\" of discomfort - This morning it was very uncomfortable    Denies fever, vision changes    Assessment: as noted above    Protocol Recommended Disposition:   See PCP Within 24 Hours - [facility]    Does the patient meet one of the following criteria for ADS visit consideration? 16+ years old, with an [facility] PCP     TIP  Providers, please consider if this condition is appropriate for management at one of our Acute and Diagnostic Services sites.     If patient is a good candidate, please use dotphrase <dot>triageresponse and select Refer to ADS to document.    Abril Callahan RN  [facility]      Reason for Disposition   Eyelid is red and painful (or tender to touch)    Additional Information   Negative: SEVERE difficulty breathing (e.g., struggling for each breath, speaks in single words)   Negative: Sounds like a life-threatening emergency to the triager   Negative: MODERATE difficulty breathing (e.g., speaks in phrases, SOB even at rest, pulse 100-120)   Negative: Fever > 103 F (39.4 C)   Negative: Patient sounds very sick or weak to the triager   Negative: SEVERE eye pain (e.g., excruciating)   Negative: [1] Eyelids are very swollen (shut or almost) AND [2] fever   Negative: [1] Eyelid (outer) is very red (or tender to touch) AND [2] fever   " Negative: Patient sounds very sick or weak to the triager   Negative: MODERATE eye pain (e.g., interferes with normal activities)   Negative: Fever > 104 F (40 C)   Negative: Cloudy spot or sore seen on the cornea (clear part of the eye)   Negative: Blurred vision   Negative: Eye is very swollen (shut or almost)   Negative: [1] MILD eye pain or discomfort AND [2] wears contacts    Protocols used: Infection on Antibiotic Follow-up Call-A-, Eye - Pus or [id number]

## 2025-03-23 NOTE — PROGRESS NOTES
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  Since then she has been using drops as directed as well as warm compresses.  She says things have gotten slightly better but overall minimal improvement.  On exam concern for possible preseptal cellulitis conjunctive only mildly irritated.  There is some point tenderness in the left upper eyelid that makes me wonder about a stye as initial etiology.  Will treat with oral antibiotics.  Continue warm compresses.  Will continue with Polytrim drops as well.  She will call to schedule follow-up with her outpatient ophthalmologist this week in case need for I&D.  Discussed return precautions for any worsening in the meantime.  - amoxicillin-clavulanate (AUGMENTIN) 875-125 MG tablet  Dispense: 10 tablet; Refill: 0  - Adult Eye  Referral       Return in about 3 days (around 3/26/2025), or with opthalmology.    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  Recent UC visit 3/21 treated for bacterial conjunctivitis - treated with polytrim drops  Reviewed ophthalmology note 2/13/25 - treatment with brimonidine in right eye, prn tears      She is back today with concern for left eye - same as before. Started 3/20.  Doing polytrim four times a day, using hot packs  Yesterday really bad, lots of light green \"pus\"  Today " its a bit better, less swollen but still drainage  Some blurry vision which started when this all started.  No injury to the eye that she can recall.  Pain in eye lid and inside her eye. Really started with swollen upper eyelid.  Had been picking at lashes before. No eye makeup.  No contacts.  No other cold symptoms, no fevers.  Never had this issue before.        Objective    /75 (BP Location: Right arm, Patient Position: Sitting, Cuff Size: Adult Regular)   Pulse 72   Temp (!) 96.6  F (35.9  C) (Temporal)   Resp 18   Wt 73.4 kg (161 lb 12.8 oz)   LMP  (LMP Unknown)   SpO2 96%   BMI 27.34 kg/m    Physical Exam  Constitutional:       General: She is not in acute distress.     Appearance: Normal appearance. She is not toxic-appearing.   HENT:      Head: Normocephalic and atraumatic.   Eyes:      General:         Left eye: No foreign body.      Extraocular Movements:      Right eye: Normal extraocular motion and no nystagmus.      Left eye: Normal extraocular motion and no nystagmus.      Conjunctiva/sclera:      Left eye: Left conjunctiva is injected. No chemosis, exudate or hemorrhage.     Comments: Significant erythema and swelling overlying the left upper eyelid with point tenderness just left of the midline.  Possible hordeolum.   Pulmonary:      Effort: Pulmonary effort is normal.   Musculoskeletal:         General: Normal range of motion.   Skin:     Findings: Erythema present.   Neurological:      General: No focal deficit present.      Mental Status: She is alert.

## 2025-03-24 LAB
SCANNED LAB RESULT: ABNORMAL
TEST NAME: ABNORMAL

## 2025-03-25 ENCOUNTER — OFFICE VISIT (OUTPATIENT)
Dept: OPHTHALMOLOGY | Facility: CLINIC | Age: 75
End: 2025-03-25
Payer: COMMERCIAL

## 2025-03-25 DIAGNOSIS — H02.88B MEIBOMIAN GLAND DYSFUNCTION (MGD), BILATERAL, BOTH UPPER AND LOWER LIDS: ICD-10-CM

## 2025-03-25 DIAGNOSIS — H01.00B BLEPHARITIS OF UPPER AND LOWER EYELIDS OF BOTH EYES, UNSPECIFIED TYPE: ICD-10-CM

## 2025-03-25 DIAGNOSIS — H00.14 CHALAZION OF LEFT UPPER EYELID: Primary | ICD-10-CM

## 2025-03-25 DIAGNOSIS — H02.88A MEIBOMIAN GLAND DYSFUNCTION (MGD), BILATERAL, BOTH UPPER AND LOWER LIDS: ICD-10-CM

## 2025-03-25 DIAGNOSIS — H10.13 ALLERGIC CONJUNCTIVITIS OF BOTH EYES: ICD-10-CM

## 2025-03-25 DIAGNOSIS — H40.1134 PRIMARY OPEN ANGLE GLAUCOMA (POAG) OF BOTH EYES, INDETERMINATE STAGE: ICD-10-CM

## 2025-03-25 DIAGNOSIS — E78.00 HYPERCHOLESTEROLEMIA: ICD-10-CM

## 2025-03-25 DIAGNOSIS — Z96.1 PSEUDOPHAKIA, BOTH EYES: ICD-10-CM

## 2025-03-25 DIAGNOSIS — H01.00A BLEPHARITIS OF UPPER AND LOWER EYELIDS OF BOTH EYES, UNSPECIFIED TYPE: ICD-10-CM

## 2025-03-25 PROCEDURE — 92004 COMPRE OPH EXAM NEW PT 1/>: CPT | Mod: GC | Performed by: OPHTHALMOLOGY

## 2025-03-25 PROCEDURE — G0463 HOSPITAL OUTPT CLINIC VISIT: HCPCS | Performed by: OPHTHALMOLOGY

## 2025-03-25 RX ORDER — ATORVASTATIN CALCIUM 20 MG/1
20 TABLET, FILM COATED ORAL DAILY
Qty: 90 TABLET | Refills: 4 | Status: SHIPPED | OUTPATIENT
Start: 2025-03-25

## 2025-03-25 ASSESSMENT — REFRACTION_WEARINGRX
OD_CYLINDER: +0.50
OD_ADD: +2.50
OS_CYLINDER: +0.25
OS_AXIS: 120
OD_SPHERE: -0.75
OS_ADD: +2.50
OD_AXIS: 035
OS_SPHERE: -1.25

## 2025-03-25 ASSESSMENT — CONF VISUAL FIELD
OD_INFERIOR_NASAL_RESTRICTION: 0
OS_SUPERIOR_TEMPORAL_RESTRICTION: 0
OD_SUPERIOR_TEMPORAL_RESTRICTION: 0
OD_NORMAL: 1
OS_INFERIOR_NASAL_RESTRICTION: 0
OD_SUPERIOR_NASAL_RESTRICTION: 0
OD_INFERIOR_TEMPORAL_RESTRICTION: 0
OS_SUPERIOR_NASAL_RESTRICTION: 0
OS_INFERIOR_TEMPORAL_RESTRICTION: 0
OS_NORMAL: 1

## 2025-03-25 ASSESSMENT — EXTERNAL EXAM - RIGHT EYE: OD_EXAM: NORMAL

## 2025-03-25 ASSESSMENT — TONOMETRY
OD_IOP_MMHG: 07
OS_IOP_MMHG: 07
IOP_METHOD: TONOPEN

## 2025-03-25 ASSESSMENT — VISUAL ACUITY
OS_CC: 20/20
OD_CC: 20/20
METHOD: SNELLEN - LINEAR
CORRECTION_TYPE: GLASSES

## 2025-03-25 ASSESSMENT — EXTERNAL EXAM - LEFT EYE: OS_EXAM: NORMAL

## 2025-03-25 ASSESSMENT — CUP TO DISC RATIO
OD_RATIO: 0.5
OS_RATIO: 0.55

## 2025-03-25 NOTE — NURSING NOTE
Chief Complaints and History of Present Illnesses   Patient presents with    Cellulitis, Eyelid Evaluation     Chief Complaint(s) and History of Present Illness(es)       Cellulitis, Eyelid Evaluation              Laterality: left eye              Comments    Pt. States that she woke up 1 week ago with a lot of green discharge and swelling from LE. Still having some discharge but much less. PRISCILLA has been swollen and painful. Pain has improved but LE is . No change in VA BE.   Belinda Humphreys COT 1:51 PM March 25, 2025

## 2025-03-25 NOTE — PROGRESS NOTES
HPI       Cellulitis, Eyelid Evaluation    In left eye.             Comments    Pt. States that she woke up 1 week ago with a lot of green discharge and swelling from LE. Still having some discharge but much less. PRISCILLA has been swollen and painful. Pain has improved but LE is . No change in VA BE.   Belinda Humphreys COT 1:51 PM March 25, 2025             Last edited by Belinda Humphreys on 3/25/2025  1:51 PM.          Review of systems for the eyes was negative other than the pertinent positives/negatives listed in the HPI.      Assessment & Plan      Jennifer Jane is a 74 year old female with the following diagnoses:   1. Chalazion of left upper eyelid    2. Allergic conjunctivitis of both eyes    3. Blepharitis of upper and lower eyelids of both eyes, unspecified type    4. Meibomian gland dysfunction (MGD), bilateral, both upper and lower lids    5. Pseudophakia, both eyes    6. Primary open angle glaucoma (POAG) of both eyes, indeterminate stage         [facility] Posey for POAG right eye, suspected PXF glaucoma OS last seen 2/13/25.  Seen in urgent care clinic on 3/21/25 with CC of 1 day of mild left eye discomfort, drainage, swelling  Dx with bacterial conjunctivitis and Rx for polytrim QID OS  Saw PCP on 3/23/25 who had concern for preseptal cellulitis. Continued Polytrim, started augmentin 875-125 x 10 days  Referred to [facility]  3/25/25 exam notable for left upper lid chalazion      Plan:  Continue Augmentin to complete 10 day course  Stop polytrim  Lid hygiene and warm compresses to continue  Expectant resolution reviewed.  Return precautions reviewed   Consider starting pataday if symptoms of allergic conjunctivitis become bothersome    Patient disposition:   Return if symptoms worsen or fail to improve, for Plan for follow up with established ophthalmologist Dr. Posey.    Cuco Alvarez MD  Resident Physician, PGY-2  Department of  Ophthalmology  March 25, 2025          Attending Physician Attestation:  Complete documentation of historical and exam elements from today's encounter can be found in the full encounter summary report (not reduplicated in this progress note).  I personally obtained the chief complaint(s) and history of present illness.  I confirmed and edited as necessary the review of systems, past medical/surgical history, family history, social history, and examination findings as documented by others; and I examined the patient myself.  I personally reviewed the relevant tests, images, and reports as documented above.  I formulated and edited as necessary the assessment and plan and discussed the findings and management plan with the patient and family. . - Venkat Johnston MD

## 2025-03-25 NOTE — PATIENT INSTRUCTIONS
Continue your course of Augmentin as prescribed  Stop polytrim drops  Continue to use warm compresses (heat mask) for 5-10 minutes 3-4 times per day left eye to speed resolution of chalazion  Continue heat packs long term 1-2 times per day both eyes for improvement of your meibomian gland dysfunction (plugged oil glands)  Purchase and begin using eyelid cleansing wipes to both eye lids 3-4 times per week for the same  Follow up as needed with Dr. Posey  Consider starting one drop of Pataday Extra Strength in both eyes daily for allergy symptoms

## 2025-03-27 ENCOUNTER — THERAPY VISIT (OUTPATIENT)
Dept: SPEECH THERAPY | Facility: CLINIC | Age: 75
End: 2025-03-27
Payer: COMMERCIAL

## 2025-03-27 ENCOUNTER — OFFICE VISIT (OUTPATIENT)
Dept: OTOLARYNGOLOGY | Facility: CLINIC | Age: 75
End: 2025-03-27
Payer: COMMERCIAL

## 2025-03-27 ENCOUNTER — PRE VISIT (OUTPATIENT)
Dept: OTOLARYNGOLOGY | Facility: CLINIC | Age: 75
End: 2025-03-27

## 2025-03-27 VITALS
HEIGHT: 66 IN | WEIGHT: 161 LBS | HEART RATE: 90 BPM | BODY MASS INDEX: 25.88 KG/M2 | DIASTOLIC BLOOD PRESSURE: 77 MMHG | SYSTOLIC BLOOD PRESSURE: 131 MMHG

## 2025-03-27 DIAGNOSIS — R49.0 DYSPHONIA: Primary | ICD-10-CM

## 2025-03-27 DIAGNOSIS — R49.9 HOARSENESS OR CHANGING VOICE: ICD-10-CM

## 2025-03-27 DIAGNOSIS — Z87.09 HX OF CHRONIC SINUSITIS: ICD-10-CM

## 2025-03-27 DIAGNOSIS — R13.12 OROPHARYNGEAL DYSPHAGIA: ICD-10-CM

## 2025-03-27 DIAGNOSIS — R13.10 DYSPHAGIA, UNSPECIFIED TYPE: ICD-10-CM

## 2025-03-27 PROCEDURE — 92524 BEHAVRAL QUALIT ANALYS VOICE: CPT | Mod: GN | Performed by: SPEECH-LANGUAGE PATHOLOGIST

## 2025-03-27 PROCEDURE — 92507 TX SP LANG VOICE COMM INDIV: CPT | Mod: GN | Performed by: SPEECH-LANGUAGE PATHOLOGIST

## 2025-03-27 PROCEDURE — 92520 LARYNGEAL FUNCTION STUDIES: CPT | Mod: GN | Performed by: SPEECH-LANGUAGE PATHOLOGIST

## 2025-03-27 NOTE — PROGRESS NOTES
[facility]  [facility] - Dept. of Otolaryngology   Clinical Voice and Upper Airway Initial Evaluation Report - In-person Appointment    Patient's Name: Jennifer Jane   Date of Evaluation: 3/27/2025  Providing SLP: Renata Rendon, NATANAELD, MM, CCC-SLP   Seen in Conjunction With: Dr. Deepthi Lopez   Referring Provider and Facility: [facility]  Insurance Coverage:   [facility]  - Medicare  Chief Complaint: Dysphonia and throat issues  Evaluation Location: [facility]  Others in Attendance for the Evaluation: n/a  # of Visit: 1  # of Therapy sessions: 0      Chief complaint: Buck is a 74 year old presenting today for evaluation of voice and throat issues.      Salient history: She has a history significant for fibromyalgia, chronic rhinitis, moderate persistent asthma with allergic rhinitis without complication, prediabetes, primary hyperparathyroidism, benign essential tremor, PVCs, edema of lower extremity Melone low-density, hoarseness, post-COVID chronic concentration deficit of postmenopausal status.    Voice:  Intermittent hoarseness  Voice:  Can hardly sing any more and she has been singing all of her life  1+ year of issues  She denies dehydration or exposure to allergens.   Singing can be problematic.   She can sing 1/2 a song and then her voice is gone  No pain with speech  Can get worse  Can be more raspy  Almost goes completely away - when singing, other times it is not as bad  No one asks about her voice or if she has experienced changes, but she notices  Normal activities, but difficulty singing  Retired with reduced vocal activities    Throat:  Allergies all of her life, but has had repeat infections in recent months.  Had an eye infection  Coughs at night  Will only wake at night to go to the bathroom  She is careful of what she eats    Breathing:  Inhaler - asthma, albuterol and advair and pulmocort  Voice changes did not coincide with  inhalers  Swallow:  Sometimes it is harder. When swallowing pills she used to do a handful, but now takes smaller amounts at a time.  Now swallow irritation is more on the left  Food rarely gets stuck in the throat  Every few days she will have trouble  Reflux:  Reflux - often 1x/day tries not to eat too much and will then drink milk    Other remarkable notes:  Has had bad nosebleeds that continue for a few hours.   Infection in the left nostril right now    Other Medical Evaluations, Testing, and Treatments:   Past Medical History:   Diagnosis Date    Acute bilateral low back pain with bilateral sciatica     Acute pain of right knee     Pain for months, worse since helping friend move.  Exam consistent with meniscal tear.  Check xray today, depending on results MRI.  Ice, NSAIDS for now.  ACE.    Amaurosis fugax     Arthritis     Fibromyalgia, ? Osteoarthritis    Back injury     Micro discectomy, approx date 1983    Benign neoplasm of ascending colon     Chondromalacia of patella, right     Severe, tri-compartmental on MRI 10/2017.  Referred to ortho.    Chronic left-sided headache     Negative MRI and CT of head 2018 and 2016  Normal labs TSH, CBC, CMP, ESR 12/2019.  See plan below in pt instructions.    Diarrhea, unspecified type     Diverticular disease of large intestine     Encounter for Medicare annual wellness exam     Up to date on mammogram and colon cancer screening.  Is due for shingles vaccine - do at pharmacy.    Glaucoma     Glaucoma suspect     Hiatal hernia     HTN (hypertension)     Hypercalciuria     Infection due to 2019 novel coronavirus 02/10/2024    x3    Insomnia 12/26/2012     Problem list name updated by automated process. Provider to review    Low bone density 2022    Overweight (BMI 25.0-29.9)     Polyp of colon     Prediabetes     Primary hyperparathyroidism     mild; borderline hypercalciuria    Pseudophakia, both eyes     Reduced vision 09/2016    L vision block, bilateral elevated eye  "presures,poss. Glacom    Right foot pain     Sciatica     On and off - exacerbates when lifts heavy things  Bilaterally.    Spinal stenosis of lumbar region without neurogenic claudication     Uncomplicated asthma     Diagnosed as 2 yo    Weakness of left lower extremity      Past Surgical History:   Procedure Laterality Date     cataract extraction with intraocular lens implant Right 01/31/2019    BACK SURGERY  1984    micro-disc-ectomy    BREAST SURGERY  1984    L Breast bx    cataract extraction with intraocular lens Left 12/06/2018    CATARACT IOL, RT/LT Bilateral     CHOLECYSTECTOMY  1990    INJECT EPIDURAL TRANSFORAMINAL Bilateral 01/31/2024    Procedure: Bilateral L5-S1 Transforaminal epidural steroid injection;  Surgeon: Sarahy Tsang MD;  Location: [facility] OR    IRIDOTOMY/IRIDECTOMY LASER SURGERY Left 09/27/2016    IRIDOTOMY/IRIDECTOMY LASER SURGERY Right 10/18/2016    WA TRABECULOPLASTY BY LASER SURGERY      TUBAL LIGATION  1989    ZZC STOMACH SURGERY PROCEDURE UNLISTED  03/1990    Cholecystectomy, 11/1989 tubal ligation       Quality of Life Questionnaires (Objective Measures):    Patient Supplied Answers To VHI Questionnaire       No data to display                Perceptual Analysis (74147): Evaluation of Voice / Speech / Non-Communicative Laryngeal Behaviors  VOICE:  Buck states that today is a typical voice today, with clinician observing voice quality to be characterized as:  Roughness: Mild to moderate Intermittent  Breathiness: Mild to moderate Intermittent  Strain: WNL  Pitch:  F0/Habitual/Conversational speech:  informally judged as WNL  Resonance:   Conversational speech:  laryngeal pharyngeal resonance  Loudness  Conversational speech:  WNL    The GRBAS is a perceptual rating of voice change. 0 indicates no impairment, 3 indicates a severe impairment. \"C\" and \"I\" may be used to signify if these feature was observed consistently or intermittently respectively. This is a rating based on clinical " judgement of disordered voice quality.  G ( 2 ) General Dysphonia     R ( 2 ) Roughness     B ( 2 ) Breathiness     A ( 0 ) Asthenia     S ( 0 ) Strain    POSTURE / TENSION/ PALPATION OF THE LARYNGEAL AREA  tenderness of the thyrohyoid area     Additional observations:   Cough/ Throat Clear: not observed today    BREATHING:   appears within normal limits and adequate at rest  Habitual pitch: WNL compared to age- and gender-matched peers   Pitch range: WNL  Resonance: laryngeal  Loudness: appropriate     Acoustic and Aerodynamic Analysis (23082):    Voice samples were recorded and analyzed within [employer] software with aerodynamic information taken in [employer] PAS software.     Normative data:  Jitter %: less than 1  Shimmer dB: less than 0.35  NHR: less than 0.194  Average f0 in connected speech: 170-220 female, 100-150 male  CPPS: greater than 19.10 for Praat    Acoustic Analysis &Aerodynamic Analysis    Voicing Efficiency      Maximum SPL 86.62 dB 84.05 6.60  Mean SPL 83.59 dB 79.65 6.25  Mean SPL During Voicing 83.59 dB 79.66 6.25  Mean Pitch 228.46 Hz 179.42 18.78  Pitch Range 61.81 Hz 44.50 32.31  Expiratory Airflow Duration 1.82 Sec 1.44 0.41  Peak Air Pressure 10.05 cm H2O 9.70 5.12  Mean Peak Air Pressure 6.98 cm H2O 7.95 4.28  Peak Expiratory Airflow 0.102 Lit/Sec 0.25 0.19  Target Airflow 0.048 Lit/Sec 0.13 0.08  Expiratory Volume 0.09 Liters 0.18 0.10  Mean Airflow During Voicing 0.047 Lit/Sec 0.13 0.07  Aerodynamic Power 0.033 graham 0.12 0.14  Aerodynamic Resistance 142.78 cm H2O/(l/s) 80.00 51.98  Acoustic Ohms 145.60 ds/cm5 81.58 53.01  Aerodynamic Efficiency 986.03 ppm 97.94 55.25  Vital Capacity      Expiratory Airflow Duration 3.07 Sec 5.90 1.19  Peak Expiratory Airflow 5.954 Lit/Sec 2.09 1.00  Expiratory Volume 2.32 Liters 2.24 0.60  Running Speech      Maximum SPL 83.46 dB    Mean Pitch 226.94 Hz    Pitch Range 198.50 Hz    Phonation Time 1.71 Sec    Expiratory Airflow  "Duration 3.11 Sec    Inspiratory Airflow Duration 1.25 Sec    Peak Expiratory Airflow 1.342 Lit/Sec    Expiratory Volume 1.00 Liters    Peak Inspiratory Airflow -2.202 Lit/Sec    Inspiratory Volume -1.56 Liters        Immuno therapy at 2-10 yo  Again at [employer] when a student  And now just got off her shots again -for allergies  PND does not help      Acoustic Analyses of Voice  Fundamental frequency Metrics  /a/ mean F0 = 237 Hz (SD = 3.34 Hz)  /i/ mean F0 = 239 Hz (SD = 2.47 Hz)  [place] Mean f0 = 230 Hz (SD 34.68 Hz)  Glide:   Min F0 = 67 Hz  Max F0 = 1410 Hz  Range = 1343 Hz  Notes = acceptable range  Cepstral Measures  CPPS /a/ = 22.54 dB  CPPS /i/ = 22.09 dB  CPPS \"all voiced\" = 18.23 dB  AVQI (v.3.01) = 2.55  Additional Measures  Harmonic to Noise Ratio /a/ = 24.10 dB  Harmonic to Noise Ratio: [place] passage = 18.38 dB  Jitter (local) /a/ = 0.327 %  Shimmer (local) /a/ = 2.500 %     Interpretation:  Acoustic findings of elevated perturbation feature are consistent with perceptual finding of dysphonia  Aerodynamic finding of low air flow and elevated peak air flow pressure are consistent with patient reported increased effort.      Laryngoscopy with stroboscopy:    Provider performing exam: Dr. Deepthi Lopez    Informed consent: Informed verbal consent was obtained, which includes potential side-effects, risks, and benefits of the procedure.     Anesthetic: 2% lidocaine and 0.025% oxymetazoline was sprayed into the nasal cavity and allowed 3 to 5 minutes for effect. The scope was passed through the left nostril.    Scope type: A distal chip flexible laryngoscope was passed through the nare with halogen and xenon light source(s).    The laryngeal and pharyngeal structures were evaluated for gross appearance, mobility, function, and focal lesions / abnormalities of the associated mucosa.    Stroboscopic Observations   R Amplitude: ~100% L Amplitude: ~100%   R Mucosal Wave: ~100% L Mucosal Wave:~100%   Phase " "Symmetry: Intermittently symmetrical Periodicity: Grossly regular   Phase Closure: Nearly equal Vertical Level of Approximation: On plane          Endoscopic Observations   Appearance:  minimal irritation   Right (R) Vocal Fold Edge: off white color, mild bowing. Small varix at the right vocal process Left (L) Vocal Fold Edge: off white color, mild bowing.   Glottic Closure: Mildly incomplete    R Arytenoid Ab/Adduction: Normal L Arytenoid Ab/Adduction: Normal      Mediolateral Compression: Mild-Moderate compression Anterior-Posterior Compression: WNL compression   Narrow Band Imaging: Consistent with halogen light findings   Additional Observations: small varix present on the near the right vocal process     Therapeutic Probes:   /m/, /n/, \"ng\" resulted in reduced hyperfunction   Flow/high airflow stimuli resulted in reduced hyperfunction  Glottal coup resulted in improved glottic adduction     FEES: Evaluation was performed by Mrs. Sophie Davalos and Dr. Lopez.      Impressions and Plan:     Jennifer Jane is a 74 year old-year-old female presenting today with dysphonia and throat symptoms secondary to laryngeal hypersensitivity and vocal fold bowing. Perceptually, mild to moderate roughness and breathiness. Laryngeal function studies indicated remarkable dysphonia which aligns with perceptual findings. Laryngoscopy today demonstrated mild bilateral bowing and a small varix on the superior surface of the right vocal process. Therefore, speech therapy has been recommended. Ms. Jane is in agreement with this plan of care.     RESEARCH: Presby-larynx is not currently a part of our studies    DURATION / FREQUENCY: 4 one-hour sessions, followed by 2 one-hour biweekly sessions.  A total of 6-8 sessions may be necessary.    GOALS:  Patient goal:   To improve and maintain a healthy voice quality  To understand the problem and fix it as much as possible  To have a normal and acceptable voice " "quality    Short-term goal(s): Within the first 4 sessions, Ms. Jane:  will be able to independently list key factors in maintenance of good vocal hygiene with 80% accuracy, and report on their use outside the therapy room.  will utilize silent inhalation with good low-respiratory engagement 75% of the time during therapy tasks with minimal clinician support  will demonstrate semi-occluded vocal tract (SOVT) exercises with at least 80% accuracy with no clinician support  will initiate Resonant Voice Therapy (RVT)    Long-term goal(s): In 6 months, Ms. Jane will:  Report resolution of symptoms, and use of optimal voice quality and comfort to meet personal, social, and professional needs, 90% of the time during a typical week of vocal activities    This treatment plan was developed with the patient who agreed with the recommendations.    _______________________________________________________________________  THERAPY NOTE (CPT 21210)  Date of Service: 3/27/2025    SUBJECTIVE / OBJECTIVE:  Please refer to my evaluation report from today's encounter for full details regarding subjective data, patient reported measures, and diagnostic findings.    THERAPEUTIC ACTIVITIES  Semi-Occluded Vocal Tract (SOVT) exercises instructed to reduce laryngeal tension, promote vocal fold pliability, and coordinate respiration and phonation  Straw phonation with water resistance was found to be most facilitating   Sustained phonation, and voice vs. voiceless productions used to promote easy voicing and raise awareness of laryngeal tension  Ascending and descending glides utilized to promote vocal fold pliability. Arpeggio patterns were also helpful (1-3-5-3-1)  \"Messa di voce\", gradual crescendo and decrescendo to vary medial compression was also utilized to promote vocal fold pliability.  Instructed on the benefits of using these exercises for improved coordination of breath flow with phonation and tissue mobilization.  Instructed on " the importance of using these exercises as a warm-up / cool down,  and to re-calibrate the voice throughout the day.    Exercises and techniques for optimal vocal hygiene including:  Systemic hydration, including strategies for increasing daily water intake  Topical hydration - Gargling (saline and plain water), saline nasal irrigation, humidification, steam, guaifenesin to reduce the thickened secretions / laryngeal irritation.  Management of laryngopharyngeal reflux disease (LPRD)  Dietary alterations which may reduce liklihood of reflux events  Avoiding eating or drinking within 2-3 hours of bedtime  Raising the headboard of the bed by 3-4 inches  Avoiding eating and drinking immediately prior to rigorous activity  Proper timing and use of acid reflux medication discussed in general context with recommendation of follow-up with pharmacist for detailed instructions    Counseling and Education  Asked many questions about the nature of her symptoms, and I answered all of these thoroughly.  A revised regimen for home practice was instructed.  I provided an AVS of today's therapeutic activities to facilitate practice.    ASSESSMENT/PLAN  PROGRESS TOWARD LONG TERM GOALS:   Adequate progress; too early for objective measures    IMPRESSIONS: Irritable Larynx Syndrome (ILS) (J38.7), Globus Sensation (R09.89), Laryngeal Hyperfunction (J38.7), and Dysphonia (R49.0). Ms. Jane demonstrated good learning of therapeutic activities to begin optimizing her voice quality and throat comfort.     PLAN: I will see Ms. Jane TBD.  I have asked the schedulers to contact him for appointments.     GOALS:  Patient goal:   To improve and maintain a healthy voice quality  To understand the problem and fix it as much as possible  To have a normal and acceptable voice quality    Short-term goal(s): Within the first 4 sessions, Ms. Jane:  will be able to independently list key factors in maintenance of good vocal hygiene with 80% accuracy,  and report on their use outside the therapy room.  will utilize silent inhalation with good low-respiratory engagement 75% of the time during therapy tasks with minimal clinician support  will demonstrate semi-occluded vocal tract (SOVT) exercises with at least 80% accuracy with no clinician support  will initiate Resonant Voice Therapy (RVT)    Long-term goal(s): In 6 months, Ms. Jane will:  Report resolution of symptoms, and use of optimal voice quality and comfort to meet personal, social, and professional needs, 90% of the time during a typical week of vocal activities    TOTAL SERVICE TIME: 60 minutes  EVALUATION OF VOICE AND RESONANCE (11811)  TREATMENT (32509)  LARYNGEAL FUNCTION STUDIES (57652)  NO CHARGE FACILITY FEE (03979)    Thank you for allowing me to participate in this patient's care,    CARLITA Lindquist, M.M., CCC-SLP  Speech-Language Pathologist  [facility] Trained Vocologist  [facility]  [email or web address]  Pronouns: she/her      *this report was created in part through the use of computerized dictation software, and though reviewed following completion, some typographic errors may persist.  If there is confusion regarding any of this notes contents, please contact me for clarification

## 2025-03-27 NOTE — PATIENT INSTRUCTIONS
Student's Name:   Kristen Oliver Birth Date  2015  Sex  female  Race/Ethnicity   School/Grade Level/ID#     Address:   [address]  [facility] [place] [address]  Parent/Guardian             Telephone#                                            Home:                               Work:   IMMUNIZATIONS: To be completed by health care provider. The mo/da/yr for every dose administered is required. If a specific vaccine is medically contraindicated, a separate written statement must be attached by the health care responsible for completing the health examination explaining the medical reason for the contraindication.      Immunization History   Administered Date(s) Administered   • DTaP, 5 Pertussis Antigens (DAPTACEL) 06/13/2016   • DTaP/HIB/IPV 01/28/2015, 03/25/2015, 05/27/2015   • DTaP/IPV 02/11/2019   • Hep A, ped/adol, 2 dose 12/01/2015, 06/13/2016   • Hep B, adolescent or pediatric 2014, 02/11/2019, 03/15/2019, 08/16/2019   • Hep B, adult 01/28/2015, 05/27/2015   • Hib (PRP-T) 03/14/2016   • MMR 12/01/2015, 02/11/2019   • Pneumococcal Conjugate 13 Valent Vacc (Prevnar 13) 01/28/2015, 03/25/2015, 05/27/2015, 12/01/2015   • Rotavirus - monovalent 01/28/2015, 03/25/2015   • Varicella 03/14/2016, 02/11/2019      Immunizations given 9/7/2023: None      Health care provider (MD, DO, APN, PA, school health professional, health official) verifying above immunization history must sign below. If adding dates to the above immunization history section, put your initials by date(s) and sign here.)  Signature                                                                 Title                                                Date  _____________________________________________________________________________________  Signature                                                                 Title                                                 CARLITA Lindquist MNORMA. (voice), [facility]-SLP  Speech-Language Pathologist & Voice SLP Supervisor  CHARLES Trained Vocologist  [facility]  [email or web address]  [email or web address]  [email or web address]  Henriettahart is best communication          Muscle Tension Dysphonia    One of the most common voice disorders we treat at the [facility] is Muscle Tension Dysphonia (MTD).  Although this may sound dramatic, it is just a medically specific way of saying that the sound of a person's voice is negatively affected by the way the muscles involved in voicing are working together.  It is what is known as a functional disorder, meaning that there is nothing structurally wrong with a person's larynx.  Rather the muscles and systems which allow us to breathe, voice, and shape that sound are out of balance. For many of us, not only does our voice play a large role in our daily lives, but it is part of our sense of self, so the overall impact of MTD cannot be overstated.  Symptoms of Muscle Tension Dysphonia  Individuals experience MTD in very different ways including:  Effects on overall voice quality  patients describe their voice as rough, hoarse, gravelly, raspy, breathy, hollow, backward, shaky, halting, pitch changes, etc  Effects on daily function  Increased effort to use voice, reduced endurance, voice breaks / cutting out, decreased loudness, total loss of voice  Changes to singing voice  Loss of range, rapid fatigue, loss of vocal agility, decreased ease even with  easy  repertoire  Other effects on the throat  Throat discomfort or pain, frequent throat clearing, neck tenderness, sensation of a lump in the throat     Causes of Muscle Tension Dysphonia  There are many specific, individual reasons why use of the vocal mechanism becomes abnormal.  Some common causes are prolonged illness, longstanding patterns of overuse, and trauma, such as a physical injury, chemical exposure, or  Date  _____________________________________________________________________________________   ALTERNATIVE PROOF OF IMMUNITY   1. Clinical diagnosis (measles, mumps, hepatitis B) is allowed when verified by physician and supported with lab confirmation.  Attach copy of lab result.    * MEASLES (Rubeola)  MO   DA  YR          **MUMPS  MO   DA  YR             HEPATITIS B  MO   DA   YR           VARICELLA  MO   DA  YR      2. History of varicella (chickenpox) disease is acceptable if verified by health care provider, school health professional or health official.  Person signing below verifies that the parent/guardian’s description of varicella disease history is indicative of past infection and is accepting such history as documentation of disease.  Date of Disease                                  Signature                                                           Title   3. Laboratory Evidence of Immunity (check one)      __ Measles*         __ Mumps**        __ Rubella        __Varicella    (Attach copy of lab result)         *All measles cases diagnosed on or after July 1, 2002, must be confirmed by laboratory evidence.      **All mumps cases diagnosed on or after July 1, 2013, must be confirmed by laboratory evidence.     Completion of Alternative 1 or 3 MUST be accompanied by Labs & Physician Signature: ___________________________  Physician Statements of Immunity MUST be submitted to [facility] for review.     Certificates of Jainism Exemption to Immunizations or Physician Medical Statements of Medical Contraindication Are Reviewed   and Maintained by the School Authority     (11/2015)                                         (COMPLETE BOTH SIDES)                                  Approved [facility] 3/2016      HEALTH HISTORY      TO BE COMPLETED AND SIGNED BY PARENT/GUARDIAN AND VERIFIED BY HEALTH CARE PROVIDER  ALLERGIES: (Food, drug, insect, other) GLUTEN ALLERGY   MEDICATION: (List all prescribed or taken on a  an emotionally traumatic event. Often the inciting event causes a person to adapt how they are using their voice temporarily, and even when the initial event is resolved these patterns persist, taking on a life of their own, yielding problems as listed above.     The onset of MTD can also be very subtle. Small amounts of added effort to achieve what feels like a stronger or clearer voice contributes to a vicious cycle in which more and more effort is required.  This cycle may continue for months or even years before the individual becomes aware that there is a problem with the voice.    The reality is, most of the time when we see people in clinic, the onset of their voice issue is long in the past or otherwise unclear. As a result we are often not able to say for certain how these patterns came to be, but thankfully that does not make their treatment any less effective.    Diagnosing Muscle Tension Dysphonia  A thorough evaluation is required in order to diagnose MTD. This involves skilled clinicians listening to a patient's voice across a variety of tasks, visually evaluating the structure and function of the larynx via nasal endoscopy, and may also utilize aerodynamic and acoustic assessment.   Among other factors the laryngologist and speech language pathologist are looking for patterns of muscular involvement beyond what is needed for clear sound. This frequently results in a squeezed or compressed appearance limiting the view of the vocal folds, often corresponding to the patient's experience of vocal effort.  Treatment of Muscle Tension Dysphonia  The primary treatment for MTD is functional voice therapy, and in nearly all cases it is the only form of treatment a patient will need. The amount of time required to complete functional therapy averages between 4 and 8 sessions, though each individual is unique, and their journey toward vocal health may require more or less time. In cases of emotional stress,  counseling or stress management may be helpful or even necessary.    On very rare occasions other medical interventions may be warranted. However, these are never used as a first line of intervention, and are best discussed between patient, speech pathologist, and laryngologist when and if the need arises.           Laryngopharyngeal Reflux Disease    What is Laryngopharyngeal Reflux Disease (LPRD)  Gastroesophageal Reflux Disease (GERD) is a well-known problem in this [place].  GERD occurs when stomach acid makes its way back up the esophagus, causing indigestion, stomach upset, and heartburn. In some cases the acid may travel all the way up the esophagus, and spill over into the larynx (voice box) and pharynx (back of your throat) resulting in what is then called Laryngo Pharyngeal Reflux Disease (LPRD). This is especially common during sleep, when the individual is lying down, and acid does not have to fight gravity to move up the esophagus.  However, it can also affect individuals while awake.      What are the symptoms of Laryngopharyngeal Reflux Disease  LPRD symptoms vary from person to person, but may include:   a dry, choking sensation, especially during the night  a sour taste in your mouth upon waking up  a raw, burning sensation in the throat  a sensation of a lump in the throat  pain in the throat, neck, or running from the back of the chin along the neck  frequent coughing or desire to clear the throat  Changes to voice quality  worst in the morning and improving over the day  quality that deteriorates quickly with use  frequently described as: gritty, rough, hoarse    Some individuals with LPRD may also experience esophageal and stomach related symptoms such as heartburn and indigestion.  However, others may experience no such  classic  reflux symptoms at all. The reason for these differences is that while our esophagus is equipped to handle small amounts of reflux, the tissues of the larynx and  regular basis.) has a current medication list which includes the following prescription(s): Spacer/Aero-Holding Chambers (AeroChamber Plus Jesus-Vu Medium) Misc, albuterol 108 (90 Base) MCG/ACT inhaler, and hydroCORTisone (CORTIZONE) 2.5 % ointment.   Diagnosis of asthma?  Child wakes during night coughing? Yes    No  Yes    No  Loss of function of one of paired  organs?(eye/ear/kidney/testicle) Yes    No    Birth defects? Yes    No  Hospitalizations? Yes    No    Developmental delay? Yes    No   When? What for? Yes    No    Blood disorders? Hemophilia,  Sickle Cell, Other? Explain. Yes    No  Surgery? (List all.)  When? What for? Yes    No    Diabetes? Yes    No  Serious injury or illness? Yes    No    Head injury/Concussion/Passed out? Yes    No  TB skin test positive (past/present)? * Yes    No *If yes, refer to local [facility] dept   Seizures? What are they like?  Yes    No  TB disease (past or present)? * Yes    No *If yes, refer to local health dept   Heart problem/Shortness of breath?  Yes    No  Tobacco use (type, frequency)?  Yes    No    Heart murmur/High blood pressure? Yes    No  Alcohol/Drug use?  Yes    No    Dizziness or chest pain with exercise? Yes    No  Family history of sudden death  before age 50? (Cause?) Yes    No    Eye/Vision problems? ______           __Glasses          __Contacts  Last exam by eye doctor ______  Other concerns? (crossed eye, drooping lids, squinting, difficulty reading) Dental?   __ Braces     __ Bridge     __ Plate   __Other   Ear/Hearing problems? Yes    No  Information may be shared with appropriate personnel for health and educational purposes.   Bone/Joint problem/injury/scoliosis? Yes    No  Parent/Guardian  Signature                                               Date   PHYSICAL EXAMINATION REQUIREMENTS   Entire section below to be completed by MD//APN/PA Head Circumference if <2-3 years old - /70   Pulse 72   Temp 97.4 °F (36.3 °C) (Temporal)   Ht 4' 2.71\"  (1.288 m)   Wt 26.1 kg (57 lb 9.6 oz)   BMI 15.75 kg/m²   BSA 0.97 m²    41 %ile (Z= -0.22) based on CDC (Girls, 2-20 Years) BMI-for-age based on BMI available as of 9/7/2023.   DIABETES SCREENING (NOT REQUIRED FOR ) BMI>85% age/sex: No     And any two of the following: Family History: No  Ethnic Minority: No    Signs of Insulin Resistance (hypertension, dyslipidemia, polycystic ovarian syndrome, acanthosis nigricans): No  At Risk: No   LEAD RISK QUESTIONNAIRE Required for children age 6 months through 6 years enrolled in licensed or public school operated day care, , nursery school and/or . (Blood test required if resides in [place] or high risk zip code.)  Questionnaire Administered? No  Blood Test Indicated? No   Blood Test Date/Result: <3 (12/1/2015).   TB SKIN OR BLOOD TEST Recommended only for children in high-risk groups including children immunosuppressed due to HIV infection or other conditions, frequent travel to or born in high prevalence countries or those exposed to adults in high-risk categories. See CDC guidelines. http://www.cdc.gov/tb/publications/factsheets/testing/TB_testing.htm.  Test Needed: No     Test performed: No                          Skin Test:    Date Read              /      /             Result:   Positive__      Negative__        mm________                          Blood Test: Date Reported       /      /               Result:  Positive__      Negative__      Value________  LAB TESTS (recommended) Date/Result  Date/Results   Hemoglobin or Hematocrit 13.1 (6/12/2023) Sickle Cell (when indicated)    Urinalysis NA Developmental Screening Tool Normal - ASQ        SYSTEM REVIEW Normal  Comments/Follow-up/Needs  Normal Comments/Follow-up/Needs   Skin  Yes  Endocrine Yes    Ears Yes                  Screening Result Gastrointestinal Yes    Eyes Yes                  Screening Result: Genito-Urinary Yes                                      LMP:    Nose Yes   pharynx are not. In this case it's like a burglar sneaks past the normal security guards only to set off the alarm when they open the safe.     How is Laryngopharyngeal Reflux Disease evaluated?  At the [facility], evaluations of any of the symptoms listed above include a thorough evaluation by a multidisciplinary team of a Laryngologist and Speech Language Pathologist (SLP) including visualizing the larynx with Nasal Endoscopy.  There may be signs during that evaluation that point toward LPRD playing a role in an individual's symptoms such as redness, irritation, or even ulceration. However, while the larynx and pharynx may be affected in the case of LPRD, the stomach is the actual origin of the reflux.  As a result, ultimate diagnosis and long term management of reflux in any form should be in the hands of the stomach specialists in Gastroenterology (GI).  Evaluation with GI may include an upper endoscopy to evaluate the esophagus and stomach, or even probes which assess the presence of reflux over several days.      How is Laryngopharyngeal Reflux Disease treated?   Within our practice your Laryngologist may recommend a course of anti-reflux medication based on your symptoms and evaluation.  This is known as empiric treatment, and it is very reasonable and even prudent for short-term management.  Additionally, should functional speech therapy be recommended, your Speech Language Pathologist (SLP) may recommend strategies to reduce voice and throat symptoms, optimize the health of your larynx and pharynx, and even some basic healthy changes that can reduce reflux in some individuals. However, for long-term management strategies it is important to maintain contact with your GI care team.     Try 2-3x/day:           Reflux Gourmet Gel - as needed. But always before bed.          Cup and Bubble Exercises   WHY: Though these exercises seems (and feels) silly they are helpful for a number of reasons. First,  "they make you use your air generously and consistently, helping you to coordinate your breath and your voice. Second, they lengthen and narrow the vocal tract with the straw. This narrowing (or semi-occlusion in scientific terms) creates back pressure in your throat which has been shown to help the vocal folds vibrate more easily and reduce how hard some of the other muscles are squeezing.   HOW:   With Water - Fill the cup (or bottle) about 2 inches full of water. Blow bubbles through the straw while keeping your voice on. (kind of like making an \"ooooo\" sound through straw).Keep the water bubbling the whole time. That means your air is moving consistently.   Without Water - make sound through the straw (like it's a kazoo). Make sure you are using your air freely. You can hold your hand in front of the straw to test, and you should be able to feel the air moving.   Use the \"w\" sound without the straw. Let your cheeks puff up slightly (like Sebas Mays). Maintain the relaxed feeling in your throat while focusing on a sense of buzz at your lips.   After easy sounds listed below speak through the straw (with or without water) using every day phrases and counting to help bridge between the exercises and everyday voice use.   Feel how open and relaxed your throat feels when you practice these sounds. If the bubbling or air stops or it gets tight, don't sweat it. Just breath, relax, and start again. Across all the exercises make sure you are getting a nice low breath and feeling the steady inward motion of low abdominal muscles when making sound.     Practice 3-4 times a day for no more than 3 minutes, and whenever you feel fatigued.   Aren't sure if you are doing it right? Ask yourself these three questions:   1. Does it feel easy?   2. Are the bubbles and voice consistent?   3. Do you feel that forward buzz?     What sounds to make:   Single pitches - use any comfortable pitch and sustain the note for as long as it " Neurologic Yes    Throat Yes  Musculoskeletal Yes    Mouth/Dental Yes  Spinal Exam Yes    Cardiovascular/HTN Yes  Nutritional status Yes    Respiratory Yes Diagnosis of Asthma: No   Mental Health Yes    Currently Prescribed Asthma Medication:        Yes  Quick-relief medication (e.g. Short Acting Beta Antagonist)        No  Controller medication (e.g. inhaled corticosteroid) Other     NEEDS/MODIFICATIONS required in the school setting: No restrictions DIETARY Needs/Restrictions:  Celiac disease - gluten allergy    SPECIAL INSTRUCTIONS/DEVICES e.g. safety glasses, glass eye, chest protector for arrhythmia, pacemaker, prosthetic device, dental bridge, false teeth, athletic support/cup: No restrictions   MENTAL HEALTH/OTHER Is there anything else the school should know about this student?  If you would like to discuss this student’s health with school or school health personnel:  Not needed   EMERGENCY ACTION needed while at school due to child’s health condition (e.g. ,seizures, asthma, insect sting, food, peanut allergy, bleeding problem, diabetes, heart problem)?   No   On the basis of the examination on this day, I approve this child’s participation in                                (If No or Modified please attach explanation.)  PHYSICAL EDUCATION:  Yes                               [employer]OL[employer]IC [employer]ORTS   Yes   Print Name  Evaristo Blanco MD         Signature                                                                       Date: 9/7/2023   Address:  [address] [address]  [phone]         (Complete Both Sides)     Approved [facility] 3/2016   "feels free and easy, and your lips or tongue are bubbling.   Sighs - glide from high to low like you are sitting down in a comfortable chair after a long day of work   [place] - Start on a medium pitch and glide up just a bit and back down   On and off - use a comfortable pitch near where you speak. Keep the bubbles moving consistently while turning the voice on and off. Recognize how little work you need to do to get the voice working.         1-3-5-3-1    Happy birthday    Videos to check out:   - \"Straws Will Save Your Life\" - Víctor Ramon    "

## 2025-03-27 NOTE — LETTER
3/27/2025       RE: Jennifer Jane  [address]  [facility] [address]     Dear Colleague,    Thank you for referring your patient, Jennifer Jane, to the [facility] EAR NOSE AND THROAT CLINIC [place] at [facility]. Please see a copy of my visit note below.        [facility]   at the [facility]   Otolaryngology Clinic     Patient: Jennifer Jane    MRN: [id number]    : 1950    Age/Gender: 74 year old female  Date of Service: 3/27/2025  Rendering Provider:   Deepthi Lopez MD       Impression & Plan     IMPRESSION: Ms. Jane is a 74 year old female who is being seen for the followin. 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 Jane is a 74 year old female who presents to us today with dysphonia and dysphagia.      Of note has asthma.     she presents today for evaluation. she reports:  - has been having issues with her speaking and singing voice and the feeling of things getting stuck in her throat    Dysphonia:  reports    Dysphagia:  reports    Dyspnea:  denies    Coughing /  Throat-clearing:  denies    GERD/LPRD:  reports    PAST MEDICAL HISTORY:   Past Medical History:   Diagnosis Date     Acute bilateral low back pain with bilateral sciatica      Acute pain of right knee     Pain for months, worse since helping friend move.  Exam consistent with meniscal tear.  Check xray today, depending on results MRI.  Ice, NSAIDS for now.  ACE.     Amaurosis fugax      Arthritis     Fibromyalgia, ? Osteoarthritis     Back injury     Micro discectomy, approx date 1983     Benign neoplasm of ascending colon      Chondromalacia of patella, right     Severe, tri-compartmental on MRI 10/2017.  Referred to ortho.     Chronic left-sided headache     Negative MRI and CT of head 2018 and 2016  Normal labs TSH, CBC, CMP, ESR 12/2019.  See plan below in pt instructions.     Diarrhea, unspecified type      Diverticular disease of large intestine      Encounter for Medicare annual wellness exam     Up to date on mammogram and colon cancer screening.  Is due for shingles vaccine - do at pharmacy.     Glaucoma      Glaucoma suspect      Hiatal hernia      HTN (hypertension)      Hypercalciuria      Infection due to 2019 novel coronavirus 02/10/2024    x3     Insomnia 12/26/2012     Problem list name updated by automated process. Provider to review     Low bone density 2022     Overweight (BMI 25.0-29.9)      Polyp of colon      Prediabetes      Primary hyperparathyroidism     mild; borderline hypercalciuria     Pseudophakia, both eyes      Reduced vision 09/2016    L vision block, bilateral elevated eye presures,poss. Glacom     Right foot pain      Sciatica     On and off - exacerbates when lifts heavy things  Bilaterally.     Spinal stenosis of lumbar region without neurogenic claudication      Uncomplicated asthma     Diagnosed as 1 yo     Weakness of left lower extremity        PAST SURGICAL HISTORY:   Past Surgical History:   Procedure Laterality Date      cataract extraction with intraocular lens implant  Right 01/31/2019     BACK SURGERY  1984    micro-disc-ectomy     BREAST SURGERY  1984    L Breast bx     cataract extraction with intraocular lens Left 12/06/2018     CATARACT IOL, RT/LT Bilateral      CHOLECYSTECTOMY  1990     INJECT EPIDURAL TRANSFORAMINAL Bilateral 01/31/2024    Procedure: Bilateral L5-S1 Transforaminal epidural steroid injection;  Surgeon: Sarahy Tsang MD;  Location: [facility] OR     IRIDOTOMY/IRIDECTOMY LASER SURGERY Left 09/27/2016     IRIDOTOMY/IRIDECTOMY LASER SURGERY Right 10/18/2016     DE TRABECULOPLASTY BY LASER SURGERY       TUBAL LIGATION  1989     ZC STOMACH SURGERY PROCEDURE UNLISTED  03/1990    Cholecystectomy, 11/1989 tubal ligation       CURRENT MEDICATIONS:   Current Outpatient Medications:      acetaminophen (TYLENOL) 325 MG tablet, Take 325-650 mg by mouth every 6 hours as needed for mild pain, Disp: , Rfl:      acetylcysteine (N-ACETYL CYSTEINE) 600 MG CAPS capsule, Take 1 capsule (600 mg) by mouth daily, Disp: 90 capsule, Rfl: 4     ADVAIR -21 MCG/ACT inhaler, Inhale 2 puffs into the lungs 2 times daily, Disp: , Rfl:      albuterol (VENTOLIN HFA) 108 (90 Base) MCG/ACT inhaler, Inhale 2 puffs into the lungs every 6 hours as needed for shortness of breath, Disp: 8.5 g, Rfl: 5     alendronate (FOSAMAX) 70 MG tablet, TAKE 1 TABLET BY MOUTH EVERY 7 DAYS TAKE 60 MINUTES BEFORE AM MEAL WITH 8 OZ. WATER. REMAIN UPRIGHT FOR 30 MINUTES., Disp: 12 tablet, Rfl: 3     amLODIPine (NORVASC) 5 MG tablet, Take 1 tablet (5 mg) by mouth at bedtime, Disp: 90 tablet, Rfl: 4     amoxicillin-clavulanate (AUGMENTIN) 875-125 MG tablet, Take 1 tablet by mouth 2 times daily for 5 days., Disp: 10 tablet, Rfl: 0     aspirin 81 MG EC tablet, Take 81 mg by mouth every 4 hours as needed for moderate pain., Disp: , Rfl:      atorvastatin (LIPITOR) 20 MG tablet, TAKE 1 TABLET BY MOUTH EVERY DAY, Disp: 90 tablet, Rfl: 4     azelastine (ASTELIN) 0.1 % nasal spray, Spray 1 spray into both nostrils 2 times  daily, Disp: , Rfl:      bisacodyl (DULCOLAX) 5 MG EC tablet, Take 5 mg by mouth daily as needed for constipation., Disp: , Rfl:      brimonidine (ALPHAGAN-P) 0.15 % ophthalmic solution, PLACE 1 DROP INTO BOTH EYES TWO TIMES A DAY., Disp: , Rfl:      Calcium Carbonate Antacid (TUMS PO), Take  by mouth At Bedtime., Disp: , Rfl:      co-enzyme Q-10 100 MG CAPS capsule, Take 1 capsule (100 mg) by mouth daily, Disp: 90 capsule, Rfl: 4     fexofenadine (ALLEGRA) 180 MG tablet, Take 180 mg by mouth daily., Disp: , Rfl:      ipratropium (ATROVENT) 0.03 % nasal spray, Spray 2 sprays into both nostrils 2 times daily (Patient not taking: Reported on 3/21/2025), Disp: 30 mL, Rfl: 6     latanoprost (XALATAN) 0.005 % ophthalmic solution, Place 1 drop into the right eye At Bedtime, Disp: , Rfl:      loratadine (CLARITIN) 10 MG tablet, Take 10 mg by mouth daily., Disp: , Rfl:      losartan (COZAAR) 50 MG tablet, TAKE 1 TABLET BY MOUTH TWICE A DAY, Disp: 180 tablet, Rfl: 1     melatonin 5 MG CAPS, Take 1 capsule by mouth as needed., Disp: , Rfl:      montelukast (SINGULAIR) 10 MG tablet, TAKE ONE TABLET BY MOUTH EVERY DAY AS DIRECTED, Disp: , Rfl: 1     moxifloxacin (VIGAMOX) 0.5 % ophthalmic solution, Place 1 drop into both eyes 4 times daily. (Patient not taking: Reported on 3/21/2025), Disp: , Rfl:      oxymetazoline (AFRIN) 0.05 % nasal spray, 2 sprays as needed., Disp: , Rfl:      polymixin b-trimethoprim (POLYTRIM) 24574-9.1 UNIT/ML-% ophthalmic solution, Place 2 drops Into the left eye every 6 hours for 7 days., Disp: 10 mL, Rfl: 0     SPIRIVA RESPIMAT 1.25 MCG/ACT inhaler, INHALE 2 PUFFS ONCE PER DAY, Disp: , Rfl:      traZODone (DESYREL) 50 MG tablet, Take 1-3 tablets ( mg) by mouth at bedtime, Disp: 130 tablet, Rfl: 4     Vitamin D3 (CHOLECALCIFEROL) 25 mcg (1000 units) tablet, Take by mouth daily, Disp: , Rfl:     ALLERGIES: Canola oil [vegetable oil], Animal dander, Dust mites, Grass, Hydroxyzine, Hydroxyzine hcl,  Pollen extract, Trees, Ragweeds, and Chlorhexidine    SOCIAL HISTORY:    Social History     Socioeconomic History     Marital status:      Spouse name: Not on file     Number of children: Not on file     Years of education: Not on file     Highest education level: Not on file   Occupational History     Not on file   Tobacco Use     Smoking status: Never     Passive exposure: Past     Smokeless tobacco: Never   Vaping Use     Vaping status: Never Used   Substance and Sexual Activity     Alcohol use: Yes     Alcohol/week: 0.0 standard drinks of alcohol     Comment: 0 -2 times a year     Drug use: No     Sexual activity: Yes     Partners: Male     Birth control/protection: Post-menopausal, Female Surgical   Other Topics Concern     Parent/sibling w/ CABG, MI or angioplasty before 65F 55M? Not Asked   Social History Narrative     Not on file     Social Drivers of Health     Financial Resource Strain: Low Risk  (2/28/2024)    Financial Resource Strain      Within the past 12 months, have you or your family members you live with been unable to get utilities (heat, electricity) when it was really needed?: No   Food Insecurity: Low Risk  (2/28/2024)    Food Insecurity      Within the past 12 months, did you worry that your food would run out before you got money to buy more?: No      Within the past 12 months, did the food you bought just not last and you didn t have money to get more?: No   Transportation Needs: Low Risk  (2/28/2024)    Transportation Needs      Within the past 12 months, has lack of transportation kept you from medical appointments, getting your medicines, non-medical meetings or appointments, work, or from getting things that you need?: No   Physical Activity: Insufficiently Active (2/28/2024)    Exercise Vital Sign      Days of Exercise per Week: 4 days      Minutes of Exercise per Session: 20 min   Stress: No Stress Concern Present (2/28/2024)    [employer] [employer] -  Occupational Stress Questionnaire      Feeling of Stress : Not at all   Social Connections: Unknown (2/28/2024)    Social Connection and Isolation Panel [NHANES]      Frequency of Communication with Friends and Family: Not on file      Frequency of Social Gatherings with Friends and Family: Once a week      Attends Church Services: Not on file      Active Member of Clubs or Organizations: Not on file      Attends Club or Organization Meetings: Not on file      Marital Status: Not on file   Interpersonal Safety: Low Risk  (8/29/2024)    Interpersonal Safety      Do you feel physically and emotionally safe where you currently live?: Yes      Within the past 12 months, have you been hit, slapped, kicked or otherwise physically hurt by someone?: No      Within the past 12 months, have you been humiliated or emotionally abused in other ways by your partner or ex-partner?: No   Housing Stability: Low Risk  (2/28/2024)    Housing Stability      Do you have housing? : Yes      Are you worried about losing your housing?: No         FAMILY HISTORY:   Family History   Problem Relation Age of Onset     Heart Disease Mother      Diabetes Mother      Coronary Artery Disease Mother      Hypertension Mother      Anesthesia Reaction Mother         Anaphylaxis, oral opioixd     Asthma Mother      Thyroid Disease Mother      Low Back Problems Mother      Alzheimer Disease Mother      Heart Disease Father         CABG x2     Diabetes Father      Coronary Artery Disease Father      Hypertension Father      Hyperlipidemia Father      Low Back Problems Father      Diabetes Sister         insulin dependent     Hypertension Sister      Low Back Problems Sister      Thyroid Disease Sister      Hip fracture Sister      Low Back Problems Brother      Dementia Brother         Lewy Body     Asthma Maternal Grandmother      Coronary Artery Disease Maternal Grandfather      Cerebrovascular Disease Paternal Grandfather      Asthma Daughter       Genetic Disease Daughter         Kallman syndrome; no olfactory bulb;     Calcium Disorder No family hx of      Non-contributory for problems with anesthesia    REVIEW OF SYSTEMS:   The patient was asked a 14 point review of systems regarding constitutional symptoms, eye symptoms, ears, nose, mouth, throat symptoms, cardiovascular symptoms, respiratory symptoms, gastrointestinal symptoms, genitourinary symptoms, musculoskeletal symptoms, integumentary symptoms, neurological symptoms, psychiatric symptoms, endocrine symptoms, hematologic/lymphatic symptoms, and allergic/ immunologic symptoms.   The pertinent factors have been included in the HPI and below.  Patient Supplied Answers to Review of Systems      2/27/2025    10:17 AM   [facility] ROS   Constitutional Weight loss   Neurology Dizzy spells    Headache   Ears, Nose, Throat Nasal congestion or drainage    Hoarseness   Cardiopulmonary Cough   Gastrointestinal/Genitourinary Constipation   Musculoskeletal Sore or stiff joints    Back pain   Allergy/Immunology Allergies or hay fever   Hematologic Easy bruising       Physical Examination   The patient underwent a physical examination as described below. The pertinent positive and negative findings are summarized after the description of the examination.  Constitutional: The patient's developmental and nutritional status was assessed. The patient's voice quality was assessed.  Head and Face: The head and face were inspected for deformities. The sinuses were palpated. The salivary glands were palpated. Facial muscle strength was assessed bilaterally.  Eyes: Extraocular movements and primary gaze alignment were assessed.  Ears, Nose, Mouth and Throat: The ears and nose were examined for deformities. The nasal septum, mucosa, and turbinates were inspected by anterior rhinoscopy. The lips, teeth, and gums were examined for abnormalities. The oral mucosa, tongue, palate, tonsils, lateral and posterior pharynx were inspected  for the presence of asymmetry or mucosal lesions.    Neck: The tracheal position was noted, and the neck mass palpated to determine if there were any asymmetries, abnormal neck masses, thyromegally, or thyroid nodules.  Respiratory: The nature of the breathing and chest expansion/symmetry was observed.  Cardiovascular: The patient was examined to determine the presence of any edema or jugular venous distension.  Abdomen: The contour of the abdomen was noted.  Lymphatic: The patient was examined for infraclavicular lymphadenopathy.  Musculoskeletal: The patient was inspected for the presence of skeletal deformities.  Extremities: The extremities were examined for any clubbing or cyanosis.  Skin: The skin was examined for inflammatory or neoplastic conditions.  Neurologic: The patient's orientation, mood, and affect were noted. The cranial nerve  functions were examined.  Other pertinent positive and negative findings on physical examination:      OC/OP: no lesions, uvula midline, soft palate elevates symmetrically   Neck: no lesions, no TH tenderness to palpation     All other physical examination findings were within normal limits and noncontributory.  Procedures   ENT PROCEDURES:   Video Laryngoscopy with Stroboscopy (CPT 30160)    Preoperative Diagnosis:  Dysphonia and throat symptoms  Postoperative Diagnosis: Dysphonia and throat symptoms  Indication:  Patient has new or persistent dysphonia and throat symptoms.  This requires evaluation by stroboscopy to fully delineate the laryngeal functioning; especially mucosal wave assessment, ultrasharp visualization of lesions on the vocal folds, and overall functioning of the larynx.  Details of Procedure: A 70 degree rigid telescopic laryngoscope or a distal chip flexible scope was used. The lighting was obtained via a light cable connected to a stroboscopic unit. The telescope was inserted either transorally or transnasally until the vocal folds could be visualized.  The patient was instructed to sustain the vowel  ee  at a comfortable pitch and loudness as the voice was monitored by a microphone connected to a fundamental frequency tracker. This circuit tracked vocal periodicity, allowing the light to flash in synchrony with the glottal cycles. A setting on the stroboscope was set to change the phase of light strobing with relation to the vocal fundamental frequency, producing an image of 1 to 2 glottal cycles every second. The video images were recorded for analysis. Use of the variable speed, slow and stop scan allowed careful study of pertinent segments of laryngeal function to increase accuracy of clinical assessments of function and dysfunction.  In particular, features of glottal closure, mucosal wave on the vocal fold cover and laryngeal symmetry were analyzed. Lastly, the patient was asked to phonate speech samples and auditory/perceptual evaluation of voice and resonance were performed.  The vocal quality was carefully evaluated for hoarseness, breathiness, loudness, phrase length and intelligibility to determine the source of dysphonia.    Findings:      B. LARYNGOVIDEOSTROBOSCOPY   Anatomic/Physiological Deviations:  presbylarnyx and supraglottic hypofunctions   Mucosal wave: Right:  No restriction     Left: No restriction  Bilateral Vocal Fold Vibration: Symmetric  Vocal Process: Right: No restriction    Left:  No restriction  Vocal Fold closure: Complete glottal closure    Complication(s): None  Disposition: Patient tolerated the procedure well         Fiberoptic Endoscopic Evaluation of Swallowing (CPT 93161)  and Interpretation of Swallowing (CPT 77445)    Indications: See above notes for complete history and physical. Patient complains of dysphagia to both solids and liquids and/or there is suggestion on history and endoscopic exam of the presence of dysphagia causing medical complaints.  Swallowing evaluation is being performed to assess the presence and degree  of dysphagia, and to recommend a safe diet.     Pulmonary Status:  No PNA   Current Diet:              regular                                        thin liquids      Consistency Amounts:  Thin Liquid: sip   Puree: sip  Solid: cookies            Positioning: upright in a chair  Oral Peripheral Exam: See physical exam section.  Anatomic Notes: See Videostroboscopy report for assessment of anatomy and laryngeal functioning  Pharyngeal secretions prior to administration of liquid or food: No   Oral Phase Abnormal Findings: No abnormal behavior observed   Pharyngeal Phase Abnormal Findings: burping, felt residue with cookie though none seen, improves after liquid wash    Recommended Diet:  regular                                        thin liquids             Review of Relevant Clinical Data   I personally reviewed:  Notes:    2/19/25 BHANU- Elier Corrigan MD   Interval History:   Jennifer Jane is a 74 year old female who presents for follow up regarding chronic rhinitis, hoarseness, and post nasal drip.     8/13/24: She reports that she is on Zyrtec and Astelin with no improvement in morning post nasal drip and voice hoarseness. She states that she feels phlegm in her throat, especially in the morning when she takes a deep breath and coughs. She states that she has acid reflux intermittently but not recently and controls her diet. She reports that she gets headaches and has lifelong allergies. She states that she has had recurrent sinus infections the last 2 years and has been treated with prednisone and antibiotics. She reports a hx of pneumonia with COVID that took her a month to recover from. She uses a steroid inhaler prn. Feels that her voice is weaker and breaks up over the last few months. Thinks it may be due to allergies.      2/19/25: Today, she reports she has been experiencing bad nose bleeds. One being so bad she had to go to the ED. She used Afrin for about 2 weeks, but has since stopped. She has  also used Azelastine as she was unable to use the Ipratropium due to concern of her underlying glaucoma. She is still experiencing a chronic cough though it is improved. Biggest thing she notices now is change in voice and dysphonia. She endorses congestion. Her symptoms bother her while she is sleeping, she states she is waking up 3-4 times a night.   ______________________  12/30/24 Katina Knutson MD   HISTORY OF PRESENT ILLNESS:  Jennifer Jane is a 74 year old female who has been followed by Dr. Eid since at least 2023 with mild hyper calcium Andreea and elevated parathyroid hormone.  She also has known osteo porosis.  However her most recent labs from June 2024 include: Parathyroid hormone level 61, ionized calcium 5.2, total calcium 10.0, phosphorus 2.0.  24-hour urine calcium was not repeated.  Her images of the neck include an ultrasound that identified an area of concern possibly in the right inferior position and a nuclear medicine scan from 2 years ago identified an area of concern possibly in the left inferior position.     Symptoms associated with hyperparathyroidism essentially her only osteoporosis for which she is on alendronate in the past.  She denies any fatigue muscle aches or joint pain.  No nephrolithiasis.  No recent fractures.  No family or previous history of hyperparathyroidism.  No problems with voice quality inspiration or swallowing.  ______________________  Radiology:    2/26/25 CT sinus   IMPRESSION:  1.  No evidence for inflammatory sinusitis.     2.  Paranasal sinus drainage pathways are patent bilaterally.     3.  No air-fluid levels.     4.  Leftward deviation of the bony nasal septum.      ______________________  1/6/25 CT Parathyroid   IMPRESSION:  9 x 3 mm elongated soft tissue density posterior and medial to the  left upper thyroid pole. The enhancement pattern is suggestive of  parathyroid adenoma in the correct clinical  "setting.      ______________________  12/28/24 XR chest  IMPRESSION: Negative chest.      ______________________  Procedures:    5/10/23 PFT        Labs:  Lab Results   Component Value Date    TSH 1.75 06/21/2024     Lab Results   Component Value Date     12/28/2024    CO2 26 12/28/2024    BUN 10.4 12/28/2024    PHOS 2.0 (L) 06/25/2024     Lab Results   Component Value Date    WBC 8.5 12/28/2024    HGB 14.1 12/28/2024    HCT 41.9 12/28/2024    MCV 89 12/28/2024     12/28/2024     No results found for: \"PT\", \"PTT\", \"INR\"  No results found for: \"MELISSA\"  No components found for: \"RHEUMATOIDFACTOR\", \"RF\"  No results found for: \"CRP\"  No components found for: \"CKTOT\", \"URICACID\"  No components found for: \"C3\", \"C4\", \"DSDNAAB\", \"NDNAABIFA\"  No results found for: \"MPOAB\"    Patient reported Quality of Life (QOL) Measures   Patient Supplied Answers To VHI Questionnaire       No data to display                  Patient Supplied Answers To EAT Questionnaire       No data to display                  Patient Supplied Answers To CSI Questionnaire       No data to display                  Patient Supplied Answers to Dyspnea Index Questionnaire:       No data to display                    Thank you for the kind referral and for allowing me to share in the care of Ms. Jane. If you have any questions, please do not hesitate to contact me.    Scribe Disclosure:   I, Malena Mota, am serving as a scribe; to document services personally performed by Deepthi Lopez MD -based on data collection and the provider's statements to me.     Provider Disclosure:  I agree with above History, Review of Systems, Physical exam and Plan.  I have reviewed the content of the documentation and have edited it as needed. I have personally performed the services documented here and the documentation accurately represents those services and the decisions I have made.      Electronically signed by:  Deepthi Lopez MD    " Laryngology    [facility]  Department of  Otolaryngology - Head and Neck Surgery  Clinics & Surgery Center  [address] [place] [id number]  Appointment line: [phone]  Fax: [phone]  [email or web address]        Again, thank you for allowing me to participate in the care of your patient.      Sincerely,    Deepthi Lopez MD

## 2025-03-27 NOTE — PROGRESS NOTES
SPEECH LANGUAGE PATHOLOGY EVALUATION              Subjective        Presenting condition or subjective complaint: Pt presents today in conjunction with ENT clinic visit per MD request.  Date of onset:      Relevant medical history:     Past Medical History:   Diagnosis Date    Acute bilateral low back pain with bilateral sciatica     Acute pain of right knee     Pain for months, worse since helping friend move.  Exam consistent with meniscal tear.  Check xray today, depending on results MRI.  Ice, NSAIDS for now.  ACE.    Amaurosis fugax     Arthritis     Fibromyalgia, ? Osteoarthritis    Back injury     Micro discectomy, approx date 1983    Benign neoplasm of ascending colon     Chondromalacia of patella, right     Severe, tri-compartmental on MRI 10/2017.  Referred to ortho.    Chronic left-sided headache     Negative MRI and CT of head 2018 and 2016  Normal labs TSH, CBC, CMP, ESR 12/2019.  See plan below in pt instructions.    Diarrhea, unspecified type     Diverticular disease of large intestine     Encounter for Medicare annual wellness exam     Up to date on mammogram and colon cancer screening.  Is due for shingles vaccine - do at pharmacy.    Glaucoma     Glaucoma suspect     Hiatal hernia     HTN (hypertension)     Hypercalciuria     Infection due to 2019 novel coronavirus 02/10/2024    x3    Insomnia 12/26/2012     Problem list name updated by automated process. Provider to review    Low bone density 2022    Overweight (BMI 25.0-29.9)     Polyp of colon     Prediabetes     Primary hyperparathyroidism     mild; borderline hypercalciuria    Pseudophakia, both eyes     Reduced vision 09/2016    L vision block, bilateral elevated eye presures,poss. Glacom    Right foot pain     Sciatica     On and off - exacerbates when lifts heavy things  Bilaterally.    Spinal stenosis of lumbar region without neurogenic claudication     Uncomplicated asthma     Diagnosed as 2 yo    Weakness of left lower extremity       Dates & types of surgery:    Past Surgical History:   Procedure Laterality Date     cataract extraction with intraocular lens implant Right 01/31/2019    BACK SURGERY  1984    micro-disc-ectomy    BREAST SURGERY  1984    L Breast bx    cataract extraction with intraocular lens Left 12/06/2018    CATARACT IOL, RT/LT Bilateral     CHOLECYSTECTOMY  1990    INJECT EPIDURAL TRANSFORAMINAL Bilateral 01/31/2024    Procedure: Bilateral L5-S1 Transforaminal epidural steroid injection;  Surgeon: Sarahy Tsang MD;  Location: [facility] OR    IRIDOTOMY/IRIDECTOMY LASER SURGERY Left 09/27/2016    IRIDOTOMY/IRIDECTOMY LASER SURGERY Right 10/18/2016    [place] TRABECULOPLASTY BY LASER SURGERY      TUBAL LIGATION  1989    ZZC STOMACH SURGERY PROCEDURE UNLISTED  03/1990    Cholecystectomy, 11/1989 tubal ligation       Prior diagnostic imaging/testing results:   no    Prior therapy history for the same diagnosis, illness or injury:  no      Patient goals for therapy:  sing, improve voice    Pain assessment: Pain denied     Objective     SWALLOW EVALUTION  Dysphagia history: Pt reports voice changes. She also notices some feeling of pharyngeal stasis with solids. Occasional coughing with liquids a few times a week. She notices she used to be able to take all her pills in one handful, but now she has to take them one at a time. Endorses frequent reflux and manages this with diet and lifestyle modifications.    Current Diet/Method of Nutritional Intake: thin liquids (level 0), regular diet      CLINICAL SWALLOW EVALUATION  Oral Motor Function:   Dentition: adequate dentition  Secretion management: WFL  Mucosal quality: adequate  Mandibular function: intact  Oral labial function: WFL  Lingual function: WFL  Velar function: intact   Buccal function: intact  Laryngeal function: cough, throat clear, volitional swallow, dysphonia present characterized by roughness and breathiness     Level of assist required for feeding: no assistance needed    Textures Trialed:   Clinical Swallow Eval: Thin Liquids  Mode of presentation: straw   Volume presented: 4oz  Preparatory Phase: WFL  Oral Phase: WFL  Pharyngeal phase of swallow: intact   Diagnostic statement: PO trials evaluated under endoscopy completed by MD. No penetration/aspiration or significant residuals.     Clinical Swallow Eval: Purees  Mode of presentation: spoon   Volume presented: 3 tablespoons  Preparatory Phase: WFL  Oral Phase: WFL  Pharyngeal phase of swallow: intact   Diagnostic statement: PO trials evaluated under endoscopy completed by MD. No penetration/aspiration or significant residuals.     Clinical Swallow Eval: Solids  Mode of presentation: self-fed   Volume presented: 1 Rosario doone  Preparatory Phase: WFL  Oral Phase: WFL  Pharyngeal phase of swallow: intact   Diagnostic statement: PO trials evaluated under endoscopy completed by MD. No penetration/aspiration or significant residuals. Pt reported feeling stasis in her throat although none was seen; suspect referred sensation from esophageal phase of swallowing.    ESOPHAGEAL PHASE OF SWALLOW  patient presents with symptoms of esophageal dysphagia     SWALLOW ASSESSMENT CLINICAL IMPRESSIONS AND RATIONALE  Diet Consistency Recommendations: thin liquids (level 0), regular diet    Recommended Feeding/Eating Techniques:  general safe swallow strategies: small sips/bites, slow pace    Medication Administration Recommendations: one at a time whole with thin liquid  Instrumental Assessment Recommendations: instrumental evaluation not recommended at this time     Assessment & Plan   CLINICAL IMPRESSIONS   Medical Diagnosis: Dysphonia; dysphagia, unspecified    Treatment Diagnosis: safe, functional oropharyngeal swallow   Impression/Assessment: Pt is a 74 year old female with voice and swallow complaints. The following significant findings have been identified:  safe, functional oropharyngeal swallow , characterized by no penetration/aspiration  or significant residuals with any PO trial texture. With solid trial pt reported feeling stasis in her throat although none was seen; suspect referred sensation from esophageal phase of swallowing. Should this become worse or more bothersome to the patient, consider GI referral for reflux management. No further SLP services for swallowing are warranted at this time.    PLAN OF CARE  Evaluation only    Recommended Referrals to Other Professionals:  voice SLP    Education Assessment:   Learner/Method: Patient;Listening;Pictures/Video;No Barriers to Learning    Risks and benefits of evaluation/treatment have been explained.   Patient/Family/caregiver agrees with Plan of Care.     Evaluation Time:    SLP Eval: oral/pharyngeal swallow function, clinical minutes (13145): 15    Evaluation Only     Signing Clinician: NATANAEL Beltre

## 2025-03-27 NOTE — PATIENT INSTRUCTIONS
1.  You were seen in the ENT Clinic today by . If you have any questions or concerns after your appointment, please call [phone]. Press option #1 for scheduling related needs. Press option #3 for Nurse advice.    2.   has recommended  the following:   - voice therapy   -continue reflux management    3.  Plan is to return to clinic as needed after therapy      Viktoriya Ortega LPN  [phone]  [facility] - Otolaryngology

## 2025-03-27 NOTE — PROGRESS NOTES
[facility]   at the [facility]   Otolaryngology Clinic     Patient: Jennifer Jane    MRN: [id number]    : 1950    Age/Gender: 74 year old female  Date of Service: 3/27/2025  Rendering Provider:   Deepthi Lopez MD       Impression & Plan     IMPRESSION: Ms. Jane is a 74 year old female who is being seen for the followin. 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 Jane is a 74 year old female who presents to us today with dysphonia and dysphagia.      Of note has asthma.     she presents today for evaluation. she reports:  - has been having issues with her speaking and singing voice and the feeling of things getting stuck in her throat    Dysphonia:  reports    Dysphagia:  reports    Dyspnea:  denies    Coughing / Throat-clearing:  denies    GERD/LPRD:  reports    PAST MEDICAL HISTORY:   Past Medical History:   Diagnosis Date    Acute bilateral low back pain with bilateral sciatica     Acute pain of right knee     Pain for months, worse since helping friend move.  Exam consistent with meniscal tear.  Check xray today, depending on results MRI.  Ice, NSAIDS for now.  ACE.     Amaurosis fugax     Arthritis     Fibromyalgia, ? Osteoarthritis    Back injury     Micro discectomy, approx date 1983    Benign neoplasm of ascending colon     Chondromalacia of patella, right     Severe, tri-compartmental on MRI 10/2017.  Referred to ortho.    Chronic left-sided headache     Negative MRI and CT of head 2018 and 2016  Normal labs TSH, CBC, CMP, ESR 12/2019.  See plan below in pt instructions.    Diarrhea, unspecified type     Diverticular disease of large intestine     Encounter for Medicare annual wellness exam     Up to date on mammogram and colon cancer screening.  Is due for shingles vaccine - do at pharmacy.    Glaucoma     Glaucoma suspect     Hiatal hernia     HTN (hypertension)     Hypercalciuria     Infection due to 2019 novel coronavirus 02/10/2024    x3    Insomnia 12/26/2012     Problem list name updated by automated process. Provider to review    Low bone density 2022    Overweight (BMI 25.0-29.9)     Polyp of colon     Prediabetes     Primary hyperparathyroidism     mild; borderline hypercalciuria    Pseudophakia, both eyes     Reduced vision 09/2016    L vision block, bilateral elevated eye presures,poss. Glacom    Right foot pain     Sciatica     On and off - exacerbates when lifts heavy things  Bilaterally.    Spinal stenosis of lumbar region without neurogenic claudication     Uncomplicated asthma     Diagnosed as 1 yo    Weakness of left lower extremity        PAST SURGICAL HISTORY:   Past Surgical History:   Procedure Laterality Date     cataract extraction with intraocular lens implant Right 01/31/2019    BACK SURGERY  1984    micro-disc-ectomy    BREAST SURGERY  1984    L Breast bx    cataract extraction with intraocular lens Left 12/06/2018    CATARACT IOL, RT/LT Bilateral     CHOLECYSTECTOMY  1990    INJECT EPIDURAL TRANSFORAMINAL Bilateral 01/31/2024    Procedure: Bilateral L5-S1 Transforaminal epidural steroid injection;  Surgeon: Sarahy Tsang MD;  [place] [facility] [place]     IRIDOTOMY/IRIDECTOMY LASER SURGERY Left 09/27/2016    IRIDOTOMY/IRIDECTOMY LASER SURGERY Right 10/18/2016    [facility] TRABECULOPLASTY BY LASER SURGERY      TUBAL LIGATION  1989    [facility] STOMACH SURGERY PROCEDURE UNLISTED  03/1990    Cholecystectomy, 11/1989 tubal ligation       CURRENT MEDICATIONS:   Current Outpatient Medications:     acetaminophen (TYLENOL) 325 MG tablet, Take 325-650 mg by mouth every 6 hours as needed for mild pain, Disp: , Rfl:     acetylcysteine (N-ACETYL CYSTEINE) 600 MG CAPS capsule, Take 1 capsule (600 mg) by mouth daily, Disp: 90 capsule, Rfl: 4    ADVAIR -21 MCG/ACT inhaler, Inhale 2 puffs into the lungs 2 times daily, Disp: , Rfl:     albuterol (VENTOLIN HFA) 108 (90 Base) MCG/ACT inhaler, Inhale 2 puffs into the lungs every 6 hours as needed for shortness of breath, Disp: 8.5 g, Rfl: 5    alendronate (FOSAMAX) 70 MG tablet, TAKE 1 TABLET BY MOUTH EVERY 7 DAYS TAKE 60 MINUTES BEFORE AM MEAL WITH 8 OZ. WATER. REMAIN UPRIGHT FOR 30 MINUTES., Disp: 12 tablet, Rfl: 3    amLODIPine (NORVASC) 5 MG tablet, Take 1 tablet (5 mg) by mouth at bedtime, Disp: 90 tablet, Rfl: 4    amoxicillin-clavulanate (AUGMENTIN) 875-125 MG tablet, Take 1 tablet by mouth 2 times daily for 5 days., Disp: 10 tablet, Rfl: 0    aspirin 81 MG EC tablet, Take 81 mg by mouth every 4 hours as needed for moderate pain., Disp: , Rfl:     atorvastatin (LIPITOR) 20 MG tablet, TAKE 1 TABLET BY MOUTH EVERY DAY, Disp: 90 tablet, Rfl: 4    azelastine (ASTELIN) 0.1 % nasal spray, Spray 1 spray into both nostrils 2 times daily, Disp: , Rfl:     bisacodyl (DULCOLAX) 5 MG EC tablet, Take 5 mg by mouth daily as needed for constipation., Disp: , Rfl:     brimonidine (ALPHAGAN-P) 0.15 % ophthalmic solution, PLACE 1 DROP INTO BOTH EYES TWO TIMES A DAY., Disp: , Rfl:     Calcium Carbonate Antacid (TUMS PO), Take  by mouth At Bedtime., Disp: , Rfl:     co-enzyme Q-10 100 MG CAPS capsule, Take 1 capsule (100 mg) by mouth daily, Disp: 90  capsule, Rfl: 4    fexofenadine (ALLEGRA) 180 MG tablet, Take 180 mg by mouth daily., Disp: , Rfl:     ipratropium (ATROVENT) 0.03 % nasal spray, Spray 2 sprays into both nostrils 2 times daily (Patient not taking: Reported on 3/21/2025), Disp: 30 mL, Rfl: 6    latanoprost (XALATAN) 0.005 % ophthalmic solution, Place 1 drop into the right eye At Bedtime, Disp: , Rfl:     loratadine (CLARITIN) 10 MG tablet, Take 10 mg by mouth daily., Disp: , Rfl:     losartan (COZAAR) 50 MG tablet, TAKE 1 TABLET BY MOUTH TWICE A DAY, Disp: 180 tablet, Rfl: 1    melatonin 5 MG CAPS, Take 1 capsule by mouth as needed., Disp: , Rfl:     montelukast (SINGULAIR) 10 MG tablet, TAKE ONE TABLET BY MOUTH EVERY DAY AS DIRECTED, Disp: , Rfl: 1    moxifloxacin (VIGAMOX) 0.5 % ophthalmic solution, Place 1 drop into both eyes 4 times daily. (Patient not taking: Reported on 3/21/2025), Disp: , Rfl:     oxymetazoline (AFRIN) 0.05 % nasal spray, 2 sprays as needed., Disp: , Rfl:     polymixin b-trimethoprim (POLYTRIM) 96477-5.1 UNIT/ML-% ophthalmic solution, Place 2 drops Into the left eye every 6 hours for 7 days., Disp: 10 mL, Rfl: 0    SPIRIVA RESPIMAT 1.25 MCG/ACT inhaler, INHALE 2 PUFFS ONCE PER DAY, Disp: , Rfl:     traZODone (DESYREL) 50 MG tablet, Take 1-3 tablets ( mg) by mouth at bedtime, Disp: 130 tablet, Rfl: 4    Vitamin D3 (CHOLECALCIFEROL) 25 mcg (1000 units) tablet, Take by mouth daily, Disp: , Rfl:     ALLERGIES: Canola oil [vegetable oil], Animal dander, Dust mites, Grass, Hydroxyzine, Hydroxyzine hcl, Pollen extract, Trees, Ragweeds, and Chlorhexidine    SOCIAL HISTORY:    Social History     Socioeconomic History    Marital status:      Spouse name: Not on file    Number of children: Not on file    Years of education: Not on file    Highest education level: Not on file   Occupational History    Not on file   Tobacco Use    Smoking status: Never     Passive exposure: Past    Smokeless tobacco: Never   Vaping Use     Vaping status: Never Used   Substance and Sexual Activity    Alcohol use: Yes     Alcohol/week: 0.0 standard drinks of alcohol     Comment: 0 -2 times a year    Drug use: No    Sexual activity: Yes     Partners: Male     Birth control/protection: Post-menopausal, Female Surgical   Other Topics Concern    Parent/sibling w/ CABG, MI or angioplasty before 65F 55M? Not Asked   Social History Narrative    Not on file     Social Drivers of Health     Financial Resource Strain: Low Risk  (2/28/2024)    Financial Resource Strain     Within the past 12 months, have you or your family members you live with been unable to get utilities (heat, electricity) when it was really needed?: No   Food Insecurity: Low Risk  (2/28/2024)    Food Insecurity     Within the past 12 months, did you worry that your food would run out before you got money to buy more?: No     Within the past 12 months, did the food you bought just not last and you didn t have money to get more?: No   Transportation Needs: Low Risk  (2/28/2024)    Transportation Needs     Within the past 12 months, has lack of transportation kept you from medical appointments, getting your medicines, non-medical meetings or appointments, work, or from getting things that you need?: No   Physical Activity: Insufficiently Active (2/28/2024)    Exercise Vital Sign     Days of Exercise per Week: 4 days     Minutes of Exercise per Session: 20 min   Stress: No Stress Concern Present (2/28/2024)    [employer] - Occupational Stress Questionnaire     Feeling of Stress : Not at all   Social Connections: Unknown (2/28/2024)    Social Connection and Isolation Panel [NHANES]     Frequency of Communication with Friends and Family: Not on file     Frequency of Social Gatherings with Friends and Family: Once a week     Attends Oriental orthodox Services: Not on file     Active Member of Clubs or Organizations: Not on file     Attends Club or Organization Meetings: Not on  file     Marital Status: Not on file   Interpersonal Safety: Low Risk  (8/29/2024)    Interpersonal Safety     Do you feel physically and emotionally safe where you currently live?: Yes     Within the past 12 months, have you been hit, slapped, kicked or otherwise physically hurt by someone?: No     Within the past 12 months, have you been humiliated or emotionally abused in other ways by your partner or ex-partner?: No   Housing Stability: Low Risk  (2/28/2024)    Housing Stability     Do you have housing? : Yes     Are you worried about losing your housing?: No         FAMILY HISTORY:   Family History   Problem Relation Age of Onset    Heart Disease Mother     Diabetes Mother     Coronary Artery Disease Mother     Hypertension Mother     Anesthesia Reaction Mother         Anaphylaxis, oral opioixd    Asthma Mother     Thyroid Disease Mother     Low Back Problems Mother     Alzheimer Disease Mother     Heart Disease Father         CABG x2    Diabetes Father     Coronary Artery Disease Father     Hypertension Father     Hyperlipidemia Father     Low Back Problems Father     Diabetes Sister         insulin dependent    Hypertension Sister     Low Back Problems Sister     Thyroid Disease Sister     Hip fracture Sister     Low Back Problems Brother     Dementia Brother         Lewy Body    Asthma Maternal Grandmother     Coronary Artery Disease Maternal Grandfather     Cerebrovascular Disease Paternal Grandfather     Asthma Daughter     Genetic Disease Daughter         Kallman syndrome; no olfactory bulb;    Calcium Disorder No family hx of      Non-contributory for problems with anesthesia    REVIEW OF SYSTEMS:   The patient was asked a 14 point review of systems regarding constitutional symptoms, eye symptoms, ears, nose, mouth, throat symptoms, cardiovascular symptoms, respiratory symptoms, gastrointestinal symptoms, genitourinary symptoms, musculoskeletal symptoms, integumentary symptoms, neurological symptoms,  psychiatric symptoms, endocrine symptoms, hematologic/lymphatic symptoms, and allergic/ immunologic symptoms.   The pertinent factors have been included in the HPI and below.  Patient Supplied Answers to Review of Systems      2/27/2025    10:17 AM   [facility] ENT ROS   Constitutional Weight loss   Neurology Dizzy spells    Headache   Ears, Nose, Throat Nasal congestion or drainage    Hoarseness   Cardiopulmonary Cough   Gastrointestinal/Genitourinary Constipation   Musculoskeletal Sore or stiff joints    Back pain   Allergy/Immunology Allergies or hay fever   Hematologic Easy bruising       Physical Examination   The patient underwent a physical examination as described below. The pertinent positive and negative findings are summarized after the description of the examination.  Constitutional: The patient's developmental and nutritional status was assessed. The patient's voice quality was assessed.  Head and Face: The head and face were inspected for deformities. The sinuses were palpated. The salivary glands were palpated. Facial muscle strength was assessed bilaterally.  Eyes: Extraocular movements and primary gaze alignment were assessed.  Ears, Nose, Mouth and Throat: The ears and nose were examined for deformities. The nasal septum, mucosa, and turbinates were inspected by anterior rhinoscopy. The lips, teeth, and gums were examined for abnormalities. The oral mucosa, tongue, palate, tonsils, lateral and posterior pharynx were inspected for the presence of asymmetry or mucosal lesions.    Neck: The tracheal position was noted, and the neck mass palpated to determine if there were any asymmetries, abnormal neck masses, thyromegally, or thyroid nodules.  Respiratory: The nature of the breathing and chest expansion/symmetry was observed.  Cardiovascular: The patient was examined to determine the presence of any edema or jugular venous distension.  Abdomen: The contour of the abdomen was noted.  Lymphatic: The patient  was examined for infraclavicular lymphadenopathy.  Musculoskeletal: The patient was inspected for the presence of skeletal deformities.  Extremities: The extremities were examined for any clubbing or cyanosis.  Skin: The skin was examined for inflammatory or neoplastic conditions.  Neurologic: The patient's orientation, mood, and affect were noted. The cranial nerve  functions were examined.  Other pertinent positive and negative findings on physical examination:      OC/OP: no lesions, uvula midline, soft palate elevates symmetrically   Neck: no lesions, no TH tenderness to palpation     All other physical examination findings were within normal limits and noncontributory.  Procedures   ENT PROCEDURES:   Video Laryngoscopy with Stroboscopy (CPT 61149)    Preoperative Diagnosis:  Dysphonia and throat symptoms  Postoperative Diagnosis: Dysphonia and throat symptoms  Indication:  Patient has new or persistent dysphonia and throat symptoms.  This requires evaluation by stroboscopy to fully delineate the laryngeal functioning; especially mucosal wave assessment, ultrasharp visualization of lesions on the vocal folds, and overall functioning of the larynx.  Details of Procedure: A 70 degree rigid telescopic laryngoscope or a distal chip flexible scope was used. The lighting was obtained via a light cable connected to a stroboscopic unit. The telescope was inserted either transorally or transnasally until the vocal folds could be visualized. The patient was instructed to sustain the vowel  ee  at a comfortable pitch and loudness as the voice was monitored by a microphone connected to a fundamental frequency tracker. This circuit tracked vocal periodicity, allowing the light to flash in synchrony with the glottal cycles. A setting on the stroboscope was set to change the phase of light strobing with relation to the vocal fundamental frequency, producing an image of 1 to 2 glottal cycles every second. The video images were  recorded for analysis. Use of the variable speed, slow and stop scan allowed careful study of pertinent segments of laryngeal function to increase accuracy of clinical assessments of function and dysfunction.  In particular, features of glottal closure, mucosal wave on the vocal fold cover and laryngeal symmetry were analyzed. Lastly, the patient was asked to phonate speech samples and auditory/perceptual evaluation of voice and resonance were performed.  The vocal quality was carefully evaluated for hoarseness, breathiness, loudness, phrase length and intelligibility to determine the source of dysphonia.    Findings:      B. LARYNGOVIDEOSTROBOSCOPY   Anatomic/Physiological Deviations:  presbylarnyx and supraglottic hypofunctions   Mucosal wave: Right:  No restriction     Left: No restriction  Bilateral Vocal Fold Vibration: Symmetric  Vocal Process: Right: No restriction    Left:  No restriction  Vocal Fold closure: Complete glottal closure    Complication(s): None  Disposition: Patient tolerated the procedure well         Fiberoptic Endoscopic Evaluation of Swallowing (CPT 60314)  and Interpretation of Swallowing (CPT 83290)    Indications: See above notes for complete history and physical. Patient complains of dysphagia to both solids and liquids and/or there is suggestion on history and endoscopic exam of the presence of dysphagia causing medical complaints.  Swallowing evaluation is being performed to assess the presence and degree of dysphagia, and to recommend a safe diet.     Pulmonary Status:  No PNA   Current Diet:              regular                                        thin liquids      Consistency Amounts:  Thin Liquid: sip   Puree: sip  Solid: cookies            Positioning: upright in a chair  Oral Peripheral Exam: See physical exam section.  Anatomic Notes: See Videostroboscopy report for assessment of anatomy and laryngeal functioning  Pharyngeal secretions prior to administration of liquid or  food: No   Oral Phase Abnormal Findings: No abnormal behavior observed   Pharyngeal Phase Abnormal Findings: burping, felt residue with cookie though none seen, improves after liquid wash    Recommended Diet:  regular                                        thin liquids             Review of Relevant Clinical Data   I personally reviewed:  Notes:    2/19/25 Elier Jones MD   Interval History:   Jennifer Jane is a 74 year old female who presents for follow up regarding chronic rhinitis, hoarseness, and post nasal drip.     8/13/24: She reports that she is on Zyrtec and Astelin with no improvement in morning post nasal drip and voice hoarseness. She states that she feels phlegm in her throat, especially in the morning when she takes a deep breath and coughs. She states that she has acid reflux intermittently but not recently and controls her diet. She reports that she gets headaches and has lifelong allergies. She states that she has had recurrent sinus infections the last 2 years and has been treated with prednisone and antibiotics. She reports a hx of pneumonia with COVID that took her a month to recover from. She uses a steroid inhaler prn. Feels that her voice is weaker and breaks up over the last few months. Thinks it may be due to allergies.      2/19/25: Today, she reports she has been experiencing bad nose bleeds. One being so bad she had to go to the ED. She used Afrin for about 2 weeks, but has since stopped. She has also used Azelastine as she was unable to use the Ipratropium due to concern of her underlying glaucoma. She is still experiencing a chronic cough though it is improved. Biggest thing she notices now is change in voice and dysphonia. She endorses congestion. Her symptoms bother her while she is sleeping, she states she is waking up 3-4 times a night.   ______________________  12/30/24 Katina Knutson MD   HISTORY OF PRESENT ILLNESS:  Jennifer Jane is a 74 year old  female who has been followed by Dr. Eid since at least 2023 with mild hyper calcium Andreea and elevated parathyroid hormone.  She also has known osteo porosis.  However her most recent labs from June 2024 include: Parathyroid hormone level 61, ionized calcium 5.2, total calcium 10.0, phosphorus 2.0.  24-hour urine calcium was not repeated.  Her images of the neck include an ultrasound that identified an area of concern possibly in the right inferior position and a nuclear medicine scan from 2 years ago identified an area of concern possibly in the left inferior position.     Symptoms associated with hyperparathyroidism essentially her only osteoporosis for which she is on alendronate in the past.  She denies any fatigue muscle aches or joint pain.  No nephrolithiasis.  No recent fractures.  No family or previous history of hyperparathyroidism.  No problems with voice quality inspiration or swallowing.  ______________________  Radiology:    2/26/25 CT sinus   IMPRESSION:  1.  No evidence for inflammatory sinusitis.     2.  Paranasal sinus drainage pathways are patent bilaterally.     3.  No air-fluid levels.     4.  Leftward deviation of the bony nasal septum.      ______________________  1/6/25 CT Parathyroid   IMPRESSION:  9 x 3 mm elongated soft tissue density posterior and medial to the  left upper thyroid pole. The enhancement pattern is suggestive of  parathyroid adenoma in the correct clinical setting.      ______________________  12/28/24 XR chest  IMPRESSION: Negative chest.      ______________________  Procedures:    5/10/23 PFT        Labs:  Lab Results   Component Value Date    TSH 1.75 06/21/2024     Lab Results   Component Value Date     12/28/2024    CO2 26 12/28/2024    BUN 10.4 12/28/2024    PHOS 2.0 (L) 06/25/2024     Lab Results   Component Value Date    WBC 8.5 12/28/2024    HGB 14.1 12/28/2024    HCT 41.9 12/28/2024    MCV 89 12/28/2024     12/28/2024     No results found for:  "\"PT\", \"PTT\", \"INR\"  No results found for: \"MELISSA\"  No components found for: \"RHEUMATOIDFACTOR\", \"RF\"  No results found for: \"CRP\"  No components found for: \"CKTOT\", \"URICACID\"  No components found for: \"C3\", \"C4\", \"DSDNAAB\", \"NDNAABIFA\"  No results found for: \"MPOAB\"    Patient reported Quality of Life (QOL) Measures   Patient Supplied Answers To VHI Questionnaire       No data to display                  Patient Supplied Answers To EAT Questionnaire       No data to display                  Patient Supplied Answers To CSI Questionnaire       No data to display                  Patient Supplied Answers to Dyspnea Index Questionnaire:       No data to display                    Thank you for the kind referral and for allowing me to share in the care of Ms. Jane. If you have any questions, please do not hesitate to contact me.    Scribe Disclosure:   I, Malena Mota, am serving as a scribe; to document services personally performed by Deepthi Lopez MD -based on data collection and the provider's statements to me.     Provider Disclosure:  I agree with above History, Review of Systems, Physical exam and Plan.  I have reviewed the content of the documentation and have edited it as needed. I have personally performed the services documented here and the documentation accurately represents those services and the decisions I have made.      Electronically signed by:  Deepthi Lopez MD    Laryngology    [facility]  Department of  Otolaryngology - Head and Neck Surgery  [facility] [place] [address]  Appointment line: [phone]  Fax: [phone]  [email or web address]        "

## 2025-04-07 ENCOUNTER — ANCILLARY PROCEDURE (OUTPATIENT)
Dept: MRI IMAGING | Facility: CLINIC | Age: 75
End: 2025-04-07
Attending: INTERNAL MEDICINE
Payer: COMMERCIAL

## 2025-04-07 DIAGNOSIS — R41.3 MEMORY CHANGE: ICD-10-CM

## 2025-04-07 PROCEDURE — 70551 MRI BRAIN STEM W/O DYE: CPT | Performed by: INTERNAL MEDICINE

## 2025-04-15 DIAGNOSIS — I10 BENIGN ESSENTIAL HYPERTENSION: ICD-10-CM

## 2025-04-16 RX ORDER — AMLODIPINE BESYLATE 5 MG/1
5 TABLET ORAL AT BEDTIME
Qty: 90 TABLET | Refills: 0 | Status: SHIPPED | OUTPATIENT
Start: 2025-04-16

## 2025-05-06 ENCOUNTER — OFFICE VISIT (OUTPATIENT)
Dept: FAMILY MEDICINE | Facility: CLINIC | Age: 75
End: 2025-05-06
Payer: COMMERCIAL

## 2025-05-06 VITALS
BODY MASS INDEX: 27.08 KG/M2 | SYSTOLIC BLOOD PRESSURE: 126 MMHG | HEART RATE: 76 BPM | WEIGHT: 158.6 LBS | DIASTOLIC BLOOD PRESSURE: 66 MMHG | TEMPERATURE: 98.2 F | HEIGHT: 64 IN | RESPIRATION RATE: 16 BRPM | OXYGEN SATURATION: 98 %

## 2025-05-06 DIAGNOSIS — E21.0 PRIMARY HYPERPARATHYROIDISM: ICD-10-CM

## 2025-05-06 DIAGNOSIS — J45.40 MODERATE PERSISTENT ASTHMA WITH ALLERGIC RHINITIS WITHOUT COMPLICATION: ICD-10-CM

## 2025-05-06 DIAGNOSIS — D12.6 TUBULAR ADENOMA OF COLON: ICD-10-CM

## 2025-05-06 DIAGNOSIS — M54.16 LUMBAR RADICULOPATHY: ICD-10-CM

## 2025-05-06 DIAGNOSIS — M85.89 OSTEOPENIA OF MULTIPLE SITES: ICD-10-CM

## 2025-05-06 DIAGNOSIS — I10 BENIGN ESSENTIAL HYPERTENSION: ICD-10-CM

## 2025-05-06 DIAGNOSIS — R41.3 MEMORY DIFFICULTY: ICD-10-CM

## 2025-05-06 DIAGNOSIS — F51.04 CHRONIC INSOMNIA: ICD-10-CM

## 2025-05-06 DIAGNOSIS — R82.994 HYPERCALCIURIA: ICD-10-CM

## 2025-05-06 DIAGNOSIS — Z00.00 ENCOUNTER FOR ANNUAL WELLNESS EXAM IN MEDICARE PATIENT: Primary | ICD-10-CM

## 2025-05-06 DIAGNOSIS — Z79.899 MEDICATION MANAGEMENT: ICD-10-CM

## 2025-05-06 DIAGNOSIS — H40.2230 CHRONIC ANGLE-CLOSURE GLAUCOMA OF BOTH EYES, UNSPECIFIED GLAUCOMA STAGE: ICD-10-CM

## 2025-05-06 DIAGNOSIS — R73.03 PRE-DIABETES: ICD-10-CM

## 2025-05-06 DIAGNOSIS — E78.00 HYPERCHOLESTEROLEMIA: ICD-10-CM

## 2025-05-06 DIAGNOSIS — K59.04 CHRONIC IDIOPATHIC CONSTIPATION: ICD-10-CM

## 2025-05-06 LAB
ALBUMIN SERPL BCG-MCNC: 4.6 G/DL (ref 3.5–5.2)
ALP SERPL-CCNC: 68 U/L (ref 40–150)
ALT SERPL W P-5'-P-CCNC: 30 U/L (ref 0–50)
ANION GAP SERPL CALCULATED.3IONS-SCNC: 12 MMOL/L (ref 7–15)
AST SERPL W P-5'-P-CCNC: 27 U/L (ref 0–45)
BILIRUB SERPL-MCNC: 0.6 MG/DL
BUN SERPL-MCNC: 13.5 MG/DL (ref 8–23)
CALCIUM SERPL-MCNC: 10.6 MG/DL (ref 8.8–10.4)
CHLORIDE SERPL-SCNC: 105 MMOL/L (ref 98–107)
CHOLEST SERPL-MCNC: 154 MG/DL
CREAT SERPL-MCNC: 0.78 MG/DL (ref 0.51–0.95)
EGFRCR SERPLBLD CKD-EPI 2021: 79 ML/MIN/1.73M2
ERYTHROCYTE [DISTWIDTH] IN BLOOD BY AUTOMATED COUNT: 12.2 % (ref 10–15)
EST. AVERAGE GLUCOSE BLD GHB EST-MCNC: 128 MG/DL
FASTING STATUS PATIENT QL REPORTED: YES
FASTING STATUS PATIENT QL REPORTED: YES
GLUCOSE SERPL-MCNC: 133 MG/DL (ref 70–99)
HBA1C MFR BLD: 6.1 % (ref 0–5.6)
HCO3 SERPL-SCNC: 23 MMOL/L (ref 22–29)
HCT VFR BLD AUTO: 43.7 % (ref 35–47)
HDLC SERPL-MCNC: 80 MG/DL
HGB BLD-MCNC: 14.4 G/DL (ref 11.7–15.7)
LDLC SERPL CALC-MCNC: 61 MG/DL
MCH RBC QN AUTO: 30.4 PG (ref 26.5–33)
MCHC RBC AUTO-ENTMCNC: 33 G/DL (ref 31.5–36.5)
MCV RBC AUTO: 92 FL (ref 78–100)
NONHDLC SERPL-MCNC: 74 MG/DL
PLATELET # BLD AUTO: 245 10E3/UL (ref 150–450)
POTASSIUM SERPL-SCNC: 4.1 MMOL/L (ref 3.4–5.3)
PROT SERPL-MCNC: 6.9 G/DL (ref 6.4–8.3)
RBC # BLD AUTO: 4.74 10E6/UL (ref 3.8–5.2)
SODIUM SERPL-SCNC: 140 MMOL/L (ref 135–145)
TRIGL SERPL-MCNC: 65 MG/DL
VIT D+METAB SERPL-MCNC: 25 NG/ML (ref 20–50)
WBC # BLD AUTO: 10.8 10E3/UL (ref 4–11)

## 2025-05-06 PROCEDURE — 1126F AMNT PAIN NOTED NONE PRSNT: CPT

## 2025-05-06 PROCEDURE — 80061 LIPID PANEL: CPT

## 2025-05-06 PROCEDURE — 83036 HEMOGLOBIN GLYCOSYLATED A1C: CPT

## 2025-05-06 PROCEDURE — 85027 COMPLETE CBC AUTOMATED: CPT

## 2025-05-06 PROCEDURE — G0439 PPPS, SUBSEQ VISIT: HCPCS

## 2025-05-06 PROCEDURE — 3078F DIAST BP <80 MM HG: CPT

## 2025-05-06 PROCEDURE — 3074F SYST BP LT 130 MM HG: CPT

## 2025-05-06 PROCEDURE — 99214 OFFICE O/P EST MOD 30 MIN: CPT | Mod: 25

## 2025-05-06 PROCEDURE — G2211 COMPLEX E/M VISIT ADD ON: HCPCS

## 2025-05-06 PROCEDURE — 36415 COLL VENOUS BLD VENIPUNCTURE: CPT

## 2025-05-06 PROCEDURE — 80053 COMPREHEN METABOLIC PANEL: CPT

## 2025-05-06 PROCEDURE — 82306 VITAMIN D 25 HYDROXY: CPT

## 2025-05-06 RX ORDER — ATORVASTATIN CALCIUM 20 MG/1
20 TABLET, FILM COATED ORAL DAILY
Qty: 90 TABLET | Refills: 3 | Status: SHIPPED | OUTPATIENT
Start: 2025-05-06

## 2025-05-06 RX ORDER — AMLODIPINE BESYLATE 5 MG/1
5 TABLET ORAL AT BEDTIME
Qty: 90 TABLET | Refills: 3 | Status: SHIPPED | OUTPATIENT
Start: 2025-05-06

## 2025-05-06 RX ORDER — TRAZODONE HYDROCHLORIDE 50 MG/1
50 TABLET ORAL AT BEDTIME
Qty: 90 TABLET | Refills: 3 | Status: SHIPPED | OUTPATIENT
Start: 2025-05-06

## 2025-05-06 RX ORDER — LOSARTAN POTASSIUM 50 MG/1
50 TABLET ORAL 2 TIMES DAILY
Qty: 180 TABLET | Refills: 3 | Status: SHIPPED | OUTPATIENT
Start: 2025-05-06

## 2025-05-06 SDOH — HEALTH STABILITY: PHYSICAL HEALTH: ON AVERAGE, HOW MANY DAYS PER WEEK DO YOU ENGAGE IN MODERATE TO STRENUOUS EXERCISE (LIKE A BRISK WALK)?: 2 DAYS

## 2025-05-06 ASSESSMENT — PAIN SCALES - GENERAL: PAINLEVEL_OUTOF10: NO PAIN (0)

## 2025-05-06 ASSESSMENT — SOCIAL DETERMINANTS OF HEALTH (SDOH): HOW OFTEN DO YOU GET TOGETHER WITH FRIENDS OR RELATIVES?: TWICE A WEEK

## 2025-05-06 NOTE — PROGRESS NOTES
Preventive Care Visit  [facility] LISA Shaikh DNP, Geriatric Medicine  May 6, 2025      Assessment & Plan     (Z00.00) Encounter for annual wellness exam in Medicare patient  (primary encounter diagnosis)  Comment: UTD on colonoscopy (due 2026) and mammogram. Last DEXA in 2024. Recommend updating RSV and Shingles vaccines at pharmacy    (M54.16) Lumbar radiculopathy with sciatica  Comment: Has tried PT historically, hoping to get more active with water therapy    (J45.40) Moderate persistent asthma with allergic rhinitis without complication  Comment: Follows with asthma/allergist, on immunotherapy. Feels overall well-controlled on current regimen    (R73.03) Pre-diabetes  Comment: Continue to encourage diet/exercise as tolerates for BG regulation  Plan: Hemoglobin A1c    (E78.00) Hypercholesterolemia  Comment: Tolerating atorvastatin, continue  Plan: Comprehensive metabolic panel, CBC with         platelets, Lipid panel reflex to direct LDL         Fasting, atorvastatin (LIPITOR) 20 MG tablet    (E21.0) Primary hyperparathyroidism  Comment: Follows with endocrinology; on Fosamax.  Plan: Comprehensive metabolic panel, Vitamin D         Deficiency    (R82.994) Hypercalciuria  Comment: Follows with endocrinology  Plan: Comprehensive metabolic panel    (I10) Benign essential hypertension  Comment: Under adequate control on amlodipine and losartan, continue. Check labs for stability  Plan: Comprehensive metabolic panel, CBC with         platelets, amLODIPine (NORVASC) 5 MG tablet,         losartan (COZAAR) 50 MG tablet    (F51.04) Chronic insomnia  Comment: Tolerating trazodone well without SE, will refill  Plan: traZODone (DESYREL) 50 MG tablet    (H40.2230) Chronic angle-closure glaucoma of both eyes, unspecified glaucoma stage  Comment: Follows with ophthalmology; on brimonide and latanoprost    (R41.3) Memory difficulty  Comment: Has had some ongoing short & long term memory troubles. Mother  "w/ hx of Alzheimer's dementia and brother with Lewy Body dementia. Working with neurologist. Will help get scheduled with geriatrics provider within clinic  Plan: Geriatrics Referral    (M85.89) Osteopenia of multiple sites  Comment: Last DEXA in 2024, on Fosamax & vitamin D. Follows with endocrinology    (D12.6) Tubular adenoma of colon  Comment: Last colonoscopy in 2023, recommend repeat in 2026. 6 tubular adenoma polyps found    (K59.04) Chronic idiopathic constipation  Comment: Has bisacodyl PRN. Just started taking Metamucil gummies. Recommend Miralax daily, can decrease regimen as needed    (Z79.899) Medication management  Comment:   Plan: Med Therapy Management Referral       Patient has been advised of split billing requirements and indicates understanding: Yes        BMI  Estimated body mass index is 27.22 kg/m  as calculated from the following:    Height as of this encounter: 1.626 m (5' 4\").    Weight as of this encounter: 71.9 kg (158 lb 9.6 oz).       Counseling  Appropriate preventive services were addressed with this patient via screening, questionnaire, or discussion as appropriate for fall prevention, nutrition, physical activity, Tobacco-use cessation, social engagement, weight loss and cognition.  Checklist reviewing preventive services available has been given to the patient.  Reviewed patient's diet, addressing concerns and/or questions.   She is at risk for lack of exercise and has been provided with information to increase physical activity for the benefit of her well-being.   Discussed possible causes of fatigue. Updated plan of care.  Patient reported difficulty with activities of daily living were addressed today.Information on urinary incontinence and treatment options given to patient.       Follow-up    Follow-up Visit   Expected date:  Nov 06, 2025 (Approximate)      Follow Up Appointment Details:     Follow-up with whom?: Me    Follow-Up for what?: Chronic Disease f/u    Chronic " "Disease f/u:  Hyperlipidemia  Hypertension       How?: In Person                 Subjective   Fidelina is a 74 year old, presenting for the following:  Physical            Here for AWV, mentions a few additional concerns as well  Reports her memory has changed and now is noticing some new mild tremors/quivers in her hands.   Does see a neurologist, is considering further blood test to look into Alzheimer's diagnosis  Reports feeling \"tired\"  Sometimes feels \"sad\" that she can't do more because she's always been active  Hx of back pain, interested in trying a therapy in a heated swimming pool. Has tried injections, Gabapentin, and surgeries. Felt too drowsy on Gabapentin. Found injections helpful.               Advance Care Planning    Patient states has Health Care Directive and will send to [employer].        5/6/2025   General Health   How would you rate your overall physical health? (!) FAIR   Feel stress (tense, anxious, or unable to sleep) Not at all         5/6/2025   Nutrition   Diet: Regular (no restrictions)         5/6/2025   Exercise   Days per week of moderate/strenous exercise 2 days   (!) EXERCISE CONCERN      5/6/2025   Social Factors   Frequency of gathering with friends or relatives Twice a week   Worry food won't last until get money to buy more No   Food not last or not have enough money for food? No   Do you have housing? (Housing is defined as stable permanent housing and does not include staying outside in a car, in a tent, in an abandoned building, in an overnight shelter, or couch-surfing.) Yes   Are you worried about losing your housing? No   Lack of transportation? No   Unable to get utilities (heat,electricity)? No         5/6/2025   Fall Risk   Fallen 2 or more times in the past year? Yes   Trouble with walking or balance? Yes   Gait Speed Test (Document in seconds) 4.3    43   Gait Speed Test Interpretation Less than or equal to 5.00 seconds - PASS       Multiple values from one day are " sorted in reverse-chronological order          5/6/2025   Activities of Daily Living- Home Safety   Needs help with the following daily activites Housework   Safety concerns in the home None of the above         5/6/2025   Dental   Dentist two times every year? Yes         5/6/2025   Hearing Screening   Hearing concerns? None of the above         5/6/2025   Driving Risk Screening   Patient/family members have concerns about driving No         5/6/2025   General Alertness/Fatigue Screening   Have you been more tired than usual lately? (!) YES         5/6/2025   Urinary Incontinence Screening   Bothered by leaking urine in past 6 months Yes         Today's PHQ-2 Score:       5/6/2025    10:32 AM   PHQ-2 ( 1999 [employer])   Q1: Little interest or pleasure in doing things 1   Q2: Feeling down, depressed or hopeless 1   PHQ-2 Score 2    Q1: Little interest or pleasure in doing things Several days   Q2: Feeling down, depressed or hopeless Several days   PHQ-2 Score 2       Patient-reported           5/6/2025   Substance Use   Alcohol more than 3/day or more than 7/wk No   Do you have a current opioid prescription? No   How severe/bad is pain from 1 to 10? 3/10   Do you use any other substances recreationally? No     Social History     Tobacco Use    Smoking status: Never     Passive exposure: Past    Smokeless tobacco: Never   Vaping Use    Vaping status: Never Used   Substance Use Topics    Alcohol use: Yes     Alcohol/week: 0.0 standard drinks of alcohol     Comment: 0 -2 times a year    Drug use: No           12/18/2024   LAST FHS-7 RESULTS   1st degree relative breast or ovarian cancer No   Any relative bilateral breast cancer No   Any male have breast cancer No   Any ONE woman have BOTH breast AND ovarian cancer No   Any woman with breast cancer before 50yrs No   2 or more relatives with breast AND/OR ovarian cancer No   2 or more relatives with breast AND/OR bowel cancer No        Mammogram Screening - After age 74-  determine frequency with patient based on health status, life expectancy and patient goals    ASCVD Risk   The 10-year ASCVD risk score (Rosibel CHAO, et al., 2019) is: 20.1%    Values used to calculate the score:      Age: 74 years      Sex: Female      Is Non- : No      Diabetic: No      Tobacco smoker: No      Systolic Blood Pressure: 126 mmHg      Is BP treated: Yes      HDL Cholesterol: 84 mg/dL      Total Cholesterol: 153 mg/dL            Reviewed and updated as needed this visit by Provider   Tobacco   Meds  Problems  Med Hx  Surg Hx  Fam Hx  Soc Hx Sexual   Activity          Past Medical History:   Diagnosis Date    Acute bilateral low back pain with bilateral sciatica     Acute pain of right knee     Pain for months, worse since helping friend move.  Exam consistent with meniscal tear.  Check xray today, depending on results MRI.  Ice, NSAIDS for now.  ACE.    Amaurosis fugax     Arthritis     Fibromyalgia, ? Osteoarthritis    Back injury     Micro discectomy, approx date 1983    Benign neoplasm of ascending colon     Chondromalacia of patella, right     Severe, tri-compartmental on MRI 10/2017.  Referred to ortho.    Chronic left-sided headache     Negative MRI and CT of head 2018 and 2016  Normal labs TSH, CBC, CMP, ESR 12/2019.  See plan below in pt instructions.    Diarrhea, unspecified type     Diverticular disease of large intestine     Encounter for Medicare annual wellness exam     Up to date on mammogram and colon cancer screening.  Is due for shingles vaccine - do at pharmacy.    Glaucoma     Glaucoma suspect     Hiatal hernia     HTN (hypertension)     Hypercalciuria     Infection due to 2019 novel coronavirus 02/10/2024    x3    Insomnia 12/26/2012     Problem list name updated by automated process. Provider to review    Low bone density 2022    Overweight (BMI 25.0-29.9)     Polyp of colon     Prediabetes     Primary hyperparathyroidism     mild; borderline  hypercalciuria    Pseudophakia, both eyes     Reduced vision 09/2016    L vision block, bilateral elevated eye presures,poss. Glacom    Right foot pain     Sciatica     On and off - exacerbates when lifts heavy things  Bilaterally.    Spinal stenosis of lumbar region without neurogenic claudication     Uncomplicated asthma     Diagnosed as 2 yo    Weakness of left lower extremity      Past Surgical History:   Procedure Laterality Date     cataract extraction with intraocular lens implant Right 01/31/2019    BACK SURGERY  1984    micro-disc-ectomy    BREAST SURGERY  1984    L Breast bx    cataract extraction with intraocular lens Left 12/06/2018    CATARACT IOL, RT/LT Bilateral     CHOLECYSTECTOMY  1990    INJECT EPIDURAL TRANSFORAMINAL Bilateral 01/31/2024    Procedure: Bilateral L5-S1 Transforaminal epidural steroid injection;  Surgeon: Sarahy Tsang MD;  Location: [facility] OR    IRIDOTOMY/IRIDECTOMY LASER SURGERY Left 09/27/2016    IRIDOTOMY/IRIDECTOMY LASER SURGERY Right 10/18/2016    RI TRABECULOPLASTY BY LASER SURGERY      TUBAL LIGATION  1989    ZC STOMACH SURGERY PROCEDURE UNLISTED  03/1990    Cholecystectomy, 11/1989 tubal ligation     Lab work is in process  BP Readings from Last 3 Encounters:   05/06/25 126/66   03/27/25 131/77   03/23/25 137/75    Wt Readings from Last 3 Encounters:   05/06/25 71.9 kg (158 lb 9.6 oz)   03/27/25 73 kg (161 lb)   03/23/25 73.4 kg (161 lb 12.8 oz)                  Current providers sharing in care for this patient include:  Patient Care Team:  Destiny Shaikh DNP as PCP - General (Internal Medicine)  Yuriy Petersen MD as MD (Orthopedics)  Sun Person MD as Assigned PCP  Katina Sanchez, RN as Specialty Care Coordinator (Cardiology)  Indigo Eid MD as Assigned Endocrinology Provider  Demarco Dutta MD as MD (Internal Medicine)  Angie Beltran MD as MD (Allergy & Immunology)  Jim Bernardo MD as MD (Cardiovascular Disease)  Jim Bernardo MD as  "Assigned Heart and Vascular Provider  Apoorva Nelson PA-C as Physician Assistant (Physical Medicine and Rehabilitation)  Elier Corrigan MD as MD (Otolaryngology)  Apoorva Nelson PA-C as Assigned Neuroscience Provider  Bright Yousif MD as Assigned Musculoskeletal Provider  Katina Regan MD as MD (Surgery)  Elier Corrigan MD as Assigned Surgical Provider  Venkat Johnston MD as MD (Ophthalmology)    The following health maintenance items are reviewed in Epic and correct as of today:  Health Maintenance   Topic Date Due    ZOSTER IMMUNIZATION (2 of 3) 01/14/2011    COVID-19 Vaccine (6 - 2024-25 season) 09/20/2024    RSV VACCINE (1 - 1-dose 75+ series) Never done    Medicare Annual MTM Pharmacist Visit (once per calendar year)  Never done    ASTHMA ACTION PLAN  12/31/2030 (Originally 11/3/2020)    LIPID  06/21/2025    BMP  12/28/2025    ANNUAL REVIEW OF HM ORDERS  01/06/2026    ASTHMA CONTROL TEST  01/06/2026    MEDICARE ANNUAL WELLNESS VISIT  05/06/2026    A1C  05/06/2026    FALL RISK ASSESSMENT  05/06/2026    COLORECTAL CANCER SCREENING  10/17/2026    DEXA  11/07/2026    DIABETES SCREENING  05/06/2028    ADVANCE CARE PLANNING  05/06/2030    DTAP/TDAP/TD IMMUNIZATION (3 - Td or Tdap) 10/27/2031    HEPATITIS C SCREENING  Completed    PHQ-2 (once per calendar year)  Completed    INFLUENZA VACCINE  Completed    Pneumococcal Vaccine: 50+ Years  Completed    HPV IMMUNIZATION  Aged Out    MENINGITIS IMMUNIZATION  Aged Out    MAMMO SCREENING  Discontinued         Review of Systems  Constitutional, HEENT, cardiovascular, pulmonary, gi and gu systems are negative, except as otherwise noted.     Objective    Exam  /66   Pulse 76   Temp 98.2  F (36.8  C) (Oral)   Resp 16   Ht 1.626 m (5' 4\")   Wt 71.9 kg (158 lb 9.6 oz)   LMP  (LMP Unknown)   SpO2 98%   BMI 27.22 kg/m     Estimated body mass index is 27.22 kg/m  as calculated from the following:    Height as of this encounter: 1.626 m " "(5' 4\").    Weight as of this encounter: 71.9 kg (158 lb 9.6 oz).    Physical Exam  Vitals reviewed.   HENT:      Head: Normocephalic.   Eyes:      Pupils: Pupils are equal, round, and reactive to light.   Cardiovascular:      Rate and Rhythm: Normal rate and regular rhythm.      Pulses: Normal pulses.      Heart sounds: Normal heart sounds. No murmur heard.  Pulmonary:      Effort: Pulmonary effort is normal. No respiratory distress.      Breath sounds: Normal breath sounds. No wheezing, rhonchi or rales.   Chest:   Breasts:     Right: Normal.      Left: Normal.   Abdominal:      General: Bowel sounds are normal. There is no distension.      Palpations: Abdomen is soft. There is no mass.      Tenderness: There is no abdominal tenderness.   Musculoskeletal:         General: Normal range of motion.      Cervical back: Normal range of motion and neck supple.      Right lower leg: No edema.      Left lower leg: No edema.   Skin:     General: Skin is warm and dry.   Neurological:      Mental Status: She is alert and oriented to person, place, and time.   Psychiatric:         Mood and Affect: Mood normal.         Behavior: Behavior normal.                5/6/2025   Mini Cog   Clock Draw Score 2 Normal   3 Item Recall 3 objects recalled   Mini Cog Total Score 5            Signed Electronically by: Destiny Shaikh, MATY, APRN, CNP    "

## 2025-05-07 ENCOUNTER — PATIENT OUTREACH (OUTPATIENT)
Dept: GASTROENTEROLOGY | Facility: CLINIC | Age: 75
End: 2025-05-07
Payer: COMMERCIAL

## 2025-05-08 ENCOUNTER — TELEPHONE (OUTPATIENT)
Dept: FAMILY MEDICINE | Facility: CLINIC | Age: 75
End: 2025-05-08
Payer: COMMERCIAL

## 2025-05-08 ENCOUNTER — RESULTS FOLLOW-UP (OUTPATIENT)
Dept: FAMILY MEDICINE | Facility: CLINIC | Age: 75
End: 2025-05-08

## 2025-05-08 NOTE — RESULT ENCOUNTER NOTE
Hi, Fidelina!    Good news - your labs all look stable. Mildly elevated calcium levels but relatively similar from prior. Please ensure you are not getting too much calcium intake (goal of 1,200 mg/day through diet and/or supplement total). Vitamin D levels are ok but on the low side. Please increase from 1,000 units/day to 2,000 units/day. A1c is stable but still in prediabetic range, slightly up from last year. Please let me know if you have any specific questions or concerns.    Take care,  Destiny Shaikh DNP, APRN, CNP

## 2025-05-08 NOTE — TELEPHONE ENCOUNTER
MTM referral from: [facility] visit (referral by provider)    MTM referral outreach attempt #2 on May 8, 2025 at 1:37 PM      Outcome: Patient not reachable after several attempts, sent [employer] message    Use [facility] part d MAP  for the carrier/Plan on the flowsheet      MyChart Message Sent    ANDERS Urbina   [phone]

## 2025-05-14 ENCOUNTER — TELEPHONE (OUTPATIENT)
Dept: NEUROLOGY | Facility: CLINIC | Age: 75
End: 2025-05-14
Payer: COMMERCIAL

## 2025-05-14 DIAGNOSIS — R41.3 MEMORY CHANGE: Primary | ICD-10-CM

## 2025-05-14 NOTE — TELEPHONE ENCOUNTER
Confirmed with patient that she is wanting to go forward with testing that was recommended in MyChart. Patient is agreeable.     She is wanting a call back once lab is ordered.

## 2025-05-14 NOTE — TELEPHONE ENCOUNTER
[facility] Call Center    Phone Message    May a detailed message be left on voicemail: yes     Reason for Call: Other: Patient would like to do the Serum AD Detection Test Recommended by Dr. Feliz and would like a call back to discuss the next steps. Patient can be reached at .       Action Taken: Message routed to:  [facility]): neurology    Travel Screening: Not Applicable

## 2025-05-14 NOTE — TELEPHONE ENCOUNTER
Per Dr. Feliz's MyChart to patient:  Your Alzheimer's disease blood test came back within the low to intermediate risk range for Alzheimer's disease. Based on this test I believe Alzheimer's disease is unlikely. There is a more accurate test that we can do, which would give us a more definitive answer. This test is not covered by insurance and cost around $150. Let us know if you would like to do this.      Alternatively it would be reasonable to monitor your symptoms and consider more testing if you notice your memory is worsening.

## 2025-05-15 ENCOUNTER — LAB (OUTPATIENT)
Dept: LAB | Facility: CLINIC | Age: 75
End: 2025-05-15
Payer: COMMERCIAL

## 2025-05-15 DIAGNOSIS — R41.3 MEMORY CHANGE: ICD-10-CM

## 2025-05-15 LAB
LAB ORDER RESULT STATUS: NORMAL
Lab: NORMAL
PERFORMING LABORATORY: NORMAL
TEST NAME: NORMAL

## 2025-05-15 PROCEDURE — 84393 TAU PHOSPHORYLATED EA: CPT

## 2025-05-15 PROCEDURE — 36415 COLL VENOUS BLD VENIPUNCTURE: CPT

## 2025-05-16 LAB
IMMUNOLOGIST REVIEW: ABNORMAL
P-TAU217 SERPL IA-MCNC: 0.71 PG/ML

## 2025-05-22 ENCOUNTER — TELEPHONE (OUTPATIENT)
Dept: NEUROLOGY | Facility: CLINIC | Age: 75
End: 2025-05-22
Payer: COMMERCIAL

## 2025-05-22 NOTE — TELEPHONE ENCOUNTER
[facility] Task Note    TASK    Return Call  Date/time 5/22/2025 at 1:01 PM   Reason for call Dr. Feliz follow up       FOLLOW-UP      ADDITIONAL COMMENTS  Called and left message with man who answered the phone. Asked him to have Fidelina call us back, want to discuss Dr. Feliz's message.    Louisa Goetz LPN

## 2025-05-22 NOTE — TELEPHONE ENCOUNTER
Hi Team - can we offer patient a follow-up next week Thursday at same time as an EMG to review blood test result. If she would prefer virtual we could do that, otherwise in person is fine. Thanks!

## 2025-05-22 NOTE — TELEPHONE ENCOUNTER
Neuroscience Clinic Task Note    TASK    Return Call  Date/time 5/22/2025 at 1:27 PM   Reason for call Dr. Feliz appt follow up       FOLLOW-UP      ADDITIONAL COMMENTS  Patient called back and writer spoke with her. Scheduled in person appointment with Dr. Feliz for Thursday 5/29    Louisa Goetz LPN

## 2025-05-29 ENCOUNTER — OFFICE VISIT (OUTPATIENT)
Dept: NEUROLOGY | Facility: CLINIC | Age: 75
End: 2025-05-29
Payer: COMMERCIAL

## 2025-05-29 VITALS
HEIGHT: 64 IN | DIASTOLIC BLOOD PRESSURE: 76 MMHG | SYSTOLIC BLOOD PRESSURE: 128 MMHG | BODY MASS INDEX: 26.98 KG/M2 | HEART RATE: 76 BPM | WEIGHT: 158 LBS

## 2025-05-29 DIAGNOSIS — F02.80 ALZHEIMER'S DISEASE (H): Primary | ICD-10-CM

## 2025-05-29 DIAGNOSIS — Z81.8 FAMILY HISTORY OF DEMENTIA: ICD-10-CM

## 2025-05-29 DIAGNOSIS — G30.9 ALZHEIMER'S DISEASE (H): Primary | ICD-10-CM

## 2025-05-29 NOTE — LETTER
5/29/2025      Jennifer Jane  [address]  [facility] [address]      Dear Colleague,    Thank you for referring your patient, Jennifer Jane, to the [facility]UR[facility] [place]. Please see a copy of my visit note below.    [facility] Neurology Follow Up Visit    Jennifer Jane MRN# [id number]   Age: 74 year old YOB: 1950     Brief history of symptoms: The patient was initially seen in neurologic consultation on 3/17/2025 for evaluation of memory concerns. Please see the comprehensive neurologic consultation note from that date in the Epic records for details.          Assessment and Plan:   Assessment:  Jennifer Jane is a 74 year old female who presents today for follow-up of memory concerns. Memory concerns started in the last few years, potentially longer. MoCA recently was 22/30. She appears to be in state of mild cognitive impairment.  was recently elevated and I'm concerned this represents Alzheimer's disease. She is likely interested in anti-amyloid infusions. We will start with APOE testing. If this does not show APOE4 homozygous, we would then proceed with PET amyloid brain scan.      Plan:  - APOE genotyping     Follow up in Neurology clinic pending above     Carlitos Feliz MD   of Neurology  [facility]  ---------------------------------------------------------------------------------------------------------------------------           Interval History:   Patient returns to discuss results of recent testing. She reports continue short term memory concerns.       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, mild stage     Bilateral edema of lower extremity     Low bone density     Primary hyperparathyroidism     Hypercalciuria     Lumbar radiculopathy with sciatica     Neuroforaminal stenosis of lumbar spine     Chronic idiopathic constipation     PVC's (premature ventricular contractions)     Post-COVID chronic concentration deficit     Hoarseness or changing voice     Postmenopausal status     Multiple joint pain     Other fatigue     Past Medical History:   Diagnosis Date     Acute bilateral low back pain with bilateral sciatica 12/18/2023     Acute pain of right knee 10/4/2017    Pain for months, worse since helping friend move.  Exam consistent with meniscal tear.  Check xray today, depending on results MRI.  Ice, NSAIDS for now.  ACE.     Amaurosis fugax 9/13/2016     Arthritis     Fibromyalgia, ? Osteoarthritis     Back injury     Micro discectomy, approx date 1983     Benign neoplasm of ascending colon 10/19/2023     Chondromalacia of patella, right 10/10/2017    Severe, tri-compartmental on MRI 10/2017.  Referred to ortho.     Chronic left-sided headache 3/4/2020    Negative MRI and CT of head 2018 and 2016  Normal labs TSH, CBC, CMP, ESR 12/2019.  See plan below in pt instructions.     Diarrhea, unspecified type 12/2/2020     Diverticular disease of large intestine 10/19/2023     Encounter for Medicare annual wellness exam 12/2/2020    Up to date on mammogram and colon cancer screening.  Is due for shingles vaccine - do at pharmacy.     Glaucoma      Glaucoma suspect 9/13/2016     Hiatal hernia      HTN (hypertension)      Hypercalciuria 7/12/2023     Infection due to 2019 novel coronavirus 02/10/2024    x3     Insomnia 12/26/2012     Problem list name updated by automated process. Provider to review     Low bone density 2022     Overweight (BMI 25.0-29.9) 10/16/2016     Polyp of colon 10/17/2023      Prediabetes      Primary hyperparathyroidism     mild; borderline hypercalciuria     Pseudophakia, both eyes 2/1/2019     Reduced vision 09/2016    L vision block, bilateral elevated eye presures,poss. Glacom     Right foot pain 3/6/2018     Sciatica 12/26/2012    On and off - exacerbates when lifts heavy things  Bilaterally.     Spinal stenosis of lumbar region without neurogenic claudication 1/18/2024     Uncomplicated asthma     Diagnosed as 2 yo     Weakness of left lower extremity 12/18/2023        Past Surgical History:     Past Surgical History:   Procedure Laterality Date      cataract extraction with intraocular lens implant Right 01/31/2019     BACK SURGERY  1984    micro-disc-ectomy     BREAST SURGERY  1984    L Breast bx     cataract extraction with intraocular lens Left 12/06/2018     CATARACT IOL, RT/LT Bilateral      CHOLECYSTECTOMY  1990     INJECT EPIDURAL TRANSFORAMINAL Bilateral 01/31/2024    Procedure: Bilateral L5-S1 Transforaminal epidural steroid injection;  Surgeon: Sarahy Tsang MD;  Location: [facility] OR     IRIDOTOMY/IRIDECTOMY LASER SURGERY Left 09/27/2016     IRIDOTOMY/IRIDECTOMY LASER SURGERY Right 10/18/2016     [place] TRABECULOPLASTY BY LASER SURGERY       TUBAL LIGATION  1989     ZZC STOMACH SURGERY PROCEDURE UNLISTED  03/1990    Cholecystectomy, 11/1989 tubal ligation        Social History:     Social History     Tobacco Use     Smoking status: Never     Passive exposure: Past     Smokeless tobacco: Never   Vaping Use     Vaping status: Never Used   Substance Use Topics     Alcohol use: Yes     Alcohol/week: 0.0 standard drinks of alcohol     Comment: 0 -2 times a year     Drug use: No        Family History:     Family History   Problem Relation Age of Onset     Heart Disease Mother      Diabetes Mother      Coronary Artery Disease Mother      Hypertension Mother      Anesthesia Reaction Mother         Anaphylaxis, oral opioixd     Asthma Mother      Thyroid Disease Mother      Low Back  Problems Mother      Alzheimer Disease Mother      Heart Disease Father         CABG x2     Diabetes Father      Coronary Artery Disease Father      Hypertension Father      Hyperlipidemia Father      Low Back Problems Father      Diabetes Sister         insulin dependent     Hypertension Sister      Low Back Problems Sister      Thyroid Disease Sister      Hip fracture Sister      Low Back Problems Brother      Dementia Brother         Lewy Body     Asthma Maternal Grandmother      Coronary Artery Disease Maternal Grandfather      Cerebrovascular Disease Paternal Grandfather      Asthma Daughter      Genetic Disease Daughter         Kallman syndrome; no olfactory bulb;     Calcium Disorder No family hx of         Medications:     Current Outpatient Medications   Medication Sig Dispense Refill     acetaminophen (TYLENOL) 325 MG tablet Take 325-650 mg by mouth every 6 hours as needed for mild pain       acetylcysteine (N-ACETYL CYSTEINE) 600 MG CAPS capsule Take 1 capsule (600 mg) by mouth daily 90 capsule 4     ADVAIR -21 MCG/ACT inhaler Inhale 2 puffs into the lungs 2 times daily       albuterol (PROAIR HFA/PROVENTIL HFA/VENTOLIN HFA) 108 (90 Base) MCG/ACT inhaler INHALE 2 PUFFS BY MOUTH EVERY 6 HOURS AS NEEDED FOR SHORTNESS OF BREATH 18 g 5     alendronate (FOSAMAX) 70 MG tablet TAKE 1 TABLET BY MOUTH EVERY 7 DAYS TAKE 60 MINUTES BEFORE AM MEAL WITH 8 OZ. WATER. REMAIN UPRIGHT FOR 30 MINUTES. 12 tablet 3     amLODIPine (NORVASC) 5 MG tablet Take 1 tablet (5 mg) by mouth at bedtime. 90 tablet 3     aspirin 81 MG EC tablet Take 81 mg by mouth every 4 hours as needed for moderate pain.       atorvastatin (LIPITOR) 20 MG tablet Take 1 tablet (20 mg) by mouth daily. 90 tablet 3     azelastine (ASTELIN) 0.1 % nasal spray Spray 1 spray into both nostrils 2 times daily       bisacodyl (DULCOLAX) 5 MG EC tablet Take 5 mg by mouth daily as needed for constipation.       brimonidine (ALPHAGAN-P) 0.15 % ophthalmic  "solution PLACE 1 DROP INTO BOTH EYES TWO TIMES A DAY.       Calcium Carbonate Antacid (TUMS PO) Take  by mouth At Bedtime.       co-enzyme Q-10 100 MG CAPS capsule Take 1 capsule (100 mg) by mouth daily 90 capsule 4     fexofenadine (ALLEGRA) 180 MG tablet Take 180 mg by mouth daily.       latanoprost (XALATAN) 0.005 % ophthalmic solution Place 1 drop into the right eye At Bedtime       loratadine (CLARITIN) 10 MG tablet Take 10 mg by mouth daily.       losartan (COZAAR) 50 MG tablet Take 1 tablet (50 mg) by mouth 2 times daily. 180 tablet 3     melatonin 5 MG CAPS Take 1 capsule by mouth as needed.       montelukast (SINGULAIR) 10 MG tablet TAKE ONE TABLET BY MOUTH EVERY DAY AS DIRECTED  1     oxymetazoline (AFRIN) 0.05 % nasal spray 2 sprays as needed.       SPIRIVA RESPIMAT 1.25 MCG/ACT inhaler INHALE 2 PUFFS ONCE PER DAY       traZODone (DESYREL) 50 MG tablet Take 1 tablet (50 mg) by mouth at bedtime. 90 tablet 3     Vitamin D3 (CHOLECALCIFEROL) 25 mcg (1000 units) tablet Take by mouth daily       No current facility-administered medications for this visit.        Allergies:     Allergies   Allergen Reactions     Canola Oil [Vegetable Oil] Anaphylaxis and GI Disturbance     Animal Dander Unknown     Dust Mites Unknown     Grass      Hydroxyzine Other (See Comments)     Passed out         Hydroxyzine Hcl      Pollen Extract Unknown     Trees      Ragweeds      Chlorhexidine Itching and Rash     Hibiclens          Review of Systems:   As above     Physical Exam:   Vitals: /76 (BP Location: Right arm, Patient Position: Sitting, Cuff Size: Adult Regular)   Pulse 76   Ht 1.626 m (5' 4\")   Wt 71.7 kg (158 lb)   LMP  (LMP Unknown)   BMI 27.12 kg/m     General: Seated comfortably in no acute distress.  Neurologic:     Mental Status: Fully alert, attentive. Language normal, speech clear and fluent, no paraphasic errors.      Data reviewed on previous visits    Imaging:  MRI brain 1/2021  Impression: "   Unremarkable MRI of the brain.    Laboratory:  TSH/B12 normal    Pertinent Investigations since last visit:   MRI brain 4/2025  IMPRESSION:  1. Mild generalized parenchymal volume loss. No dementia specific pattern identified.  2. No acute intracranial process.     Amyloid 42/40 ratio - intermediate risk range   elevated      Again, thank you for allowing me to participate in the care of your patient.        Sincerely,        Carlitos Feliz MD    Electronically signed

## 2025-05-29 NOTE — PROGRESS NOTES
[facility] Neurology Follow Up Visit    Jennifer Jane MRN# [id number]   Age: 74 year old YOB: 1950     Brief history of symptoms: The patient was initially seen in neurologic consultation on 3/17/2025 for evaluation of memory concerns. Please see the comprehensive neurologic consultation note from that date in the Epic records for details.          Assessment and Plan:   Assessment:  Jennifer Jane is a 74 year old female who presents today for follow-up of memory concerns. Memory concerns started in the last few years, potentially longer. MoCA recently was 22/30. She appears to be in state of mild cognitive impairment.  was recently elevated and I'm concerned this represents Alzheimer's disease. She is likely interested in anti-amyloid infusions. We will start with APOE testing. If this does not show APOE4 homozygous, we would then proceed with PET amyloid brain scan.      Plan:  - APOE genotyping     Follow up in Neurology clinic pending above     Carlitos Feliz MD   of Neurology  [facility]  ---------------------------------------------------------------------------------------------------------------------------           Interval History:   Patient returns to discuss results of recent testing. She reports continue short term memory concerns.       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, mild stage    Bilateral edema of lower extremity    Low bone density    Primary hyperparathyroidism    Hypercalciuria    Lumbar radiculopathy with sciatica     Neuroforaminal stenosis of lumbar spine    Chronic idiopathic constipation    PVC's (premature ventricular contractions)    Post-COVID chronic concentration deficit    Hoarseness or changing voice    Postmenopausal status    Multiple joint pain    Other fatigue     Past Medical History:   Diagnosis Date    Acute bilateral low back pain with bilateral sciatica 12/18/2023    Acute pain of right knee 10/4/2017    Pain for months, worse since helping friend move.  Exam consistent with meniscal tear.  Check xray today, depending on results MRI.  Ice, NSAIDS for now.  ACE.    Amaurosis fugax 9/13/2016    Arthritis     Fibromyalgia, ? Osteoarthritis    Back injury     Micro discectomy, approx date 1983    Benign neoplasm of ascending colon 10/19/2023    Chondromalacia of patella, right 10/10/2017    Severe, tri-compartmental on MRI 10/2017.  Referred to ortho.    Chronic left-sided headache 3/4/2020    Negative MRI and CT of head 2018 and 2016  Normal labs TSH, CBC, CMP, ESR 12/2019.  See plan below in pt instructions.    Diarrhea, unspecified type 12/2/2020    Diverticular disease of large intestine 10/19/2023    Encounter for Medicare annual wellness exam 12/2/2020    Up to date on mammogram and colon cancer screening.  Is due for shingles vaccine - do at pharmacy.    Glaucoma     Glaucoma suspect 9/13/2016    Hiatal hernia     HTN (hypertension)     Hypercalciuria 7/12/2023    Infection due to 2019 novel coronavirus 02/10/2024    x3    Insomnia 12/26/2012     Problem list name updated by automated process. Provider to review    Low bone density 2022    Overweight (BMI 25.0-29.9) 10/16/2016    Polyp of colon 10/17/2023    Prediabetes     Primary hyperparathyroidism     mild; borderline hypercalciuria    Pseudophakia, both eyes 2/1/2019    Reduced vision 09/2016    L vision block, bilateral elevated eye presures,poss. Glacom    Right foot pain 3/6/2018    Sciatica 12/26/2012    On and off - exacerbates when lifts heavy  things  Bilaterally.    Spinal stenosis of lumbar region without neurogenic claudication 1/18/2024    Uncomplicated asthma     Diagnosed as 2 yo    Weakness of left lower extremity 12/18/2023        Past Surgical History:     Past Surgical History:   Procedure Laterality Date     cataract extraction with intraocular lens implant Right 01/31/2019    BACK SURGERY  1984    micro-disc-ectomy    BREAST SURGERY  1984    L Breast bx    cataract extraction with intraocular lens Left 12/06/2018    CATARACT IOL, RT/LT Bilateral     CHOLECYSTECTOMY  1990    INJECT EPIDURAL TRANSFORAMINAL Bilateral 01/31/2024    Procedure: Bilateral L5-S1 Transforaminal epidural steroid injection;  Surgeon: Sarahy Tsang MD;  Location: [facility] OR    IRIDOTOMY/IRIDECTOMY LASER SURGERY Left 09/27/2016    IRIDOTOMY/IRIDECTOMY LASER SURGERY Right 10/18/2016    [place] TRABECULOPLASTY BY LASER SURGERY      TUBAL LIGATION  1989    ZZC STOMACH SURGERY PROCEDURE UNLISTED  03/1990    Cholecystectomy, 11/1989 tubal ligation        Social History:     Social History     Tobacco Use    Smoking status: Never     Passive exposure: Past    Smokeless tobacco: Never   Vaping Use    Vaping status: Never Used   Substance Use Topics    Alcohol use: Yes     Alcohol/week: 0.0 standard drinks of alcohol     Comment: 0 -2 times a year    Drug use: No        Family History:     Family History   Problem Relation Age of Onset    Heart Disease Mother     Diabetes Mother     Coronary Artery Disease Mother     Hypertension Mother     Anesthesia Reaction Mother         Anaphylaxis, oral opioixd    Asthma Mother     Thyroid Disease Mother     Low Back Problems Mother     Alzheimer Disease Mother     Heart Disease Father         CABG x2    Diabetes Father     Coronary Artery Disease Father     Hypertension Father     Hyperlipidemia Father     Low Back Problems Father     Diabetes Sister         insulin dependent    Hypertension Sister     Low Back Problems Sister     Thyroid  Disease Sister     Hip fracture Sister     Low Back Problems Brother     Dementia Brother         Lewy Body    Asthma Maternal Grandmother     Coronary Artery Disease Maternal Grandfather     Cerebrovascular Disease Paternal Grandfather     Asthma Daughter     Genetic Disease Daughter         Kallman syndrome; no olfactory bulb;    Calcium Disorder No family hx of         Medications:     Current Outpatient Medications   Medication Sig Dispense Refill    acetaminophen (TYLENOL) 325 MG tablet Take 325-650 mg by mouth every 6 hours as needed for mild pain      acetylcysteine (N-ACETYL CYSTEINE) 600 MG CAPS capsule Take 1 capsule (600 mg) by mouth daily 90 capsule 4    ADVAIR -21 MCG/ACT inhaler Inhale 2 puffs into the lungs 2 times daily      albuterol (PROAIR HFA/PROVENTIL HFA/VENTOLIN HFA) 108 (90 Base) MCG/ACT inhaler INHALE 2 PUFFS BY MOUTH EVERY 6 HOURS AS NEEDED FOR SHORTNESS OF BREATH 18 g 5    alendronate (FOSAMAX) 70 MG tablet TAKE 1 TABLET BY MOUTH EVERY 7 DAYS TAKE 60 MINUTES BEFORE AM MEAL WITH 8 OZ. WATER. REMAIN UPRIGHT FOR 30 MINUTES. 12 tablet 3    amLODIPine (NORVASC) 5 MG tablet Take 1 tablet (5 mg) by mouth at bedtime. 90 tablet 3    aspirin 81 MG EC tablet Take 81 mg by mouth every 4 hours as needed for moderate pain.      atorvastatin (LIPITOR) 20 MG tablet Take 1 tablet (20 mg) by mouth daily. 90 tablet 3    azelastine (ASTELIN) 0.1 % nasal spray Spray 1 spray into both nostrils 2 times daily      bisacodyl (DULCOLAX) 5 MG EC tablet Take 5 mg by mouth daily as needed for constipation.      brimonidine (ALPHAGAN-P) 0.15 % ophthalmic solution PLACE 1 DROP INTO BOTH EYES TWO TIMES A DAY.      Calcium Carbonate Antacid (TUMS PO) Take  by mouth At Bedtime.      co-enzyme Q-10 100 MG CAPS capsule Take 1 capsule (100 mg) by mouth daily 90 capsule 4    fexofenadine (ALLEGRA) 180 MG tablet Take 180 mg by mouth daily.      latanoprost (XALATAN) 0.005 % ophthalmic solution Place 1 drop into the right  "eye At Bedtime      loratadine (CLARITIN) 10 MG tablet Take 10 mg by mouth daily.      losartan (COZAAR) 50 MG tablet Take 1 tablet (50 mg) by mouth 2 times daily. 180 tablet 3    melatonin 5 MG CAPS Take 1 capsule by mouth as needed.      montelukast (SINGULAIR) 10 MG tablet TAKE ONE TABLET BY MOUTH EVERY DAY AS DIRECTED  1    oxymetazoline (AFRIN) 0.05 % nasal spray 2 sprays as needed.      SPIRIVA RESPIMAT 1.25 MCG/ACT inhaler INHALE 2 PUFFS ONCE PER DAY      traZODone (DESYREL) 50 MG tablet Take 1 tablet (50 mg) by mouth at bedtime. 90 tablet 3    Vitamin D3 (CHOLECALCIFEROL) 25 mcg (1000 units) tablet Take by mouth daily       No current facility-administered medications for this visit.        Allergies:     Allergies   Allergen Reactions    Canola Oil [Vegetable Oil] Anaphylaxis and GI Disturbance    Animal Dander Unknown    Dust Mites Unknown    Grass     Hydroxyzine Other (See Comments)     Passed out        Hydroxyzine Hcl     Pollen Extract Unknown    Trees     Ragweeds     Chlorhexidine Itching and Rash     Hibiclens          Review of Systems:   As above     Physical Exam:   Vitals: /76 (BP Location: Right arm, Patient Position: Sitting, Cuff Size: Adult Regular)   Pulse 76   Ht 1.626 m (5' 4\")   Wt 71.7 kg (158 lb)   LMP  (LMP Unknown)   BMI 27.12 kg/m     General: Seated comfortably in no acute distress.  Neurologic:     Mental Status: Fully alert, attentive. Language normal, speech clear and fluent, no paraphasic errors.      Data reviewed on previous visits    Imaging:  MRI brain 1/2021  Impression:   Unremarkable MRI of the brain.    Laboratory:  TSH/B12 normal    Pertinent Investigations since last visit:   MRI brain 4/2025  IMPRESSION:  1. Mild generalized parenchymal volume loss. No dementia specific pattern identified.  2. No acute intracranial process.     Amyloid 42/40 ratio - intermediate risk range   elevated    "

## 2025-05-29 NOTE — PATIENT INSTRUCTIONS
The two available medications for slowing of Alzheimer's disease are lecanemab and donanemab    These medications are not cures, but have been shown to slow progression by about 30%.    The lecanemab is an infusion given every 2 weeks. After 18 months, the infusion switches to every 4 weeks. The risk of symptomatic brain bleeding/swelling with lecanemab is about 3% of patients.    The donanemab is an infusion given every 4 weeks. After 18 months, we may go on a drug holiday with the medication, duration of which is yet to be determine. The risk of symptomatic brain bleeding/swelling is about 6% of patients.     We also repeat MRI brain at certain intervals to make sure you are not have the bleeding side effects    We'll start with blood test today looking for the APOE4 mutation. As long as you don't have two copies of the mutation, you would likely be a candidate for the infusions.     We'll call with the blood test results. The next step after the blood test may be doing the Amyloid PET brain scan.

## 2025-05-29 NOTE — NURSING NOTE
"Jennifer Jane's goals for this visit include:   Chief Complaint   Patient presents with    RECHECK      follow up on blood test         She requests these members of her care team be copied on today's visit information: yes    PCP: Destiny Shaikh    Referring Provider:  Referred Self, MD  No address on file    /76 (BP Location: Right arm, Patient Position: Sitting, Cuff Size: Adult Regular)   Pulse 76   Ht 1.626 m (5' 4\")   Wt 71.7 kg (158 lb)   LMP  (LMP Unknown)   BMI 27.12 kg/m      Do you need any medication refills at today's visit? No  TRENTON Mahoney, CMA ([facility])      "

## 2025-06-02 LAB
APOE ALLELE E2+E3+E4 BLD/T: ABNORMAL
SPECIMEN SOURCE: ABNORMAL

## 2025-06-03 ENCOUNTER — MYC MEDICAL ADVICE (OUTPATIENT)
Dept: NEUROLOGY | Facility: CLINIC | Age: 75
End: 2025-06-03
Payer: COMMERCIAL

## 2025-06-03 DIAGNOSIS — F02.80 ALZHEIMER'S DISEASE (H): Primary | ICD-10-CM

## 2025-06-03 DIAGNOSIS — G30.9 ALZHEIMER'S DISEASE (H): Primary | ICD-10-CM

## 2025-06-13 NOTE — TELEPHONE ENCOUNTER
We can schedule a virtual visit at the same time as an EMG on a Thursday if nothing is available. Thanks!  Me to Carlitos Feliz MD [place]      6/13/25  4:30 PM  Hi Dr. Feliz      Pt's PET scan is scheduled for 7/9. Should we plan on her following up with you sometime after to discuss results/next steps? It doesn't look like you have any current openings in the weeks following the scan, but I can keep an eye out for cancellations.     Thank you,  Maylin

## 2025-07-08 ENCOUNTER — HOSPITAL ENCOUNTER (EMERGENCY)
Facility: CLINIC | Age: 75
Discharge: HOME | End: 2025-07-09
Attending: EMERGENCY MEDICINE
Payer: COMMERCIAL

## 2025-07-08 DIAGNOSIS — R07.9 CHEST PAIN, UNSPECIFIED TYPE: ICD-10-CM

## 2025-07-08 LAB
ANION GAP SERPL CALCULATED.3IONS-SCNC: 11 MMOL/L (ref 7–15)
ATRIAL RATE - MUSE: 81 BPM
BASOPHILS # BLD AUTO: 0 10E3/UL (ref 0–0.2)
BASOPHILS NFR BLD AUTO: 0 %
BUN SERPL-MCNC: 15.4 MG/DL (ref 8–23)
CALCIUM SERPL-MCNC: 10.4 MG/DL (ref 8.8–10.4)
CHLORIDE SERPL-SCNC: 104 MMOL/L (ref 98–107)
CREAT SERPL-MCNC: 0.73 MG/DL (ref 0.51–0.95)
DIASTOLIC BLOOD PRESSURE - MUSE: NORMAL MMHG
EGFRCR SERPLBLD CKD-EPI 2021: 85 ML/MIN/1.73M2
EOSINOPHIL # BLD AUTO: 0.2 10E3/UL (ref 0–0.7)
EOSINOPHIL NFR BLD AUTO: 2 %
ERYTHROCYTE [DISTWIDTH] IN BLOOD BY AUTOMATED COUNT: 12.9 % (ref 10–15)
GLUCOSE SERPL-MCNC: 165 MG/DL (ref 70–99)
HCO3 SERPL-SCNC: 25 MMOL/L (ref 22–29)
HCT VFR BLD AUTO: 41.3 % (ref 35–47)
HGB BLD-MCNC: 14.1 G/DL (ref 11.7–15.7)
HOLD SPECIMEN: NORMAL
IMM GRANULOCYTES # BLD: 0 10E3/UL
IMM GRANULOCYTES NFR BLD: 0 %
INTERPRETATION ECG - MUSE: NORMAL
LYMPHOCYTES # BLD AUTO: 2 10E3/UL (ref 0.8–5.3)
LYMPHOCYTES NFR BLD AUTO: 21 %
MCH RBC QN AUTO: 31.2 PG (ref 26.5–33)
MCHC RBC AUTO-ENTMCNC: 34.1 G/DL (ref 31.5–36.5)
MCV RBC AUTO: 91 FL (ref 78–100)
MONOCYTES # BLD AUTO: 0.5 10E3/UL (ref 0–1.3)
MONOCYTES NFR BLD AUTO: 5 %
NEUTROPHILS # BLD AUTO: 6.8 10E3/UL (ref 1.6–8.3)
NEUTROPHILS NFR BLD AUTO: 72 %
NRBC # BLD AUTO: 0 10E3/UL
NRBC BLD AUTO-RTO: 0 /100
P AXIS - MUSE: 54 DEGREES
PLATELET # BLD AUTO: 249 10E3/UL (ref 150–450)
POTASSIUM SERPL-SCNC: 4 MMOL/L (ref 3.4–5.3)
PR INTERVAL - MUSE: 152 MS
QRS DURATION - MUSE: 82 MS
QT - MUSE: 376 MS
QTC - MUSE: 436 MS
R AXIS - MUSE: -10 DEGREES
RBC # BLD AUTO: 4.52 10E6/UL (ref 3.8–5.2)
SODIUM SERPL-SCNC: 140 MMOL/L (ref 135–145)
SYSTOLIC BLOOD PRESSURE - MUSE: NORMAL MMHG
T AXIS - MUSE: 26 DEGREES
TROPONIN T SERPL HS-MCNC: 34 NG/L
VENTRICULAR RATE- MUSE: 81 BPM
WBC # BLD AUTO: 9.5 10E3/UL (ref 4–11)

## 2025-07-08 PROCEDURE — 84484 ASSAY OF TROPONIN QUANT: CPT | Performed by: EMERGENCY MEDICINE

## 2025-07-08 PROCEDURE — 93005 ELECTROCARDIOGRAM TRACING: CPT

## 2025-07-08 PROCEDURE — 99284 EMERGENCY DEPT VISIT MOD MDM: CPT | Performed by: EMERGENCY MEDICINE

## 2025-07-08 PROCEDURE — 85025 COMPLETE CBC W/AUTO DIFF WBC: CPT | Performed by: EMERGENCY MEDICINE

## 2025-07-08 PROCEDURE — 80048 BASIC METABOLIC PNL TOTAL CA: CPT | Performed by: EMERGENCY MEDICINE

## 2025-07-08 PROCEDURE — 36415 COLL VENOUS BLD VENIPUNCTURE: CPT | Performed by: EMERGENCY MEDICINE

## 2025-07-08 PROCEDURE — 85004 AUTOMATED DIFF WBC COUNT: CPT | Performed by: EMERGENCY MEDICINE

## 2025-07-08 ASSESSMENT — ACTIVITIES OF DAILY LIVING (ADL)
ADLS_ACUITY_SCORE: 41
ADLS_ACUITY_SCORE: 41

## 2025-07-09 VITALS
OXYGEN SATURATION: 96 % | BODY MASS INDEX: 25.55 KG/M2 | RESPIRATION RATE: 16 BRPM | SYSTOLIC BLOOD PRESSURE: 165 MMHG | HEART RATE: 80 BPM | DIASTOLIC BLOOD PRESSURE: 97 MMHG | TEMPERATURE: 98.4 F | WEIGHT: 159 LBS | HEIGHT: 66 IN

## 2025-07-09 LAB — TROPONIN T SERPL HS-MCNC: 32 NG/L

## 2025-07-09 PROCEDURE — 36415 COLL VENOUS BLD VENIPUNCTURE: CPT | Performed by: EMERGENCY MEDICINE

## 2025-07-09 PROCEDURE — 84484 ASSAY OF TROPONIN QUANT: CPT | Performed by: EMERGENCY MEDICINE

## 2025-07-09 ASSESSMENT — ACTIVITIES OF DAILY LIVING (ADL): ADLS_ACUITY_SCORE: 41

## 2025-07-09 NOTE — ED TRIAGE NOTES
BIBA for chest pain with nausea that has resolved. Pt took baby ASA at home. Pt now c/o headache. She reports she is having a scan for Alzhiemers tomorrow.     Triage Assessment (Adult)       Row Name 07/08/25 2116          Triage Assessment    Airway WDL WDL        Respiratory WDL    Respiratory WDL WDL        Skin Circulation/Temperature WDL    Skin Circulation/Temperature WDL WDL        Cardiac WDL    Cardiac WDL WDL  earlier had chest pain but has resolved        Peripheral/Neurovascular WDL    Peripheral Neurovascular WDL WDL        Cognitive/Neuro/Behavioral WDL    Cognitive/Neuro/Behavioral WDL X  c/o headache                      None

## 2025-07-09 NOTE — ED TRIAGE NOTES
Patient arrived via ems with c/o chest pain. She stated he pain started 40 minutes ago. Blood sugar 1300

## 2025-07-09 NOTE — ED PROVIDER NOTES
Emergency Department Note      History of Present Illness     Chief Complaint   Chest Pain       HPI   Jennifer Jane is a 74 year old female with a history of pre-diabetes, benign essential hypertension, primary hyperparathyroidism, and Hypercholesterolemia, who presents for evaluation of chest pain. Patient reports right after eating her dinner at around 2000, she started experiencing midline chest pain which she describes feels like a pressure. She notes that this feels very similar to how she felt the last time she came into the emergency department. She adds that the pain seems to go away on its own. She mentions that she is currently feeling good. She denies having abdominal pain, trouble breathing, a history of blood clots, or any recent travels. Patient occasionally experiences edema in her legs bilaterally though none right now.    Independent Historian   None    Review of External Notes   I reviewed patients stress test from December 2024, which was normal.     Past Medical History     Medical History and Problem List   Benign essential hypertension  Pre-diabetes  Primary hyperparathyroidism  Hypercholesterolemia  Hypercalciuria    Medications   acetaminophen (TYLENOL) 325 MG tablet  acetylcysteine (N-ACETYL CYSTEINE) 600 MG CAPS capsule  ADVAIR -21 MCG/ACT inhaler  albuterol (PROAIR HFA/PROVENTIL HFA/VENTOLIN HFA) 108 (90 Base) MCG/ACT inhaler  alendronate (FOSAMAX) 70 MG tablet  amLODIPine (NORVASC) 5 MG tablet  aspirin 81 MG EC tablet  atorvastatin (LIPITOR) 20 MG tablet  azelastine (ASTELIN) 0.1 % nasal spray  bisacodyl (DULCOLAX) 5 MG EC tablet  brimonidine (ALPHAGAN-P) 0.15 % ophthalmic solution  Calcium Carbonate Antacid (TUMS PO)  co-enzyme Q-10 100 MG CAPS capsule  fexofenadine (ALLEGRA) 180 MG tablet  latanoprost (XALATAN) 0.005 % ophthalmic solution  loratadine (CLARITIN) 10 MG tablet  losartan (COZAAR) 50 MG tablet  melatonin 5 MG CAPS  montelukast (SINGULAIR) 10 MG  "tablet  oxymetazoline (AFRIN) 0.05 % nasal spray  SPIRIVA RESPIMAT 1.25 MCG/ACT inhaler  traZODone (DESYREL) 50 MG tablet  Vitamin D3 (CHOLECALCIFEROL) 25 mcg (1000 units) tablet      Surgical History   Past Surgical History:   Procedure Laterality Date     cataract extraction with intraocular lens implant Right 01/31/2019    BACK SURGERY  1984    micro-disc-ectomy    BREAST SURGERY  1984    L Breast bx    cataract extraction with intraocular lens Left 12/06/2018    CATARACT IOL, RT/LT Bilateral     CHOLECYSTECTOMY  1990    INJECT EPIDURAL TRANSFORAMINAL Bilateral 01/31/2024    Procedure: Bilateral L5-S1 Transforaminal epidural steroid injection;  Surgeon: Sarahy Tsang MD;  Location: [facility] OR    IRIDOTOMY/IRIDECTOMY LASER SURGERY Left 09/27/2016    IRIDOTOMY/IRIDECTOMY LASER SURGERY Right 10/18/2016    MN TRABECULOPLASTY BY LASER SURGERY      TUBAL LIGATION  1989    ZC STOMACH SURGERY PROCEDURE UNLISTED  03/1990    Cholecystectomy, 11/1989 tubal ligation       Physical Exam     Patient Vitals for the past 24 hrs:   BP Temp Temp src Pulse Resp SpO2 Height Weight   07/09/25 0101 -- -- -- -- -- 96 % -- --   07/09/25 0100 (!) 165/97 -- -- 80 -- -- -- --   07/08/25 2120 -- -- -- -- -- -- 1.676 m (5' 6\") 72.1 kg (159 lb)   07/08/25 2118 (!) 161/79 98.4  F (36.9  C) Oral 89 16 97 % -- --     Physical Exam  General: Sitting up in bed  Eyes:  The pupils are equal and round    Conjunctivae and sclerae are normal  ENT:    Atraumatic face  Neck:  Normal range of motion  CV:  Regular rate,  regular rhythm     Skin warm and well perfused   Resp:  Non labored breathing on room air    No tachypnea    No cough heard    Lungs clear bilaterally  GI:  Abdomen is soft, there is no rigidity    No distension    No rebound tenderness     No abdominal tenderness  MS:  Normal muscular tone  Skin:  No rash or acute skin lesions noted  Neuro:   Awake, alert.      Speech is normal and fluent.    Face is symmetric.     Moves all extremities " equally  Psych: Normal affect.  Appropriate interactions.      Diagnostics     Lab Results   Labs Ordered and Resulted from Time of ED Arrival to Time of ED Departure   BASIC METABOLIC PANEL - Abnormal       Result Value    Sodium 140      Potassium 4.0      Chloride 104      Carbon Dioxide (CO2) 25      Anion Gap 11      Urea Nitrogen 15.4      Creatinine 0.73      GFR Estimate 85      Calcium 10.4      Glucose 165 (*)    TROPONIN T, HIGH SENSITIVITY - Abnormal    Troponin T, High Sensitivity 34 (*)    TROPONIN T, HIGH SENSITIVITY - Abnormal    Troponin T, High Sensitivity 32 (*)    CBC WITH PLATELETS AND DIFFERENTIAL    WBC Count 9.5      RBC Count 4.52      Hemoglobin 14.1      Hematocrit 41.3      MCV 91      MCH 31.2      MCHC 34.1      RDW 12.9      Platelet Count 249      % Neutrophils 72      % Lymphocytes 21      % Monocytes 5      % Eosinophils 2      % Basophils 0      % Immature Granulocytes 0      NRBCs per 100 WBC 0      Absolute Neutrophils 6.8      Absolute Lymphocytes 2.0      Absolute Monocytes 0.5      Absolute Eosinophils 0.2      Absolute Basophils 0.0      Absolute Immature Granulocytes 0.0      Absolute NRBCs 0.0       EKG   ECG results from 07/08/25   EKG 12 lead     Value    Systolic Blood Pressure     Diastolic Blood Pressure     Ventricular Rate 81    Atrial Rate 81    VT Interval 152    QRS Duration 82        QTc 436    P Axis 54    R AXIS -10    T Axis 26    Interpretation ECG      Sinus rhythm  Normal ECG  When compared with ECG of 28-Dec-2024 15:08,  Interpreted at 0122          Independent Interpretation   None    ED Course      Procedures   Procedures     Discussion of Management   None    ED Course   ED Course as of 07/09/25 0233   Wed Jul 09, 2025   0124 I evaluated the patient and obtained the history as noted above           Additional Documentation  None    Medical Decision Making / Diagnosis     [facility]   Jennifer Radha Jane is a 74 year old female who presented to the  emergency department with chest pain.  Patient with chest pain.  EKG shows no acute ischemic changes.  Troponin very mildly elevated but stable on second troponin.  Similar to what it was last time she is in the emergency department.  Has had normal stress test since that visit.  Patient declines chest x-ray as she wants to leave and go home.  Doubt PE or dissection.  Doubt PE with no tachycardia or hypoxia.  She also has no shortness of breath or significant risk factors for this.  Doubt dissection with no radiation to the back.  Patient is chest pain free on discharge.  Recommend follow-up with PCP.    Disposition   The patient was discharged.     Diagnosis     ICD-10-CM    1. Chest pain, unspecified type  R07.9          Scribe Disclosure:  ALVARO, Randy Adame, am serving as a scribe at 2:34 AM on 7/9/2025 to document services personally performed by Katina Burroughs MD* based on my observations and the provider's statements to me.        Katina Burroughs MD  07/09/25 0920

## 2025-07-24 ENCOUNTER — OFFICE VISIT (OUTPATIENT)
Dept: NEUROLOGY | Facility: CLINIC | Age: 75
End: 2025-07-24
Payer: COMMERCIAL

## 2025-07-24 ENCOUNTER — TELEPHONE (OUTPATIENT)
Dept: NEUROLOGY | Facility: CLINIC | Age: 75
End: 2025-07-24

## 2025-07-24 VITALS
HEART RATE: 72 BPM | WEIGHT: 161 LBS | BODY MASS INDEX: 25.99 KG/M2 | SYSTOLIC BLOOD PRESSURE: 150 MMHG | DIASTOLIC BLOOD PRESSURE: 79 MMHG

## 2025-07-24 DIAGNOSIS — G31.84 MILD COGNITIVE IMPAIRMENT: ICD-10-CM

## 2025-07-24 DIAGNOSIS — F02.80 ALZHEIMER'S DISEASE (H): Primary | ICD-10-CM

## 2025-07-24 DIAGNOSIS — G30.9 ALZHEIMER'S DISEASE (H): Primary | ICD-10-CM

## 2025-07-24 RX ORDER — HEPARIN SODIUM,PORCINE 10 UNIT/ML
5-20 VIAL (ML) INTRAVENOUS DAILY PRN
OUTPATIENT
Start: 2025-07-31

## 2025-07-24 RX ORDER — HEPARIN SODIUM (PORCINE) LOCK FLUSH IV SOLN 100 UNIT/ML 100 UNIT/ML
5 SOLUTION INTRAVENOUS
OUTPATIENT
Start: 2025-07-31

## 2025-07-24 RX ORDER — ALBUTEROL SULFATE 0.83 MG/ML
2.5 SOLUTION RESPIRATORY (INHALATION)
OUTPATIENT
Start: 2025-07-31

## 2025-07-24 RX ORDER — ACETAMINOPHEN 325 MG/1
650 TABLET ORAL
OUTPATIENT
Start: 2025-07-31

## 2025-07-24 RX ORDER — EPINEPHRINE 1 MG/ML
0.3 INJECTION, SOLUTION INTRAMUSCULAR; SUBCUTANEOUS EVERY 5 MIN PRN
OUTPATIENT
Start: 2025-07-31

## 2025-07-24 RX ORDER — DIPHENHYDRAMINE HYDROCHLORIDE 50 MG/ML
50 INJECTION, SOLUTION INTRAMUSCULAR; INTRAVENOUS
Start: 2025-07-31

## 2025-07-24 RX ORDER — ALBUTEROL SULFATE 90 UG/1
1-2 INHALANT RESPIRATORY (INHALATION)
Start: 2025-07-31

## 2025-07-24 RX ORDER — METHYLPREDNISOLONE SODIUM SUCCINATE 40 MG/ML
40 INJECTION INTRAMUSCULAR; INTRAVENOUS
Start: 2025-07-31

## 2025-07-24 RX ORDER — DIPHENHYDRAMINE HYDROCHLORIDE 50 MG/ML
25 INJECTION, SOLUTION INTRAMUSCULAR; INTRAVENOUS
Start: 2025-07-31

## 2025-07-24 RX ORDER — DIPHENHYDRAMINE HCL 25 MG
25 CAPSULE ORAL
OUTPATIENT
Start: 2025-07-31

## 2025-07-24 RX ORDER — DONEPEZIL HYDROCHLORIDE 5 MG/1
5 TABLET, FILM COATED ORAL AT BEDTIME
Qty: 30 TABLET | Refills: 11 | Status: SHIPPED | OUTPATIENT
Start: 2025-07-24

## 2025-07-24 RX ORDER — MEPERIDINE HYDROCHLORIDE 25 MG/ML
25 INJECTION INTRAMUSCULAR; INTRAVENOUS; SUBCUTANEOUS
OUTPATIENT
Start: 2025-07-31

## 2025-07-24 NOTE — TELEPHONE ENCOUNTER
RN called Fidelina to do FAQ for donanemab infusion. FAQ score 1.    Ángela Manzo RN, BSN  [facility] Neurology

## 2025-07-24 NOTE — PROGRESS NOTES
[facility] Neurology Follow Up Visit    Jennifer Jane MRN# [id number]   Age: 74 year old YOB: 1950     Brief history of symptoms: The patient was initially seen in neurologic consultation on 3/17/2025 for evaluation of memory concerns. Please see the comprehensive neurologic consultation note from that date in the Epic records for details.          Assessment and Plan:   Assessment:  Jennifer Jane is a 74 year old female who presents today for follow-up of mild cognitive impairment due to Alzheimer's disease. MoCA recently was 22/30. Amyloid PET brain was recently positive. She would like to proceed with donanemab treatment. MRI brain does not show a contraindication to proceeding with donanemab and APOE genotype is e3/e4. We decreased the potential risks of donanemab including brain bleeding and brain swelling. She is interested in starting donepezil.      Plan:  - Start donepezil 5 mg nightly   - Donanemab infusion therapy plan (dose 1 - 350 mg, dose 2 - 700 mg, dose 3 - 1050 mg, dose 4+ 1400 mg)     Follow up in Neurology clinic in 6 months or sooner if new issues arise     Carlitos Feliz MD   of Neurology  [facility]  ---------------------------------------------------------------------------------------------------------------------------           Interval History:   Patient returns today to discuss results of PET scan and to discuss anti-amyloid infusion therapies      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, mild stage    Bilateral edema of lower  extremity    Low bone density    Primary hyperparathyroidism    Hypercalciuria    Lumbar radiculopathy with sciatica    Neuroforaminal stenosis of lumbar spine    Chronic idiopathic constipation    PVC's (premature ventricular contractions)    Post-COVID chronic concentration deficit    Hoarseness or changing voice    Postmenopausal status    Multiple joint pain    Other fatigue     Past Medical History:   Diagnosis Date    Acute bilateral low back pain with bilateral sciatica 12/18/2023    Acute pain of right knee 10/4/2017    Pain for months, worse since helping friend move.  Exam consistent with meniscal tear.  Check xray today, depending on results MRI.  Ice, NSAIDS for now.  ACE.    Amaurosis fugax 9/13/2016    Arthritis     Fibromyalgia, ? Osteoarthritis    Back injury     Micro discectomy, approx date 1983    Benign neoplasm of ascending colon 10/19/2023    Chondromalacia of patella, right 10/10/2017    Severe, tri-compartmental on MRI 10/2017.  Referred to ortho.    Chronic left-sided headache 3/4/2020    Negative MRI and CT of head 2018 and 2016  Normal labs TSH, CBC, CMP, ESR 12/2019.  See plan below in pt instructions.    Diarrhea, unspecified type 12/2/2020    Diverticular disease of large intestine 10/19/2023    Encounter for Medicare annual wellness exam 12/2/2020    Up to date on mammogram and colon cancer screening.  Is due for shingles vaccine - do at pharmacy.    Glaucoma     Glaucoma suspect 9/13/2016    Hiatal hernia     HTN (hypertension)     Hypercalciuria 7/12/2023    Infection due to 2019 novel coronavirus 02/10/2024    x3    Insomnia 12/26/2012     Problem list name updated by automated process. Provider to review    Low bone density 2022    Overweight (BMI 25.0-29.9) 10/16/2016    Polyp of colon 10/17/2023    Prediabetes     Primary hyperparathyroidism     mild; borderline hypercalciuria    Pseudophakia, both eyes 2/1/2019    Reduced vision 09/2016    L vision block, bilateral elevated eye  yamilex hernandez. Glacom    Right foot pain 3/6/2018    Sciatica 12/26/2012    On and off - exacerbates when lifts heavy things  Bilaterally.    Spinal stenosis of lumbar region without neurogenic claudication 1/18/2024    Uncomplicated asthma     Diagnosed as 2 yo    Weakness of left lower extremity 12/18/2023        Past Surgical History:     Past Surgical History:   Procedure Laterality Date     cataract extraction with intraocular lens implant Right 01/31/2019    BACK SURGERY  1984    micro-disc-ectomy    BREAST SURGERY  1984    L Breast bx    cataract extraction with intraocular lens Left 12/06/2018    CATARACT IOL, RT/LT Bilateral     CHOLECYSTECTOMY  1990    INJECT EPIDURAL TRANSFORAMINAL Bilateral 01/31/2024    Procedure: Bilateral L5-S1 Transforaminal epidural steroid injection;  Surgeon: Sarahy Tsang MD;  Location: [facility] OR    IRIDOTOMY/IRIDECTOMY LASER SURGERY Left 09/27/2016    IRIDOTOMY/IRIDECTOMY LASER SURGERY Right 10/18/2016    MI TRABECULOPLASTY BY LASER SURGERY      TUBAL LIGATION  1989    ZZC STOMACH SURGERY PROCEDURE UNLISTED  03/1990    Cholecystectomy, 11/1989 tubal ligation        Social History:     Social History     Tobacco Use    Smoking status: Never     Passive exposure: Past    Smokeless tobacco: Never   Vaping Use    Vaping status: Never Used   Substance Use Topics    Alcohol use: Yes     Alcohol/week: 0.0 standard drinks of alcohol     Comment: 0 -2 times a year    Drug use: No        Family History:     Family History   Problem Relation Age of Onset    Heart Disease Mother     Diabetes Mother     Coronary Artery Disease Mother     Hypertension Mother     Anesthesia Reaction Mother         Anaphylaxis, oral opioixd    Asthma Mother     Thyroid Disease Mother     Low Back Problems Mother     Alzheimer Disease Mother     Heart Disease Father         CABG x2    Diabetes Father     Coronary Artery Disease Father     Hypertension Father     Hyperlipidemia Father     Low Back Problems  Father     Diabetes Sister         insulin dependent    Hypertension Sister     Low Back Problems Sister     Thyroid Disease Sister     Hip fracture Sister     Low Back Problems Brother     Dementia Brother         Lewy Body    Asthma Maternal Grandmother     Coronary Artery Disease Maternal Grandfather     Cerebrovascular Disease Paternal Grandfather     Asthma Daughter     Genetic Disease Daughter         Kallman syndrome; no olfactory bulb;    Calcium Disorder No family hx of         Medications:     Current Outpatient Medications   Medication Sig Dispense Refill    acetaminophen (TYLENOL) 325 MG tablet Take 325-650 mg by mouth every 6 hours as needed for mild pain      acetylcysteine (N-ACETYL CYSTEINE) 600 MG CAPS capsule Take 1 capsule (600 mg) by mouth daily 90 capsule 4    ADVAIR -21 MCG/ACT inhaler Inhale 2 puffs into the lungs 2 times daily      albuterol (PROAIR HFA/PROVENTIL HFA/VENTOLIN HFA) 108 (90 Base) MCG/ACT inhaler INHALE 2 PUFFS BY MOUTH EVERY 6 HOURS AS NEEDED FOR SHORTNESS OF BREATH 18 g 5    alendronate (FOSAMAX) 70 MG tablet TAKE 1 TABLET BY MOUTH EVERY 7 DAYS TAKE 60 MINUTES BEFORE AM MEAL WITH 8 OZ. WATER. REMAIN UPRIGHT FOR 30 MINUTES. 12 tablet 3    amLODIPine (NORVASC) 5 MG tablet Take 1 tablet (5 mg) by mouth at bedtime. 90 tablet 3    aspirin 81 MG EC tablet Take 81 mg by mouth every 4 hours as needed for moderate pain.      atorvastatin (LIPITOR) 20 MG tablet Take 1 tablet (20 mg) by mouth daily. 90 tablet 3    azelastine (ASTELIN) 0.1 % nasal spray Spray 1 spray into both nostrils 2 times daily      bisacodyl (DULCOLAX) 5 MG EC tablet Take 5 mg by mouth daily as needed for constipation.      brimonidine (ALPHAGAN-P) 0.15 % ophthalmic solution PLACE 1 DROP INTO BOTH EYES TWO TIMES A DAY.      Calcium Carbonate Antacid (TUMS PO) Take  by mouth At Bedtime.      co-enzyme Q-10 100 MG CAPS capsule Take 1 capsule (100 mg) by mouth daily 90 capsule 4    fexofenadine (ALLEGRA) 180 MG  tablet Take 180 mg by mouth daily.      latanoprost (XALATAN) 0.005 % ophthalmic solution Place 1 drop into the right eye At Bedtime      loratadine (CLARITIN) 10 MG tablet Take 10 mg by mouth daily.      losartan (COZAAR) 50 MG tablet Take 1 tablet (50 mg) by mouth 2 times daily. 180 tablet 3    melatonin 5 MG CAPS Take 1 capsule by mouth as needed.      montelukast (SINGULAIR) 10 MG tablet TAKE ONE TABLET BY MOUTH EVERY DAY AS DIRECTED  1    oxymetazoline (AFRIN) 0.05 % nasal spray 2 sprays as needed.      SPIRIVA RESPIMAT 1.25 MCG/ACT inhaler INHALE 2 PUFFS ONCE PER DAY      traZODone (DESYREL) 50 MG tablet Take 1 tablet (50 mg) by mouth at bedtime. 90 tablet 3    Vitamin D3 (CHOLECALCIFEROL) 25 mcg (1000 units) tablet Take by mouth daily       No current facility-administered medications for this visit.        Allergies:     Allergies   Allergen Reactions    Canola Oil [Vegetable Oil] Anaphylaxis and GI Disturbance    Animal Dander Unknown    Dust Mites Unknown    Grass     Hydroxyzine Other (See Comments)     Passed out        Hydroxyzine Hcl     Pollen Extract Unknown    Trees     Ragweeds     Chlorhexidine Itching and Rash     Hibiclens          Review of Systems:   As above     Physical Exam:   Vitals: BP (!) 150/79 (BP Location: Right arm, Patient Position: Sitting, Cuff Size: Adult Regular)   Pulse 72   Wt 73 kg (161 lb)   LMP  (LMP Unknown)   BMI 25.99 kg/m     General: Seated comfortably in no acute distress.  Neurologic:     Mental Status: Fully alert, attentive. Language normal, speech clear and fluent, no paraphasic errors.      Data reviewed on previous visits    Imaging:  MRI brain 1/2021  Impression:   Unremarkable MRI of the brain.    Laboratory:  TSH/B12 normal    MRI brain 4/2025  IMPRESSION:  1. Mild generalized parenchymal volume loss. No dementia specific pattern identified.  2. No acute intracranial process.     Amyloid 42/40 ratio - intermediate risk range   elevated    Pertinent  Investigations since last visit:   APOE - e3/e4    Amyloid PET brain  Impression:   Positive scan with moderate amyloid deposition.     The longitudinal plan of care for the diagnosis(es)/condition(s) as documented were addressed during this visit. Due to the added complexity in care, I will continue to support Fidelina in the subsequent management and with ongoing continuity of care.

## 2025-07-24 NOTE — PATIENT INSTRUCTIONS
Important Information about Kisunla     What is Kisunla?  Kisunla (generic name: donanemab) is a medicine approved by the FDA in 2024 for the treatment of early Alzheimer's disease. It is an anti-amyloid monoclonal antibody.     What does Kisunla do?  Kisunla removes abnormal amyloid-beta proteins in the brain, which are thought to be one of the main causes of Alzheimer's disease.    Kisunla is not a cure, and patients on Kisunla still continue to have progressive worsening of the disease. Kisunla is able to slow the progression of Alzheimer's disease by about 25-35% after 18 months of once-a-month infusion treatments. It is unclear what the long-term effects of Kisunla are beyond 18 months. Research is ongoing to learn if treatment for longer than 18 months is beneficial.      How is Kisunla administered?  Kisunla is infused at an infusion center over 30 minutes into a vein, but for the initial visits, patients need to stay seated at the infusion center for 2-3 hours due to the possible risk of infusion reaction. Nine percent of patients had an infusion reaction with Kisunla.     Kisunla is given every month by nurses at the [facility]. Appointments may be 4 hours or longer to complete all parts of the visit. Kisunla is most effective when given as closely as possible to the once monthly schedule. Early data suggests that Kisunla can be stopped for several years after the drug clears out amyloid plaques from the brain, which can be assessed with an amyloid-PET scan, a type of neuroimaging that involves a small amount of radioactivity. This  drug holiday  from Kisunla is one of the attractive parts of this medication compared to other anti-amyloid monoclonal antibodies. Once a patient enters the moderate dementia stage (e.g. requires 24/7 supervision), they no longer benefit from Kisunla and this drug will be discontinued.    Does treatment with Kisunla have side effects?  The most common  reactions to Kisunla occur during the infusion. These may include:  - Fever   - Flu-like symptoms  - Nausea and vomiting   - Dizziness or lightheadedness  - Difficulty breathing or shortness of breath   - Blood pressure decrease or increase    Infusion reactions happen in approximately 9% of patients. Depending on the reaction, the infusion may be slowed down or stopped. Medication such as antihistamines, steroids, and ibuprofen may be given by the nurse to treat these symptoms.    What is ARIA (amyloid-related imaging abnormalities)?  There are two changes that may occur in the brain over time while on Kisunla: swelling in parts of the brain, and brain bleeding. These changes are respectively termed Amyloid Related Imaging Abnormalities-Edema and Amyloid Related Imaging Abnormalities-Hemorrhage (ARIA-E and ARIA-H). ARIA-E and ARIA-H are usually too small for a patient to notice them, and are usually only discovered because of safety MRI scans that are used for early detection of ARIA-E or ARIA-H.      Ninety percent of ARIA occurs in the first 6 months of treatment. Overall, 36% of individuals in clinical trials had brain swelling and/or bleeding, but only 6% of patients had symptoms due to brain bleeding or swelling. One and a half percent of patients had severe symptoms that required immediate emergency or ICU care. Less than half a percent (0.4%) of participants  because of the drug in the clinical trial.    Not everyone is at equal risk for ARIA. Individuals with one or two copies of a gene called ApoE4 are at greatest risk. For people with two copies of APOE4, Kisunla does more harm than good.    An MRI scan will be done no earlier than 1 year prior to the 1st infusion. Subsequent MRIs are required prior to the 2nd, 3rd, 4th and 7th infusions to look for brain swelling and bleeding. If necessary, treatment with Kisunla may be paused or discontinued depending on the severity of any changes.        These are  symptoms of ARIA to watch for:    Most frequent   Headaches, confusion, dizziness, nausea  Less frequent    Vision changes, trouble walking                  Rare   Seizure, stupor, stroke       Stroke symptoms include:    - sudden change in balance, vision, or speech   - sudden one-sided weakness of face, arm, or leg      What do I need to do while on Kisunla?    Please answer your phone when we call:   - We need to contact you within 24 hours after each of your first three infusions to see how you are doing    For your safety, below are conditions for when you must have a  or public transportation home (the Infusion Center staff will not administer Kisunla if a ride home is not available):   - Following each of your first three infusion appointments  - Following future appointments if you ever have a reaction during a Kisunla infusion    Contact the Infusion Center, as soon as possible:  - If you are unable to keep your Kisunla treatment appointment    Contact your Neurologist or the Neurology Clinic, as soon as possible:  - If you visited the ED due to possible symptoms of ARIA or stroke  - If you are prescribed medication that may affect how your blood clots  - If you have any change in your medical condition that involves bleeding, clotting,     low platelets, blood cancer, or liver disease    If any symptoms of ARIA occur:  - Go to the Emergency Department (ED)    - Make sure to let them know that you should not be given clot busting medications for stroke, because it can cause fatal brain bleeding for patients taking Kisunla  - Tell the ED physicians that you are taking Kisunla  - Request that a 3T Brain MRI with SWI imaging be performed     to check for brain swelling or bleeding (not a CT scan)  - Ask the ED staff to send a notification of your visit to your Neurologist           Important Contacts        [phone]   Neurology Clinic                                  [facility]        [phone]   Infusion Center                                  [facility]                 [facility]  January 2025

## 2025-07-24 NOTE — LETTER
7/24/2025      Jennifer Jane  [address]  [facility] [address]      Dear Colleague,    Thank you for referring your patient, Jennifer Jane, to the [facility] [place]. Please see a copy of my visit note below.    [facility] Neurology Follow Up Visit    Jennifer Jane MRN# [id number]   Age: 74 year old YOB: 1950     Brief history of symptoms: The patient was initially seen in neurologic consultation on 3/17/2025 for evaluation of memory concerns. Please see the comprehensive neurologic consultation note from that date in the Epic records for details.          Assessment and Plan:   Assessment:  Jennifer Jane is a 74 year old female who presents today for follow-up of mild cognitive impairment due to Alzheimer's disease. MoCA recently was 22/30. Amyloid PET brain was recently positive. She would like to proceed with donanemab treatment. MRI brain does not show a contraindication to proceeding with donanemab and APOE genotype is e3/e4. We decreased the potential risks of donanemab including brain bleeding and brain swelling. She is interested in starting donepezil.      Plan:  - Start donepezil 5 mg nightly   - Donanemab infusion therapy plan (dose 1 - 350 mg, dose 2 - 700 mg, dose 3 - 1050 mg, dose 4+ 1400 mg)     Follow up in Neurology clinic in 6 months or sooner if new issues arise     Carlitos Feliz MD   of Neurology  [facility]  ---------------------------------------------------------------------------------------------------------------------------           Interval History:   Patient returns today to discuss results of PET scan and to discuss anti-amyloid infusion therapies      Past Medical History:     Patient Active Problem List   Diagnosis     Pre-diabetes     Chronic rhinitis     Chronic dacryocystitis     Chronic insomnia     Moderate persistent asthma with allergic rhinitis without complication      Fibromyalgia     Chronic angle-closure glaucoma     Hypercholesterolemia     Benign essential hypertension     Cystocele, midline     Chronic pain of right knee     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, mild stage     Bilateral edema of lower extremity     Low bone density     Primary hyperparathyroidism     Hypercalciuria     Lumbar radiculopathy with sciatica     Neuroforaminal stenosis of lumbar spine     Chronic idiopathic constipation     PVC's (premature ventricular contractions)     Post-COVID chronic concentration deficit     Hoarseness or changing voice     Postmenopausal status     Multiple joint pain     Other fatigue     Past Medical History:   Diagnosis Date     Acute bilateral low back pain with bilateral sciatica 12/18/2023     Acute pain of right knee 10/4/2017    Pain for months, worse since helping friend move.  Exam consistent with meniscal tear.  Check xray today, depending on results MRI.  Ice, NSAIDS for now.  ACE.     Amaurosis fugax 9/13/2016     Arthritis     Fibromyalgia, ? Osteoarthritis     Back injury     Micro discectomy, approx date 1983     Benign neoplasm of ascending colon 10/19/2023     Chondromalacia of patella, right 10/10/2017    Severe, tri-compartmental on MRI 10/2017.  Referred to ortho.     Chronic left-sided headache 3/4/2020    Negative MRI and CT of head 2018 and 2016  Normal labs TSH, CBC, CMP, ESR 12/2019.  See plan below in pt instructions.     Diarrhea, unspecified type 12/2/2020     Diverticular disease of large intestine 10/19/2023     Encounter for Medicare annual wellness exam 12/2/2020    Up to date on mammogram and colon cancer screening.  Is due for shingles vaccine - do at pharmacy.     Glaucoma      Glaucoma suspect 9/13/2016     Hiatal hernia      HTN (hypertension)      Hypercalciuria 7/12/2023     Infection due to 2019 novel coronavirus 02/10/2024    x3     Insomnia 12/26/2012     Problem list name updated by automated process.  Provider to review     Low bone density 2022     Overweight (BMI 25.0-29.9) 10/16/2016     Polyp of colon 10/17/2023     Prediabetes      Primary hyperparathyroidism     mild; borderline hypercalciuria     Pseudophakia, both eyes 2/1/2019     Reduced vision 09/2016    L vision block, bilateral elevated eye presures,poss. Glacom     Right foot pain 3/6/2018     Sciatica 12/26/2012    On and off - exacerbates when lifts heavy things  Bilaterally.     Spinal stenosis of lumbar region without neurogenic claudication 1/18/2024     Uncomplicated asthma     Diagnosed as 1 yo     Weakness of left lower extremity 12/18/2023        Past Surgical History:     Past Surgical History:   Procedure Laterality Date      cataract extraction with intraocular lens implant Right 01/31/2019     BACK SURGERY  1984    micro-disc-ectomy     BREAST SURGERY  1984    L Breast bx     cataract extraction with intraocular lens Left 12/06/2018     CATARACT IOL, RT/LT Bilateral      CHOLECYSTECTOMY  1990     INJECT EPIDURAL TRANSFORAMINAL Bilateral 01/31/2024    Procedure: Bilateral L5-S1 Transforaminal epidural steroid injection;  Surgeon: Sarahy Tsang MD;  Location: [facility] OR     IRIDOTOMY/IRIDECTOMY LASER SURGERY Left 09/27/2016     IRIDOTOMY/IRIDECTOMY LASER SURGERY Right 10/18/2016     KS TRABECULOPLASTY BY LASER SURGERY       TUBAL LIGATION  1989     ZC STOMACH SURGERY PROCEDURE UNLISTED  03/1990    Cholecystectomy, 11/1989 tubal ligation        Social History:     Social History     Tobacco Use     Smoking status: Never     Passive exposure: Past     Smokeless tobacco: Never   Vaping Use     Vaping status: Never Used   Substance Use Topics     Alcohol use: Yes     Alcohol/week: 0.0 standard drinks of alcohol     Comment: 0 -2 times a year     Drug use: No        Family History:     Family History   Problem Relation Age of Onset     Heart Disease Mother      Diabetes Mother      Coronary Artery Disease Mother      Hypertension Mother       Anesthesia Reaction Mother         Anaphylaxis, oral opioixd     Asthma Mother      Thyroid Disease Mother      Low Back Problems Mother      Alzheimer Disease Mother      Heart Disease Father         CABG x2     Diabetes Father      Coronary Artery Disease Father      Hypertension Father      Hyperlipidemia Father      Low Back Problems Father      Diabetes Sister         insulin dependent     Hypertension Sister      Low Back Problems Sister      Thyroid Disease Sister      Hip fracture Sister      Low Back Problems Brother      Dementia Brother         Lewy Body     Asthma Maternal Grandmother      Coronary Artery Disease Maternal Grandfather      Cerebrovascular Disease Paternal Grandfather      Asthma Daughter      Genetic Disease Daughter         Kallman syndrome; no olfactory bulb;     Calcium Disorder No family hx of         Medications:     Current Outpatient Medications   Medication Sig Dispense Refill     acetaminophen (TYLENOL) 325 MG tablet Take 325-650 mg by mouth every 6 hours as needed for mild pain       acetylcysteine (N-ACETYL CYSTEINE) 600 MG CAPS capsule Take 1 capsule (600 mg) by mouth daily 90 capsule 4     ADVAIR -21 MCG/ACT inhaler Inhale 2 puffs into the lungs 2 times daily       albuterol (PROAIR HFA/PROVENTIL HFA/VENTOLIN HFA) 108 (90 Base) MCG/ACT inhaler INHALE 2 PUFFS BY MOUTH EVERY 6 HOURS AS NEEDED FOR SHORTNESS OF BREATH 18 g 5     alendronate (FOSAMAX) 70 MG tablet TAKE 1 TABLET BY MOUTH EVERY 7 DAYS TAKE 60 MINUTES BEFORE AM MEAL WITH 8 OZ. WATER. REMAIN UPRIGHT FOR 30 MINUTES. 12 tablet 3     amLODIPine (NORVASC) 5 MG tablet Take 1 tablet (5 mg) by mouth at bedtime. 90 tablet 3     aspirin 81 MG EC tablet Take 81 mg by mouth every 4 hours as needed for moderate pain.       atorvastatin (LIPITOR) 20 MG tablet Take 1 tablet (20 mg) by mouth daily. 90 tablet 3     azelastine (ASTELIN) 0.1 % nasal spray Spray 1 spray into both nostrils 2 times daily       bisacodyl (DULCOLAX)  5 MG EC tablet Take 5 mg by mouth daily as needed for constipation.       brimonidine (ALPHAGAN-P) 0.15 % ophthalmic solution PLACE 1 DROP INTO BOTH EYES TWO TIMES A DAY.       Calcium Carbonate Antacid (TUMS PO) Take  by mouth At Bedtime.       co-enzyme Q-10 100 MG CAPS capsule Take 1 capsule (100 mg) by mouth daily 90 capsule 4     fexofenadine (ALLEGRA) 180 MG tablet Take 180 mg by mouth daily.       latanoprost (XALATAN) 0.005 % ophthalmic solution Place 1 drop into the right eye At Bedtime       loratadine (CLARITIN) 10 MG tablet Take 10 mg by mouth daily.       losartan (COZAAR) 50 MG tablet Take 1 tablet (50 mg) by mouth 2 times daily. 180 tablet 3     melatonin 5 MG CAPS Take 1 capsule by mouth as needed.       montelukast (SINGULAIR) 10 MG tablet TAKE ONE TABLET BY MOUTH EVERY DAY AS DIRECTED  1     oxymetazoline (AFRIN) 0.05 % nasal spray 2 sprays as needed.       SPIRIVA RESPIMAT 1.25 MCG/ACT inhaler INHALE 2 PUFFS ONCE PER DAY       traZODone (DESYREL) 50 MG tablet Take 1 tablet (50 mg) by mouth at bedtime. 90 tablet 3     Vitamin D3 (CHOLECALCIFEROL) 25 mcg (1000 units) tablet Take by mouth daily       No current facility-administered medications for this visit.        Allergies:     Allergies   Allergen Reactions     Canola Oil [Vegetable Oil] Anaphylaxis and GI Disturbance     Animal Dander Unknown     Dust Mites Unknown     Grass      Hydroxyzine Other (See Comments)     Passed out         Hydroxyzine Hcl      Pollen Extract Unknown     Trees      Ragweeds      Chlorhexidine Itching and Rash     Hibiclens          Review of Systems:   As above     Physical Exam:   Vitals: BP (!) 150/79 (BP Location: Right arm, Patient Position: Sitting, Cuff Size: Adult Regular)   Pulse 72   Wt 73 kg (161 lb)   LMP  (LMP Unknown)   BMI 25.99 kg/m     General: Seated comfortably in no acute distress.  Neurologic:     Mental Status: Fully alert, attentive. Language normal, speech clear and fluent, no paraphasic  errors.      Data reviewed on previous visits    Imaging:  MRI brain 1/2021  Impression:   Unremarkable MRI of the brain.    Laboratory:  TSH/B12 normal    MRI brain 4/2025  IMPRESSION:  1. Mild generalized parenchymal volume loss. No dementia specific pattern identified.  2. No acute intracranial process.     Amyloid 42/40 ratio - intermediate risk range   elevated    Pertinent Investigations since last visit:   APOE - e3/e4    Amyloid PET brain  Impression:   Positive scan with moderate amyloid deposition.     The longitudinal plan of care for the diagnosis(es)/condition(s) as documented were addressed during this visit. Due to the added complexity in care, I will continue to support Fidelina in the subsequent management and with ongoing continuity of care.      Again, thank you for allowing me to participate in the care of your patient.        Sincerely,        Carlitos Feliz MD    Electronically signed

## 2025-07-30 DIAGNOSIS — F02.80 ALZHEIMER'S DISEASE (H): Primary | ICD-10-CM

## 2025-07-30 DIAGNOSIS — G30.9 ALZHEIMER'S DISEASE (H): Primary | ICD-10-CM

## 2025-07-31 ENCOUNTER — RESULTS FOLLOW-UP (OUTPATIENT)
Dept: ENDOCRINOLOGY | Facility: CLINIC | Age: 75
End: 2025-07-31

## 2025-07-31 ENCOUNTER — TELEPHONE (OUTPATIENT)
Dept: NEUROLOGY | Facility: CLINIC | Age: 75
End: 2025-07-31

## 2025-07-31 PROBLEM — G30.9: Status: ACTIVE | Noted: 2025-07-31

## 2025-07-31 PROBLEM — G31.84: Status: ACTIVE | Noted: 2025-07-31

## 2025-08-01 ENCOUNTER — RESULTS FOLLOW-UP (OUTPATIENT)
Dept: GERIATRICS | Facility: CLINIC | Age: 75
End: 2025-08-01
Payer: COMMERCIAL

## 2025-08-02 ENCOUNTER — APPOINTMENT (OUTPATIENT)
Dept: LAB | Facility: CLINIC | Age: 75
End: 2025-08-02
Payer: COMMERCIAL

## 2025-08-12 ENCOUNTER — INFUSION THERAPY VISIT (OUTPATIENT)
Dept: INFUSION THERAPY | Facility: CLINIC | Age: 75
End: 2025-08-12
Attending: INTERNAL MEDICINE
Payer: COMMERCIAL

## 2025-08-12 VITALS
OXYGEN SATURATION: 98 % | TEMPERATURE: 98.1 F | WEIGHT: 162.2 LBS | BODY MASS INDEX: 26.18 KG/M2 | RESPIRATION RATE: 18 BRPM | HEART RATE: 64 BPM | DIASTOLIC BLOOD PRESSURE: 74 MMHG | SYSTOLIC BLOOD PRESSURE: 146 MMHG

## 2025-08-12 DIAGNOSIS — G30.9 ALZHEIMER'S DISEASE (H): Primary | ICD-10-CM

## 2025-08-12 DIAGNOSIS — F02.80 ALZHEIMER'S DISEASE (H): Primary | ICD-10-CM

## 2025-08-12 LAB
ALBUMIN SERPL BCG-MCNC: 4.4 G/DL (ref 3.5–5.2)
ALP SERPL-CCNC: 65 U/L (ref 40–150)
ALT SERPL W P-5'-P-CCNC: 40 U/L (ref 0–50)
ANION GAP SERPL CALCULATED.3IONS-SCNC: 14 MMOL/L (ref 7–15)
APTT PPP: 27 SECONDS (ref 22–38)
AST SERPL W P-5'-P-CCNC: 33 U/L (ref 0–45)
BASOPHILS # BLD AUTO: 0.05 10E3/UL (ref 0–0.2)
BASOPHILS NFR BLD AUTO: 0.5 %
BILIRUB SERPL-MCNC: 0.6 MG/DL
BUN SERPL-MCNC: 15.2 MG/DL (ref 8–23)
CALCIUM SERPL-MCNC: 10.3 MG/DL (ref 8.8–10.4)
CHLORIDE SERPL-SCNC: 105 MMOL/L (ref 98–107)
CREAT SERPL-MCNC: 0.72 MG/DL (ref 0.51–0.95)
EGFRCR SERPLBLD CKD-EPI 2021: 87 ML/MIN/1.73M2
EOSINOPHIL # BLD AUTO: 0.12 10E3/UL (ref 0–0.7)
EOSINOPHIL NFR BLD AUTO: 1.3 %
ERYTHROCYTE [DISTWIDTH] IN BLOOD BY AUTOMATED COUNT: 12.9 % (ref 10–15)
GLUCOSE SERPL-MCNC: 159 MG/DL (ref 70–99)
HCO3 SERPL-SCNC: 22 MMOL/L (ref 22–29)
HCT VFR BLD AUTO: 42.5 % (ref 35–47)
HGB BLD-MCNC: 14 G/DL (ref 11.7–15.7)
IMM GRANULOCYTES # BLD: 0.03 10E3/UL
IMM GRANULOCYTES NFR BLD: 0.3 %
INR PPP: 0.93 (ref 0.85–1.15)
LYMPHOCYTES # BLD AUTO: 1.7 10E3/UL (ref 0.8–5.3)
LYMPHOCYTES NFR BLD AUTO: 18.1 %
MCH RBC QN AUTO: 30.3 PG (ref 26.5–33)
MCHC RBC AUTO-ENTMCNC: 32.9 G/DL (ref 31.5–36.5)
MCV RBC AUTO: 92 FL (ref 78–100)
MONOCYTES # BLD AUTO: 0.57 10E3/UL (ref 0–1.3)
MONOCYTES NFR BLD AUTO: 6.1 %
NEUTROPHILS # BLD AUTO: 6.94 10E3/UL (ref 1.6–8.3)
NEUTROPHILS NFR BLD AUTO: 73.7 %
NRBC # BLD AUTO: 0 10E3/UL
NRBC BLD AUTO-RTO: 0 /100
PLATELET # BLD AUTO: 296 10E3/UL (ref 150–450)
POTASSIUM SERPL-SCNC: 4.1 MMOL/L (ref 3.4–5.3)
PROT SERPL-MCNC: 6.8 G/DL (ref 6.4–8.3)
PROTHROMBIN TIME: 12.7 SECONDS (ref 11.8–14.8)
RBC # BLD AUTO: 4.62 10E6/UL (ref 3.8–5.2)
SODIUM SERPL-SCNC: 141 MMOL/L (ref 135–145)
WBC # BLD AUTO: 9.41 10E3/UL (ref 4–11)

## 2025-08-12 PROCEDURE — 96365 THER/PROPH/DIAG IV INF INIT: CPT

## 2025-08-12 PROCEDURE — 85014 HEMATOCRIT: CPT | Performed by: INTERNAL MEDICINE

## 2025-08-12 PROCEDURE — 250N000011 HC RX IP 250 OP 636: Mod: JZ | Performed by: INTERNAL MEDICINE

## 2025-08-12 PROCEDURE — 85610 PROTHROMBIN TIME: CPT | Performed by: INTERNAL MEDICINE

## 2025-08-12 PROCEDURE — 82247 BILIRUBIN TOTAL: CPT | Performed by: INTERNAL MEDICINE

## 2025-08-12 PROCEDURE — 258N000003 HC RX IP 258 OP 636: Performed by: INTERNAL MEDICINE

## 2025-08-12 PROCEDURE — 36415 COLL VENOUS BLD VENIPUNCTURE: CPT | Performed by: INTERNAL MEDICINE

## 2025-08-12 PROCEDURE — 85730 THROMBOPLASTIN TIME PARTIAL: CPT | Performed by: INTERNAL MEDICINE

## 2025-08-12 RX ORDER — ALBUTEROL SULFATE 90 UG/1
1-2 INHALANT RESPIRATORY (INHALATION)
Start: 2025-09-02

## 2025-08-12 RX ORDER — DIPHENHYDRAMINE HYDROCHLORIDE 50 MG/ML
50 INJECTION, SOLUTION INTRAMUSCULAR; INTRAVENOUS
Start: 2025-09-02

## 2025-08-12 RX ORDER — EPINEPHRINE 1 MG/ML
0.3 INJECTION, SOLUTION INTRAMUSCULAR; SUBCUTANEOUS EVERY 5 MIN PRN
OUTPATIENT
Start: 2025-09-02

## 2025-08-12 RX ORDER — ALBUTEROL SULFATE 0.83 MG/ML
2.5 SOLUTION RESPIRATORY (INHALATION)
OUTPATIENT
Start: 2025-09-02

## 2025-08-12 RX ORDER — HEPARIN SODIUM (PORCINE) LOCK FLUSH IV SOLN 100 UNIT/ML 100 UNIT/ML
5 SOLUTION INTRAVENOUS
OUTPATIENT
Start: 2025-09-02

## 2025-08-12 RX ORDER — HEPARIN SODIUM,PORCINE 10 UNIT/ML
5-20 VIAL (ML) INTRAVENOUS DAILY PRN
OUTPATIENT
Start: 2025-09-02

## 2025-08-12 RX ORDER — DIPHENHYDRAMINE HCL 25 MG
25 CAPSULE ORAL
OUTPATIENT
Start: 2025-09-02

## 2025-08-12 RX ORDER — ACETAMINOPHEN 325 MG/1
650 TABLET ORAL
OUTPATIENT
Start: 2025-09-02

## 2025-08-12 RX ORDER — METHYLPREDNISOLONE SODIUM SUCCINATE 40 MG/ML
40 INJECTION INTRAMUSCULAR; INTRAVENOUS
Start: 2025-09-02

## 2025-08-12 RX ORDER — DIPHENHYDRAMINE HYDROCHLORIDE 50 MG/ML
25 INJECTION, SOLUTION INTRAMUSCULAR; INTRAVENOUS
Start: 2025-09-02

## 2025-08-12 RX ORDER — MEPERIDINE HYDROCHLORIDE 25 MG/ML
25 INJECTION INTRAMUSCULAR; INTRAVENOUS; SUBCUTANEOUS
OUTPATIENT
Start: 2025-09-02

## 2025-08-12 RX ADMIN — SODIUM CHLORIDE 350 MG: 900 INJECTION, SOLUTION INTRAVENOUS at 13:42

## 2025-08-12 ASSESSMENT — PAIN SCALES - GENERAL: PAINLEVEL_OUTOF10: MILD PAIN (1)

## 2025-08-13 ENCOUNTER — TELEPHONE (OUTPATIENT)
Dept: NEUROLOGY | Facility: CLINIC | Age: 75
End: 2025-08-13
Payer: COMMERCIAL

## 2025-08-25 ENCOUNTER — VIRTUAL VISIT (OUTPATIENT)
Dept: ENDOCRINOLOGY | Facility: CLINIC | Age: 75
End: 2025-08-25
Payer: COMMERCIAL

## 2025-08-25 DIAGNOSIS — M85.9 LOW BONE DENSITY: ICD-10-CM

## 2025-08-25 DIAGNOSIS — R82.994 HYPERCALCIURIA: ICD-10-CM

## 2025-08-25 DIAGNOSIS — Z78.0 POSTMENOPAUSAL STATUS: ICD-10-CM

## 2025-08-25 DIAGNOSIS — E21.0 PRIMARY HYPERPARATHYROIDISM: Primary | ICD-10-CM

## 2025-08-25 PROCEDURE — G2211 COMPLEX E/M VISIT ADD ON: HCPCS

## 2025-08-25 PROCEDURE — 98006 SYNCH AUDIO-VIDEO EST MOD 30: CPT

## 2025-08-30 ENCOUNTER — TELEPHONE (OUTPATIENT)
Dept: NURSING | Facility: CLINIC | Age: 75
End: 2025-08-30
Payer: COMMERCIAL

## 2025-08-30 ENCOUNTER — RESULTS FOLLOW-UP (OUTPATIENT)
Dept: NURSING | Facility: CLINIC | Age: 75
End: 2025-08-30
Payer: COMMERCIAL

## 2025-08-30 DIAGNOSIS — I49.3 PVC'S (PREMATURE VENTRICULAR CONTRACTIONS): ICD-10-CM

## 2025-08-30 DIAGNOSIS — J45.40 MODERATE PERSISTENT ASTHMA WITH ALLERGIC RHINITIS WITHOUT COMPLICATION: ICD-10-CM

## 2025-08-30 DIAGNOSIS — U07.1 INFECTION DUE TO 2019 NOVEL CORONAVIRUS: ICD-10-CM

## 2025-08-30 DIAGNOSIS — I10 BENIGN ESSENTIAL HYPERTENSION: Primary | ICD-10-CM

## 2025-08-30 DIAGNOSIS — G30.9 ALZHEIMER'S DISEASE (H): ICD-10-CM

## 2025-08-30 DIAGNOSIS — F02.80 ALZHEIMER'S DISEASE (H): ICD-10-CM

## 2025-08-31 ENCOUNTER — NURSE TRIAGE (OUTPATIENT)
Dept: NURSING | Facility: CLINIC | Age: 75
End: 2025-08-31
Payer: COMMERCIAL

## 2025-09-01 ENCOUNTER — NURSE TRIAGE (OUTPATIENT)
Dept: NURSING | Facility: CLINIC | Age: 75
End: 2025-09-01
Payer: COMMERCIAL

## 2025-09-01 ENCOUNTER — HOSPITAL ENCOUNTER (EMERGENCY)
Facility: CLINIC | Age: 75
Discharge: HOME | End: 2025-09-01
Attending: EMERGENCY MEDICINE | Admitting: EMERGENCY MEDICINE
Payer: COMMERCIAL

## 2025-09-01 ENCOUNTER — APPOINTMENT (OUTPATIENT)
Dept: GENERAL RADIOLOGY | Facility: CLINIC | Age: 75
End: 2025-09-01
Attending: EMERGENCY MEDICINE
Payer: COMMERCIAL

## 2025-09-01 VITALS
BODY MASS INDEX: 22.82 KG/M2 | HEART RATE: 72 BPM | SYSTOLIC BLOOD PRESSURE: 120 MMHG | RESPIRATION RATE: 16 BRPM | WEIGHT: 137 LBS | DIASTOLIC BLOOD PRESSURE: 74 MMHG | OXYGEN SATURATION: 98 % | HEIGHT: 65 IN | TEMPERATURE: 97.4 F

## 2025-09-01 DIAGNOSIS — U07.1 COVID-19 VIRUS INFECTION: ICD-10-CM

## 2025-09-01 DIAGNOSIS — R07.89 CHEST DISCOMFORT: Primary | ICD-10-CM

## 2025-09-01 LAB
ANION GAP SERPL CALCULATED.3IONS-SCNC: 11 MMOL/L (ref 7–15)
ATRIAL RATE - MUSE: 71 BPM
BASOPHILS # BLD AUTO: <0.03 10E3/UL (ref 0–0.2)
BASOPHILS NFR BLD AUTO: 0.1 %
BUN SERPL-MCNC: 13.3 MG/DL (ref 8–23)
CALCIUM SERPL-MCNC: 10.2 MG/DL (ref 8.8–10.4)
CHLORIDE SERPL-SCNC: 104 MMOL/L (ref 98–107)
CREAT SERPL-MCNC: 0.72 MG/DL (ref 0.51–0.95)
DIASTOLIC BLOOD PRESSURE - MUSE: NORMAL MMHG
EGFRCR SERPLBLD CKD-EPI 2021: 87 ML/MIN/1.73M2
EOSINOPHIL # BLD AUTO: <0.03 10E3/UL (ref 0–0.7)
EOSINOPHIL NFR BLD AUTO: 0 %
ERYTHROCYTE [DISTWIDTH] IN BLOOD BY AUTOMATED COUNT: 12.6 % (ref 10–15)
GLUCOSE SERPL-MCNC: 145 MG/DL (ref 70–99)
HCO3 SERPL-SCNC: 23 MMOL/L (ref 22–29)
HCT VFR BLD AUTO: 40 % (ref 35–47)
HGB BLD-MCNC: 13.4 G/DL (ref 11.7–15.7)
IMM GRANULOCYTES # BLD: 0.04 10E3/UL
IMM GRANULOCYTES NFR BLD: 0.5 %
INTERPRETATION ECG - MUSE: NORMAL
LYMPHOCYTES # BLD AUTO: 1.16 10E3/UL (ref 0.8–5.3)
LYMPHOCYTES NFR BLD AUTO: 13.7 %
MCH RBC QN AUTO: 30.8 PG (ref 26.5–33)
MCHC RBC AUTO-ENTMCNC: 33.5 G/DL (ref 31.5–36.5)
MCV RBC AUTO: 92 FL (ref 78–100)
MONOCYTES # BLD AUTO: 0.48 10E3/UL (ref 0–1.3)
MONOCYTES NFR BLD AUTO: 5.7 %
NEUTROPHILS # BLD AUTO: 6.78 10E3/UL (ref 1.6–8.3)
NEUTROPHILS NFR BLD AUTO: 80 %
NRBC # BLD AUTO: <0.03 10E3/UL
NRBC BLD AUTO-RTO: 0 /100
P AXIS - MUSE: 71 DEGREES
PLATELET # BLD AUTO: 264 10E3/UL (ref 150–450)
POTASSIUM SERPL-SCNC: 4.2 MMOL/L (ref 3.4–5.3)
PR INTERVAL - MUSE: 144 MS
QRS DURATION - MUSE: 78 MS
QT - MUSE: 394 MS
QTC - MUSE: 428 MS
R AXIS - MUSE: -28 DEGREES
RBC # BLD AUTO: 4.35 10E6/UL (ref 3.8–5.2)
SODIUM SERPL-SCNC: 138 MMOL/L (ref 135–145)
SYSTOLIC BLOOD PRESSURE - MUSE: NORMAL MMHG
T AXIS - MUSE: 63 DEGREES
TROPONIN T SERPL HS-MCNC: 30 NG/L
TROPONIN T SERPL HS-MCNC: 31 NG/L
VENTRICULAR RATE- MUSE: 71 BPM
WBC # BLD AUTO: 8.47 10E3/UL (ref 4–11)

## 2025-09-01 PROCEDURE — 80048 BASIC METABOLIC PNL TOTAL CA: CPT | Performed by: EMERGENCY MEDICINE

## 2025-09-01 PROCEDURE — 93005 ELECTROCARDIOGRAM TRACING: CPT

## 2025-09-01 PROCEDURE — 71046 X-RAY EXAM CHEST 2 VIEWS: CPT

## 2025-09-01 PROCEDURE — 84484 ASSAY OF TROPONIN QUANT: CPT | Performed by: STUDENT IN AN ORGANIZED HEALTH CARE EDUCATION/TRAINING PROGRAM

## 2025-09-01 PROCEDURE — 84484 ASSAY OF TROPONIN QUANT: CPT | Performed by: EMERGENCY MEDICINE

## 2025-09-01 PROCEDURE — 99285 EMERGENCY DEPT VISIT HI MDM: CPT | Mod: 25 | Performed by: EMERGENCY MEDICINE

## 2025-09-01 PROCEDURE — 85025 COMPLETE CBC W/AUTO DIFF WBC: CPT | Performed by: EMERGENCY MEDICINE

## 2025-09-01 PROCEDURE — 36415 COLL VENOUS BLD VENIPUNCTURE: CPT | Performed by: STUDENT IN AN ORGANIZED HEALTH CARE EDUCATION/TRAINING PROGRAM

## 2025-09-01 PROCEDURE — 85018 HEMOGLOBIN: CPT | Performed by: STUDENT IN AN ORGANIZED HEALTH CARE EDUCATION/TRAINING PROGRAM

## 2025-09-01 PROCEDURE — 36415 COLL VENOUS BLD VENIPUNCTURE: CPT | Performed by: EMERGENCY MEDICINE

## 2025-09-01 PROCEDURE — 84132 ASSAY OF SERUM POTASSIUM: CPT | Performed by: STUDENT IN AN ORGANIZED HEALTH CARE EDUCATION/TRAINING PROGRAM

## 2025-09-01 ASSESSMENT — ACTIVITIES OF DAILY LIVING (ADL)
ADLS_ACUITY_SCORE: 41

## 2025-09-04 ENCOUNTER — ANCILLARY PROCEDURE (OUTPATIENT)
Dept: MRI IMAGING | Facility: CLINIC | Age: 75
End: 2025-09-04
Attending: INTERNAL MEDICINE
Payer: COMMERCIAL

## 2025-09-04 DIAGNOSIS — G30.9 ALZHEIMER'S DISEASE (H): ICD-10-CM

## 2025-09-04 DIAGNOSIS — F02.80 ALZHEIMER'S DISEASE (H): ICD-10-CM

## (undated) DEVICE — TRAY PAIN INJECTION 97A 640

## (undated) DEVICE — PREP DURAPREP 26ML APL 8630

## (undated) DEVICE — LINEN TOWEL PACK X5 5464

## (undated) DEVICE — TUBING EXTENSION SET MICROBORE 6" LL 2N1194

## (undated) DEVICE — DRSG PRIMAPORE 02X3" 7133

## (undated) DEVICE — NDL SPINAL 22GA 3.5" QUINCKE 405181

## (undated) DEVICE — GLOVE BIOGEL PI ULTRATOUCH G SZ 7.0 42170

## (undated) RX ORDER — TRIAMCINOLONE ACETONIDE 40 MG/ML
INJECTION, SUSPENSION INTRA-ARTICULAR; INTRAMUSCULAR
Status: DISPENSED
Start: 2017-10-19

## (undated) RX ORDER — LIDOCAINE HYDROCHLORIDE 10 MG/ML
INJECTION, SOLUTION INFILTRATION; PERINEURAL
Status: DISPENSED
Start: 2017-10-19